# Patient Record
Sex: MALE | Race: WHITE | NOT HISPANIC OR LATINO | Employment: OTHER | URBAN - METROPOLITAN AREA
[De-identification: names, ages, dates, MRNs, and addresses within clinical notes are randomized per-mention and may not be internally consistent; named-entity substitution may affect disease eponyms.]

---

## 2020-06-25 ENCOUNTER — OFFICE VISIT (OUTPATIENT)
Dept: FAMILY MEDICINE CLINIC | Facility: CLINIC | Age: 80
End: 2020-06-25
Payer: MEDICARE

## 2020-06-25 VITALS
OXYGEN SATURATION: 98 % | BODY MASS INDEX: 27.4 KG/M2 | WEIGHT: 170.5 LBS | HEART RATE: 73 BPM | TEMPERATURE: 97.1 F | HEIGHT: 66 IN | SYSTOLIC BLOOD PRESSURE: 124 MMHG | DIASTOLIC BLOOD PRESSURE: 60 MMHG | RESPIRATION RATE: 16 BRPM

## 2020-06-25 DIAGNOSIS — F51.02 ADJUSTMENT INSOMNIA: ICD-10-CM

## 2020-06-25 DIAGNOSIS — R41.89 COGNITIVE IMPAIRMENT: Primary | ICD-10-CM

## 2020-06-25 DIAGNOSIS — F41.9 ANXIETY: ICD-10-CM

## 2020-06-25 DIAGNOSIS — F32.1 CURRENT MODERATE EPISODE OF MAJOR DEPRESSIVE DISORDER WITHOUT PRIOR EPISODE (HCC): ICD-10-CM

## 2020-06-25 DIAGNOSIS — F42.8 OBSESSION: ICD-10-CM

## 2020-06-25 PROBLEM — F32.9 CURRENT EPISODE OF MAJOR DEPRESSIVE DISORDER WITHOUT PRIOR EPISODE: Status: ACTIVE | Noted: 2020-06-25

## 2020-06-25 PROBLEM — G47.00 INSOMNIA: Status: ACTIVE | Noted: 2020-06-25

## 2020-06-25 PROCEDURE — 3008F BODY MASS INDEX DOCD: CPT | Performed by: INTERNAL MEDICINE

## 2020-06-25 PROCEDURE — 99214 OFFICE O/P EST MOD 30 MIN: CPT | Performed by: INTERNAL MEDICINE

## 2020-06-25 PROCEDURE — 1036F TOBACCO NON-USER: CPT | Performed by: INTERNAL MEDICINE

## 2020-06-25 PROCEDURE — 4040F PNEUMOC VAC/ADMIN/RCVD: CPT | Performed by: INTERNAL MEDICINE

## 2020-06-25 PROCEDURE — 1160F RVW MEDS BY RX/DR IN RCRD: CPT | Performed by: INTERNAL MEDICINE

## 2020-06-25 RX ORDER — RABEPRAZOLE SODIUM 20 MG/1
TABLET, DELAYED RELEASE ORAL
COMMUNITY
Start: 2020-02-10 | End: 2021-02-15

## 2020-06-25 RX ORDER — DUTASTERIDE 0.5 MG/1
CAPSULE, LIQUID FILLED ORAL
COMMUNITY
Start: 2020-04-09

## 2020-08-17 ENCOUNTER — APPOINTMENT (EMERGENCY)
Dept: RADIOLOGY | Facility: HOSPITAL | Age: 80
End: 2020-08-17
Payer: MEDICARE

## 2020-08-17 ENCOUNTER — APPOINTMENT (EMERGENCY)
Dept: CT IMAGING | Facility: HOSPITAL | Age: 80
End: 2020-08-17
Payer: MEDICARE

## 2020-08-17 ENCOUNTER — HOSPITAL ENCOUNTER (EMERGENCY)
Facility: HOSPITAL | Age: 80
Discharge: HOME | End: 2020-08-17
Attending: EMERGENCY MEDICINE | Admitting: EMERGENCY MEDICINE
Payer: MEDICARE

## 2020-08-17 VITALS
TEMPERATURE: 96.4 F | WEIGHT: 166 LBS | HEIGHT: 66 IN | DIASTOLIC BLOOD PRESSURE: 75 MMHG | RESPIRATION RATE: 16 BRPM | OXYGEN SATURATION: 97 % | HEART RATE: 75 BPM | SYSTOLIC BLOOD PRESSURE: 151 MMHG | BODY MASS INDEX: 26.68 KG/M2

## 2020-08-17 DIAGNOSIS — R10.12 LEFT UPPER QUADRANT ABDOMINAL PAIN: Primary | ICD-10-CM

## 2020-08-17 DIAGNOSIS — R07.9 CHEST PAIN, UNSPECIFIED TYPE: ICD-10-CM

## 2020-08-17 LAB
ALBUMIN SERPL BCP-MCNC: 3.9 G/DL (ref 3.4–4.8)
ALP SERPL-CCNC: 47.1 U/L (ref 10–129)
ALT SERPL W P-5'-P-CCNC: 12 U/L (ref 5–63)
ANION GAP SERPL CALCULATED.3IONS-SCNC: 8 MMOL/L (ref 4–13)
AST SERPL W P-5'-P-CCNC: 14 U/L (ref 15–41)
ATRIAL RATE: 63 BPM
BASOPHILS # BLD AUTO: 0.02 THOUSANDS/ΜL (ref 0–0.1)
BASOPHILS NFR BLD AUTO: 0 % (ref 0–1)
BILIRUB SERPL-MCNC: 0.82 MG/DL (ref 0.3–1.2)
BUN SERPL-MCNC: 15 MG/DL (ref 6–20)
CALCIUM SERPL-MCNC: 9 MG/DL (ref 8.4–10.2)
CHLORIDE SERPL-SCNC: 107 MMOL/L (ref 96–108)
CO2 SERPL-SCNC: 24 MMOL/L (ref 22–33)
CREAT SERPL-MCNC: 1.01 MG/DL (ref 0.5–1.2)
EOSINOPHIL # BLD AUTO: 0.05 THOUSAND/ΜL (ref 0–0.61)
EOSINOPHIL NFR BLD AUTO: 1 % (ref 0–6)
ERYTHROCYTE [DISTWIDTH] IN BLOOD BY AUTOMATED COUNT: 13.7 % (ref 11.6–15.1)
GFR SERPL CREATININE-BSD FRML MDRD: 70 ML/MIN/1.73SQ M
GLUCOSE SERPL-MCNC: 98 MG/DL (ref 65–140)
HCT VFR BLD AUTO: 44.5 % (ref 36.5–49.3)
HGB BLD-MCNC: 15.5 G/DL (ref 12–17)
IMM GRANULOCYTES # BLD AUTO: 0.01 THOUSAND/UL (ref 0–0.2)
IMM GRANULOCYTES NFR BLD AUTO: 0 % (ref 0–2)
LACTATE SERPL-SCNC: 0.7 MMOL/L (ref 0–2)
LIPASE SERPL-CCNC: 42 U/L (ref 13–60)
LYMPHOCYTES # BLD AUTO: 0.99 THOUSANDS/ΜL (ref 0.6–4.47)
LYMPHOCYTES NFR BLD AUTO: 15 % (ref 14–44)
MCH RBC QN AUTO: 30.3 PG (ref 26.8–34.3)
MCHC RBC AUTO-ENTMCNC: 34.8 G/DL (ref 31.4–37.4)
MCV RBC AUTO: 87 FL (ref 82–98)
MONOCYTES # BLD AUTO: 0.42 THOUSAND/ΜL (ref 0.17–1.22)
MONOCYTES NFR BLD AUTO: 6 % (ref 4–12)
NEUTROPHILS # BLD AUTO: 5.25 THOUSANDS/ΜL (ref 1.85–7.62)
NEUTS SEG NFR BLD AUTO: 78 % (ref 43–75)
P AXIS: 32 DEGREES
PLATELET # BLD AUTO: 194 THOUSANDS/UL (ref 149–390)
PMV BLD AUTO: 10.8 FL (ref 8.9–12.7)
POTASSIUM SERPL-SCNC: 4 MMOL/L (ref 3.5–5)
PR INTERVAL: 164 MS
PROT SERPL-MCNC: 6.1 G/DL (ref 6.4–8.3)
QRS AXIS: -33 DEGREES
QRSD INTERVAL: 100 MS
QT INTERVAL: 445 MS
QTC INTERVAL: 452 MS
RBC # BLD AUTO: 5.11 MILLION/UL (ref 3.88–5.62)
SODIUM SERPL-SCNC: 139 MMOL/L (ref 133–145)
T WAVE AXIS: 72 DEGREES
TROPONIN I SERPL-MCNC: <0.03 NG/ML (ref 0–0.07)
TROPONIN I SERPL-MCNC: <0.03 NG/ML (ref 0–0.07)
VENTRICULAR RATE: 62 BPM
WBC # BLD AUTO: 6.74 THOUSAND/UL (ref 4.31–10.16)

## 2020-08-17 PROCEDURE — 84484 ASSAY OF TROPONIN QUANT: CPT | Performed by: PHYSICIAN ASSISTANT

## 2020-08-17 PROCEDURE — 93010 ELECTROCARDIOGRAM REPORT: CPT | Performed by: INTERNAL MEDICINE

## 2020-08-17 PROCEDURE — 85025 COMPLETE CBC W/AUTO DIFF WBC: CPT | Performed by: PHYSICIAN ASSISTANT

## 2020-08-17 PROCEDURE — 93005 ELECTROCARDIOGRAM TRACING: CPT

## 2020-08-17 PROCEDURE — G1004 CDSM NDSC: HCPCS

## 2020-08-17 PROCEDURE — 71046 X-RAY EXAM CHEST 2 VIEWS: CPT

## 2020-08-17 PROCEDURE — 99285 EMERGENCY DEPT VISIT HI MDM: CPT | Performed by: PHYSICIAN ASSISTANT

## 2020-08-17 PROCEDURE — 83605 ASSAY OF LACTIC ACID: CPT | Performed by: PHYSICIAN ASSISTANT

## 2020-08-17 PROCEDURE — 80053 COMPREHEN METABOLIC PANEL: CPT | Performed by: PHYSICIAN ASSISTANT

## 2020-08-17 PROCEDURE — 96360 HYDRATION IV INFUSION INIT: CPT

## 2020-08-17 PROCEDURE — 36415 COLL VENOUS BLD VENIPUNCTURE: CPT | Performed by: PHYSICIAN ASSISTANT

## 2020-08-17 PROCEDURE — 74177 CT ABD & PELVIS W/CONTRAST: CPT

## 2020-08-17 PROCEDURE — 99285 EMERGENCY DEPT VISIT HI MDM: CPT

## 2020-08-17 PROCEDURE — 83690 ASSAY OF LIPASE: CPT | Performed by: PHYSICIAN ASSISTANT

## 2020-08-17 RX ADMIN — SODIUM CHLORIDE 1000 ML: 0.9 INJECTION, SOLUTION INTRAVENOUS at 14:55

## 2020-08-17 RX ADMIN — IOHEXOL 100 ML: 350 INJECTION, SOLUTION INTRAVENOUS at 15:47

## 2020-08-17 NOTE — ED NOTES
Pt ambulated to bathroom with steady, even gait, without difficulty at this time      Bharti Mcghee RN  08/17/20 5115

## 2020-08-17 NOTE — DISCHARGE INSTRUCTIONS
The exact cause of your symptoms is not yet known, but your evaluation during today's emergency department visit is within normal limits      Please follow-up with your primary care doctor as well as a cardiologist for further evaluation if symptoms persist

## 2020-08-19 ENCOUNTER — APPOINTMENT (OUTPATIENT)
Dept: LAB | Facility: HOSPITAL | Age: 80
End: 2020-08-19
Payer: MEDICARE

## 2020-08-19 ENCOUNTER — TRANSCRIBE ORDERS (OUTPATIENT)
Dept: ADMINISTRATIVE | Facility: HOSPITAL | Age: 80
End: 2020-08-19

## 2020-08-19 DIAGNOSIS — R97.20 ELEVATED PROSTATE SPECIFIC ANTIGEN (PSA): Primary | ICD-10-CM

## 2020-08-19 DIAGNOSIS — R97.20 ELEVATED PROSTATE SPECIFIC ANTIGEN (PSA): ICD-10-CM

## 2020-08-19 LAB — PSA SERPL-MCNC: 4 NG/ML (ref 0–4)

## 2020-08-19 PROCEDURE — G0103 PSA SCREENING: HCPCS

## 2020-08-19 PROCEDURE — 36415 COLL VENOUS BLD VENIPUNCTURE: CPT

## 2020-08-27 ENCOUNTER — OFFICE VISIT (OUTPATIENT)
Dept: FAMILY MEDICINE CLINIC | Facility: CLINIC | Age: 80
End: 2020-08-27
Payer: MEDICARE

## 2020-08-27 VITALS
DIASTOLIC BLOOD PRESSURE: 70 MMHG | RESPIRATION RATE: 16 BRPM | TEMPERATURE: 96.6 F | SYSTOLIC BLOOD PRESSURE: 120 MMHG | HEART RATE: 65 BPM | HEIGHT: 66 IN | WEIGHT: 165 LBS | BODY MASS INDEX: 26.52 KG/M2 | OXYGEN SATURATION: 98 %

## 2020-08-27 DIAGNOSIS — R68.89 MICROGRAPHIA: ICD-10-CM

## 2020-08-27 DIAGNOSIS — R41.3 MEMORY IMPAIRMENT: Primary | ICD-10-CM

## 2020-08-27 DIAGNOSIS — R25.1 TREMOR: ICD-10-CM

## 2020-08-27 PROCEDURE — 1036F TOBACCO NON-USER: CPT | Performed by: FAMILY MEDICINE

## 2020-08-27 PROCEDURE — 1160F RVW MEDS BY RX/DR IN RCRD: CPT | Performed by: FAMILY MEDICINE

## 2020-08-27 PROCEDURE — 4040F PNEUMOC VAC/ADMIN/RCVD: CPT | Performed by: FAMILY MEDICINE

## 2020-08-27 PROCEDURE — 99214 OFFICE O/P EST MOD 30 MIN: CPT | Performed by: FAMILY MEDICINE

## 2020-08-27 PROCEDURE — 3008F BODY MASS INDEX DOCD: CPT | Performed by: FAMILY MEDICINE

## 2020-08-27 NOTE — PROGRESS NOTES
Assessment/Plan:         Problem List Items Addressed This Visit        Other    Memory impairment - Primary     becomng more concerning to pt will get TSH Vit B12 and  Folate; will get neuro  consult         Relevant Orders    TSH, 3rd generation    Vitamin B12    Folate    Ambulatory referral to Neurology    Palmira Rivas     Will get neuro consult  rule out early parkinsons         Tremor     Only occasional tremor will get neuro eval rule out early parkinsons                 Subjective: pt here because of memory problems pt had this addressed by dr Tristan Infante 2 months ago no blood work  Done yet pt thinks he is getting  worse has problems with word recall cant finish sentences cant remember names and forgets purpose for some of his errand  Pt does feel his handwriting has gotten worse and describes it getting smaller and messier sometimes illegible  Pt with occasional tremor of hands pts mother had early onset alzheimers     Patient ID: Micheline Kwok is a 79 y o  male  HPI    The following portions of the patient's history were reviewed and updated as appropriate:   He has a past medical history of BPH (benign prostatic hyperplasia), Disorder of eye, Diverticulitis of rectum, GERD (gastroesophageal reflux disease), Infected hernioplasty mesh ([facility] [address] ), 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[facility] [address] ), Mixed hyperlipidemia, and [facility] )  ,  does not have any pertinent problems on file  ,   has a past surgical history that includes Hernia repair (Right); EGD (2019); EGD AND COLONOSCOPY (2017); and Skin cancer excision (02/21/2019)  ,  family history includes No Known Problems in his father and mother  ,   reports that he has quit smoking  He has never used smokeless tobacco  He reports current alcohol use  He reports previous drug use ,  is allergic to sulfa antibiotics     Current Outpatient Medications   Medication Sig Dispense Refill    dutasteride (AVODART) 0 5 mg capsule 1 CAPSULE PO DAILY      Multiple Vitamins-Minerals (MEMORY NICHOLAS PO) 1 po daily      Probiotic Product (PROBIOTIC ADVANCED PO) 1 po daily      RABEprazole (ACIPHEX) 20 MG tablet 1 TABLET PO DAILY      VITAMIN D, CHOLECALCIFEROL, PO 3 caps po daily       No current facility-administered medications for this visit  Review of Systems   Constitutional: Negative for appetite change, chills, fatigue and fever  Respiratory: Negative for cough, chest tightness and shortness of breath  Cardiovascular: Negative for chest pain, palpitations and leg swelling  Gastrointestinal: Negative for abdominal pain, constipation, diarrhea, nausea and vomiting  Genitourinary: Negative for difficulty urinating and frequency  Musculoskeletal: Negative for arthralgias, back pain and neck pain  Skin: Negative for rash  Neurological: Positive for tremors (occasional)  Negative for dizziness, weakness, light-headedness, numbness and headaches  Memory deficit and micrographia   Hematological: Does not bruise/bleed easily  Psychiatric/Behavioral: Negative for dysphoric mood and sleep disturbance  The patient is not nervous/anxious  Objective:  Vitals:    08/27/20 1431   BP: 120/70   BP Location: Left arm   Patient Position: Sitting   Cuff Size: Adult   Pulse: 65   Resp: 16   Temp: (!) 96 6 °F (35 9 °C)   SpO2: 98%   Weight: 74 8 kg (165 lb)   Height: 5' 6" (1 676 m)     Body mass index is 26 63 kg/m²  Physical Exam  Vitals signs reviewed  Constitutional:       General: He is not in acute distress  Appearance: Normal appearance  He is well-developed  Eyes:      Conjunctiva/sclera: Conjunctivae normal       Pupils: Pupils are equal, round, and reactive to light  Neck:      Musculoskeletal: Normal range of motion and neck supple  Thyroid: No thyromegaly  Vascular: No carotid bruit  Cardiovascular:      Rate and Rhythm: Normal rate and regular rhythm  Pulses: Normal pulses        Heart sounds: Normal heart sounds  No murmur  Comments: No carotid bruits  Pulmonary:      Effort: Pulmonary effort is normal  No respiratory distress  Breath sounds: Normal breath sounds  Chest:      Chest wall: No tenderness  Abdominal:      General: Bowel sounds are normal  There is no distension  Palpations: Abdomen is soft  Tenderness: There is no abdominal tenderness  Lymphadenopathy:      Cervical: No cervical adenopathy  Skin:     General: Skin is warm and dry  Neurological:      General: No focal deficit present  Mental Status: He is alert and oriented to person, place, and time  Cranial Nerves: No cranial nerve deficit  Motor: No weakness  Coordination: Coordination normal       Gait: Gait normal       Deep Tendon Reflexes: Reflexes normal       Comments: No visible tremor   Psychiatric:         Mood and Affect: Mood normal          Behavior: Behavior normal          Thought Content:  Thought content normal        Mini mental status score 29/30

## 2020-08-28 ENCOUNTER — APPOINTMENT (OUTPATIENT)
Dept: LAB | Facility: HOSPITAL | Age: 80
End: 2020-08-28
Payer: MEDICARE

## 2020-08-28 DIAGNOSIS — R41.3 MEMORY IMPAIRMENT: ICD-10-CM

## 2020-08-28 LAB
FOLATE SERPL-MCNC: >20 NG/ML (ref 3.1–17.5)
TSH SERPL DL<=0.05 MIU/L-ACNC: 1.86 UIU/ML (ref 0.34–5.6)
VIT B12 SERPL-MCNC: 1758 PG/ML (ref 100–900)

## 2020-08-28 PROCEDURE — 84443 ASSAY THYROID STIM HORMONE: CPT

## 2020-08-28 PROCEDURE — 36415 COLL VENOUS BLD VENIPUNCTURE: CPT

## 2020-08-28 PROCEDURE — 82607 VITAMIN B-12: CPT

## 2020-08-28 PROCEDURE — 82746 ASSAY OF FOLIC ACID SERUM: CPT

## 2021-01-04 ENCOUNTER — CONSULT (OUTPATIENT)
Dept: NEUROLOGY | Facility: CLINIC | Age: 81
End: 2021-01-04
Payer: MEDICARE

## 2021-01-04 VITALS
SYSTOLIC BLOOD PRESSURE: 124 MMHG | BODY MASS INDEX: 25.25 KG/M2 | HEIGHT: 68 IN | DIASTOLIC BLOOD PRESSURE: 62 MMHG | WEIGHT: 166.6 LBS

## 2021-01-04 DIAGNOSIS — R41.3 MEMORY IMPAIRMENT: ICD-10-CM

## 2021-01-04 PROCEDURE — 99204 OFFICE O/P NEW MOD 45 MIN: CPT | Performed by: PSYCHIATRY & NEUROLOGY

## 2021-01-04 RX ORDER — MULTIVIT WITH MINERALS/LUTEIN
1000 TABLET ORAL DAILY
COMMUNITY

## 2021-01-04 NOTE — PROGRESS NOTES
Assessment/Plan:    Memory impairment  32-year-old man presents for evaluation of cognitive symptoms  Mostly describes frontal/inattentive symptoms such as losing objects around his house  He also has trouble with proper nouns, a common symptom normal cognitive aging  He struggled some on bedside cognitive testing scoring a 22/30 on the MoCA  He has a positive family history of Alzheimer's dementia in his mother  The patient had a single episode of sleep walking  Did have this as a child as well  Discussed option of acquiring an EEG given the low likelihood of frontal nocturnal seizures  He would like to hold off for the time being  Reviewed the patient's last PCP note in addition to labs including a normal TSH and normal B12  We will acquire an MRI with [facility]  Will call with any significant findings and discuss details at follow up  Follow up with Tyrese Stephenson, could transfer to geriatrics there after - this was discussed  Diagnoses and all orders for this visit:    Memory impairment  -     Ambulatory referral to Neurology  -     MRI brain [facility] wo contrast; Future    Other orders  -     vitamin E, tocopherol, 1,000 units capsule; Take 1,000 Units by mouth daily        Subjective:     Patient ID: Baron Austin 79 y o  male    I had the pleasure of seeing your patient, Baron Austin in the Movement Disorders Clinic at the [facility]  The patient is a 79 y o   male who presents for for evaluation of cognitive decline  He presents today with his wife  They describe mostly trouble recalling proper nouns, names of people and addresses  He also has a tendency to lose things around his house  The patient's wife describes a tendency to perseverate on ideas  Often wants to talk about the same thing over and over again though he does remember that he has talked about it already  These cognitive symptoms began about 7 months ago    He also notices that he S to put in more effort to concentrate on reading  The patient was educated through graduate school  He was career [employer], 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  No history of head trauma  Getting lost: no   Story recall: no   Repeating: no     Family History: Mother had Alzheimer's -  in her late 62s  Sister, younger - fall head injury     Regarding motor symptoms:   Tremor: mild, occasional  Witting usually   Slowness: things take longer due to detraction   Stiffness: no   Dystonia: no   Changes in facial expression: no   Changes in voice: no   Changes in writin year  Messy, gets confused  Has had some small letters "smaller than I can handle" normal sample   Changes in gait: no, walks a lot with dog   Falls:no  Freezing: no  Trouble with swallowing:no  Clumsiness/dexterity:no    Regarding non-motor symptoms:   Anosmia: no  Constipation: no  Lightheadedness: minor   Changes in Mood: ok per patient  More anxious "gets strong once in a while" never on medication for mood  Trouble with sleep: was bad over the summer but ok now  REM behavior Disorder: sleep walking once, fell on the stairs  Doesn't recall anything about it  Never acts out his dreams otherwise  The following portions of the patient's history were reviewed and updated as appropriate: allergies, current medications, past family history, past medical history, past social history, past surgical history and problem list     Objective:  /62 (BP Location: Left arm, Patient Position: Sitting, Cuff Size: Standard)   Ht 5' 8" (1 727 m)   Wt 75 6 kg (166 lb 9 6 oz)   BMI 25 33 kg/m²     Physical Exam    Neurological Exam    GENERAL MEDICAL EXAMINATION:  General appearance: alert, in no apparent distress  Appropriately dressed and groomed  Conversing and interacting appropriately  Eyes: Sclera are non-injected     Ears, nose, Mouth, Throat: Mucous membranes are moist    Resp: Breathing comfortably on RA   Musculoskeletal: No evidence of deformities  No contractures  No Edema  Skin: No visible rashes  Warm and well perfused  Psych: normal and appropriate affect     Mental Status:  Alert and oriented to person place and time  Able to relate history without difficulty  Attentive to conversation  Language is fluent and appropriate with normal prosody  There were no paraphasic errors  Speech was not dysarthric  Able to follow both midline and appendicular commands  MoCA 22/30    General Neurology examination:   PERRL, EOMI, Face activates symmetrically  Normal bulk and tone throughout  Patient moves all 4 extremities equally and antigravity  No pronator drift  There were no adventitious movements noted  Normal sensation to light touch and temperature in all 4 extremities  Finger to nose without dysmetria bilaterally  No intention tremor  Fine ALAN were accurate and symmetric with regard to dg and speed  ALAN are irregular in right leg  Normal tone  No tremor  Increased spontaneous movements - psychomotor agitation  No hypomimia or hypophonia     Dysmetria: none on FNF  Dyskinesia: none  Dystonia: none   Myoclonus: none     Gait:   Able to rise from a chair without the use of arms  Posture was normal  Narrow-base and stable stance  Normal initiation  Normal stride length, step height, arm swing  Turn completed in 2 steps  Review of Systems   Constitutional: Negative  Negative for appetite change and fever  HENT: Negative  Negative for hearing loss, tinnitus, trouble swallowing and voice change  Eyes: Negative  Negative for photophobia and pain  Respiratory: Negative  Negative for shortness of breath  Cardiovascular: Negative  Negative for palpitations  Gastrointestinal: Negative  Negative for nausea and vomiting  Endocrine: Negative  Negative for cold intolerance  Genitourinary: Negative  Negative for dysuria, frequency and urgency     Musculoskeletal: Negative  Negative for myalgias and neck pain  Skin: Negative  Negative for rash  Neurological: Positive for numbness (left pointer finger)  Negative for dizziness, tremors, seizures, syncope, facial asymmetry, speech difficulty, weakness, light-headedness and headaches  Hematological: Negative  Does not bruise/bleed easily  Psychiatric/Behavioral: Negative for hallucinations and sleep disturbance  All other systems reviewed and are negative  The above ROS was reviewed and updated  Current Outpatient Medications on File Prior to Visit   Medication Sig Dispense Refill    dutasteride (AVODART) 0 5 mg capsule 1 CAPSULE PO DAILY      Multiple Vitamins-Minerals (MEMORY NICHOLAS PO) 1 po daily      Probiotic Product (PROBIOTIC ADVANCED PO) 1 po daily      RABEprazole (ACIPHEX) 20 MG tablet 1 TABLET PO DAILY      VITAMIN D, CHOLECALCIFEROL, PO 3 caps po daily      vitamin E, tocopherol, 1,000 units capsule Take 1,000 Units by mouth daily       No current facility-administered medications on file prior to visit          Rachana Jade MD  Medical Director   [facility] [address]  [facility]

## 2021-01-04 NOTE — PATIENT INSTRUCTIONS
Things that we know are helpful for thinking and memory   1) Exercise program: gradually increase your physical activity over time  Start small and be patient  Aerobic (cardio) activity is best but incorporate balance, strength and flexibility training as well    a  Try to get at least 30min 3 times per week   2) Diet: Mediterranean diet (colorful fruits and vegetables, olive oil, fish, whole grains, very little red meet is any), MIND diet, anti-inflammatory diet  a  Stay well hydrated: drink 6-8 glasses of water per day   b  What's good for your heart is good for your brain  c  Avoid high-salt foods   3) Sleep: aim for at least 7-8 hours per night   a  Avoid alcohol and medications like Benadryl (Tylenol PM) or other sedating drugs  4) Stress management/mindfulness practice:   a  Talk with our social workers about finding a cognitive behavioral therapists  b  Try a smart phone piedad like [employer] or [employer] for beginners   i  Try curable for pain    c  Take a course in Mindfulness Based Stress Reduction (MBSR)   i  [place] reyes  Read or listen to an audiobook about it:  i  Mindfulness for beginners   ii  10% happier  iii  The happiness advantage   5) Social engagement:   a  Stay in touch with family and friends   b  Plan a few specific activities for your social health every week   c  Melva Estes a local support group   d  Volunteer! www [facility] org/volunteernow or call [phone]    MIND diet score:  1 point for each component      - Green leafy vegetables: at least 6 per week  - Other vegetable: at least 1 per day   - Berries: at least 2 per week  - Red meat: fewer than 4 per week   - Fish: at least 1 per week   - Poultry: at least 2 per week  - Beans: at least 3 per week  - Nuts: at least 5 per week  - Fast or fried food: less than 1 per week  - Olive oil   - Butter less: less than 1 table-spoon per day   - Cheese: less than 1 serving per week   - Pastries/sweets: less than 5 servings per week  - Alcohol: no more than 1 serving/ day

## 2021-01-04 NOTE — ASSESSMENT & PLAN NOTE
78-year-old man presents for evaluation of cognitive symptoms  Mostly describes frontal/inattentive symptoms such as losing objects around his house  He also has trouble with proper nouns, a common symptom normal cognitive aging  He struggled some on bedside cognitive testing scoring a 22/30 on the MoCA  He has a positive family history of Alzheimer's dementia in his mother  The patient had a single episode of sleep walking  Did have this as a child as well  Discussed option of acquiring an EEG given the low likelihood of frontal nocturnal seizures  He would like to hold off for the time being  Reviewed the patient's last PCP note in addition to labs including a normal TSH and normal B12  We will acquire an MRI with [facility]  Will call with any significant findings and discuss details at follow up  Follow up with Estle Alpers, could transfer to geriatrics there after - this was discussed

## 2021-01-08 ENCOUNTER — TELEPHONE (OUTPATIENT)
Dept: FAMILY MEDICINE CLINIC | Facility: CLINIC | Age: 81
End: 2021-01-08

## 2021-01-08 ENCOUNTER — OFFICE VISIT (OUTPATIENT)
Dept: FAMILY MEDICINE CLINIC | Facility: CLINIC | Age: 81
End: 2021-01-08
Payer: MEDICARE

## 2021-01-08 ENCOUNTER — HOSPITAL ENCOUNTER (OUTPATIENT)
Dept: RADIOLOGY | Facility: HOSPITAL | Age: 81
Discharge: HOME | End: 2021-01-08
Attending: INTERNAL MEDICINE
Payer: MEDICARE

## 2021-01-08 VITALS
WEIGHT: 166.5 LBS | BODY MASS INDEX: 25.23 KG/M2 | HEIGHT: 68 IN | SYSTOLIC BLOOD PRESSURE: 112 MMHG | OXYGEN SATURATION: 98 % | RESPIRATION RATE: 16 BRPM | DIASTOLIC BLOOD PRESSURE: 60 MMHG | HEART RATE: 49 BPM | TEMPERATURE: 97.3 F

## 2021-01-08 DIAGNOSIS — M79.672 PAIN OF LEFT HEEL: ICD-10-CM

## 2021-01-08 DIAGNOSIS — M79.672 PAIN OF LEFT HEEL: Primary | ICD-10-CM

## 2021-01-08 PROCEDURE — 73630 X-RAY EXAM OF FOOT: CPT

## 2021-01-08 PROCEDURE — 73650 X-RAY EXAM OF HEEL: CPT

## 2021-01-08 PROCEDURE — 99214 OFFICE O/P EST MOD 30 MIN: CPT | Performed by: INTERNAL MEDICINE

## 2021-01-08 NOTE — PROGRESS NOTES
BMI Counseling: Body mass index is 25 32 kg/m²  The BMI is above normal  Nutrition recommendations include reducing portion sizes, decreasing overall calorie intake, 3-5 servings of fruits/vegetables daily, reducing fast food intake, consuming healthier snacks, decreasing soda and/or juice intake and moderation in carbohydrate intake  Assessment/Plan:    No problem-specific Assessment & Plan notes found for this encounter  Diagnoses and all orders for this visit:    Pain of left heel  -     XR heel / calcaneus 2+ vw left; Future  -     XR foot 3+ vw left; Future    BMI 25 0-25 9,adult      Possible plantar fascitis and this was discussed at length with patient regarding ice therapy, nsaids, voltaren gel, and plantar fascitis exercises-will order foot xray to rule out fracture    Subjective:      Patient ID: Sha Reece is a 79 y o  male  Mr Katrina Jackson has had bad left heel pain for several days now-he is quite the walker, walking his dog several times per day-saw Neurology recently for his memory impairment issues and says he tapped his feet on the floor several times but doesn't think that was the cause of his heel pain-no puncture wounds and doesn't remember injuring it          Review of Systems   Constitutional: Negative for fatigue and fever  Musculoskeletal: Positive for arthralgias  Objective:      /60 (BP Location: Right arm, Patient Position: Sitting, Cuff Size: Adult)   Pulse (!) 49   Temp (!) 97 3 °F (36 3 °C) (Temporal)   Resp 16   Ht 5' 8" (1 727 m)   Wt 75 5 kg (166 lb 8 oz)   SpO2 98%   BMI 25 32 kg/m²          Physical Exam  Constitutional:       Appearance: Normal appearance  HENT:      Head: Normocephalic and atraumatic  Neck:      Musculoskeletal: Normal range of motion     Pulmonary:      Effort: Pulmonary effort is normal    Musculoskeletal:      Comments: Left heel no erythema or wound, slight puffiness    Neurological:      Mental Status: He is alert

## 2021-01-19 ENCOUNTER — HOSPITAL ENCOUNTER (OUTPATIENT)
Dept: MRI IMAGING | Facility: HOSPITAL | Age: 81
Discharge: HOME | End: 2021-01-19
Attending: PSYCHIATRY & NEUROLOGY
Payer: MEDICARE

## 2021-01-19 DIAGNOSIS — R41.3 MEMORY IMPAIRMENT: ICD-10-CM

## 2021-01-19 PROCEDURE — 70551 MRI BRAIN STEM W/O DYE: CPT

## 2021-01-19 PROCEDURE — G1004 CDSM NDSC: HCPCS

## 2021-01-21 ENCOUNTER — TELEPHONE (OUTPATIENT)
Dept: NEUROLOGY | Facility: CLINIC | Age: 81
End: 2021-01-21

## 2021-01-21 NOTE — TELEPHONE ENCOUNTER
Called patient, made him aware of below  Patient verbalized understanding and denied any further questions/concerns at this time

## 2021-01-21 NOTE — TELEPHONE ENCOUNTER
----- Message from Mary Baker MD sent at 1/21/2021  8:26 AM EST -----  Please call the patient regarding his MRI result  No major abnormal findings on the MRI  Will review details at follow up

## 2021-01-27 DIAGNOSIS — Z23 ENCOUNTER FOR IMMUNIZATION: ICD-10-CM

## 2021-02-15 DIAGNOSIS — K21.00 GASTROESOPHAGEAL REFLUX DISEASE WITH ESOPHAGITIS WITHOUT HEMORRHAGE: Primary | ICD-10-CM

## 2021-02-15 RX ORDER — RABEPRAZOLE SODIUM 20 MG/1
TABLET, DELAYED RELEASE ORAL
Qty: 90 TABLET | Refills: 0 | Status: SHIPPED | OUTPATIENT
Start: 2021-02-15 | End: 2021-05-19

## 2021-02-25 ENCOUNTER — IMMUNIZATIONS (OUTPATIENT)
Dept: FAMILY MEDICINE CLINIC | Facility: HOSPITAL | Age: 81
End: 2021-02-25

## 2021-02-25 DIAGNOSIS — Z23 ENCOUNTER FOR IMMUNIZATION: Primary | ICD-10-CM

## 2021-02-25 PROCEDURE — 0001A SARS-COV-2 / COVID-19 MRNA VACCINE (PFIZER-BIONTECH) 30 MCG: CPT

## 2021-02-25 PROCEDURE — 91300 SARS-COV-2 / COVID-19 MRNA VACCINE (PFIZER-BIONTECH) 30 MCG: CPT

## 2021-03-16 ENCOUNTER — IMMUNIZATIONS (OUTPATIENT)
Dept: FAMILY MEDICINE CLINIC | Facility: HOSPITAL | Age: 81
End: 2021-03-16

## 2021-03-16 DIAGNOSIS — Z23 ENCOUNTER FOR IMMUNIZATION: Primary | ICD-10-CM

## 2021-03-16 PROCEDURE — 91300 SARS-COV-2 / COVID-19 MRNA VACCINE (PFIZER-BIONTECH) 30 MCG: CPT

## 2021-03-16 PROCEDURE — 0002A SARS-COV-2 / COVID-19 MRNA VACCINE (PFIZER-BIONTECH) 30 MCG: CPT

## 2021-04-26 ENCOUNTER — APPOINTMENT (OUTPATIENT)
Dept: LAB | Facility: HOSPITAL | Age: 81
End: 2021-04-26
Attending: SPECIALIST
Payer: MEDICARE

## 2021-04-26 ENCOUNTER — TRANSCRIBE ORDERS (OUTPATIENT)
Dept: ADMINISTRATIVE | Facility: HOSPITAL | Age: 81
End: 2021-04-26

## 2021-04-26 DIAGNOSIS — C61 MALIGNANT NEOPLASM OF PROSTATE (HCC): Primary | ICD-10-CM

## 2021-04-26 DIAGNOSIS — C61 MALIGNANT NEOPLASM OF PROSTATE (HCC): ICD-10-CM

## 2021-04-26 PROCEDURE — 84153 ASSAY OF PSA TOTAL: CPT

## 2021-04-26 PROCEDURE — 36415 COLL VENOUS BLD VENIPUNCTURE: CPT

## 2021-04-27 LAB — PSA SERPL-MCNC: 3 NG/ML (ref 0–4)

## 2021-05-06 ENCOUNTER — OFFICE VISIT (OUTPATIENT)
Dept: GASTROENTEROLOGY | Facility: CLINIC | Age: 81
End: 2021-05-06
Payer: MEDICARE

## 2021-05-06 VITALS
HEART RATE: 59 BPM | BODY MASS INDEX: 25.01 KG/M2 | DIASTOLIC BLOOD PRESSURE: 70 MMHG | SYSTOLIC BLOOD PRESSURE: 135 MMHG | WEIGHT: 165 LBS | HEIGHT: 68 IN

## 2021-05-06 DIAGNOSIS — K21.00 GASTROESOPHAGEAL REFLUX DISEASE WITH ESOPHAGITIS WITHOUT HEMORRHAGE: ICD-10-CM

## 2021-05-06 DIAGNOSIS — K86.2 PANCREAS CYST: Primary | ICD-10-CM

## 2021-05-06 PROCEDURE — 99214 OFFICE O/P EST MOD 30 MIN: CPT | Performed by: INTERNAL MEDICINE

## 2021-05-06 NOTE — PROGRESS NOTES
[facility] - Outpatient Follow-up Note  Kaden Buckner [de-identified] y o  male MRN: [id number]  Encounter: [id number]          ASSESSMENT AND PLAN:      1  Pancreas cyst   prior EUS failed to show any concerning lesion and last MRI was done in 2018 and at that time was noted to have no true cyst at the uncinate process of the pancreas or the pancreatic head with no evidence of pancreatic enlargement and there was a hemangioma of the and there was some thickening of the stomach at that time which prompted EGD  For repeat MRI at this time and script was given to get kidney function since he will be getting contrast   - MRI abdomen w wo contrast; Future  - Basic metabolic panel; Future    2  Gastroesophageal reflux disease with esophagitis without hemorrhage   controlled with rabeprazole every other day and will continue this regimen    3  Hx of colon polyps   colonoscopy due in 2022 for screening purposes    We will see patient in 6 months unless otherwise indicated  Patient was seen and examined with Dr Husam Groves   ______________________________________________________________________    SUBJECTIVE:        This is an 26-year-old male who presents today for a follow-up  He does have history of pancreatic cyst and underwent EUS back in 2018 an MRI has been followed and he was due for 1 in December of 2020  He had EGD last 2019 and was noted to have mild antritis gastric polyps hiatal hernia and open ring  Patient last had colonoscopy in 2017 and at that time patient was noted to have no polyps but does have history of tubular adenomas an EGD and colonoscopy are due in 2022  Reflux symptoms are controlled on rabeprazole  Denies any difficulty swallowing and denies any change in bowel habits  REVIEW OF SYSTEMS IS OTHERWISE NEGATIVE        Historical Information   Past Medical History:   Diagnosis Date    BPH (benign prostatic hyperplasia)     Disorder of eye     disorder of choroid of eye  Diverticulitis of rectum     GERD (gastroesophageal reflux disease)     Infected hernioplasty mesh (HCC)     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[address] )     Mixed hyperlipidemia     Skin cancer (melanoma) (HCC)     Uveal, Caroidal     Past Surgical History:   Procedure Laterality Date    EGD  2019    EGD AND COLONOSCOPY  2017    HERNIA REPAIR Right     Inguinal/Umbilical    SKIN CANCER EXCISION  02/21/2019    R upper cheek, benign lesion including margins, face, ears, eyelids, nose, lips, mucous membrane, excised diameter 2 1 to 3 0 CM     Social History   Social History     Substance and Sexual Activity   Alcohol Use Yes    Comment: mod     Social History     Substance and Sexual Activity   Drug Use Not Currently    Comment: DENIES     Social History     Tobacco Use   Smoking Status Former Smoker   Smokeless Tobacco Never Used   Tobacco Comment    QUIT IN 1969     Family History   Problem Relation Age of Onset    Alzheimer's disease Mother     No Known Problems Father        Meds/Allergies       Current Outpatient Medications:     dutasteride (AVODART) 0 5 mg capsule    Multiple Vitamins-Minerals (MEMORY NICHOLAS PO)    Probiotic Product (PROBIOTIC ADVANCED PO)    RABEprazole (ACIPHEX) 20 MG tablet    VITAMIN D, CHOLECALCIFEROL, PO    vitamin E, tocopherol, 1,000 units capsule    Allergies   Allergen Reactions    Sulfa Antibiotics            Objective     Blood pressure 135/70, pulse 59, height 5' 8" (1 727 m), weight 74 8 kg (165 lb)  Body mass index is 25 09 kg/m²  PHYSICAL EXAM:      General Appearance:   Alert, cooperative, no distress   HEENT:   Normocephalic, atraumatic, anicteric      Neck:  Supple, symmetrical, trachea midline   Lungs:   Clear to auscultation bilaterally; no rales, rhonchi or wheezing; respirations unlabored    Heart[de-identified]   Regular rate and rhythm; no murmur, rub, or gallop     Abdomen:   Soft, non-tender, non-distended; normal bowel sounds; no masses, no organomegaly    Genitalia:   Deferred    Rectal:   Deferred    Extremities:  No cyanosis, clubbing or edema    Pulses:  2+ and symmetric    Skin:  No jaundice, rashes, or lesions    Lymph nodes:  No palpable cervical lymphadenopathy        Lab Results:   No visits with results within 1 Day(s) from this visit  Latest known visit with results is:   Appointment on 04/26/2021   Component Date Value    PSA 04/26/2021 3 0          Radiology Results:   No results found

## 2021-05-19 DIAGNOSIS — K21.00 GASTROESOPHAGEAL REFLUX DISEASE WITH ESOPHAGITIS WITHOUT HEMORRHAGE: ICD-10-CM

## 2021-05-19 RX ORDER — RABEPRAZOLE SODIUM 20 MG/1
TABLET, DELAYED RELEASE ORAL
Qty: 90 TABLET | Refills: 0 | Status: SHIPPED | OUTPATIENT
Start: 2021-05-19 | End: 2021-08-16

## 2021-06-08 ENCOUNTER — HOSPITAL ENCOUNTER (OUTPATIENT)
Dept: MRI IMAGING | Facility: HOSPITAL | Age: 81
Discharge: HOME | End: 2021-06-08
Payer: MEDICARE

## 2021-06-08 DIAGNOSIS — K86.2 PANCREAS CYST: ICD-10-CM

## 2021-06-08 PROCEDURE — 74183 MRI ABD W/O CNTR FLWD CNTR: CPT

## 2021-06-08 PROCEDURE — G1004 CDSM NDSC: HCPCS

## 2021-06-08 PROCEDURE — A9585 GADOBUTROL INJECTION: HCPCS | Performed by: PHYSICIAN ASSISTANT

## 2021-06-08 RX ADMIN — GADOBUTROL 7.5 ML: 604.72 INJECTION INTRAVENOUS at 11:51

## 2021-06-08 NOTE — LETTER
[address] [place] [address]  [address] [facility] [address] June 17, 2021    MRN: [id number]     Phone: [phone]     Dear Mr Alyssia Sheikh recently had a(n) MRI performed on 6/8/2021 at  [address] [place] [address] that was requested by Mandie Crane PA-C  The study was reviewed by a radiologist, which is a physician who specializes in medical imaging  The radiologist issued a report describing his or her findings  In that report there was a finding that the radiologist felt warranted further discussion with your health care provider and that discussion would be beneficial to you  The results were sent to Mandie Crane PA-C on 06/15/2021 12:03 PM  We recommend that you contact Mandie Crane PA-C at [phone] or set up an appointment to discuss the results of the imaging test  If you have already heard from Mandie Crane PA-C regarding the results of your study, you can disregard this letter  This letter is not meant to alarm you, but intended to encourage you to follow-up on your results with the provider that sent you for the imaging study  In addition, we have enclosed answers to frequently asked questions by other patients who have also received a letter to review results with their health care provider (see page two)  Thank you for choosing [address] [place] [address] for your medical imaging needs  FREQUENTLY ASKED QUESTIONS    1  Why am I receiving this letter? [facility] requires us to notify patients who have findings on imaging exams that may require more testing or follow-up with a health professional within the next 3 months          2  How serious is the finding on the imaging test?  This letter is sent to all patients who may need follow-up or more testing within the next 3 months  Receiving this letter does not necessarily mean you have a life-threatening imaging finding or disease  Recommendations in the radiologists imaging report are general in nature and it is up to your healthcare provider to say whether those recommendations make sense for your situation  You are strongly encouraged to talk to your health care provider about the results and ask whether additional steps need to be taken  3  Where can I get a copy of the final report for my recent radiology exam?  To get a full copy of the report you can access your records online at [email or web address] or please contact [address] [place] [address] Records Department at [phone] Monday through Friday between 8 am and 6 pm          4  What do I need to do now? Please contact your health care provider who requested the imaging study to discuss what further actions (if any) are needed  You may have already heard from (your ordering provider) in regard to this test in which case you can disregard this letter  NOTICE IN ACCORDANCE WITH THE [facility] PATIENT TEST RESULT INFORMATION ACT OF 2018    You are receiving this notice as a result of a determination by your diagnostic imaging service that further discussions of your test results are warranted and would be beneficial to you  The complete results of your test or tests have been or will be sent to the health care practitioner that ordered the test or tests  It is recommended that you contact your health care practitioner to discuss your results as soon as possible

## 2021-06-09 ENCOUNTER — TELEPHONE (OUTPATIENT)
Dept: NEUROLOGY | Facility: CLINIC | Age: 81
End: 2021-06-09

## 2021-06-14 ENCOUNTER — OFFICE VISIT (OUTPATIENT)
Dept: NEUROLOGY | Facility: CLINIC | Age: 81
End: 2021-06-14
Payer: MEDICARE

## 2021-06-14 VITALS
SYSTOLIC BLOOD PRESSURE: 118 MMHG | HEART RATE: 75 BPM | WEIGHT: 166 LBS | BODY MASS INDEX: 26.06 KG/M2 | DIASTOLIC BLOOD PRESSURE: 82 MMHG | HEIGHT: 67 IN

## 2021-06-14 DIAGNOSIS — R41.89 COGNITIVE IMPAIRMENT: Primary | ICD-10-CM

## 2021-06-14 DIAGNOSIS — R41.3 MEMORY IMPAIRMENT: ICD-10-CM

## 2021-06-14 PROCEDURE — 99214 OFFICE O/P EST MOD 30 MIN: CPT | Performed by: NURSE PRACTITIONER

## 2021-06-14 RX ORDER — MULTIVIT WITH MINERALS/LUTEIN
1000 TABLET ORAL DAILY
COMMUNITY

## 2021-06-14 NOTE — PATIENT INSTRUCTIONS
Medication we could trial is called aricept or donepazil  Things that we know are helpful for thinking and memory   Exercise program: gradually increase your physical activity over time  Start small and be patient  Aerobic (cardio) activity is best but incorporate balance, strength and flexibility training as well  Try to get at least 30min 3 times per week   Diet: Mediterranean diet (colorful fruits and vegetables, olive oil, fish, whole grains, very little red meet is any), MIND diet, anti-inflammatory diet  Stay well hydrated: drink 6-8 glasses of water per day   What's good for your heart is good for your brain   Avoid high-salt foods   Sleep: aim for at least 7-8 hours per night   Avoid alcohol and medications like Benadryl (Tylenol PM) or other sedating drugs   Stress management/mindfulness practice:   Talk with our social workers about finding a cognitive behavioral therapists   Try a smart phone piedad like "[employer]" or "mindfulness for beginners"   Try "curable" for pain     Take a course in Mindfulness Based Stress Reduction (MBSR)   [place]al   Read or listen to an audiobook about it:   Mindfulness for beginners   10% happier   The happiness advantage   Social engagement:   Stay in touch with family and friends   Plan a few specific activities for your social health every week   Join a local support group   Volunteer! www [facility] org/[email or web address] or call [phone]     MIND diet score:   1 point for each component       Green leafy vegetables: at least 6 per week   Other vegetable: at least 1 per day   Berries: at least 2 per week   Red meat: fewer than 4 per week   Fish: at least 1 per week   Poultry: at least 2 per week   Beans: at least 3 per week   Nuts: at least 5 per week   Fast or fried food: less than 1 per week   Olive oil   Butter less: less than 1 table-spoon per day   Cheese: less than 1 serving per week   Pastries/sweets: less than 5 servings per week   Alcohol: no more than 1 serving/ day

## 2021-06-14 NOTE — ASSESSMENT & PLAN NOTE
Overall patient feels that his memory has slightly declined in the past 6 months  He continues to describe some frontal/inattentive symptoms such as losing objects, he also has difficulty remembering names and short-term recall but is able to remember these items in time  We reviewed his MRI with NeuroQuant in which showed a possible neuro degenerative process such as Alzheimer's, however at this time his symptoms would be considered mild and/or possibly age-related  We discussed quite a few possibilities as far as considering medication options such as donepezil  We discussed risks and benefits of this medication and that this medication would only slow progression of a neurodegenerative condition such as Alzheimer's as there is no cure at present time for this type of condition  He also inquired about possibly repeating the MRI which we could consider in the future, however this likely would not change his treatment if his memory continued to decline  His wife notes that he does get embarrassed by his forgetfulness and has been recently stressed with the possibility of moving and we discussed that stress and mood can negatively affect memory as well  We discussed referral to [facility] Care/geriatrics given his MRI results and memory changes  He will plan follow-up with us in 5-6 months in which we would obtain [place], but if he is established with geriatrics he may transition his care to there  At this time he did not want to start any medications, but may consider Aricept in the future and he will contact our office if he wishes to start prior to his next appointment  Patient was encouraged to increase mind stimulating activities such as reading, crosswords, word searches, puzzles, Soduku, solitaire, coloring and other brain games  We also discussed the importance of staying physically active and eating a health diet such as the Mediterranean or MIND diet

## 2021-06-14 NOTE — PROGRESS NOTES
Patient ID: Kaden Buckner is a [de-identified] y o  male  Assessment/Plan:    Memory impairment  Overall patient feels that his memory has slightly declined in the past 6 months  He continues to describe some frontal/inattentive symptoms such as losing objects, he also has difficulty remembering names and short-term recall but is able to remember these items in time  We reviewed his MRI with NeuroQuant in which showed a possible neuro degenerative process such as Alzheimer's, however at this time his symptoms would be considered mild and/or possibly age-related  We discussed quite a few possibilities as far as considering medication options such as donepezil  We discussed risks and benefits of this medication and that this medication would only slow progression of a neurodegenerative condition such as Alzheimer's as there is no cure at present time for this type of condition  He also inquired about possibly repeating the MRI which we could consider in the future, however this likely would not change his treatment if his memory continued to decline  His wife notes that he does get embarrassed by his forgetfulness and has been recently stressed with the possibility of moving and we discussed that stress and mood can negatively affect memory as well  We discussed referral to Senior Care/geriatrics given his MRI results and memory changes  He will plan follow-up with us in 5-6 months in which we would obtain [place], but if he is established with geriatrics he may transition his care to there  At this time he did not want to start any medications, but may consider Aricept in the future and he will contact our office if he wishes to start prior to his next appointment  Patient was encouraged to increase mind stimulating activities such as reading, crosswords, word searches, puzzles, Soduku, solitaire, coloring and other brain games    We also discussed the importance of staying physically active and eating a health diet such as the [address] or MIND diet  Subjective:    HPI    Patient is a 61-year-old male who presented for evaluation of cognitive decline  He noted symptoms began mid  in which she would have trouble recalling proper nouns, names and people addresses, would have a tendency to lose things around the house  He would often talk about the same things over and over again, and would not remember that he had talked about already  He also noted that he had to put more effort into concentrating on reading  The patient was educated through graduate school  He was career [employer], 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  No history of head trauma  Family History: Mother had Alzheimer's -  in her late 62s  Sister, younger - fall head injury     At his initial visit with Dr Mikal Hauser in 2021 he scored a 22/30 on [place]  And his description of his symptoms were noted to be mostly frontal/inattentive, and some common symptoms of normal cognitive aging  An MRI with NeuroQuant was ordered at the time which showed diffuse generalized volume loss, mild degree of chronic small vessel disease, Low hippocampal volume and enlarged inferior lateral and overall ventricular system, suggestive of global ex-vacuo dilatation  The additional finding of an abnormal Hippocamal Occupancy Score, ([address]), is 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  Possibly repeat in 6 months  Interval History:   He feels like his memory has gotten worse since the last time he was here but nothing significant  Will have issues with some short term and long term recall  For example misplacing items, recalling past details of events or something he was told  Forgets names often  His thought process appears to be slower at times  He will forget the name to something but be able to figure out the answer in a few minutes    He had one episode where he couldn't figure out how to open to microwave  No other similar episodes  Finds himself repeating the same things because he forgot he said them  He does get some obsessive thoughts about certain events from time to time  Very unpredictable-good and bad days, more good days then bad  Denies getting lost  No driving concerns  Sleeps well  They are planning to eventually sell their home and move to [place] to be near their daughter  He goes to the gym twice a week, and walks his dog several times a day  Objective:    Blood pressure 118/82, pulse 75, height 5' 7" (1 702 m), weight 75 3 kg (166 lb)  Physical Exam  Constitutional:       General: He is awake  HENT:      Right Ear: Hearing normal       Left Ear: Hearing normal    Eyes:      Extraocular Movements: Extraocular movements intact  Pupils: Pupils are equal, round, and reactive to light  Neurological:      Mental Status: He is alert  Deep Tendon Reflexes:      Reflex Scores:       Bicep reflexes are 2+ on the right side and 2+ on the left side  Brachioradialis reflexes are 2+ on the right side and 2+ on the left side  Patellar reflexes are 2+ on the right side and 2+ on the left side  Achilles reflexes are 2+ on the right side and 2+ on the left side  Psychiatric:         Speech: Speech normal          Neurological Exam  Mental Status  Awake and alert  Oriented to person, place, time and situation  Speech is normal  Language is fluent with no aphasia  Cranial Nerves  CN III, IV, VI: Extraocular movements intact bilaterally  Pupils equal round and reactive to light bilaterally  CN V:  Right: Facial sensation is normal   Left: Facial sensation is normal on the left  CN VII:  Right: There is no facial weakness  Left: There is no facial weakness  CN VIII:  Right: Hearing is normal   Left: Hearing is normal   CN XII: Tongue midline without atrophy or fasciculations      Motor    Strength: Strength 5/5 upper and lower extremities  Sensory  Light touch is normal in upper and lower extremities  Reflexes                                           Right                      Left  Brachioradialis                    2+                         2+  Biceps                                 2+                         2+  Patellar                                2+                         2+  Achilles                                2+                         2+    Coordination  Right: Finger-to-nose normal   Left: Finger-to-nose normal     Gait  Casual gait is normal including stance, stride, and arm swing  ROS:    Review of Systems   Constitutional: Negative  Negative for appetite change and fever  HENT: Negative  Negative for hearing loss, tinnitus, trouble swallowing and voice change  Eyes: Negative  Negative for photophobia and pain  Respiratory: Negative  Negative for shortness of breath  Cardiovascular: Negative  Negative for palpitations  Gastrointestinal: Negative  Negative for nausea and vomiting  Endocrine: Negative  Negative for cold intolerance  Genitourinary: Negative  Negative for dysuria, frequency and urgency  Musculoskeletal: Negative  Negative for myalgias and neck pain  Skin: Negative  Negative for rash  Neurological: Negative  Negative for dizziness, tremors, seizures, syncope, facial asymmetry, speech difficulty, weakness, light-headedness, numbness and headaches  Hematological: Negative  Does not bruise/bleed easily  Psychiatric/Behavioral: Negative  Negative for confusion, hallucinations and sleep disturbance

## 2021-06-16 NOTE — RESULT ENCOUNTER NOTE
It appears that pancreatic cyst has not grown in size when compared to previous EUS it was 2 cm x 1 8 cm,  please let me know if you have any other recommendations as for follow-up and we will have to refer him back regarding the MRI of his spine to his primary or oncologist Sabianist/ethnic/cultural/personal food preferences

## 2021-07-16 ENCOUNTER — HOSPITAL ENCOUNTER (OUTPATIENT)
Dept: MRI IMAGING | Facility: HOSPITAL | Age: 81
Discharge: HOME | End: 2021-07-16
Payer: MEDICARE

## 2021-07-16 DIAGNOSIS — K86.2 PANCREAS CYST: ICD-10-CM

## 2021-07-16 PROCEDURE — G1004 CDSM NDSC: HCPCS

## 2021-07-16 PROCEDURE — A9585 GADOBUTROL INJECTION: HCPCS | Performed by: PHYSICIAN ASSISTANT

## 2021-07-16 PROCEDURE — 74183 MRI ABD W/O CNTR FLWD CNTR: CPT

## 2021-07-16 RX ADMIN — GADOBUTROL 7.5 ML: 604.72 INJECTION INTRAVENOUS at 07:57

## 2021-07-20 ENCOUNTER — HOSPITAL ENCOUNTER (OUTPATIENT)
Dept: MRI IMAGING | Facility: HOSPITAL | Age: 81
Discharge: HOME | End: 2021-07-20
Attending: INTERNAL MEDICINE
Payer: MEDICARE

## 2021-07-20 DIAGNOSIS — M51.46 SCHMORL'S NODES OF LUMBAR REGION: ICD-10-CM

## 2021-07-20 PROCEDURE — 72158 MRI LUMBAR SPINE W/O & W/DYE: CPT

## 2021-07-20 PROCEDURE — A9585 GADOBUTROL INJECTION: HCPCS | Performed by: INTERNAL MEDICINE

## 2021-07-20 PROCEDURE — G1004 CDSM NDSC: HCPCS

## 2021-07-20 RX ADMIN — GADOBUTROL 7.5 ML: 604.72 INJECTION INTRAVENOUS at 07:47

## 2021-07-23 ENCOUNTER — TELEPHONE (OUTPATIENT)
Dept: GASTROENTEROLOGY | Facility: CLINIC | Age: 81
End: 2021-07-23

## 2021-07-23 NOTE — TELEPHONE ENCOUNTER
Spoke to Chantelle Schofield in Radiology  Informed her GI was already aware of results of MRI  She just wanted to inform us that the results were available in chart  Tomasz Hedrick was already aware of results because she spoke with his primary care physician who ordered an MRI of the lumbar area    Thank you

## 2021-07-23 NOTE — TELEPHONE ENCOUNTER
I spoke with Rachel Medellin she already received the results of the MRI of the abdomen that she ordered that was done 7/16  There was an MRI of the lumbar spine ordered by his primary care physician  Dr Cecile Hoyos  on 7/20 -radiology needs to speak with her concerning these findings    Thank you

## 2021-07-23 NOTE — TELEPHONE ENCOUNTER
[employer] from radiology called for Danny Reynolds  Pt had significant findings on his MRI  Vci you please address this  Thank you

## 2021-08-16 DIAGNOSIS — K21.00 GASTROESOPHAGEAL REFLUX DISEASE WITH ESOPHAGITIS WITHOUT HEMORRHAGE: ICD-10-CM

## 2021-08-16 RX ORDER — RABEPRAZOLE SODIUM 20 MG/1
TABLET, DELAYED RELEASE ORAL
Qty: 90 TABLET | Refills: 0 | Status: SHIPPED | OUTPATIENT
Start: 2021-08-16

## 2021-10-18 ENCOUNTER — OFFICE VISIT (OUTPATIENT)
Dept: NEUROLOGY | Facility: CLINIC | Age: 81
End: 2021-10-18
Payer: MEDICARE

## 2021-10-18 VITALS
HEIGHT: 67 IN | DIASTOLIC BLOOD PRESSURE: 80 MMHG | TEMPERATURE: 97.3 F | HEART RATE: 62 BPM | WEIGHT: 168 LBS | SYSTOLIC BLOOD PRESSURE: 120 MMHG | BODY MASS INDEX: 26.37 KG/M2

## 2021-10-18 DIAGNOSIS — R41.3 MEMORY IMPAIRMENT: Primary | ICD-10-CM

## 2021-10-18 PROCEDURE — 99214 OFFICE O/P EST MOD 30 MIN: CPT | Performed by: NURSE PRACTITIONER

## 2021-11-08 ENCOUNTER — APPOINTMENT (OUTPATIENT)
Dept: LAB | Facility: HOSPITAL | Age: 81
End: 2021-11-08
Payer: MEDICARE

## 2021-11-08 DIAGNOSIS — K86.2 PANCREAS CYST: ICD-10-CM

## 2021-11-08 LAB
ANION GAP SERPL CALCULATED.3IONS-SCNC: 6 MMOL/L (ref 4–13)
BUN SERPL-MCNC: 15 MG/DL (ref 6–20)
CALCIUM SERPL-MCNC: 9 MG/DL (ref 8.4–10.2)
CHLORIDE SERPL-SCNC: 105 MMOL/L (ref 96–108)
CO2 SERPL-SCNC: 30 MMOL/L (ref 22–33)
CREAT SERPL-MCNC: 1.11 MG/DL (ref 0.5–1.2)
GFR SERPL CREATININE-BSD FRML MDRD: 62 ML/MIN/1.73SQ M
GLUCOSE P FAST SERPL-MCNC: 87 MG/DL (ref 70–105)
POTASSIUM SERPL-SCNC: 4.3 MMOL/L (ref 3.5–5)
SODIUM SERPL-SCNC: 141 MMOL/L (ref 133–145)

## 2021-11-08 PROCEDURE — 36415 COLL VENOUS BLD VENIPUNCTURE: CPT

## 2021-11-08 PROCEDURE — 80048 BASIC METABOLIC PNL TOTAL CA: CPT

## 2021-11-11 ENCOUNTER — TELEPHONE (OUTPATIENT)
Dept: GASTROENTEROLOGY | Facility: CLINIC | Age: 81
End: 2021-11-11

## 2021-11-11 ENCOUNTER — OFFICE VISIT (OUTPATIENT)
Dept: GASTROENTEROLOGY | Facility: CLINIC | Age: 81
End: 2021-11-11
Payer: MEDICARE

## 2021-11-11 VITALS
HEART RATE: 61 BPM | WEIGHT: 167 LBS | SYSTOLIC BLOOD PRESSURE: 129 MMHG | HEIGHT: 67 IN | DIASTOLIC BLOOD PRESSURE: 61 MMHG | BODY MASS INDEX: 26.21 KG/M2

## 2021-11-11 DIAGNOSIS — K86.2 PANCREAS CYST: ICD-10-CM

## 2021-11-11 DIAGNOSIS — Z86.010 HX OF ADENOMATOUS COLONIC POLYPS: ICD-10-CM

## 2021-11-11 DIAGNOSIS — K31.7 GASTRIC POLYPS: ICD-10-CM

## 2021-11-11 DIAGNOSIS — K21.00 GASTROESOPHAGEAL REFLUX DISEASE WITH ESOPHAGITIS WITHOUT HEMORRHAGE: Primary | ICD-10-CM

## 2021-11-11 DIAGNOSIS — K22.2 SCHATZKI'S RING: ICD-10-CM

## 2021-11-11 PROBLEM — K21.9 GASTROESOPHAGEAL REFLUX: Status: ACTIVE | Noted: 2021-11-11

## 2021-11-11 PROBLEM — Z86.0101 HX OF ADENOMATOUS COLONIC POLYPS: Status: ACTIVE | Noted: 2021-11-11

## 2021-11-11 PROCEDURE — 99214 OFFICE O/P EST MOD 30 MIN: CPT | Performed by: INTERNAL MEDICINE

## 2022-01-20 ENCOUNTER — TELEPHONE (OUTPATIENT)
Dept: GERIATRICS | Age: 82
End: 2022-01-20

## 2022-01-20 NOTE — TELEPHONE ENCOUNTER
Spoke with patient's spouse, Fred Pringle, to reschedule patient's 2/2/22 appointment due to schedule conflicts  Spouse relayed she received our welcome letter and expressed hesitation in rescheduling; relayed she felt that there is no reason we should have living will or power of  and that everything we asked for is in patient's chart  Spouse expressed annoyance  This MA relayed that it is often times helpful, when in a situation where patient cannot speak on his behalf, that documentation is retained in the network; it helps the family  Spouse expressed not understanding and asked to cancel appointments  Appointments have been cancelled

## 2022-02-04 ENCOUNTER — HOSPITAL ENCOUNTER (EMERGENCY)
Facility: HOSPITAL | Age: 82
Discharge: HOME | End: 2022-02-04
Attending: INTERNAL MEDICINE | Admitting: INTERNAL MEDICINE
Payer: MEDICARE

## 2022-02-04 ENCOUNTER — OFFICE VISIT (OUTPATIENT)
Dept: FAMILY MEDICINE CLINIC | Facility: CLINIC | Age: 82
End: 2022-02-04
Payer: MEDICARE

## 2022-02-04 ENCOUNTER — APPOINTMENT (EMERGENCY)
Dept: CT IMAGING | Facility: HOSPITAL | Age: 82
End: 2022-02-04
Payer: MEDICARE

## 2022-02-04 VITALS
HEART RATE: 52 BPM | BODY MASS INDEX: 27.47 KG/M2 | SYSTOLIC BLOOD PRESSURE: 141 MMHG | HEIGHT: 67 IN | RESPIRATION RATE: 18 BRPM | WEIGHT: 175 LBS | OXYGEN SATURATION: 100 % | TEMPERATURE: 97.5 F | DIASTOLIC BLOOD PRESSURE: 61 MMHG

## 2022-02-04 VITALS
OXYGEN SATURATION: 99 % | TEMPERATURE: 97.1 F | SYSTOLIC BLOOD PRESSURE: 124 MMHG | DIASTOLIC BLOOD PRESSURE: 80 MMHG | HEART RATE: 50 BPM | RESPIRATION RATE: 16 BRPM | HEIGHT: 67 IN | BODY MASS INDEX: 26.21 KG/M2 | WEIGHT: 167 LBS

## 2022-02-04 DIAGNOSIS — R10.32 LEFT LOWER QUADRANT ABDOMINAL PAIN: Primary | ICD-10-CM

## 2022-02-04 DIAGNOSIS — K57.92 ACUTE DIVERTICULITIS: Primary | ICD-10-CM

## 2022-02-04 LAB
ALBUMIN SERPL BCP-MCNC: 3.9 G/DL (ref 3.4–4.8)
ALP SERPL-CCNC: 49.7 U/L (ref 10–129)
ALT SERPL W P-5'-P-CCNC: 8 U/L (ref 5–63)
ANION GAP SERPL CALCULATED.3IONS-SCNC: 7 MMOL/L (ref 4–13)
AST SERPL W P-5'-P-CCNC: 12 U/L (ref 15–41)
BASOPHILS # BLD AUTO: 0.01 THOUSANDS/ΜL (ref 0–0.1)
BASOPHILS NFR BLD AUTO: 0 % (ref 0–1)
BILIRUB SERPL-MCNC: 1.11 MG/DL (ref 0.3–1.2)
BUN SERPL-MCNC: 15 MG/DL (ref 6–20)
CALCIUM SERPL-MCNC: 8.9 MG/DL (ref 8.4–10.2)
CHLORIDE SERPL-SCNC: 106 MMOL/L (ref 96–108)
CO2 SERPL-SCNC: 26 MMOL/L (ref 22–33)
CREAT SERPL-MCNC: 1.06 MG/DL (ref 0.5–1.2)
EOSINOPHIL # BLD AUTO: 0.04 THOUSAND/ΜL (ref 0–0.61)
EOSINOPHIL NFR BLD AUTO: 0 % (ref 0–6)
ERYTHROCYTE [DISTWIDTH] IN BLOOD BY AUTOMATED COUNT: 12.8 % (ref 11.6–15.1)
GFR SERPL CREATININE-BSD FRML MDRD: 65 ML/MIN/1.73SQ M
GLUCOSE SERPL-MCNC: 99 MG/DL (ref 65–140)
HCT VFR BLD AUTO: 41.4 % (ref 36.5–49.3)
HGB BLD-MCNC: 14.2 G/DL (ref 12–17)
IMM GRANULOCYTES # BLD AUTO: 0.03 THOUSAND/UL (ref 0–0.2)
IMM GRANULOCYTES NFR BLD AUTO: 0 % (ref 0–2)
LACTATE SERPL-SCNC: 0.8 MMOL/L (ref 0–2)
LIPASE SERPL-CCNC: 24 U/L (ref 13–60)
LYMPHOCYTES # BLD AUTO: 0.81 THOUSANDS/ΜL (ref 0.6–4.47)
LYMPHOCYTES NFR BLD AUTO: 8 % (ref 14–44)
MCH RBC QN AUTO: 30 PG (ref 26.8–34.3)
MCHC RBC AUTO-ENTMCNC: 34.3 G/DL (ref 31.4–37.4)
MCV RBC AUTO: 88 FL (ref 82–98)
MONOCYTES # BLD AUTO: 0.69 THOUSAND/ΜL (ref 0.17–1.22)
MONOCYTES NFR BLD AUTO: 7 % (ref 4–12)
NEUTROPHILS # BLD AUTO: 8.01 THOUSANDS/ΜL (ref 1.85–7.62)
NEUTS SEG NFR BLD AUTO: 85 % (ref 43–75)
NRBC BLD AUTO-RTO: 0 /100 WBCS
PLATELET # BLD AUTO: 171 THOUSANDS/UL (ref 149–390)
PMV BLD AUTO: 11.1 FL (ref 8.9–12.7)
POTASSIUM SERPL-SCNC: 3.8 MMOL/L (ref 3.5–5)
PROT SERPL-MCNC: 6.5 G/DL (ref 6.4–8.3)
RBC # BLD AUTO: 4.73 MILLION/UL (ref 3.88–5.62)
SODIUM SERPL-SCNC: 139 MMOL/L (ref 133–145)
WBC # BLD AUTO: 9.59 THOUSAND/UL (ref 4.31–10.16)

## 2022-02-04 PROCEDURE — 83690 ASSAY OF LIPASE: CPT | Performed by: PHYSICIAN ASSISTANT

## 2022-02-04 PROCEDURE — 96365 THER/PROPH/DIAG IV INF INIT: CPT

## 2022-02-04 PROCEDURE — 99284 EMERGENCY DEPT VISIT MOD MDM: CPT

## 2022-02-04 PROCEDURE — 85025 COMPLETE CBC W/AUTO DIFF WBC: CPT | Performed by: PHYSICIAN ASSISTANT

## 2022-02-04 PROCEDURE — 80053 COMPREHEN METABOLIC PANEL: CPT | Performed by: PHYSICIAN ASSISTANT

## 2022-02-04 PROCEDURE — 99214 OFFICE O/P EST MOD 30 MIN: CPT | Performed by: FAMILY MEDICINE

## 2022-02-04 PROCEDURE — 99284 EMERGENCY DEPT VISIT MOD MDM: CPT | Performed by: PHYSICIAN ASSISTANT

## 2022-02-04 PROCEDURE — 36415 COLL VENOUS BLD VENIPUNCTURE: CPT | Performed by: PHYSICIAN ASSISTANT

## 2022-02-04 PROCEDURE — G1004 CDSM NDSC: HCPCS

## 2022-02-04 PROCEDURE — 83605 ASSAY OF LACTIC ACID: CPT | Performed by: PHYSICIAN ASSISTANT

## 2022-02-04 PROCEDURE — 74177 CT ABD & PELVIS W/CONTRAST: CPT

## 2022-02-04 PROCEDURE — 96366 THER/PROPH/DIAG IV INF ADDON: CPT

## 2022-02-04 RX ORDER — AMOXICILLIN AND CLAVULANATE POTASSIUM 875; 125 MG/1; MG/1
1 TABLET, FILM COATED ORAL 2 TIMES DAILY
Qty: 20 TABLET | Refills: 0 | Status: SHIPPED | OUTPATIENT
Start: 2022-02-04 | End: 2022-02-12 | Stop reason: HOSPADM

## 2022-02-04 RX ORDER — AMOXICILLIN AND CLAVULANATE POTASSIUM 875; 125 MG/1; MG/1
1 TABLET, FILM COATED ORAL 2 TIMES DAILY
Qty: 20 TABLET | Refills: 0 | Status: SHIPPED | OUTPATIENT
Start: 2022-02-04 | End: 2022-02-04 | Stop reason: SDUPTHER

## 2022-02-04 RX ADMIN — IOHEXOL 100 ML: 350 INJECTION, SOLUTION INTRAVENOUS at 14:34

## 2022-02-04 RX ADMIN — SODIUM CHLORIDE, SODIUM LACTATE, POTASSIUM CHLORIDE, AND CALCIUM CHLORIDE 1000 ML: .6; .31; .03; .02 INJECTION, SOLUTION INTRAVENOUS at 13:31

## 2022-02-04 NOTE — PROGRESS NOTES
Assessment/Plan:         Problem List Items Addressed This Visit        Other    Left lower quadrant abdominal pain - Primary     Possible acute diverticulitis pt with sudden onset severe in nature has some guarding give this and age will send to ER for eval                 Subjective: pt with lower abdominal pain this am 2 episodes with trying to have BM  And  Was not successful when he went back to bed after a few minutes it would go away  When he lies to sitting and going up steps it hurts  No urinary complaints no fever     Patient ID: Connie Ocasio is a [de-identified] y o  male  HPI    The following portions of the patient's history were reviewed and updated as appropriate:   Past Medical History:  He has a past medical history of BPH (benign prostatic hyperplasia), Disorder of eye, Diverticulitis of rectum, GERD (gastroesophageal reflux disease), Infected hernioplasty mesh ([facility] ), 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 ), Mixed hyperlipidemia, and Skin cancer (melanoma) ([facility] )  ,  _______________________________________________________________________  Medical Problems:  does not have any pertinent problems on file ,  _______________________________________________________________________  Past Surgical History:   has a past surgical history that includes Hernia repair (Right); EGD (2019); EGD AND COLONOSCOPY (2017); and Skin cancer excision (02/21/2019)  ,  _______________________________________________________________________  Family History:  family history includes Alzheimer's disease in his mother; No Known Problems in his father ,  _______________________________________________________________________  Social History:   reports that he quit smoking about 53 years ago  His smoking use included cigarettes  He has a 9 00 pack-year smoking history  He has never used smokeless tobacco  He reports current alcohol use   He reports previous drug use ,  _______________________________________________________________________  Allergies:  is allergic to sulfa antibiotics     _______________________________________________________________________  Current Outpatient Medications   Medication Sig Dispense Refill    Ascorbic Acid (vitamin C) 1000 MG tablet Take 1,000 mg by mouth daily      b complex vitamins tablet Take 1 tablet by mouth daily      dutasteride (AVODART) 0 5 mg capsule 1 CAPSULE PO DAILY      RABEprazole (ACIPHEX) 20 MG tablet take 1 tablet by mouth 1/2 HOUR PRIOR TO BREAKFAST (Patient taking differently: PT TAKES 1 TABLET EVERY OTHER DAY) 90 tablet 0    VITAMIN D, CHOLECALCIFEROL, PO 3 caps po daily      Multiple Vitamins-Minerals (MEMORY NICHOLAS PO) 1 po daily (Patient not taking: Reported on 10/18/2021 )      Probiotic Product (PROBIOTIC ADVANCED PO) 1 po daily (Patient not taking: Reported on 10/18/2021 )      vitamin E, tocopherol, 1,000 units capsule Take 1,000 Units by mouth daily (Patient not taking: Reported on 10/18/2021 )       No current facility-administered medications for this visit      _______________________________________________________________________  Review of Systems   Constitutional: Negative for chills and fever  Gastrointestinal: Positive for abdominal pain and constipation  Negative for abdominal distention, anal bleeding, diarrhea, nausea, rectal pain and vomiting  Genitourinary: Negative for dysuria, frequency, testicular pain and urgency  Objective:  Vitals:    02/04/22 1122   BP: 124/80   BP Location: Left arm   Patient Position: Sitting   Cuff Size: Standard   Pulse: (!) 50   Resp: 16   Temp: (!) 97 1 °F (36 2 °C)   TempSrc: Temporal   SpO2: 99%   Weight: 75 8 kg (167 lb)   Height: 5' 7" (1 702 m)     Body mass index is 26 16 kg/m²  Physical Exam  Constitutional:       General: He is not in acute distress  Appearance: Normal appearance  He is not ill-appearing     Cardiovascular:      Rate and Rhythm: Normal rate  Heart sounds: Normal heart sounds  Pulmonary:      Effort: Pulmonary effort is normal    Abdominal:      General: There is no distension  Tenderness: There is abdominal tenderness (periumbilical and left lower quadrant pain)  There is guarding  There is no rebound  Neurological:      Mental Status: He is alert

## 2022-02-04 NOTE — ASSESSMENT & PLAN NOTE
Possible acute diverticulitis pt with sudden onset severe in nature has some guarding give this and age will send to ER for eval

## 2022-02-04 NOTE — DISCHARGE INSTRUCTIONS
Please return to the emergency department for worsening symptoms including chest pain, shortness of breath, dizziness, lightheadedness, fever greater than 103, severe pain, worsening symptoms, antibiotics not working, inability to walk, fainting episodes, etc  Please follow-up with your family practice provider as soon as possible  You have been diagnosed today with acute diverticulitis  I have sent an antibiotic over to your pharmacy  Please take the entire course of this antibiotic and take as prescribed  I have given you information of follow-up with a gastroenterologist   Please call and make an appointment  Please follow-up with your primary care provider as soon as possible

## 2022-02-05 NOTE — ED PROVIDER NOTES
History  Chief Complaint   Patient presents with    Abdominal Pain     LLQ pain x "a couple days" sent by PCP to R/O diverticulitis  denies n/v/d  denies urinary symptoms     This is an 42-year-old male with past medical history significant for BPH, diverticulitis, GERD, and hyperlipidemia presenting department today for left lower quadrant pain  This is been ongoing for approximately 2 days  He notes he is constipated but he is still passing flatus  He did have 1 abdominal surgery in the past in his umbilical region but it was not for hernia  He denies any nausea, vomiting, diarrhea, or urinary symptoms  No fever or chills  No chest pain or shortness of breath  No bloody stool  No melena  The patient denies other complaints at this time  History provided by:  Patient   used: No    Abdominal Pain  Pain location:  LLQ  Pain quality comment:  "Blocked"  Pain radiates to:  Does not radiate  Pain severity:  Moderate  Onset quality:  Gradual  Duration:  2 days  Timing:  Constant  Progression:  Worsening  Chronicity:  New  Relieved by:  None tried  Worsened by:  Nothing  Ineffective treatments:  None tried  Associated symptoms: constipation and flatus    Associated symptoms: no anorexia, no belching, no chest pain, no chills, no cough, no diarrhea, no dysuria, no fatigue, no fever, no hematuria, no melena, no nausea, no shortness of breath, no sore throat and no vomiting    Risk factors: being elderly    Risk factors: not pregnant        Prior to Admission Medications   Prescriptions Last Dose Informant Patient Reported? Taking?    Ascorbic Acid (vitamin C) 1000 MG tablet  Self Yes No   Sig: Take 1,000 mg by mouth daily   Multiple Vitamins-Minerals (MEMORY NICHOLAS PO)  Self Yes No   Si po daily   Patient not taking: Reported on 10/18/2021    Probiotic Product (PROBIOTIC ADVANCED PO)  Self Yes No   Si po daily   Patient not taking: Reported on 10/18/2021    RABEprazole (ACIPHEX) 20 MG tablet  Self No No   Sig: take 1 tablet by mouth 1/2 HOUR PRIOR TO BREAKFAST   Patient taking differently: PT TAKES 1 TABLET EVERY OTHER DAY   VITAMIN D, CHOLECALCIFEROL, PO  Self Yes No   Sig: 3 caps po daily   b complex vitamins tablet  Self Yes No   Sig: Take 1 tablet by mouth daily   dutasteride (AVODART) 0 5 mg capsule  Self Yes No   Si CAPSULE PO DAILY   vitamin E, tocopherol, 1,000 units capsule  Self Yes No   Sig: Take 1,000 Units by mouth daily   Patient not taking: Reported on 10/18/2021       Facility-Administered Medications: None       Past Medical History:   Diagnosis Date    BPH (benign prostatic hyperplasia)     Disorder of eye     disorder of choroid of eye    Diverticulitis of rectum     GERD (gastroesophageal reflux disease)     Infected hernioplasty mesh (HCC)     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 [address] [facility]     Mixed hyperlipidemia     [facility]     Uveal, Caroidal       Past Surgical History:   Procedure Laterality Date    EGD      EGD AND COLONOSCOPY      HERNIA REPAIR Right     Inguinal/Umbilical    SKIN CANCER EXCISION  2019    R upper cheek, benign lesion including margins, face, ears, eyelids, nose, lips, mucous membrane, excised diameter 2 1 to 3 0 CM       Family History   Problem Relation Age of Onset    Alzheimer's disease Mother     No Known Problems Father      I have reviewed and agree with the history as documented  E-Cigarette/Vaping    E-Cigarette Use Never User      E-Cigarette/Vaping Substances    Nicotine No     THC No     CBD No     Flavoring No     Other No     Unknown No      Social History     Tobacco Use    Smoking status: Former Smoker     Packs/day: 1 00     Years: 9 00     Pack years: 9 00     Types: Cigarettes     Quit date:      Years since quittin 1    Smokeless tobacco: Never Used    Tobacco comment: QUIT IN    Vaping Use    Vaping Use: Never used   Substance Use Topics    Alcohol use:  Yes Comment: occassionally    Drug use: Not Currently     Comment: DENIES       Review of Systems   Constitutional: Negative for appetite change, chills, fatigue and fever  HENT: Negative for sore throat  Eyes: Negative for visual disturbance  Respiratory: Negative for cough, chest tightness, shortness of breath and wheezing  Cardiovascular: Negative for chest pain and palpitations  Gastrointestinal: Positive for abdominal pain, constipation and flatus  Negative for abdominal distention, anorexia, diarrhea, melena, nausea and vomiting  Genitourinary: Negative for dysuria, flank pain, hematuria and urgency  Musculoskeletal: Negative for neck pain and neck stiffness  Skin: Negative for rash and wound  Neurological: Negative for dizziness, seizures, syncope, weakness, light-headedness, numbness and headaches  Psychiatric/Behavioral: Negative for confusion  All other systems reviewed and are negative  Physical Exam  Physical Exam  Vitals and nursing note reviewed  Constitutional:       General: He is not in acute distress  Appearance: Normal appearance  He is normal weight  He is not ill-appearing, toxic-appearing or diaphoretic  HENT:      Head: Normocephalic and atraumatic  Nose: Nose normal  No congestion or rhinorrhea  Mouth/Throat:      Mouth: Mucous membranes are moist       Pharynx: No oropharyngeal exudate or posterior oropharyngeal erythema  Eyes:      General: No scleral icterus  Right eye: No discharge  Left eye: No discharge  Conjunctiva/sclera: Conjunctivae normal    Cardiovascular:      Rate and Rhythm: Normal rate and regular rhythm  Pulses: Normal pulses  Heart sounds: Normal heart sounds  No murmur heard  No friction rub  No gallop  Comments: 2+ radial pulses bilaterally  Pulmonary:      Effort: Pulmonary effort is normal  No respiratory distress  Breath sounds: Normal breath sounds  No stridor   No wheezing, rhonchi or rales  Comments: CTA b/l without adventitious breath sounds  Chest:      Chest wall: No tenderness  Abdominal:      General: Abdomen is flat  There is no distension  Palpations: Abdomen is soft  Tenderness: There is abdominal tenderness  There is no right CVA tenderness, left CVA tenderness, guarding or rebound  Comments: Left lower quadrant tenderness to palpation without any evidence of peritonitis  No CVA tenderness bilaterally  Negative Freeman sign, rebound, and Rovsing  Abdomen is soft and not distended   Musculoskeletal:         General: Normal range of motion  Cervical back: Normal range of motion  No rigidity  Right lower leg: No edema  Left lower leg: No edema  Skin:     General: Skin is warm and dry  Capillary Refill: Capillary refill takes less than 2 seconds  Coloration: Skin is not jaundiced or pale  Neurological:      General: No focal deficit present  Mental Status: He is alert and oriented to person, place, and time  Mental status is at baseline     Psychiatric:         Mood and Affect: Mood normal          Behavior: Behavior normal          Vital Signs  ED Triage Vitals [02/04/22 1309]   Temperature Pulse Respirations Blood Pressure SpO2   97 5 °F (36 4 °C) (!) 52 18 141/61 100 %      Temp Source Heart Rate Source Patient Position - Orthostatic VS BP Location FiO2 (%)   Oral -- -- -- --      Pain Score       No Pain           Vitals:    02/04/22 1309   BP: 141/61   Pulse: (!) 52         Visual Acuity      ED Medications  Medications   lactated ringers bolus 1,000 mL (0 mL Intravenous Stopped 2/4/22 1505)   iohexol (OMNIPAQUE) 350 MG/ML injection (SINGLE-DOSE) 100 mL (100 mL Intravenous Given 2/4/22 1434)       Diagnostic Studies  Results Reviewed     Procedure Component Value Units Date/Time    Comprehensive metabolic panel [[id number]]  (Abnormal) Collected: 02/04/22 1331    Lab Status: Final result Specimen: Blood from Arm, Right Updated: 02/04/22 1400     Sodium 139 mmol/L      Potassium 3 8 mmol/L      Chloride 106 mmol/L      CO2 26 mmol/L      ANION GAP 7 mmol/L      BUN 15 mg/dL      Creatinine 1 06 mg/dL      Glucose 99 mg/dL      Calcium 8 9 mg/dL      AST 12 U/L      ALT 8 U/L      Alkaline Phosphatase 49 7 U/L      Total Protein 6 5 g/dL      Albumin 3 9 g/dL      Total Bilirubin 1 11 mg/dL      eGFR 65 ml/min/1 73sq m     Narrative:      [place] guidelines for Chronic Kidney Disease (CKD):     Stage 1 with normal or high GFR (GFR > 90 mL/min/1 73 square meters)    Stage 2 Mild CKD (GFR = 60-89 mL/min/1 73 square meters)    Stage 3A Moderate CKD (GFR = 45-59 mL/min/1 73 square meters)    Stage 3B Moderate CKD (GFR = 30-44 mL/min/1 73 square meters)    Stage 4 Severe CKD (GFR = 15-29 mL/min/1 73 square meters)    Stage 5 End Stage CKD (GFR <15 mL/min/1 73 square meters)  Note: GFR calculation is accurate only with a steady state creatinine    Lipase [[id number]]  (Normal) Collected: 02/04/22 1331    Lab Status: Final result Specimen: Blood from Arm, Right Updated: 02/04/22 1400     Lipase 24 u/L     Lactic acid [[id number]]  (Normal) Collected: 02/04/22 1331    Lab Status: Final result Specimen: Blood from Arm, Right Updated: 02/04/22 1400     LACTIC ACID 0 8 mmol/L     Narrative:      Result may be elevated if tourniquet was used during collection      CBC and differential [[id number]]  (Abnormal) Collected: 02/04/22 1331    Lab Status: Final result Specimen: Blood from Arm, Right Updated: 02/04/22 1340     WBC 9 59 Thousand/uL      RBC 4 73 Million/uL      Hemoglobin 14 2 g/dL      Hematocrit 41 4 %      MCV 88 fL      MCH 30 0 pg      MCHC 34 3 g/dL      RDW 12 8 %      MPV 11 1 fL      Platelets 470 Thousands/uL      nRBC 0 /100 WBCs      Neutrophils Relative 85 %      Immat GRANS % 0 %      Lymphocytes Relative 8 %      Monocytes Relative 7 %      Eosinophils Relative 0 %      Basophils Relative 0 % Neutrophils Absolute 8 01 Thousands/µL      Immature Grans Absolute 0 03 Thousand/uL      Lymphocytes Absolute 0 81 Thousands/µL      Monocytes Absolute 0 69 Thousand/µL      Eosinophils Absolute 0 04 Thousand/µL      Basophils Absolute 0 01 Thousands/µL                  CT abdomen pelvis with contrast   Final Result by Lyn Downs MD (02/04 1445)      Acute descending colonic diverticulitis without pericolonic abscess or pneumoperitoneum  The study was marked in [facility] for immediate notification  Workstation performed: [id number]                    Procedures  Procedures         ED Course  ED Course as of 02/04/22 1913   Souleymane Hnanah Feb 04, 2022   1450 CT A+P Results:    Acute descending colonic diverticulitis without pericolonic abscess or pneumoperitoneum  200 Will treat with ABX                                             MDM  Number of Diagnoses or Management Options  Acute diverticulitis: new and requires workup  Diagnosis management comments: This is an 23-year-old male presenting to the emergency department today for left lower quadrant pain  He also notes constipation but is still passing flatus  No nausea, vomiting, diarrhea, or urinary symptoms  Vital signs are stable  On physical examination, the patient has left lower quadrant tenderness to palpation that is nonradiating at this time  Lab workup grossly within normal limits  The patient has acute diverticulitis without any abscess or perforation noted on CT abdomen and pelvis  The patient is stable for discharge at this time  Augmentin sent to the patient's pharmacy  Recommending PCP and GI follow-up as soon as possible  Strict return precautions were given  Recommending Tylenol for symptomatic relief  Also recommending a high-fiber diet and a stool softener to help facilitate easier bowel movements  The patient verifies understanding and agrees to the plan at this time    He notes return to the emergency department with any systemic signs or symptoms of infection  All questions answered to the patient's satisfaction  Amount and/or Complexity of Data Reviewed  Clinical lab tests: ordered and reviewed  Tests in the radiology section of CPT®: ordered and reviewed  Discussion of test results with the performing providers: yes        Disposition  Final diagnoses:   Acute diverticulitis     Time reflects when diagnosis was documented in both MDM as applicable and the Disposition within this note     Time User Action Codes Description Comment    2/4/2022  3:01 PM Enma Yadav Add [K57 92] Acute diverticulitis       ED Disposition     ED Disposition Condition Date/Time Comment    Discharge Stable Fri Feb 4, 2022  3:00 PM Kaden Buckner discharge to home/self care              Follow-up Information     Follow up With Specialties Details Why Contact Info Additional Information    López De Oliveira MD Internal Medicine, Pediatrics Schedule an appointment as soon as possible for a visit   [address]  [facility] [address] [facility] Emergency Department Emergency Medicine Go to  If symptoms worsen [address] [facility],[address] [id number]  [address] [place] [address] Emergency Department, [address], [address] [place] [address]          Discharge Medication List as of 2/4/2022  3:03 PM      CONTINUE these medications which have CHANGED    Details   amoxicillin-clavulanate (Augmentin) 875-125 mg per tablet Take 1 tablet by mouth 2 (two) times a day for 10 days, Starting Fri 2/4/2022, Until Mon 2/14/2022, Normal         CONTINUE these medications which have NOT CHANGED    Details   Ascorbic Acid (vitamin C) 1000 MG tablet Take 1,000 mg by mouth daily, Historical Med      b complex vitamins tablet Take 1 tablet by mouth daily, Historical Med      dutasteride (AVODART) 0 5 mg capsule 1 CAPSULE PO DAILY, Starting Thu 4/9/2020, Historical Med Multiple Vitamins-Minerals (MEMORY NICHOLAS PO) 1 po daily, Historical Med      Probiotic Product (PROBIOTIC ADVANCED PO) 1 po daily, Historical Med      RABEprazole (ACIPHEX) 20 MG tablet take 1 tablet by mouth 1/2 HOUR PRIOR TO BREAKFAST, Normal      VITAMIN D, CHOLECALCIFEROL, PO 3 caps po daily, Historical Med      vitamin E, tocopherol, 1,000 units capsule Take 1,000 Units by mouth daily, Historical Med             No discharge procedures on file      PDMP Review     None          ED Provider  Electronically Signed by           Иван Aponte PA-C  02/04/22 3699

## 2022-02-10 ENCOUNTER — HOSPITAL ENCOUNTER (INPATIENT)
Facility: HOSPITAL | Age: 82
LOS: 1 days | Discharge: HOME | DRG: 392 | End: 2022-02-12
Attending: INTERNAL MEDICINE | Admitting: INTERNAL MEDICINE
Payer: MEDICARE

## 2022-02-10 ENCOUNTER — APPOINTMENT (EMERGENCY)
Dept: CT IMAGING | Facility: HOSPITAL | Age: 82
DRG: 392 | End: 2022-02-10
Payer: MEDICARE

## 2022-02-10 DIAGNOSIS — K59.00 CONSTIPATION, UNSPECIFIED CONSTIPATION TYPE: ICD-10-CM

## 2022-02-10 DIAGNOSIS — K57.92 ACUTE DIVERTICULITIS: Primary | ICD-10-CM

## 2022-02-10 DIAGNOSIS — Z78.9 FAILURE OF OUTPATIENT TREATMENT: ICD-10-CM

## 2022-02-10 LAB
ALBUMIN SERPL BCP-MCNC: 3.9 G/DL (ref 3.4–4.8)
ALP SERPL-CCNC: 59.6 U/L (ref 10–129)
ALT SERPL W P-5'-P-CCNC: 9 U/L (ref 5–63)
ANION GAP SERPL CALCULATED.3IONS-SCNC: 8 MMOL/L (ref 4–13)
AST SERPL W P-5'-P-CCNC: 12 U/L (ref 15–41)
BASOPHILS # BLD AUTO: 0.02 THOUSANDS/ΜL (ref 0–0.1)
BASOPHILS NFR BLD AUTO: 0 % (ref 0–1)
BILIRUB SERPL-MCNC: 0.76 MG/DL (ref 0.3–1.2)
BUN SERPL-MCNC: 21 MG/DL (ref 6–20)
CALCIUM SERPL-MCNC: 9.3 MG/DL (ref 8.4–10.2)
CHLORIDE SERPL-SCNC: 103 MMOL/L (ref 96–108)
CO2 SERPL-SCNC: 26 MMOL/L (ref 22–33)
CREAT SERPL-MCNC: 1.1 MG/DL (ref 0.5–1.2)
EOSINOPHIL # BLD AUTO: 0.08 THOUSAND/ΜL (ref 0–0.61)
EOSINOPHIL NFR BLD AUTO: 1 % (ref 0–6)
ERYTHROCYTE [DISTWIDTH] IN BLOOD BY AUTOMATED COUNT: 12.6 % (ref 11.6–15.1)
GFR SERPL CREATININE-BSD FRML MDRD: 63 ML/MIN/1.73SQ M
GLUCOSE SERPL-MCNC: 125 MG/DL (ref 65–140)
HCT VFR BLD AUTO: 42.3 % (ref 36.5–49.3)
HGB BLD-MCNC: 14.6 G/DL (ref 12–17)
IMM GRANULOCYTES # BLD AUTO: 0.05 THOUSAND/UL (ref 0–0.2)
IMM GRANULOCYTES NFR BLD AUTO: 0 % (ref 0–2)
LACTATE SERPL-SCNC: 0.6 MMOL/L (ref 0–2)
LIPASE SERPL-CCNC: 28 U/L (ref 13–60)
LYMPHOCYTES # BLD AUTO: 0.81 THOUSANDS/ΜL (ref 0.6–4.47)
LYMPHOCYTES NFR BLD AUTO: 7 % (ref 14–44)
MCH RBC QN AUTO: 29.9 PG (ref 26.8–34.3)
MCHC RBC AUTO-ENTMCNC: 34.5 G/DL (ref 31.4–37.4)
MCV RBC AUTO: 87 FL (ref 82–98)
MONOCYTES # BLD AUTO: 0.85 THOUSAND/ΜL (ref 0.17–1.22)
MONOCYTES NFR BLD AUTO: 7 % (ref 4–12)
NEUTROPHILS # BLD AUTO: 10.04 THOUSANDS/ΜL (ref 1.85–7.62)
NEUTS SEG NFR BLD AUTO: 85 % (ref 43–75)
NRBC BLD AUTO-RTO: 0 /100 WBCS
PLATELET # BLD AUTO: 228 THOUSANDS/UL (ref 149–390)
PMV BLD AUTO: 10.5 FL (ref 8.9–12.7)
POTASSIUM SERPL-SCNC: 3.9 MMOL/L (ref 3.5–5)
PROT SERPL-MCNC: 6.8 G/DL (ref 6.4–8.3)
RBC # BLD AUTO: 4.88 MILLION/UL (ref 3.88–5.62)
SODIUM SERPL-SCNC: 137 MMOL/L (ref 133–145)
WBC # BLD AUTO: 11.85 THOUSAND/UL (ref 4.31–10.16)

## 2022-02-10 PROCEDURE — 74177 CT ABD & PELVIS W/CONTRAST: CPT

## 2022-02-10 PROCEDURE — 80053 COMPREHEN METABOLIC PANEL: CPT | Performed by: INTERNAL MEDICINE

## 2022-02-10 PROCEDURE — 96361 HYDRATE IV INFUSION ADD-ON: CPT

## 2022-02-10 PROCEDURE — 99285 EMERGENCY DEPT VISIT HI MDM: CPT

## 2022-02-10 PROCEDURE — 96374 THER/PROPH/DIAG INJ IV PUSH: CPT

## 2022-02-10 PROCEDURE — G1004 CDSM NDSC: HCPCS

## 2022-02-10 PROCEDURE — 1124F ACP DISCUSS-NO DSCNMKR DOCD: CPT | Performed by: INTERNAL MEDICINE

## 2022-02-10 PROCEDURE — 96375 TX/PRO/DX INJ NEW DRUG ADDON: CPT

## 2022-02-10 PROCEDURE — 99285 EMERGENCY DEPT VISIT HI MDM: CPT | Performed by: INTERNAL MEDICINE

## 2022-02-10 PROCEDURE — 83690 ASSAY OF LIPASE: CPT | Performed by: INTERNAL MEDICINE

## 2022-02-10 PROCEDURE — 36415 COLL VENOUS BLD VENIPUNCTURE: CPT | Performed by: INTERNAL MEDICINE

## 2022-02-10 PROCEDURE — 83605 ASSAY OF LACTIC ACID: CPT | Performed by: INTERNAL MEDICINE

## 2022-02-10 PROCEDURE — 85025 COMPLETE CBC W/AUTO DIFF WBC: CPT | Performed by: INTERNAL MEDICINE

## 2022-02-10 RX ORDER — ONDANSETRON 2 MG/ML
4 INJECTION INTRAMUSCULAR; INTRAVENOUS ONCE
Status: COMPLETED | OUTPATIENT
Start: 2022-02-10 | End: 2022-02-10

## 2022-02-10 RX ORDER — MORPHINE SULFATE 4 MG/ML
4 INJECTION, SOLUTION INTRAMUSCULAR; INTRAVENOUS ONCE
Status: COMPLETED | OUTPATIENT
Start: 2022-02-10 | End: 2022-02-10

## 2022-02-10 RX ORDER — SODIUM CHLORIDE 9 MG/ML
125 INJECTION, SOLUTION INTRAVENOUS CONTINUOUS
Status: DISCONTINUED | OUTPATIENT
Start: 2022-02-10 | End: 2022-02-11

## 2022-02-10 RX ADMIN — SODIUM CHLORIDE 125 ML/HR: 0.9 INJECTION, SOLUTION INTRAVENOUS at 22:16

## 2022-02-10 RX ADMIN — MORPHINE SULFATE 4 MG: 4 INJECTION INTRAVENOUS at 22:18

## 2022-02-10 RX ADMIN — ONDANSETRON 4 MG: 2 INJECTION INTRAMUSCULAR; INTRAVENOUS at 22:18

## 2022-02-11 PROBLEM — K59.00 CONSTIPATION: Status: ACTIVE | Noted: 2022-02-11

## 2022-02-11 PROBLEM — K57.92 ACUTE DIVERTICULITIS: Status: ACTIVE | Noted: 2022-02-11

## 2022-02-11 LAB
ANION GAP SERPL CALCULATED.3IONS-SCNC: 10 MMOL/L (ref 4–13)
BASOPHILS # BLD AUTO: 0.03 THOUSANDS/ΜL (ref 0–0.1)
BASOPHILS NFR BLD AUTO: 0 % (ref 0–1)
BILIRUB UR QL STRIP: NEGATIVE
BUN SERPL-MCNC: 19 MG/DL (ref 6–20)
CALCIUM SERPL-MCNC: 8.5 MG/DL (ref 8.4–10.2)
CHLORIDE SERPL-SCNC: 103 MMOL/L (ref 96–108)
CLARITY UR: CLEAR
CO2 SERPL-SCNC: 23 MMOL/L (ref 22–33)
COLOR UR: YELLOW
CREAT SERPL-MCNC: 0.96 MG/DL (ref 0.5–1.2)
EOSINOPHIL # BLD AUTO: 0.04 THOUSAND/ΜL (ref 0–0.61)
EOSINOPHIL NFR BLD AUTO: 0 % (ref 0–6)
ERYTHROCYTE [DISTWIDTH] IN BLOOD BY AUTOMATED COUNT: 12.5 % (ref 11.6–15.1)
GFR SERPL CREATININE-BSD FRML MDRD: 74 ML/MIN/1.73SQ M
GLUCOSE SERPL-MCNC: 117 MG/DL (ref 65–140)
GLUCOSE UR STRIP-MCNC: NEGATIVE MG/DL
HCT VFR BLD AUTO: 38.4 % (ref 36.5–49.3)
HGB BLD-MCNC: 12.9 G/DL (ref 12–17)
HGB UR QL STRIP.AUTO: NEGATIVE
IMM GRANULOCYTES # BLD AUTO: 0.04 THOUSAND/UL (ref 0–0.2)
IMM GRANULOCYTES NFR BLD AUTO: 0 % (ref 0–2)
KETONES UR STRIP-MCNC: ABNORMAL MG/DL
LEUKOCYTE ESTERASE UR QL STRIP: NEGATIVE
LYMPHOCYTES # BLD AUTO: 0.7 THOUSANDS/ΜL (ref 0.6–4.47)
LYMPHOCYTES NFR BLD AUTO: 7 % (ref 14–44)
MCH RBC QN AUTO: 29.3 PG (ref 26.8–34.3)
MCHC RBC AUTO-ENTMCNC: 33.6 G/DL (ref 31.4–37.4)
MCV RBC AUTO: 87 FL (ref 82–98)
MONOCYTES # BLD AUTO: 0.79 THOUSAND/ΜL (ref 0.17–1.22)
MONOCYTES NFR BLD AUTO: 8 % (ref 4–12)
NEUTROPHILS # BLD AUTO: 8.64 THOUSANDS/ΜL (ref 1.85–7.62)
NEUTS SEG NFR BLD AUTO: 85 % (ref 43–75)
NITRITE UR QL STRIP: NEGATIVE
NRBC BLD AUTO-RTO: 0 /100 WBCS
PH UR STRIP.AUTO: 7 [PH]
PLATELET # BLD AUTO: 191 THOUSANDS/UL (ref 149–390)
PMV BLD AUTO: 11.6 FL (ref 8.9–12.7)
POTASSIUM SERPL-SCNC: 3.9 MMOL/L (ref 3.5–5)
PROT UR STRIP-MCNC: NEGATIVE MG/DL
RBC # BLD AUTO: 4.4 MILLION/UL (ref 3.88–5.62)
SODIUM SERPL-SCNC: 136 MMOL/L (ref 133–145)
SP GR UR STRIP.AUTO: 1.01 (ref 1–1.03)
UROBILINOGEN UR QL STRIP.AUTO: 0.2 E.U./DL
WBC # BLD AUTO: 10.24 THOUSAND/UL (ref 4.31–10.16)

## 2022-02-11 PROCEDURE — 87040 BLOOD CULTURE FOR BACTERIA: CPT | Performed by: EMERGENCY MEDICINE

## 2022-02-11 PROCEDURE — 80048 BASIC METABOLIC PNL TOTAL CA: CPT

## 2022-02-11 PROCEDURE — 85025 COMPLETE CBC W/AUTO DIFF WBC: CPT

## 2022-02-11 PROCEDURE — 96361 HYDRATE IV INFUSION ADD-ON: CPT

## 2022-02-11 PROCEDURE — 99219 PR INITIAL OBSERVATION CARE/DAY 50 MINUTES: CPT | Performed by: INTERNAL MEDICINE

## 2022-02-11 PROCEDURE — 81003 URINALYSIS AUTO W/O SCOPE: CPT | Performed by: INTERNAL MEDICINE

## 2022-02-11 PROCEDURE — 36415 COLL VENOUS BLD VENIPUNCTURE: CPT | Performed by: EMERGENCY MEDICINE

## 2022-02-11 RX ORDER — POLYETHYLENE GLYCOL 3350 17 G/17G
17 POWDER, FOR SOLUTION ORAL DAILY
Status: DISCONTINUED | OUTPATIENT
Start: 2022-02-11 | End: 2022-02-12 | Stop reason: HOSPADM

## 2022-02-11 RX ORDER — ACETAMINOPHEN 325 MG/1
650 TABLET ORAL EVERY 6 HOURS PRN
Status: DISCONTINUED | OUTPATIENT
Start: 2022-02-11 | End: 2022-02-12 | Stop reason: HOSPADM

## 2022-02-11 RX ORDER — CEFTRIAXONE 1 G/50ML
1000 INJECTION, SOLUTION INTRAVENOUS EVERY 24 HOURS
Status: DISCONTINUED | OUTPATIENT
Start: 2022-02-12 | End: 2022-02-12 | Stop reason: HOSPADM

## 2022-02-11 RX ORDER — BISACODYL 10 MG
10 SUPPOSITORY, RECTAL RECTAL DAILY PRN
Status: DISCONTINUED | OUTPATIENT
Start: 2022-02-11 | End: 2022-02-12 | Stop reason: HOSPADM

## 2022-02-11 RX ORDER — OXYCODONE HYDROCHLORIDE 5 MG/1
2.5 TABLET ORAL EVERY 6 HOURS PRN
Status: DISCONTINUED | OUTPATIENT
Start: 2022-02-11 | End: 2022-02-12 | Stop reason: HOSPADM

## 2022-02-11 RX ORDER — DOCUSATE SODIUM 100 MG/1
100 CAPSULE, LIQUID FILLED ORAL 2 TIMES DAILY
Status: DISCONTINUED | OUTPATIENT
Start: 2022-02-11 | End: 2022-02-12 | Stop reason: HOSPADM

## 2022-02-11 RX ORDER — METRONIDAZOLE 500 MG/1
500 TABLET ORAL ONCE
Status: COMPLETED | OUTPATIENT
Start: 2022-02-11 | End: 2022-02-11

## 2022-02-11 RX ORDER — SODIUM CHLORIDE 9 MG/ML
75 INJECTION, SOLUTION INTRAVENOUS CONTINUOUS
Status: DISCONTINUED | OUTPATIENT
Start: 2022-02-11 | End: 2022-02-12 | Stop reason: HOSPADM

## 2022-02-11 RX ORDER — METRONIDAZOLE 500 MG/1
500 TABLET ORAL EVERY 8 HOURS SCHEDULED
Status: DISCONTINUED | OUTPATIENT
Start: 2022-02-11 | End: 2022-02-12 | Stop reason: HOSPADM

## 2022-02-11 RX ORDER — ONDANSETRON 2 MG/ML
4 INJECTION INTRAMUSCULAR; INTRAVENOUS EVERY 6 HOURS PRN
Status: DISCONTINUED | OUTPATIENT
Start: 2022-02-11 | End: 2022-02-12 | Stop reason: HOSPADM

## 2022-02-11 RX ORDER — CEFTRIAXONE 1 G/50ML
1000 INJECTION, SOLUTION INTRAVENOUS ONCE
Status: COMPLETED | OUTPATIENT
Start: 2022-02-11 | End: 2022-02-11

## 2022-02-11 RX ORDER — PANTOPRAZOLE SODIUM 40 MG/1
40 TABLET, DELAYED RELEASE ORAL EVERY OTHER DAY
Status: DISCONTINUED | OUTPATIENT
Start: 2022-02-11 | End: 2022-02-12 | Stop reason: HOSPADM

## 2022-02-11 RX ORDER — ASCORBIC ACID 500 MG
1000 TABLET ORAL DAILY
Status: DISCONTINUED | OUTPATIENT
Start: 2022-02-11 | End: 2022-02-12 | Stop reason: HOSPADM

## 2022-02-11 RX ORDER — FINASTERIDE 5 MG/1
5 TABLET, FILM COATED ORAL DAILY
Status: DISCONTINUED | OUTPATIENT
Start: 2022-02-11 | End: 2022-02-12 | Stop reason: HOSPADM

## 2022-02-11 RX ADMIN — FINASTERIDE 5 MG: 5 TABLET, FILM COATED ORAL at 09:29

## 2022-02-11 RX ADMIN — BISACODYL 10 MG: 10 SUPPOSITORY RECTAL at 17:03

## 2022-02-11 RX ADMIN — SODIUM CHLORIDE 75 ML/HR: 0.9 INJECTION, SOLUTION INTRAVENOUS at 05:16

## 2022-02-11 RX ADMIN — ENOXAPARIN SODIUM 40 MG: 40 INJECTION SUBCUTANEOUS at 09:30

## 2022-02-11 RX ADMIN — METRONIDAZOLE 500 MG: 500 TABLET ORAL at 21:14

## 2022-02-11 RX ADMIN — IOHEXOL 100 ML: 350 INJECTION, SOLUTION INTRAVENOUS at 00:07

## 2022-02-11 RX ADMIN — CEFTRIAXONE 1000 MG: 1 INJECTION, SOLUTION INTRAVENOUS at 02:32

## 2022-02-11 RX ADMIN — METRONIDAZOLE 500 MG: 500 TABLET ORAL at 13:41

## 2022-02-11 RX ADMIN — METRONIDAZOLE 500 MG: 500 TABLET ORAL at 05:15

## 2022-02-11 RX ADMIN — DOCUSATE SODIUM 100 MG: 100 CAPSULE, LIQUID FILLED ORAL at 09:29

## 2022-02-11 RX ADMIN — OXYCODONE HYDROCHLORIDE AND ACETAMINOPHEN 1000 MG: 500 TABLET ORAL at 09:29

## 2022-02-11 RX ADMIN — PANTOPRAZOLE SODIUM 40 MG: 40 TABLET, DELAYED RELEASE ORAL at 05:15

## 2022-02-11 RX ADMIN — DOCUSATE SODIUM 100 MG: 100 CAPSULE, LIQUID FILLED ORAL at 17:04

## 2022-02-11 RX ADMIN — POLYETHYLENE GLYCOL 3350 17 G: 17 POWDER, FOR SOLUTION ORAL at 09:29

## 2022-02-11 RX ADMIN — METRONIDAZOLE 500 MG: 500 TABLET ORAL at 02:31

## 2022-02-11 NOTE — ASSESSMENT & PLAN NOTE
Last BM was over week ago  States he previously went once daily  Denies any medication changes  States he is staying adequately hydrated and eating foods high in fiber    CT abdomen pelvis without any signs of obstruction  Start on bowel regimen

## 2022-02-11 NOTE — ED CARE HANDOFF
Emergency Department Sign Out Note        Signout and transfer of care from my colleague, Dr Jamaal Purvis  See Separate Emergency Department note  The patient, Dieudonne Barakat, was evaluated for left lower quadrant pain  Labs Reviewed   CBC AND DIFFERENTIAL - Abnormal       Result Value Ref Range Status    WBC 11 85 (*) 4 31 - 10 16 Thousand/uL Final    RBC 4 88  3 88 - 5 62 Million/uL Final    Hemoglobin 14 6  12 0 - 17 0 g/dL Final    Hematocrit 42 3  36 5 - 49 3 % Final    MCV 87  82 - 98 fL Final    MCH 29 9  26 8 - 34 3 pg Final    MCHC 34 5  31 4 - 37 4 g/dL Final    RDW 12 6  11 6 - 15 1 % Final    MPV 10 5  8 9 - 12 7 fL Final    Platelets 589  366 - 390 Thousands/uL Final    nRBC 0  /100 WBCs Final    Neutrophils Relative 85 (*) 43 - 75 % Final    Immat GRANS % 0  0 - 2 % Final    Lymphocytes Relative 7 (*) 14 - 44 % Final    Monocytes Relative 7  4 - 12 % Final    Eosinophils Relative 1  0 - 6 % Final    Basophils Relative 0  0 - 1 % Final    Neutrophils Absolute 10 04 (*) 1 85 - 7 62 Thousands/µL Final    Immature Grans Absolute 0 05  0 00 - 0 20 Thousand/uL Final    Lymphocytes Absolute 0 81  0 60 - 4 47 Thousands/µL Final    Monocytes Absolute 0 85  0 17 - 1 22 Thousand/µL Final    Eosinophils Absolute 0 08  0 00 - 0 61 Thousand/µL Final    Basophils Absolute 0 02  0 00 - 0 10 Thousands/µL Final   COMPREHENSIVE METABOLIC PANEL - Abnormal    Sodium 137  133 - 145 mmol/L Final    Potassium 3 9  3 5 - 5 0 mmol/L Final    Chloride 103  96 - 108 mmol/L Final    CO2 26  22 - 33 mmol/L Final    ANION GAP 8  4 - 13 mmol/L Final    BUN 21 (*) 6 - 20 mg/dL Final    Creatinine 1 10  0 50 - 1 20 mg/dL Final    Comment: Standardized to IDMS reference method    Glucose 125  65 - 140 mg/dL Final    Comment: If the patient is fasting, the ADA then defines impaired fasting glucose as > 100 mg/dL and diabetes as > or equal to 123 mg/dL    Specimen collection should occur prior to Sulfasalazine administration due to the potential for falsely depressed results  Specimen collection should occur prior to Sulfapyridine administration due to the potential for falsely elevated results  Calcium 9 3  8 4 - 10 2 mg/dL Final    AST 12 (*) 15 - 41 U/L Final    Comment: Specimen collection should occur prior to Sulfasalazine administration due to the potential for falsely depressed results  ALT 9  5 - 63 U/L Final    Comment: Specimen collection should occur prior to Sulfasalazine administration due to the potential for falsely depressed results  Alkaline Phosphatase 59 6  10 - 129 U/L Final    Total Protein 6 8  6 4 - 8 3 g/dL Final    Albumin 3 9  3 4 - 4 8 g/dL Final    Total Bilirubin 0 76  0 30 - 1 20 mg/dL Final    eGFR 63  ml/min/1 73sq m Final    Narrative:     [place] guidelines for Chronic Kidney Disease (CKD):     Stage 1 with normal or high GFR (GFR > 90 mL/min/1 73 square meters)    Stage 2 Mild CKD (GFR = 60-89 mL/min/1 73 square meters)    Stage 3A Moderate CKD (GFR = 45-59 mL/min/1 73 square meters)    Stage 3B Moderate CKD (GFR = 30-44 mL/min/1 73 square meters)    Stage 4 Severe CKD (GFR = 15-29 mL/min/1 73 square meters)    Stage 5 End Stage CKD (GFR <15 mL/min/1 73 square meters)  Note: GFR calculation is accurate only with a steady state creatinine   LIPASE - Normal    Lipase 28  13 - 60 u/L Final   LACTIC ACID, PLASMA - Normal    LACTIC ACID 0 6  0 0 - 2 0 mmol/L Final    Narrative:     Result may be elevated if tourniquet was used during collection  BLOOD CULTURE   BLOOD CULTURE   UA W REFLEX TO MICROSCOPIC WITH REFLEX TO CULTURE       CT abdomen/pelvis pending  CT abdomen/pelvis shows intervally worsened diverticulitis of the descending colon, no pericolonic abscess  CT also shows a right renal cyst and enlarged prostate      Plan to expand antibiotic therapy, and admit patient for further evaluation and treatment of acute diverticulitis, with failure of outpatient therapy  Case was reviewed with inpatient team   Plan of care was reviewed with patient  ED Course as of 02/11/22 0205   Fri Feb 11, 2022   0149 CT ABDOMEN AND PELVIS WITH IV CONTRAST    FINDINGS:     ABDOMEN     LOWER CHEST:  No clinically significant abnormality identified in the visualized lower chest      LIVER/BILIARY TREE:  Unremarkable      GALLBLADDER:  No calcified gallstones  No pericholecystic inflammatory change      SPLEEN:  Unremarkable      PANCREAS:  Unremarkable      ADRENAL GLANDS:  Unremarkable      KIDNEYS/URETERS:  1 1 cm cyst in the midportion of the right kidney  Bilateral kidneys otherwise appear grossly unremarkable; no hydronephrosis      STOMACH AND BOWEL:  Colonic diverticulosis redemonstrated  There is focal moderate wall thickening in the midportion of the descending colon, consistent with focal acute diverticulitis which appears to have intervally worsened  Pericolonic inflammatory   changes  Small amount of fluid tracks in the left paracolic gutter, no evidence of bowel obstruction  No discrete abscess  Small hiatal hernia  Otherwise grossly unremarkable      APPENDIX:  No findings to suggest appendicitis      ABDOMINOPELVIC CAVITY:  No intraperitoneal free air  No discrete adenopathy      VESSELS:  Mild atherosclerosis; no aortic aneurysm      PELVIS     REPRODUCTIVE ORGANS:  Enlarged and nodular prostate  Prostate volume estimated at 57 mL      URINARY BLADDER:  Prostate indents the bladder base; otherwise grossly unremarkable     ABDOMINAL WALL/INGUINAL REGIONS:  Fat containing inguinal hernias  Otherwise grossly unremarkable      OSSEOUS STRUCTURES: Degenerative changes of the spine, most prominent in the lower lumbar region  Generalized osteopenia    No acute fracture or destructive osseous lesion         IMPRESSION:     Previously seen focal diverticulitis in the descending colon appears to have intervally worsened, no discrete pericolonic abscess is seen      No pneumoperitoneum      Right renal cyst, enlarged prostate, and other findings as above         Procedures  MDM        Disposition  Final diagnoses:   Acute diverticulitis   Failure of outpatient treatment     Time reflects when diagnosis was documented in both MDM as applicable and the Disposition within this note     Time User Action Codes Description Comment    2/11/2022  2:04 AM Doris Mann Add [K57 92] Acute diverticulitis     2/11/2022  2:04 AM Doris Mann Add [Z78 9] Failure of outpatient treatment       ED Disposition     ED Disposition Condition Date/Time Comment    Admit Stable Fri Feb 11, 2022  2:04 AM Case was reviewed with inpatient team and the patient's admission status was agreed to be Admission Status: observation status to the service of Dr Michelle Langston  Follow-up Information    None       Patient's Medications   Discharge Prescriptions    No medications on file     No discharge procedures on file         ED Provider  Electronically Signed by     Trice Stanley MD  02/11/22 0004       Trice Stanley MD  02/11/22 6402       Trice Stanley MD  02/11/22 5197

## 2022-02-11 NOTE — H&P
[facility] 46  H&P- Leonora Patterson 1940, [de-identified] y o  male MRN: [id number]  Unit/Bed#: -[facility] Encounter: [id number]  Primary Care Provider: Onofre Horton MD   Date and time admitted to hospital: 2/10/2022  9:33 PM    * Acute diverticulitis  Assessment & Plan  · Diagnosed 2/4 in ED  Discharged with Augmentin  Presents 2/10 with worsening pain and constipation  · Afebrile, does not meet sepsis/SIRS, WBC 11 85  · Colonoscopy 2017: hx of tubular adenomas, no polyps, sigmoid diverticlar and internal hemorrhoids in anal papilla  recommended f/u in 5 years   · CT abd/pelvis:  · Previously seen focal diverticulitis in the descending colon appears to have intervally worsened, no discrete pericolonic abscess is seen  No pneumoperitoneum    Plan  · Started rocephin and flagyl in ED  · Will continue  · IVF  · Clear liquid diet, advance as able  · P r n Zofran, pain control  · Follow up on blood cultures  · General surgery consult     Constipation  Assessment & Plan  Last BM was over week ago  States he previously went once daily  Denies any medication changes  States he is staying adequately hydrated and eating foods high in fiber  CT abdomen pelvis without any signs of obstruction  Start on bowel regimen    Hx of adenomatous colonic polyps  Assessment & Plan  · On remote colonoscopy, not noted on last colonoscopy from 2017  · Follows with GI, Dr Glenna Balbuena    Gastroesophageal reflux  Assessment & Plan  Takes Rabeprazole every other day, will order Protonix while inpatient     Cognitive impairment  Assessment & Plan  · Follows with neurology, diagnosed with Alzheimer's dementia  Mild memory impairment  A&Ox3  · MOCA 22/30  · Optimize sleep/wake cycles      VTE Pharmacologic Prophylaxis: VTE Score: 4 Lovenox  Code Status: Level 1 - Full Code Full Code   Discussion with family: Patient declined call to        Anticipated Length of Stay: Patient will be admitted on an observation basis with an anticipated length of stay of less than 2 midnights secondary to Acute diverticulitis failing outpatient management  Total Time for Visit, including Counseling / Coordination of Care: 60 minutes Greater than 50% of this total time spent on direct patient counseling and coordination of care  Chief Complaint:  Abdominal pain    History of Present Illness:  Eliz Robles is a [de-identified] y o  male with a PMH of diverticulosis, BPH, GERD who presents with worsening abdominal pain  Patient was seen in ED on 02/04 for same complaints and was diagnosed with acute diverticulitis  Patient was stable for discharge and sent home on a course of Augmentin with close return to care precautions  States the sudden onset of worsening pain early this afternoon  Did not take any medications prior to arrival   Denies any fevers, chills, nausea, vomiting, diarrhea  Of note patient states he did not have a bowel movement over week  Last bowel movement was regular  He states he previously had bowel movements every day  Denies any new medications  States he is eating and drinking appropriately and is eating foods high in fiber  Last colonoscopy was in 2017  Patient history of adenomas polyps, last colonoscopy no polyps visualized  Follow-up outpatient with GI, has repeat colonoscopy scheduled for later 2022  Will admit for observation patient failed outpatient management of acute diverticulitis  Plan for liquid diet advance as able  IV antibiotics  General surgery consult  Confirm with patient level 1 full code    Review of Systems:  Review of Systems   Constitutional: Negative for chills and fever  HENT: Negative for congestion  Eyes: Negative for visual disturbance  Respiratory: Negative for chest tightness, shortness of breath and wheezing  Cardiovascular: Negative for chest pain, palpitations and leg swelling  Gastrointestinal: Positive for abdominal pain and constipation   Negative for abdominal distention, blood in stool, diarrhea, nausea and vomiting  Endocrine: Negative for polyuria  Genitourinary: Negative for difficulty urinating, frequency and urgency  Musculoskeletal: Negative for back pain  Skin: Negative for rash  Neurological: Negative for dizziness, weakness and light-headedness  Psychiatric/Behavioral: Negative for sleep disturbance  Past Medical and Surgical History:   Past Medical History:   Diagnosis Date    BPH (benign prostatic hyperplasia)     Disorder of eye     disorder of choroid of eye    Diverticulitis of rectum     GERD (gastroesophageal reflux disease)     Infected hernioplasty mesh (HCC)     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  Authorizing Provider   amoxicillin-clavulanate (Augmentin) 875-125 mg per tablet Take 1 tablet by mouth 2 (two) times a day for 10 days 2/4/22 2/14/22  Nicolas Yadav PA-C   Ascorbic Acid (vitamin C) 1000 MG tablet Take 1,000 mg by mouth daily    Historical Provider, MD   b complex vitamins tablet Take 1 tablet by mouth daily    Historical Provider, MD   dutasteride (AVODART) 0 5 mg capsule 1 CAPSULE PO DAILY 4/9/20   Historical Provider, MD   Multiple Vitamins-Minerals (MEMORY NICHOLAS PO) 1 po daily  Patient not taking: Reported on 10/18/2021     Historical Provider, MD   Probiotic Product (PROBIOTIC ADVANCED PO) 1 po daily  Patient not taking: Reported on 10/18/2021     Historical Provider, MD   RABEprazole (ACIPHEX) 20 MG tablet take 1 tablet by mouth 1/2 HOUR PRIOR TO BREAKFAST  Patient taking differently: PT TAKES 1 TABLET EVERY OTHER DAY 21   Kenzie Sung MD   VITAMIN D, CHOLECALCIFEROL, PO 3 caps po daily    Historical Provider, MD   vitamin E, tocopherol, 1,000 units capsule Take 1,000 Units by mouth daily  Patient not taking: Reported on 10/18/2021     Historical Provider, MD     I have reviewed home medications with patient personally  Allergies: Allergies   Allergen Reactions    Sulfa Antibiotics        Social History:  Marital Status: /Civil Union   Occupation:   Patient Pre-hospital Living Situation: Home  Patient Pre-hospital Level of Mobility: walks  Patient Pre-hospital Diet Restrictions:  Substance Use History:   Social History     Substance and Sexual Activity   Alcohol Use Yes    Comment: occassionally     Social History     Tobacco Use   Smoking Status Former Smoker    Packs/day:     Years:     Pack years:     Types: Cigarettes    Quit date: Idania Stewart Years since quittin 1   Smokeless Tobacco Never Used   Tobacco Comment    QUIT IN      Social History     Substance and Sexual Activity   Drug Use Not Currently    Comment: DENIES       Family History:  Family History   Problem Relation Age of Onset    Alzheimer's disease Mother     No Known Problems Father        Physical Exam:     Vitals:   Blood Pressure: 114/57 (22)  Pulse: 70 (22)  Temperature: (!) 97 °F (36 1 °C) (22)  Temp Source: Tympanic (22)  Respirations: 19 (22)  Height: 5' 7" (170 2 cm) (22)  Weight - Scale: 74 5 kg (164 lb 3 9 oz) (22)  SpO2: 97 % (22)    Physical Exam  Constitutional:       General: He is not in acute distress  Appearance: He is not toxic-appearing  HENT:      Head: Normocephalic and atraumatic        Mouth/Throat:      Mouth: Mucous membranes are moist    Eyes:      Pupils: Pupils are equal, round, and reactive to light  Cardiovascular:      Rate and Rhythm: Normal rate and regular rhythm  Pulses: Normal pulses  Heart sounds: Normal heart sounds  No murmur heard  Pulmonary:      Effort: Pulmonary effort is normal  No respiratory distress  Breath sounds: Normal breath sounds  No wheezing or rales  Abdominal:      General: Abdomen is flat  Bowel sounds are normal  There is no distension  Tenderness: There is abdominal tenderness (TTP in LLQ and suprapubic region)  There is no guarding or rebound  Musculoskeletal:      Right lower leg: No edema  Left lower leg: No edema  Skin:     General: Skin is warm and dry  Neurological:      General: No focal deficit present  Mental Status: He is alert and oriented to person, place, and time  Cranial Nerves: No cranial nerve deficit     Psychiatric:         Mood and Affect: Mood normal          Behavior: Behavior normal           Additional Data:     Lab Results:  Results from last 7 days   Lab Units 02/10/22  2208   WBC Thousand/uL 11 85*   HEMOGLOBIN g/dL 14 6   HEMATOCRIT % 42 3   PLATELETS Thousands/uL 228   NEUTROS PCT % 85*   LYMPHS PCT % 7*   MONOS PCT % 7   EOS PCT % 1     Results from last 7 days   Lab Units 02/10/22  2208   SODIUM mmol/L 137   POTASSIUM mmol/L 3 9   CHLORIDE mmol/L 103   CO2 mmol/L 26   BUN mg/dL 21*   CREATININE mg/dL 1 10   ANION GAP mmol/L 8   CALCIUM mg/dL 9 3   ALBUMIN g/dL 3 9   TOTAL BILIRUBIN mg/dL 0 76   ALK PHOS U/L 59 6   ALT U/L 9   AST U/L 12*   GLUCOSE RANDOM mg/dL 125                 Results from last 7 days   Lab Units 02/10/22  2208 02/04/22  1331   LACTIC ACID mmol/L 0 6 0 8       Imaging: Reviewed radiology reports from this admission including: abdominal/pelvic CT  CT abdomen pelvis with contrast   Final Result by Marjorie Alvarez DO (02/11 0144)      Previously seen focal diverticulitis in the descending colon appears to have intervally worsened, no discrete pericolonic abscess is seen       No pneumoperitoneum  Right renal cyst, enlarged prostate, and other findings as above  Workstation performed: [id number]             EKG and Other Studies Reviewed on Admission:   · EKG: No EKG obtained  ** Please Note: This note has been constructed using a voice recognition system   **

## 2022-02-11 NOTE — ASSESSMENT & PLAN NOTE
· Follows with neurology, diagnosed with Alzheimer's dementia  Mild memory impairment  A&Ox3    · MOCA 22/30  · Optimize sleep/wake cycles

## 2022-02-11 NOTE — ED PROVIDER NOTES
History  Chief Complaint   Patient presents with    Abdominal Pain     recent issues with constipation over the past week      This is an [de-identified]years old came for having left-sided abdominal pain  Patient was at the ER on 2022 for the same pain and he was diagnosed with acute descending colonic diverticulitis  Patient was discharged on Augmentin from the ER  Patient came back and stated that the pain did not go away  Patient also stated that he has one BM In 1 week which was yesterday patient passed gas  PATIENT DENIES ANY FEVER, nausea, vomiting  Patient still taking his antibiotic and he did not finish it and since yesterday he has pain which increased in severity  Patient took his dinner tonight and after dinner he has severe pain  Patient also stated that he found that his urine is pink but he has no flank pain  Patient denies any dysuria hematuria  Patient denies any chest pain shortness of breath  Prior to Admission Medications   Prescriptions Last Dose Informant Patient Reported? Taking?    Ascorbic Acid (vitamin C) 1000 MG tablet  Self Yes No   Sig: Take 1,000 mg by mouth daily   Multiple Vitamins-Minerals (MEMORY NICHOLAS PO)  Self Yes No   Si po daily   Patient not taking: Reported on 10/18/2021    Probiotic Product (PROBIOTIC ADVANCED PO)  Self Yes No   Si po daily   Patient not taking: Reported on 10/18/2021    RABEprazole (ACIPHEX) 20 MG tablet  Self No No   Sig: take 1 tablet by mouth 1/2 HOUR PRIOR TO BREAKFAST   Patient taking differently: PT TAKES 1 TABLET EVERY OTHER DAY   VITAMIN D, CHOLECALCIFEROL, PO  Self Yes No   Sig: 3 caps po daily   amoxicillin-clavulanate (Augmentin) 875-125 mg per tablet   No No   Sig: Take 1 tablet by mouth 2 (two) times a day for 10 days   b complex vitamins tablet  Self Yes No   Sig: Take 1 tablet by mouth daily   dutasteride (AVODART) 0 5 mg capsule  Self Yes No   Si CAPSULE PO DAILY   vitamin E, tocopherol, 1,000 units capsule  Self Yes No   Sig: Take 1,000 Units by mouth daily   Patient not taking: Reported on 10/18/2021       Facility-Administered Medications: None       Past Medical History:   Diagnosis Date    BPH (benign prostatic hyperplasia)     Disorder of eye     disorder of choroid of eye    Diverticulitis of rectum     GERD (gastroesophageal reflux disease)     Infected hernioplasty mesh (HCC)     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[facility] [address] )     Mixed hyperlipidemia     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, Caroidal       Past Surgical History:   Procedure Laterality Date    EGD      EGD AND COLONOSCOPY      HERNIA REPAIR Right     Inguinal/Umbilical    SKIN CANCER EXCISION  2019    R upper cheek, benign lesion including margins, face, ears, eyelids, nose, lips, mucous membrane, excised diameter 2 1 to 3 0 CM       Family History   Problem Relation Age of Onset    Alzheimer's disease Mother     No Known Problems Father      I have reviewed and agree with the history as documented  E-Cigarette/Vaping    E-Cigarette Use Never User      E-Cigarette/Vaping Substances    Nicotine No     THC No     CBD No     Flavoring No     Other No     Unknown No      Social History     Tobacco Use    Smoking status: Former Smoker     Packs/day: 1 00     Years: 9      Pack years: 9      Types: Cigarettes     Quit date:      Years since quittin 1    Smokeless tobacco: Never Used    Tobacco comment: QUIT IN    Vaping Use    Vaping Use: Never used   Substance Use Topics    Alcohol use: Yes     Comment: occassionally    Drug use: Not Currently     Comment: DENIES       Review of Systems   Constitutional: Negative for diaphoresis, fatigue and fever  HENT: Negative for congestion, drooling, ear discharge, hearing loss, rhinorrhea, sinus pressure, sinus pain, sore throat and tinnitus  Respiratory: Negative for cough, chest tightness and shortness of breath      Cardiovascular: Negative for chest pain, palpitations and leg swelling  Gastrointestinal: Positive for abdominal pain and constipation  Negative for abdominal distention, blood in stool, diarrhea, nausea and vomiting  Endocrine: Negative for polydipsia, polyphagia and polyuria  Genitourinary: Negative for difficulty urinating, dysuria, flank pain, hematuria, scrotal swelling and testicular pain  Musculoskeletal: Negative for arthralgias, back pain, gait problem, neck pain and neck stiffness  Skin: Negative for color change, pallor and rash  Neurological: Negative for dizziness, light-headedness and headaches  Hematological: Negative for adenopathy  Does not bruise/bleed easily  Psychiatric/Behavioral: Negative for agitation and behavioral problems  Physical Exam  Physical Exam  Vitals and nursing note reviewed  Constitutional:       General: He is not in acute distress  Appearance: He is well-developed  He is not ill-appearing, toxic-appearing or diaphoretic  HENT:      Head: Normocephalic and atraumatic  Mouth/Throat:      Mouth: Mucous membranes are moist       Pharynx: No pharyngeal swelling or oropharyngeal exudate  Eyes:      Extraocular Movements: Extraocular movements intact  Pupils: Pupils are equal, round, and reactive to light  Cardiovascular:      Rate and Rhythm: Normal rate and regular rhythm  Heart sounds: Normal heart sounds  No murmur heard  No friction rub  No gallop  Pulmonary:      Effort: Pulmonary effort is normal  No respiratory distress  Breath sounds: Normal breath sounds  No wheezing, rhonchi or rales  Chest:      Chest wall: No tenderness  Abdominal:      General: A surgical scar is present  Bowel sounds are normal  There is no distension or abdominal bruit  There are no signs of injury  Palpations: Abdomen is soft  There is no shifting dullness, hepatomegaly, splenomegaly or mass  Tenderness:  There is abdominal tenderness in the left upper quadrant and left lower quadrant  There is no right CVA tenderness, left CVA tenderness, guarding or rebound  Negative signs include Freeman's sign, Rovsing's sign, McBurney's sign and obturator sign  Hernia: No hernia is present  Musculoskeletal:         General: No tenderness or deformity  Normal range of motion  Cervical back: Normal range of motion and neck supple  Skin:     General: Skin is warm and dry  Capillary Refill: Capillary refill takes less than 2 seconds  Coloration: Skin is not cyanotic, jaundiced, mottled or pale  Findings: No erythema or rash  Neurological:      Mental Status: He is alert and oriented to person, place, and time     Psychiatric:         Behavior: Behavior normal          Vital Signs  ED Triage Vitals [02/10/22 2135]   Temperature Pulse Respirations Blood Pressure SpO2   98 1 °F (36 7 °C) 91 18 140/68 99 %      Temp Source Heart Rate Source Patient Position - Orthostatic VS BP Location FiO2 (%)   Oral Monitor Sitting Left arm --      Pain Score       2           Vitals:    02/11/22 0015 02/11/22 0100 02/11/22 0300 02/11/22 0746   BP: 131/65 120/75 114/57 113/51   Pulse: 78 74 70 77   Patient Position - Orthostatic VS: Lying Lying Lying Lying         Visual Acuity      ED Medications  Medications   cefTRIAXone (ROCEPHIN) IVPB (premix in dextrose) 1,000 mg 50 mL (has no administration in time range)   metroNIDAZOLE (FLAGYL) tablet 500 mg (500 mg Oral Given 2/11/22 1341)   ondansetron (ZOFRAN) injection 4 mg (has no administration in time range)   acetaminophen (TYLENOL) tablet 650 mg (has no administration in time range)   oxyCODONE (ROXICODONE) IR tablet 2 5 mg (has no administration in time range)   sodium chloride 0 9 % infusion (75 mL/hr Intravenous New Bag 2/11/22 0516)   enoxaparin (LOVENOX) subcutaneous injection 40 mg (40 mg Subcutaneous Given 2/11/22 0930)   ascorbic acid (VITAMIN C) tablet 1,000 mg (1,000 mg Oral Given 2/11/22 0929)   finasteride (PROSCAR) tablet 5 mg (5 mg Oral Given 2/11/22 0929)   pantoprazole (PROTONIX) EC tablet 40 mg (40 mg Oral Given 2/11/22 0515)   docusate sodium (COLACE) capsule 100 mg (100 mg Oral Given 2/11/22 0929)   polyethylene glycol (MIRALAX) packet 17 g (17 g Oral Given 2/11/22 0929)   morphine (PF) 4 mg/mL injection 4 mg (4 mg Intravenous Given 2/10/22 2218)   ondansetron (ZOFRAN) injection 4 mg (4 mg Intravenous Given 2/10/22 2218)   iohexol (OMNIPAQUE) 350 MG/ML injection (SINGLE-DOSE) 100 mL (100 mL Intravenous Given 2/11/22 0007)   iohexol (OMNIPAQUE) 240 MG/ML solution 50 mL (50 mL Oral Given 2/11/22 0007)   cefTRIAXone (ROCEPHIN) IVPB (premix in dextrose) 1,000 mg 50 mL (1,000 mg Intravenous New Bag 2/11/22 0232)   metroNIDAZOLE (FLAGYL) tablet 500 mg (500 mg Oral Given 2/11/22 0231)       Diagnostic Studies  Results Reviewed     Procedure Component Value Units Date/Time    Basic metabolic panel [[id number]] Collected: 02/11/22 0449    Lab Status: Final result Specimen: Blood from Arm, Left Updated: 02/11/22 0538     Sodium 136 mmol/L      Potassium 3 9 mmol/L      Chloride 103 mmol/L      CO2 23 mmol/L      ANION GAP 10 mmol/L      BUN 19 mg/dL      Creatinine 0 96 mg/dL      Glucose 117 mg/dL      Calcium 8 5 mg/dL      eGFR 74 ml/min/1 73sq m     Narrative:      [place] guidelines for Chronic Kidney Disease (CKD):     Stage 1 with normal or high GFR (GFR > 90 mL/min/1 73 square meters)    Stage 2 Mild CKD (GFR = 60-89 mL/min/1 73 square meters)    Stage 3A Moderate CKD (GFR = 45-59 mL/min/1 73 square meters)    Stage 3B Moderate CKD (GFR = 30-44 mL/min/1 73 square meters)    Stage 4 Severe CKD (GFR = 15-29 mL/min/1 73 square meters)    Stage 5 End Stage CKD (GFR <15 mL/min/1 73 square meters)  Note: GFR calculation is accurate only with a steady state creatinine    CBC and differential [[id number]]  (Abnormal) Collected: 02/11/22 9985    Lab Status: Final result Specimen: Blood from Arm, Left Updated: 02/11/22 0509     WBC 10 24 Thousand/uL      RBC 4 40 Million/uL      Hemoglobin 12 9 g/dL      Hematocrit 38 4 %      MCV 87 fL      MCH 29 3 pg      MCHC 33 6 g/dL      RDW 12 5 %      MPV 11 6 fL      Platelets 509 Thousands/uL      nRBC 0 /100 WBCs      Neutrophils Relative 85 %      Immat GRANS % 0 %      Lymphocytes Relative 7 %      Monocytes Relative 8 %      Eosinophils Relative 0 %      Basophils Relative 0 %      Neutrophils Absolute 8 64 Thousands/µL      Immature Grans Absolute 0 04 Thousand/uL      Lymphocytes Absolute 0 70 Thousands/µL      Monocytes Absolute 0 79 Thousand/µL      Eosinophils Absolute 0 04 Thousand/µL      Basophils Absolute 0 03 Thousands/µL     UA w Reflex to Microscopic w Reflex to Culture [[id number]]  (Abnormal) Collected: 02/11/22 0231    Lab Status: Final result Specimen: Urine, Clean Catch Updated: 02/11/22 0250     Color, UA Yellow     Clarity, UA Clear     Specific Gravity, UA 1 010     pH, UA 7 0     Leukocytes, UA Negative     Nitrite, UA Negative     Protein, UA Negative mg/dl      Glucose, UA Negative mg/dl      Ketones, UA Trace mg/dl      Urobilinogen, UA 0 2 E U /dl      Bilirubin, UA Negative     Blood, UA Negative    Blood culture #1 [[id number]] Collected: 02/11/22 0229    Lab Status: In process Specimen: Blood from Arm, Left Updated: 02/11/22 0232    Blood culture #2 [[id number]] Collected: 02/11/22 0220    Lab Status:  In process Specimen: Blood from Arm, Right Updated: 02/11/22 0225    Comprehensive metabolic panel [[id number]]  (Abnormal) Collected: 02/10/22 2208    Lab Status: Final result Specimen: Blood from Arm, Right Updated: 02/10/22 2234     Sodium 137 mmol/L      Potassium 3 9 mmol/L      Chloride 103 mmol/L      CO2 26 mmol/L      ANION GAP 8 mmol/L      BUN 21 mg/dL      Creatinine 1 10 mg/dL      Glucose 125 mg/dL      Calcium 9 3 mg/dL      AST 12 U/L      ALT 9 U/L      Alkaline Phosphatase 59 6 U/L      Total Protein 6 8 g/dL      Albumin 3 9 g/dL Total Bilirubin 0 76 mg/dL      eGFR 63 ml/min/1 73sq m     Narrative:      [place] guidelines for Chronic Kidney Disease (CKD):     Stage 1 with normal or high GFR (GFR > 90 mL/min/1 73 square meters)    Stage 2 Mild CKD (GFR = 60-89 mL/min/1 73 square meters)    Stage 3A Moderate CKD (GFR = 45-59 mL/min/1 73 square meters)    Stage 3B Moderate CKD (GFR = 30-44 mL/min/1 73 square meters)    Stage 4 Severe CKD (GFR = 15-29 mL/min/1 73 square meters)    Stage 5 End Stage CKD (GFR <15 mL/min/1 73 square meters)  Note: GFR calculation is accurate only with a steady state creatinine    Lipase [[id number]]  (Normal) Collected: 02/10/22 2208    Lab Status: Final result Specimen: Blood from Arm, Right Updated: 02/10/22 2234     Lipase 28 u/L     Lactic acid, plasma [[id number]]  (Normal) Collected: 02/10/22 2208    Lab Status: Final result Specimen: Blood from Arm, Right Updated: 02/10/22 2233     LACTIC ACID 0 6 mmol/L     Narrative:      Result may be elevated if tourniquet was used during collection      CBC and differential [[id number]]  (Abnormal) Collected: 02/10/22 2208    Lab Status: Final result Specimen: Blood from Arm, Right Updated: 02/10/22 2215     WBC 11 85 Thousand/uL      RBC 4 88 Million/uL      Hemoglobin 14 6 g/dL      Hematocrit 42 3 %      MCV 87 fL      MCH 29 9 pg      MCHC 34 5 g/dL      RDW 12 6 %      MPV 10 5 fL      Platelets 946 Thousands/uL      nRBC 0 /100 WBCs      Neutrophils Relative 85 %      Immat GRANS % 0 %      Lymphocytes Relative 7 %      Monocytes Relative 7 %      Eosinophils Relative 1 %      Basophils Relative 0 %      Neutrophils Absolute 10 04 Thousands/µL      Immature Grans Absolute 0 05 Thousand/uL      Lymphocytes Absolute 0 81 Thousands/µL      Monocytes Absolute 0 85 Thousand/µL      Eosinophils Absolute 0 08 Thousand/µL      Basophils Absolute 0 02 Thousands/µL                  CT abdomen pelvis with contrast   Final Result by Anand Doherty Grisel Bragg DO (02/11 0144)      Previously seen focal diverticulitis in the descending colon appears to have intervally worsened, no discrete pericolonic abscess is seen  No pneumoperitoneum  Right renal cyst, enlarged prostate, and other findings as above  Workstation performed: [id number]                    Procedures  Procedures         ED Course  ED Course as of 02/11/22 1420   Thu Feb 10, 2022   2244 Blood Pressure: 140/68                                             MDM    Disposition  Final diagnoses:   Acute diverticulitis   Failure of outpatient treatment     Time reflects when diagnosis was documented in both MDM as applicable and the Disposition within this note     Time User Action Codes Description Comment    2/11/2022  2:04 AM Tami Young [K57 [id number]] Acute diverticulitis     2/11/2022  2:04 AM Tami Young [Z78 [id number]] Failure of outpatient treatment       ED Disposition     ED Disposition Condition Date/Time Comment    Admit Stable Fri Feb 11, 2022  2:04 AM Case was reviewed with inpatient team and the patient's admission status was agreed to be Admission Status: observation status to the service of Dr Chad Torres          Follow-up Information    None         Current Discharge Medication List      CONTINUE these medications which have NOT CHANGED    Details   amoxicillin-clavulanate (Augmentin) 875-125 mg per tablet Take 1 tablet by mouth 2 (two) times a day for 10 days  Qty: 20 tablet, Refills: 0    Associated Diagnoses: Acute diverticulitis      Ascorbic Acid (vitamin C) 1000 MG tablet Take 1,000 mg by mouth daily      b complex vitamins tablet Take 1 tablet by mouth daily      dutasteride (AVODART) 0 5 mg capsule 1 CAPSULE PO DAILY      Multiple Vitamins-Minerals (MEMORY NICHOLAS PO) 1 po daily      Probiotic Product (PROBIOTIC ADVANCED PO) 1 po daily      RABEprazole (ACIPHEX) 20 MG tablet take 1 tablet by mouth 1/2 HOUR PRIOR TO BREAKFAST  Qty: 90 tablet, Refills: 0 Associated Diagnoses: Gastroesophageal reflux disease with esophagitis without hemorrhage      VITAMIN D, CHOLECALCIFEROL, PO 3 caps po daily      vitamin E, tocopherol, 1,000 units capsule Take 1,000 Units by mouth daily             No discharge procedures on file      PDMP Review     None          ED Provider  Electronically Signed by           Katt Hernandes MD  02/11/22 5856

## 2022-02-11 NOTE — ASSESSMENT & PLAN NOTE
· Diagnosed 2/4 in ED  Discharged with Augmentin  Presents 2/10 with worsening pain and constipation  · Afebrile, does not meet sepsis/SIRS, WBC 11 85  · Colonoscopy 2017: hx of tubular adenomas, no polyps, sigmoid diverticlar and internal hemorrhoids in anal papilla  recommended f/u in 5 years   · CT abd/pelvis:  · Previously seen focal diverticulitis in the descending colon appears to have intervally worsened, no discrete pericolonic abscess is seen    No pneumoperitoneum    Plan  · Started rocephin and flagyl in ED  · Will continue  · IVF  · Clear liquid diet, advance as able  · P r n Zofran, pain control  · Follow up on blood cultures  · General surgery consult

## 2022-02-11 NOTE — ASSESSMENT & PLAN NOTE
· On remote colonoscopy, not noted on last colonoscopy from 2017  · Follows with GI, Dr Daxa Carrillo

## 2022-02-12 VITALS
SYSTOLIC BLOOD PRESSURE: 109 MMHG | OXYGEN SATURATION: 96 % | BODY MASS INDEX: 25.78 KG/M2 | HEIGHT: 67 IN | TEMPERATURE: 97.4 F | RESPIRATION RATE: 18 BRPM | DIASTOLIC BLOOD PRESSURE: 68 MMHG | HEART RATE: 70 BPM | WEIGHT: 164.24 LBS

## 2022-02-12 LAB
ANION GAP SERPL CALCULATED.3IONS-SCNC: 6 MMOL/L (ref 4–13)
BASOPHILS # BLD AUTO: 0.02 THOUSANDS/ΜL (ref 0–0.1)
BASOPHILS NFR BLD AUTO: 1 % (ref 0–1)
BUN SERPL-MCNC: 11 MG/DL (ref 6–20)
CALCIUM SERPL-MCNC: 8.3 MG/DL (ref 8.4–10.2)
CHLORIDE SERPL-SCNC: 109 MMOL/L (ref 96–108)
CO2 SERPL-SCNC: 25 MMOL/L (ref 22–33)
CREAT SERPL-MCNC: 0.93 MG/DL (ref 0.5–1.2)
EOSINOPHIL # BLD AUTO: 0.13 THOUSAND/ΜL (ref 0–0.61)
EOSINOPHIL NFR BLD AUTO: 3 % (ref 0–6)
ERYTHROCYTE [DISTWIDTH] IN BLOOD BY AUTOMATED COUNT: 12.4 % (ref 11.6–15.1)
GFR SERPL CREATININE-BSD FRML MDRD: 77 ML/MIN/1.73SQ M
GLUCOSE SERPL-MCNC: 98 MG/DL (ref 65–140)
HCT VFR BLD AUTO: 38.2 % (ref 36.5–49.3)
HGB BLD-MCNC: 12.9 G/DL (ref 12–17)
IMM GRANULOCYTES # BLD AUTO: 0.02 THOUSAND/UL (ref 0–0.2)
IMM GRANULOCYTES NFR BLD AUTO: 1 % (ref 0–2)
LYMPHOCYTES # BLD AUTO: 0.91 THOUSANDS/ΜL (ref 0.6–4.47)
LYMPHOCYTES NFR BLD AUTO: 22 % (ref 14–44)
MCH RBC QN AUTO: 29.5 PG (ref 26.8–34.3)
MCHC RBC AUTO-ENTMCNC: 33.8 G/DL (ref 31.4–37.4)
MCV RBC AUTO: 87 FL (ref 82–98)
MONOCYTES # BLD AUTO: 0.39 THOUSAND/ΜL (ref 0.17–1.22)
MONOCYTES NFR BLD AUTO: 9 % (ref 4–12)
NEUTROPHILS # BLD AUTO: 2.73 THOUSANDS/ΜL (ref 1.85–7.62)
NEUTS SEG NFR BLD AUTO: 64 % (ref 43–75)
NRBC BLD AUTO-RTO: 0 /100 WBCS
PLATELET # BLD AUTO: 199 THOUSANDS/UL (ref 149–390)
PMV BLD AUTO: 11.4 FL (ref 8.9–12.7)
POTASSIUM SERPL-SCNC: 4 MMOL/L (ref 3.5–5)
RBC # BLD AUTO: 4.37 MILLION/UL (ref 3.88–5.62)
SODIUM SERPL-SCNC: 140 MMOL/L (ref 133–145)
WBC # BLD AUTO: 4.2 THOUSAND/UL (ref 4.31–10.16)

## 2022-02-12 PROCEDURE — 99239 HOSP IP/OBS DSCHRG MGMT >30: CPT | Performed by: PHYSICIAN ASSISTANT

## 2022-02-12 PROCEDURE — 80048 BASIC METABOLIC PNL TOTAL CA: CPT | Performed by: INTERNAL MEDICINE

## 2022-02-12 PROCEDURE — 85025 COMPLETE CBC W/AUTO DIFF WBC: CPT | Performed by: INTERNAL MEDICINE

## 2022-02-12 RX ORDER — DOCUSATE SODIUM 100 MG/1
100 CAPSULE, LIQUID FILLED ORAL 2 TIMES DAILY
Qty: 60 CAPSULE | Refills: 0 | Status: SHIPPED | OUTPATIENT
Start: 2022-02-12 | End: 2022-03-14

## 2022-02-12 RX ORDER — LEVOFLOXACIN 750 MG/1
750 TABLET ORAL EVERY 24 HOURS
Qty: 5 TABLET | Refills: 0 | Status: SHIPPED | OUTPATIENT
Start: 2022-02-12 | End: 2022-02-17

## 2022-02-12 RX ORDER — ONDANSETRON 4 MG/1
4 TABLET, FILM COATED ORAL EVERY 8 HOURS PRN
Qty: 20 TABLET | Refills: 0 | Status: SHIPPED | OUTPATIENT
Start: 2022-02-12

## 2022-02-12 RX ORDER — METRONIDAZOLE 500 MG/1
500 TABLET ORAL EVERY 8 HOURS SCHEDULED
Qty: 15 TABLET | Refills: 0 | Status: SHIPPED | OUTPATIENT
Start: 2022-02-12 | End: 2022-02-17

## 2022-02-12 RX ORDER — ACETAMINOPHEN 325 MG/1
650 TABLET ORAL EVERY 6 HOURS PRN
Qty: 20 TABLET | Refills: 0 | Status: SHIPPED | OUTPATIENT
Start: 2022-02-12

## 2022-02-12 RX ORDER — POLYETHYLENE GLYCOL 3350 17 G/17G
17 POWDER, FOR SOLUTION ORAL DAILY
Qty: 170 G | Refills: 0 | Status: SHIPPED | OUTPATIENT
Start: 2022-02-13 | End: 2022-02-23

## 2022-02-12 RX ADMIN — CEFTRIAXONE 1000 MG: 1 INJECTION, SOLUTION INTRAVENOUS at 02:15

## 2022-02-12 RX ADMIN — FINASTERIDE 5 MG: 5 TABLET, FILM COATED ORAL at 08:19

## 2022-02-12 RX ADMIN — METRONIDAZOLE 500 MG: 500 TABLET ORAL at 05:23

## 2022-02-12 RX ADMIN — OXYCODONE HYDROCHLORIDE AND ACETAMINOPHEN 1000 MG: 500 TABLET ORAL at 08:19

## 2022-02-12 RX ADMIN — ENOXAPARIN SODIUM 40 MG: 40 INJECTION SUBCUTANEOUS at 08:19

## 2022-02-12 RX ADMIN — DOCUSATE SODIUM 100 MG: 100 CAPSULE, LIQUID FILLED ORAL at 08:19

## 2022-02-12 RX ADMIN — POLYETHYLENE GLYCOL 3350 17 G: 17 POWDER, FOR SOLUTION ORAL at 08:19

## 2022-02-12 RX ADMIN — METRONIDAZOLE 500 MG: 500 TABLET ORAL at 13:21

## 2022-02-12 NOTE — ASSESSMENT & PLAN NOTE
· On remote colonoscopy, not noted on last colonoscopy from 2017  · Follows with GI, Dr Phyllis Santiago    Follow-up outpatient

## 2022-02-12 NOTE — ASSESSMENT & PLAN NOTE
· Diagnosed on 2/4 in ED, was discharged with Augmentin  Presents with the same on 2/10 with worsening pain and constipation  · Colonoscopy 2017: hx of tubular adenomas, no polyps, sigmoid diverticlar and internal hemorrhoids in anal papilla  recommended f/u in 5 years   · CT abd/pelvis: Previously seen focal diverticulitis in the descending colon appears to have intervally worsened, no discrete pericolonic abscess is seen    No pneumoperitoneum  · Afebrile, does not meet sepsis/SIRS, WBC 11 85 on admission, which has since resolved and normalized  · Started rocephin and flagyl in ED  · Patient without any abdominal complaints today, did have soft BM overnight without issues, stable for discharge to continue outpatient antibiotic course and follow-up with General surgery  · Follow up on blood cultures  · General surgery consulted, discussed with Dr Oleg Linda:  · Can discharge on Levaquin/Flagyl course  · Follow-up outpatient in office  · Tolerated full liquid diet well, can continue on discharge for few days and transition to low residue diet for few weeks

## 2022-02-12 NOTE — DISCHARGE INSTRUCTIONS
Levofloxacin (By mouth)   Levofloxacin (fbm-zlz-AMUA-a-sin)  Treats infections and plague (including pneumonic and septicemic plague)  It is also given to people who have been exposed to anthrax  This medicine is a quinolone antibiotic  Brand Name(s): Levaquin   There may be other brand names for this medicine  When This Medicine Should Not Be Used: This medicine is not right for everyone  Do not use it if you had an allergic reaction to levofloxacin or to similar medicines  How to Use This Medicine:   Liquid, Tablet  · Your doctor will tell you how much medicine to use  Do not use more than directed  Take your medicine at the same time each day  · Oral liquid: Take it 1 hour before or 2 hours after you eat  Measure the oral liquid medicine with a marked measuring spoon, oral syringe, or medicine cup  · Tablet: Take it with or without food  · Take all of the medicine in your prescription to clear up your infection, even if you feel better after the first few doses  · Drink extra fluids so you will urinate more often and help prevent kidney problems  · This medicine should come with a Medication Guide  Ask your pharmacist for a copy if you do not have one  · Missed dose: Take a dose as soon as you remember  If it is almost time for your next dose, wait until then and take a regular dose  Do not take extra medicine to make up for a missed dose  · Store the medicine in a closed container at room temperature, away from heat, moisture, and direct light  Drugs and Foods to Avoid:   Ask your doctor or pharmacist before using any other medicine, including over-the-counter medicines, vitamins, and herbal products  · Some medicines can affect how levofloxacin works  Tell your doctor if you are using any of the following:  ? Theophylline  ? Blood thinner (including warfarin)  ? Insulin or oral diabetes medicine (including glyburide, insulin)  ?  Medicine for heart rhythm problems (including amiodarone, procainamide, quinidine, sotalol)  ? Medicine to treat depression or mental illness  ? NSAID pain or arthritis medicine (including aspirin, celecoxib, diclofenac, ibuprofen, naproxen)  ? Steroid medicine (including hydrocortisone, methylprednisolone, prednisone)  · Take levofloxacin at least 2 hours before or 2 hours after didanosine buffered tablets for oral suspension or the pediatric powder for oral suspension, sucralfate, or antacids, multivitamins, or other products containing aluminum, magnesium, iron, or zinc   Warnings While Using This Medicine:   · Tell your doctor if you are pregnant or breastfeeding, or if you have kidney disease, liver disease, diabetes, heart disease, myasthenia gravis, brain problems, aortic aneurysm (bulge in the wall of the largest artery), or a history of heart rhythm problems (including QT prolongation), seizures, or mental illness  Tell your doctor if you have ever had tendon or joint problems, including rheumatoid arthritis, or if you have received a transplant  · This medicine may cause the following problems:  ? Tendinitis and tendon rupture (may happen after treatment ends)  ? Nerve damage in the arms or legs, which may be permanent  ? Changes in mood or behavior, seizures, or increased pressure in the head  ? Serious skin reactions  ? Kidney problems  ? Liver problems  ? Increased risk of aortic aneurysm  ? Heart rhythm changes  ? Changes in blood sugar levels  · This medicine may make you feel dizzy or lightheaded  Do not drive or do anything else that could be dangerous until you know how this medicine affects you  · This medicine may make you bleed, bruise, or get infections more easily  Take precautions to prevent illness and injury  Wash your hands often  · This medicine can cause diarrhea  Call your doctor if the diarrhea becomes severe, does not stop, or is bloody  Do not take any medicine to stop diarrhea until you have talked to your doctor   Diarrhea can occur 2 months or more after you stop taking this medicine  · Tell any doctor or dentist who treats you that you are using this medicine  This medicine may affect certain medical test results  · This medicine may make your skin more sensitive to sunlight  Wear sunscreen  Do not use sunlamps or tanning beds  · Call your doctor if your symptoms do not improve or if they get worse  · Your doctor will do lab tests at regular visits to check on the effects of this medicine  Keep all appointments  · Keep all medicine out of the reach of children  Never share your medicine with anyone  Possible Side Effects While Using This Medicine:   Call your doctor right away if you notice any of these side effects:  · Allergic reaction: Itching or hives, swelling in your face or hands, swelling or tingling in your mouth or throat, chest tightness, trouble breathing  · Blistering, peeling, red skin rash  · Change in how much or how often you urinate, cloudy or bloody urine  · Dark urine or pale stools, nausea, vomiting, loss of appetite, stomach pain, yellow skin or eyes  · Diarrhea that may contain blood  · Fainting, dizziness, or lightheadedness  · Fast, slow, or uneven heartbeat, chest pain  · Numbness, tingling, or burning pain in your hands, arms, legs, or feet  · Pain, stiffness, swelling, or bruises around your ankle, leg, shoulder, or other joint  · Seizures, severe headache, unusual thoughts or behaviors, trouble sleeping, feeling anxious, confused, or depressed, seeing, hearing, or feeling things that are not there  · Sensitivity of the skin to sunlight, redness or other discoloration of the skin, severe sunburn  · Sudden chest, stomach, or back pain, trouble breathing, cough  · Unusual bleeding, bruising, or weakness  If you notice these less serious side effects, talk with your doctor:   · Mild headache or nausea  If you notice other side effects that you think are caused by this medicine, tell your doctor     Call your doctor for medical advice about side effects  You may report side effects to FDA at 8-425-FDA-3065    © Copyright [employer] 2021 Information is for End User's use only and may not be sold, redistributed or otherwise used for commercial purposes  The above information is an  only  It is not intended as medical advice for individual conditions or treatments  Talk to your doctor, nurse or pharmacist before following any medical regimen to see if it is safe and effective for you  Metronidazole (By mouth)   Metronidazole (met-cari-ANTONIO-da-zole)  Treats bacterial infections  Brand Name(s): Flagyl, Flagyl 375   There may be other brand names for this medicine  When This Medicine Should Not Be Used: This medicine is not right for everyone  Do not use if you had an allergic reaction to metronidazole or similar medicines  Do not use this medicine to treat trichomoniasis if you are in the first 3 months of pregnancy  How to Use This Medicine:   Capsule, Tablet, Long Acting Tablet  · Take your medicine as directed  Your dose may need to be changed several times to find what works best for you  · Capsule: Take with food or milk to avoid upset stomach  · Extended-release tablet: Take it on an empty stomach, 1 hour before or 2 hours after a meal   · Swallow the extended-release tablet whole  Do not crush, break, or chew it  · Take all of the medicine in your prescription to clear up your infection, even if you feel better after the first few doses  · Missed dose: Take a dose as soon as you remember  If it is almost time for your next dose, wait until then and take a regular dose  Do not take extra medicine to make up for a missed dose  · Store the medicine in a closed container at room temperature, away from heat, moisture, and direct light    Drugs and Foods to Avoid:   Ask your doctor or pharmacist before using any other medicine, including over-the-counter medicines, vitamins, and herbal products  · Do not use this medicine if you have taken disulfiram within the last 2 weeks  Do not drink alcohol or use medicine that contains alcohol or propylene glycol while using this medicine and for at least 3 days after treatment  · Some foods and medicines can affect how metronidazole works  Tell your doctor if you are using any of the following:  ? Busulfan, cimetidine, lithium, phenobarbital, phenytoin  ? Blood thinner (including warfarin)  ? Medicine that may cause heart rhythm problems  Warnings While Using This Medicine:   · Tell your doctor if you are pregnant or breastfeeding, or if you have kidney disease, liver disease, a yeast infection (including oral thrush), Cockayne syndrome (genetic disorder), or a history of blood or bone marrow problems or seizures  · This medicine may cause the following problems:  ? Brain or nervous system problems, including peripheral neuropathy, encephalopathy, seizures, meningitis, or vision problems  ? Liver problems, which may be life-threatening  ? Serious skin reactions  · Call your doctor if your symptoms do not improve or if they get worse  · Your doctor will do lab tests at regular visits to check on the effects of this medicine  Keep all appointments  · Tell any doctor or dentist who treats you that you are using this medicine  This medicine may affect certain medical test results  · Keep all medicine out of the reach of children  Never share your medicine with anyone    Possible Side Effects While Using This Medicine:   Call your doctor right away if you notice any of these side effects:  · Allergic reaction: Itching or hives, swelling in your face or hands, swelling or tingling in your mouth or throat, chest tightness, trouble breathing  · Blistering, peeling, red skin rash  · Change in how much or how often you urinate  · Confusion, drowsiness, headache, stiff neck or back  · Dark urine or pale stools, nausea, vomiting, loss of appetite, stomach pain, yellow skin or eyes  · Dizziness, problems with muscle control, clumsiness, shakiness, trouble talking  · Fast, pounding, or uneven heartbeat, fainting, lightheadedness  · Fever, chills, cough, sore throat, body aches  · Numbness, tingling, or burning pain in your hands, arms, legs, or feet  · Seizures  · Unusual bleeding, bruising, or weakness  If you notice these less serious side effects, talk with your doctor:   · Sores or white patches on your lips, mouth, or throat  · Unusual or unpleasant taste in your mouth  If you notice other side effects that you think are caused by this medicine, tell your doctor  Call your doctor for medical advice about side effects  You may report side effects to FDA at 0-875-FDA-4776    © Copyright [employer] 2021 Information is for End User's use only and may not be sold, redistributed or otherwise used for commercial purposes  The above information is an  only  It is not intended as medical advice for individual conditions or treatments  Talk to your doctor, nurse or pharmacist before following any medical regimen to see if it is safe and effective for you

## 2022-02-12 NOTE — DISCHARGE INSTR - AVS FIRST PAGE
You will need to follow-up with general surgery outpatient for management and monitoring of acute diverticulitis  Continue full liquid diet for few days, if doing well can transition to low residue diet for few weeks  Will continue taking antibiotics with Levaquin/Flagyl for 5 days

## 2022-02-12 NOTE — DISCHARGE SUMMARY
Vaibhav U  67   Discharge- Dieudonne Barakat 1940, [de-identified] y o  male MRN: [id number]  Unit/Bed#: -01 Encounter: [id number]  Primary Care Provider: Sravani Mcbride MD   Date and time admitted to hospital: 2/10/2022  9:33 PM    * Acute diverticulitis  Assessment & Plan  · Diagnosed on 2/4 in ED, was discharged with Augmentin  Presents with the same on 2/10 with worsening pain and constipation  · Colonoscopy 2017: hx of tubular adenomas, no polyps, sigmoid diverticlar and internal hemorrhoids in anal papilla  recommended f/u in 5 years   · CT abd/pelvis: Previously seen focal diverticulitis in the descending colon appears to have intervally worsened, no discrete pericolonic abscess is seen  No pneumoperitoneum  · Afebrile, does not meet sepsis/SIRS, WBC 11 85 on admission, which has since resolved and normalized  · Started rocephin and flagyl in ED  · Patient without any abdominal complaints today, did have soft BM overnight without issues, stable for discharge to continue outpatient antibiotic course and follow-up with General surgery  · Follow up on blood cultures  · General surgery consulted, discussed with Dr Nhung Gan:  · Can discharge on Levaquin/Flagyl course  · Follow-up outpatient in office  · Tolerated full liquid diet well, can continue on discharge for few days and transition to low residue diet for few weeks    Constipation  Assessment & Plan  · PTA, last BM was over week ago  States he previously went once daily  · Denies any medication changes  States he is staying adequately hydrated and eating foods high in fiber  · CT abdomen pelvis without any signs of obstruction  · Start on bowel regimen  · Soft BM on 02/11    Hx of adenomatous colonic polyps  Assessment & Plan  · On remote colonoscopy, not noted on last colonoscopy from 2017  · Follows with GI, Dr Daxa Carrillo    Follow-up outpatient    Gastroesophageal reflux  Assessment & Plan  · Takes Rabeprazole every other day, will order Protonix while inpatient   · Continue PPI    Cognitive impairment  Assessment & Plan  · Follows with neurology, diagnosed with Alzheimer's dementia  Mild memory impairment  A&Ox3  · MOCA 22/30  · Optimize sleep/wake cycles    Medical Problems             Resolved Problems  Date Reviewed: 2/12/2022    None              Discharging Physician / Practitioner: Olivia Johnson PA-C  PCP: Zoraida Nageotte, MD  Admission Date:   Admission Orders (From admission, onward)     Ordered        02/11/22 1035  Inpatient Admission  Once            02/11/22 0205  Place in Observation  Once                      Discharge Date: 02/12/22    Consultations During Hospital Stay:  · None    Procedures Performed:   · None    Significant Findings / Test Results:   CT abdomen pelvis with contrast   Final Result by Sasha Boo DO (02/11 0144)      Previously seen focal diverticulitis in the descending colon appears to have intervally worsened, no discrete pericolonic abscess is seen  No pneumoperitoneum  Right renal cyst, enlarged prostate, and other findings as above  Workstation performed: [id number]             Incidental Findings:   · None     Test Results Pending at Discharge (will require follow up): · Final blood culture results     Outpatient Tests Requested:  · Follow-up with general surgery outpatient for monitoring of acute diverticulitis    Complications:  None    Reason for Admission:  Acute diverticulitis    Hospital Course:   Kathya Martinez is a [de-identified] y o  male patient who originally presented to the hospital on 2/10/2022 due to acute diverticulitis  Patient was recently seen in the ED on 02/04 for the same, was discharged with Augmentin course, failed in via trial and presented with worsening abdominal pain and constipation X 1 week  On arrival to ED, patient overall stable, afebrile, did not meet SIRS/sepsis criteria, although with mildly elevated WBCs   CT showed worsened diverticulitis without abscess  Patient started on IV Rocephin and Flagyl in the ED, started on IV fluids, clear liquid diet, general surgery consulted  Throughout course of admission, patient's abdominal pain had improved, was able to have a BM overnight 2/11, tolerated advancing to full liquid diet well  General surgery recommended discharge on Levaquin/Flagyl and following up outpatient in office, can continue full liquid diet for few days on discharge and transition to low residue/fiber diet for few weeks  Continue bowel regimen on discharge, will give Amrit gonzales r fito Miller Please see above list of diagnoses and related plan for additional information  Condition at Discharge: stable    Discharge Day Visit / Exam:   Subjective:  Patient is seen at bedside this a m , without nausea/vomiting, abdominal pain resolved  Patient did have 1 soft BM overnight, tolerating diet well, states he feels ready for discharge home  Vitals: Blood Pressure: 109/68 (02/12/22 0808)  Pulse: 70 (02/12/22 0808)  Temperature: (!) 97 4 °F (36 3 °C) (02/12/22 0808)  Temp Source: Oral (02/12/22 2818)  Respirations: 18 (02/12/22 0808)  Height: 5' 7" (170 2 cm) (02/11/22 0300)  Weight - Scale: 74 5 kg (164 lb 3 9 oz) (02/11/22 0300)  SpO2: 96 % (02/12/22 0830)  Exam:   Physical Exam  Constitutional:       General: He is not in acute distress  Appearance: Normal appearance  He is not ill-appearing, toxic-appearing or diaphoretic  Cardiovascular:      Rate and Rhythm: Normal rate and regular rhythm  Pulses: Normal pulses  Heart sounds: Normal heart sounds  Pulmonary:      Effort: Pulmonary effort is normal  No respiratory distress  Breath sounds: Normal breath sounds  Abdominal:      General: Bowel sounds are normal  There is no distension  Palpations: Abdomen is soft  Tenderness: There is no abdominal tenderness  Musculoskeletal:         General: No swelling or tenderness     Skin:     General: Skin is warm  Neurological:      General: No focal deficit present  Mental Status: He is alert and oriented to person, place, and time  Psychiatric:         Mood and Affect: Mood normal          Behavior: Behavior normal          Discussion with Family: Attempted to update  (wife) via phone  Left voicemail  Discharge instructions/Information to patient and family:   See after visit summary for information provided to patient and family  Provisions for Follow-Up Care:  See after visit summary for information related to follow-up care and any pertinent home health orders  Disposition:   Home    Planned Readmission:  None     Discharge Statement:  I spent 45 minutes discharging the patient  This time was spent on the day of discharge  I had direct contact with the patient on the day of discharge  Greater than 50% of the total time was spent examining patient, answering all patient questions, arranging and discussing plan of care with patient as well as directly providing post-discharge instructions  Additional time then spent on discharge activities  Discharge Medications:  See after visit summary for reconciled discharge medications provided to patient and/or family        **Please Note: This note may have been constructed using a voice recognition system**

## 2022-02-12 NOTE — ASSESSMENT & PLAN NOTE
· PTA, last BM was over week ago  States he previously went once daily  · Denies any medication changes  States he is staying adequately hydrated and eating foods high in fiber    · CT abdomen pelvis without any signs of obstruction  · Start on bowel regimen  · Soft BM on 02/11

## 2022-02-14 ENCOUNTER — TRANSITIONAL CARE MANAGEMENT (OUTPATIENT)
Dept: FAMILY MEDICINE CLINIC | Facility: CLINIC | Age: 82
End: 2022-02-14

## 2022-02-16 LAB
BACTERIA BLD CULT: NORMAL
BACTERIA BLD CULT: NORMAL

## 2022-02-24 NOTE — PHYSICIAN ADVISOR
Current patient class: Inpatient  The patient is currently on Hospital Day: 3 at [facility]      The patient was admitted to the hospital at 10:35 AM on 2/11/22 for the following diagnosis:  Abdominal pain [R10 9]  Acute diverticulitis [K57 92]  Failure of outpatient treatment [Z78 9]       There is documentation in the medical record of an expected length of stay of at least 2 midnights  The patient is therefore expected to satisfy the 2 midnight benchmark and given the 2 midnight presumption is appropriate for INPATIENT ADMISSION  Given this expectation of a satisfying stay, CMS instructs us that the patient is most often appropriate for inpatient admission under part A provided medical necessity is documented in the chart  After review of the relevant documentation, labs, vital signs and test results, the patient is appropriate for INPATIENT ADMISSION  Admission to the hospital as an inpatient is a complex decision making process which requires the practitioner to consider the patients presenting complaint, history and physical examination and all relevant testing  With this in mind, in this case, the patient was deemed appropriate for INPATIENT ADMISSION  After review of the documentation and testing available at the time of the admission I concur with this clinical determination of medical necessity  Rationale is as follows: The patient is a [de-identified] yrs old Male who presented to the ED at 2/10/2022  9:33 PM with a chief complaint of Abdominal Pain (recent issues with constipation over the past week )  Patient was recently in ER 2/4/22 for diverticulitis and treated with Augmentin  She then returned to ER on day of admission with worsening abdominal pain and constipation  CT abd/pelvis showed worsening diverticulitis without abscess  She was seen by general surgery and started on IV rocephin, flagyl and IV fluids   No surgical intervention was recommended and bowel regimen started as well  Her diet was advanced from clear liquid to full liquid by discharge  Her abdominal pain improved and she was discharged on hospital day 3  The patients vitals on arrival were   ED Triage Vitals [02/10/22 2135]   Temperature Pulse Respirations Blood Pressure SpO2   98 1 °F (36 7 °C) 91 18 140/68 99 %      Temp Source Heart Rate Source Patient Position - Orthostatic VS BP Location FiO2 (%)   Oral Monitor Sitting Left arm --      Pain Score       2           Past Medical History:   Diagnosis Date    BPH (benign prostatic hyperplasia)     Disorder of eye     disorder of choroid of eye    Diverticulitis of rectum     GERD (gastroesophageal reflux disease)     Infected hernioplasty mesh (HCC)     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[address] )     Mixed hyperlipidemia     Skin cancer (melanoma) (HCC)     Uveal, Caroidal     Past Surgical History:   Procedure Laterality Date    EGD  2019    EGD AND COLONOSCOPY  2017    HERNIA REPAIR Right     Inguinal/Umbilical    SKIN CANCER EXCISION  02/21/2019    R upper cheek, benign lesion including margins, face, ears, eyelids, nose, lips, mucous membrane, excised diameter 2 1 to 3 0 CM           Consults have been placed to:   None    Vitals:    02/11/22 1619 02/11/22 1950 02/12/22 0808 02/12/22 0830   BP: 121/59 124/54 109/68    BP Location: Right arm Left arm Left arm    Pulse: (!) 42 72 70    Resp: 18 18 18    Temp: (!) 96 6 °F (35 9 °C) 97 7 °F (36 5 °C) (!) 97 4 °F (36 3 °C)    TempSrc: Tympanic Tympanic Oral    SpO2: 98% 95%  96%   Weight:       Height:           Most recent labs:    No results for input(s): WBC, HGB, HCT, PLT, K, NA, CALCIUM, BUN, CREATININE, LIPASE, AMYLASE, INR, TROPONINI, CKTOTAL, AST, ALT, ALKPHOS, BILITOT in the last 72 hours  Scheduled Meds:  Continuous Infusions:No current facility-administered medications for this encounter  PRN Meds:      Surgical procedures (if appropriate):

## 2022-03-08 ENCOUNTER — TELEPHONE (OUTPATIENT)
Dept: FAMILY MEDICINE CLINIC | Facility: CLINIC | Age: 82
End: 2022-03-08

## 2022-03-08 ENCOUNTER — HOSPITAL ENCOUNTER (EMERGENCY)
Facility: HOSPITAL | Age: 82
Discharge: HOME | End: 2022-03-08
Attending: INTERNAL MEDICINE | Admitting: INTERNAL MEDICINE
Payer: MEDICARE

## 2022-03-08 ENCOUNTER — APPOINTMENT (EMERGENCY)
Dept: CT IMAGING | Facility: HOSPITAL | Age: 82
End: 2022-03-08
Payer: MEDICARE

## 2022-03-08 VITALS
RESPIRATION RATE: 16 BRPM | SYSTOLIC BLOOD PRESSURE: 145 MMHG | HEIGHT: 66 IN | OXYGEN SATURATION: 100 % | DIASTOLIC BLOOD PRESSURE: 78 MMHG | TEMPERATURE: 97.4 F | BODY MASS INDEX: 25.28 KG/M2 | WEIGHT: 157.3 LBS | HEART RATE: 62 BPM

## 2022-03-08 DIAGNOSIS — R33.9 URINARY RETENTION: ICD-10-CM

## 2022-03-08 DIAGNOSIS — R10.33 PERIUMBILICAL ABDOMINAL PAIN: Primary | ICD-10-CM

## 2022-03-08 DIAGNOSIS — N40.0 BPH (BENIGN PROSTATIC HYPERPLASIA): ICD-10-CM

## 2022-03-08 LAB
ALBUMIN SERPL BCP-MCNC: 3.7 G/DL (ref 3.5–5)
ALP SERPL-CCNC: 56 U/L (ref 34–104)
ALT SERPL W P-5'-P-CCNC: 10 U/L (ref 7–52)
ANION GAP SERPL CALCULATED.3IONS-SCNC: 7 MMOL/L (ref 4–13)
AST SERPL W P-5'-P-CCNC: 13 U/L (ref 13–39)
ATRIAL RATE: 63 BPM
BASOPHILS # BLD AUTO: 0.03 THOUSANDS/ΜL (ref 0–0.1)
BASOPHILS NFR BLD AUTO: 1 % (ref 0–1)
BILIRUB SERPL-MCNC: 0.63 MG/DL (ref 0.2–1)
BUN SERPL-MCNC: 13 MG/DL (ref 5–25)
CALCIUM SERPL-MCNC: 9 MG/DL (ref 8.4–10.2)
CARDIAC TROPONIN I PNL SERPL HS: 3 NG/L (ref 8–18)
CHLORIDE SERPL-SCNC: 105 MMOL/L (ref 96–108)
CO2 SERPL-SCNC: 27 MMOL/L (ref 21–32)
CREAT SERPL-MCNC: 1.04 MG/DL (ref 0.6–1.3)
EOSINOPHIL # BLD AUTO: 0.12 THOUSAND/ΜL (ref 0–0.61)
EOSINOPHIL NFR BLD AUTO: 2 % (ref 0–6)
ERYTHROCYTE [DISTWIDTH] IN BLOOD BY AUTOMATED COUNT: 13 % (ref 11.6–15.1)
GFR SERPL CREATININE-BSD FRML MDRD: 67 ML/MIN/1.73SQ M
GLUCOSE SERPL-MCNC: 96 MG/DL (ref 65–140)
HCT VFR BLD AUTO: 38.4 % (ref 36.5–49.3)
HGB BLD-MCNC: 13.7 G/DL (ref 12–17)
IMM GRANULOCYTES # BLD AUTO: 0.02 THOUSAND/UL (ref 0–0.2)
IMM GRANULOCYTES NFR BLD AUTO: 0 % (ref 0–2)
LIPASE SERPL-CCNC: 41 U/L (ref 11–82)
LYMPHOCYTES # BLD AUTO: 1.08 THOUSANDS/ΜL (ref 0.6–4.47)
LYMPHOCYTES NFR BLD AUTO: 18 % (ref 14–44)
MCH RBC QN AUTO: 29.8 PG (ref 26.8–34.3)
MCHC RBC AUTO-ENTMCNC: 35.7 G/DL (ref 31.4–37.4)
MCV RBC AUTO: 84 FL (ref 82–98)
MONOCYTES # BLD AUTO: 0.4 THOUSAND/ΜL (ref 0.17–1.22)
MONOCYTES NFR BLD AUTO: 7 % (ref 4–12)
NEUTROPHILS # BLD AUTO: 4.49 THOUSANDS/ΜL (ref 1.85–7.62)
NEUTS SEG NFR BLD AUTO: 72 % (ref 43–75)
NRBC BLD AUTO-RTO: 0 /100 WBCS
P AXIS: 50 DEGREES
PLATELET # BLD AUTO: 203 THOUSANDS/UL (ref 149–390)
PMV BLD AUTO: 10.7 FL (ref 8.9–12.7)
POTASSIUM SERPL-SCNC: 3.8 MMOL/L (ref 3.5–5.3)
PR INTERVAL: 167 MS
PROT SERPL-MCNC: 6.2 G/DL (ref 6.4–8.4)
QRS AXIS: -30 DEGREES
QRSD INTERVAL: 103 MS
QT INTERVAL: 453 MS
QTC INTERVAL: 460 MS
RBC # BLD AUTO: 4.6 MILLION/UL (ref 3.88–5.62)
SODIUM SERPL-SCNC: 139 MMOL/L (ref 135–147)
T WAVE AXIS: 74 DEGREES
VENTRICULAR RATE: 62 BPM
WBC # BLD AUTO: 6.14 THOUSAND/UL (ref 4.31–10.16)

## 2022-03-08 PROCEDURE — G1004 CDSM NDSC: HCPCS

## 2022-03-08 PROCEDURE — 96361 HYDRATE IV INFUSION ADD-ON: CPT

## 2022-03-08 PROCEDURE — 36415 COLL VENOUS BLD VENIPUNCTURE: CPT | Performed by: INTERNAL MEDICINE

## 2022-03-08 PROCEDURE — 96360 HYDRATION IV INFUSION INIT: CPT

## 2022-03-08 PROCEDURE — 85025 COMPLETE CBC W/AUTO DIFF WBC: CPT | Performed by: INTERNAL MEDICINE

## 2022-03-08 PROCEDURE — 93005 ELECTROCARDIOGRAM TRACING: CPT

## 2022-03-08 PROCEDURE — 93010 ELECTROCARDIOGRAM REPORT: CPT | Performed by: INTERNAL MEDICINE

## 2022-03-08 PROCEDURE — 83690 ASSAY OF LIPASE: CPT | Performed by: INTERNAL MEDICINE

## 2022-03-08 PROCEDURE — 74177 CT ABD & PELVIS W/CONTRAST: CPT

## 2022-03-08 PROCEDURE — 80053 COMPREHEN METABOLIC PANEL: CPT | Performed by: INTERNAL MEDICINE

## 2022-03-08 PROCEDURE — 99284 EMERGENCY DEPT VISIT MOD MDM: CPT | Performed by: INTERNAL MEDICINE

## 2022-03-08 PROCEDURE — 84484 ASSAY OF TROPONIN QUANT: CPT | Performed by: INTERNAL MEDICINE

## 2022-03-08 PROCEDURE — 99284 EMERGENCY DEPT VISIT MOD MDM: CPT

## 2022-03-08 RX ORDER — TAMSULOSIN HYDROCHLORIDE 0.4 MG/1
0.4 CAPSULE ORAL
Qty: 30 CAPSULE | Refills: 0 | Status: SHIPPED | OUTPATIENT
Start: 2022-03-08 | End: 2022-04-07

## 2022-03-08 RX ORDER — SODIUM CHLORIDE 9 MG/ML
125 INJECTION, SOLUTION INTRAVENOUS CONTINUOUS
Status: DISCONTINUED | OUTPATIENT
Start: 2022-03-08 | End: 2022-03-08 | Stop reason: HOSPADM

## 2022-03-08 RX ADMIN — SODIUM CHLORIDE 125 ML/HR: 0.9 INJECTION, SOLUTION INTRAVENOUS at 13:18

## 2022-03-08 RX ADMIN — IOHEXOL 100 ML: 350 INJECTION, SOLUTION INTRAVENOUS at 14:33

## 2022-03-08 NOTE — ED PROVIDER NOTES
History  Chief Complaint   Patient presents with    Abdominal Pain     pt reports severe abdominal pain this morning; This is an [de-identified]years old came for having abdominal pain for the last few days  The abdominal pain increased today  Patient pointed to the mid abdomen  Patient has no nausea no vomiting no diarrhea  Patient take his breakfast this morning he tolerated it well  Patient denies any fever  Patient recently diagnosed with diverticulitis and he was on antibiotics for that  Patient is moving his bowel every day  Patient denies any urinary symptoms  Patient has no chest pain shortness of breath  Patient has pulse ox 100% on room air  Patient has long history of abdominal surgery when he has hernia which was repaired and was complicated and he has another surgery for it by Dr Mercedes Night  Prior to Admission Medications   Prescriptions Last Dose Informant Patient Reported? Taking?    Ascorbic Acid (vitamin C) 1000 MG tablet  Self Yes No   Sig: Take 1,000 mg by mouth daily   Multiple Vitamins-Minerals (MEMORY NICHOLAS PO)  Self Yes No   Si po daily   Patient not taking: Reported on 10/18/2021    Probiotic Product (PROBIOTIC ADVANCED PO)  Self Yes No   Si po daily   Patient not taking: Reported on 10/18/2021    RABEprazole (ACIPHEX) 20 MG tablet  Self No No   Sig: take 1 tablet by mouth 1/2 HOUR PRIOR TO BREAKFAST   Patient taking differently: PT TAKES 1 TABLET EVERY OTHER DAY   VITAMIN D, CHOLECALCIFEROL, PO  Self Yes No   Sig: 3 caps po daily   acetaminophen (TYLENOL) 325 mg tablet   No No   Sig: Take 2 tablets (650 mg total) by mouth every 6 (six) hours as needed for mild pain   b complex vitamins tablet  Self Yes No   Sig: Take 1 tablet by mouth daily   docusate sodium (COLACE) 100 mg capsule   No No   Sig: Take 1 capsule (100 mg total) by mouth 2 (two) times a day   dutasteride (AVODART) 0 5 mg capsule  Self Yes No   Si CAPSULE PO DAILY   ondansetron (ZOFRAN) 4 mg tablet   No No Sig: Take 1 tablet (4 mg total) by mouth every 8 (eight) hours as needed for nausea or vomiting   polyethylene glycol (MIRALAX) 17 g packet   No No   Sig: Take 17 g by mouth daily for 10 days   vitamin E, tocopherol, 1,000 units capsule  Self Yes No   Sig: Take 1,000 Units by mouth daily   Patient not taking: Reported on 10/18/2021       Facility-Administered Medications: None       Past Medical History:   Diagnosis Date    BPH (benign prostatic hyperplasia)     Disorder of eye     disorder of choroid of eye    Diverticulitis of rectum     GERD (gastroesophageal reflux disease)     Infected hernioplasty mesh (HCC)     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 [address] )     Mixed hyperlipidemia     [facility]     Uveal, Caroidal       Past Surgical History:   Procedure Laterality Date    EGD      EGD AND COLONOSCOPY      HERNIA REPAIR Right     Inguinal/Umbilical    SKIN CANCER EXCISION  2019    R upper cheek, benign lesion including margins, face, ears, eyelids, nose, lips, mucous membrane, excised diameter 2 1 to 3 0 CM       Family History   Problem Relation Age of Onset    Alzheimer's disease Mother     No Known Problems Father      I have reviewed and agree with the history as documented  E-Cigarette/Vaping    E-Cigarette Use Never User      E-Cigarette/Vaping Substances    Nicotine No     THC No     CBD No     Flavoring No     Other No     Unknown No      Social History     Tobacco Use    Smoking status: Former Smoker     Packs/day: 1 00     Years: 9      Pack years: 9      Types: Cigarettes     Quit date:      Years since quittin 2    Smokeless tobacco: Never Used    Tobacco comment: QUIT IN    Vaping Use    Vaping Use: Never used   Substance Use Topics    Alcohol use: Yes     Comment: occassionally    Drug use: Not Currently     Comment: DENIES       Review of Systems   Constitutional: Negative for diaphoresis, fatigue and fever     HENT: Negative for sneezing, sore throat and trouble swallowing  Respiratory: Negative for cough, chest tightness and shortness of breath  Cardiovascular: Negative for chest pain, palpitations and leg swelling  Gastrointestinal: Positive for abdominal pain  Negative for blood in stool, constipation, diarrhea, nausea and vomiting  Endocrine: Negative for polydipsia, polyphagia and polyuria  Genitourinary: Negative for difficulty urinating, dysuria, flank pain and hematuria  Musculoskeletal: Negative for arthralgias, back pain, gait problem, neck pain and neck stiffness  Skin: Negative for color change, pallor and rash  Neurological: Negative for dizziness, light-headedness and headaches  Hematological: Negative for adenopathy  Does not bruise/bleed easily  Psychiatric/Behavioral: Negative for agitation and behavioral problems  Physical Exam  Physical Exam  Vitals and nursing note reviewed  Constitutional:       General: He is not in acute distress  Appearance: He is well-developed  He is not ill-appearing, toxic-appearing or diaphoretic  HENT:      Head: Normocephalic and atraumatic  Mouth/Throat:      Pharynx: No oropharyngeal exudate  Eyes:      General: No scleral icterus  Extraocular Movements: Extraocular movements intact  Pupils: Pupils are equal, round, and reactive to light  Cardiovascular:      Rate and Rhythm: Normal rate and regular rhythm  Heart sounds: Normal heart sounds  No murmur heard  No friction rub  No gallop  Pulmonary:      Effort: Pulmonary effort is normal  No respiratory distress  Breath sounds: Normal breath sounds  No stridor  No wheezing, rhonchi or rales  Chest:      Chest wall: No tenderness  Abdominal:      General: Bowel sounds are normal  There is no distension  Palpations: Abdomen is soft  There is no shifting dullness, fluid wave, hepatomegaly, splenomegaly, mass or pulsatile mass  Tenderness:  There is abdominal tenderness in the periumbilical area  There is no right CVA tenderness, left CVA tenderness, guarding or rebound  Negative signs include Freeman's sign, Rovsing's sign, McBurney's sign, psoas sign and obturator sign  Hernia: No hernia is present  There is no hernia in the umbilical area, ventral area, left inguinal area, right femoral area, left femoral area or right inguinal area  Musculoskeletal:         General: No tenderness or deformity  Normal range of motion  Cervical back: Normal range of motion and neck supple  Skin:     General: Skin is warm and dry  Capillary Refill: Capillary refill takes less than 2 seconds  Coloration: Skin is not cyanotic, jaundiced or pale  Findings: No erythema or rash  Neurological:      Mental Status: He is alert and oriented to person, place, and time     Psychiatric:         Behavior: Behavior normal          Vital Signs  ED Triage Vitals   Temperature Pulse Respirations Blood Pressure SpO2   03/08/22 1250 03/08/22 1245 03/08/22 1245 03/08/22 1245 03/08/22 1245   (!) 97 4 °F (36 3 °C) 62 16 145/78 100 %      Temp Source Heart Rate Source Patient Position - Orthostatic VS BP Location FiO2 (%)   03/08/22 1250 03/08/22 1245 03/08/22 1245 03/08/22 1245 --   Oral Monitor Lying Left arm       Pain Score       03/08/22 1245       7           Vitals:    03/08/22 1245   BP: 145/78   Pulse: 62   Patient Position - Orthostatic VS: Lying         Visual Acuity      ED Medications  Medications   iohexol (OMNIPAQUE) 350 MG/ML injection (SINGLE-DOSE) 100 mL (100 mL Intravenous Given 3/8/22 1433)       Diagnostic Studies  Results Reviewed     Procedure Component Value Units Date/Time    Lipase [[id number]]  (Normal) Collected: 03/08/22 1318    Lab Status: Final result Specimen: Blood from Arm, Right Updated: 03/08/22 1415     Lipase 41 u/L     Comprehensive metabolic panel [[id number]]  (Abnormal) Collected: 03/08/22 1318    Lab Status: Final result Specimen: Blood from Arm, Right Updated: 03/08/22 1415     Sodium 139 mmol/L      Potassium 3 8 mmol/L      Chloride 105 mmol/L      CO2 27 mmol/L      ANION GAP 7 mmol/L      BUN 13 mg/dL      Creatinine 1 04 mg/dL      Glucose 96 mg/dL      Calcium 9 0 mg/dL      AST 13 U/L      ALT 10 U/L      Alkaline Phosphatase 56 U/L      Total Protein 6 2 g/dL      Albumin 3 7 g/dL      Total Bilirubin 0 63 mg/dL      eGFR 67 ml/min/1 73sq m     Narrative:      [place] guidelines for Chronic Kidney Disease (CKD):     Stage 1 with normal or high GFR (GFR > 90 mL/min/1 73 square meters)    Stage 2 Mild CKD (GFR = 60-89 mL/min/1 73 square meters)    Stage 3A Moderate CKD (GFR = 45-59 mL/min/1 73 square meters)    Stage 3B Moderate CKD (GFR = 30-44 mL/min/1 73 square meters)    Stage 4 Severe CKD (GFR = 15-29 mL/min/1 73 square meters)    Stage 5 End Stage CKD (GFR <15 mL/min/1 73 square meters)  Note: GFR calculation is accurate only with a steady state creatinine    High Sensitivity Troponin I Random [[id number]]  (Abnormal) Collected: 03/08/22 1318    Lab Status: Final result Specimen: Blood from Arm, Right Updated: 03/08/22 1348     HS TnI random 3 ng/L     CBC and differential [[id number]] Collected: 03/08/22 1318    Lab Status: Final result Specimen: Blood from Arm, Right Updated: 03/08/22 1324     WBC 6 14 Thousand/uL      RBC 4 60 Million/uL      Hemoglobin 13 7 g/dL      Hematocrit 38 4 %      MCV 84 fL      MCH 29 8 pg      MCHC 35 7 g/dL      RDW 13 0 %      MPV 10 7 fL      Platelets 219 Thousands/uL      nRBC 0 /100 WBCs      Neutrophils Relative 72 %      Immat GRANS % 0 %      Lymphocytes Relative 18 %      Monocytes Relative 7 %      Eosinophils Relative 2 %      Basophils Relative 1 %      Neutrophils Absolute 4 49 Thousands/µL      Immature Grans Absolute 0 02 Thousand/uL      Lymphocytes Absolute 1 08 Thousands/µL      Monocytes Absolute 0 40 Thousand/µL      Eosinophils Absolute 0 12 Thousand/µL      Basophils Absolute 0 03 Thousands/µL                  CT abdomen pelvis w contrast   Final Result by Nate Mathew MD (03/08 1505)      Previously demonstrated diverticulitis has improved since the study of February 10  It is possible there is a subtle recurrence however if indeed the prior process had previously resolved  No abscess is seen  Bladder distention with prostate hypertrophy should be correlated for any urinary retention  There is moderate SMA narrowing identified at its origin although the celiac and JELENA appear patent and there is no bowel dilatation  This was discussed with Dr Jennings Sandhoff at 3:00 PM            Workstation performed: [id number]                    Procedures  Procedures         ED Course  ED Course as of 03/09/22 0626   Tue Mar 08, 2022   1504 Pt stated after he came back from CT he went to bathroom and large amount of urine comes out  1537 EKG; Sinus rhythm rate 62/min, L axis deviation  No ischemic changes                               SBIRT 20yo+      Most Recent Value   SBIRT (22 yo +)    In order to provide better care to our patients, we are screening all of our patients for alcohol and drug use  Would it be okay to ask you these screening questions? No Filed at: 03/08/2022 1457                    [facility]  Number of Diagnoses or Management Options  Diagnosis management comments: This is an [de-identified]years old came for having abdominal pain  Patient was recently treated for diverticulitis  Physical exam on the patient shows midline and periumbilical tenderness  Patient has no nausea no vomiting no diarrhea  Patient has no fever  The pain is like discomfort we reviewed the labs on the patient which came back negative  And CTA shows very large urinary bladder which looks like retention  Patient after came back from the CT scan he went to the bathroom in the past large amount of urine  Patient has also enlarged prostate    Patient stated that all the abdominal pain he has is gone after he has urine  Patient advised to follow-up with urologist and are going to start him on Flomax once daily  Amount and/or Complexity of Data Reviewed  Clinical lab tests: ordered and reviewed  Tests in the radiology section of CPT®: ordered and reviewed    Risk of Complications, Morbidity, and/or Mortality  Presenting problems: moderate  Diagnostic procedures: moderate  Management options: low        Disposition  Final diagnoses:   Periumbilical abdominal pain   Urinary retention   BPH (benign prostatic hyperplasia)     Time reflects when diagnosis was documented in both MDM as applicable and the Disposition within this note     Time User Action Codes Description Comment    3/8/2022  3:41 PM Kamaljit Haywood Add [X25 09] Periumbilical abdominal pain     3/8/2022  3:41 PM Kamaljit Haywood Add [R33 9] Urinary retention     3/8/2022  3:42 PM Kamaljit Haywood Add [N40 0] BPH (benign prostatic hyperplasia)       ED Disposition     ED Disposition Condition Date/Time Comment    Discharge Stable Tue Mar 8, 2022  3:41 PM Thomos Seats discharge to home/self care              Follow-up Information     Follow up With Specialties Details Why Contact Info Additional [address] [facility] In 1 week  [facility] [address] [facility] [id number]  [address] [place] [address] For Urology [facility], [address], [address],  [facility]          Discharge Medication List as of 3/8/2022  3:46 PM      START taking these medications    Details   tamsulosin (FLOMAX) 0 4 mg Take 1 capsule (0 4 mg total) by mouth daily with dinner, Starting Tue 3/8/2022, Until Thu 4/7/2022, Normal         CONTINUE these medications which have NOT CHANGED    Details   acetaminophen (TYLENOL) 325 mg tablet Take 2 tablets (650 mg total) by mouth every 6 (six) hours as needed for mild pain, Starting Sat 2/12/2022, Normal Ascorbic Acid (vitamin C) 1000 MG tablet Take 1,000 mg by mouth daily, Historical Med      b complex vitamins tablet Take 1 tablet by mouth daily, Historical Med      docusate sodium (COLACE) 100 mg capsule Take 1 capsule (100 mg total) by mouth 2 (two) times a day, Starting Sat 2/12/2022, Until Mon 3/14/2022, Normal      dutasteride (AVODART) 0 5 mg capsule 1 CAPSULE PO DAILY, Starting Thu 4/9/2020, Historical Med      Multiple Vitamins-Minerals (MEMORY NICHOLAS PO) 1 po daily, Historical Med      ondansetron (ZOFRAN) 4 mg tablet Take 1 tablet (4 mg total) by mouth every 8 (eight) hours as needed for nausea or vomiting, Starting Sat 2/12/2022, Normal      polyethylene glycol (MIRALAX) 17 g packet Take 17 g by mouth daily for 10 days, Starting Sun 2/13/2022, Until Wed 2/23/2022, Normal      Probiotic Product (PROBIOTIC ADVANCED PO) 1 po daily, Historical Med      RABEprazole (ACIPHEX) 20 MG tablet take 1 tablet by mouth 1/2 HOUR PRIOR TO BREAKFAST, Normal      VITAMIN D, CHOLECALCIFEROL, PO 3 caps po daily, Historical Med      vitamin E, tocopherol, 1,000 units capsule Take 1,000 Units by mouth daily, Historical Med             No discharge procedures on file      PDMP Review     None          ED Provider  Electronically Signed by           Benitez Corrales MD  03/09/22 5919

## 2022-03-08 NOTE — ED NOTES
D/c reviewed with pt  Pt verbalized understanding and has no further questions at this time       Aure Tao, JACINTA  03/08/22 8650

## 2022-03-08 NOTE — DISCHARGE INSTRUCTIONS
FOLLOW up with urology  Take medications as prescribed  Labs Reviewed   COMPREHENSIVE METABOLIC PANEL - Abnormal       Result Value Ref Range Status    Sodium 139  135 - 147 mmol/L Final    Potassium 3 8  3 5 - 5 3 mmol/L Final    Chloride 105  96 - 108 mmol/L Final    CO2 27  21 - 32 mmol/L Final    ANION GAP 7  4 - 13 mmol/L Final    BUN 13  5 - 25 mg/dL Final    Creatinine 1 04  0 60 - 1 30 mg/dL Final    Comment: Standardized to IDMS reference method    Glucose 96  65 - 140 mg/dL Final    Comment: If the patient is fasting, the ADA then defines impaired fasting glucose as > 100 mg/dL and diabetes as > or equal to 123 mg/dL  Specimen collection should occur prior to Sulfasalazine administration due to the potential for falsely depressed results  Specimen collection should occur prior to Sulfapyridine administration due to the potential for falsely elevated results  Calcium 9 0  8 4 - 10 2 mg/dL Final    AST 13  13 - 39 U/L Final    Comment: Specimen collection should occur prior to Sulfasalazine administration due to the potential for falsely depressed results  ALT 10  7 - 52 U/L Final    Comment: Specimen collection should occur prior to Sulfasalazine administration due to the potential for falsely depressed results       Alkaline Phosphatase 56  34 - 104 U/L Final    Total Protein 6 2 (*) 6 4 - 8 4 g/dL Final    Albumin 3 7  3 5 - 5 0 g/dL Final    Total Bilirubin 0 63  0 20 - 1 00 mg/dL Final    eGFR 67  ml/min/1 73sq m Final    Narrative:     [place] guidelines for Chronic Kidney Disease (CKD):     Stage 1 with normal or high GFR (GFR > 90 mL/min/1 73 square meters)    Stage 2 Mild CKD (GFR = 60-89 mL/min/1 73 square meters)    Stage 3A Moderate CKD (GFR = 45-59 mL/min/1 73 square meters)    Stage 3B Moderate CKD (GFR = 30-44 mL/min/1 73 square meters)    Stage 4 Severe CKD (GFR = 15-29 mL/min/1 73 square meters)    Stage 5 End Stage CKD (GFR <15 mL/min/1 73 square meters)  Note: GFR calculation is accurate only with a steady state creatinine   HIGH SENSITIVITY TROPONIN I RANDOM - Abnormal    HS TnI random 3 (*) 8 - 18 ng/L Final    Comment:                                              Initial (time 0) result  If >=50 ng/L, Myocardial injury suggested ;  Type of myocardial injury and treatment strategy  to be determined  If 5-49 ng/L, a delta result at 2 hours and or 4 hours will be needed to further evaluate  If <4 ng/L, and chest pain has been >3 hours since onset, patient may qualify for discharge based on the HEART score in the ED  If <5 ng/L and <3hours since onset of chest pain, a delta result at 2 hours will be needed to further evaluate  HS Troponin 99th Percentile URL of a Health Population=12 ng/L with a 95% Confidence Interval of 8-18 ng/L  Second Troponin (time 2 hours)  If calculated delta >= 20 ng/L,  Myocardial injury suggested ; Type of myocardial injury and treatment strategy to be determined  If 5-49 ng/L and the calculated delta is 5-19 ng/L, consult medical service for evaluation  Continue evaluation for ischemia on ecg and other possible etiology and repeat hs troponin at 4 hours  If delta is <5 ng/L at 2 hours, consider discharge based on risk stratification via the HEART score (if in ED), or MAGALIS risk score in IP/Observation  HS Troponin 99th Percentile URL of a Health Population=12 ng/L with a 95% Confidence Interval of 8-18 ng/L     LIPASE - Normal    Lipase 41  11 - 82 u/L Final   CBC AND DIFFERENTIAL    WBC 6 14  4 31 - 10 16 Thousand/uL Final    RBC 4 60  3 88 - 5 62 Million/uL Final    Hemoglobin 13 7  12 0 - 17 0 g/dL Final    Hematocrit 38 4  36 5 - 49 3 % Final    MCV 84  82 - 98 fL Final    MCH 29 8  26 8 - 34 3 pg Final    MCHC 35 7  31 4 - 37 4 g/dL Final    RDW 13 0  11 6 - 15 1 % Final    MPV 10 7  8 9 - 12 7 fL Final    Platelets 055  861 - 390 Thousands/uL Final    nRBC 0  /100 WBCs Final    Neutrophils Relative 72  43 - 75 % Final    Immat GRANS % 0  0 - 2 % Final    Lymphocytes Relative 18  14 - 44 % Final    Monocytes Relative 7  4 - 12 % Final    Eosinophils Relative 2  0 - 6 % Final    Basophils Relative 1  0 - 1 % Final    Neutrophils Absolute 4 49  1 85 - 7 62 Thousands/µL Final    Immature Grans Absolute 0 02  0 00 - 0 20 Thousand/uL Final    Lymphocytes Absolute 1 08  0 60 - 4 47 Thousands/µL Final    Monocytes Absolute 0 40  0 17 - 1 22 Thousand/µL Final    Eosinophils Absolute 0 12  0 00 - 0 61 Thousand/µL Final    Basophils Absolute 0 03  0 00 - 0 10 Thousands/µL Final     CT abdomen pelvis w contrast   Final Result      Previously demonstrated diverticulitis has improved since the study of February 10  It is possible there is a subtle recurrence however if indeed the prior process had previously resolved  No abscess is seen  Bladder distention with prostate hypertrophy should be correlated for any urinary retention  There is moderate SMA narrowing identified at its origin although the celiac and JELENA appear patent and there is no bowel dilatation        This was discussed with Dr Sharyn Melendez at 3:00 PM            Workstation performed: [id number]

## 2022-03-09 RX ORDER — IBUPROFEN 200 MG
TABLET ORAL AS NEEDED
COMMUNITY

## 2022-03-11 ENCOUNTER — ANESTHESIA (OUTPATIENT)
Dept: GASTROENTEROLOGY | Facility: AMBULATORY SURGERY CENTER | Age: 82
End: 2022-03-11

## 2022-03-11 ENCOUNTER — HOSPITAL ENCOUNTER (OUTPATIENT)
Dept: GASTROENTEROLOGY | Facility: AMBULATORY SURGERY CENTER | Age: 82
Discharge: HOME | End: 2022-03-11
Payer: MEDICARE

## 2022-03-11 ENCOUNTER — ANESTHESIA EVENT (OUTPATIENT)
Dept: GASTROENTEROLOGY | Facility: AMBULATORY SURGERY CENTER | Age: 82
End: 2022-03-11

## 2022-03-11 VITALS
HEIGHT: 66 IN | RESPIRATION RATE: 18 BRPM | WEIGHT: 154 LBS | SYSTOLIC BLOOD PRESSURE: 113 MMHG | TEMPERATURE: 96 F | HEART RATE: 70 BPM | BODY MASS INDEX: 24.75 KG/M2 | DIASTOLIC BLOOD PRESSURE: 58 MMHG | OXYGEN SATURATION: 99 %

## 2022-03-11 DIAGNOSIS — K31.7 GASTRIC POLYPS: ICD-10-CM

## 2022-03-11 DIAGNOSIS — Z86.010 HX OF ADENOMATOUS COLONIC POLYPS: ICD-10-CM

## 2022-03-11 DIAGNOSIS — K22.2 SCHATZKI'S RING: ICD-10-CM

## 2022-03-11 DIAGNOSIS — K21.00 GASTROESOPHAGEAL REFLUX DISEASE WITH ESOPHAGITIS WITHOUT HEMORRHAGE: ICD-10-CM

## 2022-03-11 PROCEDURE — 00813 ANES UPR LWR GI NDSC PX: CPT | Performed by: NURSE ANESTHETIST, CERTIFIED REGISTERED

## 2022-03-11 PROCEDURE — G0105 COLORECTAL SCRN; HI RISK IND: HCPCS | Performed by: INTERNAL MEDICINE

## 2022-03-11 PROCEDURE — 88305 TISSUE EXAM BY PATHOLOGIST: CPT | Performed by: PATHOLOGY

## 2022-03-11 PROCEDURE — 99100 ANES PT EXTEME AGE<1 YR&>70: CPT | Performed by: NURSE ANESTHETIST, CERTIFIED REGISTERED

## 2022-03-11 PROCEDURE — 43239 EGD BIOPSY SINGLE/MULTIPLE: CPT | Performed by: INTERNAL MEDICINE

## 2022-03-11 RX ORDER — LIDOCAINE HYDROCHLORIDE 10 MG/ML
INJECTION, SOLUTION EPIDURAL; INFILTRATION; INTRACAUDAL; PERINEURAL AS NEEDED
Status: DISCONTINUED | OUTPATIENT
Start: 2022-03-11 | End: 2022-03-11

## 2022-03-11 RX ORDER — PROPOFOL 10 MG/ML
INJECTION, EMULSION INTRAVENOUS AS NEEDED
Status: DISCONTINUED | OUTPATIENT
Start: 2022-03-11 | End: 2022-03-11

## 2022-03-11 RX ORDER — SODIUM CHLORIDE 9 MG/ML
INJECTION, SOLUTION INTRAVENOUS CONTINUOUS PRN
Status: DISCONTINUED | OUTPATIENT
Start: 2022-03-11 | End: 2022-03-11

## 2022-03-11 RX ORDER — SODIUM CHLORIDE 9 MG/ML
30 INJECTION, SOLUTION INTRAVENOUS CONTINUOUS
Status: DISCONTINUED | OUTPATIENT
Start: 2022-03-11 | End: 2022-03-15 | Stop reason: HOSPADM

## 2022-03-11 RX ORDER — SODIUM CHLORIDE 9 MG/ML
20 INJECTION, SOLUTION INTRAVENOUS CONTINUOUS
Status: DISCONTINUED | OUTPATIENT
Start: 2022-03-11 | End: 2022-03-15 | Stop reason: HOSPADM

## 2022-03-11 RX ADMIN — PROPOFOL 100 MG: 10 INJECTION, EMULSION INTRAVENOUS at 08:28

## 2022-03-11 RX ADMIN — SODIUM CHLORIDE: 9 INJECTION, SOLUTION INTRAVENOUS at 08:23

## 2022-03-11 RX ADMIN — PROPOFOL 50 MG: 10 INJECTION, EMULSION INTRAVENOUS at 08:39

## 2022-03-11 RX ADMIN — PROPOFOL 50 MG: 10 INJECTION, EMULSION INTRAVENOUS at 08:33

## 2022-03-11 RX ADMIN — LIDOCAINE HYDROCHLORIDE 30 MG: 10 INJECTION, SOLUTION EPIDURAL; INFILTRATION; INTRACAUDAL; PERINEURAL at 08:28

## 2022-03-11 RX ADMIN — PROPOFOL 50 MG: 10 INJECTION, EMULSION INTRAVENOUS at 08:45

## 2022-03-11 NOTE — DISCHARGE INSTRUCTIONS
Upper Endoscopy and Colonoscopy   WHAT YOU NEED TO KNOW:   An upper endoscopy is also called an upper gastrointestinal (GI) endoscopy, or an esophagogastroduodenoscopy (EGD)  It is a procedure to examine the inside of your esophagus, stomach, and duodenum (first part of the small intestine) with a scope  You may feel bloated, gassy, or have some abdominal discomfort after your procedure  Your throat may be sore for 24 to 36 hours  You may burp or pass gas from air that is still inside your body  A colonoscopy is a procedure to examine the inside of your colon (intestine) with a scope  Polyps or tissue growths may have been removed during your colonoscopy  It is normal to feel bloated and to have some abdominal discomfort  You should be passing gas  If you have hemorrhoids or you had polyps removed, you may have a small amount of bleeding  DISCHARGE INSTRUCTIONS:   Seek care immediately if:   · You have sudden, severe abdominal pain  · You have problems swallowing  · You have a large amount of black, sticky bowel movements or blood in your bowel movements  · You have sudden trouble breathing  · You feel weak, lightheaded, or faint or your heart beats faster than normal for you  Contact your healthcare provider if:   · You have a fever and chills  · You have nausea or are vomiting  · Your abdomen is bloated or feels full and hard  · You have abdominal pain  · You have a large amount of black, sticky bowel movements or blood in your bowel movements  · You have not had a bowel movement for 3 days after your procedure  · You have rash or hives  · You have questions or concerns about your procedure  Activity:   ·       Do not lift, strain, or run for 24 hours after your procedure  ·       Rest after your procedure  You have been given medicine to relax you  Do not drive or make important decisions until the day after your procedure   Return to your normal activity as directed  ·       Relieve gas and discomfort from bloating by lying on your right side with a heating pad on your abdomen  You may need to take short walks to help the gas move out  Eat small meals until bloating is relieved  Follow up with your healthcare provider as directed: Write down your questions so you remember to ask them during your visits  If you take a blood thinner, please review the specific instructions from your endoscopist about when you should resume it  These can be found in the Recommendation and Your Medication list sections of this After Visit Summary  GERD (Gastroesophageal Reflux Disease)   WHAT YOU NEED TO KNOW:   What is gastroesophageal reflux disease (GERD)? GERD is reflux that happens more than 2 times a week for a few weeks  Reflux means acid and food in your stomach back up into your esophagus  GERD can cause other health problems over time if it is not treated  What causes GERD? GERD often happens because the lower muscle (sphincter) of the esophagus does not close properly  The sphincter normally opens to let food into the stomach  It then closes to keep food and stomach acid in the stomach  If the sphincter does not close properly, stomach acid and food back up (reflux) into the esophagus  The following may increase your risk for GERD:  · Certain foods such as spicy foods, chocolate, foods that contain caffeine, peppermint, and fried foods    · Hiatal hernia    · Certain medicines such as calcium channel blockers (used to treat high blood pressure), allergy medicines, sedatives, or antidepressants    · Pregnancy, obesity, or scleroderma    · Lying down after a meal    · Drinking alcohol or smoking cigarettes    What are the signs and symptoms of GERD?    · Heartburn (burning pain in your chest)    · Pain after meals that spreads to your neck, jaw, or shoulder    · Pain that gets better when you change positions    · Bitter or acid taste in your mouth    · A dry cough    · Trouble swallowing or pain with swallowing    · Hoarseness or a sore throat    · Burping or hiccups    · Feeling full soon after you start eating    How is GERD diagnosed? Your healthcare provider will ask about your symptoms and when they started  Tell him or her about other medical conditions you have, your eating habits, and your activities  You may also need any of the following:  · The amount of acid  in your esophagus and stomach may be checked  A small probe is used to check the amount  · An endoscopy  is a procedure used to look at the inside of your esophagus and stomach  An endoscope is a bendable tube with a light and camera on the end  Your healthcare provider may remove a small sample of tissue and send it to a lab for tests  · X-ray  pictures may be taken of your stomach and intestines (bowel)  You may be given a chalky liquid to drink before the pictures are taken  This liquid helps your stomach and intestines show up better on the x-rays  · Pressure and function  tests of your esophagus can help find problems such as a hiatal hernia  How is GERD treated? · Medicines  are used to decrease stomach acid  Medicine may also be used to help your lower esophageal sphincter and stomach contract (tighten) more  · Surgery  is done to wrap the upper part of the stomach around the esophageal sphincter  This will strengthen the sphincter and prevent reflux  How can I manage GERD? · Do not have foods or drinks that may increase heartburn  These include chocolate, peppermint, fried or fatty foods, drinks that contain caffeine, or carbonated drinks (soda)  Other foods include spicy foods, onions, tomatoes, and tomato-based foods  Do not have foods or drinks that can irritate your esophagus, such as citrus fruits, juices, and alcohol  · Do not eat large meals    When you eat a lot of food at one time, your stomach needs more acid to digest it  Eat 6 small meals each day instead of 3 large ones, and eat slowly  Do not eat meals 2 to 3 hours before bedtime  · Elevate the head of your bed  Place 6-inch blocks under the head of your bed frame  You may also use more than one pillow under your head and shoulders while you sleep  · Maintain a healthy weight  If you are overweight, weight loss may help relieve symptoms of GERD  · Do not smoke  Smoking weakens the lower esophageal sphincter and increases the risk of GERD  Ask your healthcare provider for information if you currently smoke and need help to quit  E-cigarettes or smokeless tobacco still contain nicotine  Talk to your healthcare provider before you use these products  · Do not put pressure on your abdomen  Pressure pushes acid up into your esophagus  Do not wear clothing that is tight around your waist  Do not bend over  Bend at the knees if you need to pick something up  Call your local emergency number (911 in the [address] [facility]) if:   · You have severe chest pain and sudden trouble breathing  When should I seek immediate care? · You have trouble breathing after you vomit  · You have trouble swallowing, or pain with swallowing  · Your bowel movements are black, bloody, or tarry-looking  · Your vomit looks like coffee grounds or has blood in it  When should I call my doctor? · You feel full and cannot burp or vomit  · You vomit large amounts, or you vomit often  · You are losing weight without trying  · Your symptoms get worse or do not improve with treatment  · You have questions or concerns about your condition or care  CARE AGREEMENT:   You have the right to help plan your care  Learn about your health condition and how it may be treated  Discuss treatment options with your healthcare providers to decide what care you want to receive  You always have the right to refuse treatment  The above information is an  only   It is not intended as medical advice for individual conditions or treatments  Talk to your doctor, nurse or pharmacist before following any medical regimen to see if it is safe and effective for you  © Copyright [employer] 2022 Information is for End User's use only and may not be sold, redistributed or otherwise used for commercial purposes  All illustrations and images included in CareNotes® are the copyrighted property of [employer] D A M , Inc  or [facility]  Gastric Polyps   WHAT YOU NEED TO KNOW:   What are gastric polyps? Gastric polyps are growths that form in the lining of your stomach  They are not cancer, but certain types of polyps can change into cancer  What increases my risk for gastric polyps? · Chronic gastritis caused by NSAIDs use or ulcers    · Long-term use of proton pump inhibitor medicines (used to decrease stomach acid)    · An infection in your stomach caused by H  pylori bacteria    What are the symptoms of gastric polyps? You may have no symptoms  Large polyps may cause any of the following:  · Abdominal pain    · Indigestion    · Vomiting after meals or vomiting blood    · Dark or bloody bowel movements    How are gastric polyps diagnosed? Gastric polyps are usually found during an endoscopy for another reason  All or part of the polyp will be removed  Your healthcare provider may also remove tissue from your stomach  The polyps and tissue are sent to a lab for testing  How are gastric polyps treated? Some types of polyps go away on their own  You may need any of the following for polyps that do not go away:  · Antibiotics  may be given if you have an infection caused by H  pylori bacteria  · Polyp removal  may be needed if they are large, you have symptoms, or abnormal cells are found  Large polyps and abnormal cells increase your risk for cancer  · Surgery  may be used to remove part of your stomach if the polyps cannot be removed and abnormal cells are found      What can I do to manage or prevent gastric polyps? · Wash your hands often to prevent an H  pylori infection  Use soap and warm water  Use an alcohol-based gel if soap and water are not available  Clean your hands before you eat and after you use the bathroom  Clean your hands after you change a baby's diaper  · Ask your healthcare provider about medicines before you take them  NSAIDs, such as ibuprofen, can cause stomach bleeding  Your provider may tell you not to use proton pump inhibitors if you have polyps called fundic polyps  He or she can tell you about other medicines you should not take to prevent new polyps  · Do not smoke  Nicotine and other chemicals in cigarettes and cigars can worsen your symptoms  Ask your provider for information if you currently smoke and need help to quit  E-cigarettes or smokeless tobacco still contain nicotine  Talk to your provider before you use these products  · Do not drink alcohol  Alcohol may worsen your symptoms  Alcohol also increases the risk for cancer of the esophagus or stomach  Ask your provider for information if you currently drink alcohol and need help to quit  When should I seek immediate care? · You have blood in your vomit  · You have dark or bloody bowel movements  · You have severe pain in your abdomen that does not go away after you take medicine  When should I call my doctor? · You have indigestion that does not go away with treatment  · You vomit after meals  · You have questions or concerns about your condition or care  CARE AGREEMENT:   You have the right to help plan your care  Learn about your health condition and how it may be treated  Discuss treatment options with your healthcare providers to decide what care you want to receive  You always have the right to refuse treatment  The above information is an  only  It is not intended as medical advice for individual conditions or treatments   Talk to your doctor, nurse or pharmacist before following any medical regimen to see if it is safe and effective for you  © Copyright [employer] 2022 Information is for End User's use only and may not be sold, redistributed or otherwise used for commercial purposes  All illustrations and images included in CareNotes® are the copyrighted property of [employer] M [employer] Inc  or Sasha Tirado  Diverticulosis   WHAT YOU NEED TO KNOW:   What is diverticulosis? Diverticulosis is a condition that causes small pockets called diverticula to form in your intestine  These pockets make it difficult for bowel movements to pass through your digestive system  What causes diverticulosis? Diverticula form when muscles have to work hard to move bowel movements through the intestine  The force causes bulges to form at weak areas in the intestine  This may happen if you eat foods that are low in fiber  Fiber helps give your bowel movements more bulk so they are larger and easier to move through your colon  The following may increase your risk of diverticulosis:  · A history of constipation    · Age 36 or older    · Obesity    · Lack of exercise    What are the signs and symptoms of diverticulosis? Diverticulosis usually does not cause any signs or symptoms  It may cause any of the following in some people:  · Pain or discomfort in your lower abdomen    · Abdominal bloating    · Constipation or diarrhea    How is diverticulosis diagnosed? Your healthcare provider will examine you and ask about your bowel movements, diet, and symptoms  He or she will also ask about any medical conditions you have or medicines you take  You may need any of the following:  · Blood tests  may be done to check for signs of inflammation  · A barium enema  is an x-ray of your colon that may show diverticula  A tube is put into your anus, and a liquid called barium is put through the tube   Barium is used so that healthcare providers can see your colon more clearly  · Flexible sigmoidoscopy  is a test to look for any changes in your lower intestines and rectum  It may also show the cause of any bleeding or pain  A soft, bendable tube with a light on the end will be put into your anus  It will then be moved forward into your intestine  · A colonoscopy  is used to look at your whole colon  A scope (long bendable tube with a light on the end) is used to take pictures  This test may show diverticula  · A CT scan , or CAT scan, may show diverticula  You may be given contrast liquid before the scan  Tell the healthcare provider if you have ever had an allergic reaction to contrast liquid  How is diverticulosis managed? The goal of treatment is to manage any symptoms you have and prevent other problems such as diverticulitis  Diverticulitis is swelling or infection of the diverticula  Your healthcare provider may recommend any of the following:  · Eat a variety of high-fiber foods  High-fiber foods help you have regular bowel movements  High-fiber foods include cooked beans, fruits, vegetables, and some cereals  Most adults need 25 to 35 grams of fiber each day  Your healthcare provider may recommend that you have more  Ask your healthcare provider how much fiber you need  Increase fiber slowly  You may have abdominal discomfort, bloating, and gas if you add fiber to your diet too quickly  You may need to take a fiber supplement if you are not getting enough fiber from food  · Medicines  to soften your bowel movements may be given  You may also need medicines to treat symptoms such as bloating and pain  · Drink liquids as directed  You may need to drink 2 to 3 liters (8 to 12 cups) of liquids every day  Ask your healthcare provider how much liquid to drink each day and which liquids are best for you  · Apply heat  on your abdomen for 20 to 30 minutes every 2 hours for as many days as directed  Heat helps decrease pain and muscle spasms      How can I help prevent diverticulitis or other symptoms? The following may help decrease your risk for diverticulitis or symptoms, such as bleeding  Talk to your provider about these or other things you can do to prevent problems that may occur with diverticulosis  · Exercise regularly  Ask your healthcare provider about the best exercise plan for you  Exercise can help you have regular bowel movements  Get 30 minutes of exercise on most days of the week  · Maintain a healthy weight  Ask your healthcare provider what a healthy weight is for you  Ask him or her to help you create a weight loss plan if you are overweight  · Do not smoke  Nicotine and other chemicals in cigarettes increase your risk for diverticulitis  Ask your healthcare provider for information if you currently smoke and need help to quit  E-cigarettes or smokeless tobacco still contain nicotine  Talk to your healthcare provider before you use these products  · Ask your healthcare provider if it is safe to take NSAIDs  NSAIDs may increase your risk of diverticulitis  When should I seek immediate care? · You have severe pain on the left side of your lower abdomen  · Your bowel movements are bright or dark red  When should I call my doctor? · You have a fever and chills  · You feel dizzy or lightheaded  · You have nausea, or you are vomiting  · You have a change in your bowel movements  · You have questions or concerns about your condition or care  CARE AGREEMENT:   You have the right to help plan your care  Learn about your health condition and how it may be treated  Discuss treatment options with your healthcare providers to decide what care you want to receive  You always have the right to refuse treatment  The above information is an  only  It is not intended as medical advice for individual conditions or treatments   Talk to your doctor, nurse or pharmacist before following any medical regimen to see if it is safe and effective for you  © Copyright [employer] 2022 Information is for End User's use only and may not be sold, redistributed or otherwise used for commercial purposes  All illustrations and images included in CareNotes® are the copyrighted property of Cameron MENEZES  or [facility] Fiber Diet   WHAT YOU NEED TO KNOW:   What is a high-fiber diet? A high-fiber diet includes foods that have a high amount of fiber  Fiber is the part of fruits, vegetables, and grains that is not broken down by your body  Fiber keeps your bowel movements regular  Fiber can also help lower your cholesterol level, control blood sugar in people with diabetes, and relieve constipation  Fiber can also help you control your weight because it helps you feel full faster  Most adults should eat 25 to 35 grams of fiber each day  Talk to your dietitian or healthcare provider about the amount of fiber you need  What foods are good sources of fiber? · Foods with at least 4 grams of fiber per serving:      ? ? to ½ cup of high-fiber cereal (check the nutrition label on the box)    ? ½ cup of blackberries or raspberries    ? 4 dried prunes    ? 1 cooked artichoke    ? ½ cup of cooked legumes, such as lentils, or red, kidney, and cruz beans    · Foods with 1 to 3 grams of fiber per serving:      ? 1 slice of whole-wheat, pumpernickel, or rye bread    ? ½ cup of cooked brown rice    ? 4 whole-wheat crackers    ? 1 cup of oatmeal    ? ½ cup of cereal with 1 to 3 grams of fiber per serving (check the nutrition label on the box)    ? 1 small piece of fruit, such as an apple, banana, pear, kiwi, or orange    ? 3 dates    ? ½ cup of canned apricots, fruit cocktail, peaches, or pears    ? ½ cup of raw or cooked vegetables, such as carrots, cauliflower, cabbage, spinach, squash, or corn    What are some ways that I can increase fiber in my diet? · Choose brown or wild rice instead of white rice       · Use whole wheat flour in recipes instead of white or all-purpose flour  · Add beans and peas to casseroles or soups  · Choose fresh fruit and vegetables with peels or skins on instead of juices  What other guidelines should I follow? · Add fiber to your diet slowly  You may have abdominal discomfort, bloating, and gas if you add fiber to your diet too quickly  · Drink plenty of liquids as you add fiber to your diet  You may have nausea or develop constipation if you do not drink enough water  Ask how much liquid to drink each day and which liquids are best for you  CARE AGREEMENT:   You have the right to help plan your care  Discuss treatment options with your healthcare provider to decide what care you want to receive  You always have the right to refuse treatment  The above information is an  only  It is not intended as medical advice for individual conditions or treatments  Talk to your doctor, nurse or pharmacist before following any medical regimen to see if it is safe and effective for you  © Copyright [employer] 2022 Information is for End User's use only and may not be sold, redistributed or otherwise used for commercial purposes   All illustrations and images included in CareNotes® are the copyrighted property of A [employer] A M [employer] Inc  or [address] [place] [address]

## 2022-03-11 NOTE — ANESTHESIA POSTPROCEDURE EVALUATION
Post-Op Assessment Note    CV Status:  Stable  Pain Score: 0    Pain management: adequate     Mental Status:  Alert and awake   Hydration Status:  Euvolemic   PONV Controlled:  Controlled   Airway Patency:  Patent      Post Op Vitals Reviewed: Yes      Staff: CRNA   Comments: vss        No complications documented      BP   109/76   Temp     Pulse  70   Resp   16   SpO2   100

## 2022-03-11 NOTE — ANESTHESIA PREPROCEDURE EVALUATION
Procedure:  EGD  COLONOSCOPY    Relevant Problems   GI/HEPATIC   (+) Gastroesophageal reflux   (+) Pancreas cyst      NEURO/PSYCH   (+) Anxiety   (+) Current episode of major depressive disorder without prior episode   (+) Hx of adenomatous colonic polyps        Physical Exam    Airway    Mallampati score: I  TM Distance: >3 FB  Neck ROM: full     Dental   No notable dental hx     Cardiovascular  Rhythm: regular, Rate: normal, Cardiovascular exam normal    Pulmonary  Pulmonary exam normal Breath sounds clear to auscultation,     Other Findings        Anesthesia Plan  ASA Score- 2     Anesthesia Type- IV sedation with anesthesia with ASA Monitors  Additional Monitors:   Airway Plan:           Plan Factors-Exercise tolerance (METS): >4 METS  Chart reviewed  EKG reviewed  Imaging results reviewed  Existing labs reviewed  Patient summary reviewed  Obstructive sleep apnea risk education given perioperatively  Induction- intravenous  Postoperative Plan-     Informed Consent- Anesthetic plan and risks discussed with patient

## 2022-03-11 NOTE — H&P
History and Physical - [facility]  Kaden Buckner [de-identified] y o  male MRN: [id number]                  HPI: Kaden Buckner is a [de-identified]y o  year old male who presents for EGD and colonoscopy due to history of gastroesophageal reflux Schatzki's ring and adenomatous colon polyps      REVIEW OF SYSTEMS: Per the HPI, and otherwise unremarkable      Historical Information   Past Medical History:   Diagnosis Date    BPH (benign prostatic hyperplasia)     Colon polyp     Disorder of eye     disorder of choroid of eye    Diverticulitis of rectum     GERD (gastroesophageal reflux disease)     Infected hernioplasty mesh (HCC)     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 [facility]     Memory impairment     Mixed hyperlipidemia     [facility] )     Uveal, Caroidal     Past Surgical History:   Procedure Laterality Date    ABDOMINAL SURGERY      mesh removal    EGD      EGD AND COLONOSCOPY      HERNIA REPAIR Right     Inguinal/Umbilical    SKIN CANCER EXCISION  2019    R upper cheek, benign lesion including margins, face, ears, eyelids, nose, lips, mucous membrane, excised diameter 2 1 to 3 0 CM     Social History   Social History     Substance and Sexual Activity   Alcohol Use Yes    Comment: occassionally     Social History     Substance and Sexual Activity   Drug Use Not Currently    Comment: DENIES     Social History     Tobacco Use   Smoking Status Former Smoker    Packs/day: 1 00    Years: 9 00    Pack years: 9 00    Types: Cigarettes    Quit date: 57    Years since quittin 2   Smokeless Tobacco Never Used   Tobacco Comment    QUIT IN      Family History   Problem Relation Age of Onset    Alzheimer's disease Mother     No Known Problems Father        Meds/Allergies       Current Outpatient Medications:     Ascorbic Acid (vitamin C) 1000 MG tablet    b complex vitamins tablet    RABEprazole (ACIPHEX) 20 MG tablet    tamsulosin (FLOMAX) 0 4 mg    VITAMIN D, CHOLECALCIFEROL, PO    acetaminophen (TYLENOL) 325 mg tablet    docusate sodium (COLACE) 100 mg capsule    dutasteride (AVODART) 0 5 mg capsule    ibuprofen (MOTRIN) 200 mg tablet    Multiple Vitamins-Minerals (MEMORY NICHOLAS PO)    ondansetron (ZOFRAN) 4 mg tablet    polyethylene glycol (MIRALAX) 17 g packet    Probiotic Product (PROBIOTIC ADVANCED PO)    vitamin E, tocopherol, 1,000 units capsule    Allergies   Allergen Reactions    Sulfa Antibiotics Other (See Comments)     Childhood- unknown       Objective     /75   Pulse 82   Temp (!) 96 °F (35 6 °C) (Temporal)   Resp 18   Ht 5' 6" (1 676 m)   Wt 69 9 kg (154 lb)   SpO2 98%   BMI 24 86 kg/m²       PHYSICAL EXAM    Gen: NAD  Head: NCAT  CV: RRR  CHEST: Clear  ABD: soft, NT/ND  EXT: no edema      ASSESSMENT/PLAN:  This is a [de-identified]y o  year old male here for EGD and colonoscopy, and he is stable and optimized for his procedure

## 2022-03-23 NOTE — RESULT ENCOUNTER NOTE
Please inform patient that their biopsies were benign  Repeat EGD 1 year with biopsies of the squamocolumnar junction  Due to intestinal metaplasia during recent biopsies

## 2022-04-12 ENCOUNTER — TELEPHONE (OUTPATIENT)
Dept: NEUROLOGY | Facility: CLINIC | Age: 82
End: 2022-04-12

## 2022-10-10 ENCOUNTER — HOSPITAL ENCOUNTER (EMERGENCY)
Facility: HOSPITAL | Age: 82
Discharge: HOME | End: 2022-10-10
Attending: EMERGENCY MEDICINE
Payer: MEDICARE

## 2022-10-10 ENCOUNTER — APPOINTMENT (EMERGENCY)
Dept: CT IMAGING | Facility: HOSPITAL | Age: 82
End: 2022-10-10
Payer: MEDICARE

## 2022-10-10 VITALS
BODY MASS INDEX: 24.86 KG/M2 | SYSTOLIC BLOOD PRESSURE: 156 MMHG | TEMPERATURE: 97.5 F | DIASTOLIC BLOOD PRESSURE: 71 MMHG | OXYGEN SATURATION: 100 % | RESPIRATION RATE: 12 BRPM | WEIGHT: 154 LBS | HEART RATE: 68 BPM

## 2022-10-10 DIAGNOSIS — K57.92 ACUTE DIVERTICULITIS: Primary | ICD-10-CM

## 2022-10-10 LAB
ALBUMIN SERPL BCP-MCNC: 3.9 G/DL (ref 3.5–5)
ALP SERPL-CCNC: 55 U/L (ref 34–104)
ALT SERPL W P-5'-P-CCNC: 10 U/L (ref 7–52)
ANION GAP SERPL CALCULATED.3IONS-SCNC: 8 MMOL/L (ref 4–13)
AST SERPL W P-5'-P-CCNC: 14 U/L (ref 13–39)
BACTERIA UR QL AUTO: NORMAL /HPF
BASOPHILS # BLD AUTO: 0.02 THOUSANDS/ΜL (ref 0–0.1)
BASOPHILS NFR BLD AUTO: 0 % (ref 0–1)
BILIRUB SERPL-MCNC: 0.7 MG/DL (ref 0.2–1)
BILIRUB UR QL STRIP: NEGATIVE
BUN SERPL-MCNC: 15 MG/DL (ref 5–25)
CALCIUM SERPL-MCNC: 9.1 MG/DL (ref 8.4–10.2)
CARDIAC TROPONIN I PNL SERPL HS: 3 NG/L
CHLORIDE SERPL-SCNC: 105 MMOL/L (ref 96–108)
CLARITY UR: CLEAR
CO2 SERPL-SCNC: 25 MMOL/L (ref 21–32)
COLOR UR: YELLOW
CREAT SERPL-MCNC: 0.98 MG/DL (ref 0.6–1.3)
EOSINOPHIL # BLD AUTO: 0.12 THOUSAND/ΜL (ref 0–0.61)
EOSINOPHIL NFR BLD AUTO: 1 % (ref 0–6)
ERYTHROCYTE [DISTWIDTH] IN BLOOD BY AUTOMATED COUNT: 13 % (ref 11.6–15.1)
GFR SERPL CREATININE-BSD FRML MDRD: 72 ML/MIN/1.73SQ M
GLUCOSE SERPL-MCNC: 87 MG/DL (ref 65–140)
GLUCOSE UR STRIP-MCNC: NEGATIVE MG/DL
HCT VFR BLD AUTO: 42 % (ref 36.5–49.3)
HGB BLD-MCNC: 14.6 G/DL (ref 12–17)
HGB UR QL STRIP.AUTO: NEGATIVE
IMM GRANULOCYTES # BLD AUTO: 0.03 THOUSAND/UL (ref 0–0.2)
IMM GRANULOCYTES NFR BLD AUTO: 0 % (ref 0–2)
KETONES UR STRIP-MCNC: NEGATIVE MG/DL
LEUKOCYTE ESTERASE UR QL STRIP: ABNORMAL
LIPASE SERPL-CCNC: 28 U/L (ref 11–82)
LYMPHOCYTES # BLD AUTO: 1.29 THOUSANDS/ΜL (ref 0.6–4.47)
LYMPHOCYTES NFR BLD AUTO: 15 % (ref 14–44)
MCH RBC QN AUTO: 30 PG (ref 26.8–34.3)
MCHC RBC AUTO-ENTMCNC: 34.8 G/DL (ref 31.4–37.4)
MCV RBC AUTO: 86 FL (ref 82–98)
MONOCYTES # BLD AUTO: 0.58 THOUSAND/ΜL (ref 0.17–1.22)
MONOCYTES NFR BLD AUTO: 7 % (ref 4–12)
NEUTROPHILS # BLD AUTO: 6.81 THOUSANDS/ΜL (ref 1.85–7.62)
NEUTS SEG NFR BLD AUTO: 77 % (ref 43–75)
NITRITE UR QL STRIP: NEGATIVE
NON-SQ EPI CELLS URNS QL MICRO: NORMAL /HPF
NRBC BLD AUTO-RTO: 0 /100 WBCS
PH UR STRIP.AUTO: 6 [PH]
PLATELET # BLD AUTO: 181 THOUSANDS/UL (ref 149–390)
PMV BLD AUTO: 10.4 FL (ref 8.9–12.7)
POTASSIUM SERPL-SCNC: 3.7 MMOL/L (ref 3.5–5.3)
PROT SERPL-MCNC: 6.6 G/DL (ref 6.4–8.4)
PROT UR STRIP-MCNC: NEGATIVE MG/DL
RBC # BLD AUTO: 4.86 MILLION/UL (ref 3.88–5.62)
RBC #/AREA URNS AUTO: NORMAL /HPF
SODIUM SERPL-SCNC: 138 MMOL/L (ref 135–147)
SP GR UR STRIP.AUTO: 1.01 (ref 1–1.03)
UROBILINOGEN UR QL STRIP.AUTO: 0.2 E.U./DL
WBC # BLD AUTO: 8.85 THOUSAND/UL (ref 4.31–10.16)
WBC #/AREA URNS AUTO: NORMAL /HPF

## 2022-10-10 PROCEDURE — 85025 COMPLETE CBC W/AUTO DIFF WBC: CPT

## 2022-10-10 PROCEDURE — 84484 ASSAY OF TROPONIN QUANT: CPT | Performed by: EMERGENCY MEDICINE

## 2022-10-10 PROCEDURE — 80053 COMPREHEN METABOLIC PANEL: CPT

## 2022-10-10 PROCEDURE — 36415 COLL VENOUS BLD VENIPUNCTURE: CPT

## 2022-10-10 PROCEDURE — 83690 ASSAY OF LIPASE: CPT

## 2022-10-10 PROCEDURE — 74177 CT ABD & PELVIS W/CONTRAST: CPT

## 2022-10-10 PROCEDURE — 93005 ELECTROCARDIOGRAM TRACING: CPT

## 2022-10-10 PROCEDURE — G1004 CDSM NDSC: HCPCS

## 2022-10-10 PROCEDURE — 99284 EMERGENCY DEPT VISIT MOD MDM: CPT

## 2022-10-10 PROCEDURE — 99285 EMERGENCY DEPT VISIT HI MDM: CPT | Performed by: EMERGENCY MEDICINE

## 2022-10-10 PROCEDURE — 81001 URINALYSIS AUTO W/SCOPE: CPT | Performed by: EMERGENCY MEDICINE

## 2022-10-10 RX ORDER — AMOXICILLIN AND CLAVULANATE POTASSIUM 875; 125 MG/1; MG/1
1 TABLET, FILM COATED ORAL ONCE
Status: COMPLETED | OUTPATIENT
Start: 2022-10-10 | End: 2022-10-10

## 2022-10-10 RX ORDER — AMOXICILLIN AND CLAVULANATE POTASSIUM 875; 125 MG/1; MG/1
1 TABLET, FILM COATED ORAL EVERY 12 HOURS SCHEDULED
Qty: 14 TABLET | Refills: 0 | Status: SHIPPED | OUTPATIENT
Start: 2022-10-10 | End: 2022-10-17

## 2022-10-10 RX ADMIN — IOHEXOL 100 ML: 350 INJECTION, SOLUTION INTRAVENOUS at 18:48

## 2022-10-10 RX ADMIN — AMOXICILLIN AND CLAVULANATE POTASSIUM 1 TABLET: 875; 125 TABLET, FILM COATED ORAL at 20:54

## 2022-10-10 NOTE — ED NOTES
Patient transported to [address] [place] [address] [facility]  10/10/22 6621 (3) adequate

## 2022-10-10 NOTE — ED PROVIDER NOTES
History  Chief Complaint   Patient presents with   • Constipation     No bm since Thursday     81year-old male presents saying that he had not had a bowel movement for 4 days, had an “explosive” bowel movement today, patient reports he now has some bilateral lower quadrant abdominal pain and he is concerned  Patient denies any fever, headache, dizziness, chest pain, cough, shortness of breath, nausea, vomiting, distension, dysuria, hematuria, hematochezia, no other complaints  The patient has no history of abdominal surgeries, the patient reports he has no medical problems, however chart review shows that he has a history of diverticulitis and other abdominal disorders  Prior to Admission Medications   Prescriptions Last Dose Informant Patient Reported? Taking?    Ascorbic Acid (vitamin C) 1000 MG tablet  Self Yes No   Sig: Take 1,000 mg by mouth daily   Multiple Vitamins-Minerals (MEMORY NICHOLAS PO)  Self Yes No   Si po daily   Patient not taking: Reported on 10/18/2021    Probiotic Product (PROBIOTIC ADVANCED PO)  Self Yes No   Si po daily   Patient not taking: Reported on 10/18/2021    RABEprazole (ACIPHEX) 20 MG tablet  Self No No   Sig: take 1 tablet by mouth 1/2 HOUR PRIOR TO BREAKFAST   Patient taking differently: PT TAKES 1 TABLET EVERY OTHER DAY   VITAMIN D, CHOLECALCIFEROL, PO  Self Yes No   Sig: 3 caps po daily   acetaminophen (TYLENOL) 325 mg tablet   No No   Sig: Take 2 tablets (650 mg total) by mouth every 6 (six) hours as needed for mild pain   b complex vitamins tablet  Self Yes No   Sig: Take 1 tablet by mouth daily   docusate sodium (COLACE) 100 mg capsule  Self No No   Sig: Take 1 capsule (100 mg total) by mouth 2 (two) times a day   Patient taking differently: Take 100 mg by mouth if needed     dutasteride (AVODART) 0 5 mg capsule  Self Yes No   Si CAPSULE PO DAILY   ibuprofen (MOTRIN) 200 mg tablet  Spouse/Significant Other Yes No   Sig: Take by mouth if needed for mild pain ondansetron (ZOFRAN) 4 mg tablet   No No   Sig: Take 1 tablet (4 mg total) by mouth every 8 (eight) hours as needed for nausea or vomiting   Patient not taking: Reported on 3/9/2022    polyethylene glycol (MIRALAX) 17 g packet   No No   Sig: Take 17 g by mouth daily for 10 days   Patient not taking: Reported on 3/9/2022    tamsulosin (FLOMAX) 0 4 mg   No No   Sig: Take 1 capsule (0 4 mg total) by mouth daily with dinner   vitamin E, tocopherol, 1,000 units capsule  Self Yes No   Sig: Take 1,000 Units by mouth daily   Patient not taking: Reported on 10/18/2021       Facility-Administered Medications: None       Past Medical History:   Diagnosis Date   • BPH (benign prostatic hyperplasia)    • Colon polyp    • Disorder of eye     disorder of choroid of eye   • Diverticulitis of rectum    • GERD (gastroesophageal reflux disease)    • Infected hernioplasty mesh [facility])    • 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[facility] )    • Memory impairment    • Mixed hyperlipidemia    • Skin cancer (melanoma) ([facility] )     Uveal, Caroidal       Past Surgical History:   Procedure Laterality Date   • ABDOMINAL SURGERY      mesh removal   • EGD  2019   • EGD AND COLONOSCOPY  2017   • EGD AND COLONOSCOPY  03/11/2022   • HERNIA REPAIR Right     Inguinal/Umbilical   • SKIN CANCER EXCISION  02/21/2019    R upper cheek, benign lesion including margins, face, ears, eyelids, nose, lips, mucous membrane, excised diameter 2 1 to 3 0 CM       Family History   Problem Relation Age of Onset   • Alzheimer's disease Mother    • No Known Problems Father      I have reviewed and agree with the history as documented      E-Cigarette/Vaping   • E-Cigarette Use Never User      E-Cigarette/Vaping Substances   • Nicotine No    • THC No    • CBD No    • Flavoring No    • Other No    • Unknown No      Social History     Tobacco Use   • Smoking status: Former Smoker     Packs/day: 1 00     Years: 9 00     Pack years: 9 00     Types: Cigarettes     Quit date: 1969     Years since quittin 8   • Smokeless tobacco: Never Used   • Tobacco comment: QUIT IN    Vaping Use   • Vaping Use: Never used   Substance Use Topics   • Alcohol use: Yes     Comment: occassionally   • Drug use: Not Currently     Comment: DENIES       Review of Systems   Constitutional: Negative for fever  HENT: Negative for congestion  Eyes: Negative for visual disturbance  Respiratory: Negative for cough and shortness of breath  Cardiovascular: Negative for chest pain  Gastrointestinal: Positive for abdominal pain and constipation  Negative for diarrhea and vomiting  Endocrine: Negative for polyuria  Genitourinary: Negative for dysuria and hematuria  Musculoskeletal: Negative for myalgias  Neurological: Negative for dizziness and headaches  Physical Exam  Physical Exam  Vitals and nursing note reviewed  Constitutional:       Appearance: Normal appearance  HENT:      Head: Normocephalic and atraumatic  Right Ear: External ear normal       Left Ear: External ear normal       Mouth/Throat:      Mouth: Mucous membranes are moist       Pharynx: Oropharynx is clear  Eyes:      Conjunctiva/sclera: Conjunctivae normal       Pupils: Pupils are equal, round, and reactive to light  Cardiovascular:      Rate and Rhythm: Normal rate and regular rhythm  Heart sounds: Normal heart sounds  Pulmonary:      Effort: No respiratory distress  Breath sounds: Normal breath sounds  Abdominal:      General: Abdomen is flat  There is no distension  Palpations: Abdomen is soft  Tenderness: There is abdominal tenderness in the right lower quadrant, suprapubic area and left lower quadrant  There is no right CVA tenderness, left CVA tenderness, guarding or rebound  Positive signs include McBurney's sign  Negative signs include Rovsing's sign  Musculoskeletal:         General: No deformity  Normal range of motion  Cervical back: Normal range of motion     Skin:     General: Skin is warm and dry  Neurological:      General: No focal deficit present  Mental Status: He is alert and oriented to person, place, and time     Psychiatric:         Mood and Affect: Mood normal          Behavior: Behavior normal          Vital Signs  ED Triage Vitals [10/10/22 1714]   Temperature Pulse Respirations Blood Pressure SpO2   97 5 °F (36 4 °C) 68 12 156/71 100 %      Temp Source Heart Rate Source Patient Position - Orthostatic VS BP Location FiO2 (%)   Oral Monitor Sitting Right arm --      Pain Score       5           Vitals:    10/10/22 1714   BP: 156/71   Pulse: 68   Patient Position - Orthostatic VS: Sitting         Visual Acuity      ED Medications  Medications   amoxicillin-clavulanate (AUGMENTIN) 875-125 mg per tablet 1 tablet (has no administration in time range)   iohexol (OMNIPAQUE) 350 MG/ML injection (SINGLE-DOSE) 100 mL (100 mL Intravenous Given 10/10/22 1848)       Diagnostic Studies  Results Reviewed     Procedure Component Value Units Date/Time    HS Troponin 0hr (reflex protocol) [[id number]]  (Normal) Collected: 10/10/22 1803    Lab Status: Final result Specimen: Blood from Arm, Right Updated: 10/10/22 1830     hs TnI 0hr 3 ng/L     Urine Microscopic [[id number]]  (Normal) Collected: 10/10/22 1802    Lab Status: Final result Specimen: Urine, Clean Catch Updated: 10/10/22 1822     RBC, UA None Seen /hpf      WBC, UA 0-1 /hpf      Epithelial Cells Occasional /hpf      Bacteria, UA Occasional /hpf     UA (URINE) with reflex to Scope [[id number]]  (Abnormal) Collected: 10/10/22 1802    Lab Status: Final result Specimen: Urine, Clean Catch Updated: 10/10/22 1809     Color, UA Yellow     Clarity, UA Clear     Specific Gravity, UA 1 010     pH, UA 6 0     Leukocytes, UA Trace     Nitrite, UA Negative     Protein, UA Negative mg/dl      Glucose, UA Negative mg/dl      Ketones, UA Negative mg/dl      Urobilinogen, UA 0 2 E U /dl      Bilirubin, UA Negative     Occult Blood, UA Negative Comprehensive metabolic panel [[id number]] Collected: 10/10/22 1732    Lab Status: Final result Specimen: Blood from Arm, Right Updated: 10/10/22 1804     Sodium 138 mmol/L      Potassium 3 7 mmol/L      Chloride 105 mmol/L      CO2 25 mmol/L      ANION GAP 8 mmol/L      BUN 15 mg/dL      Creatinine 0 98 mg/dL      Glucose 87 mg/dL      Calcium 9 1 mg/dL      AST 14 U/L      ALT 10 U/L      Alkaline Phosphatase 55 U/L      Total Protein 6 6 g/dL      Albumin 3 9 g/dL      Total Bilirubin 0 70 mg/dL      eGFR 72 ml/min/1 73sq m     Narrative:      National Kidney Disease Foundation guidelines for Chronic Kidney Disease (CKD):   •  Stage 1 with normal or high GFR (GFR > 90 mL/min/1 73 square meters)  •  Stage 2 Mild CKD (GFR = 60-89 mL/min/1 73 square meters)  •  Stage 3A Moderate CKD (GFR = 45-59 mL/min/1 73 square meters)  •  Stage 3B Moderate CKD (GFR = 30-44 mL/min/1 73 square meters)  •  Stage 4 Severe CKD (GFR = 15-29 mL/min/1 73 square meters)  •  Stage 5 End Stage CKD (GFR <15 mL/min/1 73 square meters)  Note: GFR calculation is accurate only with a steady state creatinine    Lipase [[id number]]  (Normal) Collected: 10/10/22 1732    Lab Status: Final result Specimen: Blood from Arm, Right Updated: 10/10/22 1804     Lipase 28 u/L     CBC and differential [[id number]]  (Abnormal) Collected: 10/10/22 1732    Lab Status: Final result Specimen: Blood from Arm, Right Updated: 10/10/22 1738     WBC 8 85 Thousand/uL      RBC 4 86 Million/uL      Hemoglobin 14 6 g/dL      Hematocrit 42 0 %      MCV 86 fL      MCH 30 0 pg      MCHC 34 8 g/dL      RDW 13 0 %      MPV 10 4 fL      Platelets 776 Thousands/uL      nRBC 0 /100 WBCs      Neutrophils Relative 77 %      Immat GRANS % 0 %      Lymphocytes Relative 15 %      Monocytes Relative 7 %      Eosinophils Relative 1 %      Basophils Relative 0 %      Neutrophils Absolute 6 81 Thousands/µL      Immature Grans Absolute 0 03 Thousand/uL      Lymphocytes Absolute 1 29 Thousands/µL      Monocytes Absolute 0 58 Thousand/µL      Eosinophils Absolute 0 12 Thousand/µL      Basophils Absolute 0 02 Thousands/µL                  CT abdomen pelvis with contrast   Final Result by Bailey Hsu MD (10/10 2040)         1  Multiple diverticula in the sigmoid colon with small amount of adjacent fluid felt to represent acute diverticulitis  No evidence of perforation  The study was marked in [facility] for immediate notification  Workstation performed: [id number]                    Procedures  ECG 12 Lead Documentation Only    Date/Time: 10/10/2022 6:35 PM  Performed by: David Taveras MD  Authorized by: David Taveras MD     ECG reviewed by me, the ED Provider: yes    Patient location:  ED  Previous ECG:     Previous ECG:  Unavailable  Interpretation:     Interpretation: abnormal    Rate:     ECG rate:  80    ECG rate assessment: normal    Rhythm:     Rhythm: sinus rhythm    Ectopy:     Ectopy: bigeminy and PVCs      PVCs:  Frequent  QRS:     QRS axis:  Left  Conduction:     Conduction: normal    ST segments:     ST segments:  Normal  T waves:     T waves: normal               ED Course       MDM  Number of Diagnoses or Management Options  Acute diverticulitis  Diagnosis management comments: Patient found to have diverticulitis without abscess or perforation, therefore patient will be sent home with a prescription for Augmentin, encouraged to follow up with primary care and GI as an outpatient for further management, and to return if any symptoms worsen  Patient is safe for discharge home at this time        Disposition  Final diagnoses:   Acute diverticulitis     Time reflects when diagnosis was documented in both MDM as applicable and the Disposition within this note     Time User Action Codes Description Comment    10/10/2022  8:44 PM Melanie Valentine Add [K57 92] Acute diverticulitis     10/10/2022  8:44 PM Melanie Valentine Modify [K57 92] Acute diverticulitis       ED Disposition     ED Disposition   Discharge    Condition   Stable    Date/Time   Mon Oct 10, 2022  8:44 PM    Comment   Eden Gan discharge to home/self care                 Follow-up Information     Follow up With Specialties Details Why Contact Info Additional Information    Thais Weaver MD Internal Medicine, Pediatrics Schedule an appointment as soon as possible for a visit in 3 days For follow-up [address]  [place] [address] Emergency Department Emergency Medicine Go to  As needed [address] [place], [address] Emergency Department, [address], [address] [facility]    [facility] [place] [facility] Schedule an appointment as soon as possible for a visit in 3 days For follow-up [address] [facility] [address] [facility] [place], [facility] [address] [place], [place], [address] [phone]          Patient's Medications   Discharge Prescriptions    AMOXICILLIN-CLAVULANATE (AUGMENTIN) 875-125 MG PER TABLET    Take 1 tablet by mouth every 12 (twelve) hours for 7 days       Start Date: 10/10/2022End Date: 10/17/2022       Order Dose: 1 tablet       Quantity: 14 tablet    Refills: 0           PDMP Review     None          ED Provider  Electronically Signed by           Dori Joyner MD  10/10/22 2044

## 2022-10-11 LAB
ATRIAL RATE: 60 BPM
P AXIS: 18 DEGREES
PR INTERVAL: 171 MS
QRS AXIS: -25 DEGREES
QRSD INTERVAL: 99 MS
QT INTERVAL: 447 MS
QTC INTERVAL: 516 MS
T WAVE AXIS: 48 DEGREES
VENTRICULAR RATE: 80 BPM

## 2022-10-11 PROCEDURE — 93010 ELECTROCARDIOGRAM REPORT: CPT | Performed by: INTERNAL MEDICINE

## 2022-10-31 ENCOUNTER — CONSULT (OUTPATIENT)
Dept: SURGERY | Facility: CLINIC | Age: 82
End: 2022-10-31

## 2022-10-31 VITALS
OXYGEN SATURATION: 98 % | SYSTOLIC BLOOD PRESSURE: 110 MMHG | WEIGHT: 162 LBS | BODY MASS INDEX: 26.03 KG/M2 | HEIGHT: 66 IN | DIASTOLIC BLOOD PRESSURE: 64 MMHG | TEMPERATURE: 97.6 F | HEART RATE: 74 BPM | RESPIRATION RATE: 18 BRPM

## 2022-10-31 DIAGNOSIS — K40.91 RECURRENT RIGHT INGUINAL HERNIA: Primary | ICD-10-CM

## 2023-03-15 ENCOUNTER — TELEPHONE (OUTPATIENT)
Dept: GASTROENTEROLOGY | Facility: CLINIC | Age: 83
End: 2023-03-15

## 2023-04-17 PROBLEM — Z87.19 HISTORY OF DIVERTICULITIS: Status: ACTIVE | Noted: 2023-04-17

## 2023-04-17 PROBLEM — R41.3 MEMORY IMPAIRMENT: Status: RESOLVED | Noted: 2020-08-27 | Resolved: 2023-04-17

## 2023-04-20 ENCOUNTER — APPOINTMENT (OUTPATIENT)
Dept: LAB | Facility: HOSPITAL | Age: 83
End: 2023-04-20
Attending: INTERNAL MEDICINE

## 2023-04-20 DIAGNOSIS — Z13.29 THYROID DISORDER SCREENING: ICD-10-CM

## 2023-04-20 DIAGNOSIS — Z87.898 HISTORY OF ELEVATED PSA: ICD-10-CM

## 2023-04-20 DIAGNOSIS — Z00.00 MEDICARE ANNUAL WELLNESS VISIT, SUBSEQUENT: ICD-10-CM

## 2023-04-20 DIAGNOSIS — Z12.5 SCREENING PSA (PROSTATE SPECIFIC ANTIGEN): ICD-10-CM

## 2023-04-20 DIAGNOSIS — Z13.220 LIPID SCREENING: ICD-10-CM

## 2023-04-20 LAB
ALBUMIN SERPL BCP-MCNC: 4.2 G/DL (ref 3.5–5)
ALP SERPL-CCNC: 50 U/L (ref 34–104)
ALT SERPL W P-5'-P-CCNC: 12 U/L (ref 7–52)
ANION GAP SERPL CALCULATED.3IONS-SCNC: 8 MMOL/L (ref 4–13)
AST SERPL W P-5'-P-CCNC: 15 U/L (ref 13–39)
BASOPHILS # BLD AUTO: 0.02 THOUSANDS/ΜL (ref 0–0.1)
BASOPHILS NFR BLD AUTO: 0 % (ref 0–1)
BILIRUB SERPL-MCNC: 0.92 MG/DL (ref 0.2–1)
BUN SERPL-MCNC: 15 MG/DL (ref 5–25)
CALCIUM SERPL-MCNC: 9.1 MG/DL (ref 8.4–10.2)
CHLORIDE SERPL-SCNC: 105 MMOL/L (ref 96–108)
CHOLEST SERPL-MCNC: 166 MG/DL
CO2 SERPL-SCNC: 27 MMOL/L (ref 21–32)
CREAT SERPL-MCNC: 1.07 MG/DL (ref 0.6–1.3)
EOSINOPHIL # BLD AUTO: 0.07 THOUSAND/ΜL (ref 0–0.61)
EOSINOPHIL NFR BLD AUTO: 1 % (ref 0–6)
ERYTHROCYTE [DISTWIDTH] IN BLOOD BY AUTOMATED COUNT: 13 % (ref 11.6–15.1)
GFR SERPL CREATININE-BSD FRML MDRD: 64 ML/MIN/1.73SQ M
GLUCOSE P FAST SERPL-MCNC: 94 MG/DL (ref 65–99)
HCT VFR BLD AUTO: 44.4 % (ref 36.5–49.3)
HDLC SERPL-MCNC: 61 MG/DL
HGB BLD-MCNC: 14.8 G/DL (ref 12–17)
IMM GRANULOCYTES # BLD AUTO: 0.01 THOUSAND/UL (ref 0–0.2)
IMM GRANULOCYTES NFR BLD AUTO: 0 % (ref 0–2)
LDLC SERPL CALC-MCNC: 97 MG/DL (ref 0–100)
LYMPHOCYTES # BLD AUTO: 0.86 THOUSANDS/ΜL (ref 0.6–4.47)
LYMPHOCYTES NFR BLD AUTO: 17 % (ref 14–44)
MCH RBC QN AUTO: 29.9 PG (ref 26.8–34.3)
MCHC RBC AUTO-ENTMCNC: 33.3 G/DL (ref 31.4–37.4)
MCV RBC AUTO: 90 FL (ref 82–98)
MONOCYTES # BLD AUTO: 0.36 THOUSAND/ΜL (ref 0.17–1.22)
MONOCYTES NFR BLD AUTO: 7 % (ref 4–12)
NEUTROPHILS # BLD AUTO: 3.66 THOUSANDS/ΜL (ref 1.85–7.62)
NEUTS SEG NFR BLD AUTO: 75 % (ref 43–75)
NONHDLC SERPL-MCNC: 105 MG/DL
NRBC BLD AUTO-RTO: 0 /100 WBCS
PLATELET # BLD AUTO: 193 THOUSANDS/UL (ref 149–390)
PMV BLD AUTO: 10.7 FL (ref 8.9–12.7)
POTASSIUM SERPL-SCNC: 4 MMOL/L (ref 3.5–5.3)
PROT SERPL-MCNC: 6.6 G/DL (ref 6.4–8.4)
PSA SERPL-MCNC: 8.4 NG/ML (ref 0–4)
RBC # BLD AUTO: 4.95 MILLION/UL (ref 3.88–5.62)
SODIUM SERPL-SCNC: 140 MMOL/L (ref 135–147)
TRIGL SERPL-MCNC: 42 MG/DL
TSH SERPL DL<=0.05 MIU/L-ACNC: 1.72 UIU/ML (ref 0.45–4.5)
WBC # BLD AUTO: 4.98 THOUSAND/UL (ref 4.31–10.16)

## 2023-05-01 ENCOUNTER — OFFICE VISIT (OUTPATIENT)
Dept: SURGERY | Facility: CLINIC | Age: 83
End: 2023-05-01

## 2023-05-01 VITALS
DIASTOLIC BLOOD PRESSURE: 76 MMHG | HEIGHT: 66 IN | RESPIRATION RATE: 18 BRPM | OXYGEN SATURATION: 98 % | HEART RATE: 60 BPM | TEMPERATURE: 97.4 F | WEIGHT: 161 LBS | SYSTOLIC BLOOD PRESSURE: 120 MMHG | BODY MASS INDEX: 25.88 KG/M2

## 2023-05-01 DIAGNOSIS — K40.91 RECURRENT RIGHT INGUINAL HERNIA: Primary | ICD-10-CM

## 2023-05-01 RX ORDER — DUTASTERIDE 0.5 MG/1
0.5 CAPSULE, LIQUID FILLED ORAL DAILY
COMMUNITY
Start: 2023-04-19

## 2023-05-01 NOTE — PROGRESS NOTES
Assessment/Plan: The hernia remains asymptomatic however it has become bigger in size  Patient wants to have surgery done in fall in the month of November  I told him that he should see me in September for a preop visit  In the meantime I advised him to give me a call or go to the emergency department if the hernia becomes irreducible, painful, he starts having nausea and vomiting  He verbalized understanding  No problem-specific Assessment & Plan notes found for this encounter  Diagnoses and all orders for this visit:    Recurrent right inguinal hernia    Other orders  -     dutasteride (AVODART) 0 5 mg capsule; Take 0 5 mg by mouth daily          Subjective:      Patient ID: Yesenia Simpson is a 81 y o  male  29-year-old male patient came to my office for follow-up regarding his recurrent right inguinal hernia  He says that he does not have any pain in the hernia  No nausea or vomiting  He says when he lays down the hernia reduces  It has however become bigger than the last time he was seen  The following portions of the patient's history were reviewed and updated as appropriate:   He  has a past medical history of BPH (benign prostatic hyperplasia), Colon polyp, Disorder of eye, Diverticulitis of rectum, GERD (gastroesophageal reflux disease), Infected hernioplasty mesh ([address] ), Malignant melanoma of eye, left ([address] ), Memory impairment, Mixed hyperlipidemia, Skin cancer (melanoma) ([address] ), Vision loss of left eye, and Wears glasses    He   Patient Active Problem List    Diagnosis Date Noted    History of diverticulitis 04/17/2023    Acute diverticulitis 02/11/2022    Constipation 02/11/2022    Left lower quadrant abdominal pain 02/04/2022    Gastroesophageal reflux 11/11/2021    Pancreas cyst 11/11/2021    Schatzki's ring 11/11/2021    Gastric polyps 11/11/2021    Hx of adenomatous colonic polyps 11/11/2021    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    Tremor 08/27/2020    Cognitive impairment 06/25/2020    Anxiety 06/25/2020    Obsession 06/25/2020    Insomnia 06/25/2020    Current moderate episode of major depressive disorder without prior episode [facility])      He  has a past surgical history that includes Hernia repair (Right); EGD (2019); EGD AND COLONOSCOPY (2017); Skin cancer excision (02/21/2019); Abdominal surgery; and EGD AND COLONOSCOPY (03/11/2022)  His family history includes Alzheimer's disease in his mother; No Known Problems in his father  He  reports that he quit smoking about 54 years ago  His smoking use included cigarettes  He has a 9 00 pack-year smoking history  He has never used smokeless tobacco  He reports current alcohol use  He reports that he does not currently use drugs  Current Outpatient Medications   Medication Sig Dispense Refill    acetaminophen (TYLENOL) 325 mg tablet Take 2 tablets (650 mg total) by mouth every 6 (six) hours as needed for mild pain 20 tablet 0    Ascorbic Acid (vitamin C) 1000 MG tablet Take 1,000 mg by mouth daily      b complex vitamins tablet Take 1 tablet by mouth daily      dutasteride (AVODART) 0 5 mg capsule Take 0 5 mg by mouth daily      ibuprofen (MOTRIN) 200 mg tablet Take by mouth if needed for mild pain      VITAMIN D, CHOLECALCIFEROL, PO 3 caps po daily      tamsulosin (FLOMAX) 0 4 mg Take 1 capsule (0 4 mg total) by mouth daily with dinner 30 capsule 0     No current facility-administered medications for this visit       Current Outpatient Medications on File Prior to Visit   Medication Sig    acetaminophen (TYLENOL) 325 mg tablet Take 2 tablets (650 mg total) by mouth every 6 (six) hours as needed for mild pain    Ascorbic Acid (vitamin C) 1000 MG tablet Take 1,000 mg by mouth daily    b complex vitamins tablet Take 1 tablet by mouth daily    dutasteride (AVODART) 0 5 mg capsule Take 0 5 mg by mouth daily    ibuprofen (MOTRIN) 200 mg tablet Take by mouth if needed for mild pain    VITAMIN D, CHOLECALCIFEROL, PO 3 caps po "daily    tamsulosin (FLOMAX) 0 4 mg Take 1 capsule (0 4 mg total) by mouth daily with dinner     No current facility-administered medications on file prior to visit  He is allergic to sulfa antibiotics       Review of Systems   Constitutional: Negative  HENT: Negative  Eyes: Negative  Respiratory: Negative  Cardiovascular: Negative  Gastrointestinal: Negative  Endocrine: Negative  Genitourinary: Negative  Musculoskeletal: Negative  Skin: Negative  Allergic/Immunologic: Negative  Hematological: Negative  Psychiatric/Behavioral: Negative  Objective:      /76 (BP Location: Left arm, Patient Position: Sitting, Cuff Size: Standard)   Pulse 60   Temp (!) 97 4 °F (36 3 °C) (Tympanic)   Resp 18   Ht 5' 6\" (1 676 m)   Wt 73 kg (161 lb)   SpO2 98%   BMI 25 99 kg/m²          Physical Exam  Vitals reviewed  Constitutional:       Appearance: Normal appearance  HENT:      Head: Normocephalic and atraumatic  Mouth/Throat:      Mouth: Mucous membranes are moist    Eyes:      Pupils: Pupils are equal, round, and reactive to light  Cardiovascular:      Rate and Rhythm: Normal rate and regular rhythm  Pulses: Normal pulses  Pulmonary:      Effort: Pulmonary effort is normal    Abdominal:      General: Abdomen is flat  Bowel sounds are normal  There is no distension  Palpations: Abdomen is soft  Tenderness: There is no abdominal tenderness  Hernia: A hernia (Moderate-sized reducible right-sided recurrent inguinal hernia  The contents were bowel) is present  Genitourinary:     Penis: Normal        Testes: Normal    Musculoskeletal:         General: Normal range of motion  Cervical back: Normal range of motion  Neurological:      General: No focal deficit present  Mental Status: He is alert and oriented to person, place, and time           "

## 2023-06-22 ENCOUNTER — TELEPHONE (OUTPATIENT)
Age: 83
End: 2023-06-22

## 2023-06-22 NOTE — TELEPHONE ENCOUNTER
Patients wife called asking for a refill of the following medication below  Patient has an appt on 07/12/23    [facility]    RABEprazole (ACIPHEX) 20 MG tablet     30 tablets no refill      Please Advise

## 2023-06-23 DIAGNOSIS — K21.9 GASTROESOPHAGEAL REFLUX DISEASE WITHOUT ESOPHAGITIS: Primary | ICD-10-CM

## 2023-06-23 RX ORDER — RABEPRAZOLE SODIUM 20 MG/1
20 TABLET, DELAYED RELEASE ORAL DAILY
Qty: 60 TABLET | Refills: 0 | Status: SHIPPED | OUTPATIENT
Start: 2023-06-23

## 2023-07-12 ENCOUNTER — OFFICE VISIT (OUTPATIENT)
Dept: GASTROENTEROLOGY | Facility: CLINIC | Age: 83
End: 2023-07-12
Payer: MEDICARE

## 2023-07-12 ENCOUNTER — TELEPHONE (OUTPATIENT)
Dept: GASTROENTEROLOGY | Facility: CLINIC | Age: 83
End: 2023-07-12

## 2023-07-12 VITALS
BODY MASS INDEX: 25.65 KG/M2 | DIASTOLIC BLOOD PRESSURE: 61 MMHG | HEART RATE: 82 BPM | SYSTOLIC BLOOD PRESSURE: 112 MMHG | WEIGHT: 159.6 LBS | HEIGHT: 66 IN

## 2023-07-12 DIAGNOSIS — K22.2 SCHATZKI'S RING: ICD-10-CM

## 2023-07-12 DIAGNOSIS — K21.9 GASTROESOPHAGEAL REFLUX DISEASE WITHOUT ESOPHAGITIS: Primary | ICD-10-CM

## 2023-07-12 DIAGNOSIS — K31.A0 INTESTINAL METAPLASIA OF CARDIA OF STOMACH: ICD-10-CM

## 2023-07-12 DIAGNOSIS — Z86.010 HX OF ADENOMATOUS COLONIC POLYPS: ICD-10-CM

## 2023-07-12 DIAGNOSIS — K86.2 PANCREAS CYST: ICD-10-CM

## 2023-07-12 DIAGNOSIS — K31.7 GASTRIC POLYPS: ICD-10-CM

## 2023-07-12 PROCEDURE — 99214 OFFICE O/P EST MOD 30 MIN: CPT | Performed by: INTERNAL MEDICINE

## 2023-07-12 NOTE — TELEPHONE ENCOUNTER
Procedure: EGD  Scheduled date of procedure (as of today):07/25/23  Physician performing procedure:Dr. Ag Celeste  Location of procedure:[facility]  Instructions reviewed with patient by: ti  Clearances: n/a

## 2023-07-12 NOTE — H&P (VIEW-ONLY)
[facility] - Outpatient Follow-up Note  Manuel Austin 81 y.o. male MRN: [id number]  Encounter: [id number]          ASSESSMENT AND PLAN:      1. Gastroesophageal reflux disease without esophagitis  Control conservatively. Has not taken his Aciphex for a year  - EGD; Future    2. Intestinal metaplasia of cardia of stomach  Noted on last endoscopy March 2022 we will repeat  - EGD; Future    3. Schatzki's ring  Dilated years ago asymptomatic  - EGD; Future    4. Gastric polyps  Fundic  - EGD; Future    5. Pancreas cyst  1.4 cm pancreatic tail cyst last MRI 2 years ago  - MRI abdomen w wo contrast and mrcp; Future  - Basic metabolic panel; Future    6. Hx of adenomatous colonic polyps  Next colonoscopy March 2027 if clinically reasonable at that time    ______________________________________________________________________    SUBJECTIVE: Very pleasant 44-year-old retired [employer] who I have seen for many years Schatzki's ring reflux gastric polyps pancreas cyst and colon cancer screening. He is off his Aciphex and has been so for a year he did have intestinal metaplasia of the cardia a year ago he is due for repeat EGD. He has no other particular complaints. REVIEW OF SYSTEMS IS OTHERWISE NEGATIVE.       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, Caroidal   • Vision loss of left eye    • Wears glasses      Past Surgical History:   Procedure Laterality Date   • ABDOMINAL SURGERY      mesh removal   • EGD  2019   • EGD AND COLONOSCOPY  2017   • EGD AND COLONOSCOPY  03/11/2022   • HERNIA REPAIR Right Inguinal/Umbilical   • SKIN CANCER EXCISION  2019    R upper cheek, benign lesion including margins, face, ears, eyelids, nose, lips, mucous membrane, excised diameter 2.1 to 3.0 CM     Social History   Social History     Substance and Sexual Activity   Alcohol Use Yes    Comment: 1 glass a night     Social History     Substance and Sexual Activity   Drug Use Not Currently    Comment: DENIES     Social History     Tobacco Use   Smoking Status Former   • Packs/day: 1.00   • Years: 9.00   • Total pack years: 9.00   • Types: Cigarettes   • Quit date:    • Years since quittin.5   Smokeless Tobacco Never   Tobacco Comments    QUIT IN      Family History   Problem Relation Age of Onset   • Alzheimer's disease Mother    • No Known Problems Father        Meds/Allergies       Current Outpatient Medications:   •  acetaminophen (TYLENOL) 325 mg tablet  •  Ascorbic Acid (vitamin C) 1000 MG tablet  •  b complex vitamins tablet  •  dutasteride (AVODART) 0.5 mg capsule  •  ibuprofen (MOTRIN) 200 mg tablet  •  VITAMIN D, CHOLECALCIFEROL, PO  •  tamsulosin (FLOMAX) 0.4 mg    Allergies   Allergen Reactions   • Sulfa Antibiotics Other (See Comments)     Childhood- unknown           Objective     Blood pressure 112/61, pulse 82, height 5' 6" (1.676 m), weight 72.4 kg (159 lb 9.6 oz). Body mass index is 25.76 kg/m². PHYSICAL EXAM:      General Appearance:   Alert, cooperative, no distress   HEENT:   Normocephalic, atraumatic, anicteric.     Neck:  Supple, symmetrical, trachea midline   Lungs:   Clear to auscultation bilaterally; no rales, rhonchi or wheezing; respirations unlabored    Heart[de-identified]   Regular rate and rhythm; no murmur, rub, or gallop.    Abdomen:   Soft, non-tender, non-distended; normal bowel sounds; no masses, no organomegaly    Genitalia:   Deferred    Rectal:   Deferred    Extremities:  No cyanosis, clubbing or edema    Pulses:  2+ and symmetric    Skin:  No jaundice, rashes, or lesions    Lymph nodes:  No palpable cervical lymphadenopathy        Lab Results:   No visits with results within 1 Day(s) from this visit. Latest known visit with results is:   Appointment on 04/20/2023   Component Date Value   • WBC 04/20/2023 4.98    • RBC 04/20/2023 4.95    • Hemoglobin 04/20/2023 14.8    • Hematocrit 04/20/2023 44.4    • MCV 04/20/2023 90    • MCH 04/20/2023 29.9    • MCHC 04/20/2023 33.3    • RDW 04/20/2023 13.0    • MPV 04/20/2023 10.7    • Platelets 26/98/3246 193    • nRBC 04/20/2023 0    • Neutrophils Relative 04/20/2023 75    • Immat GRANS % 04/20/2023 0    • Lymphocytes Relative 04/20/2023 17    • Monocytes Relative 04/20/2023 7    • Eosinophils Relative 04/20/2023 1    • Basophils Relative 04/20/2023 0    • Neutrophils Absolute 04/20/2023 3.66    • Immature Grans Absolute 04/20/2023 0.01    • Lymphocytes Absolute 04/20/2023 0.86    • Monocytes Absolute 04/20/2023 0.36    • Eosinophils Absolute 04/20/2023 0.07    • Basophils Absolute 04/20/2023 0.02    • Sodium 04/20/2023 140    • Potassium 04/20/2023 4.0    • Chloride 04/20/2023 105    • CO2 04/20/2023 27    • ANION GAP 04/20/2023 8    • BUN 04/20/2023 15    • Creatinine 04/20/2023 1.07    • Glucose, Fasting 04/20/2023 94    • Calcium 04/20/2023 9.1    • AST 04/20/2023 15    • ALT 04/20/2023 12    • Alkaline Phosphatase 04/20/2023 50    • Total Protein 04/20/2023 6.6    • Albumin 04/20/2023 4.2    • Total Bilirubin 04/20/2023 0.92    • eGFR 04/20/2023 64    • Cholesterol 04/20/2023 166    • Triglycerides 04/20/2023 42    • HDL, Direct 04/20/2023 61    • LDL Calculated 04/20/2023 97    • Non-HDL-Chol (CHOL-HDL) 04/20/2023 105    • TSH 3RD GENERATON 04/20/2023 1.719    • PSA 04/20/2023 8.4 (H)          Radiology Results:   No results found.

## 2023-07-12 NOTE — PROGRESS NOTES
[facility] - Outpatient Follow-up Note  Clinton Palacios 81 y.o. male MRN: [id number]  Encounter: [id number]          ASSESSMENT AND PLAN:      1. Gastroesophageal reflux disease without esophagitis  Control conservatively. Has not taken his Aciphex for a year  - EGD; Future    2. Intestinal metaplasia of cardia of stomach  Noted on last endoscopy March 2022 we will repeat  - EGD; Future    3. Schatzki's ring  Dilated years ago asymptomatic  - EGD; Future    4. Gastric polyps  Fundic  - EGD; Future    5. Pancreas cyst  1.4 cm pancreatic tail cyst last MRI 2 years ago  - MRI abdomen w wo contrast and mrcp; Future  - Basic metabolic panel; Future    6. 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 He is off his Aciphex and has been so for a year he did have intestinal metaplasia of the cardia a year ago he is due for repeat EGD. He has no other particular complaints. REVIEW OF SYSTEMS IS OTHERWISE NEGATIVE.       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, Caroidal   • Vision loss of left eye    • Wears glasses      Past Surgical History:   Procedure Laterality Date   • ABDOMINAL SURGERY      mesh removal   • EGD  2019   • EGD AND COLONOSCOPY  2017   • EGD AND COLONOSCOPY  03/11/2022   • HERNIA REPAIR Right Inguinal/Umbilical   • SKIN CANCER EXCISION  2019    R upper cheek, benign lesion including margins, face, ears, eyelids, nose, lips, mucous membrane, excised diameter 2.1 to 3.0 CM     Social History   Social History     Substance and Sexual Activity   Alcohol Use Yes    Comment: 1 glass a night     Social History     Substance and Sexual Activity   Drug Use Not Currently    Comment: DENIES     Social History     Tobacco Use   Smoking Status Former   • Packs/day: 1.00   • Years: 9.00   • Total pack years: 9.00   • Types: Cigarettes   • Quit date:    • Years since quittin.5   Smokeless Tobacco Never   Tobacco Comments    QUIT IN      Family History   Problem Relation Age of Onset   • Alzheimer's disease Mother    • No Known Problems Father        Meds/Allergies       Current Outpatient Medications:   •  acetaminophen (TYLENOL) 325 mg tablet  •  Ascorbic Acid (vitamin C) 1000 MG tablet  •  b complex vitamins tablet  •  dutasteride (AVODART) 0.5 mg capsule  •  ibuprofen (MOTRIN) 200 mg tablet  •  VITAMIN D, CHOLECALCIFEROL, PO  •  tamsulosin (FLOMAX) 0.4 mg    Allergies   Allergen Reactions   • Sulfa Antibiotics Other (See Comments)     Childhood- unknown           Objective     Blood pressure 112/61, pulse 82, height 5' 6" (1.676 m), weight 72.4 kg (159 lb 9.6 oz). Body mass index is 25.76 kg/m². PHYSICAL EXAM:      General Appearance:   Alert, cooperative, no distress   HEENT:   Normocephalic, atraumatic, anicteric.     Neck:  Supple, symmetrical, trachea midline   Lungs:   Clear to auscultation bilaterally; no rales, rhonchi or wheezing; respirations unlabored    Heart[de-identified]   Regular rate and rhythm; no murmur, rub, or gallop.    Abdomen:   Soft, non-tender, non-distended; normal bowel sounds; no masses, no organomegaly    Genitalia:   Deferred    Rectal:   Deferred    Extremities:  No cyanosis, clubbing or edema    Pulses:  2+ and symmetric    Skin:  No jaundice, rashes, or lesions    Lymph nodes:  No palpable cervical lymphadenopathy        Lab Results:   No visits with results within 1 Day(s) from this visit. Latest known visit with results is:   Appointment on 04/20/2023   Component Date Value   • WBC 04/20/2023 4.98    • RBC 04/20/2023 4.95    • Hemoglobin 04/20/2023 14.8    • Hematocrit 04/20/2023 44.4    • MCV 04/20/2023 90    • MCH 04/20/2023 29.9    • MCHC 04/20/2023 33.3    • RDW 04/20/2023 13.0    • MPV 04/20/2023 10.7    • Platelets 99/83/5662 193    • nRBC 04/20/2023 0    • Neutrophils Relative 04/20/2023 75    • Immat GRANS % 04/20/2023 0    • Lymphocytes Relative 04/20/2023 17    • Monocytes Relative 04/20/2023 7    • Eosinophils Relative 04/20/2023 1    • Basophils Relative 04/20/2023 0    • Neutrophils Absolute 04/20/2023 3.66    • Immature Grans Absolute 04/20/2023 0.01    • Lymphocytes Absolute 04/20/2023 0.86    • Monocytes Absolute 04/20/2023 0.36    • Eosinophils Absolute 04/20/2023 0.07    • Basophils Absolute 04/20/2023 0.02    • Sodium 04/20/2023 140    • Potassium 04/20/2023 4.0    • Chloride 04/20/2023 105    • CO2 04/20/2023 27    • ANION GAP 04/20/2023 8    • BUN 04/20/2023 15    • Creatinine 04/20/2023 1.07    • Glucose, Fasting 04/20/2023 94    • Calcium 04/20/2023 9.1    • AST 04/20/2023 15    • ALT 04/20/2023 12    • Alkaline Phosphatase 04/20/2023 50    • Total Protein 04/20/2023 6.6    • Albumin 04/20/2023 4.2    • Total Bilirubin 04/20/2023 0.92    • eGFR 04/20/2023 64    • Cholesterol 04/20/2023 166    • Triglycerides 04/20/2023 42    • HDL, Direct 04/20/2023 61    • LDL Calculated 04/20/2023 97    • Non-HDL-Chol (CHOL-HDL) 04/20/2023 105    • TSH 3RD GENERATON 04/20/2023 1.719    • PSA 04/20/2023 8.4 (H)          Radiology Results:   No results found.

## 2023-07-25 ENCOUNTER — TELEPHONE (OUTPATIENT)
Age: 83
End: 2023-07-25

## 2023-07-25 NOTE — TELEPHONE ENCOUNTER
Medication  RABEprazole (ACIPHEX) 20 MG tablet [[id number]]  RABEprazole (ACIPHEX) 20 MG tablet [[id number]]  DISCONTINUED    Order Details  Dose: 20 mg Route: Oral Frequency: Daily   Dispense Quantity: 60 tablet Refills: 0          Sig: Take 1 tablet (20 mg total) by mouth daily         Start Date: 06/23/23 End Date: 07/12/23   Discontinued by: Morelia Rolle MD on 7/12/2023 10:07   Reason: Therapy completed         Written Date: 06/23/23 Expiration Date: 06/22/24

## 2023-07-26 ENCOUNTER — ANESTHESIA EVENT (OUTPATIENT)
Dept: ANESTHESIOLOGY | Facility: AMBULATORY SURGERY CENTER | Age: 83
End: 2023-07-26

## 2023-07-26 ENCOUNTER — ANESTHESIA (OUTPATIENT)
Dept: ANESTHESIOLOGY | Facility: AMBULATORY SURGERY CENTER | Age: 83
End: 2023-07-26

## 2023-07-26 RX ORDER — LIDOCAINE HYDROCHLORIDE 10 MG/ML
0.5 INJECTION, SOLUTION EPIDURAL; INFILTRATION; INTRACAUDAL; PERINEURAL ONCE AS NEEDED
Status: CANCELLED | OUTPATIENT
Start: 2023-07-26

## 2023-07-26 RX ORDER — SODIUM CHLORIDE, SODIUM LACTATE, POTASSIUM CHLORIDE, CALCIUM CHLORIDE 600; 310; 30; 20 MG/100ML; MG/100ML; MG/100ML; MG/100ML
125 INJECTION, SOLUTION INTRAVENOUS CONTINUOUS
Status: CANCELLED | OUTPATIENT
Start: 2023-07-26

## 2023-07-27 ENCOUNTER — ANESTHESIA (OUTPATIENT)
Dept: GASTROENTEROLOGY | Facility: AMBULATORY SURGERY CENTER | Age: 83
End: 2023-07-27

## 2023-07-27 ENCOUNTER — HOSPITAL ENCOUNTER (OUTPATIENT)
Dept: GASTROENTEROLOGY | Facility: AMBULATORY SURGERY CENTER | Age: 83
Discharge: HOME | End: 2023-07-27
Payer: MEDICARE

## 2023-07-27 ENCOUNTER — ANESTHESIA EVENT (OUTPATIENT)
Dept: GASTROENTEROLOGY | Facility: AMBULATORY SURGERY CENTER | Age: 83
End: 2023-07-27

## 2023-07-27 VITALS
HEART RATE: 68 BPM | SYSTOLIC BLOOD PRESSURE: 112 MMHG | WEIGHT: 159 LBS | OXYGEN SATURATION: 98 % | TEMPERATURE: 98.3 F | HEIGHT: 66 IN | DIASTOLIC BLOOD PRESSURE: 68 MMHG | RESPIRATION RATE: 18 BRPM | BODY MASS INDEX: 25.55 KG/M2

## 2023-07-27 DIAGNOSIS — K31.7 GASTRIC POLYPS: ICD-10-CM

## 2023-07-27 DIAGNOSIS — K21.9 GASTROESOPHAGEAL REFLUX DISEASE WITHOUT ESOPHAGITIS: ICD-10-CM

## 2023-07-27 DIAGNOSIS — K31.A0 INTESTINAL METAPLASIA OF CARDIA OF STOMACH: ICD-10-CM

## 2023-07-27 DIAGNOSIS — K22.2 SCHATZKI'S RING: ICD-10-CM

## 2023-07-27 PROBLEM — K22.719 BARRETT'S ESOPHAGUS WITH DYSPLASIA: Status: ACTIVE | Noted: 2023-07-27

## 2023-07-27 PROCEDURE — 43239 EGD BIOPSY SINGLE/MULTIPLE: CPT | Performed by: INTERNAL MEDICINE

## 2023-07-27 PROCEDURE — 88305 TISSUE EXAM BY PATHOLOGIST: CPT | Performed by: PATHOLOGY

## 2023-07-27 RX ORDER — PROPOFOL 10 MG/ML
INJECTION, EMULSION INTRAVENOUS AS NEEDED
Status: DISCONTINUED | OUTPATIENT
Start: 2023-07-27 | End: 2023-07-27

## 2023-07-27 RX ORDER — SODIUM CHLORIDE, SODIUM LACTATE, POTASSIUM CHLORIDE, CALCIUM CHLORIDE 600; 310; 30; 20 MG/100ML; MG/100ML; MG/100ML; MG/100ML
125 INJECTION, SOLUTION INTRAVENOUS CONTINUOUS
Status: DISCONTINUED | OUTPATIENT
Start: 2023-07-27 | End: 2023-07-27

## 2023-07-27 RX ORDER — LIDOCAINE HYDROCHLORIDE 10 MG/ML
0.5 INJECTION, SOLUTION EPIDURAL; INFILTRATION; INTRACAUDAL; PERINEURAL ONCE AS NEEDED
Status: DISCONTINUED | OUTPATIENT
Start: 2023-07-27 | End: 2023-07-31 | Stop reason: HOSPADM

## 2023-07-27 RX ORDER — LIDOCAINE HYDROCHLORIDE 10 MG/ML
INJECTION, SOLUTION EPIDURAL; INFILTRATION; INTRACAUDAL; PERINEURAL AS NEEDED
Status: DISCONTINUED | OUTPATIENT
Start: 2023-07-27 | End: 2023-07-27

## 2023-07-27 RX ADMIN — PROPOFOL 30 MG: 10 INJECTION, EMULSION INTRAVENOUS at 09:29

## 2023-07-27 RX ADMIN — LIDOCAINE HYDROCHLORIDE 100 MG: 10 INJECTION, SOLUTION EPIDURAL; INFILTRATION; INTRACAUDAL; PERINEURAL at 09:26

## 2023-07-27 RX ADMIN — SODIUM CHLORIDE, SODIUM LACTATE, POTASSIUM CHLORIDE, CALCIUM CHLORIDE: 600; 310; 30; 20 INJECTION, SOLUTION INTRAVENOUS at 09:22

## 2023-07-27 RX ADMIN — PROPOFOL 120 MG: 10 INJECTION, EMULSION INTRAVENOUS at 09:26

## 2023-07-27 NOTE — DISCHARGE SUMMARY
Discharge Summary - Alejandra [facility] 81 y.o. male MRN: [id number]    Unit/Bed#:  Encounter: [id number]    Admission Date:  7/27/2023    Admitting Diagnosis: Gastroesophageal reflux disease without esophagitis [K21.9]  Intestinal metaplasia of cardia of stomach [K31. A0]  Schatzki's ring [K22.2]  Gastric polyps [K31.7]    HPI: History of reflux and Sherwood's esophagus    Procedures Performed: No orders of the defined types were placed in this encounter. Summary of Hospital Course: Tolerated procedure well    Significant Findings, Care, Treatment and Services Provided: Short segment Sherwood's esophagus? Biopsies taken. Small sliding hiatal hernia    Complications: None    Discharge Diagnosis: See above    Medical Problems     Resolved Problems  Date Reviewed: 7/12/2023   None         Condition at Discharge: good         Discharge instructions/Information to patient and family:   See after visit summary for information provided to patient and family. Provisions for Follow-Up Care:  See after visit summary for information related to follow-up care and any pertinent home health orders.       PCP: Reji Juarez MD    Disposition: Home

## 2023-07-27 NOTE — INTERVAL H&P NOTE
H&P reviewed. After examining the patient I find no changes in the patients condition since the H&P had been written.     Vitals:    07/27/23 0824   BP: 125/67   Pulse: 55   Resp: 18   Temp: 98.3 °F (36.8 °C)   SpO2: 98%

## 2023-07-27 NOTE — ANESTHESIA POSTPROCEDURE EVALUATION
Post-Op Assessment Note    CV Status:  Stable  Pain Score: 0    Pain management: adequate     Mental Status:  Alert and awake   Hydration Status:  Euvolemic   PONV Controlled:  Controlled   Airway Patency:  Patent      Post Op Vitals Reviewed: Yes      Staff: CRNA         No notable events documented.     BP   115/64   Temp   98.0   Pulse  56   Resp   18   SpO2   100

## 2023-07-27 NOTE — ANESTHESIA PREPROCEDURE EVALUATION
Procedure:  EGD    URI 1 week prior. No ongoing symptoms. Relevant Problems   GI/HEPATIC   (+) Gastroesophageal reflux   (+) Pancreas cyst      NEURO/PSYCH   (+) Anxiety   (+) Current moderate episode of major depressive disorder without prior episode [facility])        Physical Exam    Airway    Mallampati score: III  TM Distance: >3 FB  Neck ROM: full     Dental   No notable dental hx     Cardiovascular  Rhythm: regular, Rate: normal, Cardiovascular exam normal    Pulmonary  Pulmonary exam normal Breath sounds clear to auscultation,     Other Findings  Small mouth opening      Anesthesia Plan  ASA Score- 2     Anesthesia Type- IV sedation with anesthesia with ASA Monitors. Additional Monitors:   Airway Plan:     Comment: Discussed risks/benefits, including medication reactions, awareness, aspiration, and serious/life threatening complications. Plan to maintain native airway with IVGA, monitored with EtCO2    Discussed the increase risk of undergoing anesthesia with recent/ongoing URI. However the patient wishes to proceed. .       Plan Factors-Exercise tolerance (METS): >4 METS. Patient summary reviewed. Patient instructed to abstain from smoking on day of procedure. Patient did not smoke on day of surgery. Induction- intravenous. Postoperative Plan-     Informed Consent- Anesthetic plan and risks discussed with patient. I personally reviewed this patient with the CRNA. Discussed and agreed on the Anesthesia Plan with the CRNA. Rafia Bowser

## 2023-07-31 PROCEDURE — 88305 TISSUE EXAM BY PATHOLOGIST: CPT | Performed by: PATHOLOGY

## 2023-08-07 ENCOUNTER — TELEPHONE (OUTPATIENT)
Dept: GASTROENTEROLOGY | Facility: CLINIC | Age: 83
End: 2023-08-07

## 2023-09-11 ENCOUNTER — OFFICE VISIT (OUTPATIENT)
Dept: SURGERY | Facility: CLINIC | Age: 83
End: 2023-09-11
Payer: MEDICARE

## 2023-09-11 VITALS
HEART RATE: 64 BPM | BODY MASS INDEX: 24.91 KG/M2 | RESPIRATION RATE: 18 BRPM | WEIGHT: 155 LBS | TEMPERATURE: 97.7 F | OXYGEN SATURATION: 98 % | SYSTOLIC BLOOD PRESSURE: 114 MMHG | DIASTOLIC BLOOD PRESSURE: 72 MMHG | HEIGHT: 66 IN

## 2023-09-11 DIAGNOSIS — Z01.818 ENCOUNTER FOR PREADMISSION TESTING: ICD-10-CM

## 2023-09-11 DIAGNOSIS — K40.91 RECURRENT RIGHT INGUINAL HERNIA: Primary | ICD-10-CM

## 2023-09-11 PROCEDURE — 99214 OFFICE O/P EST MOD 30 MIN: CPT | Performed by: SURGERY

## 2023-09-11 NOTE — PROGRESS NOTES
Assessment/Plan: 28-year-old who has a right recurrent inguinal hernia. He is here to schedule his surgery. He wants surgery to be done in November. This will be done using open technique. I explained the procedure to him. He verbalized understanding and signed the consent. The surgery will be done on November 2, 2023. No problem-specific Assessment & Plan notes found for this encounter. Diagnoses and all orders for this visit:    Recurrent right inguinal hernia    Encounter for preadmission testing  -     CBC and differential; Future  -     Comprehensive metabolic panel; Future  -     ECG 12 lead; Future          Subjective:      Patient ID: Joaquim Herrera is a 81 y.o. male. 28-year-old male patient who is here to schedule his surgery for right recurrent inguinal hernia. He says he has no pain in the hernia. No nausea vomiting. No other new complaints. The following portions of the patient's history were reviewed and updated as appropriate:   He  has a past medical history of BPH (benign prostatic hyperplasia), Colon polyp, Disorder of eye, Diverticulitis of rectum, GERD (gastroesophageal reflux disease), Infected hernioplasty mesh ([address]), Malignant melanoma of eye, left ([address]), Memory impairment, Mixed hyperlipidemia, Skin cancer (melanoma) ([address]), Vision loss of left eye, and Wears glasses.   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 His family history includes Alzheimer's disease in his mother; No Known Problems in his father. He  reports that he quit smoking about 54 years ago. His smoking use included cigarettes. He has a 9.00 pack-year smoking history. He has never used smokeless tobacco. He reports current alcohol use of about 7.0 standard drinks of alcohol per week. He reports that he does not currently use drugs. Current Outpatient Medications   Medication Sig Dispense Refill   • acetaminophen (TYLENOL) 325 mg tablet Take 2 tablets (650 mg total) by mouth every 6 (six) hours as needed for mild pain 20 tablet 0   • Ascorbic Acid (vitamin C) 1000 MG tablet Take 1,000 mg by mouth daily     • b complex vitamins tablet Take 1 tablet by mouth daily     • ibuprofen (MOTRIN) 200 mg tablet Take by mouth if needed for mild pain     • VITAMIN D, CHOLECALCIFEROL, PO 3 caps po daily       No current facility-administered medications for this visit. Current Outpatient Medications on File Prior to Visit   Medication Sig   • acetaminophen (TYLENOL) 325 mg tablet Take 2 tablets (650 mg total) by mouth every 6 (six) hours as needed for mild pain   • Ascorbic Acid (vitamin C) 1000 MG tablet Take 1,000 mg by mouth daily   • b complex vitamins tablet Take 1 tablet by mouth daily   • ibuprofen (MOTRIN) 200 mg tablet Take by mouth if needed for mild pain   • VITAMIN D, CHOLECALCIFEROL, PO 3 caps po daily     No current facility-administered medications on file prior to visit. He is allergic to sulfa antibiotics. .    Review of Systems   Constitutional: Negative. HENT: Negative. Eyes: Negative. Respiratory: Negative. Cardiovascular: Negative. Gastrointestinal: Negative. Endocrine: Negative. Genitourinary: Negative. Musculoskeletal: Negative. Skin: Negative. Allergic/Immunologic: Negative. Hematological: Negative. Psychiatric/Behavioral: Negative. Objective:      /72 (BP Location: Left arm, Patient Position: Sitting, Cuff Size: Standard)   Pulse 64   Temp 97.7 °F (36.5 °C) (Tympanic)   Resp 18   Ht 5' 6" (1.676 m)   Wt 70.3 kg (155 lb)   SpO2 98%   BMI 25.02 kg/m²          Physical Exam  Vitals reviewed. Constitutional:       Appearance: Normal appearance. HENT:      Head: Normocephalic and atraumatic. Mouth/Throat:      Mouth: Mucous membranes are moist.   Eyes:      Pupils: Pupils are equal, round, and reactive to light. Cardiovascular:      Rate and Rhythm: Normal rate and regular rhythm. Pulses: Normal pulses. Pulmonary:      Effort: Pulmonary effort is normal.   Abdominal:      General: Abdomen is flat. Bowel sounds are normal. There is no distension. Palpations: Abdomen is soft. Tenderness: There is no abdominal tenderness. Hernia: A hernia (Moderate-sized reducible right-sided recurrent inguinal hernia. The contents were bowel) is present. Genitourinary:     Penis: Normal.       Testes: Normal.   Musculoskeletal:         General: Normal range of motion. Cervical back: Normal range of motion. Neurological:      General: No focal deficit present. Mental Status: He is alert and oriented to person, place, and time.

## 2023-10-20 ENCOUNTER — OFFICE VISIT (OUTPATIENT)
Dept: LAB | Facility: HOSPITAL | Age: 83
End: 2023-10-20
Attending: SURGERY
Payer: MEDICARE

## 2023-10-20 ENCOUNTER — LAB (OUTPATIENT)
Dept: LAB | Facility: HOSPITAL | Age: 83
End: 2023-10-20
Attending: SURGERY
Payer: MEDICARE

## 2023-10-20 DIAGNOSIS — Z01.818 ENCOUNTER FOR PREADMISSION TESTING: ICD-10-CM

## 2023-10-20 DIAGNOSIS — K86.2 PANCREAS CYST: ICD-10-CM

## 2023-10-20 LAB
ALBUMIN SERPL BCP-MCNC: 4.1 G/DL (ref 3.5–5)
ALP SERPL-CCNC: 51 U/L (ref 34–104)
ALT SERPL W P-5'-P-CCNC: 10 U/L (ref 7–52)
ANION GAP SERPL CALCULATED.3IONS-SCNC: 5 MMOL/L
AST SERPL W P-5'-P-CCNC: 12 U/L (ref 13–39)
BASOPHILS # BLD AUTO: 0.03 THOUSANDS/ÂΜL (ref 0–0.1)
BASOPHILS NFR BLD AUTO: 1 % (ref 0–1)
BILIRUB SERPL-MCNC: 0.88 MG/DL (ref 0.2–1)
BUN SERPL-MCNC: 15 MG/DL (ref 5–25)
CALCIUM SERPL-MCNC: 9.1 MG/DL (ref 8.4–10.2)
CHLORIDE SERPL-SCNC: 104 MMOL/L (ref 96–108)
CO2 SERPL-SCNC: 30 MMOL/L (ref 21–32)
CREAT SERPL-MCNC: 0.96 MG/DL (ref 0.6–1.3)
EOSINOPHIL # BLD AUTO: 0.09 THOUSAND/ÂΜL (ref 0–0.61)
EOSINOPHIL NFR BLD AUTO: 2 % (ref 0–6)
ERYTHROCYTE [DISTWIDTH] IN BLOOD BY AUTOMATED COUNT: 13.1 % (ref 11.6–15.1)
GFR SERPL CREATININE-BSD FRML MDRD: 73 ML/MIN/1.73SQ M
GLUCOSE P FAST SERPL-MCNC: 81 MG/DL (ref 65–99)
HCT VFR BLD AUTO: 43.6 % (ref 36.5–49.3)
HGB BLD-MCNC: 14.4 G/DL (ref 12–17)
IMM GRANULOCYTES # BLD AUTO: 0.02 THOUSAND/UL (ref 0–0.2)
IMM GRANULOCYTES NFR BLD AUTO: 0 % (ref 0–2)
LYMPHOCYTES # BLD AUTO: 1.08 THOUSANDS/ÂΜL (ref 0.6–4.47)
LYMPHOCYTES NFR BLD AUTO: 18 % (ref 14–44)
MCH RBC QN AUTO: 30 PG (ref 26.8–34.3)
MCHC RBC AUTO-ENTMCNC: 33 G/DL (ref 31.4–37.4)
MCV RBC AUTO: 91 FL (ref 82–98)
MONOCYTES # BLD AUTO: 0.45 THOUSAND/ÂΜL (ref 0.17–1.22)
MONOCYTES NFR BLD AUTO: 8 % (ref 4–12)
NEUTROPHILS # BLD AUTO: 4.23 THOUSANDS/ÂΜL (ref 1.85–7.62)
NEUTS SEG NFR BLD AUTO: 71 % (ref 43–75)
NRBC BLD AUTO-RTO: 0 /100 WBCS
PLATELET # BLD AUTO: 194 THOUSANDS/UL (ref 149–390)
PMV BLD AUTO: 10.6 FL (ref 8.9–12.7)
POTASSIUM SERPL-SCNC: 3.9 MMOL/L (ref 3.5–5.3)
PROT SERPL-MCNC: 6.2 G/DL (ref 6.4–8.4)
RBC # BLD AUTO: 4.8 MILLION/UL (ref 3.88–5.62)
SODIUM SERPL-SCNC: 139 MMOL/L (ref 135–147)
WBC # BLD AUTO: 5.9 THOUSAND/UL (ref 4.31–10.16)

## 2023-10-20 PROCEDURE — 85025 COMPLETE CBC W/AUTO DIFF WBC: CPT

## 2023-10-20 PROCEDURE — 93005 ELECTROCARDIOGRAM TRACING: CPT

## 2023-10-20 PROCEDURE — 36415 COLL VENOUS BLD VENIPUNCTURE: CPT

## 2023-10-20 PROCEDURE — 80053 COMPREHEN METABOLIC PANEL: CPT

## 2023-10-26 LAB
ATRIAL RATE: 65 BPM
P AXIS: 53 DEGREES
PR INTERVAL: 160 MS
QRS AXIS: -27 DEGREES
QRSD INTERVAL: 92 MS
QT INTERVAL: 398 MS
QTC INTERVAL: 413 MS
T WAVE AXIS: 55 DEGREES
VENTRICULAR RATE: 65 BPM

## 2023-10-26 PROCEDURE — 93010 ELECTROCARDIOGRAM REPORT: CPT | Performed by: INTERNAL MEDICINE

## 2023-11-01 RX ORDER — DUTASTERIDE 0.5 MG/1
0.5 CAPSULE, LIQUID FILLED ORAL DAILY
COMMUNITY

## 2023-11-01 NOTE — PRE-PROCEDURE INSTRUCTIONS
Pre-Surgery Instructions:   Medication Instructions    acetaminophen (TYLENOL) 325 mg tablet Uses PRN- OK to take day of surgery    Ascorbic Acid (vitamin C) 1000 MG tablet Stop taking 7 days prior to surgery. b complex vitamins tablet Stop taking 7 days prior to surgery. dutasteride (AVODART) 0.5 mg capsule Take day of surgery. ibuprofen (MOTRIN) 200 mg tablet Stop taking 3 days prior to surgery. VITAMIN D, CHOLECALCIFEROL, PO Stop taking 7 days prior to surgery. Pt verbalizes understanding of the following:    Please reference your [facility] Surgical Experience Booklet” for additional information to prepare for your upcoming surgery. - DO NOT EAT OR DRINK ANYTHING after midnight on the evening before your procedure including coffee, tea, gum or hard candy.    - ONLY SIPS OF WATER with your medications are allowed on the morning of your procedure. - Avoid OTC non-directed Vit/ Suppl/ Herbals 7 days prior to surgery to ensure no drug interactions with perioperative surgical/ anesthetic meds  - Avoid NSAIDs 3 days prior. Tylenol is ok to take as needed. - Avoid ASA containing products 5 days prior, unless otherwise instructed by your provider   - Bring a list of meds you take at home with your last dose noted    - Avoid alcohol within 24hrs     - Follow the pre surgery showering instructions as listed in the [facility] Surgical Experience Booklet” or otherwise provided by your surgeon's office.  - Bathing instructions, has chg, neck down, no genitals  - No lotions, powders, sprays, deodorant, cologne, jewelry  - No shaving any body parts within 24hrs  - Do not use dry shampoo, hair spray, hair gel, or any type of hair products. - Do not use a blade to shave the surgical area 1 week before surgery. It is ok to use clean electric clippers up to 24 hours before surgery    - For outpatient surgery, arrange for someone to drive you home after the procedure & stay with you until the next morning.  Visitor guidelines discussed. - Bring insurance cards & photo id    - Bring a case for your glasses  - Leave all valuables such as credit cards, money & jewelry at home  - Please bring any specially ordered equipment (sling, braces) if indicated. - Wear causal clothing that is easy to take on and off. Consider your type of surgery.    - Notify surgeon if you develop any new illnesses, exposure, develop a rash/ open wounds or have additional questions prior to your surgery. - Did the surgeon's office give you any other special instructions? No  - Did surgeon require any clearances? No    You will receive a call one business day prior to surgery with an arrival time and hospital directions. If your surgery is scheduled on a Monday, the hospital will be calling you on the Friday prior to your surgery.

## 2023-11-02 ENCOUNTER — ANESTHESIA EVENT (OUTPATIENT)
Dept: PERIOP | Facility: HOSPITAL | Age: 83
End: 2023-11-02
Payer: MEDICARE

## 2023-11-02 ENCOUNTER — HOSPITAL ENCOUNTER (OUTPATIENT)
Facility: HOSPITAL | Age: 83
Setting detail: OUTPATIENT SURGERY
Discharge: HOME | End: 2023-11-02
Attending: SURGERY | Admitting: SURGERY
Payer: MEDICARE

## 2023-11-02 ENCOUNTER — ANESTHESIA (OUTPATIENT)
Dept: PERIOP | Facility: HOSPITAL | Age: 83
End: 2023-11-02
Payer: MEDICARE

## 2023-11-02 VITALS
TEMPERATURE: 97.5 F | HEART RATE: 67 BPM | RESPIRATION RATE: 16 BRPM | BODY MASS INDEX: 24.11 KG/M2 | SYSTOLIC BLOOD PRESSURE: 109 MMHG | WEIGHT: 150 LBS | OXYGEN SATURATION: 98 % | DIASTOLIC BLOOD PRESSURE: 62 MMHG | HEIGHT: 66 IN

## 2023-11-02 DIAGNOSIS — K40.91 RECURRENT INGUINAL HERNIA OF RIGHT SIDE WITHOUT OBSTRUCTION OR GANGRENE: Primary | ICD-10-CM

## 2023-11-02 PROCEDURE — 49520 REREPAIR ING HERNIA REDUCE: CPT | Performed by: SURGERY

## 2023-11-02 PROCEDURE — NC001 PR NO CHARGE: Performed by: SURGERY

## 2023-11-02 PROCEDURE — 55520 REMOVAL OF SPERM CORD LESION: CPT | Performed by: PHYSICIAN ASSISTANT

## 2023-11-02 PROCEDURE — 88305 TISSUE EXAM BY PATHOLOGIST: CPT | Performed by: PATHOLOGY

## 2023-11-02 PROCEDURE — 55520 REMOVAL OF SPERM CORD LESION: CPT | Performed by: SURGERY

## 2023-11-02 PROCEDURE — 88302 TISSUE EXAM BY PATHOLOGIST: CPT | Performed by: PATHOLOGY

## 2023-11-02 PROCEDURE — C1781 MESH (IMPLANTABLE): HCPCS | Performed by: SURGERY

## 2023-11-02 PROCEDURE — 49520 REREPAIR ING HERNIA REDUCE: CPT | Performed by: PHYSICIAN ASSISTANT

## 2023-11-02 RX ORDER — CEFAZOLIN SODIUM 2 G/50ML
2000 SOLUTION INTRAVENOUS ONCE
Status: COMPLETED | OUTPATIENT
Start: 2023-11-02 | End: 2023-11-02

## 2023-11-02 RX ORDER — ONDANSETRON 2 MG/ML
INJECTION INTRAMUSCULAR; INTRAVENOUS AS NEEDED
Status: DISCONTINUED | OUTPATIENT
Start: 2023-11-02 | End: 2023-11-02

## 2023-11-02 RX ORDER — GLYCOPYRROLATE 0.2 MG/ML
INJECTION INTRAMUSCULAR; INTRAVENOUS AS NEEDED
Status: DISCONTINUED | OUTPATIENT
Start: 2023-11-02 | End: 2023-11-02

## 2023-11-02 RX ORDER — DEXAMETHASONE SODIUM PHOSPHATE 10 MG/ML
INJECTION, SOLUTION INTRAMUSCULAR; INTRAVENOUS AS NEEDED
Status: DISCONTINUED | OUTPATIENT
Start: 2023-11-02 | End: 2023-11-02

## 2023-11-02 RX ORDER — ROCURONIUM BROMIDE 10 MG/ML
INJECTION, SOLUTION INTRAVENOUS AS NEEDED
Status: DISCONTINUED | OUTPATIENT
Start: 2023-11-02 | End: 2023-11-02

## 2023-11-02 RX ORDER — PROPOFOL 10 MG/ML
INJECTION, EMULSION INTRAVENOUS AS NEEDED
Status: DISCONTINUED | OUTPATIENT
Start: 2023-11-02 | End: 2023-11-02

## 2023-11-02 RX ORDER — SODIUM CHLORIDE, SODIUM LACTATE, POTASSIUM CHLORIDE, CALCIUM CHLORIDE 600; 310; 30; 20 MG/100ML; MG/100ML; MG/100ML; MG/100ML
100 INJECTION, SOLUTION INTRAVENOUS CONTINUOUS
Status: DISCONTINUED | OUTPATIENT
Start: 2023-11-02 | End: 2023-11-02 | Stop reason: HOSPADM

## 2023-11-02 RX ORDER — LIDOCAINE HCL/PF 100 MG/5ML
SYRINGE (ML) INJECTION AS NEEDED
Status: DISCONTINUED | OUTPATIENT
Start: 2023-11-02 | End: 2023-11-02

## 2023-11-02 RX ORDER — FENTANYL CITRATE 50 UG/ML
INJECTION, SOLUTION INTRAMUSCULAR; INTRAVENOUS AS NEEDED
Status: DISCONTINUED | OUTPATIENT
Start: 2023-11-02 | End: 2023-11-02

## 2023-11-02 RX ORDER — NEOSTIGMINE METHYLSULFATE 1 MG/ML
INJECTION INTRAVENOUS AS NEEDED
Status: DISCONTINUED | OUTPATIENT
Start: 2023-11-02 | End: 2023-11-02

## 2023-11-02 RX ORDER — PHENYLEPHRINE HCL IN 0.9% NACL 1 MG/10 ML
SYRINGE (ML) INTRAVENOUS AS NEEDED
Status: DISCONTINUED | OUTPATIENT
Start: 2023-11-02 | End: 2023-11-02

## 2023-11-02 RX ORDER — ONDANSETRON 2 MG/ML
4 INJECTION INTRAMUSCULAR; INTRAVENOUS ONCE AS NEEDED
Status: DISCONTINUED | OUTPATIENT
Start: 2023-11-02 | End: 2023-11-02 | Stop reason: HOSPADM

## 2023-11-02 RX ORDER — FENTANYL CITRATE/PF 50 MCG/ML
25 SYRINGE (ML) INJECTION
Status: DISCONTINUED | OUTPATIENT
Start: 2023-11-02 | End: 2023-11-02 | Stop reason: HOSPADM

## 2023-11-02 RX ORDER — OXYCODONE HYDROCHLORIDE AND ACETAMINOPHEN 5; 325 MG/1; MG/1
1 TABLET ORAL EVERY 8 HOURS PRN
Qty: 12 TABLET | Refills: 0 | Status: SHIPPED | OUTPATIENT
Start: 2023-11-02

## 2023-11-02 RX ORDER — MAGNESIUM HYDROXIDE 1200 MG/15ML
LIQUID ORAL AS NEEDED
Status: DISCONTINUED | OUTPATIENT
Start: 2023-11-02 | End: 2023-11-02 | Stop reason: HOSPADM

## 2023-11-02 RX ORDER — BUPIVACAINE HYDROCHLORIDE AND EPINEPHRINE 5; 5 MG/ML; UG/ML
INJECTION, SOLUTION EPIDURAL; INTRACAUDAL; PERINEURAL AS NEEDED
Status: DISCONTINUED | OUTPATIENT
Start: 2023-11-02 | End: 2023-11-02 | Stop reason: HOSPADM

## 2023-11-02 RX ADMIN — Medication 100 MCG: at 13:31

## 2023-11-02 RX ADMIN — Medication 100 MCG: at 13:38

## 2023-11-02 RX ADMIN — ONDANSETRON 4 MG: 2 INJECTION INTRAMUSCULAR; INTRAVENOUS at 13:44

## 2023-11-02 RX ADMIN — PROPOFOL 50 MG: 10 INJECTION, EMULSION INTRAVENOUS at 14:30

## 2023-11-02 RX ADMIN — LIDOCAINE HYDROCHLORIDE 100 MG: 20 INJECTION INTRAVENOUS at 13:30

## 2023-11-02 RX ADMIN — SODIUM CHLORIDE, SODIUM LACTATE, POTASSIUM CHLORIDE, AND CALCIUM CHLORIDE: .6; .31; .03; .02 INJECTION, SOLUTION INTRAVENOUS at 13:10

## 2023-11-02 RX ADMIN — CEFAZOLIN SODIUM 2000 MG: 2 SOLUTION INTRAVENOUS at 13:20

## 2023-11-02 RX ADMIN — DEXAMETHASONE SODIUM PHOSPHATE 10 MG: 10 INJECTION, SOLUTION INTRAMUSCULAR; INTRAVENOUS at 13:44

## 2023-11-02 RX ADMIN — GLYCOPYRROLATE 0.6 MG: 0.2 INJECTION, SOLUTION INTRAMUSCULAR; INTRAVENOUS at 14:28

## 2023-11-02 RX ADMIN — FENTANYL CITRATE 50 MCG: 50 INJECTION INTRAMUSCULAR; INTRAVENOUS at 13:30

## 2023-11-02 RX ADMIN — PROPOFOL 150 MG: 10 INJECTION, EMULSION INTRAVENOUS at 13:30

## 2023-11-02 RX ADMIN — ROCURONIUM BROMIDE 40 MG: 10 INJECTION, SOLUTION INTRAVENOUS at 13:31

## 2023-11-02 RX ADMIN — PROPOFOL 50 MG: 10 INJECTION, EMULSION INTRAVENOUS at 13:31

## 2023-11-02 RX ADMIN — FENTANYL CITRATE 50 MCG: 50 INJECTION INTRAMUSCULAR; INTRAVENOUS at 14:30

## 2023-11-02 RX ADMIN — PHENYLEPHRINE HYDROCHLORIDE 30 MCG/MIN: 10 INJECTION INTRAVENOUS at 13:33

## 2023-11-02 RX ADMIN — PHENYLEPHRINE HYDROCHLORIDE 40 MCG/MIN: 10 INJECTION INTRAVENOUS at 13:40

## 2023-11-02 RX ADMIN — NEOSTIGMINE METHYLSULFATE 3 MG: 1 INJECTION, SOLUTION INTRAVENOUS at 14:28

## 2023-11-02 NOTE — DISCHARGE INSTR - AVS FIRST PAGE
Postoperative Care Instructions  Right Inguinal Reoperative Repair      1. General: You may feel pulling sensations around the wound or funny aches and pains around the incisions. This is normal. Even minor surgery is a change in your body and this is your body's reaction to it. If you have had abdominal surgery, it may help to support the incision with a small pillow or blanket for comfort when moving or coughing. 2. Wound care: The glue over the incisions will fall off over the next week or two.    3. Showering: You may shower again 48 hours after surgery. Please do not soak wound in standing water such as a bath, hot tub, pool, lake, etc. Do not scrub or use exfoliants on the surgical wounds. 4. Activity: You may go up and down stairs, walk as much as you are comfortable, but walk at least 3 times each day. If you have had abdominal surgery, do not perform any strenuous exercise or lift anything heavier than 15-20 pounds for at least 4 weeks, unless cleared by your physician. 5. Diet: You may resume your regular diet. Please drink lots of water. 6. Medications: Resume all of your previous medications, unless told otherwise by the doctor. A good option for pain control is to start with acetaminophen(Tylenol) 650mg and ibuprofen(Advil) 400-600mg and alternate taking them every 3 hours. If this is not sufficient then you make take the narcotic pain medicine as prescribed. You do not need to take the narcotic pain medication unless you are having significant pain and discomfort. Please take the narcotic medication with food. Insure that you do not take more than 4000 mg of Tylenol per day. 7. Driving: You will need someone to drive you home on the day of surgery. Do not drive or make any important decisions while on narcotic pain medication. Generally, you may drive 48 hours after you've stopped taking all narcotic pain medications.     8. Upset Stomach: You may take Maalox, Tums, or similar items for an upset stomach. If your narcotic pain medication causes an upset stomach, do not take it on an empty stomach. Try taking it with at least some crackers or toast.     9. Constipation: Patients often experience constipation after surgery, especially if taking narcotic pain medications. We recommend starting an over-the-counter medication for this, such as Metamucil, Senokot, Colace, milk of magnesia, etc. You may stop taking these medications a couple days after your last dose of narcotic medication. If you experience significant nausea or vomiting after abdominal surgery, call the office before trying any of these medications. 10. Call the office: If you are experiencing any of the following: fevers above 101.5°, significant nausea or vomiting, if the wound develops drainage and/or excessive redness around the wound, or if you have significant diarrhea or other worsening symptoms. 11. Pain: A prescription for narcotic pain medication will be sent to your pharmacy upon discharge from the hospital. Please only take this medication if absolutely necessary. Please return extra narcotics to your local pharmacy.

## 2023-11-02 NOTE — H&P
Assessment/Plan: 60-year-old who has a right recurrent inguinal hernia. No new complaints since he was last seen in the office. No problem-specific Assessment & Plan notes found for this encounter. Diagnoses and all orders for this visit:     Recurrent right inguinal hernia     Encounter for preadmission testing  -     CBC and differential; Future  -     Comprehensive metabolic panel; Future  -     ECG 12 lead; Future            Subjective:       Patient ID: Sarah Colón is a 81 y.o. male. 60-year-old male patient who is here to schedule his surgery for right recurrent inguinal hernia. He says he has no pain in the hernia. No nausea vomiting. No other new complaints. The following portions of the patient's history were reviewed and updated as appropriate:   He  has a past medical history of BPH (benign prostatic hyperplasia), Colon polyp, Disorder of eye, Diverticulitis of rectum, GERD (gastroesophageal reflux disease), Infected hernioplasty mesh ([address]), Malignant melanoma of eye, left ([address]), Memory impairment, Mixed hyperlipidemia, Skin cancer (melanoma) ([address]), Vision loss of left eye, and Wears glasses. He        Patient Active Problem List     Diagnosis Date Noted    Sherwood's esophagus with dysplasia 07/27/2023    History of diverticulitis 04/17/2023    Acute diverticulitis 02/11/2022    Constipation 02/11/2022    Left lower quadrant abdominal pain 02/04/2022    Gastroesophageal reflux 11/11/2021    Pancreas cyst 11/11/2021    Schatzki's ring 11/11/2021    Gastric polyps 11/11/2021    Hx of adenomatous colonic polyps 11/11/2021    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    Tremor 08/27/2020    Cognitive impairment 06/25/2020    Anxiety 06/25/2020    Obsession 06/25/2020    Insomnia 06/25/2020    Current moderate episode of major depressive disorder without prior episode [facility])        He  has a past surgical history that includes Hernia repair (Right); EGD (2019); EGD AND COLONOSCOPY (2017);  Skin cancer excision (02/21/2019); Abdominal surgery; and EGD AND COLONOSCOPY (03/11/2022). His family history includes Alzheimer's disease in his mother; No Known Problems in his father. He  reports that he quit smoking about 54 years ago. His smoking use included cigarettes. He has a 9.00 pack-year smoking history. He has never used smokeless tobacco. He reports current alcohol use of about 7.0 standard drinks of alcohol per week. He reports that he does not currently use drugs. Current Outpatient Medications   Medication Sig Dispense Refill    acetaminophen (TYLENOL) 325 mg tablet Take 2 tablets (650 mg total) by mouth every 6 (six) hours as needed for mild pain 20 tablet 0    Ascorbic Acid (vitamin C) 1000 MG tablet Take 1,000 mg by mouth daily        b complex vitamins tablet Take 1 tablet by mouth daily        ibuprofen (MOTRIN) 200 mg tablet Take by mouth if needed for mild pain        VITAMIN D, CHOLECALCIFEROL, PO 3 caps po daily          No current facility-administered medications for this visit. Current Outpatient Medications on File Prior to Visit   Medication Sig    acetaminophen (TYLENOL) 325 mg tablet Take 2 tablets (650 mg total) by mouth every 6 (six) hours as needed for mild pain    Ascorbic Acid (vitamin C) 1000 MG tablet Take 1,000 mg by mouth daily    b complex vitamins tablet Take 1 tablet by mouth daily    ibuprofen (MOTRIN) 200 mg tablet Take by mouth if needed for mild pain    VITAMIN D, CHOLECALCIFEROL, PO 3 caps po daily      No current facility-administered medications on file prior to visit. He is allergic to sulfa antibiotics. .     Review of Systems  Constitutional: Negative. HENT: Negative. Eyes: Negative. Respiratory: Negative. Cardiovascular: Negative. Gastrointestinal: Negative. Endocrine: Negative. Genitourinary: Negative. Musculoskeletal: Negative. Skin: Negative. Allergic/Immunologic: Negative. Hematological: Negative. Psychiatric/Behavioral: Negative. Objective:        /72 (BP Location: Left arm, Patient Position: Sitting, Cuff Size: Standard)   Pulse 64   Temp 97.7 °F (36.5 °C) (Tympanic)   Resp 18   Ht 5' 6" (1.676 m)   Wt 70.3 kg (155 lb)   SpO2 98%   BMI 25.02 kg/m²             Physical Exam  Vitals reviewed. Constitutional:       Appearance: Normal appearance. HENT:      Head: Normocephalic and atraumatic. Mouth/Throat:      Mouth: Mucous membranes are moist.   Eyes:      Pupils: Pupils are equal, round, and reactive to light. Cardiovascular:      Rate and Rhythm: Normal rate and regular rhythm. Pulses: Normal pulses. Pulmonary:      Effort: Pulmonary effort is normal.   Abdominal:      General: Abdomen is flat. Bowel sounds are normal. There is no distension. Palpations: Abdomen is soft. Tenderness: There is no abdominal tenderness. Hernia: A hernia (Moderate-sized reducible right-sided recurrent inguinal hernia. The contents were bowel) is present. Genitourinary:     Penis: Normal.       Testes: Normal.   Musculoskeletal:         General: Normal range of motion. Cervical back: Normal range of motion. Neurological:      General: No focal deficit present. Mental Status: He is alert and oriented to person, place, and time.

## 2023-11-02 NOTE — OP NOTE
OPERATIVE REPORT  PATIENT NAME: Magui Miller    :  1940  MRN: [id number]  Pt Location: [facility] OR ROOM 02    SURGERY DATE: 2023    Surgeon(s) and Role: Janine Snider MD - Primary     * Carl Gray PA-C - Assisting    Preop Diagnosis:  Recurrent inguinal hernia of right side without obstruction or gangrene [K40.91]    Post-Op Diagnosis Codes:     * Recurrent inguinal hernia of right side without obstruction or gangrene [K40.91]    Procedure(s):  Right - OPEN REPAIR OF RECURRENT REDUCIBLE RIGHT INGUINAL  HERNIAS WITH MESH    Specimen(s):  ID Type Source Tests Collected by Time Destination   1 : RIGHT SPERMATIC CORD LIPOMA Tissue Lipoma of cord TISSUE EXAM Janine Snider MD 2023 1358    2 : RIGHT ILIOHYPOGASTRIC NERVE Tissue Nerve TISSUE EXAM Janine Snider MD 2023 1416        Estimated Blood Loss:   Minimal    Drains:  * No LDAs found *    Anesthesia Type:   General    Operative Indications:  Recurrent inguinal hernia of right side without obstruction or gangrene [K40.91]      Operative Findings:  Right-sided recurrent inguinal hernia. There was a direct defect as well as indirect defect. The contents of the direct hernia was small bowel and fat. The contents of the indirect hernia was fact. Lipoma of the cord was present which was excised. Cord structures were identified and preserved. Complications:   None    Procedure and Technique:  Patient was brought to the operating room and identified correctly. General anesthesia given by anesthesia team parts prepped and draped in standard fashion. Timeout performed. Patient received preoperative IV antibiotic. Local anesthetic was given in the area of the previous surgical scar in the right groin. Part of the previous incision was used. The incision was deepened through the skin and subcutaneous tissue up to the Maria E's fascia. Maria E's fascia was opened using electrocautery. External oblique aponeurosis was identified. The external ring and external oblique was opened using electrocautery. Cord structures were delivered and a Penrose drain was used to retract them. Some fibers of cremasteric were taken down. I could identify a large direct sac immediately. It was dissected from the cord structures and the attenuated fibers of the transversalis fascia. An indirect hernia sac was also seen. It was dissected and reduced. The lipoma of the cord was ligated at the base with 2-0 Vicryl and excised and sent for pathology. An extra-large plug was then introduced into the preperitoneal space. Plication of the fascia was done in a running fashion using 2-0 Prolene taking bites through the mesh as well. Reconstruction of the internal ring was performed in the similar fashion. A Prolene hernia patch was then introduced and was placed over the posterior wall. It was sutured over the conjoint tendon using interrupted 2-0 Prolene. Sutures were all taken over the conjoint tendon and iliopubic tract. The tails were then wrapped around the cord at the internal ring and sutured to the conjoined muscle. We ensured that it was snug around the cord but not too tight. The external oblique aponeurosis was then closed in a running fashion using 2-0 Vicryl. Irrigation was performed. Hemostasis was adequate. The Maria E's fascia was closed in an interrupted fashion using 3-0 Vicryl. Subcutaneous tissue was closed in an interrupted fashion using 3-0 Vicryl. The skin was closed using 4-0 Monocryl in a subcuticular fashion. Exofin was applied. Patient was reversed from anesthesia and taken to recovery under stable condition. I was present for the entire procedure., A qualified resident physician was not available. , and A physician assistant was required during the procedure for retraction, tissue handling, dissection and suturing.     Patient Disposition:  PACU         SIGNATURE: Flavio Valera MD  DATE: November 2, 2023  TIME: 2:58 PM

## 2023-11-02 NOTE — ANESTHESIA POSTPROCEDURE EVALUATION
Post-Op Assessment Note    CV Status:  Stable  Pain Score: 0    Pain management: adequate     Mental Status:  Sleepy and arousable   Hydration Status:  Stable   PONV Controlled:  Controlled   Airway Patency:  Patent      Post Op Vitals Reviewed: Yes      Staff: CRNA         No notable events documented.     BP   127/59   Temp   97.8   Pulse  69   Resp   12   SpO2   99

## 2023-11-02 NOTE — ANESTHESIA PREPROCEDURE EVALUATION
Procedure:  OPEN REPAIR OF RECURRENT RIGHT INGUINAL HERNIA WITH MESH (Right: Groin)    Relevant Problems   GI/HEPATIC   (+) Gastroesophageal reflux   (+) Pancreas cyst      NEURO/PSYCH   (+) Anxiety   (+) Current moderate episode of major depressive disorder without prior episode (HCC)        Physical Exam    Airway    Mallampati score: II  TM Distance: >3 FB  Neck ROM: full     Dental   No notable dental hx     Cardiovascular  Cardiovascular exam normal    Pulmonary  Pulmonary exam normal     Other Findings        Anesthesia Plan  ASA Score- 2     Anesthesia Type- general with ASA Monitors. Additional Monitors:     Airway Plan: ETT. Plan Factors-    Chart reviewed. EKG reviewed. Existing labs reviewed. Patient summary reviewed. Induction- intravenous. Postoperative Plan-     Informed Consent- Anesthetic plan and risks discussed with patient. I personally reviewed this patient with the CRNA. Discussed and agreed on the Anesthesia Plan with the CRNA. Lena Zarco

## 2023-11-03 NOTE — PERIOPERATIVE NURSING NOTE
Pt unable to void post procedure. Dr. Angela Rodriguez notified of attempts to void and all interventions including IVF, PO fluids, ambulation, and running water while attempting to void. Per Dr. Angela Rodriguez patient is to be straight cathed and then discharged. Straight cath completed in sterile fashion without difficulty. Pt straight cathed for 200ml raymond colored urine. Pt tolerated well. Pt and wife provided education to continue with increased PO fluid intake. If unable to void in 8 hours or if there is bladder discomfort, distention, and pt is unable to void they are to return to the ED. Patient and wife verbalized understanding.

## 2023-11-06 ENCOUNTER — HOSPITAL ENCOUNTER (INPATIENT)
Facility: HOSPITAL | Age: 83
LOS: 1 days | Discharge: HOME HEALTH | DRG: 684 | End: 2023-11-08
Attending: INTERNAL MEDICINE | Admitting: SURGERY
Payer: MEDICARE

## 2023-11-06 ENCOUNTER — APPOINTMENT (OUTPATIENT)
Dept: RADIOLOGY | Facility: HOSPITAL | Age: 83
DRG: 684 | End: 2023-11-06
Payer: MEDICARE

## 2023-11-06 DIAGNOSIS — R33.8 ACUTE URINARY RETENTION: ICD-10-CM

## 2023-11-06 DIAGNOSIS — R33.8 BENIGN PROSTATIC HYPERPLASIA WITH URINARY RETENTION: ICD-10-CM

## 2023-11-06 DIAGNOSIS — Z87.19 S/P INGUINAL HERNIA REPAIR: ICD-10-CM

## 2023-11-06 DIAGNOSIS — F41.9 ANXIETY: ICD-10-CM

## 2023-11-06 DIAGNOSIS — N40.1 BENIGN PROSTATIC HYPERPLASIA WITH URINARY RETENTION: ICD-10-CM

## 2023-11-06 DIAGNOSIS — K59.00 CONSTIPATION, UNSPECIFIED CONSTIPATION TYPE: ICD-10-CM

## 2023-11-06 DIAGNOSIS — R41.89 COGNITIVE IMPAIRMENT: Primary | ICD-10-CM

## 2023-11-06 DIAGNOSIS — Z98.890 S/P INGUINAL HERNIA REPAIR: ICD-10-CM

## 2023-11-06 DIAGNOSIS — N17.9 ACUTE RENAL FAILURE, UNSPECIFIED ACUTE RENAL FAILURE TYPE (HCC): ICD-10-CM

## 2023-11-06 LAB
ALBUMIN SERPL BCP-MCNC: 4.1 G/DL (ref 3.5–5)
ALP SERPL-CCNC: 53 U/L (ref 34–104)
ALT SERPL W P-5'-P-CCNC: 12 U/L (ref 7–52)
ANION GAP SERPL CALCULATED.3IONS-SCNC: 16 MMOL/L
AST SERPL W P-5'-P-CCNC: 16 U/L (ref 13–39)
BACTERIA UR QL AUTO: ABNORMAL /HPF
BASOPHILS # BLD AUTO: 0.01 THOUSANDS/ÂΜL (ref 0–0.1)
BASOPHILS NFR BLD AUTO: 0 % (ref 0–1)
BILIRUB SERPL-MCNC: 0.74 MG/DL (ref 0.2–1)
BILIRUB UR QL STRIP: NEGATIVE
BUN SERPL-MCNC: 76 MG/DL (ref 5–25)
CALCIUM SERPL-MCNC: 10.1 MG/DL (ref 8.4–10.2)
CHLORIDE SERPL-SCNC: 98 MMOL/L (ref 96–108)
CLARITY UR: CLEAR
CO2 SERPL-SCNC: 21 MMOL/L (ref 21–32)
COLOR UR: YELLOW
CREAT SERPL-MCNC: 8.37 MG/DL (ref 0.6–1.3)
EOSINOPHIL # BLD AUTO: 0.03 THOUSAND/ÂΜL (ref 0–0.61)
EOSINOPHIL NFR BLD AUTO: 0 % (ref 0–6)
ERYTHROCYTE [DISTWIDTH] IN BLOOD BY AUTOMATED COUNT: 13.6 % (ref 11.6–15.1)
GFR SERPL CREATININE-BSD FRML MDRD: 5 ML/MIN/1.73SQ M
GLUCOSE SERPL-MCNC: 105 MG/DL (ref 65–140)
GLUCOSE UR STRIP-MCNC: NEGATIVE MG/DL
HCT VFR BLD AUTO: 46.8 % (ref 36.5–49.3)
HGB BLD-MCNC: 16.1 G/DL (ref 12–17)
HGB UR QL STRIP.AUTO: ABNORMAL
IMM GRANULOCYTES # BLD AUTO: 0.06 THOUSAND/UL (ref 0–0.2)
IMM GRANULOCYTES NFR BLD AUTO: 0 % (ref 0–2)
KETONES UR STRIP-MCNC: ABNORMAL MG/DL
LEUKOCYTE ESTERASE UR QL STRIP: NEGATIVE
LIPASE SERPL-CCNC: 12 U/L (ref 11–82)
LYMPHOCYTES # BLD AUTO: 0.67 THOUSANDS/ÂΜL (ref 0.6–4.47)
LYMPHOCYTES NFR BLD AUTO: 5 % (ref 14–44)
MCH RBC QN AUTO: 29.9 PG (ref 26.8–34.3)
MCHC RBC AUTO-ENTMCNC: 34.4 G/DL (ref 31.4–37.4)
MCV RBC AUTO: 87 FL (ref 82–98)
MONOCYTES # BLD AUTO: 1.2 THOUSAND/ÂΜL (ref 0.17–1.22)
MONOCYTES NFR BLD AUTO: 8 % (ref 4–12)
NEUTROPHILS # BLD AUTO: 12.88 THOUSANDS/ÂΜL (ref 1.85–7.62)
NEUTS SEG NFR BLD AUTO: 87 % (ref 43–75)
NITRITE UR QL STRIP: NEGATIVE
NON-SQ EPI CELLS URNS QL MICRO: ABNORMAL /HPF
NRBC BLD AUTO-RTO: 0 /100 WBCS
PH UR STRIP.AUTO: 5.5 [PH]
PLATELET # BLD AUTO: 253 THOUSANDS/UL (ref 149–390)
PMV BLD AUTO: 10.9 FL (ref 8.9–12.7)
POTASSIUM SERPL-SCNC: 4.2 MMOL/L (ref 3.5–5.3)
PROT SERPL-MCNC: 7.5 G/DL (ref 6.4–8.4)
PROT UR STRIP-MCNC: ABNORMAL MG/DL
RBC # BLD AUTO: 5.38 MILLION/UL (ref 3.88–5.62)
RBC #/AREA URNS AUTO: ABNORMAL /HPF
SODIUM SERPL-SCNC: 135 MMOL/L (ref 135–147)
SP GR UR STRIP.AUTO: 1.01 (ref 1–1.03)
UROBILINOGEN UR QL STRIP.AUTO: 0.2 E.U./DL
WBC # BLD AUTO: 14.85 THOUSAND/UL (ref 4.31–10.16)
WBC #/AREA URNS AUTO: ABNORMAL /HPF

## 2023-11-06 PROCEDURE — 99283 EMERGENCY DEPT VISIT LOW MDM: CPT

## 2023-11-06 PROCEDURE — 99222 1ST HOSP IP/OBS MODERATE 55: CPT | Performed by: SURGERY

## 2023-11-06 PROCEDURE — 81003 URINALYSIS AUTO W/O SCOPE: CPT | Performed by: PHYSICIAN ASSISTANT

## 2023-11-06 PROCEDURE — 81001 URINALYSIS AUTO W/SCOPE: CPT | Performed by: PHYSICIAN ASSISTANT

## 2023-11-06 PROCEDURE — 99285 EMERGENCY DEPT VISIT HI MDM: CPT | Performed by: INTERNAL MEDICINE

## 2023-11-06 PROCEDURE — 71045 X-RAY EXAM CHEST 1 VIEW: CPT

## 2023-11-06 PROCEDURE — 83690 ASSAY OF LIPASE: CPT | Performed by: INTERNAL MEDICINE

## 2023-11-06 PROCEDURE — 36415 COLL VENOUS BLD VENIPUNCTURE: CPT | Performed by: PHYSICIAN ASSISTANT

## 2023-11-06 PROCEDURE — 85025 COMPLETE CBC W/AUTO DIFF WBC: CPT | Performed by: PHYSICIAN ASSISTANT

## 2023-11-06 PROCEDURE — 80053 COMPREHEN METABOLIC PANEL: CPT | Performed by: PHYSICIAN ASSISTANT

## 2023-11-06 PROCEDURE — 93005 ELECTROCARDIOGRAM TRACING: CPT

## 2023-11-06 RX ORDER — OXYCODONE HYDROCHLORIDE 5 MG/1
2.5 TABLET ORAL EVERY 6 HOURS PRN
Status: DISCONTINUED | OUTPATIENT
Start: 2023-11-06 | End: 2023-11-08 | Stop reason: HOSPADM

## 2023-11-06 RX ORDER — IBUPROFEN 400 MG/1
400 TABLET ORAL EVERY 6 HOURS PRN
Status: DISCONTINUED | OUTPATIENT
Start: 2023-11-06 | End: 2023-11-06

## 2023-11-06 RX ORDER — SODIUM CHLORIDE 9 MG/ML
125 INJECTION, SOLUTION INTRAVENOUS CONTINUOUS
Status: DISCONTINUED | OUTPATIENT
Start: 2023-11-06 | End: 2023-11-07

## 2023-11-06 RX ORDER — KETOROLAC TROMETHAMINE 30 MG/ML
15 INJECTION, SOLUTION INTRAMUSCULAR; INTRAVENOUS EVERY 6 HOURS PRN
Status: DISCONTINUED | OUTPATIENT
Start: 2023-11-06 | End: 2023-11-06

## 2023-11-06 RX ADMIN — SODIUM CHLORIDE 125 ML/HR: 0.9 INJECTION, SOLUTION INTRAVENOUS at 16:45

## 2023-11-06 RX ADMIN — SODIUM CHLORIDE 500 ML: 0.9 INJECTION, SOLUTION INTRAVENOUS at 18:37

## 2023-11-06 NOTE — ED PROVIDER NOTES
History  Chief Complaint   Patient presents with    Post-op Problem     Reports he had a hernia repair last Thursday and has been lethargic ever since. Denies N/V/D. Complaining of lower mid abdominal pain that comes and goes. Was prescribed Percocet, but hasn't taken any at all     This is an 81years old came for having generalized weakness and abdominal pain. Patient has recent right inguinal hernia repair which was done by .  According to the wife he does not eat or drink. Patient also has history of BPH and the last time he passed urine was yesterday afternoon. Patient has no fever. Patient has history of dementia and cannot express what he has. Patient denies any fever. Wife denies vomiting or diarrhea. Patient has no respiratory distress or CP. Patient is awake but he is confused and disoriented. Due to the wife this is his baseline. Prior to Admission Medications   Prescriptions Last Dose Informant Patient Reported? Taking?    Ascorbic Acid (vitamin C) 1000 MG tablet  Self Yes No   Sig: Take 1,000 mg by mouth daily   VITAMIN D, CHOLECALCIFEROL, PO  Self Yes No   Sig: 3 caps po daily   acetaminophen (TYLENOL) 325 mg tablet  Self No No   Sig: Take 2 tablets (650 mg total) by mouth every 6 (six) hours as needed for mild pain   b complex vitamins tablet  Self Yes No   Sig: Take 1 tablet by mouth daily   dutasteride (AVODART) 0.5 mg capsule   Yes No   Sig: Take 0.5 mg by mouth daily   ibuprofen (MOTRIN) 200 mg tablet  Self Yes No   Sig: Take by mouth if needed for mild pain   oxyCODONE-acetaminophen (Percocet) 5-325 mg per tablet   No No   Sig: Take 1 tablet by mouth every 8 (eight) hours as needed for moderate pain for up to 12 doses Max Daily Amount: 3 tablets      Facility-Administered Medications: None       Past Medical History:   Diagnosis Date    Anxiety     BPH (benign prostatic hyperplasia)     Colon polyp     Disorder of eye     disorder of choroid of eye    Diverticulitis of rectum     Infected hernioplasty mesh (HCC)     Malignant melanoma of eye, left ([address])     Memory impairment     Mixed hyperlipidemia     Skin cancer (melanoma) ([address])     Uveal, Caroidal    Vision loss of left eye     Wears glasses        Past Surgical History:   Procedure Laterality Date    ABDOMINAL SURGERY      mesh removal    EGD      EGD AND COLONOSCOPY      EGD AND COLONOSCOPY  2022    HERNIA REPAIR Right     Inguinal/Umbilical    [place] RPR RECRT INGUINAL HERNIA ANY AGE REDUCIBLE Right 2023    Procedure: OPEN REPAIR OF RECURRENT REDUCIBLE RIGHT INGUINAL  HERNIAS WITH MESH;  Surgeon: Jayro Hinkle MD;  Location:  MAIN OR;  Service: General    SKIN CANCER EXCISION  2019    R upper cheek, benign lesion including margins, face, ears, eyelids, nose, lips, mucous membrane, excised diameter 2.1 to 3.0 CM       Family History   Problem Relation Age of Onset    Alzheimer's disease Mother     No Known Problems Father      I have reviewed and agree with the history as documented. E-Cigarette/Vaping    E-Cigarette Use Never User      E-Cigarette/Vaping Substances    Nicotine No     THC No     CBD No     Flavoring No     Other No     Unknown No      Social History     Tobacco Use    Smoking status: Former     Packs/day: 1.00     Years: 9.00     Total pack years: 9.00     Types: Cigarettes     Quit date:      Years since quittin.8    Smokeless tobacco: Never    Tobacco comments:     QUIT IN    Vaping Use    Vaping Use: Never used   Substance Use Topics    Alcohol use: Yes     Alcohol/week: 7.0 standard drinks of alcohol     Types: 7 Glasses of wine per week     Comment: 1 glass a night    Drug use: Not Currently     Comment: DENIES       Review of Systems   Unable to perform ROS: Dementia       Physical Exam  Physical Exam  Vitals and nursing note reviewed. Constitutional:       General: He is not in acute distress. Appearance: He is well-developed. He is obese.  He is not ill-appearing, toxic-appearing or diaphoretic. HENT:      Head: Normocephalic and atraumatic. Right Ear: Ear canal normal.      Left Ear: Ear canal normal.      Nose: Nose normal. No congestion or rhinorrhea. Mouth/Throat:      Pharynx: No oropharyngeal exudate or posterior oropharyngeal erythema. Eyes:      Extraocular Movements: Extraocular movements intact. Neck:      Vascular: No carotid bruit. Cardiovascular:      Rate and Rhythm: Normal rate and regular rhythm. Heart sounds: Normal heart sounds. No murmur heard. No friction rub. No gallop. Pulmonary:      Effort: No respiratory distress. Breath sounds: No stridor. No wheezing, rhonchi or rales. Chest:      Chest wall: No tenderness. Abdominal:      General: Bowel sounds are normal. There is distension. Palpations: Abdomen is soft. There is no mass. Tenderness: There is no abdominal tenderness. There is no right CVA tenderness, left CVA tenderness, guarding or rebound. Hernia: No hernia is present. Comments: Patient has suprapubic fullness. No abdominal tenderness. No rigidity or mass. The site of the right inguinal hernia repair looks good there is no signs of infection. Bladder scan was done and shows 990 cc of urine. Musculoskeletal:         General: No swelling, tenderness, deformity or signs of injury. Normal range of motion. Cervical back: Normal range of motion and neck supple. No rigidity or tenderness. Right lower leg: No edema. Left lower leg: No edema. Lymphadenopathy:      Cervical: No cervical adenopathy. Skin:     General: Skin is warm and dry. Capillary Refill: Capillary refill takes less than 2 seconds. Coloration: Skin is not jaundiced or pale. Findings: No bruising, erythema, lesion or rash. Neurological:      Mental Status: He is alert and oriented to person, place, and time.    Psychiatric:         Behavior: Behavior normal.         Vital Signs  ED Triage Vitals   Temperature Pulse Respirations Blood Pressure SpO2   11/06/23 1603 11/06/23 1603 11/06/23 1603 11/06/23 1607 11/06/23 1603   99.1 °F (37.3 °C) 86 16 165/81 99 %      Temp Source Heart Rate Source Patient Position - Orthostatic VS BP Location FiO2 (%)   11/06/23 1603 -- 11/06/23 1756 11/06/23 1756 --   Axillary  Lying Right arm       Pain Score       11/06/23 1603       5           Vitals:    11/06/23 1756 11/06/23 2130 11/07/23 0010 11/07/23 0841   BP: 156/78 135/69 138/79 133/69   Pulse: 75 87 87 83   Patient Position - Orthostatic VS: Lying Lying Lying Lying         Visual Acuity      ED Medications  Medications   finasteride (PROSCAR) tablet 5 mg (5 mg Oral Given 11/7/23 0844)   tamsulosin (FLOMAX) capsule 0.4 mg (has no administration in time range)   lactated ringers infusion (125 mL/hr Intravenous New Bag 11/7/23 0400)   ondansetron (ZOFRAN) injection 4 mg (has no administration in time range)   heparin (porcine) subcutaneous injection 5,000 Units (5,000 Units Subcutaneous Given 11/7/23 0524)   acetaminophen (TYLENOL) tablet 650 mg (has no administration in time range)   oxyCODONE (ROXICODONE) IR tablet 2.5 mg (has no administration in time range)   influenza vaccine, high-dose (FLUZONE HIGH-DOSE) IM injection SHEREE 0.7 mL (has no administration in time range)   sodium chloride 0.9 % bolus 500 mL (500 mL Intravenous New Bag 11/6/23 1837)       Diagnostic Studies  Results Reviewed       Procedure Component Value Units Date/Time    Urine Microscopic [[id number]]  (Abnormal) Collected: 11/06/23 1647    Lab Status: Final result Specimen: Urine, Indwelling Choi Catheter Updated: 11/06/23 1702     RBC, UA Innumerable /hpf      WBC, UA 1-2 /hpf      Epithelial Cells None Seen /hpf      Bacteria, UA Occasional /hpf     UA w Reflex to Microscopic w Reflex to Culture [[id number]]  (Abnormal) Collected: 11/06/23 1647    Lab Status: Final result Specimen: Urine, Indwelling Choi Catheter Updated: 11/06/23 1655     Color, UA Yellow     Clarity, UA Clear     Specific Gravity, UA 1.015     pH, UA 5.5     Leukocytes, UA Negative     Nitrite, UA Negative     Protein, UA 1+ mg/dl      Glucose, UA Negative mg/dl      Ketones, UA Trace mg/dl      Urobilinogen, UA 0.2 E.U./dl      Bilirubin, UA Negative     Occult Blood, UA 3+    Comprehensive metabolic panel [[id number]]  (Abnormal) Collected: 11/06/23 1624    Lab Status: Final result Specimen: Blood from Arm, Left Updated: 11/06/23 1649     Sodium 135 mmol/L      Potassium 4.2 mmol/L      Chloride 98 mmol/L      CO2 21 mmol/L      ANION GAP 16 mmol/L      BUN 76 mg/dL      Creatinine 8.37 mg/dL      Glucose 105 mg/dL      Calcium 10.1 mg/dL      AST 16 U/L      ALT 12 U/L      Alkaline Phosphatase 53 U/L      Total Protein 7.5 g/dL      Albumin 4.1 g/dL      Total Bilirubin 0.74 mg/dL      eGFR 5 ml/min/1.73sq m     Narrative:      National Kid[employer] Foundation guidelines for Chronic Kidney Disease (CKD):     Stage 1 with normal or high GFR (GFR > 90 mL/min/1.73 square meters)    Stage 2 Mild CKD (GFR = 60-89 mL/min/1.73 square meters)    Stage 3A Moderate CKD (GFR = 45-59 mL/min/1.73 square meters)    Stage 3B Moderate CKD (GFR = 30-44 mL/min/1.73 square meters)    Stage 4 Severe CKD (GFR = 15-29 mL/min/1.73 square meters)    Stage 5 End Stage CKD (GFR <15 mL/min/1.73 square meters)  Note: GFR calculation is accurate only with a steady state creatinine    Lipase [[id number]]  (Normal) Collected: 11/06/23 1624    Lab Status: Final result Specimen: Blood from Arm, Left Updated: 11/06/23 1649     Lipase 12 u/L     CBC and differential [[id number]]  (Abnormal) Collected: 11/06/23 1624    Lab Status: Final result Specimen: Blood from Arm, Left Updated: 11/06/23 1631     WBC 14.85 Thousand/uL      RBC 5.38 Million/uL      Hemoglobin 16.1 g/dL      Hematocrit 46.8 %      MCV 87 fL      MCH 29.9 pg      MCHC 34.4 g/dL      RDW 13.6 %      MPV 10.9 fL      Platelets 003 Thousands/uL      nRBC 0 /100 WBCs      Neutrophils Relative 87 %      Immat GRANS % 0 %      Lymphocytes Relative 5 %      Monocytes Relative 8 %      Eosinophils Relative 0 %      Basophils Relative 0 %      Neutrophils Absolute 12.88 Thousands/µL      Immature Grans Absolute 0.06 Thousand/uL      Lymphocytes Absolute 0.67 Thousands/µL      Monocytes Absolute 1.20 Thousand/µL      Eosinophils Absolute 0.03 Thousand/µL      Basophils Absolute 0.01 Thousands/µL                    XR chest 1 view portable   ED Interpretation by Terrence Hanley MD (11/06 6150)   CXR ; RLL LLL Atelectasis, small L pleural effusion       Final Result by Christina Membreno MD (11/07 3608)      Mild bibasilar atelectasis, greater on the right. Workstation performed: [id number]                    Procedures  Procedures         ED Course                                             Medical Decision Making  This is an 81years old having recent repair of the right inguinal hernia which was done on 11/2/2023. Surgery was done with no complications. Patient went home and does not eat he does not drink. Came today having abdominal fullness. On the exam he has urinary retention with 990 cc of urine. Choi's catheter was inserted immediately and clear urine comes out. The site of the hernia shows no signs of infections. Patient has WBCs 14 K and today is postop day#4. We will admit the patient under Dr. Morelia Delgado for observation and monitor his status. Labs reviewed and BUN/CR 78/8.37 , this is coming up from 15/0.96 possible due to acute urinary retention. UA shows no infection. The case discussed with Kathryn Kanner, NP, and dr Morelia Delgado.      Amount and/or Complexity of Data Reviewed  Labs: ordered. Details: WBC 14.85, bun/cr 76/8. 37coming up from 15/0.96 on 11/2/23  Radiology: ordered and independent interpretation performed. Details: CXR; RLL, LLL atelectasis, small L pleural effusion.   Discussion of management or test interpretation with external provider(s): Discussed with Kamron Blood and dr Valorie Martin  Prescription drug management. Decision regarding hospitalization.              Disposition  Final diagnoses:   Acute urinary retention   Acute renal failure, unspecified acute renal failure type ([address])   S/P inguinal hernia repair     Time reflects when diagnosis was documented in both MDM as applicable and the Disposition within this note       Time User Action Codes Description Comment    11/6/2023  4:27 PM Kamron Blood Add [R41.89] Cognitive impairment     11/6/2023  4:27 PM Kamron Herrmannry Add [K59.00] Constipation, unspecified constipation type     11/6/2023  4:27 PM Lavkaren Herrmannry Add [F41.9] Anxiety     11/6/2023  5:17 PM Abdelmalek, Moheb Add [R33.8] Acute urinary retention     11/6/2023  5:18 PM Abdelmalek, Moheb Add [N17.9] Acute renal failure, unspecified acute renal failure type ([address])     11/6/2023  5:18 PM Migel Robertson [H90.689,  Z87.19] S/P inguinal hernia repair           ED Disposition       ED Disposition   Admit    Condition   Stable    Date/Time   Mon Nov 6, 2023  5:38 PM    Comment   Case was discussed with Sera Kaye NP and the patient's admission status was agreed to be Admission Status: inpatient status to the service of Dr. Kosta Valencia    None         Current Discharge Medication List        CONTINUE these medications which have NOT CHANGED    Details   acetaminophen (TYLENOL) 325 mg tablet Take 2 tablets (650 mg total) by mouth every 6 (six) hours as needed for mild pain  Qty: 20 tablet, Refills: 0    Associated Diagnoses: Acute diverticulitis      Ascorbic Acid (vitamin C) 1000 MG tablet Take 1,000 mg by mouth daily      b complex vitamins tablet Take 1 tablet by mouth daily      dutasteride (AVODART) 0.5 mg capsule Take 0.5 mg by mouth daily      ibuprofen (MOTRIN) 200 mg tablet Take by mouth if needed for mild pain oxyCODONE-acetaminophen (Percocet) 5-325 mg per tablet Take 1 tablet by mouth every 8 (eight) hours as needed for moderate pain for up to 12 doses Max Daily Amount: 3 tablets  Qty: 12 tablet, Refills: 0    Associated Diagnoses: Recurrent inguinal hernia of right side without obstruction or gangrene      VITAMIN D, CHOLECALCIFEROL, PO 3 caps po daily             No discharge procedures on file.     PDMP Review       None            ED Provider  Electronically Signed by             Nevaeh White MD  11/07/23 5727

## 2023-11-06 NOTE — H&P
History and Physical - General Surgery   Joaquim Herrera 81 y.o. male MRN: [id number]  Unit/Bed#: ED 15 Encounter: [id number]    Assessment/Plan   80 y/o male with PMH anxiety/depression, BPH, memory impairment, HLD, s/p open R IHR with Dr. Bj Meng on 11/2/23, p/w decreased PO intake and weakness since surgery. Pt's wife reports pt hasn't eaten much at all and has only had a little to drink per her prompting since surgery  Pt reports incisional pain has been well controlled with ibuprofen   Denies abdominal pain, fever/chills, changes to bowel/bladder function  Pt reports that he urinated a good amount recently, however upon placement of Choi by ED physician >900cc immediate return (clear yellow urine, slightly dark in color)  Pt afebrile with stable vital signs on arrival  Abdomen soft, mildly distended, mildly tender to palpation of epigastrium (exam prior to Choi placement)  Incision CDI with reasonable amount of ecchymosis extending into scrotum, no evidence of hematoma/seroma, no incisional tenderness, no scrotal edema  Labs pending    Admit to surgical service for observation and IVF  Consult SLIM for medical co-management  F/U labs and UA  Proscar/Flomax  Continue Choi catheter  Regular diet  IVF  PRN analgesics/antiemetics  I/O  D/W attending    HPI:  Joaquim Herrera is a 81 y.o. male with PMH anxiety/depression, BPH, memory impairment, HLD, s/p open R IHR with Dr. Bj Meng on 11/2/23, p/w decreased PO intake and weakness since surgery. Pt reports that he has felt okay but hasn't been very hungry. He says that his bowel and bladder function have been normal. Of note, pt required straight cath prior to discharge after surgery on 11/2. His pain has been managed adequately with ibuprofen at home. He denies fever/chills, nausea/vomiting. No sick contacts. Pt denies urinary symptoms. On exam, pt has expected post-op ecchymosis and incision is CDI.  Abdomen mildly distended and tender, both improved after placement of Choi catheter with almost 1L return. Review of Systems   Constitutional:  Positive for appetite change (decreased) and fatigue. Negative for chills and fever. Respiratory:  Negative for shortness of breath. Cardiovascular:  Negative for chest pain. Gastrointestinal:  Negative for abdominal pain, constipation, diarrhea, nausea and vomiting. Genitourinary:  Negative for decreased urine volume, difficulty urinating, dysuria, frequency, hematuria, penile discharge, penile pain, penile swelling, scrotal swelling, testicular pain and urgency. Skin:  Positive for wound (surgical). Neurological:  Negative for dizziness and light-headedness.         Historical Information   Past Medical History:   Diagnosis Date    Anxiety     BPH (benign prostatic hyperplasia)     Colon polyp     Disorder of eye     disorder of choroid of eye    Diverticulitis of rectum     Infected hernioplasty mesh (HCC)     Malignant melanoma of eye, left ([address])     Memory impairment     Mixed hyperlipidemia     Skin cancer (melanoma) ([address])     Uveal, Caroidal    Vision loss of left eye     Wears glasses      Past Surgical History:   Procedure Laterality Date    ABDOMINAL SURGERY      mesh removal    EGD  2019    EGD AND COLONOSCOPY  2017    EGD AND COLONOSCOPY  03/11/2022    HERNIA REPAIR Right     Inguinal/Umbilical    [place] RPR RECRT INGUINAL HERNIA ANY AGE REDUCIBLE Right 11/2/2023    Procedure: OPEN REPAIR OF RECURRENT REDUCIBLE RIGHT INGUINAL  HERNIAS WITH MESH;  Surgeon: Donna Chilel MD;  Location:  MAIN OR;  Service: General    SKIN CANCER EXCISION  02/21/2019    R upper cheek, benign lesion including margins, face, ears, eyelids, nose, lips, mucous membrane, excised diameter 2.1 to 3.0 CM     Social History   Social History     Substance and Sexual Activity   Alcohol Use Yes    Alcohol/week: 7.0 standard drinks of alcohol    Types: 7 Glasses of wine per week    Comment: 1 glass a night     Social History Substance and Sexual Activity   Drug Use Not Currently    Comment: DENIES     Social History     Tobacco Use   Smoking Status Former    Packs/day: 1.00    Years: 9.00    Total pack years: 9.00    Types: Cigarettes    Quit date: 56    Years since quittin.8   Smokeless Tobacco Never   Tobacco Comments    QUIT IN 56     Family History: non-contributory    Meds/Allergies   all medications and allergies reviewed  Allergies   Allergen Reactions    Sulfa Antibiotics Other (See Comments)     Childhood- unknown       Objective   First Vitals:   Blood Pressure: 165/81 (23 1607)  Pulse: 86 (23 1603)  Temperature: 99.1 °F (37.3 °C) (23 1603)  Temp Source: Axillary (23 1603)  Respirations: 16 (23 1603)  Weight - Scale: 68 kg (150 lb) (23 1603)  SpO2: 99 % (23 1603)    Current Vitals:   Blood Pressure: 165/81 (23 1607)  Pulse: 86 (23 1603)  Temperature: 99.1 °F (37.3 °C) (23 1603)  Temp Source: Axillary (23 1603)  Respirations: 16 (23 1603)  Weight - Scale: 68 kg (150 lb) (23 1603)  SpO2: 99 % (23 1603)    No intake or output data in the 24 hours ending 23 1643    Invasive Devices       None                   Physical Exam  Vitals reviewed. Constitutional:       General: He is not in acute distress. Appearance: He is not toxic-appearing. HENT:      Head: Normocephalic and atraumatic. Mouth/Throat:      Mouth: Mucous membranes are dry. Eyes:      Extraocular Movements: Extraocular movements intact. Cardiovascular:      Rate and Rhythm: Normal rate and regular rhythm. Pulmonary:      Effort: Pulmonary effort is normal. No respiratory distress. Abdominal:      General: Bowel sounds are normal. There is distension. Palpations: Abdomen is soft. Tenderness: There is abdominal tenderness. There is no guarding. Comments: Incision CDI. Ecchymosis from incision into scrotum, normal post-surgery.  No hematoma. Genitourinary:     Penis: Normal.       Testes: Normal.   Musculoskeletal:         General: Normal range of motion. Cervical back: Normal range of motion. Skin:     General: Skin is warm and dry. Neurological:      Mental Status: He is alert. Psychiatric:         Mood and Affect: Mood normal.         Behavior: Behavior normal.         Thought Content: Thought content normal.            Labs pending.        Hilary Patterson PA-C   11/6/2023

## 2023-11-07 PROBLEM — N17.9 AKI (ACUTE KIDNEY INJURY) (HCC): Status: ACTIVE | Noted: 2023-11-07

## 2023-11-07 PROBLEM — Z98.890 H/O INGUINAL HERNIA REPAIR: Status: ACTIVE | Noted: 2023-11-07

## 2023-11-07 PROBLEM — Z87.19 H/O INGUINAL HERNIA REPAIR: Status: ACTIVE | Noted: 2023-11-07

## 2023-11-07 PROBLEM — N40.0 BPH (BENIGN PROSTATIC HYPERPLASIA): Chronic | Status: ACTIVE | Noted: 2023-11-07

## 2023-11-07 LAB
ALBUMIN SERPL BCP-MCNC: 3.1 G/DL (ref 3.5–5)
ALP SERPL-CCNC: 40 U/L (ref 34–104)
ALT SERPL W P-5'-P-CCNC: 8 U/L (ref 7–52)
ANION GAP SERPL CALCULATED.3IONS-SCNC: 5 MMOL/L
ANION GAP SERPL CALCULATED.3IONS-SCNC: 9 MMOL/L
AST SERPL W P-5'-P-CCNC: 10 U/L (ref 13–39)
BASOPHILS # BLD AUTO: 0.01 THOUSANDS/ÂΜL (ref 0–0.1)
BASOPHILS NFR BLD AUTO: 0 % (ref 0–1)
BILIRUB SERPL-MCNC: 0.77 MG/DL (ref 0.2–1)
BUN SERPL-MCNC: 29 MG/DL (ref 5–25)
BUN SERPL-MCNC: 42 MG/DL (ref 5–25)
CALCIUM ALBUM COR SERPL-MCNC: 9.5 MG/DL (ref 8.3–10.1)
CALCIUM SERPL-MCNC: 8.7 MG/DL (ref 8.4–10.2)
CALCIUM SERPL-MCNC: 8.8 MG/DL (ref 8.4–10.2)
CHLORIDE SERPL-SCNC: 109 MMOL/L (ref 96–108)
CHLORIDE SERPL-SCNC: 110 MMOL/L (ref 96–108)
CO2 SERPL-SCNC: 22 MMOL/L (ref 21–32)
CO2 SERPL-SCNC: 24 MMOL/L (ref 21–32)
CREAT SERPL-MCNC: 1.16 MG/DL (ref 0.6–1.3)
CREAT SERPL-MCNC: 2.2 MG/DL (ref 0.6–1.3)
EOSINOPHIL # BLD AUTO: 0.1 THOUSAND/ÂΜL (ref 0–0.61)
EOSINOPHIL NFR BLD AUTO: 1 % (ref 0–6)
ERYTHROCYTE [DISTWIDTH] IN BLOOD BY AUTOMATED COUNT: 13.3 % (ref 11.6–15.1)
GFR SERPL CREATININE-BSD FRML MDRD: 26 ML/MIN/1.73SQ M
GFR SERPL CREATININE-BSD FRML MDRD: 58 ML/MIN/1.73SQ M
GLUCOSE SERPL-MCNC: 100 MG/DL (ref 65–140)
GLUCOSE SERPL-MCNC: 96 MG/DL (ref 65–140)
HCT VFR BLD AUTO: 39 % (ref 36.5–49.3)
HGB BLD-MCNC: 13.1 G/DL (ref 12–17)
IMM GRANULOCYTES # BLD AUTO: 0.02 THOUSAND/UL (ref 0–0.2)
IMM GRANULOCYTES NFR BLD AUTO: 0 % (ref 0–2)
LYMPHOCYTES # BLD AUTO: 0.59 THOUSANDS/ÂΜL (ref 0.6–4.47)
LYMPHOCYTES NFR BLD AUTO: 7 % (ref 14–44)
MCH RBC QN AUTO: 29.9 PG (ref 26.8–34.3)
MCHC RBC AUTO-ENTMCNC: 33.6 G/DL (ref 31.4–37.4)
MCV RBC AUTO: 89 FL (ref 82–98)
MONOCYTES # BLD AUTO: 0.83 THOUSAND/ÂΜL (ref 0.17–1.22)
MONOCYTES NFR BLD AUTO: 10 % (ref 4–12)
NEUTROPHILS # BLD AUTO: 7.22 THOUSANDS/ÂΜL (ref 1.85–7.62)
NEUTS SEG NFR BLD AUTO: 82 % (ref 43–75)
NRBC BLD AUTO-RTO: 0 /100 WBCS
PLATELET # BLD AUTO: 194 THOUSANDS/UL (ref 149–390)
PMV BLD AUTO: 10.5 FL (ref 8.9–12.7)
POTASSIUM SERPL-SCNC: 3.8 MMOL/L (ref 3.5–5.3)
POTASSIUM SERPL-SCNC: 3.9 MMOL/L (ref 3.5–5.3)
PROT SERPL-MCNC: 5.4 G/DL (ref 6.4–8.4)
RBC # BLD AUTO: 4.38 MILLION/UL (ref 3.88–5.62)
SARS-COV-2 RNA RESP QL NAA+PROBE: NEGATIVE
SODIUM SERPL-SCNC: 138 MMOL/L (ref 135–147)
SODIUM SERPL-SCNC: 141 MMOL/L (ref 135–147)
WBC # BLD AUTO: 8.77 THOUSAND/UL (ref 4.31–10.16)

## 2023-11-07 PROCEDURE — 80053 COMPREHEN METABOLIC PANEL: CPT | Performed by: PHYSICIAN ASSISTANT

## 2023-11-07 PROCEDURE — 85025 COMPLETE CBC W/AUTO DIFF WBC: CPT | Performed by: PHYSICIAN ASSISTANT

## 2023-11-07 PROCEDURE — 99232 SBSQ HOSP IP/OBS MODERATE 35: CPT | Performed by: PHYSICIAN ASSISTANT

## 2023-11-07 PROCEDURE — 99232 SBSQ HOSP IP/OBS MODERATE 35: CPT | Performed by: SURGERY

## 2023-11-07 PROCEDURE — 80048 BASIC METABOLIC PNL TOTAL CA: CPT | Performed by: PHYSICIAN ASSISTANT

## 2023-11-07 PROCEDURE — 99221 1ST HOSP IP/OBS SF/LOW 40: CPT | Performed by: NURSE PRACTITIONER

## 2023-11-07 PROCEDURE — 87635 SARS-COV-2 COVID-19 AMP PRB: CPT | Performed by: PHYSICIAN ASSISTANT

## 2023-11-07 RX ORDER — ONDANSETRON 2 MG/ML
4 INJECTION INTRAMUSCULAR; INTRAVENOUS EVERY 6 HOURS PRN
Status: DISCONTINUED | OUTPATIENT
Start: 2023-11-07 | End: 2023-11-08 | Stop reason: HOSPADM

## 2023-11-07 RX ORDER — ACETAMINOPHEN 325 MG/1
650 TABLET ORAL EVERY 6 HOURS PRN
Status: DISCONTINUED | OUTPATIENT
Start: 2023-11-07 | End: 2023-11-08 | Stop reason: HOSPADM

## 2023-11-07 RX ORDER — HEPARIN SODIUM 5000 [USP'U]/ML
5000 INJECTION, SOLUTION INTRAVENOUS; SUBCUTANEOUS EVERY 8 HOURS SCHEDULED
Status: DISCONTINUED | OUTPATIENT
Start: 2023-11-07 | End: 2023-11-08 | Stop reason: HOSPADM

## 2023-11-07 RX ORDER — LANOLIN ALCOHOL/MO/W.PET/CERES
3 CREAM (GRAM) TOPICAL
Status: DISCONTINUED | OUTPATIENT
Start: 2023-11-07 | End: 2023-11-08 | Stop reason: HOSPADM

## 2023-11-07 RX ORDER — FINASTERIDE 5 MG/1
5 TABLET, FILM COATED ORAL DAILY
Status: DISCONTINUED | OUTPATIENT
Start: 2023-11-07 | End: 2023-11-08 | Stop reason: HOSPADM

## 2023-11-07 RX ORDER — SODIUM CHLORIDE, SODIUM LACTATE, POTASSIUM CHLORIDE, CALCIUM CHLORIDE 600; 310; 30; 20 MG/100ML; MG/100ML; MG/100ML; MG/100ML
125 INJECTION, SOLUTION INTRAVENOUS CONTINUOUS
Status: DISCONTINUED | OUTPATIENT
Start: 2023-11-07 | End: 2023-11-08 | Stop reason: HOSPADM

## 2023-11-07 RX ORDER — TAMSULOSIN HYDROCHLORIDE 0.4 MG/1
0.4 CAPSULE ORAL
Status: DISCONTINUED | OUTPATIENT
Start: 2023-11-07 | End: 2023-11-08 | Stop reason: HOSPADM

## 2023-11-07 RX ADMIN — Medication 3 MG: at 21:05

## 2023-11-07 RX ADMIN — TAMSULOSIN HYDROCHLORIDE 0.4 MG: 0.4 CAPSULE ORAL at 16:00

## 2023-11-07 RX ADMIN — SODIUM CHLORIDE, SODIUM LACTATE, POTASSIUM CHLORIDE, AND CALCIUM CHLORIDE 125 ML/HR: .6; .31; .03; .02 INJECTION, SOLUTION INTRAVENOUS at 12:40

## 2023-11-07 RX ADMIN — SODIUM CHLORIDE 125 ML/HR: 0.9 INJECTION, SOLUTION INTRAVENOUS at 00:45

## 2023-11-07 RX ADMIN — HEPARIN SODIUM 5000 UNITS: 5000 INJECTION INTRAVENOUS; SUBCUTANEOUS at 05:24

## 2023-11-07 RX ADMIN — HEPARIN SODIUM 5000 UNITS: 5000 INJECTION INTRAVENOUS; SUBCUTANEOUS at 21:05

## 2023-11-07 RX ADMIN — FINASTERIDE 5 MG: 5 TABLET, FILM COATED ORAL at 08:44

## 2023-11-07 RX ADMIN — HEPARIN SODIUM 5000 UNITS: 5000 INJECTION INTRAVENOUS; SUBCUTANEOUS at 14:48

## 2023-11-07 RX ADMIN — SODIUM CHLORIDE, SODIUM LACTATE, POTASSIUM CHLORIDE, AND CALCIUM CHLORIDE 125 ML/HR: .6; .31; .03; .02 INJECTION, SOLUTION INTRAVENOUS at 04:00

## 2023-11-07 NOTE — PROGRESS NOTES
Progress Note - General Surgery   Mo Herr 81 y.o. male MRN: [id number]  Unit/Bed#: -01 Encounter: [id number]    Assessment:  80yo M with PMH anxiety/depression, BPH, memory impairment, HLD, s/p open R IHR with Dr. Nico Garcia on 11/2/23, presenting with decreased PO intake and weakness since surgery   - AVSS on RA  - no leukocytosis  RENATO   - Cr 2.2 (8.3), baseline appears to be between 0.9 - 1.0  Urinary retention    - 16 (Fr Mcduffie catheter inserted in ED with >900cc immediate return  - UOP: additional 1.5L out overnight   - UA neg for infection     Plan:  - Maintain mcduffie catheter, patient will require TOV, timing to be determined   - regular diet as tolerated   - Trend BMP, monitor Cr closely, monitor vitals   - IVF   - I/Os  - continue flomax/proscar   - DVT ppx : heparin / SCDs  - SLIM consulted for assistance with medical management, appreciate recs     Discussed with attending     Subjective/Objective   Subjective: No acute events overnight. Agitated this morning. Impaired memory. Only oriented to self. Unsure how he arrived at hospital.  Feels that we are keeping him from his wife and home. Mcduffie catheter in place draining clear yellow urine. Denies pain. Has not touched breakfast tray this morning. Called wife and updated on patient's mental status and overall clinical picture while at patient's bedside. Provided phone with wife on the line to patient to continue conversation and reassurance. Objective:   Blood pressure 138/79, pulse 87, temperature 98.1 °F (36.7 °C), temperature source Oral, resp. rate 18, height 5' 6" (1.676 m), weight 68 kg (150 lb), SpO2 96 %. ,Body mass index is 24.21 kg/m².       Intake/Output Summary (Last 24 hours) at 11/7/2023 0723  Last data filed at 11/7/2023 0601  Gross per 24 hour   Intake 1308.33 ml   Output 3065 ml   Net -1756.67 ml       Invasive Devices       Peripheral Intravenous Line  Duration             Peripheral IV 11/07/23 Dorsal (posterior); Left Forearm <1 day              Drain  Duration             Urethral Catheter 16 Fr. <1 day                    Physical Exam  Vitals reviewed. Constitutional:       General: He is not in acute distress. Appearance: He is not toxic-appearing. HENT:      Head: Normocephalic and atraumatic. Cardiovascular:      Rate and Rhythm: Normal rate. Pulmonary:      Effort: No respiratory distress. Abdominal:      General: Bowel sounds are normal. There is distension (mild). Palpations: Abdomen is soft. Tenderness: There is no abdominal tenderness. There is no guarding or rebound. Genitourinary:     Comments: Choi catheter in place draining clear yellow urine  Neurological:      Mental Status: He is alert. He is disoriented. Psychiatric:         Behavior: Behavior is agitated. Lab, Imaging and other studies:I have personally reviewed pertinent lab results. , CBC:   Lab Results   Component Value Date    WBC 8.77 11/07/2023    HGB 13.1 11/07/2023    HCT 39.0 11/07/2023    MCV 89 11/07/2023     11/07/2023    RBC 4.38 11/07/2023    MCH 29.9 11/07/2023    MCHC 33.6 11/07/2023    RDW 13.3 11/07/2023    MPV 10.5 11/07/2023    NRBC 0 11/07/2023   , CMP:   Lab Results   Component Value Date    SODIUM 141 11/07/2023    K 3.8 11/07/2023     (H) 11/07/2023    CO2 22 11/07/2023    BUN 42 (H) 11/07/2023    CREATININE 2.20 (H) 11/07/2023    CALCIUM 8.8 11/07/2023    AST 10 (L) 11/07/2023    ALT 8 11/07/2023    ALKPHOS 40 11/07/2023    EGFR 26 11/07/2023       No results found. VTE PPX:  VTE Pharmacologic Prophylaxis: Heparin  VTE Mechanical Prophylaxis: SCDs, ambulation as able     Denilson Vergara PA-C  11/7/2023     7:23 AM   **Please Note: Portions of the record may have been created using voice recognition software. Occasional wrong word or "sound a like" substitutions may have occurred due to the inherent limitations of voice recognition software.   Read the chart carefully and recognize, using context, where substitutions have occurred. **

## 2023-11-07 NOTE — ASSESSMENT & PLAN NOTE
Patient is s/p recurrent inguinal hernia repair with surgery and was doing well at home in recovery until issue with urinary retention  Post surg mgmt per primary team

## 2023-11-07 NOTE — CONSULTS
[facility]  Consult  Name: Homer Brothers 81 y.o. male I MRN: [id number]  Unit/Bed#: -01 I Date of Admission: 11/6/2023   Date of Service: 11/7/2023 I Hospital Day: 0    Inpatient consult to Internal Medicine  Consult performed by: JUAN C Patel  Consult ordered by: Cristine Sr PA-C          Assessment/Plan   * RENATO (acute kidney injury) [facility])  Assessment & Plan  Patient is s/p recurrent inguinal hernia repair with surgery and was doing well at home in recovery until yesterday when he was unable to void. HE had to be straight catheterized prior to discharge on 11/2 following surgery also. He complained of abdominal pains and was retaining >900 ml of urine. A mcduffie catheter was placed in the ED on 11/6/23. He has a post-obstructive RENATO which should resolve with the mcduffie catheter and IVF. Avoid nephrotoxic medications and further insult with hypotension  His baseline creatinine is around 1.0  Follow creatinine closely, BMP this am   Follow urine output closely. The mcduffie was free flowing overnight and he is -1700ml  Continue flomax and proscar       BPH (benign prostatic hyperplasia)  Assessment & Plan  Flomax and proscar  If patient fails voiding trial, consider urology consult     H/O inguinal hernia repair  Assessment & Plan  Per primary team              VTE Prophylaxis:   Moderate Risk (Score 3-4) - Pharmacological DVT Prophylaxis Ordered: heparin. Recommendations for Discharge:  Resume pre hospital medications  Urology follow-up     Total Time Spent on Date of Encounter in care of patient: 32 mins. This time was spent on one or more of the following: performing physical exam; counseling and coordination of care; obtaining or reviewing history; documenting in the medical record; reviewing/ordering tests, medications or procedures; communicating with other healthcare professionals and discussing with patient's family/caregivers.     Collaboration of Care: Were Recommendations Directly Discussed with Primary Treatment Team? No    History of Present Illness:  Bryant Snider is a 81 y.o. male who is originally admitted to the surgical  service due to abdominal pains and inability to void. We are consulted for assistance with his acute kidney injury and other medical issues. The patient has a history of BPH and cognitive decline. He only endorses taking vitamins at home and is "not really on medications." He did not know that he was in the hospital and was very upset that we are keeping him from his wife. When asked, he has no abdominal pains currently. He asks to go home as soon as possible. Review of Systems:  Review of Systems   Constitutional: Negative. HENT: Negative. Respiratory: Negative. Cardiovascular: Negative. Gastrointestinal:  Positive for abdominal pain. Genitourinary:  Positive for decreased urine volume and difficulty urinating. Neurological: Negative. Hematological: Negative. Psychiatric/Behavioral: Negative.            Past Medical and Surgical History:   Past Medical History:   Diagnosis Date    Anxiety     BPH (benign prostatic hyperplasia)     Colon polyp     Disorder of eye     disorder of choroid of eye    Diverticulitis of rectum     Infected hernioplasty mesh (HCC)     Malignant melanoma of eye, left ([address])     Memory impairment     Mixed hyperlipidemia     Skin cancer (melanoma) ([address])     Uveal, Caroidal    Vision loss of left eye     Wears glasses        Past Surgical History:   Procedure Laterality Date    ABDOMINAL SURGERY      mesh removal    EGD  2019    EGD AND COLONOSCOPY  2017    EGD AND COLONOSCOPY  03/11/2022    HERNIA REPAIR Right     Inguinal/Umbilical    LA RPR RECRT INGUINAL HERNIA ANY AGE REDUCIBLE Right 11/2/2023    Procedure: OPEN REPAIR OF RECURRENT REDUCIBLE RIGHT INGUINAL  HERNIAS WITH MESH;  Surgeon: Rufino Pierce MD;  Location:  MAIN OR;  Service: General    SKIN CANCER EXCISION 2019    R upper cheek, benign lesion including margins, face, ears, eyelids, nose, lips, mucous membrane, excised diameter 2.1 to 3.0 CM       Meds/Allergies:  all medications and allergies reviewed    Allergies: Allergies   Allergen Reactions    Sulfa Antibiotics Other (See Comments)     Childhood- unknown       Social History:  Marital Status: /Civil Union  Substance Use History:   Social History     Substance and Sexual Activity   Alcohol Use Yes    Alcohol/week: 7.0 standard drinks of alcohol    Types: 7 Glasses of wine per week    Comment: 1 glass a night     Social History     Tobacco Use   Smoking Status Former    Packs/day: 1.00    Years: 9.00    Total pack years: 9.00    Types: Cigarettes    Quit date:     Years since quittin.8   Smokeless Tobacco Never   Tobacco Comments    QUIT IN      Social History     Substance and Sexual Activity   Drug Use Not Currently    Comment: DENIES       Family History:  Patient is not able to state     Physical Exam:   Vitals:   Blood Pressure: 138/79 (23 001)  Pulse: 87 (23)  Temperature: 98.1 °F (36.7 °C) (23)  Temp Source: Oral (23)  Respirations: 18 (23)  Height: 5' 6" (167.6 cm) (23)  Weight - Scale: 68 kg (150 lb) (23)  SpO2: 96 % (23)    Physical Exam  HENT:      Head: Normocephalic. Mouth/Throat:      Mouth: Mucous membranes are moist.   Cardiovascular:      Rate and Rhythm: Normal rate and regular rhythm. Heart sounds: No murmur heard. Pulmonary:      Effort: Pulmonary effort is normal.   Abdominal:      General: Abdomen is flat. Bowel sounds are normal. There is no distension. Palpations: Abdomen is soft. Tenderness: There is no abdominal tenderness. Genitourinary:     Penis: Normal.       Testes: Normal.      Comments: Choi draining clear yellow urine. Musculoskeletal:         General: No swelling. Normal range of motion. Skin:     General: Skin is warm and dry. Coloration: Skin is not pale. Neurological:      Mental Status: He is alert. He is disoriented. Psychiatric:      Comments: Slightly agitated but redirectable when talking about his wife. Additional Data:   Lab Results:    Results from last 7 days   Lab Units 11/06/23  1624   WBC Thousand/uL 14.85*   HEMOGLOBIN g/dL 16.1   HEMATOCRIT % 46.8   PLATELETS Thousands/uL 253   NEUTROS PCT % 87*   LYMPHS PCT % 5*   MONOS PCT % 8   EOS PCT % 0     Results from last 7 days   Lab Units 11/06/23  1624   SODIUM mmol/L 135   POTASSIUM mmol/L 4.2   CHLORIDE mmol/L 98   CO2 mmol/L 21   BUN mg/dL 76*   CREATININE mg/dL 8.37*   ANION GAP mmol/L 16   CALCIUM mg/dL 10.1   ALBUMIN g/dL 4.1   TOTAL BILIRUBIN mg/dL 0.74   ALK PHOS U/L 53   ALT U/L 12   AST U/L 16   GLUCOSE RANDOM mg/dL 105             No results found for: "HGBA1C"            Imaging: Reviewed radiology reports from this admission including: chest xray  XR chest 1 view portable   ED Interpretation by Tiffani Antonio MD (11/06 8404)   CXR ; RLL LLL Atelectasis, small L pleural effusion           EKG, Pathology, and Other Studies Reviewed on Admission:   EKG: NSR. HR 65. This was an EKG from 10/20/23. ** Please Note: This note may have been constructed using a voice recognition system.  **

## 2023-11-07 NOTE — ASSESSMENT & PLAN NOTE
Patient presented on 11/6 unable to void. He had to be straight catheterized prior to discharge on 11/2 following surgery also. He complained of abdominal pains and was retaining >900 ml of urine. A mcduffie catheter was placed in the ED on 11/6/23. He has a post-obstructive RENATO which should resolve with the mcduffie catheter and IVF. Avoid nephrotoxic medications (patient was taking NSAIDS) and further insult with hypotension  His baseline creatinine is around 1.0, currently improved from 8.37 to 2.20 already  Follow creatinine closely, BMP tomorrow am  Follow urine output closely. The mcduffie was free flowing overnight and he is -1700ml  Continue flomax and proscar   Would do TOV in 1 week.  Outpatient referral to urology at discharge as he tells me he is not established   Avoid constipation

## 2023-11-07 NOTE — ASSESSMENT & PLAN NOTE
Patient is s/p recurrent inguinal hernia repair with surgery and was doing well at home in recovery until yesterday when he was unable to void. HE had to be straight catheterized prior to discharge on 11/2 following surgery also. He complained of abdominal pains and was retaining >900 ml of urine. A mcduffie catheter was placed in the ED on 11/6/23. He has a post-obstructive RENATO which should resolve with the mcduffie catheter and IVF. Avoid nephrotoxic medications and further insult with hypotension  His baseline creatinine is around 1.0  Follow creatinine closely, BMP this am   Follow urine output closely.  The mcduffie was free flowing overnight and he is -1700ml  Continue flomax and proscar

## 2023-11-07 NOTE — PROGRESS NOTES
[facility] [facility]  Progress Note  Name: Felix Sandra  MRN: [id number]  Unit/Bed#: -01 I Date of Admission: 11/6/2023   Date of Service: 11/7/2023 I Hospital Day: 0    Assessment/Plan   * RENATO (acute kidney injury) [facility])  Assessment & Plan  Patient presented on 11/6 unable to void. He had to be straight catheterized prior to discharge on 11/2 following surgery also. He complained of abdominal pains and was retaining >900 ml of urine. A mcduffie catheter was placed in the ED on 11/6/23. He has a post-obstructive RENATO which should resolve with the mcduffie catheter and IVF. Avoid nephrotoxic medications (patient was taking NSAIDS) and further insult with hypotension  His baseline creatinine is around 1.0, currently improved from 8.37 to 2.20 already  Follow creatinine closely, BMP tomorrow am  Follow urine output closely. The mcduffie was free flowing overnight and he is -1700ml  Continue flomax and proscar   Would do TOV in 1 week. Outpatient referral to urology at discharge as he tells me he is not established   Avoid constipation      H/O inguinal hernia repair  Assessment & Plan  Patient is s/p recurrent inguinal hernia repair with surgery and was doing well at home in recovery until issue with urinary retention  Post surg mgmt per primary team     BPH (benign prostatic hyperplasia)  Assessment & Plan  Flomax and proscar           VTE Pharmacologic Prophylaxis:   sc heparin    Patient Centered Rounds: spoke with RN  Discussions with Specialists or Other Care Team Provider: case mgmt    Education and Discussions with Family / Patient: Attempted to update  (wife) via phone. Left voicemail. Total Time Spent on Date of Encounter in care of patient: 25 mins.  This time was spent on one or more of the following: performing physical exam; counseling and coordination of care; obtaining or reviewing history; documenting in the medical record; reviewing/ordering tests, medications or procedures; communicating with other healthcare professionals and discussing with patient's family/caregivers. Current Length of Stay: 0 day(s)  Current Patient Status: Observation   Certification Statement: The patient, admitted on an observation basis, will now require > 2 midnight hospital stay due to RENATO  Discharge Plan:  likely would be stable for dc tomorrow if continued improvement in creatinine    Code Status: Level 1 - Full Code    Subjective:   Patient offers no complaints but seems to imply he was confused about his plan of care until his wife clarified for him and now he is no longer confused. Objective:     Vitals:   Temp (24hrs), Av.3 °F (36.8 °C), Min:97.6 °F (36.4 °C), Max:99.1 °F (37.3 °C)    Temp:  [97.6 °F (36.4 °C)-99.1 °F (37.3 °C)] 97.6 °F (36.4 °C)  HR:  [75-93] 83  Resp:  [16-20] 20  BP: (133-165)/(69-81) 133/69  SpO2:  [95 %-99 %] 97 %  Body mass index is 24.21 kg/m². Input and Output Summary (last 24 hours): Intake/Output Summary (Last 24 hours) at 2023 1207  Last data filed at 2023 0601  Gross per 24 hour   Intake 1308.33 ml   Output 3065 ml   Net -1756.67 ml       Physical Exam:   Physical Exam  Vitals reviewed. Constitutional:       General: He is not in acute distress. Appearance: Normal appearance. He is not ill-appearing, toxic-appearing or diaphoretic. Comments: Well appearing   Eyes:      General: No scleral icterus. Right eye: No discharge. Left eye: No discharge. Cardiovascular:      Rate and Rhythm: Normal rate and regular rhythm. Heart sounds: No murmur heard. Pulmonary:      Effort: No respiratory distress. Breath sounds: No stridor. No wheezing or rhonchi. Abdominal:      General: There is no distension. Palpations: Abdomen is soft. Tenderness: There is no abdominal tenderness. There is no guarding.    Genitourinary:     Comments: Choi with clear yellow urine  Musculoskeletal:      Right lower leg: No edema. Left lower leg: No edema. Skin:     General: Skin is warm and dry. Coloration: Skin is not jaundiced or pale. Findings: No bruising, erythema, lesion or rash. Neurological:      Mental Status: He is alert. Mental status is at baseline. Comments: Awake alert sitting up in bed   Psychiatric:         Mood and Affect: Mood normal.         Thought Content: Thought content normal.          Additional Data:     Labs:  Results from last 7 days   Lab Units 11/07/23  0539   WBC Thousand/uL 8.77   HEMOGLOBIN g/dL 13.1   HEMATOCRIT % 39.0   PLATELETS Thousands/uL 194   NEUTROS PCT % 82*   LYMPHS PCT % 7*   MONOS PCT % 10   EOS PCT % 1     Results from last 7 days   Lab Units 11/07/23  0539   SODIUM mmol/L 141   POTASSIUM mmol/L 3.8   CHLORIDE mmol/L 110*   CO2 mmol/L 22   BUN mg/dL 42*   CREATININE mg/dL 2.20*   ANION GAP mmol/L 9   CALCIUM mg/dL 8.8   ALBUMIN g/dL 3.1*   TOTAL BILIRUBIN mg/dL 0.77   ALK PHOS U/L 40   ALT U/L 8   AST U/L 10*   GLUCOSE RANDOM mg/dL 96                       Lines/Drains:  Invasive Devices       Peripheral Intravenous Line  Duration             Peripheral IV 11/07/23 Dorsal (posterior); Left Forearm <1 day              Drain  Duration             Urethral Catheter 16 Fr. <1 day                  Urinary Catheter:  Goal for removal:  would dc with mcduffie           Imaging:    Recent Cultures (last 7 days):         Last 24 Hours Medication List:   Current Facility-Administered Medications   Medication Dose Route Frequency Provider Last Rate    acetaminophen  650 mg Oral Q6H PRN Briana Lornaval Shah PA-C      finasteride  5 mg Oral Daily Briana Lorna Minisilvana PA-C      heparin (porcine)  5,000 Units Subcutaneous Q8H [facility] Cristel Muñoz PA-C      influenza vaccine  0.7 mL Intramuscular Once Odilon Velázquez MD      lactated ringers  125 mL/hr Intravenous Continuous Cristel Muñoz PA-C 125 mL/hr (11/07/23 0400)    ondansetron  4 mg Intravenous Q6H PRN Briana King PA-C      oxyCODONE  2.5 mg Oral Q6H PRN Briana King PA-C      tamsulosin  0.4 mg Oral Daily With [facility] KAREL Randolph          Today, Patient Was Seen By: Abner Brennan PA-C    **Please Note: This note may have been constructed using a voice recognition system. **

## 2023-11-07 NOTE — NURSING NOTE
Flu shot ordered for patient, patient declined at this time, stated he will consider receiving at his follow up appt once discharged. Wife in room and agrees.  Provider aware

## 2023-11-08 ENCOUNTER — HOME HEALTH ADMISSION (OUTPATIENT)
Dept: HOME HEALTH SERVICES | Facility: HOME HEALTHCARE | Age: 83
End: 2023-11-08

## 2023-11-08 ENCOUNTER — TELEPHONE (OUTPATIENT)
Age: 83
End: 2023-11-08

## 2023-11-08 ENCOUNTER — HOME CARE VISIT (OUTPATIENT)
Dept: HOME HEALTH SERVICES | Facility: HOME HEALTHCARE | Age: 83
End: 2023-11-08

## 2023-11-08 VITALS
RESPIRATION RATE: 20 BRPM | TEMPERATURE: 97.9 F | WEIGHT: 150 LBS | DIASTOLIC BLOOD PRESSURE: 67 MMHG | HEART RATE: 73 BPM | HEIGHT: 66 IN | BODY MASS INDEX: 24.11 KG/M2 | SYSTOLIC BLOOD PRESSURE: 130 MMHG | OXYGEN SATURATION: 97 %

## 2023-11-08 LAB
ANION GAP SERPL CALCULATED.3IONS-SCNC: 8 MMOL/L
BUN SERPL-MCNC: 20 MG/DL (ref 5–25)
CALCIUM SERPL-MCNC: 8.2 MG/DL (ref 8.4–10.2)
CHLORIDE SERPL-SCNC: 107 MMOL/L (ref 96–108)
CO2 SERPL-SCNC: 25 MMOL/L (ref 21–32)
CREAT SERPL-MCNC: 0.88 MG/DL (ref 0.6–1.3)
GFR SERPL CREATININE-BSD FRML MDRD: 79 ML/MIN/1.73SQ M
GLUCOSE SERPL-MCNC: 96 MG/DL (ref 65–140)
POTASSIUM SERPL-SCNC: 3.5 MMOL/L (ref 3.5–5.3)
SODIUM SERPL-SCNC: 140 MMOL/L (ref 135–147)

## 2023-11-08 PROCEDURE — 88305 TISSUE EXAM BY PATHOLOGIST: CPT | Performed by: PATHOLOGY

## 2023-11-08 PROCEDURE — 88302 TISSUE EXAM BY PATHOLOGIST: CPT | Performed by: PATHOLOGY

## 2023-11-08 PROCEDURE — 99232 SBSQ HOSP IP/OBS MODERATE 35: CPT | Performed by: INTERNAL MEDICINE

## 2023-11-08 PROCEDURE — 80048 BASIC METABOLIC PNL TOTAL CA: CPT | Performed by: PHYSICIAN ASSISTANT

## 2023-11-08 PROCEDURE — 99232 SBSQ HOSP IP/OBS MODERATE 35: CPT | Performed by: SURGERY

## 2023-11-08 RX ORDER — TAMSULOSIN HYDROCHLORIDE 0.4 MG/1
0.4 CAPSULE ORAL
Qty: 30 CAPSULE | Refills: 0 | Status: SHIPPED | OUTPATIENT
Start: 2023-11-08 | End: 2023-12-08

## 2023-11-08 RX ADMIN — HEPARIN SODIUM 5000 UNITS: 5000 INJECTION INTRAVENOUS; SUBCUTANEOUS at 06:02

## 2023-11-08 RX ADMIN — HEPARIN SODIUM 5000 UNITS: 5000 INJECTION INTRAVENOUS; SUBCUTANEOUS at 14:12

## 2023-11-08 RX ADMIN — FINASTERIDE 5 MG: 5 TABLET, FILM COATED ORAL at 09:38

## 2023-11-08 NOTE — TELEPHONE ENCOUNTER
Darryl Martínez [facility] [address], [address] [place] [address] [address]    [phone]    Telephone Intake: Geriatric Assessment     - Chart Review  Has this patient been seen by our department in the last 3 years? No  Please route to provider for chart review prior to scheduling and let the caller know that this phone intake will be reviewed IF -  Pt was recently hospitalized  Pt is prescribed medications for behavior management or has a history of psychiatric hospitalization  Pt plans to attend alone    Referral source: Self     Caller who is scheduling/relationship to pt: Andre Rudd   Caller's phone number: [phone]    Reason for referral: Patient concerns  and Family member concerns regarding memory concerns, behavior changes/concerns, driving concerns, and home safety concerns. If there are behavioral concerns, is the pt prescribed medications to manage these? N/A    If so, how many? N/A    Has the patient ever had an inpatient psychiatric hospitalization? No, N/A   What is the goal of the visit? Dx and care plan      Has the patient been seen by a Neurologist or Geriatrician? Yes    If yes, is this appointment for a second opinion? No  Has the patient ever been diagnosed with dementia? No    Has the patient had an MRI NeuroQuant within the last 1 year? No (If so, please route to provider to determine if assessment + conference are needed or if only assessment should be scheduled)      Preferred language? English   Highest education level? Masters   Does the patient wear glasses? Yes   Does the patient use hearing aids? No     Is there a living will/healthcare POA in place/If so, who? No     Does the pt/caregiver have access for a virtual visit (computer/smart phone with audio/video)? No    Caller was informed:   Please make sure the pt is accompanied by someone who knows them well / caregiver / family member to participate in this appointment.    Who will accompany the pt (name and relationship)? Gretta Smith   Phone number of person accompanying pt: [phone]  Please make sure the pt attends all appointments, including the assessment, care conference, follow-up, whether in-person or virtual.  For virtual visits, pt must be physically present in [facility]. Office packet mailed out to: [phone] [facility] [place] [phone]  Added to wait list for sooner appointments/notified that calls can be short notice (same day/day prior)?  No

## 2023-11-08 NOTE — CASE MANAGEMENT
Case Management Assessment & Discharge Planning Note    Patient name Renny Lips  Location /-42 MRN [id number]  : 1940 Date 2023       Current Admission Date: 2023  Current Admission Diagnosis:RENATO (acute kidney injury) [facility])   Patient Active Problem List    Diagnosis Date Noted    RENATO (acute kidney injury) ([address] ) 2023    H/O inguinal hernia repair 2023    BPH (benign prostatic hyperplasia) 2023    Recurrent inguinal hernia of right side without obstruction or gangrene 2023    Sherwood's esophagus with dysplasia 2023    History of diverticulitis 2023    Acute diverticulitis 2022    Constipation 2022    Left lower quadrant abdominal pain 2022    Gastroesophageal reflux 2021    Pancreas cyst 2021    Schatzki's ring 2021    Gastric polyps 2021    Hx of adenomatous colonic polyps 2021    Micrographia 2020    Tremor 2020    Cognitive impairment 2020    Anxiety 2020    Obsession 2020    Insomnia 2020    Current moderate episode of major depressive disorder without prior episode ([address] )       LOS (days): 1  Geometric Mean LOS (GMLOS) (days): 2.20  Days to GMLOS:1.3     OBJECTIVE:    Risk of Unplanned Readmission Score: 6.87         Current admission status: Inpatient       Preferred Pharmacy:   [address] [place], [place] [address]get #[id number] ERIC Mejia - [address] [place] [id number]  Phone: [phone] Fax: [address] [address]  Phone: [phone] Fax: [phone]    Primary Care Provider: Zayra Myrick MD    Primary Insurance: MEDICARE  Secondary Insurance:  FOR LIFE    ASSESSMENT:  Rishi Proxies    There are no active Health Care Proxies on file.                  Readmission Root Cause  30 Day Readmission: No    Patient Information  Admitted from[de-identified] Home  Mental Status: Alert  During Assessment patient was accompanied by: Spouse  Assessment information provided by[de-identified] Patient  Support Systems: Spouse/significant other  Home entry access options.  Select all that apply.: Stairs  Number of steps to enter home.: 4  Do the steps have railings?: Yes  Type of Current Residence: 2 story home  Upon entering residence, is there a bedroom on the main floor (no further steps)?: No  A bedroom is located on the following floor levels of residence (select all that apply):: 2nd Floor  Upon entering residence, is there a bathroom on the main floor (no further steps)?: No  Indicate which floors of current residence have a bathroom (select all the apply):: 2nd Floor  Number of steps to 2nd floor from main floor: One Flight  In the last 12 months, was there a time when you were not able to pay the mortgage or rent on time?: No  In the last 12 months, was there a time when you did not have a steady place to sleep or slept in a shelter (including now)?: No  Homeless/housing insecurity resource given?: N/A  Living Arrangements: Lives w/ Spouse/significant other    Activities of Daily Living Prior to Admission  Functional Status: Independent  Completes ADLs independently?: Yes  Ambulates independently?: Yes  Does patient use assisted devices?: No  Does patient currently own DME?: No  Does patient have a history of Outpatient Therapy (PT/OT)?: No  Does the patient have a history of Short-Term Rehab?: No  Does patient have a history of HHC?: Yes  Does patient currently have [facility]?: No         Patient Information Continued  Income Source: Pension/skilled nursing  Does patient have prescription coverage?: Yes  Within the past 12 months, you worried that your food would run out before you got the money to buy more.: Never true  Within the past 12 months, the food you bought just didn't last and you didn't have money to get more.: Never true  Food insecurity resource given?: N/A  Does patient receive dialysis treatments?: No  Does patient have a history of substance abuse?: No  Does patient have a history of Mental Health Diagnosis?: No         Means of Transportation  Means of Transport to Appts[de-identified] Family transport  In the past 12 months, has lack of transportation kept you from medical appointments or from getting medications?: No  In the past 12 months, has lack of transportation kept you from meetings, work, or from getting things needed for daily living?: No  Was application for public transport provided?: N/A        DISCHARGE DETAILS:    Discharge planning discussed with[de-identified] patient and spouse  Freedom of Choice: Yes  Comments - Freedom of Choice: Cm informed that patinet is being recommended for dc home with mcduffie. Cm met with patient and spouse at bedside to discuss dc recommendation. Both are in agreement. Cm confirmed that they had [facility] in the past, but can not recall which agecny. Both are in agreement with a blanket referral to [facility] agencies. Cm placed referral and will follow up with the patient and spouse since they have no preference. CM contacted family/caregiver?: Yes  Were Treatment Team discharge recommendations reviewed with patient/caregiver?: Yes  Did patient/caregiver verbalize understanding of patient care needs?: Yes  Were patient/caregiver advised of the risks associated with not following Treatment Team discharge recommendations?: Yes    Contacts  Patient Contacts: [place]  Relationship to Patient[de-identified] Family  Contact Method:  In Person  Reason/Outcome: Emergency Contact, Discharge [address]         Is the patient interested in [facility] at discharge?: Yes  [facility] requested[de-identified] [address] Provider[de-identified] PCP  Home Health Services Needed[de-identified] Urinary Incontinence Catheter Management  Homebound Criteria Met[de-identified] Requires the Assistance of Another Person for Safe Ambulation or to Leave the Home  Supporting Clincal Findings[de-identified] Fatigues Easliy in Short Distances, Limited Endurance    DME Referral Provided  Referral made for DME?: No    Other Referral/Resources/Interventions Provided:  Referral Comments: referral to [facility] for mcduffie management.          Treatment Team Recommendation: Home with [address] [facility]  Discharge Destination Plan[de-identified] Home with [address] [facility] at Discharge : Family           ETA of Transport (Date): 11/08/23  ETA of Transport (Time): 1500

## 2023-11-08 NOTE — ASSESSMENT & PLAN NOTE
History of cognitive decline and recent hernia repair 11/2 came back to the hospital for lethargy found to have profound renal injury  RENATO secondary to obstructive uropathy as well as NSAID use  Had retained over 900 mL of urine  Urinary catheter has been placed. Urology recommends maintaining until outpatient follow-up for voiding trial.    Started on finasteride and tamsulosin.     Disposition: Medically stable for discharge with outpatient follow-up

## 2023-11-08 NOTE — PROGRESS NOTES
[address] [facility]  Progress Note  Name: Bernie Gutierrez  MRN: [id number]  Unit/Bed#: -01 I Date of Admission: 11/6/2023   Date of Service: 11/8/2023 I Hospital Day: 1    Assessment/Plan   * RENATO (acute kidney injury) [facility])  Assessment & Plan  History of cognitive decline and recent hernia repair 11/2 came back to the hospital for lethargy found to have profound renal injury  RENATO secondary to obstructive uropathy as well as NSAID use  Had retained over 900 mL of urine  Urinary catheter has been placed. Urology recommends maintaining until outpatient follow-up for voiding trial.    Started on finasteride and tamsulosin. Disposition: Medically stable for discharge with outpatient follow-up     BPH (benign prostatic hyperplasia)  Assessment & Plan  BPH with urinary retention this admission. Started on finasteride and tamsulosin. Follow-up with Dr. Charles Craft for voiding trial after discharge    H/O inguinal hernia repair  Assessment & Plan  Recent inguinal hernia repair without issues. Management per primary service    Cognitive impairment  Assessment & Plan  Cognitive decline with some delirium. Currently mood stable. VTE Pharmacologic Prophylaxis:   Moderate Risk (Score 3-4) - Pharmacological DVT Prophylaxis Ordered: heparin. Patient Centered Rounds: I have performed bedside rounds with nursing staff today. Discussions with Specialists or Other Care Team Provider: Case management    Education and Discussions with Family / Patient:     Time Spent for Care: This time was spent on one or more of the following: performing physical exam; counseling and coordination of care; obtaining or reviewing history; documenting in the medical record; reviewing/ordering tests, medications or procedures; communicating with other healthcare professionals and discussing with patient's family/caregivers.     Current Length of Stay: 1 day(s)  Current Patient Status: Inpatient   Certification Statement:   Discharge Plan: Fabiana Sim is following this patient on consult. They are medically stable for discharge when deemed appropriate by primary service. Code Status: Level 1 - Full Code      Subjective:   Patient seen and examined. No complaints. On virtual 1: 1. Objective:   Vitals: Blood pressure 130/67, pulse 73, temperature 97.9 °F (36.6 °C), temperature source Axillary, resp. rate 20, height 5' 6" (1.676 m), weight 68 kg (150 lb), SpO2 97 %. Intake/Output Summary (Last 24 hours) at 11/8/2023 1001  Last data filed at 11/8/2023 0840  Gross per 24 hour   Intake 2109 ml   Output 2375 ml   Net -266 ml       Physical Exam  Vitals reviewed. Constitutional:       General: He is not in acute distress. HENT:      Head: Atraumatic. Cardiovascular:      Rate and Rhythm: Regular rhythm. Heart sounds: Normal heart sounds. Pulmonary:      Effort: Pulmonary effort is normal.      Breath sounds: Decreased breath sounds present. No wheezing. Abdominal:      General: Bowel sounds are normal.      Palpations: Abdomen is soft. Tenderness: There is no abdominal tenderness. There is no rebound. Musculoskeletal:         General: No swelling or tenderness. Skin:     General: Skin is warm. Findings: Bruising (Ecchymosis inguinal region) present. Neurological:      Mental Status: He is alert. Cranial Nerves: No cranial nerve deficit.    Psychiatric:         Mood and Affect: Mood normal.       Additional Data:   Labs:  Results from last 7 days   Lab Units 11/07/23  0539 11/06/23  1624   WBC Thousand/uL 8.77 14.85*   HEMOGLOBIN g/dL 13.1 16.1   PLATELETS Thousands/uL 194 253   MCV fL 89 87     Results from last 7 days   Lab Units 11/08/23  0501 11/07/23  1604 11/07/23  0539 11/06/23  1624   SODIUM mmol/L 140 138 141 135   POTASSIUM mmol/L 3.5 3.9 3.8 4.2   CHLORIDE mmol/L 107 109* 110* 98   CO2 mmol/L 25 24 22 21   ANION GAP mmol/L 8 5 9 16   BUN mg/dL 20 29* 42* 76*   CREATININE mg/dL 0.88 1. 16 2.20* 8.37*   CALCIUM mg/dL 8.2* 8.7 8.8 10.1   ALBUMIN g/dL  --   --  3.1* 4.1   TOTAL BILIRUBIN mg/dL  --   --  0.77 0.74   ALK PHOS U/L  --   --  40 53   ALT U/L  --   --  8 12   AST U/L  --   --  10* 16   EGFR ml/min/1.73sq m 79 58 26 5   GLUCOSE RANDOM mg/dL 96 100 96 105                                      * I Have Reviewed All Lab Data Listed Above. Cultures:         Results from last 7 days   Lab Units 11/07/23  0937   SARS-COV-2  Negative           Lines/Drains:  Invasive Devices       Peripheral Intravenous Line  Duration             Peripheral IV 11/07/23 Dorsal (posterior); Left Forearm 1 day              Drain  Duration             Urethral Catheter 16 Fr. 1 day                  Telemetry:      Imaging:  Imaging Reports Reviewed Today Include:   XR chest 1 view portable    Result Date: 11/7/2023  Impression: Mild bibasilar atelectasis, greater on the right. Workstation performed: [id number]       Scheduled Meds:  Current Facility-Administered Medications   Medication Dose Route Frequency Provider Last Rate    acetaminophen  650 mg Oral Q6H PRN Briana Gautam PA-C      finasteride  5 mg Oral Daily Briana Gautam PA-C      heparin (porcine)  5,000 Units Subcutaneous [facility] Maria Ines Carroll PA-C      influenza vaccine  0.7 mL Intramuscular Once Lor Silveira MD      lactated ringers  125 mL/hr Intravenous Continuous Maria Ines Carroll PA-C 125 mL/hr (11/07/23 1240)    melatonin  3 mg Oral HS Lor Silveira MD      ondansetron  4 mg Intravenous Q6H PRN Briana Gautam PA-C      oxyCODONE  2.5 mg Oral Q6H PRN Briana Gautam PA-C      tamsulosin  0.4 mg Oral Daily With [address] KAREL Randolph         Today, Patient Was Seen By: Juan Wilson DO    ** Please Note: Dictation voice to text software may have been used in the creation of this document.  **

## 2023-11-08 NOTE — CONSULTS
H&P Exam - Urology       Patient: Chanel Best   : 1940 Sex: male   MRN: [id number]     CSN: [id number]      History of Present Illness   HPI:  Chanel Best is a 81 y.o. male well-known to me history of voiding complaints on tamsulosin and finasteride combination undergoing uneventful hernia surgery last week requiring postop straight cath being discharged home presenting back to [address] yesterday in urinary retention having Choi cath inserted with 800 cc of urine drained with creatinine 8.0 patient admitted creatinine falling to 2.0 today seen at the bedside. Review of Systems:   Constitutional:  Negative for activity change, fever, chills and diaphoresis. HENT: Negative for hearing loss and trouble swallowing. Eyes: Negative for itching and visual disturbance. Respiratory: Negative for chest tightness and shortness of breath. Cardiovascular: Negative for chest pain, edema. Gastrointestinal: Negative for abdominal distention, na abdominal pain, constipation, diarrhea, Nausea and vomiting. Genitourinary: Negative for decreased urine volume, difficulty urinating, dysuria, enuresis, frequency, hematuria and urgency. Musculoskeletal: Negative for gait problem and myalgias. Neurological: Negative for dizziness and headaches. Hematological: Does not bruise/bleed easily.        Historical Information   Past Medical History:   Diagnosis Date    Anxiety     BPH (benign prostatic hyperplasia)     Colon polyp     Disorder of eye     disorder of choroid of eye    Diverticulitis of rectum     Infected hernioplasty mesh (HCC)     Malignant melanoma of eye, left ([address])     Memory impairment     Mixed hyperlipidemia     Skin cancer (melanoma) ([address])     Uveal, Caroidal    Vision loss of left eye     Wears glasses      Past Surgical History:   Procedure Laterality Date    ABDOMINAL SURGERY      mesh removal    EGD      EGD AND COLONOSCOPY  2017    EGD AND COLONOSCOPY  2022 HERNIA REPAIR Right     Inguinal/Umbilical    CA RPR RECRT INGUINAL HERNIA ANY AGE REDUCIBLE Right 2023    Procedure: OPEN REPAIR OF RECURRENT REDUCIBLE RIGHT INGUINAL  HERNIAS WITH MESH;  Surgeon: Hilary Fajardo MD;  Location:  MAIN OR;  Service: General    SKIN CANCER EXCISION  2019    R upper cheek, benign lesion including margins, face, ears, eyelids, nose, lips, mucous membrane, excised diameter 2.1 to 3.0 CM     Social History   Social History     Substance and Sexual Activity   Alcohol Use Yes    Alcohol/week: 7.0 standard drinks of alcohol    Types: 7 Glasses of wine per week    Comment: 1 glass a night     Social History     Substance and Sexual Activity   Drug Use Not Currently    Comment: DENIES     Social History     Tobacco Use   Smoking Status Former    Packs/day: 1.00    Years: 9.00    Total pack years: 9.00    Types: Cigarettes    Quit date:     Years since quittin.8   Smokeless Tobacco Never   Tobacco Comments    QUIT IN      Family History:   Family History   Problem Relation Age of Onset    Alzheimer's disease Mother     No Known Problems Father        Meds/Allergies   Medications Prior to Admission   Medication    acetaminophen (TYLENOL) 325 mg tablet    Ascorbic Acid (vitamin C) 1000 MG tablet    b complex vitamins tablet    dutasteride (AVODART) 0.5 mg capsule    ibuprofen (MOTRIN) 200 mg tablet    oxyCODONE-acetaminophen (Percocet) 5-325 mg per tablet    VITAMIN D, CHOLECALCIFEROL, PO     Allergies   Allergen Reactions    Sulfa Antibiotics Other (See Comments)     Childhood- unknown       Objective   Vitals: /59 (BP Location: Right arm)   Pulse 71   Temp 98.5 °F (36.9 °C) (Oral)   Resp 18   Ht 5' 6" (1.676 m)   Wt 68 kg (150 lb)   SpO2 97%   BMI 24.21 kg/m²     Physical Exam:  General Alert awake   Normocephalic atraumatic PERRLA  Lungs clear bilaterally  Cardiac normal S1 normal S2  Abdomen soft, flank pain  Hernia incision covered dressing  Indwelling Choi draining clear urine  Extremities no edema    I/O last 24 hours: In: 2977.3 [P.O.:709; I.V.:2268.3]  Out: 2800 [Urine:2800]    Invasive Devices       Peripheral Intravenous Line  Duration             Peripheral IV 11/07/23 Dorsal (posterior); Left Forearm <1 day              Drain  Duration             Urethral Catheter 16 Fr. 1 day                        Lab Results: CBC:   Lab Results   Component Value Date    WBC 8.77 11/07/2023    HGB 13.1 11/07/2023    HCT 39.0 11/07/2023    MCV 89 11/07/2023     11/07/2023    RBC 4.38 11/07/2023    MCH 29.9 11/07/2023    MCHC 33.6 11/07/2023    RDW 13.3 11/07/2023    MPV 10.5 11/07/2023    NRBC 0 11/07/2023     CMP:   Lab Results   Component Value Date     (H) 11/07/2023    CO2 24 11/07/2023    BUN 29 (H) 11/07/2023    CREATININE 1.16 11/07/2023    CALCIUM 8.7 11/07/2023    AST 10 (L) 11/07/2023    ALT 8 11/07/2023    ALKPHOS 40 11/07/2023    EGFR 58 11/07/2023     Urinalysis:   Lab Results   Component Value Date    COLORU Yellow 11/06/2023    CLARITYU Clear 11/06/2023    SPECGRAV 1.015 11/06/2023    PHUR 5.5 11/06/2023    LEUKOCYTESUR Negative 11/06/2023    NITRITE Negative 11/06/2023    GLUCOSEU Negative 11/06/2023    KETONESU Trace (A) 11/06/2023    BILIRUBINUR Negative 11/06/2023    BLOODU 3+ (A) 11/06/2023     Urine Culture: No results found for: "URINECX"  PSA:   Lab Results   Component Value Date    PSA 8.4 (H) 04/20/2023    PSA 3.0 04/26/2021    PSA 4.0 08/19/2020           Assessment/ Plan:  Acute urinary retention secondary to anesthesia recent hernia repair  Continue Choi catheter  We will DC home with catheter when ambulating well would remove catheter in the office within 1 week continue tamsulosin 0.8 mg increased      Yonny Robles MD

## 2023-11-08 NOTE — PROGRESS NOTES
Progress Note - General Surgery   Jensen Martinez 81 y.o. male MRN: [id number]  Unit/Bed#: -01 Encounter: [id number]    Assessment:  80yo M with PMH anxiety/depression, BPH, memory impairment, HLD, s/p open R IHR with Dr. Renay Olguin on 11/2/23, presenting with decreased PO intake and weakness since surgery   - afeb, VSS on RA  - BMP unremarkable, Cr returned to baseline   - UOP >2L via mcduffie catheter    Plan:  - Discussed patient with Dr. Renay Olguin, pts surgeon. Plan for dc to home this afternoon   - follow up with general surgery in outpatient setting   - Dr. Des Estevez of Urology evaluated patient last evening, recommending cont mcduffie catheter, dc home with catheter, follow up in 1 week after hospital discharge, cont tamsulosin     Subjective/Objective   Subjective: patient placed on virtual 1:1 overnight due to cognitive impairment. This morning, patient is disoriented. States "you are keeping me from my wife". Patient re-oriented and wife notified, phone provided to pt with wife on line. Denies abdominal pain, making good urine, tolerating diet. Addendum: re-evaluated at bedside @ 12:18 PM  Mental status improved. Feels well. Tolerating diet. Good UOP. Suspect hospital acquired delirium is contributing     Objective:   Blood pressure 130/59, pulse 71, temperature 98.5 °F (36.9 °C), temperature source Oral, resp. rate 18, height 5' 6" (1.676 m), weight 68 kg (150 lb), SpO2 97 %. ,Body mass index is 24.21 kg/m². Intake/Output Summary (Last 24 hours) at 11/8/2023 0723  Last data filed at 11/8/2023 0601  Gross per 24 hour   Intake 2109 ml   Output 2100 ml   Net 9 ml       Invasive Devices       Peripheral Intravenous Line  Duration             Peripheral IV 11/07/23 Dorsal (posterior); Left Forearm 1 day              Drain  Duration             Urethral Catheter 16 Fr. 1 day                    Physical Exam  Vitals reviewed. Constitutional:       General: He is not in acute distress.      Appearance: He is not toxic-appearing. HENT:      Mouth/Throat:      Mouth: Mucous membranes are moist.   Cardiovascular:      Rate and Rhythm: Normal rate. Pulmonary:      Effort: No respiratory distress. Abdominal:      General: Bowel sounds are normal. There is no distension. Palpations: Abdomen is soft. Tenderness: There is no abdominal tenderness. There is no guarding or rebound. Comments: S/p open RIH repair with surrounding ecchymosis, no recurrence, no hematoma formation    Genitourinary:     Comments: Choi catheter in place draining clear yellow urine  Musculoskeletal:      Right lower leg: No edema. Left lower leg: No edema. Skin:     General: Skin is warm and dry. Neurological:      Mental Status: He is alert. He is disoriented. Psychiatric:         Behavior: Behavior is agitated. Behavior is cooperative. Cognition and Memory: Memory is impaired. Lab, Imaging and other studies:I have personally reviewed pertinent lab results. , CBC: No results found for: "WBC", "HGB", "HCT", "MCV", "PLT", "ADJUSTEDWBC", "RBC", "MCH", "MCHC", "RDW", "MPV", "NRBC", CMP:   Lab Results   Component Value Date    SODIUM 138 11/07/2023    K 3.9 11/07/2023     (H) 11/07/2023    CO2 24 11/07/2023    BUN 29 (H) 11/07/2023    CREATININE 1.16 11/07/2023    CALCIUM 8.7 11/07/2023    EGFR 58 11/07/2023       XR chest 1 view portable    Result Date: 11/7/2023  Impression: Mild bibasilar atelectasis, greater on the right. Workstation performed: [id number]       VTE PPX:  VTE Mechanical Prophylaxis: SCDs, ambulation as able     Vika Marks PA-C  11/8/2023     7:23 AM   **Please Note: Portions of the record may have been created using voice recognition software. Occasional wrong word or "sound a like" substitutions may have occurred due to the inherent limitations of voice recognition software. Read the chart carefully and recognize, using context, where substitutions have occurred. **

## 2023-11-08 NOTE — PLAN OF CARE
Problem: MOBILITY - ADULT  Goal: Maintain or return to baseline ADL function  Description: INTERVENTIONS:  -  Assess patient's ability to carry out ADLs; assess patient's baseline for ADL function and identify physical deficits which impact ability to perform ADLs (bathing, care of mouth/teeth, toileting, grooming, dressing, etc.)  - Assess/evaluate cause of self-care deficits   - Assess range of motion  - Assess patient's mobility; develop plan if impaired  - Assess patient's need for assistive devices and provide as appropriate  - Encourage maximum independence but intervene and supervise when necessary  - Involve family in performance of ADLs  - Assess for home care needs following discharge   - Consider OT consult to assist with ADL evaluation and planning for discharge  - Provide patient education as appropriate  Outcome: Progressing  Goal: Maintains/Returns to pre admission functional level  Description: INTERVENTIONS:  - Perform BMAT or MOVE assessment daily.   - Set and communicate daily mobility goal to care team and patient/family/caregiver. - Collaborate with rehabilitation services on mobility goals if consulted  - Perform Range of Motion  times a day. - Reposition patient every  hours.   - Dangle patient  times a day  - Stand patient  times a day  - Ambulate patient  times a day  - Out of bed to chair  times a day   - Out of bed for meals  times a day  - Out of bed for toileting  - Record patient progress and toleration of activity level   Outcome: Progressing     Problem: PAIN - ADULT  Goal: Verbalizes/displays adequate comfort level or baseline comfort level  Description: Interventions:  - Encourage patient to monitor pain and request assistance  - Assess pain using appropriate pain scale  - Administer analgesics based on type and severity of pain and evaluate response  - Implement non-pharmacological measures as appropriate and evaluate response  - Consider cultural and social influences on pain and pain management  - Notify physician/advanced practitioner if interventions unsuccessful or patient reports new pain  Outcome: Progressing     Problem: INFECTION - ADULT  Goal: Absence or prevention of progression during hospitalization  Description: INTERVENTIONS:  - Assess and monitor for signs and symptoms of infection  - Monitor lab/diagnostic results  - Monitor all insertion sites, i.e. indwelling lines, tubes, and drains  - Monitor endotracheal if appropriate and nasal secretions for changes in amount and color  - [place] appropriate cooling/warming therapies per order  - Administer medications as ordered  - Instruct and encourage patient and family to use good hand hygiene technique  - Identify and instruct in appropriate isolation precautions for identified infection/condition  Outcome: Progressing  Goal: Absence of fever/infection during neutropenic period  Description: INTERVENTIONS:  - Monitor WBC    Outcome: Progressing     Problem: SAFETY ADULT  Goal: Maintain or return to baseline ADL function  Description: INTERVENTIONS:  -  Assess patient's ability to carry out ADLs; assess patient's baseline for ADL function and identify physical deficits which impact ability to perform ADLs (bathing, care of mouth/teeth, toileting, grooming, dressing, etc.)  - Assess/evaluate cause of self-care deficits   - Assess range of motion  - Assess patient's mobility; develop plan if impaired  - Assess patient's need for assistive devices and provide as appropriate  - Encourage maximum independence but intervene and supervise when necessary  - Involve family in performance of ADLs  - Assess for home care needs following discharge   - Consider OT consult to assist with ADL evaluation and planning for discharge  - Provide patient education as appropriate  Outcome: Progressing  Goal: Maintains/Returns to pre admission functional level  Description: INTERVENTIONS:  - Perform BMAT or MOVE assessment daily.   - Set and communicate daily mobility goal to care team and patient/family/caregiver. - Collaborate with rehabilitation services on mobility goals if consulted  - Perform Range of Motion  times a day. - Reposition patient every  hours.   - Dangle patient  times a day  - Stand patient  times a day  - Ambulate patient  times a day  - Out of bed to chair  times a day   - Out of bed for meals  times a day  - Out of bed for toileting  - Record patient progress and toleration of activity level   Outcome: Progressing  Goal: Patient will remain free of falls  Description: INTERVENTIONS:  - Educate patient/family on patient safety including physical limitations  - Instruct patient to call for assistance with activity   - Consult OT/PT to assist with strengthening/mobility   - Keep Call bell within reach  - Keep bed low and locked with side rails adjusted as appropriate  - Keep care items and personal belongings within reach  - Initiate and maintain comfort rounds  - Make Fall Risk Sign visible to staff  - Offer Toileting every  Hours, in advance of need  - Initiate/Maintain alarm  - Obtain necessary fall risk management equipment:   - Apply yellow socks and bracelet for high fall risk patients  - Consider moving patient to room near nurses station  Outcome: Progressing     Problem: DISCHARGE PLANNING  Goal: Discharge to home or other facility with appropriate resources  Description: INTERVENTIONS:  - Identify barriers to discharge w/patient and caregiver  - Arrange for needed discharge resources and transportation as appropriate  - Identify discharge learning needs (meds, wound care, etc.)  - Arrange for interpretive services to assist at discharge as needed  - Refer to Case Management Department for coordinating discharge planning if the patient needs post-hospital services based on physician/advanced practitioner order or complex needs related to functional status, cognitive ability, or social support system  Outcome: Progressing Problem: Knowledge Deficit  Goal: Patient/family/caregiver demonstrates understanding of disease process, treatment plan, medications, and discharge instructions  Description: Complete learning assessment and assess knowledge base.   Interventions:  - Provide teaching at level of understanding  - Provide teaching via preferred learning methods  Outcome: Progressing

## 2023-11-08 NOTE — ASSESSMENT & PLAN NOTE
BPH with urinary retention this admission. Started on finasteride and tamsulosin.   Follow-up with Dr. Trinity Liao for voiding trial after discharge

## 2023-11-09 ENCOUNTER — TELEPHONE (OUTPATIENT)
Age: 83
End: 2023-11-09

## 2023-11-10 ENCOUNTER — APPOINTMENT (EMERGENCY)
Dept: CT IMAGING | Facility: HOSPITAL | Age: 83
DRG: 698 | End: 2023-11-10
Payer: MEDICARE

## 2023-11-10 ENCOUNTER — HOSPITAL ENCOUNTER (INPATIENT)
Facility: HOSPITAL | Age: 83
LOS: 5 days | DRG: 698 | End: 2023-11-16
Attending: EMERGENCY MEDICINE | Admitting: INTERNAL MEDICINE
Payer: MEDICARE

## 2023-11-10 ENCOUNTER — HOME CARE VISIT (OUTPATIENT)
Dept: HOME HEALTH SERVICES | Facility: HOME HEALTHCARE | Age: 83
End: 2023-11-10

## 2023-11-10 DIAGNOSIS — R41.89 COGNITIVE IMPAIRMENT: ICD-10-CM

## 2023-11-10 DIAGNOSIS — F22 DELUSION (HCC): ICD-10-CM

## 2023-11-10 DIAGNOSIS — R41.82 ALTERED MENTAL STATUS, UNSPECIFIED ALTERED MENTAL STATUS TYPE: Primary | ICD-10-CM

## 2023-11-10 DIAGNOSIS — G93.40 ENCEPHALOPATHY: ICD-10-CM

## 2023-11-10 DIAGNOSIS — R31.29 MICROSCOPIC HEMATURIA: ICD-10-CM

## 2023-11-10 DIAGNOSIS — R44.3 HALLUCINATION: ICD-10-CM

## 2023-11-10 DIAGNOSIS — N40.0 BPH (BENIGN PROSTATIC HYPERPLASIA): ICD-10-CM

## 2023-11-10 DIAGNOSIS — F03.90 DEMENTIA (HCC): ICD-10-CM

## 2023-11-10 DIAGNOSIS — R33.9 URINARY RETENTION: ICD-10-CM

## 2023-11-10 LAB
ALBUMIN SERPL BCP-MCNC: 3.4 G/DL (ref 3.5–5)
ALP SERPL-CCNC: 53 U/L (ref 34–104)
ALT SERPL W P-5'-P-CCNC: 11 U/L (ref 7–52)
AMMONIA PLAS-SCNC: 36 UMOL/L (ref 18–72)
AMORPH PHOS CRY URNS QL MICRO: ABNORMAL /HPF
ANION GAP SERPL CALCULATED.3IONS-SCNC: 8 MMOL/L
APAP SERPL-MCNC: <2 UG/ML (ref 10–20)
AST SERPL W P-5'-P-CCNC: 14 U/L (ref 13–39)
BACTERIA UR QL AUTO: ABNORMAL /HPF
BASOPHILS # BLD AUTO: 0.02 THOUSANDS/ÂΜL (ref 0–0.1)
BASOPHILS NFR BLD AUTO: 0 % (ref 0–1)
BILIRUB SERPL-MCNC: 0.62 MG/DL (ref 0.2–1)
BILIRUB UR QL STRIP: NEGATIVE
BUN SERPL-MCNC: 13 MG/DL (ref 5–25)
CALCIUM ALBUM COR SERPL-MCNC: 9.2 MG/DL (ref 8.3–10.1)
CALCIUM SERPL-MCNC: 8.7 MG/DL (ref 8.4–10.2)
CHLORIDE SERPL-SCNC: 105 MMOL/L (ref 96–108)
CLARITY UR: CLEAR
CO2 SERPL-SCNC: 24 MMOL/L (ref 21–32)
COLOR UR: YELLOW
CREAT SERPL-MCNC: 0.84 MG/DL (ref 0.6–1.3)
EOSINOPHIL # BLD AUTO: 0.23 THOUSAND/ÂΜL (ref 0–0.61)
EOSINOPHIL NFR BLD AUTO: 3 % (ref 0–6)
ERYTHROCYTE [DISTWIDTH] IN BLOOD BY AUTOMATED COUNT: 12.7 % (ref 11.6–15.1)
ETHANOL SERPL-MCNC: <10 MG/DL
GFR SERPL CREATININE-BSD FRML MDRD: 81 ML/MIN/1.73SQ M
GLUCOSE SERPL-MCNC: 101 MG/DL (ref 65–140)
GLUCOSE UR STRIP-MCNC: NEGATIVE MG/DL
HCT VFR BLD AUTO: 39.2 % (ref 36.5–49.3)
HGB BLD-MCNC: 13.7 G/DL (ref 12–17)
HGB UR QL STRIP.AUTO: ABNORMAL
IMM GRANULOCYTES # BLD AUTO: 0.02 THOUSAND/UL (ref 0–0.2)
IMM GRANULOCYTES NFR BLD AUTO: 0 % (ref 0–2)
KETONES UR STRIP-MCNC: ABNORMAL MG/DL
LEUKOCYTE ESTERASE UR QL STRIP: NEGATIVE
LYMPHOCYTES # BLD AUTO: 1.12 THOUSANDS/ÂΜL (ref 0.6–4.47)
LYMPHOCYTES NFR BLD AUTO: 14 % (ref 14–44)
MAGNESIUM SERPL-MCNC: 1.8 MG/DL (ref 1.9–2.7)
MCH RBC QN AUTO: 30 PG (ref 26.8–34.3)
MCHC RBC AUTO-ENTMCNC: 34.9 G/DL (ref 31.4–37.4)
MCV RBC AUTO: 86 FL (ref 82–98)
MONOCYTES # BLD AUTO: 0.56 THOUSAND/ÂΜL (ref 0.17–1.22)
MONOCYTES NFR BLD AUTO: 7 % (ref 4–12)
MUCOUS THREADS UR QL AUTO: ABNORMAL
NEUTROPHILS # BLD AUTO: 5.91 THOUSANDS/ÂΜL (ref 1.85–7.62)
NEUTS SEG NFR BLD AUTO: 76 % (ref 43–75)
NITRITE UR QL STRIP: NEGATIVE
NON-SQ EPI CELLS URNS QL MICRO: ABNORMAL /HPF
NRBC BLD AUTO-RTO: 0 /100 WBCS
PH UR STRIP.AUTO: 7.5 [PH]
PLATELET # BLD AUTO: 223 THOUSANDS/UL (ref 149–390)
PMV BLD AUTO: 10 FL (ref 8.9–12.7)
POTASSIUM SERPL-SCNC: 3.5 MMOL/L (ref 3.5–5.3)
PROT SERPL-MCNC: 5.8 G/DL (ref 6.4–8.4)
PROT UR STRIP-MCNC: NEGATIVE MG/DL
RBC # BLD AUTO: 4.57 MILLION/UL (ref 3.88–5.62)
RBC #/AREA URNS AUTO: ABNORMAL /HPF
SALICYLATES SERPL-MCNC: <5 MG/DL (ref 3–20)
SODIUM SERPL-SCNC: 137 MMOL/L (ref 135–147)
SP GR UR STRIP.AUTO: 1.01 (ref 1–1.03)
TSH SERPL DL<=0.05 MIU/L-ACNC: 3.23 UIU/ML (ref 0.45–4.5)
UROBILINOGEN UR QL STRIP.AUTO: 0.2 E.U./DL
WBC # BLD AUTO: 7.86 THOUSAND/UL (ref 4.31–10.16)
WBC #/AREA URNS AUTO: ABNORMAL /HPF

## 2023-11-10 PROCEDURE — 81001 URINALYSIS AUTO W/SCOPE: CPT | Performed by: EMERGENCY MEDICINE

## 2023-11-10 PROCEDURE — 36415 COLL VENOUS BLD VENIPUNCTURE: CPT | Performed by: EMERGENCY MEDICINE

## 2023-11-10 PROCEDURE — 99284 EMERGENCY DEPT VISIT MOD MDM: CPT

## 2023-11-10 PROCEDURE — 84443 ASSAY THYROID STIM HORMONE: CPT | Performed by: EMERGENCY MEDICINE

## 2023-11-10 PROCEDURE — 80053 COMPREHEN METABOLIC PANEL: CPT | Performed by: EMERGENCY MEDICINE

## 2023-11-10 PROCEDURE — 81003 URINALYSIS AUTO W/O SCOPE: CPT | Performed by: EMERGENCY MEDICINE

## 2023-11-10 PROCEDURE — 84443 ASSAY THYROID STIM HORMONE: CPT | Performed by: INTERNAL MEDICINE

## 2023-11-10 PROCEDURE — 82607 VITAMIN B-12: CPT | Performed by: INTERNAL MEDICINE

## 2023-11-10 PROCEDURE — 82140 ASSAY OF AMMONIA: CPT | Performed by: EMERGENCY MEDICINE

## 2023-11-10 PROCEDURE — 80179 DRUG ASSAY SALICYLATE: CPT | Performed by: EMERGENCY MEDICINE

## 2023-11-10 PROCEDURE — 93005 ELECTROCARDIOGRAM TRACING: CPT

## 2023-11-10 PROCEDURE — G1004 CDSM NDSC: HCPCS

## 2023-11-10 PROCEDURE — 70450 CT HEAD/BRAIN W/O DYE: CPT

## 2023-11-10 PROCEDURE — 99291 CRITICAL CARE FIRST HOUR: CPT | Performed by: EMERGENCY MEDICINE

## 2023-11-10 PROCEDURE — 85025 COMPLETE CBC W/AUTO DIFF WBC: CPT | Performed by: EMERGENCY MEDICINE

## 2023-11-10 PROCEDURE — 82077 ASSAY SPEC XCP UR&BREATH IA: CPT | Performed by: EMERGENCY MEDICINE

## 2023-11-10 PROCEDURE — 82306 VITAMIN D 25 HYDROXY: CPT | Performed by: INTERNAL MEDICINE

## 2023-11-10 PROCEDURE — 80143 DRUG ASSAY ACETAMINOPHEN: CPT | Performed by: EMERGENCY MEDICINE

## 2023-11-10 PROCEDURE — 83735 ASSAY OF MAGNESIUM: CPT | Performed by: EMERGENCY MEDICINE

## 2023-11-11 ENCOUNTER — HOME CARE VISIT (OUTPATIENT)
Dept: HOME HEALTH SERVICES | Facility: HOME HEALTHCARE | Age: 83
End: 2023-11-11

## 2023-11-11 ENCOUNTER — APPOINTMENT (INPATIENT)
Dept: CT IMAGING | Facility: HOSPITAL | Age: 83
DRG: 698 | End: 2023-11-11
Payer: MEDICARE

## 2023-11-11 PROBLEM — K40.91 RECURRENT INGUINAL HERNIA OF RIGHT SIDE WITHOUT OBSTRUCTION OR GANGRENE: Status: RESOLVED | Noted: 2023-11-02 | Resolved: 2023-11-11

## 2023-11-11 PROBLEM — N17.9 AKI (ACUTE KIDNEY INJURY) (HCC): Status: RESOLVED | Noted: 2023-11-07 | Resolved: 2023-11-11

## 2023-11-11 PROBLEM — G93.40 ENCEPHALOPATHY: Status: ACTIVE | Noted: 2023-11-11

## 2023-11-11 PROBLEM — R33.9 URINE RETENTION: Status: ACTIVE | Noted: 2023-11-11

## 2023-11-11 LAB
25(OH)D3 SERPL-MCNC: 34.2 NG/ML (ref 30–100)
ANION GAP SERPL CALCULATED.3IONS-SCNC: 7 MMOL/L
BASOPHILS # BLD AUTO: 0.01 THOUSANDS/ÂΜL (ref 0–0.1)
BASOPHILS NFR BLD AUTO: 0 % (ref 0–1)
BUN SERPL-MCNC: 11 MG/DL (ref 5–25)
CALCIUM SERPL-MCNC: 8.3 MG/DL (ref 8.4–10.2)
CHLORIDE SERPL-SCNC: 106 MMOL/L (ref 96–108)
CO2 SERPL-SCNC: 24 MMOL/L (ref 21–32)
CREAT SERPL-MCNC: 0.76 MG/DL (ref 0.6–1.3)
EOSINOPHIL # BLD AUTO: 0.18 THOUSAND/ÂΜL (ref 0–0.61)
EOSINOPHIL NFR BLD AUTO: 3 % (ref 0–6)
ERYTHROCYTE [DISTWIDTH] IN BLOOD BY AUTOMATED COUNT: 12.8 % (ref 11.6–15.1)
GFR SERPL CREATININE-BSD FRML MDRD: 84 ML/MIN/1.73SQ M
GLUCOSE SERPL-MCNC: 106 MG/DL (ref 65–140)
HCT VFR BLD AUTO: 36.3 % (ref 36.5–49.3)
HGB BLD-MCNC: 12.3 G/DL (ref 12–17)
IMM GRANULOCYTES # BLD AUTO: 0.03 THOUSAND/UL (ref 0–0.2)
IMM GRANULOCYTES NFR BLD AUTO: 1 % (ref 0–2)
LYMPHOCYTES # BLD AUTO: 0.98 THOUSANDS/ÂΜL (ref 0.6–4.47)
LYMPHOCYTES NFR BLD AUTO: 15 % (ref 14–44)
MCH RBC QN AUTO: 29.1 PG (ref 26.8–34.3)
MCHC RBC AUTO-ENTMCNC: 33.9 G/DL (ref 31.4–37.4)
MCV RBC AUTO: 86 FL (ref 82–98)
MONOCYTES # BLD AUTO: 0.53 THOUSAND/ÂΜL (ref 0.17–1.22)
MONOCYTES NFR BLD AUTO: 8 % (ref 4–12)
NEUTROPHILS # BLD AUTO: 4.62 THOUSANDS/ÂΜL (ref 1.85–7.62)
NEUTS SEG NFR BLD AUTO: 73 % (ref 43–75)
NRBC BLD AUTO-RTO: 0 /100 WBCS
PLATELET # BLD AUTO: 211 THOUSANDS/UL (ref 149–390)
PMV BLD AUTO: 9.8 FL (ref 8.9–12.7)
POTASSIUM SERPL-SCNC: 3.6 MMOL/L (ref 3.5–5.3)
RBC # BLD AUTO: 4.22 MILLION/UL (ref 3.88–5.62)
SODIUM SERPL-SCNC: 137 MMOL/L (ref 135–147)
TSH SERPL DL<=0.05 MIU/L-ACNC: 3.29 UIU/ML (ref 0.45–4.5)
VIT B12 SERPL-MCNC: 341 PG/ML (ref 180–914)
WBC # BLD AUTO: 6.35 THOUSAND/UL (ref 4.31–10.16)

## 2023-11-11 PROCEDURE — 74176 CT ABD & PELVIS W/O CONTRAST: CPT

## 2023-11-11 PROCEDURE — G1004 CDSM NDSC: HCPCS

## 2023-11-11 PROCEDURE — 85025 COMPLETE CBC W/AUTO DIFF WBC: CPT | Performed by: INTERNAL MEDICINE

## 2023-11-11 PROCEDURE — 80048 BASIC METABOLIC PNL TOTAL CA: CPT | Performed by: INTERNAL MEDICINE

## 2023-11-11 PROCEDURE — 99222 1ST HOSP IP/OBS MODERATE 55: CPT | Performed by: INTERNAL MEDICINE

## 2023-11-11 RX ORDER — TAMSULOSIN HYDROCHLORIDE 0.4 MG/1
0.4 CAPSULE ORAL
Status: CANCELLED | OUTPATIENT
Start: 2023-11-11

## 2023-11-11 RX ORDER — ENOXAPARIN SODIUM 100 MG/ML
40 INJECTION SUBCUTANEOUS DAILY
Status: DISCONTINUED | OUTPATIENT
Start: 2023-11-11 | End: 2023-11-16 | Stop reason: HOSPADM

## 2023-11-11 RX ORDER — ACETAMINOPHEN 325 MG/1
975 TABLET ORAL EVERY 8 HOURS SCHEDULED
Status: DISCONTINUED | OUTPATIENT
Start: 2023-11-11 | End: 2023-11-13

## 2023-11-11 RX ORDER — QUETIAPINE FUMARATE 25 MG/1
25 TABLET, FILM COATED ORAL
Status: DISCONTINUED | OUTPATIENT
Start: 2023-11-11 | End: 2023-11-11

## 2023-11-11 RX ORDER — ONDANSETRON 2 MG/ML
4 INJECTION INTRAMUSCULAR; INTRAVENOUS EVERY 6 HOURS PRN
Status: DISCONTINUED | OUTPATIENT
Start: 2023-11-11 | End: 2023-11-16 | Stop reason: HOSPADM

## 2023-11-11 RX ORDER — OLANZAPINE 2.5 MG/1
5 TABLET, FILM COATED ORAL ONCE
Status: COMPLETED | OUTPATIENT
Start: 2023-11-11 | End: 2023-11-11

## 2023-11-11 RX ORDER — ACETAMINOPHEN 325 MG/1
650 TABLET ORAL EVERY 6 HOURS PRN
Status: DISCONTINUED | OUTPATIENT
Start: 2023-11-11 | End: 2023-11-16 | Stop reason: HOSPADM

## 2023-11-11 RX ORDER — OLANZAPINE 10 MG/2ML
5 INJECTION, POWDER, FOR SOLUTION INTRAMUSCULAR EVERY 6 HOURS PRN
Status: DISCONTINUED | OUTPATIENT
Start: 2023-11-11 | End: 2023-11-13

## 2023-11-11 RX ORDER — MAGNESIUM SULFATE HEPTAHYDRATE 40 MG/ML
2 INJECTION, SOLUTION INTRAVENOUS ONCE
Status: COMPLETED | OUTPATIENT
Start: 2023-11-11 | End: 2023-11-11

## 2023-11-11 RX ORDER — FINASTERIDE 5 MG/1
5 TABLET, FILM COATED ORAL DAILY
Status: DISCONTINUED | OUTPATIENT
Start: 2023-11-11 | End: 2023-11-16 | Stop reason: HOSPADM

## 2023-11-11 RX ORDER — QUETIAPINE FUMARATE 25 MG/1
25 TABLET, FILM COATED ORAL
Status: DISCONTINUED | OUTPATIENT
Start: 2023-11-11 | End: 2023-11-14

## 2023-11-11 RX ORDER — FINASTERIDE 5 MG/1
5 TABLET, FILM COATED ORAL DAILY
Status: CANCELLED | OUTPATIENT
Start: 2023-11-11

## 2023-11-11 RX ORDER — TAMSULOSIN HYDROCHLORIDE 0.4 MG/1
0.4 CAPSULE ORAL
Status: DISCONTINUED | OUTPATIENT
Start: 2023-11-11 | End: 2023-11-16 | Stop reason: HOSPADM

## 2023-11-11 RX ADMIN — ENOXAPARIN SODIUM 40 MG: 40 INJECTION SUBCUTANEOUS at 08:12

## 2023-11-11 RX ADMIN — OLANZAPINE 5 MG: 2.5 TABLET, FILM COATED ORAL at 12:39

## 2023-11-11 RX ADMIN — MAGNESIUM SULFATE HEPTAHYDRATE 2 G: 40 INJECTION, SOLUTION INTRAVENOUS at 03:38

## 2023-11-11 RX ADMIN — ACETAMINOPHEN 975 MG: 325 TABLET, FILM COATED ORAL at 12:38

## 2023-11-11 RX ADMIN — QUETIAPINE FUMARATE 25 MG: 25 TABLET ORAL at 19:41

## 2023-11-11 RX ADMIN — TAMSULOSIN HYDROCHLORIDE 0.4 MG: 0.4 CAPSULE ORAL at 16:14

## 2023-11-11 RX ADMIN — ACETAMINOPHEN 975 MG: 325 TABLET, FILM COATED ORAL at 21:35

## 2023-11-11 RX ADMIN — FINASTERIDE 5 MG: 5 TABLET, FILM COATED ORAL at 12:39

## 2023-11-11 NOTE — PLAN OF CARE
Problem: PAIN - ADULT  Goal: Verbalizes/displays adequate comfort level or baseline comfort level  Description: Interventions:  - Encourage patient to monitor pain and request assistance  - Assess pain using appropriate pain scale  - Administer analgesics based on type and severity of pain and evaluate response  - Implement non-pharmacological measures as appropriate and evaluate response  - Consider cultural and social influences on pain and pain management  - Notify physician/advanced practitioner if interventions unsuccessful or patient reports new pain  Outcome: Progressing     Problem: INFECTION - ADULT  Goal: Absence or prevention of progression during hospitalization  Description: INTERVENTIONS:  - Assess and monitor for signs and symptoms of infection  - Monitor lab/diagnostic results  - Monitor all insertion sites, i.e. indwelling lines, tubes, and drains  - Monitor endotracheal if appropriate and nasal secretions for changes in amount and color  - [place] appropriate cooling/warming therapies per order  - Administer medications as ordered  - Instruct and encourage patient and family to use good hand hygiene technique  - Identify and instruct in appropriate isolation precautions for identified infection/condition  Outcome: Progressing  Goal: Absence of fever/infection during neutropenic period  Description: INTERVENTIONS:  - Monitor WBC    Outcome: Progressing     Problem: SAFETY ADULT  Goal: Patient will remain free of falls  Description: INTERVENTIONS:  - Educate patient/family on patient safety including physical limitations  - Instruct patient to call for assistance with activity   - Consult OT/PT to assist with strengthening/mobility   - Keep Call bell within reach  - Keep bed low and locked with side rails adjusted as appropriate  - Keep care items and personal belongings within reach  - Initiate and maintain comfort rounds  - Make Fall Risk Sign visible to staff  - Offer Toileting every 2 Hours, in advance of need  - Initiate/Maintain bed alarm  - Obtain necessary fall risk management equipment:   - Apply yellow socks and bracelet for high fall risk patients  - Consider moving patient to room near nurses station  Outcome: Progressing  Goal: Maintain or return to baseline ADL function  Description: INTERVENTIONS:  -  Assess patient's ability to carry out ADLs; assess patient's baseline for ADL function and identify physical deficits which impact ability to perform ADLs (bathing, care of mouth/teeth, toileting, grooming, dressing, etc.)  - Assess/evaluate cause of self-care deficits   - Assess range of motion  - Assess patient's mobility; develop plan if impaired  - Assess patient's need for assistive devices and provide as appropriate  - Encourage maximum independence but intervene and supervise when necessary  - Involve family in performance of ADLs  - Assess for home care needs following discharge   - Consider OT consult to assist with ADL evaluation and planning for discharge  - Provide patient education as appropriate  Outcome: Progressing  Goal: Maintains/Returns to pre admission functional level  Description: INTERVENTIONS:  - Perform BMAT or MOVE assessment daily.   - Set and communicate daily mobility goal to care team and patient/family/caregiver. - Collaborate with rehabilitation services on mobility goals if consulted  - Perform Range of Motion 3 times a day. - Reposition patient every 2 hours.   - Dangle patient 3 times a day  - Stand patient 3 times a day  - Ambulate patient 3 times a day  - Out of bed to chair 3 times a day   - Out of bed for meals 3 times a day  - Out of bed for toileting  - Record patient progress and toleration of activity level   Outcome: Progressing     Problem: DISCHARGE PLANNING  Goal: Discharge to home or other facility with appropriate resources  Description: INTERVENTIONS:  - Identify barriers to discharge w/patient and caregiver  - Arrange for needed discharge resources and transportation as appropriate  - Identify discharge learning needs (meds, wound care, etc.)  - Arrange for interpretive services to assist at discharge as needed  - Refer to Case Management Department for coordinating discharge planning if the patient needs post-hospital services based on physician/advanced practitioner order or complex needs related to functional status, cognitive ability, or social support system  Outcome: Progressing     Problem: Knowledge Deficit  Goal: Patient/family/caregiver demonstrates understanding of disease process, treatment plan, medications, and discharge instructions  Description: Complete learning assessment and assess knowledge base.   Interventions:  - Provide teaching at level of understanding  - Provide teaching via preferred learning methods  Outcome: Progressing
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
## 2023-11-11 NOTE — ASSESSMENT & PLAN NOTE
Recent admission with RENATO secondary to urine retention s/p Choi catheter placement. Follows up with Dr. Gregory Queen outpatient. Recommended voiding trial on Monday. Given patient in the hospital if he is present till Monday then will do voiding trial inpatient.

## 2023-11-11 NOTE — H&P
ADMISSION NOTE   [address]       Name: Joyce Calhoun   Age & Sex: 81 y.o. male   MRN: [id number]  Unit/Bed#: -01   Encounter: [id number]  Primary Care Provider: Juan Cooper MD      * Metabolic encephalopathy  Assessment & Plan  Patient presented with worsening change in mental status. As per wife who is the primary historian reports patient has dementia however baseline is very cooperative, calm. Alert oriented x2-3. Symptoms started to get worse with change in mental status after recent surgery done on 11/2/2023 for inguinal hernia repair. Was recently admitted to the hospital with RENATO secondary to urine retention s/p Choi catheter placement and discharged home with Choi catheter in place. States patient was calm and cooperative however his mental status was not back to baseline on discharge from recent hospitalization. Presented again with delusion and hallucinations started on the day of presentation. Difficulty taking care of her at home by wife. No recent changes in medication. TSH WNL. Electrolytes WNL. CT head unremarkable. Currently unclear etiology. Vitals otherwise stable. Patient currently is awake, alert and oriented to self which is not his baseline. Stable etiology likely cystitis with change in mental status and hallucination requiring IV antibiotic. Fall precautions  Delirium precautions  Replace magnesium  Will start Seroquel 25 mg daily  Continue Rocephin  Geriatics consulted  Check TSH, vitamin B12, vitamin D  Ct abdomen was reviewed shows -Circumferential bladder wall thickening with mild surrounding fat stranding. The bladder wall thickening is attributable to outlet obstruction, given the severe prostamegaly, however the surrounding fat stranding could indicate a superimposed cystitis. Give IV antibiotic with Rocephin. Also will replace with vitamin B12.   Geriatrics input appreciated, recommended to continue Seroquel 25 mg at 7 PM, added gabapentin 100 mg twice a day, can increase gabapentin to every 8 hours as needed, and Zyprexa as needed for severe agitation. We will watch without virtual one-to-one, patient's wife wants him to be placed in SNF however does not need physical therapy needs. Cystitis  Assessment & Plan  Presented with change in mental status with cystitis and will give IV antibiotic with Rocephin. Urine retention  Assessment & Plan  Recent admission with RENATO secondary to urine retention s/p Choi catheter placement. Follows up with Dr. Jose Orellana outpatient. Plan of care was discussed in length with Dr. Jose Orellana and the wife at bedside who wanted a voiding trial done as inpatient however recommended to continue Choi catheter for now due to confusion and encephalopathy and recommend voiding trial at the SNF/rehab or as outpatient by Dr. Jose Orellana. BPH (benign prostatic hyperplasia)  Assessment & Plan  On Flomax and dutasteride chronic change to finasteride and Flomax while inpatient    H/O inguinal hernia repair  Assessment & Plan  S/p recent surgery on 11/2/2023    Constipation  Assessment & Plan  Stool softeners,    Insomnia  Assessment & Plan  We will add melatonin 3 mg nightly  Optimize pain regimen  If melatonin not effective consider mirtazapine 7.5 mg nightly  Avoid nighttime interruptions and caffeine use in the afternoon    Current moderate episode of major depressive disorder without prior episode (HCC)  Assessment & Plan  Consider Lexapro 5 mg daily  Provide supportive care  Encourage family and friends to visit    Cognitive impairment  Assessment & Plan  Patient needs assistance with some IADLs at baseline  Will continue to provide supportive care, reorient as needed. Patient is at high risk for delirium, will monitor closely and place on delirium precautions. Maintain sleep/wake cycle, add melatonin 3 mg nightly  Optimize pain regimen.   Monitor for constipation and urinary retention and manage as needed. Vitamin B12 and TSH within normal limits  Encourage family to visit. Encourage to wear glasses and hearing aids while awake. Encourage po intake, assist with feeding if needed. Out of bed with meals and continue physical therapy  I would recommend geriatric assessment as outpatient when patient is medically stable and in a familiar environment        VTE Prophylaxis: Enoxaparin (Lovenox)  / sequential compression device and foot pump applied   Code Status: Level 1 full code  POLST: Unknown  Discussion with family: Daughter-in-law and wife present at bedside. All questions/concerns were answered. They agree with the plan. Anticipated Length of Stay:  Patient will be admitted on an Inpatient basis with an anticipated length of stay of more than 2 midnights. Justification for Hospital Stay: Encephalopathy    Total Time for Visit, including Counseling / Coordination of Care: 45 minutes. Greater than 50% of this total time spent on direct patient counseling and coordination of care. Chief Complaint:   Change in mental status started 1 day prior to presentation having delusions, hallucination as per wife    History of Present Illness:    Freddy Packer is a 81 y.o. male with prior history of BPH with recent inguinal hernia repair done on 67/1/6225 complicated with urine retention requiring Choi catheter placement who presents with change in mental status noted on day of presentation. Presented with wife and daughter-in-law who are the primary historian. Patient is poor historian. Patient is alert oriented to self however awake and following commands well. Wife states patient was in good health prior to recent surgery however had change in mental status requiring recent admission and discharge but was not back to his baseline. Was relatively stable in mood however had worsening delusional hallucination on the day of presentation today. Denied any complaint.   No recent changes in medication. No alcoholism. Was seeing people who were not present as well as blaming wife for suspicious activity. Review of Systems:    Review of Systems   Constitutional:  Negative for activity change, appetite change, chills, diaphoresis, fatigue, fever and unexpected weight change. HENT:  Negative for rhinorrhea and sore throat. Eyes:  Negative for visual disturbance. Respiratory:  Negative for cough, chest tightness and shortness of breath. Cardiovascular:  Negative for chest pain, palpitations and leg swelling. Gastrointestinal:  Negative for abdominal distention, abdominal pain, blood in stool, constipation, diarrhea, nausea and vomiting. Genitourinary:  Negative for difficulty urinating. Musculoskeletal:  Negative for arthralgias. Neurological:  Negative for headaches. Psychiatric/Behavioral:  Negative for behavioral problems and sleep disturbance.         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[address])     Memory impairment     Mixed hyperlipidemia     Skin cancer (melanoma) ([address])     Uveal, Caroidal    Vision loss of left eye     Wears glasses        Past Surgical History:   Procedure Laterality Date    ABDOMINAL SURGERY      mesh removal    EGD  2019    EGD AND COLONOSCOPY  2017    EGD AND COLONOSCOPY  03/11/2022    HERNIA REPAIR Right     Inguinal/Umbilical    [place] [facility] RECRT INGUINAL HERNIA ANY AGE REDUCIBLE Right 11/2/2023    Procedure: OPEN REPAIR OF RECURRENT REDUCIBLE RIGHT INGUINAL  HERNIAS WITH MESH;  Surgeon: Zuleika Turcios MD;  Location:  MAIN OR;  Service: General    SKIN CANCER EXCISION  02/21/2019    R upper cheek, benign lesion including margins, face, ears, eyelids, nose, lips, mucous membrane, excised diameter 2.1 to 3.0 CM       Meds/Allergies:    Prior to Admission medications    Medication Sig Start Date End Date Taking? Authorizing Provider   dutasteride (AVODART) 0.5 mg capsule Take 0.5 mg by mouth daily   Yes Historical Provider, MD   tamsulosin (FLOMAX) 0.4 mg Take 1 capsule (0.4 mg total) by mouth daily with dinner 23 Yes Tanvir Sampson PA-C   VITAMIN D, CHOLECALCIFEROL, PO 3 caps po daily   Yes Historical Provider, MD   acetaminophen (TYLENOL) 325 mg tablet Take 2 tablets (650 mg total) by mouth every 6 (six) hours as needed for mild pain  Patient not taking: Reported on 11/10/2023 2/12/22   Chay Craft PA-C   Ascorbic Acid (vitamin C) 1000 MG tablet Take 1,000 mg by mouth daily  Patient not taking: Reported on 11/10/2023    Historical Provider, MD   b complex vitamins tablet Take 1 tablet by mouth daily  Patient not taking: Reported on 11/10/2023    Historical Provider, MD   ibuprofen (MOTRIN) 200 mg tablet Take by mouth if needed for mild pain  Patient not taking: Reported on 11/10/2023    Historical Provider, MD   oxyCODONE-acetaminophen (Percocet) 5-325 mg per tablet Take 1 tablet by mouth every 8 (eight) hours as needed for moderate pain for up to 12 doses Max Daily Amount: 3 tablets  Patient not taking: Reported on 11/10/2023 11/2/23   Nicci Uriarte PA-C     I have reviewed home medications with patient family member. Allergies:    Allergies   Allergen Reactions    Sulfa Antibiotics Other (See Comments)     Childhood- unknown       Social History:     Marital Status: /Civil Union   Substance Use History:   Social History     Substance and Sexual Activity   Alcohol Use Yes    Alcohol/week: 7.0 standard drinks of alcohol    Types: 7 Glasses of wine per week    Comment: 1 glass a night     Social History     Tobacco Use   Smoking Status Former    Packs/day: 1.00    Years: 9.00    Total pack years: 9.00    Types: Cigarettes    Quit date:     Years since quittin.9   Smokeless Tobacco Never   Tobacco Comments    QUIT IN 57     Social History     Substance and Sexual Activity   Drug Use Not Currently    Comment: DENIES       Family History:    Family History   Problem Relation Age of Onset    Alzheimer's disease Mother     No Known Problems Father         Physical Exam:     Vitals:   Blood Pressure: 108/66 (11/13/23 2349)  Pulse: 68 (11/13/23 2349)  Temperature: 97.5 °F (36.4 °C) (11/13/23 2349)  Temp Source: Tympanic (11/13/23 2349)  Respirations: 18 (11/13/23 2349)  Height: 5' 6" (167.6 cm) (11/11/23 0222)  Weight - Scale: 66.2 kg (145 lb 15.1 oz) (11/11/23 0222)  SpO2: 100 % (11/13/23 2349)    Physical Exam  Vitals reviewed. Constitutional:       General: He is not in acute distress. Appearance: Normal appearance. HENT:      Head: Normocephalic and atraumatic. Eyes:      General: No scleral icterus. Right eye: No discharge. Left eye: No discharge. Cardiovascular:      Rate and Rhythm: Normal rate and regular rhythm. Pulses: Normal pulses. Heart sounds: Normal heart sounds. Pulmonary:      Effort: Pulmonary effort is normal. No respiratory distress. Breath sounds: Normal breath sounds. No wheezing or rales. Chest:      Chest wall: No tenderness. Abdominal:      General: Abdomen is flat. Bowel sounds are normal. There is no distension. Palpations: Abdomen is soft. Tenderness: There is no abdominal tenderness. Musculoskeletal:         General: No deformity. Normal range of motion. Cervical back: Normal range of motion and neck supple. Right lower leg: No edema. Left lower leg: No edema. Skin:     General: Skin is warm. Coloration: Skin is not jaundiced or pale. Findings: No rash. Neurological:      General: No focal deficit present. Mental Status: He is alert and oriented to person, place, and time. Mental status is at baseline. Cranial Nerves: No cranial nerve deficit.       Motor: No weakness. Psychiatric:         Mood and Affect: Mood normal.         Behavior: Behavior normal.         Additional Data:     Lab Results: I have personally reviewed pertinent reports. Results from last 7 days   Lab Units 11/13/23  0531 11/11/23 0451   WBC Thousand/uL 7.17 6.35   HEMOGLOBIN g/dL 13.6 12.3   HEMATOCRIT % 40.3 36.3*   PLATELETS Thousands/uL 250 211   NEUTROS PCT %  --  73   LYMPHS PCT %  --  15   MONOS PCT %  --  8   EOS PCT %  --  3     Results from last 7 days   Lab Units 11/13/23  0531 11/11/23  0451 11/10/23  2304   SODIUM mmol/L 140   < > 137   POTASSIUM mmol/L 3.6   < > 3.5   CHLORIDE mmol/L 108   < > 105   CO2 mmol/L 24   < > 24   BUN mg/dL 15   < > 13   CREATININE mg/dL 0.99   < > 0.84   ANION GAP mmol/L 8   < > 8   CALCIUM mg/dL 8.4   < > 8.7   ALBUMIN g/dL  --   --  3.4*   TOTAL BILIRUBIN mg/dL  --   --  0.62   ALK PHOS U/L  --   --  53   ALT U/L  --   --  11   AST U/L  --   --  14   GLUCOSE RANDOM mg/dL 95   < > 101    < > = values in this interval not displayed. Imaging: I have personally reviewed pertinent reports. CT abdomen pelvis wo contrast   Final Result by Maribel Perla MD (11/11 1501)      Circumferential bladder wall thickening with mild surrounding fat stranding. The bladder wall thickening is attributable to outlet obstruction, given the severe prostamegaly, however the surrounding fat stranding could indicate a superimposed cystitis. The study was marked in [facility] for immediate notification. Workstation performed: [id number]         CT head without contrast   Final Result by Naveed Crabtree MD (11/11 0010)      No acute intracranial abnormality. Workstation performed: [id number]             EKG, Pathology, and Other Studies Reviewed on Admission:   EKG: Not available    Allscripts / Epic Records Reviewed: Yes     ** Please Note: This note has been constructed using a voice recognition system.  **

## 2023-11-11 NOTE — PLAN OF CARE
Problem: PAIN - ADULT  Goal: Verbalizes/displays adequate comfort level or baseline comfort level  Description: Interventions:  - Encourage patient to monitor pain and request assistance  - Assess pain using appropriate pain scale  - Administer analgesics based on type and severity of pain and evaluate response  - Implement non-pharmacological measures as appropriate and evaluate response  - Consider cultural and social influences on pain and pain management  - Notify physician/advanced practitioner if interventions unsuccessful or patient reports new pain  Outcome: Progressing     Problem: INFECTION - ADULT  Goal: Absence or prevention of progression during hospitalization  Description: INTERVENTIONS:  - Assess and monitor for signs and symptoms of infection  - Monitor lab/diagnostic results  - Monitor all insertion sites, i.e. indwelling lines, tubes, and drains  - Monitor endotracheal if appropriate and nasal secretions for changes in amount and color  - [place] appropriate cooling/warming therapies per order  - Administer medications as ordered  - Instruct and encourage patient and family to use good hand hygiene technique  - Identify and instruct in appropriate isolation precautions for identified infection/condition  Outcome: Progressing  Goal: Absence of fever/infection during neutropenic period  Description: INTERVENTIONS:  - Monitor WBC    Outcome: Progressing     Problem: SAFETY ADULT  Goal: Patient will remain free of falls  Description: INTERVENTIONS:  - Educate patient/family on patient safety including physical limitations  - Instruct patient to call for assistance with activity   - Consult OT/PT to assist with strengthening/mobility   - Keep Call bell within reach  - Keep bed low and locked with side rails adjusted as appropriate  - Keep care items and personal belongings within reach  - Initiate and maintain comfort rounds  - Make Fall Risk Sign visible to staff  - Apply yellow socks and bracelet for high fall risk patients  - Consider moving patient to room near nurses station  Outcome: Progressing  Goal: Maintain or return to baseline ADL function  Description: INTERVENTIONS:  -  Assess patient's ability to carry out ADLs; assess patient's baseline for ADL function and identify physical deficits which impact ability to perform ADLs (bathing, care of mouth/teeth, toileting, grooming, dressing, etc.)  - Assess/evaluate cause of self-care deficits   - Assess range of motion  - Assess patient's mobility; develop plan if impaired  - Assess patient's need for assistive devices and provide as appropriate  - Encourage maximum independence but intervene and supervise when necessary  - Involve family in performance of ADLs  - Assess for home care needs following discharge   - Consider OT consult to assist with ADL evaluation and planning for discharge  - Provide patient education as appropriate  Outcome: Progressing  Goal: Maintains/Returns to pre admission functional level  Description: INTERVENTIONS:  - Perform BMAT or MOVE assessment daily.   - Set and communicate daily mobility goal to care team and patient/family/caregiver.    - Collaborate with rehabilitation services on mobility goals if consulted  - Out of bed for toileting  - Record patient progress and toleration of activity level   Outcome: Progressing     Problem: DISCHARGE PLANNING  Goal: Discharge to home or other facility with appropriate resources  Description: INTERVENTIONS:  - Identify barriers to discharge w/patient and caregiver  - Arrange for needed discharge resources and transportation as appropriate  - Identify discharge learning needs (meds, wound care, etc.)  - Arrange for interpretive services to assist at discharge as needed  - Refer to Case Management Department for coordinating discharge planning if the patient needs post-hospital services based on physician/advanced practitioner order or complex needs related to functional status, cognitive ability, or social support system  Outcome: Progressing     Problem: Knowledge Deficit  Goal: Patient/family/caregiver demonstrates understanding of disease process, treatment plan, medications, and discharge instructions  Description: Complete learning assessment and assess knowledge base.   Interventions:  - Provide teaching at level of understanding  - Provide teaching via preferred learning methods  Outcome: Progressing

## 2023-11-11 NOTE — ASSESSMENT & PLAN NOTE
Patient presented with worsening change in mental status. As per wife who is the primary historian reports patient has dementia however baseline is very cooperative, calm. Alert oriented x2-3. Symptoms started to get worse with change in mental status after recent surgery done on 11/2/2023 for inguinal hernia repair. Was recently admitted to the hospital with RENATO secondary to urine retention s/p Choi catheter placement and discharged home with Choi catheter in place. States patient was calm and cooperative however his mental status was not back to baseline on discharge from recent hospitalization. Presented again with delusion and hallucinations started on the day of presentation. Difficulty taking care of her at home by wife. No recent changes in medication. Urinalysis unremarkable. TSH WNL. Electrolytes WNL. CT head unremarkable. Currently unclear etiology. Vitals otherwise stable. Patient currently is awake, alert and oriented to self which is not his baseline. Fall precautions  Delirium precautions  Replace magnesium  Will start Seroquel 25 mg daily  Will need MRI brain if no etiology identified  Geriatics consulted  Check TSH, vitamin B12, vitamin D  Ct abdomen was reviewed shows -Circumferential bladder wall thickening with mild surrounding fat stranding. The bladder wall thickening is attributable to outlet obstruction, given the severe prostamegaly, however the surrounding fat stranding could indicate a superimposed cystitis. Give IV antibiotic with Rocephin. Also will replace with vitamin B12.

## 2023-11-11 NOTE — CASE COMMUNICATION
[id number]. Cirilo Islas Patient called into office asking to cancel SN visit today as he was admitted back to hospital last night.

## 2023-11-12 PROBLEM — N30.90 CYSTITIS: Status: ACTIVE | Noted: 2023-11-12

## 2023-11-12 PROCEDURE — 99232 SBSQ HOSP IP/OBS MODERATE 35: CPT | Performed by: INTERNAL MEDICINE

## 2023-11-12 RX ORDER — CEFTRIAXONE 1 G/50ML
1000 INJECTION, SOLUTION INTRAVENOUS EVERY 24 HOURS
Status: DISCONTINUED | OUTPATIENT
Start: 2023-11-12 | End: 2023-11-16 | Stop reason: HOSPADM

## 2023-11-12 RX ADMIN — ACETAMINOPHEN 975 MG: 325 TABLET, FILM COATED ORAL at 05:25

## 2023-11-12 RX ADMIN — CYANOCOBALAMIN TAB 500 MCG 1000 MCG: 500 TAB at 08:31

## 2023-11-12 RX ADMIN — FINASTERIDE 5 MG: 5 TABLET, FILM COATED ORAL at 08:31

## 2023-11-12 RX ADMIN — TAMSULOSIN HYDROCHLORIDE 0.4 MG: 0.4 CAPSULE ORAL at 16:50

## 2023-11-12 RX ADMIN — CEFTRIAXONE 1000 MG: 1 INJECTION, SOLUTION INTRAVENOUS at 17:52

## 2023-11-12 RX ADMIN — QUETIAPINE FUMARATE 25 MG: 25 TABLET ORAL at 19:31

## 2023-11-12 RX ADMIN — ENOXAPARIN SODIUM 40 MG: 40 INJECTION SUBCUTANEOUS at 08:31

## 2023-11-12 NOTE — PLAN OF CARE
Problem: PAIN - ADULT  Goal: Verbalizes/displays adequate comfort level or baseline comfort level  Description: Interventions:  - Encourage patient to monitor pain and request assistance  - Assess pain using appropriate pain scale  - Administer analgesics based on type and severity of pain and evaluate response  - Implement non-pharmacological measures as appropriate and evaluate response  - Consider cultural and social influences on pain and pain management  - Notify physician/advanced practitioner if interventions unsuccessful or patient reports new pain  Outcome: Progressing     Problem: INFECTION - ADULT  Goal: Absence or prevention of progression during hospitalization  Description: INTERVENTIONS:  - Assess and monitor for signs and symptoms of infection  - Monitor lab/diagnostic results  - Monitor all insertion sites, i.e. indwelling lines, tubes, and drains  - Monitor endotracheal if appropriate and nasal secretions for changes in amount and color  - [place] appropriate cooling/warming therapies per order  - Administer medications as ordered  - Instruct and encourage patient and family to use good hand hygiene technique  - Identify and instruct in appropriate isolation precautions for identified infection/condition  Outcome: Progressing  Goal: Absence of fever/infection during neutropenic period  Description: INTERVENTIONS:  - Monitor WBC    Outcome: Progressing     Problem: SAFETY ADULT  Goal: Patient will remain free of falls  Description: INTERVENTIONS:  - Educate patient/family on patient safety including physical limitations  - Instruct patient to call for assistance with activity   - Consult OT/PT to assist with strengthening/mobility   - Keep Call bell within reach  - Keep bed low and locked with side rails adjusted as appropriate  - Keep care items and personal belongings within reach  - Initiate and maintain comfort rounds  - Make Fall Risk Sign visible to staff  - Apply yellow socks and bracelet for high fall risk patients  - Consider moving patient to room near nurses station  Outcome: Progressing  Goal: Maintain or return to baseline ADL function  Description: INTERVENTIONS:  -  Assess patient's ability to carry out ADLs; assess patient's baseline for ADL function and identify physical deficits which impact ability to perform ADLs (bathing, care of mouth/teeth, toileting, grooming, dressing, etc.)  - Assess/evaluate cause of self-care deficits   - Assess range of motion  - Assess patient's mobility; develop plan if impaired  - Assess patient's need for assistive devices and provide as appropriate  - Encourage maximum independence but intervene and supervise when necessary  - Involve family in performance of ADLs  - Assess for home care needs following discharge   - Consider OT consult to assist with ADL evaluation and planning for discharge  - Provide patient education as appropriate  Outcome: Progressing  Goal: Maintains/Returns to pre admission functional level  Description: INTERVENTIONS:  - Perform BMAT or MOVE assessment daily.   - Set and communicate daily mobility goal to care team and patient/family/caregiver.    - Collaborate with rehabilitation services on mobility goals if consulted  - Out of bed for toileting  - Record patient progress and toleration of activity level   Outcome: Progressing     Problem: DISCHARGE PLANNING  Goal: Discharge to home or other facility with appropriate resources  Description: INTERVENTIONS:  - Identify barriers to discharge w/patient and caregiver  - Arrange for needed discharge resources and transportation as appropriate  - Identify discharge learning needs (meds, wound care, etc.)  - Arrange for interpretive services to assist at discharge as needed  - Refer to Case Management Department for coordinating discharge planning if the patient needs post-hospital services based on physician/advanced practitioner order or complex needs related to functional status, cognitive ability, or social support system  Outcome: Progressing     Problem: Knowledge Deficit  Goal: Patient/family/caregiver demonstrates understanding of disease process, treatment plan, medications, and discharge instructions  Description: Complete learning assessment and assess knowledge base.   Interventions:  - Provide teaching at level of understanding  - Provide teaching via preferred learning methods  Outcome: Progressing     Problem: Prexisting or High Potential for Compromised Skin Integrity  Goal: Skin integrity is maintained or improved  Description: INTERVENTIONS:  - Identify patients at risk for skin breakdown  - Assess and monitor skin integrity  - Assess and monitor nutrition and hydration status  - Monitor labs   - Assess for incontinence   - Turn and reposition patient  - Assist with mobility/ambulation  - Relieve pressure over bony prominences  - Avoid friction and shearing  - Provide appropriate hygiene as needed including keeping skin clean and dry  - Evaluate need for skin moisturizer/barrier cream  - Collaborate with interdisciplinary team   - Patient/family teaching  - Consider wound care consult   Outcome: Progressing

## 2023-11-12 NOTE — PLAN OF CARE
Problem: SAFETY ADULT  Goal: Patient will remain free of falls  Description: INTERVENTIONS:  - Educate patient/family on patient safety including physical limitations  - Instruct patient to call for assistance with activity   - Consult OT/PT to assist with strengthening/mobility   - Keep Call bell within reach  - Keep bed low and locked with side rails adjusted as appropriate  - Keep care items and personal belongings within reach  - Initiate and maintain comfort rounds  - Make Fall Risk Sign visible to staff  - Offer Toileting every 2 Hours, in advance of need  - Initiate/Maintain bed alarm  - Obtain necessary fall risk management equipment:   - Apply yellow socks and bracelet for high fall risk patients  - Consider moving patient to room near nurses station  Outcome: Progressing        Problem: SAFETY ADULT  Goal: Maintain or return to baseline ADL function  Description: INTERVENTIONS:  -  Assess patient's ability to carry out ADLs; assess patient's baseline for ADL function and identify physical deficits which impact ability to perform ADLs (bathing, care of mouth/teeth, toileting, grooming, dressing, etc.)  - Assess/evaluate cause of self-care deficits   - Assess range of motion  - Assess patient's mobility; develop plan if impaired  - Assess patient's need for assistive devices and provide as appropriate  - Encourage maximum independence but intervene and supervise when necessary  - Involve family in performance of ADLs  - Assess for home care needs following discharge   - Consider OT consult to assist with ADL evaluation and planning for discharge  - Provide patient education as appropriate  Outcome: Progressing        Problem: INFECTION - ADULT  Goal: Absence or prevention of progression during hospitalization  Description: INTERVENTIONS:  - Assess and monitor for signs and symptoms of infection  - Monitor lab/diagnostic results  - Monitor all insertion sites, i.e. indwelling lines, tubes, and drains  - Monitor endotracheal if appropriate and nasal secretions for changes in amount and color  - [place] appropriate cooling/warming therapies per order  - Administer medications as ordered  - Instruct and encourage patient and family to use good hand hygiene technique  - Identify and instruct in appropriate isolation precautions for identified infection/condition  Outcome: Progressing  Goal: Absence of fever/infection during neutropenic period  Description: INTERVENTIONS:  - Monitor WBC    Outcome: Progressing        Problem: NEUROSENSORY - ADULT  Goal: Achieves stable or improved neurological status  Description: INTERVENTIONS  - Monitor and report changes in neurological status  - Monitor vital signs such as temperature, blood pressure, glucose, and any other labs ordered   - Initiate measures to prevent increased intracranial pressure  - Monitor for seizure activity and implement precautions if appropriate      Outcome: Progressing  Goal: Achieves maximal functionality and self care  Description: INTERVENTIONS  - Monitor swallowing and airway patency with patient fatigue and changes in neurological status  - Encourage and assist patient to increase activity and self care. - Encourage visually impaired, hearing impaired and aphasic patients to use assistive/communication devices  Outcome: Progressing     Problem: NEUROSENSORY - ADULT  Goal: Achieves maximal functionality and self care  Description: INTERVENTIONS  - Monitor swallowing and airway patency with patient fatigue and changes in neurological status  - Encourage and assist patient to increase activity and self care.    - Encourage visually impaired, hearing impaired and aphasic patients to use assistive/communication devices  Outcome: Progressing

## 2023-11-12 NOTE — PROGRESS NOTES
[facility]  Progress Note  Name: Amy Reagan  MRN: [id number]  Unit/Bed#: -01 I Date of Admission: 11/10/2023   Date of Service: 11/12/2023 I Hospital Day: 1    Assessment/Plan   Cystitis  Assessment & Plan  Presented with change in mental status with cystitis and will give IV antibiotic with Rocephin. Urine retention  Assessment & Plan  Recent admission with RENATO secondary to urine retention s/p Choi catheter placement. Follows up with Dr. Deepthi Corral outpatient. Recommended voiding trial on Monday. Given patient in the hospital if he is present till Monday then will do voiding trial inpatient. BPH (benign prostatic hyperplasia)  Assessment & Plan  On Flomax and dutasteride chronic change to finasteride and Flomax while inpatient    H/O inguinal hernia repair  Assessment & Plan  S/p recent surgery on 11/2/2023    * Encephalopathy  Assessment & Plan  Patient presented with worsening change in mental status. As per wife who is the primary historian reports patient has dementia however baseline is very cooperative, calm. Alert oriented x2-3. Symptoms started to get worse with change in mental status after recent surgery done on 11/2/2023 for inguinal hernia repair. Was recently admitted to the hospital with RENATO secondary to urine retention s/p Choi catheter placement and discharged home with Choi catheter in place. States patient was calm and cooperative however his mental status was not back to baseline on discharge from recent hospitalization. Presented again with delusion and hallucinations started on the day of presentation. Difficulty taking care of her at home by wife. No recent changes in medication. Urinalysis unremarkable. TSH WNL. Electrolytes WNL. CT head unremarkable. Currently unclear etiology. Vitals otherwise stable. Patient currently is awake, alert and oriented to self which is not his baseline.     Fall precautions  Delirium precautions  Replace magnesium  Will start Seroquel 25 mg daily  Will need MRI brain if no etiology identified  Geriatics consulted  Check TSH, vitamin B12, vitamin D  Ct abdomen was reviewed shows -Circumferential bladder wall thickening with mild surrounding fat stranding. The bladder wall thickening is attributable to outlet obstruction, given the severe prostamegaly, however the surrounding fat stranding could indicate a superimposed cystitis. Give IV antibiotic with Rocephin. Also will replace with vitamin B12. VTE Pharmacologic Prophylaxis:   Moderate Risk (Score 3-4) - Pharmacological DVT Prophylaxis Ordered: enoxaparin (Lovenox). Patient Centered Rounds: I performed bedside rounds with nursing staff today. Discussions with Specialists or Other Care Team Provider: d/w staff    Education and Discussions with Family / Patient: Updated  (wife) via phone. Total Time Spent on Date of Encounter in care of patient: 45 mins. This time was spent on one or more of the following: performing physical exam; counseling and coordination of care; obtaining or reviewing history; documenting in the medical record; reviewing/ordering tests, medications or procedures; communicating with other healthcare professionals and discussing with patient's family/caregivers. Current Length of Stay: 1 day(s)  Current Patient Status: Inpatient   Certification Statement: The patient will continue to require additional inpatient hospital stay due to cystitis , encephalopathy   Discharge Plan: Anticipate discharge in 48 hrs to rehab facility. Code Status: Level 1 - Full Code    Subjective:   Patient is confused thinks that he is in [place], patient has no acute overnight events noted. Denies of abdominal pain nausea or vomiting.     Objective:     Vitals:   Temp (24hrs), Av.8 °F (36.6 °C), Min:97.4 °F (36.3 °C), Max:98.1 °F (36.7 °C)    Temp:  [97.4 °F (36.3 °C)-98.1 °F (36.7 °C)] 98 °F (36.7 °C)  HR:  [60-85] 60  Resp:  [16-18] 16  BP: (117-136)/(52-68) 136/52  SpO2:  [95 %-98 %] 98 %  Body mass index is 23.56 kg/m². Input and Output Summary (last 24 hours): Intake/Output Summary (Last 24 hours) at 11/12/2023 1658  Last data filed at 11/12/2023 1601  Gross per 24 hour   Intake 1079 ml   Output 1470 ml   Net -391 ml       Physical Exam:   Physical Exam   HEENT-PERRLA, moist oral mucosa  Neck-supple, no JVD elevation   Respiratory-equal air entry bilaterally, no rales or rhonchi  Cardiovascular system-S1, S2 heard, no murmur or gallops or rubs  Abdomen-soft, nontender, no guarding or rigidity, bowel sounds heard  Extremities-no pedal edema  Peripheral pulses palpable  Musculoskeletal-no contractures  Central nervous system-no acute focal neurological deficit ,no sensory or motor deficit noted. Skin-no rash noted, right groin surgical site is clean and bruising noted. No active bleeding or discharge noted. Patient is awake alert however confused thinks that he is in [facility] [place], denies of any abdominal pain or nausea or vomiting. Patient anxious earlier this morning.     Additional Data:     Labs:  Results from last 7 days   Lab Units 11/11/23  0451   WBC Thousand/uL 6.35   HEMOGLOBIN g/dL 12.3   HEMATOCRIT % 36.3*   PLATELETS Thousands/uL 211   NEUTROS PCT % 73   LYMPHS PCT % 15   MONOS PCT % 8   EOS PCT % 3     Results from last 7 days   Lab Units 11/11/23  0451 11/10/23  2304   SODIUM mmol/L 137 137   POTASSIUM mmol/L 3.6 3.5   CHLORIDE mmol/L 106 105   CO2 mmol/L 24 24   BUN mg/dL 11 13   CREATININE mg/dL 0.76 0.84   ANION GAP mmol/L 7 8   CALCIUM mg/dL 8.3* 8.7   ALBUMIN g/dL  --  3.4*   TOTAL BILIRUBIN mg/dL  --  0.62   ALK PHOS U/L  --  53   ALT U/L  --  11   AST U/L  --  14   GLUCOSE RANDOM mg/dL 106 101                       Lines/Drains:  Invasive Devices       Peripheral Intravenous Line  Duration             Peripheral IV 11/10/23 Left;Ventral (anterior) Forearm 1 day Drain  Duration             Urethral Catheter 16 Fr. 6 days                  Urinary Catheter:  Goal for removal: N/A- Discharging with Choi               Imaging: Personally reviewed the following imaging: abdominal/pelvic CT    Recent Cultures (last 7 days):         Last 24 Hours Medication List:   Current Facility-Administered Medications   Medication Dose Route Frequency Provider Last Rate    acetaminophen  650 mg Oral Q6H PRN Moris Aceves MD      acetaminophen  975 mg Oral [facility] Ana Brennan MD      cefTRIAXone  1,000 mg Intravenous Q24H Ana Brennan MD      cyanocobalamin  1,000 mcg Oral Daily Ana Brennan MD      enoxaparin  40 mg Subcutaneous Daily Moris Aceves MD      finasteride  5 mg Oral Daily Ana Brennan MD      OLANZapine  5 mg Intramuscular Q6H PRN Moris Aceves MD      ondansetron  4 mg Intravenous Q6H PRN Moris Aceves MD      QUEtiapine  25 mg Oral HS Cameron Villarreal MD      tamsulosin  0.4 mg Oral Daily With Jennifer Hall MD          Today, Patient Was Seen By: Cameron Villarreal MD    **Please Note: This note may have been constructed using a voice recognition system. **

## 2023-11-13 LAB
ANION GAP SERPL CALCULATED.3IONS-SCNC: 8 MMOL/L
BUN SERPL-MCNC: 15 MG/DL (ref 5–25)
CALCIUM SERPL-MCNC: 8.4 MG/DL (ref 8.4–10.2)
CHLORIDE SERPL-SCNC: 108 MMOL/L (ref 96–108)
CO2 SERPL-SCNC: 24 MMOL/L (ref 21–32)
CREAT SERPL-MCNC: 0.99 MG/DL (ref 0.6–1.3)
ERYTHROCYTE [DISTWIDTH] IN BLOOD BY AUTOMATED COUNT: 12.8 % (ref 11.6–15.1)
GFR SERPL CREATININE-BSD FRML MDRD: 70 ML/MIN/1.73SQ M
GLUCOSE SERPL-MCNC: 95 MG/DL (ref 65–140)
HCT VFR BLD AUTO: 40.3 % (ref 36.5–49.3)
HGB BLD-MCNC: 13.6 G/DL (ref 12–17)
MCH RBC QN AUTO: 30 PG (ref 26.8–34.3)
MCHC RBC AUTO-ENTMCNC: 33.7 G/DL (ref 31.4–37.4)
MCV RBC AUTO: 89 FL (ref 82–98)
PLATELET # BLD AUTO: 250 THOUSANDS/UL (ref 149–390)
PMV BLD AUTO: 10.1 FL (ref 8.9–12.7)
POTASSIUM SERPL-SCNC: 3.6 MMOL/L (ref 3.5–5.3)
RBC # BLD AUTO: 4.54 MILLION/UL (ref 3.88–5.62)
SODIUM SERPL-SCNC: 140 MMOL/L (ref 135–147)
WBC # BLD AUTO: 7.17 THOUSAND/UL (ref 4.31–10.16)

## 2023-11-13 PROCEDURE — 99232 SBSQ HOSP IP/OBS MODERATE 35: CPT | Performed by: INTERNAL MEDICINE

## 2023-11-13 PROCEDURE — 85027 COMPLETE CBC AUTOMATED: CPT | Performed by: INTERNAL MEDICINE

## 2023-11-13 PROCEDURE — 97167 OT EVAL HIGH COMPLEX 60 MIN: CPT

## 2023-11-13 PROCEDURE — 97130 THER IVNTJ EA ADDL 15 MIN: CPT

## 2023-11-13 PROCEDURE — 80048 BASIC METABOLIC PNL TOTAL CA: CPT | Performed by: INTERNAL MEDICINE

## 2023-11-13 PROCEDURE — 99222 1ST HOSP IP/OBS MODERATE 55: CPT | Performed by: FAMILY MEDICINE

## 2023-11-13 PROCEDURE — 97162 PT EVAL MOD COMPLEX 30 MIN: CPT

## 2023-11-13 PROCEDURE — 97129 THER IVNTJ 1ST 15 MIN: CPT

## 2023-11-13 PROCEDURE — 99223 1ST HOSP IP/OBS HIGH 75: CPT | Performed by: UROLOGY

## 2023-11-13 RX ORDER — GABAPENTIN 100 MG/1
100 CAPSULE ORAL 2 TIMES DAILY
Status: DISCONTINUED | OUTPATIENT
Start: 2023-11-13 | End: 2023-11-14

## 2023-11-13 RX ORDER — OLANZAPINE 10 MG/2ML
2.5 INJECTION, POWDER, FOR SOLUTION INTRAMUSCULAR
Status: DISCONTINUED | OUTPATIENT
Start: 2023-11-13 | End: 2023-11-16 | Stop reason: HOSPADM

## 2023-11-13 RX ORDER — ESCITALOPRAM OXALATE 10 MG/1
5 TABLET ORAL DAILY
Status: DISCONTINUED | OUTPATIENT
Start: 2023-11-14 | End: 2023-11-16 | Stop reason: HOSPADM

## 2023-11-13 RX ORDER — OLANZAPINE 10 MG/2ML
2.5 INJECTION, POWDER, FOR SOLUTION INTRAMUSCULAR EVERY 6 HOURS PRN
Status: DISCONTINUED | OUTPATIENT
Start: 2023-11-13 | End: 2023-11-13

## 2023-11-13 RX ORDER — DOCUSATE SODIUM 100 MG/1
100 CAPSULE, LIQUID FILLED ORAL 2 TIMES DAILY
Status: DISCONTINUED | OUTPATIENT
Start: 2023-11-13 | End: 2023-11-16 | Stop reason: HOSPADM

## 2023-11-13 RX ORDER — ACETAMINOPHEN 325 MG/1
975 TABLET ORAL 3 TIMES DAILY
Status: DISCONTINUED | OUTPATIENT
Start: 2023-11-13 | End: 2023-11-16 | Stop reason: HOSPADM

## 2023-11-13 RX ORDER — SENNOSIDES 8.6 MG
2 TABLET ORAL
Status: DISCONTINUED | OUTPATIENT
Start: 2023-11-13 | End: 2023-11-14

## 2023-11-13 RX ORDER — POLYETHYLENE GLYCOL 3350 17 G/17G
17 POWDER, FOR SOLUTION ORAL DAILY
Status: DISCONTINUED | OUTPATIENT
Start: 2023-11-13 | End: 2023-11-16 | Stop reason: HOSPADM

## 2023-11-13 RX ORDER — LANOLIN ALCOHOL/MO/W.PET/CERES
3 CREAM (GRAM) TOPICAL
Status: DISCONTINUED | OUTPATIENT
Start: 2023-11-13 | End: 2023-11-16 | Stop reason: HOSPADM

## 2023-11-13 RX ORDER — WATER 10 ML/10ML
INJECTION INTRAMUSCULAR; INTRAVENOUS; SUBCUTANEOUS
Status: COMPLETED
Start: 2023-11-13 | End: 2023-11-13

## 2023-11-13 RX ADMIN — Medication 3 MG: at 18:07

## 2023-11-13 RX ADMIN — CYANOCOBALAMIN TAB 500 MCG 1000 MCG: 500 TAB at 08:52

## 2023-11-13 RX ADMIN — DOCUSATE SODIUM 100 MG: 100 CAPSULE, LIQUID FILLED ORAL at 18:07

## 2023-11-13 RX ADMIN — CEFTRIAXONE 1000 MG: 1 INJECTION, SOLUTION INTRAVENOUS at 17:19

## 2023-11-13 RX ADMIN — TAMSULOSIN HYDROCHLORIDE 0.4 MG: 0.4 CAPSULE ORAL at 17:20

## 2023-11-13 RX ADMIN — ACETAMINOPHEN 975 MG: 325 TABLET, FILM COATED ORAL at 22:12

## 2023-11-13 RX ADMIN — ENOXAPARIN SODIUM 40 MG: 40 INJECTION SUBCUTANEOUS at 08:52

## 2023-11-13 RX ADMIN — FINASTERIDE 5 MG: 5 TABLET, FILM COATED ORAL at 08:52

## 2023-11-13 RX ADMIN — WATER: 1 INJECTION INTRAMUSCULAR; INTRAVENOUS; SUBCUTANEOUS at 22:18

## 2023-11-13 RX ADMIN — DOCUSATE SODIUM 100 MG: 100 CAPSULE, LIQUID FILLED ORAL at 10:53

## 2023-11-13 RX ADMIN — QUETIAPINE FUMARATE 25 MG: 25 TABLET ORAL at 18:07

## 2023-11-13 RX ADMIN — GABAPENTIN 100 MG: 100 CAPSULE ORAL at 14:56

## 2023-11-13 RX ADMIN — POLYETHYLENE GLYCOL 3350 17 G: 17 POWDER, FOR SOLUTION ORAL at 10:53

## 2023-11-13 RX ADMIN — SENNOSIDES 17.2 MG: 8.6 TABLET, FILM COATED ORAL at 22:14

## 2023-11-13 RX ADMIN — OLANZAPINE 2.5 MG: 10 INJECTION, POWDER, FOR SOLUTION INTRAMUSCULAR at 21:29

## 2023-11-13 NOTE — ASSESSMENT & PLAN NOTE
Patient needs assistance with some IADLs at baseline  Will continue to provide supportive care, reorient as needed. Patient is at high risk for delirium, will monitor closely and place on delirium precautions. Maintain sleep/wake cycle, add melatonin 3 mg nightly  Optimize pain regimen. Monitor for constipation and urinary retention and manage as needed. Vitamin B12 and TSH within normal limits  Encourage family to visit. Encourage to wear glasses and hearing aids while awake. Encourage po intake, assist with feeding if needed.    Out of bed with meals and continue physical therapy  I would recommend geriatric assessment as outpatient when patient is medically stable and in a familiar environment

## 2023-11-13 NOTE — CONSULTS
Consultation - Geriatric Medicine   Elias Del Cid 81 y.o. male MRN: [id number]  Unit/Bed#: -01 Encounter: [id number]      Assessment/Plan     Urine retention  Assessment & Plan  Patient seen by urology  Currently Choi in place  Continue tamsulosin and finasteride  Monitor closely  Continue ceftriaxone for UTI    BPH (benign prostatic hyperplasia)  Assessment & Plan  Continue tamsulosin and finasteride  Urology follows    Constipation  Assessment & Plan  Add senna 2 tablets nightly  Continue Colace and MiraLAX  Encourage p.o. hydration and out of bed as tolerated  Offer prune juice    Insomnia  Assessment & Plan  We will add melatonin 3 mg nightly  Optimize pain regimen  If melatonin not effective consider mirtazapine 7.5 mg nightly  Avoid nighttime interruptions and caffeine use in the afternoon    Current moderate episode of major depressive disorder without prior episode (HCC)  Assessment & Plan  Consider Lexapro 5 mg daily  Provide supportive care  Encourage family and friends to visit    Cognitive impairment  Assessment & Plan  Patient needs assistance with some IADLs at baseline  Will continue to provide supportive care, reorient as needed. Patient is at high risk for delirium, will monitor closely and place on delirium precautions. Maintain sleep/wake cycle, add melatonin 3 mg nightly  Optimize pain regimen. Monitor for constipation and urinary retention and manage as needed. Vitamin B12 and TSH within normal limits  Encourage family to visit. Encourage to wear glasses and hearing aids while awake. Encourage po intake, assist with feeding if needed.    Out of bed with meals and continue physical therapy  I would recommend geriatric assessment as outpatient when patient is medically stable and in a familiar environment    * Encephalopathy  Assessment & Plan    -Patient is high risk of delirium due to cognitive impairment at baseline, change in environment, insomnia, cystitis  -Initiate delirium precautions  -maintain normal sleep/wake cycle, add melatonin 3 mg nightly  -minimize overnight interruptions, group overnight vitals/labs/nursing checks as possible  -dim lights, close blinds and turn off tv to minimize stimulation and encourage sleep environment in evenings  -ensure that pain is well controlled continue Tylenol 975mg 3x daily scheduled   -monitor for fecal and urinary retention which may precipitate delirium, add senna to his regimen  -encourage early mobilization and ambulation  -provide frequent reorientation and redirection  -encourage family and friends at the bedside to help calm patient if anxious  -avoid medications which may precipitate or worsen delirium such as tramadol, benzodiazepine, anticholinergics, and antihistaminics  -encourage hydration and nutrition , assist with feeding if needed  -redirect unwanted behaviors as first line, avoid physical restraints.  -Continue Seroquel 25 mg at 7 PM  -Add gabapentin 100 mg twice daily second dose at 7 PM  -May use gabapentin 100 mg every 8 hours as needed for agitation if patient tolerates p.o. intake  -Use Zyprexa 2.5 mg every 8 hours as needed only for severe agitation and if patient not tolerating p.o. intake                History of Present Illness   Physician Requesting Consult: Sal Roper MD  Reason for Consult / Principal Problem: Acute encephalopathy  Hx and PE limited by: change in mental status  Additional history obtained from: Wife Kurtis Melgoza      HPI: Renny Yates is a 81y.o. year old male who presented to the hospital with change in mental status. He was found to have UTI and started on IV antibiotics. During his hospital stay he continues to be confused. At the time of encounter he denies chest pain shortness of breath cough. No fever or chills. No abdominal pain nausea vomiting. States that his appetite is fair. Reports insomnia.   Became tearful intermittently at the time of encounter and stated multiple times that he feels embarrassed. Discussed with wife at bedside, she reports concerns for memory issues at baseline. Patient needs assistance with some IADLs. She noted acute change in mental status status post hernia repair couple of weeks ago. Patient lives at home with his wife, continues to drive, ambulates independently and no recent falls reported. Inpatient consult to Gerontology  Consult performed by: Butch Farias MD  Consult ordered by: Lindsay Levy MD      Inpatient consult to Gerontology  Consult performed by: Butch Farias MD  Consult ordered by: Rosario Sim MD          Review of Systems   Constitutional:  Positive for appetite change. Negative for chills, fatigue and fever. HENT:  Negative for congestion, rhinorrhea and sore throat. Respiratory:  Negative for cough, shortness of breath and wheezing. Cardiovascular:  Negative for chest pain. Gastrointestinal:  Negative for abdominal pain, constipation and nausea. Genitourinary:  Negative for dysuria and hematuria. Musculoskeletal:  Negative for gait problem. Skin:  Negative for rash and wound. Allergic/Immunologic: Negative for environmental allergies. Neurological:  Negative for dizziness and syncope. Hematological:  Does not bruise/bleed easily. Psychiatric/Behavioral:  Positive for dysphoric mood and sleep disturbance. Negative for behavioral problems.             Historical Information   Past Medical History:   Diagnosis Date    Anxiety     BPH (benign prostatic hyperplasia)     Colon polyp     Disorder of eye     disorder of choroid of eye    Diverticulitis of rectum     Infected hernioplasty mesh (HCC)     Malignant melanoma of eye, left ([address])     Memory impairment     Mixed hyperlipidemia     Skin cancer (melanoma) ([address])     Uveal, Caroidal    Vision loss of left eye     Wears glasses      Past Surgical History:   Procedure Laterality Date    ABDOMINAL SURGERY      mesh removal    EGD  2019 EGD AND COLONOSCOPY      EGD AND COLONOSCOPY  2022    HERNIA REPAIR Right     Inguinal/Umbilical    RI RPR RECRT INGUINAL HERNIA ANY AGE REDUCIBLE Right 2023    Procedure: OPEN REPAIR OF RECURRENT REDUCIBLE RIGHT INGUINAL  HERNIAS WITH MESH;  Surgeon: Linn Joshua MD;  Location: [facility];  Service: General    SKIN CANCER EXCISION  2019    R upper cheek, benign lesion including margins, face, ears, eyelids, nose, lips, mucous membrane, excised diameter 2.1 to 3.0 CM     Social History   Social History     Substance and Sexual Activity   Alcohol Use Yes    Alcohol/week: 7.0 standard drinks of alcohol    Types: 7 Glasses of wine per week    Comment: 1 glass a night     Social History     Substance and Sexual Activity   Drug Use Not Currently    Comment: DENIES     Social History     Tobacco Use   Smoking Status Former    Packs/day: 1.00    Years: 9.00    Total pack years: 9.00    Types: Cigarettes    Quit date:     Years since quittin.9   Smokeless Tobacco Never   Tobacco Comments    QUIT IN      Family History:   Family History   Problem Relation Age of Onset    Alzheimer's disease Mother     No Known Problems Father        Meds/Allergies   current meds:   Current Facility-Administered Medications   Medication Dose Route Frequency    acetaminophen (TYLENOL) tablet 650 mg  650 mg Oral Q6H PRN    acetaminophen (TYLENOL) tablet 975 mg  975 mg Oral TID    cefTRIAXone (ROCEPHIN) IVPB (premix in dextrose) 1,000 mg 50 mL  1,000 mg Intravenous Q24H    cyanocobalamin (VITAMIN B-12) tablet 1,000 mcg  1,000 mcg Oral Daily    docusate sodium (COLACE) capsule 100 mg  100 mg Oral BID    enoxaparin (LOVENOX) subcutaneous injection 40 mg  40 mg Subcutaneous Daily    [START ON 2023] escitalopram (LEXAPRO) tablet 5 mg  5 mg Oral Daily    finasteride (PROSCAR) tablet 5 mg  5 mg Oral Daily    gabapentin (NEURONTIN) capsule 100 mg  100 mg Oral BID    melatonin tablet 3 mg  3 mg Oral HS OLANZapine (ZyPREXA) IM injection 2.5 mg  2.5 mg Intramuscular Q6H PRN    ondansetron (ZOFRAN) injection 4 mg  4 mg Intravenous Q6H PRN    polyethylene glycol (MIRALAX) packet 17 g  17 g Oral Daily    QUEtiapine (SEROquel) tablet 25 mg  25 mg Oral HS    senna (SENOKOT) tablet 17.2 mg  2 tablet Oral HS    tamsulosin (FLOMAX) capsule 0.4 mg  0.4 mg Oral Daily With Dinner     Home medications  Tamsulosin 0.4 mg daily  Dutasteride 0.5 mg daily  Vitamin D 2000 units daily  Vitamin C 1000 mg daily  Vitamin B complex 1 capsule daily  Tylenol as needed for pain    Allergies   Allergen Reactions    Sulfa Antibiotics Other (See Comments)     Childhood- unknown       Objective     Intake/Output Summary (Last 24 hours) at 11/13/2023 1535  Last data filed at 11/13/2023 0405  Gross per 24 hour   Intake 278 ml   Output 1250 ml   Net -972 ml     Invasive Devices       Peripheral Intravenous Line  Duration             Peripheral IV 11/10/23 Left;Ventral (anterior) Forearm 2 days              Drain  Duration             Urethral Catheter 16 Fr. 6 days                    Physical Exam  Vitals and nursing note reviewed. Constitutional:       General: He is not in acute distress. Appearance: He is well-developed. HENT:      Head: Normocephalic and atraumatic. Mouth/Throat:      Mouth: Mucous membranes are moist.   Eyes:      Conjunctiva/sclera: Conjunctivae normal.   Cardiovascular:      Rate and Rhythm: Normal rate and regular rhythm. Heart sounds: No murmur heard. Pulmonary:      Effort: Pulmonary effort is normal. No respiratory distress. Breath sounds: Normal breath sounds. Abdominal:      Palpations: Abdomen is soft. Tenderness: There is no abdominal tenderness. Genitourinary:     Comments: Choi cath  Musculoskeletal:         General: No swelling. Cervical back: Neck supple. Right lower leg: No edema. Left lower leg: No edema. Skin:     General: Skin is warm and dry.       Capillary Refill: Capillary refill takes less than 2 seconds. Neurological:      Mental Status: He is alert. He is disoriented. Comments: AAOx1   Psychiatric:         Mood and Affect: Mood normal.         Lab Results:   I have personally reviewed pertinent lab results including the following:  - CBC CMP, B12, TSH, vitamin D    I have personally reviewed the following imaging study reports in PACS:  -CT abdomen and pelvis, CT head      Therapies:   PT: no postacute rehab needs    VTE Prophylaxis: Enoxaparin (Lovenox)    Code Status: Level 1 - Full Code  Advance Directive and Living Will:      Power of :    POLST:      Family and Social Support:   Living Arrangements: Lives w/ Spouse/significant other  Support Systems: Spouse/significant other  Type of Current Residence: 2 story home  Type of Current Home Care Services: Nurse visit          Thank you for allowing me to participate in your patients' care. Please do not hesitate to call with any additional questions.   Emely Koenig MD

## 2023-11-13 NOTE — PLAN OF CARE
Problem: PHYSICAL THERAPY ADULT  Goal: Performs mobility at highest level of function for planned discharge setting. See evaluation for individualized goals. Description:            See flowsheet documentation for full assessment, interventions and recommendations. Outcome: Adequate for Discharge  Note:    Problem List: Impaired judgement, Decreased cognition  Assessment: Orders for PT eval and treat received. Pt exhibits physical deficits noted in problem list above. Deficits listed contribute to functional limitations that are significant from the patient PLOF and include: safety/judgement awareness that would put pt at risk for falling or other injury. During today's session,assessed pt's strength, ROM, coordination, endurance, balance, ability to transfer, and ambulate in hallways with balance challenges. The AM-PAC & Barthel Index outcome tools were used to assist in determining pt safety w/ mobility/self care & appropriate d/c recommendations, see above for scores. Patient's clinical presentation is evolving due to altered mental status. Considering the patient's PLOF, co-morbidities, acute functional limitations, functional outcome measures pt appears close to or at his baseline functionality. Despite pt's cognitive deficits he does seem to make safe decisions and is not impulsive. At times, the patient has difficulty understanding or sequencing tasks. Pt does not seem to have acute PT needs at this time. Will discharge PT order. Barriers to Discharge: Decreased caregiver support     Rehab Resource Intensity Level, PT: No post-acute rehabilitation needs    See flowsheet documentation for full assessment.

## 2023-11-13 NOTE — OCCUPATIONAL THERAPY NOTE
Occupational Therapy Evaluation/Cognitive Evaluation     Patient Name: Dana Echavarria  AOLIS'P Date: 11/13/2023  Problem List  Principal Problem:    Encephalopathy  Active Problems:    H/O inguinal hernia repair    BPH (benign prostatic hyperplasia)    Urine retention    Cystitis    Past Medical History  Past Medical History:   Diagnosis Date    Anxiety     BPH (benign prostatic hyperplasia)     Colon polyp     Disorder of eye     disorder of choroid of eye    Diverticulitis of rectum     Infected hernioplasty mesh (HCC)     Malignant melanoma of eye, left ([address])     Memory impairment     Mixed hyperlipidemia     Skin cancer (melanoma) ([address])     Uveal, Caroidal    Vision loss of left eye     Wears glasses      Past Surgical History  Past Surgical History:   Procedure Laterality Date    ABDOMINAL SURGERY      mesh removal    EGD  2019    EGD AND COLONOSCOPY  2017    EGD AND COLONOSCOPY  03/11/2022    HERNIA REPAIR Right     Inguinal/Umbilical    SC RPR RECRT INGUINAL HERNIA ANY AGE REDUCIBLE Right 11/2/2023    Procedure: OPEN REPAIR OF RECURRENT REDUCIBLE RIGHT INGUINAL  HERNIAS WITH MESH;  Surgeon: Clinton Bonilla MD;  Location:  MAIN OR;  Service: General    SKIN CANCER EXCISION  02/21/2019    R upper cheek, benign lesion including margins, face, ears, eyelids, nose, lips, mucous membrane, excised diameter 2.1 to 3.0 CM             11/13/23 1220   OT Last Visit   OT Visit Date 11/13/23   Note Type   Note type Evaluation  (+cognitive evaluation)   Pain Assessment   Pain Assessment Tool 0-10   Pain Score No Pain   Patient's Stated Pain Goal No pain   Hospital Pain Intervention(s) Repositioned; Ambulation/increased activity   Multiple Pain Sites No   Restrictions/Precautions   Weight Bearing Precautions Per Order No   Other Precautions 1:1;Cognitive; Chair Alarm; Bed Alarm; Fall Risk  (Virtual 1:1)   Home Living   Type of [address] [place] [address] Dr Multi-level;1/2 bath on main level;Bed/bath upstairs;Stairs to enter with rails  (4 MITCHELL; first 3 steps with HR)   Bathroom Shower/Tub Tub/shower unit   Bathroom Toilet Standard   Bathroom Equipment Other (Comment)  (Spouse denies)   [address] Other (Comment)  (Spouse denies)   Prior Function   Level of [place] Independent with ADLs; Independent with functional mobility; Needs assistance with IADLS   Lives With Spouse   Receives Help From Family;Friend(s)   IADLs Independent with driving;Family/Friend/Other provides meals; Family/Friend/Other provides medication management  (At true baseline patient is independent with all IADL tasks, (+) driving. Most recently, has needed incrased assistance to sequence and initate tasks d/t impaired cognition.)   Falls in the last 6 months 0   Vocational Retired   Comments Pt is a questionable historian at time of eval. Spouse and Son present to provide insight into home setup and PLOF. At true baseline, pt is fully independent with all ADL/IADL and mobility without use of AD. (+) driving. Since patients recent surgery, he has had increased difficulty managing IADL and basic self-care tasks d/t difficulty sequencing, directional following and attention deficits per spouse. Lifestyle   Autonomy At baseline pt is (I) c ADLs, functional mobility w/o use of AD. (A) for IADL tasks. (+) Driving. Lives with spouse in a multi-level home, (+) steps. Retired   Reciprocal Relationships Supportive family and friends   Service to Others Retired   [employer]   Additional Pertinent History Pt admitted from home with worsening change in mental status. As per wife who is the primary historian reports patient has dementia however baseline is very cooperative, calm. Alert oriented x2-3. Symptoms started to get worse with change in mental status after recent surgery done on 11/2/2023 for inguinal hernia repair.   Was recently admitted to the hospital with RENATO secondary to urine retention s/p Choi catheter placement and discharged home with Choi catheter in place   Family/Caregiver Present Yes  (Spouse and son at the beginning of the evaluation.)   Subjective   Subjective "Oh look! Do you see the baby?"   ADL   Eating Assistance 5  Supervision/Setup   Eating Deficit Setup  (with lunch tray at the end of the session; pt seeking verbal agreement to start eating lunch.)   Grooming Assistance 5  Supervision/Setup   Grooming Deficit Setup;Supervision/safety; Increased time to complete;Verbal cueing;Wash/dry hands  (verbal cues to sequence through task)   [address] [place] [address]  4  Minimal Assistance   Additional Comments Unable to formally assess bathing, dressing, toileting at time of eval. The above levels of assistance are anticipated based on functional performance deficits with use of clinical judgement. Pt is limited by impaired cognition. Bed Mobility   Supine to Sit 7  Independent   Sit to Supine 7  Independent   Additional Comments Pt denies dizziness/lightheadedness with postural changes. Transfers   Sit to Stand 4  Minimal assistance   Additional items Increased time required;Verbal cues   Stand to Sit 5  Supervision   Additional items Increased time required;Verbal cues   Stand pivot 4  Minimal assistance   Additional items Increased time required;Verbal cues  (No AD;  Verbal cues for surface proximity.)   Additional Comments Unsteadiness noted with inital sit to stand from EOB ~ CGA/ANGELIC for postural corrections and to assist with balance. Functional Mobility   Functional Mobility 4  Minimal assistance   Additional Comments Variable ANGELIC-CGA for functional household distance in the hallway without use of AD.    Balance   Static Sitting Normal   Dynamic Sitting Normal   Static Standing Fair +   Dynamic Standing Fair +   Activity Tolerance Activity Tolerance Treatment limited secondary to medical complications (Comment)  (Impaired cognition)   Medical Staff Made Aware Spoke with care coordination, SLIM, PT   Nurse Made Aware Spoke with JACINTA Johnston   RUZION Assessment   RUE Assessment WFL  (MMT 5/5 grossly)   LUE Assessment   LUE Assessment WFL  (MMT 5/5 grossly)   Hand Function   Gross Motor Coordination Functional   Fine Motor Coordination Functional   Hand Function Comments Pt is R hand dominant   Vision-Basic Assessment   Current Vision Wears glasses all the time   Patient Visual Report Other (Comment)  (Noted the blue fall mat as a cattle trough, taking LARGE steps onto the mat.)   Vision - Complex Assessment   Tracking Intact   Acuity Able to read clock/calendar on wall without difficulty  (Difficulty reading the phrase "[employer]" on employee's badge but able to read the clock on the wall without difficulty.)   Cognition   Overall Cognitive Status Impaired    Per spouse and EMR patient has a cognitive impairment at baseline. Usually AxO 2-3. Arousal/Participation Alert; Responsive; Cooperative   Attention Attends with cues to redirect   Orientation Level Oriented to person;Disoriented to place; Disoriented to time;Disoriented to situation  ("This is an [place]" "A place for old people" "June")   Memory Decreased recall of precautions;Decreased recall of recent events;Decreased short term memory;Decreased long term memory   Following Commands Follows one step commands with increased time or repetition   Comments Pt is very pleasent and cooperative. Reports visual hallucinations of seeing a baby on the bathroom floor where blankets were noted to be and the fall mat as a cattle trough. Pt becomes upset and frustrated with confusion. . States it is difficult  to express words/thoughts properly.    Cognition Assessment Tools SLUMS   Score 9   Assessment   Limitation Decreased cognition;Decreased self-care trans;Decreased high-level ADLs   Prognosis Fair Assessment Patient is a 81 y.o. male seen for OT evaluation and cognitive evaluation  at [facility] following admission on 11/10/2023  s/p Encephalopathy. Please see above for comprehensive list of comorbidities and significant PMHx impacting functional performance. At baseline, pt is fully independent with all self-care tasks and basic IADLs. Ambulates functional household and community distance without use of AD. (+) driving. Upon initial evaluation, pt appears to be performing below baseline functional status. Pt requires [place] for UB ADLs, ANGELIC for LB ADLs, independent for bed mobility, ANGELIC for transfers and [place] for functional mobility household distance with no AD. The AM-PAC & Barthel Index outcome tools were used to assist in determining pt safety w/self care /mobility and appropriate d/c recommendations, see above for score. Occupational performance is affected by the following deficits: impaired memory , impaired judgement and problem solving , decreased emotional regulation and coping skills , impaired sequencing , orientation , impaired safety awareness , impaired learning ability d/t current cognitive status , impaired global mental status, and direction following. Personal/Environmental factors impacting D/C include: steps to enter/navigate the home, FOS to second floor, Assistance needed for ADLs and functional mobility, High fall risk , decreased insight toward deficits , decreased recall of precautions , and baseline cognitive deficits. Supporting factors include: support system available and attitude towards recovery Patient would benefit from OT services within the acute care setting to maximize level of functional independence in the following areas driving/community mobility , activity engagement , medication management , safety procedures , fall prevention , and ADLs.   From OT standpoint, recommendation at time of D/C would be to level III (Minimum Resource Intensity)   Goals Patient Goals no rehab goals stated on this date d/t impaired cognition; decrease burden of care   LTG Time Frame 10-14   Long Term Goal See below   Plan   Treatment Interventions ADL retraining;Functional transfer training;Cognitive reorientation;Patient/family training;Equipment evaluation/education; Compensatory technique education; Energy conservation; Activityengagement   Goal Expiration Date 11/23/23   OT Treatment Day 1   OT Frequency 2-3x/wk   Discharge Recommendation   Rehab Resource Intensity Level, OT III (Minimum Resource Intensity)  (OP Cognition f/u; Fit to drive asseessment)   Additional Comments  The patient's raw score on the AM-PAC Daily Activity Inpatient Short Form is 19. A raw score of greater than or equal to 19 suggests the patient may benefit from discharge to home. Please refer to the recommendation of the Occupational Therapist for safe discharge planning. AM-PAC Daily Activity Inpatient   Lower Body Dressing 3   Bathing 3   Toileting 3   Upper Body Dressing 3   Grooming 3   Eating 4   Daily Activity Raw Score 19   Daily Activity Standardized Score (Calc for Raw Score >=11) 40.22   AM-PAC Applied Cognition Inpatient   Following a Speech/Presentation 1   Understanding Ordinary Conversation 2   Taking Medications 1   Remembering Where Things Are Placed or Put Away 2   Remembering List of 4-5 Errands 1   Taking Care of Complicated Tasks 1   Applied Cognition Raw Score 8   Applied Cognition Standardized Score 19.32   SLUMS   What day of the week is it? 1   What is the year? 0   What state are we in? 0   How much did you spend? 1   How much do you have left? 0   Name animals? 1   Oject recall? 0   Repeat numbers backwards? 1   Draw a clock? 0   Identify a triangle 0   Determine size? 1   What was the females name? 2   When did she go back to work? 0   What work did she do? 0   What state did she live in?  2   Calculated SLUMS Score 9   SLUMS Comments (S)  Total score: 9/30; scores <20 with a high school education indicate performance consistent with dementia. Impairments in immediate recall, orientation, delayed recall with interference, memory, numeric calculation and registration, visual spatial, and executive function noted. Barthel Index   Feeding 10   Bathing 0   Grooming Score 0   Dressing Score 5   Bladder Score 0   Bowels Score 10   Toilet Use Score 5   Transfers (Bed/Chair) Score 10   Mobility (Level Surface) Score 0   Stairs Score 5   Barthel Index Score 45   Additional Treatment Session   Start Time 1155   End Time 1220   Treatment Assessment Pt participated in [facility] following IE to further evaluate cognitive function. Pt's overall score 9/30, scores less than 20 for a patient with a high school education are consistent with dementia. Recommend futher cognitive evaluation for early dementing disorder. Deficits noted in immediate recall, orientation, delayed recall with interference, memory, numeric calculation and registration, visual spatial, and executive function. Pt verbalized and displayed frustration with inability to adequately answer questions. Jair Negus attempting in various different ways to express how he is feeling. Recommend family to continue assisting / supervising with medication management and safety within the home. Recommend further cognitive assessment with use of the ACLS and Fit to drive assessment for safe discharge disposition. End of Consult   Education Provided Yes   Patient Position at End of Consult Supine;Bed/Chair alarm activated; All needs within reach   Nurse Communication Nurse aware of consult     Goals established on initial evaluation:     Pt will complete UB ADLs Mod independent   for increased ADL independence within 10 days. Pt will complete LB ADLs Mod independent   for increased ADL independence within 10 days. Pt will complete toileting Mod independent   with use of DME for increased ADL independence within 10 days.      Pt will demonstrate proper body mechanics to complete self-care transfers and functional mobility with Supervision and use of LRAD for increased safety and functional independence within 10 days. Pt will perform grooming tasks standing at the sink with Supervision in order to increase overall independence and return to PLOF. Pt will receive education on use of compensatory memory strategies for increased safety and independent with IADL tasks. Pt will attend to treatment task or activity for 10 minutes without need for redirection to improve activity engagement within 10 days. Pt will participate in ongoing cognitive assessments (ACLS) to assist with safe D/C planning and supervision/assistance recommendations. Patient will demonstrate compensatory techniques for low vision with minimal verbal cues to increase safety awareness, increase independence with ADLS and decrease fall risk.        Dary Macias, [id number] [facility] OTR/L   [place] Licensure# [id number]

## 2023-11-13 NOTE — ASSESSMENT & PLAN NOTE
No bowel movement since admission to the hospital   Continue senna 2 tablets BID  Continue Colace and MiraLAX  Encourage p.o. hydration and out of bed as tolerated  Offer prune juice

## 2023-11-13 NOTE — CONSULTS
Consultation - General Surgery  Salli Collet 81 y.o. male MRN: [id number]  Unit/Bed#: -01 Encounter: [id number]    Reason for Consult: urinary retention 2/2 BPH      Assessment/Plan:  82 y/o male with PMH anxiety/depression, BPH, dementia, HLD, recently admitted with RENATO 2/2 urinary retention and discharged home with Choi catheter, now p/w AMS. Urology consulted for urinary retention. Per wife, pt with hallucinations and delusion, difficulty caring for him at home  Follows with Dr. Ravi Lincoln 11/11 with circumferential bladder wall thickening attributable to obstruction vs cystitis  UA unremarkable  AFVSS  Labs WNL  1.25L UO per Choi  Pt has no complaints this morning  Abdominal exam benign  Surgical site from right inguinal hernia repair looks great, resolving ecchymosis    Continue Choi catheter, discharge with catheter with plan for void trial at rehab  Continue Flomax/Proscar  Continue empiric antibiotics per SLIM  F/U with Dr. Leah Sargent       HPI:    Salli Collet is a 81 y.o. male with PMH anxiety/depression, BPH, dementia, HLD, recently admitted with RENATO 2/2 urinary retention and discharged home with Choi catheter, now p/w AMS. Urology consulted for urinary retention. No acute problems with Choi catheter. No hematuria. Patient denies any complaints including dysuria, hematuria, penile pain/swelling, scrotal pain/swelling. Choi catheter draining clear yellow urine. Patient is afebrile with stable vitals and labs within normal limits. Good urine output per Choi catheter. Abdominal exam benign. Patient follows with Dr. Leah Sargent. Review of Systems:  Review of Systems   Constitutional:  Negative for chills and fever. Respiratory:  Negative for shortness of breath. Cardiovascular:  Negative for chest pain. Gastrointestinal:  Negative for abdominal pain, nausea and vomiting.    Genitourinary:  Negative for flank pain, hematuria, penile pain, penile swelling, scrotal swelling and testicular pain.         Historical Information   Past Medical History:   Diagnosis Date    Anxiety     BPH (benign prostatic hyperplasia)     Colon polyp     Disorder of eye     disorder of choroid of eye    Diverticulitis of rectum     Infected hernioplasty mesh ([address])     Malignant melanoma of eye, left ([address])     Memory impairment     Mixed hyperlipidemia     Skin cancer (melanoma) ([address])     Uveal, Caroidal    Vision loss of left eye     Wears glasses      Past Surgical History:   Procedure Laterality Date    ABDOMINAL SURGERY      mesh removal    EGD      EGD AND COLONOSCOPY      EGD AND COLONOSCOPY  2022    HERNIA REPAIR Right     Inguinal/Umbilical    WA RPR RECRT INGUINAL HERNIA ANY AGE REDUCIBLE Right 2023    Procedure: OPEN REPAIR OF RECURRENT REDUCIBLE RIGHT INGUINAL  HERNIAS WITH MESH;  Surgeon: Trinity Pereira MD;  Location:  MAIN OR;  Service: General    SKIN CANCER EXCISION  2019    R upper cheek, benign lesion including margins, face, ears, eyelids, nose, lips, mucous membrane, excised diameter 2.1 to 3.0 CM     Social History   Social History     Substance and Sexual Activity   Alcohol Use Yes    Alcohol/week: 7.0 standard drinks of alcohol    Types: 7 Glasses of wine per week    Comment: 1 glass a night     Social History     Substance and Sexual Activity   Drug Use Not Currently    Comment: DENIES     Social History     Tobacco Use   Smoking Status Former    Packs/day: 1.00    Years: 9.00    Total pack years: 9.00    Types: Cigarettes    Quit date:     Years since quittin.9   Smokeless Tobacco Never   Tobacco Comments    QUIT IN      Family History   Problem Relation Age of Onset    Alzheimer's disease Mother     No Known Problems Father        Medications:  Current Facility-Administered Medications   Medication Dose Route Frequency    acetaminophen (TYLENOL) tablet 650 mg  650 mg Oral Q6H PRN    acetaminophen (TYLENOL) tablet 975 mg  975 mg Oral Q8H [facility] cefTRIAXone (ROCEPHIN) IVPB (premix in dextrose) 1,000 mg 50 mL  1,000 mg Intravenous Q24H    cyanocobalamin (VITAMIN B-12) tablet 1,000 mcg  1,000 mcg Oral Daily    enoxaparin (LOVENOX) subcutaneous injection 40 mg  40 mg Subcutaneous Daily    finasteride (PROSCAR) tablet 5 mg  5 mg Oral Daily    OLANZapine (ZyPREXA) IM injection 5 mg  5 mg Intramuscular Q6H PRN    ondansetron (ZOFRAN) injection 4 mg  4 mg Intravenous Q6H PRN    QUEtiapine (SEROquel) tablet 25 mg  25 mg Oral HS    tamsulosin (FLOMAX) capsule 0.4 mg  0.4 mg Oral Daily With Dinner       Allergies   Allergen Reactions    Sulfa Antibiotics Other (See Comments)     Childhood- unknown       Physical Examination:  Vitals:   Vitals:    11/12/23 0752 11/12/23 1440 11/12/23 2345 11/13/23 0725   BP: 121/68 136/52 108/61 109/60   BP Location: Right arm Right arm Right arm Right arm   Pulse: 61 60 66 69   Resp: 16 16 16 17   Temp: (!) 97.4 °F (36.3 °C) 98 °F (36.7 °C) 99 °F (37.2 °C) 97.9 °F (36.6 °C)   TempSrc: Oral Oral Tympanic Oral   SpO2: 98% 98% 98% 98%   Weight:       Height:         Temp  Min: 97.4 °F (36.3 °C)  Max: 99 °F (37.2 °C)  IBW (Ideal Body Weight): 63.8 kg    Physical Exam  Vitals and nursing note reviewed. Constitutional:       General: He is not in acute distress. Appearance: He is not toxic-appearing. HENT:      Head: Normocephalic and atraumatic. Eyes:      Extraocular Movements: Extraocular movements intact. Pulmonary:      Effort: Pulmonary effort is normal. No respiratory distress. Abdominal:      General: There is no distension. Palpations: Abdomen is soft. Tenderness: There is no abdominal tenderness. There is no guarding. Genitourinary:     Penis: Normal.       Testes: Normal.      Comments: Choi in place draining CYU  Musculoskeletal:         General: Normal range of motion. Cervical back: Normal range of motion. Skin:     General: Skin is warm and dry.    Neurological:      Mental Status: He is alert. Mental status is at baseline. Psychiatric:         Mood and Affect: Mood normal.         Behavior: Behavior normal.          Diagnostic Data:  Lab: I have personally reviewed pertinent lab results. , CBC:   Lab Results   Component Value Date    WBC 7.17 11/13/2023    HGB 13.6 11/13/2023    HCT 40.3 11/13/2023    MCV 89 11/13/2023     11/13/2023    RBC 4.54 11/13/2023    MCH 30.0 11/13/2023    MCHC 33.7 11/13/2023    RDW 12.8 11/13/2023    MPV 10.1 11/13/2023   , CMP:   Lab Results   Component Value Date    SODIUM 140 11/13/2023    K 3.6 11/13/2023     11/13/2023    CO2 24 11/13/2023    BUN 15 11/13/2023    CREATININE 0.99 11/13/2023    CALCIUM 8.4 11/13/2023    EGFR 70 11/13/2023     Results from last 7 days   Lab Units 11/13/23  0531   WBC Thousand/uL 7.17   HEMOGLOBIN g/dL 13.6   HEMATOCRIT % 40.3   PLATELETS Thousands/uL 250     Results from last 7 days   Lab Units 11/13/23  0531 11/11/23  0451 11/10/23  2304   POTASSIUM mmol/L 3.6   < > 3.5   CHLORIDE mmol/L 108   < > 105   CO2 mmol/L 24   < > 24   BUN mg/dL 15   < > 13   CREATININE mg/dL 0.99   < > 0.84   CALCIUM mg/dL 8.4   < > 8.7   ALK PHOS U/L  --   --  53   ALT U/L  --   --  11   AST U/L  --   --  14    < > = values in this interval not displayed. Imaging: I have personally reviewed the pertinent imaging studies on the PACS system  Procedure: CT abdomen pelvis wo contrast    Result Date: 11/11/2023  Narrative: CT ABDOMEN AND PELVIS WITHOUT IV CONTRAST INDICATION: . Urinary retention. COMPARISON: CT abdomen pelvis 10/10/2022. TECHNIQUE:  CT examination of the abdomen and pelvis was performed without intravenous contrast. Multiplanar 2D reformatted images were created from the source data. This examination, like all CT scans performed in the [facility], was performed utilizing techniques to minimize radiation dose exposure, including the use of iterative reconstruction and automated exposure control.  Radiation dose length product (DLP) for this visit:  344.8 mGy-cm Enteric contrast was administered. FINDINGS: ABDOMEN LOWER CHEST: Trace right basilar pleural effusion. Small hiatal hernia. LIVER/BILIARY TREE:  Unremarkable. GALLBLADDER:  No calcified gallstones. No pericholecystic inflammatory change. SPLEEN:  Unremarkable. PANCREAS:  Unremarkable. ADRENAL GLANDS:  Unremarkable. KIDNEYS/URETERS: Unchanged right renal cyst. Mild nonspecific bilateral perinephric fat stranding. STOMACH AND BOWEL: Uncomplicated duodenal diverticulum. There is colonic diverticulosis without evidence of acute diverticulitis. APPENDIX:  No findings to suggest appendicitis. ABDOMINOPELVIC CAVITY:  No ascites. No pneumoperitoneum. No lymphadenopathy. VESSELS:  Unremarkable for patient's age. PELVIS REPRODUCTIVE ORGANS: Severe prostamegaly protruding into the bladder base. URINARY BLADDER: Decompressed with a Choi catheter. Circumferential bladder wall thickening with mild surrounding fat stranding. This thickening is attributable to the obstruction however the surrounding fat stranding could indicate superimposed cystitis. ABDOMINAL WALL/INGUINAL REGIONS: Right inguinal hernia repair. OSSEOUS STRUCTURES:  No acute fracture or destructive osseous lesion. Impression: Circumferential bladder wall thickening with mild surrounding fat stranding. The bladder wall thickening is attributable to outlet obstruction, given the severe prostamegaly, however the surrounding fat stranding could indicate a superimposed cystitis. The study was marked in [facility] for immediate notification. Workstation performed: [id number]     Procedure: CT head without contrast    Result Date: 11/11/2023  Narrative: CT BRAIN - WITHOUT CONTRAST INDICATION:   AMS. COMPARISON:  None. TECHNIQUE:  CT examination of the brain was performed. Multiplanar 2D reformatted images were created from the source data. Radiation dose length product (DLP) for this visit:  825.3 mGy-cm .   This examination, like all CT scans performed in the [facility], was performed utilizing techniques to minimize radiation dose exposure, including the use of iterative reconstruction and automated exposure control. IMAGE QUALITY:  Diagnostic. FINDINGS: PARENCHYMA:  No intracranial mass, mass effect or midline shift. No CT signs of acute infarction. No acute parenchymal hemorrhage. VENTRICLES AND EXTRA-AXIAL SPACES:  Ventricles and extra-axial CSF spaces are prominent commensurate with the degree of volume loss. No hydrocephalus. No acute extra-axial hemorrhage. VISUALIZED ORBITS: Normal visualized orbits. PARANASAL SINUSES: Normal visualized paranasal sinuses. CALVARIUM AND EXTRACRANIAL SOFT TISSUES:  Normal.     Impression: No acute intracranial abnormality. Workstation performed: [id number]       Active medications:   The patients active medications were reviewed and modified as appropriate  VTE Prophylaxis: Enoxaparin (Lovenox)      Code Status: Level 1 - Full Code    Augie Patel PA-C  11/13/2023

## 2023-11-13 NOTE — ASSESSMENT & PLAN NOTE
Patient seen by urology  Currently Choi in place  Continue tamsulosin and finasteride  Monitor closely  Continue ceftriaxone for UTI

## 2023-11-13 NOTE — PLAN OF CARE
Problem: OCCUPATIONAL THERAPY ADULT  Goal: Performs self-care activities at highest level of function for planned discharge setting. See evaluation for individualized goals. Description: Treatment Interventions: ADL retraining, Functional transfer training, Cognitive reorientation, Patient/family training, Equipment evaluation/education, Compensatory technique education, Energy conservation, Activityengagement          See flowsheet documentation for full assessment, interventions and recommendations. Outcome: Progressing  Note: Limitation: Decreased cognition, Decreased self-care trans, Decreased high-level ADLs  Prognosis: Fair  Assessment: Patient is a 81 y.o. male seen for OT evaluation and cognitive evaluation  at [facility] following admission on 11/10/2023  s/p Encephalopathy. Please see above for comprehensive list of comorbidities and significant PMHx impacting functional performance. At baseline, pt is fully independent with all self-care tasks and basic IADLs. Ambulates functional household and community distance without use of AD. (+) driving. Upon initial evaluation, pt appears to be performing below baseline functional status. Pt requires [place] for UB ADLs, [place] for LB ADLs, independent for bed mobility, ANGELIC for transfers and [place] for functional mobility household distance with no AD. The AM-PAC & Barthel Index outcome tools were used to assist in determining pt safety w/self care /mobility and appropriate d/c recommendations, see above for score. Occupational performance is affected by the following deficits: impaired memory , impaired judgement and problem solving , decreased emotional regulation and coping skills , impaired sequencing , orientation , impaired safety awareness , impaired learning ability d/t current cognitive status , impaired global mental status, and direction following.  Personal/Environmental factors impacting D/C include: steps to enter/navigate the home, FOS to second floor, Assistance needed for ADLs and functional mobility, High fall risk , decreased insight toward deficits , decreased recall of precautions , and baseline cognitive deficits. Supporting factors include: support system available and attitude towards recovery Patient would benefit from OT services within the acute care setting to maximize level of functional independence in the following areas driving/community mobility , activity engagement , medication management , safety procedures , fall prevention , and ADLs. From OT standpoint, recommendation at time of D/C would be to level III (Minimum Resource Intensity)     Rehab Resource Intensity Level, OT: III (Minimum Resource Intensity) (OP Cognition f/u;  Fit to drive asseessment)       Adán Marcus, [id number] [facility] OTR/L   [place] Licensure# [id number]

## 2023-11-13 NOTE — ASSESSMENT & PLAN NOTE
First-line treatment is behavior modification  Maintain sleep-wake cycle, avoid nighttime interruptions  Avoid caffeine throughout the day  Avoid napping throughout the day  Encourage physical activity throughout the day  Avoid sedative hypnotics including benzodiazepines and benadryl  Continue melatonin 3 mg nightly

## 2023-11-13 NOTE — PLAN OF CARE
Problem: SAFETY ADULT  Goal: Patient will remain free of falls  Description: INTERVENTIONS:  - Educate patient/family on patient safety including physical limitations  - Instruct patient to call for assistance with activity   - Consult OT/PT to assist with strengthening/mobility   - Keep Call bell within reach  - Keep bed low and locked with side rails adjusted as appropriate  - Keep care items and personal belongings within reach  - Initiate and maintain comfort rounds  - Make Fall Risk Sign visible to staff  - Offer Toileting every 2 Hours, in advance of need  - Initiate/Maintain bed and chair alarm  - Apply yellow socks and bracelet for high fall risk patients  - Consider moving patient to room near nurses station  Outcome: Progressing  Goal: Maintain or return to baseline ADL function  Description: INTERVENTIONS:  -  Assess patient's ability to carry out ADLs; assess patient's baseline for ADL function and identify physical deficits which impact ability to perform ADLs (bathing, care of mouth/teeth, toileting, grooming, dressing, etc.)  - Assess/evaluate cause of self-care deficits   - Assess range of motion  - Assess patient's mobility; develop plan if impaired  - Assess patient's need for assistive devices and provide as appropriate  - Encourage maximum independence but intervene and supervise when necessary  - Involve family in performance of ADLs  - Assess for home care needs following discharge   - Consider OT consult to assist with ADL evaluation and planning for discharge  - Provide patient education as appropriate  Outcome: Progressing  Goal: Maintains/Returns to pre admission functional level  Description: INTERVENTIONS:  - Perform BMAT or MOVE assessment daily.   - Set and communicate daily mobility goal to care team and patient/family/caregiver. - Collaborate with rehabilitation services on mobility goals if consulted  - Perform Range of Motion 3 times a day.   - Reposition patient every 2 hours.  - Dangle patient 3 times a day  - Stand patient 3 times a day  - Ambulate patient 3 times a day  - Out of bed to chair 3 times a day   - Out of bed for meals 3 times a day  - Out of bed for toileting  - Record patient progress and toleration of activity level   Outcome: Progressing     Problem: Prexisting or High Potential for Compromised Skin Integrity  Goal: Skin integrity is maintained or improved  Description: INTERVENTIONS:  - Identify patients at risk for skin breakdown  - Assess and monitor skin integrity  - Assess and monitor nutrition and hydration status  - Monitor labs   - Assess for incontinence   - Turn and reposition patient  - Assist with mobility/ambulation  - Relieve pressure over bony prominences  - Avoid friction and shearing  - Provide appropriate hygiene as needed including keeping skin clean and dry  - Evaluate need for skin moisturizer/barrier cream  - Collaborate with interdisciplinary team   - Patient/family teaching   Outcome: Progressing     Problem: MOBILITY - ADULT  Goal: Maintain or return to baseline ADL function  Description: INTERVENTIONS:  -  Assess patient's ability to carry out ADLs; assess patient's baseline for ADL function and identify physical deficits which impact ability to perform ADLs (bathing, care of mouth/teeth, toileting, grooming, dressing, etc.)  - Assess/evaluate cause of self-care deficits   - Assess range of motion  - Assess patient's mobility; develop plan if impaired  - Assess patient's need for assistive devices and provide as appropriate  - Encourage maximum independence but intervene and supervise when necessary  - Involve family in performance of ADLs  - Assess for home care needs following discharge   - Consider OT consult to assist with ADL evaluation and planning for discharge  - Provide patient education as appropriate  Outcome: Progressing  Goal: Maintains/Returns to pre admission functional level  Description: INTERVENTIONS:  - Perform BMAT or MOVE assessment daily.   - Set and communicate daily mobility goal to care team and patient/family/caregiver. - Collaborate with rehabilitation services on mobility goals if consulted  - Perform Range of Motion 3 times a day. - Reposition patient every 2 hours. - Dangle patient 3 times a day  - Stand patient 3 times a day  - Ambulate patient 3 times a day  - Out of bed to chair 3 times a day   - Out of bed for meals 3 times a day  - Out of bed for toileting  - Record patient progress and toleration of activity level   Outcome: Progressing     Problem: NEUROSENSORY - ADULT  Goal: Achieves stable or improved neurological status  Description: INTERVENTIONS  - Monitor and report changes in neurological status  - Monitor vital signs such as temperature, blood pressure, glucose, and any other labs ordered   - Initiate measures to prevent increased intracranial pressure  - Monitor for seizure activity and implement precautions if appropriate      Outcome: Progressing  Goal: Achieves maximal functionality and self care  Description: INTERVENTIONS  - Monitor swallowing and airway patency with patient fatigue and changes in neurological status  - Encourage and assist patient to increase activity and self care.    - Encourage visually impaired, hearing impaired and aphasic patients to use assistive/communication devices  Outcome: Progressing     Problem: GASTROINTESTINAL - ADULT  Goal: Maintains or returns to baseline bowel function  Description: INTERVENTIONS:  - Assess bowel function  - Encourage oral fluids to ensure adequate hydration  - Administer IV fluids if ordered to ensure adequate hydration  - Administer ordered medications as needed  - Encourage mobilization and activity  - Consider nutritional services referral to assist patient with adequate nutrition and appropriate food choices  Outcome: Progressing  Goal: Maintains adequate nutritional intake  Description: INTERVENTIONS:  - Monitor percentage of each meal consumed  - Identify factors contributing to decreased intake, treat as appropriate  - Assist with meals as needed  - Monitor I&O, weight, and lab values if indicated  - Obtain nutrition services referral as needed  Outcome: Progressing     Problem: GENITOURINARY - ADULT  Goal: Maintains or returns to baseline urinary function  Description: INTERVENTIONS:  - Assess urinary function  - Encourage oral fluids to ensure adequate hydration if ordered  - Administer IV fluids as ordered to ensure adequate hydration  - Administer ordered medications as needed  - Offer frequent toileting  - Follow urinary retention protocol if ordered  Outcome: Progressing  Goal: Absence of urinary retention  Description: INTERVENTIONS:  - Assess patient’s ability to void and empty bladder  - Monitor I/O  - Bladder scan as needed  - Discuss with physician/AP medications to alleviate retention as needed  - Discuss catheterization for long term situations as appropriate  Outcome: Progressing  Goal: Urinary catheter remains patent  Description: INTERVENTIONS:  - Assess patency of urinary catheter  - If patient has a chronic mcduffie, consider changing catheter if non-functioning  - Follow guidelines for intermittent irrigation of non-functioning urinary catheter  Outcome: Progressing

## 2023-11-13 NOTE — ASSESSMENT & PLAN NOTE
Patient presented with worsening change in mental status. As per wife who is the primary historian reports patient has dementia however baseline is very cooperative, calm. Alert oriented x2-3. Symptoms started to get worse with change in mental status after recent surgery done on 11/2/2023 for inguinal hernia repair. Was recently admitted to the hospital with RENATO secondary to urine retention s/p Choi catheter placement and discharged home with Choi catheter in place. States patient was calm and cooperative however his mental status was not back to baseline on discharge from recent hospitalization. Presented again with delusion and hallucinations started on the day of presentation. Difficulty taking care of her at home by wife. No recent changes in medication. Urinalysis unremarkable. TSH WNL. Electrolytes WNL. CT head unremarkable. Currently unclear etiology. Vitals otherwise stable. Patient currently is awake, alert and oriented to self which is not his baseline. Fall precautions  Delirium precautions  Replace magnesium  Will start Seroquel 25 mg daily  Will need MRI brain if no etiology identified  Geriatics consulted  Check TSH, vitamin B12, vitamin D  Ct abdomen was reviewed shows -Circumferential bladder wall thickening with mild surrounding fat stranding. The bladder wall thickening is attributable to outlet obstruction, given the severe prostamegaly, however the surrounding fat stranding could indicate a superimposed cystitis. Give IV antibiotic with Rocephin. Also will replace with vitamin B12. Geriatrics input appreciated, recommended to continue Seroquel 25 mg at 7 PM, added gabapentin 100 mg twice a day, can increase gabapentin to every 8 hours as needed, and Zyprexa as needed for severe agitation. We will watch without virtual one-to-one, patient's wife wants him to be placed in SNF however does not need physical therapy needs.

## 2023-11-13 NOTE — CASE MANAGEMENT
Case Management Assessment    Patient name Magui Miller  Location /-89 MRN [id number]  : 1940 Date 2023       Current Admission Date: 11/10/2023  Current Admission Diagnosis:Encephalopathy   Patient Active Problem List    Diagnosis Date Noted    Cystitis 2023    Encephalopathy 2023    Urine retention 2023    H/O inguinal hernia repair 2023    BPH (benign prostatic hyperplasia) 2023    Sherwood's esophagus with dysplasia 2023    History of diverticulitis 2023    Acute diverticulitis 2022    Constipation 2022    Left lower quadrant abdominal pain 2022    Gastroesophageal reflux 2021    Pancreas cyst 2021    Schatzki's ring 2021    Gastric polyps 2021    Hx of adenomatous colonic polyps 2021    Micrographia 2020    Tremor 2020    Cognitive impairment 2020    Anxiety 2020    Obsession 2020    Insomnia 2020    Current moderate episode of major depressive disorder without prior episode ([facility])       LOS (days): 2  Geometric Mean LOS (GMLOS) (days): 2.90  Days to GMLOS:0.3     OBJECTIVE:  PATIENT READMITTED TO HOSPITAL  Risk of Unplanned Readmission Score: 9.05         Current admission status: Inpatient       Preferred Pharmacy:   [id number] [address] [address]  Phone: [phone] Fax: [address] [place] [address]  Phone: [phone] Fax: [phone]    Primary Care Provider: Magui Galaviz MD    Primary Insurance: MEDICARE  Secondary Insurance:  FOR LIFE    ASSESSMENT:  Rishi Proxies    There are no active Health Care Proxies on file.                  Readmission Root Cause  30 Day Readmission: Yes  Who directed you to return to the hospital?: Family  Did you understand whom to contact if you had questions or problems?: Yes  Did you get your prescriptions before you left the hospital?: Yes  Were you able to get your prescriptions filled when you left the hospital?: Yes  Did you take your medications as prescribed?: Yes  Were you able to get to your follow-up appointments?: No  Reason[de-identified] Readmitted prior to appointment  During previous admission, was a post-acute recommendation made?: Yes  What post-acute resources were offered?: [facility]  Patient was readmitted due to: encephalopathy  Action Plan: medical stablization. therapy pending-possible STR. Patient Information  Admitted from[de-identified] Home  Mental Status: Confused  During Assessment patient was accompanied by: Spouse  Assessment information provided by[de-identified] Spouse  Support Systems: Spouse/significant other  Home entry access options.  Select all that apply.: Stairs  Number of steps to enter home.: 4  Do the steps have railings?: Yes  Type of Current Residence: 2 story home  Upon entering residence, is there a bedroom on the main floor (no further steps)?: No  A bedroom is located on the following floor levels of residence (select all that apply):: 2nd Floor  Upon entering residence, is there a bathroom on the main floor (no further steps)?: No  Indicate which floors of current residence have a bathroom (select all the apply):: 2nd Floor  Number of steps to 2nd floor from main floor: One Flight  In the last 12 months, was there a time when you were not able to pay the mortgage or rent on time?: No  In the last 12 months, was there a time when you did not have a steady place to sleep or slept in a shelter (including now)?: No  Homeless/housing insecurity resource given?: N/A  Living Arrangements: Lives w/ Spouse/significant other    Activities of Daily Living Prior to Admission  Functional Status: Independent  Completes ADLs independently?: Yes  Ambulates independently?: Yes  Does patient use assisted devices?: No  Does patient currently own DME?: No  Does patient have a history of Outpatient Therapy (PT/OT)?: No  Does the patient have a history of Short-Term Rehab?: No  Does patient have a history of HHC?: Yes  Does patient currently have [address]?: Yes    Current Home Health Care  Type of Current Home Care Services: Nurse visit  Current Home Health Agency[de-identified] [facility] Provider[de-identified] PCP    Patient Information Continued  Income Source: Pension/longterm  Does patient have prescription coverage?: Yes  Within the past 12 months, you worried that your food would run out before you got the money to buy more.: Never true  Within the past 12 months, the food you bought just didn't last and you didn't have money to get more.: Never true  Food insecurity resource given?: N/A  Does patient receive dialysis treatments?: No  Does patient have a history of substance abuse?: No         Means of Transportation  Means of Transport to [facility][de-identified] Family transport  In the past 12 months, has lack of transportation kept you from medical appointments or from getting medications?: No  In the past 12 months, has lack of transportation kept you from meetings, work, or from getting things needed for daily living?: No  Was application for public transport provided?: N/A    Cm met with patient and wife again to review dc plan. Cm informed wife that referrals sent to facilities for review to see who will be able to accept. Cm provided wife with information for long term care at home or at assisted livings. Cm will follow.

## 2023-11-13 NOTE — ASSESSMENT & PLAN NOTE
-Patient is high risk of delirium due to cognitive impairment at baseline, change in environment, insomnia, cystitis  -Initiate delirium precautions  -maintain normal sleep/wake cycle, continue melatonin 3 mg nightly  -minimize overnight interruptions, group overnight vitals/labs/nursing checks as possible  -dim lights, close blinds and turn off tv to minimize stimulation and encourage sleep environment in evenings  -ensure that pain is well controlled continue Tylenol 975mg 3x daily scheduled   -monitor for fecal and urinary retention which may precipitate delirium, continue Senna 2 tablets BID  -encourage early mobilization and ambulation  -provide frequent reorientation and redirection  -encourage family and friends at the bedside to help calm patient if anxious  -avoid medications which may precipitate or worsen delirium such as tramadol, benzodiazepine, anticholinergics, and antihistaminics  -encourage hydration and nutrition , assist with feeding if needed  -redirect unwanted behaviors as first line, avoid physical restraints  -Continue gabapentin 100 mg TID, if needed could consider increasing nighttime dose of gabapentin 200 mg  -Continue Depakote 250 mg po at 5 PM. continue to monitor CMP to monitor LFTs  -If patient having behaviors in the morning could consider low-dose Depakote at 9 AM, will hold on that for now as patient has been appropriate for the most part today  -Use Zyprexa 2.5 mg every 8 hours as needed only for severe agitation and if patient not tolerating p.o. intake- has not required Zyprexa last night or today  -Seroquel was discontinued yesterday

## 2023-11-13 NOTE — PROGRESS NOTES
[address]  Progress Note  Name: Robby Galaviz  MRN: [id number]  Unit/Bed#: -01 I Date of Admission: 11/10/2023   Date of Service: 11/13/2023 I Hospital Day: 2    Assessment/Plan   Cystitis  Assessment & Plan  Presented with change in mental status with cystitis and will give IV antibiotic with Rocephin. Urine retention  Assessment & Plan  Recent admission with RENATO secondary to urine retention s/p Choi catheter placement. Follows up with Dr. Renetta Hernandez outpatient. Plan of care was discussed in length with Dr. Renetta Hernandez and the wife at bedside who wanted a voiding trial done as inpatient however recommended to continue Cohi catheter for now due to confusion and encephalopathy and recommend voiding trial at the SNF/rehab or as outpatient by Dr. Renetta Hernandez. BPH (benign prostatic hyperplasia)  Assessment & Plan  On Flomax and dutasteride chronic change to finasteride and Flomax while inpatient    H/O inguinal hernia repair  Assessment & Plan  S/p recent surgery on 11/2/2023    Constipation  Assessment & Plan  Stool softeners,    * Encephalopathy  Assessment & Plan  Patient presented with worsening change in mental status. As per wife who is the primary historian reports patient has dementia however baseline is very cooperative, calm. Alert oriented x2-3. Symptoms started to get worse with change in mental status after recent surgery done on 11/2/2023 for inguinal hernia repair. Was recently admitted to the hospital with RENATO secondary to urine retention s/p Choi catheter placement and discharged home with Choi catheter in place. States patient was calm and cooperative however his mental status was not back to baseline on discharge from recent hospitalization. Presented again with delusion and hallucinations started on the day of presentation. Difficulty taking care of her at home by wife. No recent changes in medication. Urinalysis unremarkable. TSH WNL.   Electrolytes WNL.  CT head unremarkable. Currently unclear etiology. Vitals otherwise stable. Patient currently is awake, alert and oriented to self which is not his baseline. Fall precautions  Delirium precautions  Replace magnesium  Will start Seroquel 25 mg daily  Will need MRI brain if no etiology identified  Geriatics consulted  Check TSH, vitamin B12, vitamin D  Ct abdomen was reviewed shows -Circumferential bladder wall thickening with mild surrounding fat stranding. The bladder wall thickening is attributable to outlet obstruction, given the severe prostamegaly, however the surrounding fat stranding could indicate a superimposed cystitis. Give IV antibiotic with Rocephin. Also will replace with vitamin B12. Geriatrics input appreciated, recommended to continue Seroquel 25 mg at 7 PM, added gabapentin 100 mg twice a day, can increase gabapentin to every 8 hours as needed, and Zyprexa as needed for severe agitation. We will watch without virtual one-to-one, patient's wife wants him to be placed in SNF however does not need physical therapy needs. VTE Pharmacologic Prophylaxis:   Moderate Risk (Score 3-4) - Pharmacological DVT Prophylaxis Ordered: enoxaparin (Lovenox). Patient Centered Rounds: I performed bedside rounds with nursing staff today. Discussions with Specialists or Other Care Team Provider: d/w geriatrics    Education and Discussions with Family / Patient: Updated  (wife) at bedside. Total Time Spent on Date of Encounter in care of patient: 45 mins. This time was spent on one or more of the following: performing physical exam; counseling and coordination of care; obtaining or reviewing history; documenting in the medical record; reviewing/ordering tests, medications or procedures; communicating with other healthcare professionals and discussing with patient's family/caregivers.     Current Length of Stay: 2 day(s)  Current Patient Status: Inpatient   Certification Statement: The patient will continue to require additional inpatient hospital stay due to retention with Choi catheter, confusion  Discharge Plan: Anticipate discharge in 24-48 hrs to rehab facility. Code Status: Level 1 - Full Code    Subjective:   Patient awake alert but confused, thinks that he is in [place], states that he is seeing a BP in the room, was on virtual one-to-one, he is directable, follow simple commands,    Objective:     Vitals:   Temp (24hrs), Av.3 °F (36.8 °C), Min:97.9 °F (36.6 °C), Max:99 °F (37.2 °C)    Temp:  [97.9 °F (36.6 °C)-99 °F (37.2 °C)] 98 °F (36.7 °C)  HR:  [66-72] 72  Resp:  [16-17] 17  BP: (108-112)/(60-64) 112/64  SpO2:  [98 %] 98 %  Body mass index is 23.56 kg/m². Input and Output Summary (last 24 hours): Intake/Output Summary (Last 24 hours) at 2023 1822  Last data filed at 2023 0405  Gross per 24 hour   Intake 160 ml   Output 600 ml   Net -440 ml       Physical Exam:   Physical Exam   HEENT-PERRLA, moist oral mucosa  Neck-supple, no JVD elevation   Respiratory-equal air entry bilaterally, no rales or rhonchi  Cardiovascular system-S1, S2 heard, no murmur or gallops or rubs  Abdomen-soft, nontender, no guarding or rigidity, bowel sounds heard  Extremities-no pedal edema  Peripheral pulses palpable  Musculoskeletal-no contractures  Central nervous system-no acute focal neurological deficit ,no sensory or motor deficit noted.   Skin-no rash noted       Additional Data:     Labs:  Results from last 7 days   Lab Units 23  0531 23  0451   WBC Thousand/uL 7.17 6.35   HEMOGLOBIN g/dL 13.6 12.3   HEMATOCRIT % 40.3 36.3*   PLATELETS Thousands/uL 250 211   NEUTROS PCT %  --  73   LYMPHS PCT %  --  15   MONOS PCT %  --  8   EOS PCT %  --  3     Results from last 7 days   Lab Units 23  0531 231 11/10/23  2304   SODIUM mmol/L 140   < > 137   POTASSIUM mmol/L 3.6   < > 3.5   CHLORIDE mmol/L 108   < > 105   CO2 mmol/L 24   < > 24 BUN mg/dL 15   < > 13   CREATININE mg/dL 0.99   < > 0.84   ANION GAP mmol/L 8   < > 8   CALCIUM mg/dL 8.4   < > 8.7   ALBUMIN g/dL  --   --  3.4*   TOTAL BILIRUBIN mg/dL  --   --  0.62   ALK PHOS U/L  --   --  53   ALT U/L  --   --  11   AST U/L  --   --  14   GLUCOSE RANDOM mg/dL 95   < > 101    < > = values in this interval not displayed.                        Lines/Drains:  Invasive Devices       Peripheral Intravenous Line  Duration             Peripheral IV 11/10/23 Left;Ventral (anterior) Forearm 2 days              Drain  Duration             Urethral Catheter 16 Fr. 7 days                  Urinary Catheter:  Goal for removal: N/A- Discharging with Choi               Imaging: Personally reviewed the following imaging: abdominal/pelvic CT    Recent Cultures (last 7 days):         Last 24 Hours Medication List:   Current Facility-Administered Medications   Medication Dose Route Frequency Provider Last Rate    acetaminophen  650 mg Oral Q6H PRN Ibrahima Hamm MD      acetaminophen  975 mg Oral TID Anastacia Martines MD      cefTRIAXone  1,000 mg Intravenous Q24H Ingris Gamez MD 1,000 mg (11/13/23 4253)    cyanocobalamin  1,000 mcg Oral Daily Ana Moore MD      docusate sodium  100 mg Oral BID Ana Moore MD      enoxaparin  40 mg Subcutaneous Daily Ibrahima Hamm MD      Kojo Boothe ON 11/14/2023] escitalopram  5 mg Oral Daily Anastacia Martines MD      finasteride  5 mg Oral Daily Ingris Gamez MD      gabapentin  100 mg Oral BID Anastacia Martines MD      melatonin  3 mg Oral HS Anastacia Martines MD      OLANZapine  2.5 mg Intramuscular Q6H PRN Anastacia Martines MD      ondansetron  4 mg Intravenous Q6H PRN Ibrahima Hamm MD      polyethylene glycol  17 g Oral Daily Ana Moore MD      QUEtiapine  25 mg Oral HS Ana Moore MD      senna  2 tablet Oral HS Anastacia Martines MD      tamsulosin  0.4 mg Oral Daily With [employer] Angela Gallardo MD          Today, Patient Was Seen By: Lisa Velázquez MD    **Please Note: This note may have been constructed using a voice recognition system. **

## 2023-11-13 NOTE — ASSESSMENT & PLAN NOTE
Recent admission with RENATO secondary to urine retention s/p Choi catheter placement. Follows up with Dr. Jd Yang outpatient. Plan of care was discussed in length with Dr. Jd Yang and the wife at bedside who wanted a voiding trial done as inpatient however recommended to continue Choi catheter for now due to confusion and encephalopathy and recommend voiding trial at the SNF/rehab or as outpatient by Dr. Jd Yang.

## 2023-11-14 PROBLEM — G93.41 METABOLIC ENCEPHALOPATHY: Status: ACTIVE | Noted: 2023-11-11

## 2023-11-14 PROBLEM — T83.518A CATHETER CYSTITIS: Status: ACTIVE | Noted: 2023-11-12

## 2023-11-14 PROCEDURE — 83918 ORGANIC ACIDS TOTAL QUANT: CPT | Performed by: INTERNAL MEDICINE

## 2023-11-14 PROCEDURE — 99232 SBSQ HOSP IP/OBS MODERATE 35: CPT | Performed by: FAMILY MEDICINE

## 2023-11-14 PROCEDURE — 99232 SBSQ HOSP IP/OBS MODERATE 35: CPT | Performed by: INTERNAL MEDICINE

## 2023-11-14 RX ORDER — DIVALPROEX SODIUM 250 MG/1
250 TABLET, DELAYED RELEASE ORAL DAILY
Status: DISCONTINUED | OUTPATIENT
Start: 2023-11-14 | End: 2023-11-16 | Stop reason: HOSPADM

## 2023-11-14 RX ORDER — SENNOSIDES 8.6 MG
2 TABLET ORAL 2 TIMES DAILY
Status: DISCONTINUED | OUTPATIENT
Start: 2023-11-14 | End: 2023-11-16 | Stop reason: HOSPADM

## 2023-11-14 RX ORDER — GABAPENTIN 100 MG/1
100 CAPSULE ORAL 3 TIMES DAILY
Status: DISCONTINUED | OUTPATIENT
Start: 2023-11-14 | End: 2023-11-16 | Stop reason: HOSPADM

## 2023-11-14 RX ORDER — WATER 10 ML/10ML
INJECTION INTRAMUSCULAR; INTRAVENOUS; SUBCUTANEOUS
Status: COMPLETED
Start: 2023-11-14 | End: 2023-11-14

## 2023-11-14 RX ADMIN — ACETAMINOPHEN 975 MG: 325 TABLET, FILM COATED ORAL at 22:00

## 2023-11-14 RX ADMIN — OLANZAPINE 2.5 MG: 10 INJECTION, POWDER, FOR SOLUTION INTRAMUSCULAR at 00:56

## 2023-11-14 RX ADMIN — GABAPENTIN 100 MG: 100 CAPSULE ORAL at 22:00

## 2023-11-14 RX ADMIN — FINASTERIDE 5 MG: 5 TABLET, FILM COATED ORAL at 09:16

## 2023-11-14 RX ADMIN — GABAPENTIN 100 MG: 100 CAPSULE ORAL at 13:18

## 2023-11-14 RX ADMIN — GABAPENTIN 100 MG: 100 CAPSULE ORAL at 09:16

## 2023-11-14 RX ADMIN — ESCITALOPRAM OXALATE 5 MG: 10 TABLET ORAL at 09:16

## 2023-11-14 RX ADMIN — CYANOCOBALAMIN TAB 500 MCG 1000 MCG: 500 TAB at 09:16

## 2023-11-14 RX ADMIN — ENOXAPARIN SODIUM 40 MG: 40 INJECTION SUBCUTANEOUS at 09:15

## 2023-11-14 RX ADMIN — WATER 10 ML: 1 INJECTION INTRAMUSCULAR; INTRAVENOUS; SUBCUTANEOUS at 00:58

## 2023-11-14 RX ADMIN — CEFTRIAXONE 1000 MG: 1 INJECTION, SOLUTION INTRAVENOUS at 17:29

## 2023-11-14 RX ADMIN — POLYETHYLENE GLYCOL 3350 17 G: 17 POWDER, FOR SOLUTION ORAL at 09:15

## 2023-11-14 RX ADMIN — DOCUSATE SODIUM 100 MG: 100 CAPSULE, LIQUID FILLED ORAL at 09:16

## 2023-11-14 RX ADMIN — ACETAMINOPHEN 975 MG: 325 TABLET, FILM COATED ORAL at 09:16

## 2023-11-14 RX ADMIN — Medication 3 MG: at 22:00

## 2023-11-14 NOTE — PLAN OF CARE
Problem: PAIN - ADULT  Goal: Verbalizes/displays adequate comfort level or baseline comfort level  Description: Interventions:  - Encourage patient to monitor pain and request assistance  - Assess pain using appropriate pain scale  - Administer analgesics based on type and severity of pain and evaluate response  - Implement non-pharmacological measures as appropriate and evaluate response  - Consider cultural and social influences on pain and pain management  - Notify physician/advanced practitioner if interventions unsuccessful or patient reports new pain  Outcome: Progressing     Problem: INFECTION - ADULT  Goal: Absence or prevention of progression during hospitalization  Description: INTERVENTIONS:  - Assess and monitor for signs and symptoms of infection  - Monitor lab/diagnostic results  - Monitor all insertion sites, i.e. indwelling lines, tubes, and drains  - Monitor endotracheal if appropriate and nasal secretions for changes in amount and color  - Jerome appropriate cooling/warming therapies per order  - Administer medications as ordered  - Instruct and encourage patient and family to use good hand hygiene technique  - Identify and instruct in appropriate isolation precautions for identified infection/condition  Outcome: Progressing  Goal: Absence of fever/infection during neutropenic period  Description: INTERVENTIONS:  - Monitor WBC    Outcome: Progressing     Problem: SAFETY ADULT  Goal: Patient will remain free of falls  Description: INTERVENTIONS:  - Educate patient/family on patient safety including physical limitations  - Instruct patient to call for assistance with activity   - Consult OT/PT to assist with strengthening/mobility   - Keep Call bell within reach  - Keep bed low and locked with side rails adjusted as appropriate  - Keep care items and personal belongings within reach  - Initiate and maintain comfort rounds  - Make Fall Risk Sign visible to staff  - Offer Toileting every 2 Hours, in advance of need  - Initiate/Maintain bed alarm  - Obtain necessary fall risk management equipment:   - Apply yellow socks and bracelet for high fall risk patients  - Consider moving patient to room near nurses station  Outcome: Progressing  Goal: Maintain or return to baseline ADL function  Description: INTERVENTIONS:  -  Assess patient's ability to carry out ADLs; assess patient's baseline for ADL function and identify physical deficits which impact ability to perform ADLs (bathing, care of mouth/teeth, toileting, grooming, dressing, etc.)  - Assess/evaluate cause of self-care deficits   - Assess range of motion  - Assess patient's mobility; develop plan if impaired  - Assess patient's need for assistive devices and provide as appropriate  - Encourage maximum independence but intervene and supervise when necessary  - Involve family in performance of ADLs  - Assess for home care needs following discharge   - Consider OT consult to assist with ADL evaluation and planning for discharge  - Provide patient education as appropriate  Outcome: Progressing  Goal: Maintains/Returns to pre admission functional level  Description: INTERVENTIONS:  - Perform BMAT or MOVE assessment daily.   - Set and communicate daily mobility goal to care team and patient/family/caregiver. - Collaborate with rehabilitation services on mobility goals if consulted  - Perform Range of Motion 3 times a day. - Reposition patient every 2 hours.   - Dangle patient 3 times a day  - Stand patient 3 times a day  - Ambulate patient 3 times a day  - Out of bed to chair 3 times a day   - Out of bed for meals 3 times a day  - Out of bed for toileting  - Record patient progress and toleration of activity level   Outcome: Progressing     Problem: DISCHARGE PLANNING  Goal: Discharge to home or other facility with appropriate resources  Description: INTERVENTIONS:  - Identify barriers to discharge w/patient and caregiver  - Arrange for needed discharge resources and transportation as appropriate  - Identify discharge learning needs (meds, wound care, etc.)  - Arrange for interpretive services to assist at discharge as needed  - Refer to Case Management Department for coordinating discharge planning if the patient needs post-hospital services based on physician/advanced practitioner order or complex needs related to functional status, cognitive ability, or social support system  Outcome: Progressing     Problem: Knowledge Deficit  Goal: Patient/family/caregiver demonstrates understanding of disease process, treatment plan, medications, and discharge instructions  Description: Complete learning assessment and assess knowledge base.   Interventions:  - Provide teaching at level of understanding  - Provide teaching via preferred learning methods  Outcome: Progressing     Problem: Prexisting or High Potential for Compromised Skin Integrity  Goal: Skin integrity is maintained or improved  Description: INTERVENTIONS:  - Identify patients at risk for skin breakdown  - Assess and monitor skin integrity  - Assess and monitor nutrition and hydration status  - Monitor labs   - Assess for incontinence   - Turn and reposition patient  - Assist with mobility/ambulation  - Relieve pressure over bony prominences  - Avoid friction and shearing  - Provide appropriate hygiene as needed including keeping skin clean and dry  - Evaluate need for skin moisturizer/barrier cream  - Collaborate with interdisciplinary team   - Patient/family teaching  - Consider wound care consult   Outcome: Progressing     Problem: MOBILITY - ADULT  Goal: Maintain or return to baseline ADL function  Description: INTERVENTIONS:  -  Assess patient's ability to carry out ADLs; assess patient's baseline for ADL function and identify physical deficits which impact ability to perform ADLs (bathing, care of mouth/teeth, toileting, grooming, dressing, etc.)  - Assess/evaluate cause of self-care deficits   - Assess range of motion  - Assess patient's mobility; develop plan if impaired  - Assess patient's need for assistive devices and provide as appropriate  - Encourage maximum independence but intervene and supervise when necessary  - Involve family in performance of ADLs  - Assess for home care needs following discharge   - Consider OT consult to assist with ADL evaluation and planning for discharge  - Provide patient education as appropriate  Outcome: Progressing  Goal: Maintains/Returns to pre admission functional level  Description: INTERVENTIONS:  - Perform BMAT or MOVE assessment daily.   - Set and communicate daily mobility goal to care team and patient/family/caregiver. - Collaborate with rehabilitation services on mobility goals if consulted  - Perform Range of Motion 3 times a day. - Reposition patient every 2 hours. - Dangle patient 3 times a day  - Stand patient 3 times a day  - Ambulate patient 3 times a day  - Out of bed to chair 3 times a day   - Out of bed for meals 3 times a day  - Out of bed for toileting  - Record patient progress and toleration of activity level   Outcome: Progressing     Problem: NEUROSENSORY - ADULT  Goal: Achieves stable or improved neurological status  Description: INTERVENTIONS  - Monitor and report changes in neurological status  - Monitor vital signs such as temperature, blood pressure, glucose, and any other labs ordered   - Initiate measures to prevent increased intracranial pressure  - Monitor for seizure activity and implement precautions if appropriate      Outcome: Progressing  Goal: Achieves maximal functionality and self care  Description: INTERVENTIONS  - Monitor swallowing and airway patency with patient fatigue and changes in neurological status  - Encourage and assist patient to increase activity and self care.    - Encourage visually impaired, hearing impaired and aphasic patients to use assistive/communication devices  Outcome: Progressing     Problem: GASTROINTESTINAL - ADULT  Goal: Maintains or returns to baseline bowel function  Description: INTERVENTIONS:  - Assess bowel function  - Encourage oral fluids to ensure adequate hydration  - Administer IV fluids if ordered to ensure adequate hydration  - Administer ordered medications as needed  - Encourage mobilization and activity  - Consider nutritional services referral to assist patient with adequate nutrition and appropriate food choices  Outcome: Progressing  Goal: Maintains adequate nutritional intake  Description: INTERVENTIONS:  - Monitor percentage of each meal consumed  - Identify factors contributing to decreased intake, treat as appropriate  - Assist with meals as needed  - Monitor I&O, weight, and lab values if indicated  - Obtain nutrition services referral as needed  Outcome: Progressing     Problem: GENITOURINARY - ADULT  Goal: Maintains or returns to baseline urinary function  Description: INTERVENTIONS:  - Assess urinary function  - Encourage oral fluids to ensure adequate hydration if ordered  - Administer IV fluids as ordered to ensure adequate hydration  - Administer ordered medications as needed  - Offer frequent toileting  - Follow urinary retention protocol if ordered  Outcome: Progressing  Goal: Absence of urinary retention  Description: INTERVENTIONS:  - Assess patient’s ability to void and empty bladder  - Monitor I/O  - Bladder scan as needed  - Discuss with physician/AP medications to alleviate retention as needed  - Discuss catheterization for long term situations as appropriate  Outcome: Progressing  Goal: Urinary catheter remains patent  Description: INTERVENTIONS:  - Assess patency of urinary catheter  - If patient has a chronic mcduffie, consider changing catheter if non-functioning  - Follow guidelines for intermittent irrigation of non-functioning urinary catheter  Outcome: Progressing

## 2023-11-14 NOTE — ASSESSMENT & PLAN NOTE
Patient needs assistance with some IADLs at baseline  Will continue to provide supportive care, reorient as needed. Patient is at high risk for delirium, will monitor closely and place on delirium precautions. Maintain sleep/wake cycle, add melatonin 3 mg nightly  Optimize pain regimen. Monitor for constipation and urinary retention and manage as needed. Vitamin B12 and TSH within normal limits-continue to supplement B12  Encourage family to visit. Encourage to wear glasses and hearing aids while awake. Encourage po intake, assist with feeding if needed.    Out of bed with meals and continue physical therapy  I would recommend geriatric assessment as outpatient when patient is medically stable and in a familiar environment  Agree with starting Depakote in the setting of dementia and agitation with behavioral issues

## 2023-11-14 NOTE — NURSING NOTE
2115 impulsive and aggressive, hitting nurses and trying to elope. Security is called to help redirect back in bed. 2130 After getting pt his prn med Zyprexa doctor is informed.

## 2023-11-14 NOTE — ASSESSMENT & PLAN NOTE
Presented with change in mental status with cystitis and will give IV antibiotic with Rocephin. Patient cystitis is associated with indwelling Choi catheter which was placed on 11/6/2023, this is evidenced by CT scan showing recurrence of bladder wall thickening, and urinalysis.

## 2023-11-14 NOTE — ASSESSMENT & PLAN NOTE
We will add melatonin 3 mg nightly  Optimize pain regimen  If melatonin not effective consider mirtazapine 7.5 mg nightly  Avoid nighttime interruptions and caffeine use in the afternoon

## 2023-11-14 NOTE — ASSESSMENT & PLAN NOTE
Patient presented with worsening change in mental status. As per wife who is the primary historian reports patient has dementia however baseline is very cooperative, calm. Alert oriented x2-3. Symptoms started to get worse with change in mental status after recent surgery done on 11/2/2023 for inguinal hernia repair. Was recently admitted to the hospital with RENATO secondary to urine retention s/p Choi catheter placement and discharged home with Choi catheter in place. States patient was calm and cooperative however his mental status was not back to baseline on discharge from recent hospitalization. Presented again with delusion and hallucinations started on the day of presentation. Difficulty taking care of her at home by wife. No recent changes in medication. TSH WNL. Electrolytes WNL. CT head unremarkable. Currently unclear etiology. Vitals otherwise stable. Patient currently is awake, alert and oriented to self which is not his baseline. Stable etiology likely cystitis with change in mental status and hallucination requiring IV antibiotic. Fall precautions  Delirium precautions  Replace magnesium  Will start Seroquel 25 mg daily  Continue Rocephin  Geriatics consulted  Check TSH, vitamin B12, vitamin D  Ct abdomen was reviewed shows -Circumferential bladder wall thickening with mild surrounding fat stranding. The bladder wall thickening is attributable to outlet obstruction, given the severe prostamegaly, however the surrounding fat stranding could indicate a superimposed cystitis. Give IV antibiotic with Rocephin. Also will replace with vitamin B12. Geriatrics input appreciated, recommended to continue Seroquel 25 mg at 7 PM, added gabapentin 100 mg twice a day, can increase gabapentin to every 8 hours as needed, and Zyprexa as needed for severe agitation.   Agree with adding Depakote due to agitation in the setting of dementia

## 2023-11-14 NOTE — PROGRESS NOTES
Progress Note - Geriatric Medicine   Sarah Colón 81 y.o. male MRN: [id number]  Unit/Bed#: -01 Encounter: [id number]      Assessment/Plan:  Urine retention  Assessment & Plan  Patient seen by urology  Currently Choi in place  Continue tamsulosin and finasteride  Monitor closely  Continue ceftriaxone for UTI    BPH (benign prostatic hyperplasia)  Assessment & Plan  Continue tamsulosin and finasteride  Urology follows    Constipation  Assessment & Plan  No bowel movement since admission to the hospital   Increase senna to 2 tablets bid  Continue Colace and MiraLAX  Encourage p.o. hydration and out of bed as tolerated  Offer prune juice    Insomnia  Assessment & Plan  We will add melatonin 3 mg nightly  Optimize pain regimen  If melatonin not effective consider mirtazapine 7.5 mg nightly  Avoid nighttime interruptions and caffeine use in the afternoon    Current moderate episode of major depressive disorder without prior episode (HCC)  Assessment & Plan  Continue Lexapro 5 mg daily  Provide supportive care  Encourage family and friends to visit    Cognitive impairment  Assessment & Plan  Patient needs assistance with some IADLs at baseline  Will continue to provide supportive care, reorient as needed. Patient is at high risk for delirium, will monitor closely and place on delirium precautions. Maintain sleep/wake cycle, add melatonin 3 mg nightly  Optimize pain regimen. Monitor for constipation and urinary retention and manage as needed. Vitamin B12 and TSH within normal limits  Encourage family to visit. Encourage to wear glasses and hearing aids while awake. Encourage po intake, assist with feeding if needed.    Out of bed with meals and continue physical therapy  I would recommend geriatric assessment as outpatient when patient is medically stable and in a familiar environment    * Metabolic encephalopathy  Assessment & Plan    -Patient is high risk of delirium due to cognitive impairment at baseline, change in environment, insomnia, cystitis  -Initiate delirium precautions  -maintain normal sleep/wake cycle, add melatonin 3 mg nightly  -minimize overnight interruptions, group overnight vitals/labs/nursing checks as possible  -dim lights, close blinds and turn off tv to minimize stimulation and encourage sleep environment in evenings  -ensure that pain is well controlled continue Tylenol 975mg 3x daily scheduled   -monitor for fecal and urinary retention which may precipitate delirium, add senna to his regimen  -encourage early mobilization and ambulation  -provide frequent reorientation and redirection  -encourage family and friends at the bedside to help calm patient if anxious  -avoid medications which may precipitate or worsen delirium such as tramadol, benzodiazepine, anticholinergics, and antihistaminics  -encourage hydration and nutrition , assist with feeding if needed  -redirect unwanted behaviors as first line, avoid physical restraints.  -Continue Seroquel 25 mg at 7 PM  -Increase gabapentin to 100 mg 3 times daily  -Start Depakote 250 mg po at 5 PM, monitor CMP  -Use Zyprexa 2.5 mg every 8 hours as needed only for severe agitation and if patient not tolerating p.o. intake         Subjective:   Patient seen and examined at bedside for geriatric follow-up. Sleeping at the time of encounter. She was agitated overnight and received Zyprexa 2.5 mg IM around 9 PM.  Per nursing staff he is sleeping since 3 AM.  He was able to take his morning medications. Review of Systems   Unable to perform ROS: Mental status change         Objective:     Vitals: Blood pressure 103/63, pulse 59, temperature 97.6 °F (36.4 °C), temperature source Oral, resp. rate 16, height 5' 6" (1.676 m), weight 66.2 kg (145 lb 15.1 oz), SpO2 99 %. ,Body mass index is 23.56 kg/m².       Intake/Output Summary (Last 24 hours) at 11/14/2023 1059  Last data filed at 11/14/2023 0900  Gross per 24 hour   Intake 720 ml   Output 970 ml   Net -250 ml       Current Medications: Reviewed    Physical Exam:   Physical Exam  Vitals and nursing note reviewed. Constitutional:       General: He is not in acute distress. Appearance: He is well-developed. HENT:      Head: Normocephalic and atraumatic. Mouth/Throat:      Mouth: Mucous membranes are dry. Eyes:      Conjunctiva/sclera: Conjunctivae normal.   Cardiovascular:      Rate and Rhythm: Normal rate and regular rhythm. Heart sounds: No murmur heard. Pulmonary:      Effort: Pulmonary effort is normal. No respiratory distress. Breath sounds: Normal breath sounds. Abdominal:      Palpations: Abdomen is soft. Tenderness: There is no abdominal tenderness. Genitourinary:     Comments: Choi cath  Musculoskeletal:         General: No swelling. Cervical back: Neck supple. Skin:     General: Skin is warm and dry. Capillary Refill: Capillary refill takes less than 2 seconds. Neurological:      Mental Status: He is alert. Comments: somnolent   Psychiatric:      Comments: Agitated/aggressive in the evening , currently sleeping          Invasive Devices       Peripheral Intravenous Line  Duration             Peripheral IV 11/10/23 Left;Ventral (anterior) Forearm 3 days              Drain  Duration             Urethral Catheter 16 Fr. 7 days                    Lab, Imaging and other studies: I have personally reviewed pertinent reports.

## 2023-11-14 NOTE — PLAN OF CARE
Problem: PAIN - ADULT  Goal: Verbalizes/displays adequate comfort level or baseline comfort level  Description: Interventions:  - Encourage patient to monitor pain and request assistance  - Assess pain using appropriate pain scale  - Administer analgesics based on type and severity of pain and evaluate response  - Implement non-pharmacological measures as appropriate and evaluate response  - Consider cultural and social influences on pain and pain management  - Notify physician/advanced practitioner if interventions unsuccessful or patient reports new pain  Outcome: Progressing     Problem: INFECTION - ADULT  Goal: Absence or prevention of progression during hospitalization  Description: INTERVENTIONS:  - Assess and monitor for signs and symptoms of infection  - Monitor lab/diagnostic results  - Administer medications as ordered  - Instruct and encourage patient and family to use good hand hygiene technique  - Identify and instruct in appropriate isolation precautions for identified infection/condition  Outcome: Progressing  Goal: Absence of fever/infection during neutropenic period  Description: INTERVENTIONS:  - Monitor WBC    Outcome: Progressing     Problem: DISCHARGE PLANNING  Goal: Discharge to home or other facility with appropriate resources  Description: INTERVENTIONS:  - Identify barriers to discharge w/patient and caregiver  - Arrange for needed discharge resources and transportation as appropriate  - Identify discharge learning needs (meds, wound care, etc.)  - Arrange for interpretive services to assist at discharge as needed  - Refer to Case Management Department for coordinating discharge planning if the patient needs post-hospital services based on physician/advanced practitioner order or complex needs related to functional status, cognitive ability, or social support system  Outcome: Progressing     Problem: Knowledge Deficit  Goal: Patient/family/caregiver demonstrates understanding of disease process, treatment plan, medications, and discharge instructions  Description: Complete learning assessment and assess knowledge base. Interventions:  - Provide teaching at level of understanding  - Provide teaching via preferred learning methods  Outcome: Progressing     Problem: Prexisting or High Potential for Compromised Skin Integrity  Goal: Skin integrity is maintained or improved  Description: INTERVENTIONS:  - Identify patients at risk for skin breakdown  - Assess and monitor skin integrity  - Assess and monitor nutrition and hydration status  - Monitor labs   - Assess for incontinence   - Turn and reposition patient  - Assist with mobility/ambulation  - Relieve pressure over bony prominences  - Avoid friction and shearing  - Provide appropriate hygiene as needed including keeping skin clean and dry  - Evaluate need for skin moisturizer/barrier cream  - Collaborate with interdisciplinary team   - Patient/family teaching  Outcome: Progressing     Problem: MOBILITY - ADULT  Goal: Maintain or return to baseline ADL function  Description: INTERVENTIONS:  -  Assess patient's ability to carry out ADLs; assess patient's baseline for ADL function and identify physical deficits which impact ability to perform ADLs (bathing, care of mouth/teeth, toileting, grooming, dressing, etc.)  - Assess/evaluate cause of self-care deficits   - Assess range of motion  - Assess patient's mobility; develop plan if impaired  - Assess patient's need for assistive devices and provide as appropriate  - Encourage maximum independence but intervene and supervise when necessary  - Involve family in performance of ADLs  - Assess for home care needs following discharge   - Consider OT consult to assist with ADL evaluation and planning for discharge  - Provide patient education as appropriate  Outcome: Progressing  Goal: Maintains/Returns to pre admission functional level  Description: INTERVENTIONS:  - Perform BMAT or MOVE assessment daily. - Set and communicate daily mobility goal to care team and patient/family/caregiver. - Collaborate with rehabilitation services on mobility goals if consulted  - Perform Range of Motion 3 times a day. - Reposition patient every 2 hours. - Dangle patient 3 times a day  - Stand patient 3 times a day  - Ambulate patient 3 times a day  - Out of bed to chair 3 times a day   - Out of bed for meals 3 times a day  - Out of bed for toileting  - Record patient progress and toleration of activity level   Outcome: Progressing     Problem: NEUROSENSORY - ADULT  Goal: Achieves stable or improved neurological status  Description: INTERVENTIONS  - Monitor and report changes in neurological status  - Monitor vital signs such as temperature, blood pressure, glucose, and any other labs ordered   - Initiate measures to prevent increased intracranial pressure  - Monitor for seizure activity and implement precautions if appropriate      Outcome: Progressing  Goal: Achieves maximal functionality and self care  Description: INTERVENTIONS  - Monitor swallowing and airway patency with patient fatigue and changes in neurological status  - Encourage and assist patient to increase activity and self care.    - Encourage visually impaired, hearing impaired and aphasic patients to use assistive/communication devices  Outcome: Progressing     Problem: GASTROINTESTINAL - ADULT  Goal: Maintains or returns to baseline bowel function  Description: INTERVENTIONS:  - Assess bowel function  - Encourage oral fluids to ensure adequate hydration  - Administer IV fluids if ordered to ensure adequate hydration  - Administer ordered medications as needed  - Encourage mobilization and activity  - Consider nutritional services referral to assist patient with adequate nutrition and appropriate food choices  Outcome: Progressing  Goal: Maintains adequate nutritional intake  Description: INTERVENTIONS:  - Monitor percentage of each meal consumed  - Identify factors contributing to decreased intake, treat as appropriate  - Assist with meals as needed  - Monitor I&O, weight, and lab values if indicated  - Obtain nutrition services referral as needed  Outcome: Progressing     Problem: GENITOURINARY - ADULT  Goal: Maintains or returns to baseline urinary function  Description: INTERVENTIONS:  - Assess urinary function  - Encourage oral fluids to ensure adequate hydration if ordered  - Administer IV fluids as ordered to ensure adequate hydration  - Administer ordered medications as needed  - Offer frequent toileting  - Follow urinary retention protocol if ordered  Outcome: Progressing  Goal: Absence of urinary retention  Description: INTERVENTIONS:  - Assess patient’s ability to void and empty bladder  - Monitor I/O  - Bladder scan as needed  - Discuss with physician/AP medications to alleviate retention as needed  - Discuss catheterization for long term situations as appropriate  Outcome: Progressing  Goal: Urinary catheter remains patent  Description: INTERVENTIONS:  - Assess patency of urinary catheter  - If patient has a chronic mcduffie, consider changing catheter if non-functioning  - Follow guidelines for intermittent irrigation of non-functioning urinary catheter  Outcome: Progressing

## 2023-11-14 NOTE — PROGRESS NOTES
[address]  Progress Note  Name: Leonor Portillo  MRN: [id number]  Unit/Bed#: -01 I Date of Admission: 11/10/2023   Date of Service: 11/14/2023 I Hospital Day: 3    Assessment/Plan   Catheter cystitis   Assessment & Plan  Presented with change in mental status with cystitis and will give IV antibiotic with Rocephin. Patient cystitis is associated with indwelling Choi catheter which was placed on 11/6/2023, this is evidenced by CT scan showing recurrence of bladder wall thickening, and urinalysis. Urine retention  Assessment & Plan  Recent admission with RENATO secondary to urine retention s/p Choi catheter placement. Follows up with Dr. Terressa Halsted outpatient. Plan of care was discussed in length with Dr. Terressa Halsted and the wife at bedside who wanted a voiding trial done as inpatient however recommended to continue Choi catheter for now due to confusion and encephalopathy and recommend voiding trial at the SNF/rehab or as outpatient by Dr. Terressa Halsted. BPH (benign prostatic hyperplasia)  Assessment & Plan  On Flomax and dutasteride chronic change to finasteride and Flomax while inpatient    H/O inguinal hernia repair  Assessment & Plan  S/p recent surgery on 11/2/2023    Constipation  Assessment & Plan  Stool softeners,    Insomnia  Assessment & Plan  We will add melatonin 3 mg nightly  Optimize pain regimen  If melatonin not effective consider mirtazapine 7.5 mg nightly  Avoid nighttime interruptions and caffeine use in the afternoon    Current moderate episode of major depressive disorder without prior episode (HCC)  Assessment & Plan  Consider Lexapro 5 mg daily  Provide supportive care  Encourage family and friends to visit    Cognitive impairment  Assessment & Plan  Patient needs assistance with some IADLs at baseline  Will continue to provide supportive care, reorient as needed.   Patient is at high risk for delirium, will monitor closely and place on delirium precautions. Maintain sleep/wake cycle, add melatonin 3 mg nightly  Optimize pain regimen. Monitor for constipation and urinary retention and manage as needed. Vitamin B12 and TSH within normal limits-continue to supplement B12  Encourage family to visit. Encourage to wear glasses and hearing aids while awake. Encourage po intake, assist with feeding if needed. Out of bed with meals and continue physical therapy  I would recommend geriatric assessment as outpatient when patient is medically stable and in a familiar environment  Agree with starting Depakote in the setting of dementia and agitation with behavioral issues    * Metabolic encephalopathy  Assessment & Plan  Patient presented with worsening change in mental status. As per wife who is the primary historian reports patient has dementia however baseline is very cooperative, calm. Alert oriented x2-3. Symptoms started to get worse with change in mental status after recent surgery done on 11/2/2023 for inguinal hernia repair. Was recently admitted to the hospital with RENATO secondary to urine retention s/p Choi catheter placement and discharged home with Choi catheter in place. States patient was calm and cooperative however his mental status was not back to baseline on discharge from recent hospitalization. Presented again with delusion and hallucinations started on the day of presentation. Difficulty taking care of her at home by wife. No recent changes in medication. TSH WNL. Electrolytes WNL. CT head unremarkable. Currently unclear etiology. Vitals otherwise stable. Patient currently is awake, alert and oriented to self which is not his baseline. Stable etiology likely cystitis with change in mental status and hallucination requiring IV antibiotic.     Fall precautions  Delirium precautions  Replace magnesium  Will start Seroquel 25 mg daily  Continue Rocephin  Geriatics consulted  Check TSH, vitamin B12, vitamin D  Ct abdomen was reviewed shows -Circumferential bladder wall thickening with mild surrounding fat stranding. The bladder wall thickening is attributable to outlet obstruction, given the severe prostamegaly, however the surrounding fat stranding could indicate a superimposed cystitis. Give IV antibiotic with Rocephin. Also will replace with vitamin B12. Geriatrics input appreciated, recommended to continue Seroquel 25 mg at 7 PM, added gabapentin 100 mg twice a day, can increase gabapentin to every 8 hours as needed, and Zyprexa as needed for severe agitation. Agree with adding Depakote due to agitation in the setting of dementia               VTE Pharmacologic Prophylaxis:   High Risk (Score >/= 5) - Pharmacological DVT Prophylaxis Ordered: enoxaparin (Lovenox). Sequential Compression Devices Ordered. Mobility:   Basic Mobility Inpatient Raw Score: 24  JH-HLM Goal: 8: Walk 250 feet or more  JH-HLM Achieved: 8: Walk 250 feet ot more  HLM Goal NOT achieved. Continue with multidisciplinary rounding and encourage appropriate mobility to improve upon [facility] goals. Patient Centered Rounds: I performed bedside rounds with nursing staff today. Discussions with Specialists or Other Care Team Provider: geriatrics    Education and Discussions with Family / Patient: Updated  (wife) at bedside. Total Time Spent on Date of Encounter in care of patient: 35 mins. This time was spent on one or more of the following: performing physical exam; counseling and coordination of care; obtaining or reviewing history; documenting in the medical record; reviewing/ordering tests, medications or procedures; communicating with other healthcare professionals and discussing with patient's family/caregivers.     Current Length of Stay: 3 day(s)  Current Patient Status: Inpatient   Certification Statement: The patient will continue to require additional inpatient hospital stay due to medication management  Discharge Plan: Anticipate discharge in 24-48 hrs to discharge location to be determined pending rehab evaluations. Code Status: Level 1 - Full Code    Subjective:   Patient was noted to be agitated last night requiring as needed dosing of Zyprexa, has been sleeping from roughly 3 AM into this morning. Objective:     Vitals:   Temp (24hrs), Av.7 °F (36.5 °C), Min:97.5 °F (36.4 °C), Max:98 °F (36.7 °C)    Temp:  [97.5 °F (36.4 °C)-98 °F (36.7 °C)] 97.6 °F (36.4 °C)  HR:  [59-72] 59  Resp:  [16-18] 16  BP: (103-112)/(63-66) 103/63  SpO2:  [98 %-100 %] 99 %  Body mass index is 23.56 kg/m². Input and Output Summary (last 24 hours): Intake/Output Summary (Last 24 hours) at 2023 1318  Last data filed at 2023 0900  Gross per 24 hour   Intake 720 ml   Output 970 ml   Net -250 ml       Physical Exam:   Physical Exam  Vitals and nursing note reviewed. Constitutional:       General: He is not in acute distress. Appearance: He is well-developed. He is not toxic-appearing or diaphoretic. HENT:      Head: Normocephalic and atraumatic. Eyes:      General: No scleral icterus. Conjunctiva/sclera: Conjunctivae normal.   Cardiovascular:      Rate and Rhythm: Normal rate and regular rhythm. Heart sounds: No murmur heard. No friction rub. No gallop. Pulmonary:      Effort: Pulmonary effort is normal. No respiratory distress. Breath sounds: Normal breath sounds. No stridor. No wheezing, rhonchi or rales. Chest:      Chest wall: No tenderness. Abdominal:      General: There is no distension. Palpations: Abdomen is soft. There is no mass. Tenderness: There is no abdominal tenderness. There is no guarding or rebound. Hernia: No hernia is present. Musculoskeletal:         General: No swelling or tenderness. Cervical back: Neck supple. Skin:     General: Skin is warm and dry. Capillary Refill: Capillary refill takes less than 2 seconds.    Neurological:      Mental Status: He is alert. He is disoriented. Psychiatric:         Mood and Affect: Mood normal.          Additional Data:     Labs:  Results from last 7 days   Lab Units 11/13/23  0531 11/11/23  0451   WBC Thousand/uL 7.17 6.35   HEMOGLOBIN g/dL 13.6 12.3   HEMATOCRIT % 40.3 36.3*   PLATELETS Thousands/uL 250 211   NEUTROS PCT %  --  73   LYMPHS PCT %  --  15   MONOS PCT %  --  8   EOS PCT %  --  3     Results from last 7 days   Lab Units 11/13/23  0531 11/11/23  0451 11/10/23  2304   SODIUM mmol/L 140   < > 137   POTASSIUM mmol/L 3.6   < > 3.5   CHLORIDE mmol/L 108   < > 105   CO2 mmol/L 24   < > 24   BUN mg/dL 15   < > 13   CREATININE mg/dL 0.99   < > 0.84   ANION GAP mmol/L 8   < > 8   CALCIUM mg/dL 8.4   < > 8.7   ALBUMIN g/dL  --   --  3.4*   TOTAL BILIRUBIN mg/dL  --   --  0.62   ALK PHOS U/L  --   --  53   ALT U/L  --   --  11   AST U/L  --   --  14   GLUCOSE RANDOM mg/dL 95   < > 101    < > = values in this interval not displayed. Lines/Drains:  Invasive Devices       Peripheral Intravenous Line  Duration             Peripheral IV 11/10/23 Left;Ventral (anterior) Forearm 3 days              Drain  Duration             Urethral Catheter 16 Fr. 7 days                  Urinary Catheter:  Goal for removal: N/A- Discharging with Choi               Imaging: No pertinent imaging reviewed.     Recent Cultures (last 7 days):         Last 24 Hours Medication List:   Current Facility-Administered Medications   Medication Dose Route Frequency Provider Last Rate    acetaminophen  650 mg Oral Q6H PRN Glenny Chowdhury MD      acetaminophen  975 mg Oral TID Дмитрий Yu MD      cefTRIAXone  1,000 mg Intravenous Q24H Tesha Barrera MD 1,000 mg (11/13/23 7191)    cyanocobalamin  1,000 mcg Oral Daily Ana Christian MD      divalproex sodium  250 mg Oral Daily Дмитрий Yu MD      docusate sodium  100 mg Oral BID Tesha Barrera MD      enoxaparin  40 mg Subcutaneous Daily Dora Osborne MD      escitalopram  5 mg Oral Daily Jr Coats MD      finasteride  5 mg Oral Daily Manpreet Ball MD      gabapentin  100 mg Oral TID Jr Coats MD      melatonin  3 mg Oral HS Jr Coats MD      OLANZapine  2.5 mg Intramuscular Q3H PRN Codie Mirza MD      ondansetron  4 mg Intravenous Q6H PRN Dora Osborne MD      polyethylene glycol  17 g Oral Daily Manpreet Ball MD      QUEtiapine  25 mg Oral HS Manpreet Ball MD      senna  2 tablet Oral BID Jr Coats MD      tamsulosin  0.4 mg Oral Daily With Sravani Cesar MD          Today, Patient Was Seen By: Alejandra Kaplan DO    **Please Note: This note may have been constructed using a voice recognition system. **

## 2023-11-14 NOTE — ASSESSMENT & PLAN NOTE
Recent admission with RENATO secondary to urine retention s/p Choi catheter placement. Follows up with Dr. Dimitri Marin outpatient. Plan of care was discussed in length with Dr. Dimitri Marin and the wife at bedside who wanted a voiding trial done as inpatient however recommended to continue Choi catheter for now due to confusion and encephalopathy and recommend voiding trial at the SNF/rehab or as outpatient by Dr. Dimitri Marin.

## 2023-11-15 LAB
ALBUMIN SERPL BCP-MCNC: 3.4 G/DL (ref 3.5–5)
ALP SERPL-CCNC: 50 U/L (ref 34–104)
ALT SERPL W P-5'-P-CCNC: 12 U/L (ref 7–52)
ANION GAP SERPL CALCULATED.3IONS-SCNC: 6 MMOL/L
AST SERPL W P-5'-P-CCNC: 12 U/L (ref 13–39)
BASOPHILS # BLD AUTO: 0.02 THOUSANDS/ÂΜL (ref 0–0.1)
BASOPHILS NFR BLD AUTO: 0 % (ref 0–1)
BILIRUB SERPL-MCNC: 0.46 MG/DL (ref 0.2–1)
BUN SERPL-MCNC: 17 MG/DL (ref 5–25)
CALCIUM ALBUM COR SERPL-MCNC: 9.1 MG/DL (ref 8.3–10.1)
CALCIUM SERPL-MCNC: 8.6 MG/DL (ref 8.4–10.2)
CHLORIDE SERPL-SCNC: 106 MMOL/L (ref 96–108)
CO2 SERPL-SCNC: 27 MMOL/L (ref 21–32)
CREAT SERPL-MCNC: 0.92 MG/DL (ref 0.6–1.3)
EOSINOPHIL # BLD AUTO: 0.24 THOUSAND/ÂΜL (ref 0–0.61)
EOSINOPHIL NFR BLD AUTO: 3 % (ref 0–6)
ERYTHROCYTE [DISTWIDTH] IN BLOOD BY AUTOMATED COUNT: 12.9 % (ref 11.6–15.1)
GFR SERPL CREATININE-BSD FRML MDRD: 77 ML/MIN/1.73SQ M
GLUCOSE SERPL-MCNC: 98 MG/DL (ref 65–140)
HCT VFR BLD AUTO: 41.4 % (ref 36.5–49.3)
HGB BLD-MCNC: 13.9 G/DL (ref 12–17)
IMM GRANULOCYTES # BLD AUTO: 0.03 THOUSAND/UL (ref 0–0.2)
IMM GRANULOCYTES NFR BLD AUTO: 0 % (ref 0–2)
LYMPHOCYTES # BLD AUTO: 1.03 THOUSANDS/ÂΜL (ref 0.6–4.47)
LYMPHOCYTES NFR BLD AUTO: 11 % (ref 14–44)
MCH RBC QN AUTO: 30.1 PG (ref 26.8–34.3)
MCHC RBC AUTO-ENTMCNC: 33.6 G/DL (ref 31.4–37.4)
MCV RBC AUTO: 90 FL (ref 82–98)
MONOCYTES # BLD AUTO: 0.47 THOUSAND/ÂΜL (ref 0.17–1.22)
MONOCYTES NFR BLD AUTO: 5 % (ref 4–12)
NEUTROPHILS # BLD AUTO: 7.43 THOUSANDS/ÂΜL (ref 1.85–7.62)
NEUTS SEG NFR BLD AUTO: 81 % (ref 43–75)
NRBC BLD AUTO-RTO: 0 /100 WBCS
PLATELET # BLD AUTO: 239 THOUSANDS/UL (ref 149–390)
PMV BLD AUTO: 10.2 FL (ref 8.9–12.7)
POTASSIUM SERPL-SCNC: 3.8 MMOL/L (ref 3.5–5.3)
PROT SERPL-MCNC: 5.9 G/DL (ref 6.4–8.4)
RBC # BLD AUTO: 4.62 MILLION/UL (ref 3.88–5.62)
SODIUM SERPL-SCNC: 139 MMOL/L (ref 135–147)
WBC # BLD AUTO: 9.22 THOUSAND/UL (ref 4.31–10.16)

## 2023-11-15 PROCEDURE — 85025 COMPLETE CBC W/AUTO DIFF WBC: CPT | Performed by: INTERNAL MEDICINE

## 2023-11-15 PROCEDURE — 99232 SBSQ HOSP IP/OBS MODERATE 35: CPT | Performed by: NURSE PRACTITIONER

## 2023-11-15 PROCEDURE — 80053 COMPREHEN METABOLIC PANEL: CPT | Performed by: INTERNAL MEDICINE

## 2023-11-15 PROCEDURE — 99232 SBSQ HOSP IP/OBS MODERATE 35: CPT | Performed by: INTERNAL MEDICINE

## 2023-11-15 RX ADMIN — CEFTRIAXONE 1000 MG: 1 INJECTION, SOLUTION INTRAVENOUS at 17:09

## 2023-11-15 RX ADMIN — GABAPENTIN 100 MG: 100 CAPSULE ORAL at 22:10

## 2023-11-15 RX ADMIN — POLYETHYLENE GLYCOL 3350 17 G: 17 POWDER, FOR SOLUTION ORAL at 09:26

## 2023-11-15 RX ADMIN — SENNOSIDES 17.2 MG: 8.6 TABLET, FILM COATED ORAL at 17:08

## 2023-11-15 RX ADMIN — TAMSULOSIN HYDROCHLORIDE 0.4 MG: 0.4 CAPSULE ORAL at 17:08

## 2023-11-15 RX ADMIN — DOCUSATE SODIUM 100 MG: 100 CAPSULE, LIQUID FILLED ORAL at 09:25

## 2023-11-15 RX ADMIN — DOCUSATE SODIUM 100 MG: 100 CAPSULE, LIQUID FILLED ORAL at 17:08

## 2023-11-15 RX ADMIN — ACETAMINOPHEN 975 MG: 325 TABLET, FILM COATED ORAL at 22:08

## 2023-11-15 RX ADMIN — FINASTERIDE 5 MG: 5 TABLET, FILM COATED ORAL at 09:25

## 2023-11-15 RX ADMIN — ACETAMINOPHEN 975 MG: 325 TABLET, FILM COATED ORAL at 17:08

## 2023-11-15 RX ADMIN — GABAPENTIN 100 MG: 100 CAPSULE ORAL at 09:25

## 2023-11-15 RX ADMIN — ACETAMINOPHEN 975 MG: 325 TABLET, FILM COATED ORAL at 09:25

## 2023-11-15 RX ADMIN — ESCITALOPRAM OXALATE 5 MG: 10 TABLET ORAL at 09:25

## 2023-11-15 RX ADMIN — SENNOSIDES 17.2 MG: 8.6 TABLET, FILM COATED ORAL at 09:25

## 2023-11-15 RX ADMIN — DIVALPROEX SODIUM 250 MG: 250 TABLET, DELAYED RELEASE ORAL at 17:08

## 2023-11-15 RX ADMIN — GABAPENTIN 100 MG: 100 CAPSULE ORAL at 12:56

## 2023-11-15 RX ADMIN — CYANOCOBALAMIN TAB 500 MCG 1000 MCG: 500 TAB at 09:25

## 2023-11-15 RX ADMIN — Medication 3 MG: at 22:08

## 2023-11-15 RX ADMIN — ENOXAPARIN SODIUM 40 MG: 40 INJECTION SUBCUTANEOUS at 09:26

## 2023-11-15 NOTE — PLAN OF CARE
Problem: PAIN - ADULT  Goal: Verbalizes/displays adequate comfort level or baseline comfort level  Description: Interventions:  - Encourage patient to monitor pain and request assistance  - Assess pain using appropriate pain scale  - Administer analgesics based on type and severity of pain and evaluate response  - Implement non-pharmacological measures as appropriate and evaluate response  - Consider cultural and social influences on pain and pain management  - Notify physician/advanced practitioner if interventions unsuccessful or patient reports new pain  Outcome: Progressing     Problem: INFECTION - ADULT  Goal: Absence or prevention of progression during hospitalization  Description: INTERVENTIONS:  - Assess and monitor for signs and symptoms of infection  - Monitor lab/diagnostic results  - Monitor all insertion sites, i.e. indwelling lines, tubes, and drains  - Monitor endotracheal if appropriate and nasal secretions for changes in amount and color  - [place] appropriate cooling/warming therapies per order  - Administer medications as ordered  - Instruct and encourage patient and family to use good hand hygiene technique  - Identify and instruct in appropriate isolation precautions for identified infection/condition  Outcome: Progressing     Problem: DISCHARGE PLANNING  Goal: Discharge to home or other facility with appropriate resources  Description: INTERVENTIONS:  - Identify barriers to discharge w/patient and caregiver  - Arrange for needed discharge resources and transportation as appropriate  - Identify discharge learning needs (meds, wound care, etc.)  - Arrange for interpretive services to assist at discharge as needed  - Refer to Case Management Department for coordinating discharge planning if the patient needs post-hospital services based on physician/advanced practitioner order or complex needs related to functional status, cognitive ability, or social support system  Outcome: Progressing Problem: Knowledge Deficit  Goal: Patient/family/caregiver demonstrates understanding of disease process, treatment plan, medications, and discharge instructions  Description: Complete learning assessment and assess knowledge base.   Interventions:  - Provide teaching at level of understanding  - Provide teaching via preferred learning methods  Outcome: Progressing     Problem: MOBILITY - ADULT  Goal: Maintain or return to baseline ADL function  Description: INTERVENTIONS:  -  Assess patient's ability to carry out ADLs; assess patient's baseline for ADL function and identify physical deficits which impact ability to perform ADLs (bathing, care of mouth/teeth, toileting, grooming, dressing, etc.)  - Assess/evaluate cause of self-care deficits   - Assess range of motion  - Assess patient's mobility; develop plan if impaired  - Assess patient's need for assistive devices and provide as appropriate  - Encourage maximum independence but intervene and supervise when necessary  - Involve family in performance of ADLs  - Assess for home care needs following discharge   - Consider OT consult to assist with ADL evaluation and planning for discharge  - Provide patient education as appropriate  Outcome: Progressing     Problem: GASTROINTESTINAL - ADULT  Goal: Maintains or returns to baseline bowel function  Description: INTERVENTIONS:  - Assess bowel function  - Encourage oral fluids to ensure adequate hydration  - Administer IV fluids if ordered to ensure adequate hydration  - Administer ordered medications as needed  - Encourage mobilization and activity  - Consider nutritional services referral to assist patient with adequate nutrition and appropriate food choices  Outcome: Progressing

## 2023-11-15 NOTE — ASSESSMENT & PLAN NOTE
Patient needs assistance with some IADLs at baseline  Will continue to provide supportive care, reorient as needed. Patient is at high risk for delirium, will monitor closely and place on delirium precautions. Maintain sleep/wake cycle, add melatonin 3 mg nightly  Optimize pain regimen. Monitor for constipation and urinary retention and manage as needed. Vitamin B12 and TSH within normal limits-continue to supplement B12  Encourage family to visit. Encourage to wear glasses and hearing aids while awake. Encourage po intake, assist with feeding if needed. Out of bed with meals and continue physical therapy  I would recommend geriatric assessment as outpatient when patient is medically stable and in a familiar environment  Agree with starting Depakote in the setting of dementia and agitation with behavioral issues-patient continues to be confused however appears to be less agitated today during my evaluation on 11/15 throughout the morning and afternoon.   Patient slept well last night we will hold off on starting Remeron for now

## 2023-11-15 NOTE — PROGRESS NOTES
Progress Note - Geriatric Medicine   Elias Del Cid 81 y.o. male MRN: [id number]  Unit/Bed#: -01 Encounter: [id number]      Assessment/Plan:  Urine retention  Assessment & Plan  Patient seen by urology  Currently Choi in place  Continue tamsulosin and finasteride  Monitor closely  Continue ceftriaxone for UTI    BPH (benign prostatic hyperplasia)  Assessment & Plan  Continue tamsulosin and finasteride  Urology follows    Constipation  Assessment & Plan  No bowel movement since admission to the hospital   Continue senna 2 tablets BID  Continue Colace and MiraLAX  Encourage p.o. hydration and out of bed as tolerated  Offer prune juice    Insomnia  Assessment & Plan  First-line treatment is behavior modification  Maintain sleep-wake cycle, avoid nighttime interruptions  Avoid caffeine throughout the day  Avoid napping throughout the day  Encourage physical activity throughout the day  Avoid sedative hypnotics including benzodiazepines and benadryl  Continue melatonin 3 mg nightly    Current moderate episode of major depressive disorder without prior episode (HCC)  Assessment & Plan  Continue Lexapro 5 mg daily  Provide supportive care  Encourage family and friends to visit    Cognitive impairment  Assessment & Plan  Patient needs assistance with some IADLs at baseline  Will continue to provide supportive care, reorient as needed. Patient is at high risk for delirium, will monitor closely and place on delirium precautions. Maintain sleep/wake cycle, add melatonin 3 mg nightly  Optimize pain regimen. Monitor for constipation and urinary retention and manage as needed. Vitamin B12 and TSH within normal limits  Encourage family to visit. Encourage to wear glasses and hearing aids while awake. Encourage po intake, assist with feeding if needed.    Out of bed with meals and continue physical therapy  I would recommend geriatric assessment as outpatient when patient is medically stable and in a familiar environment    * Metabolic encephalopathy  Assessment & Plan    -Patient is high risk of delirium due to cognitive impairment at baseline, change in environment, insomnia, cystitis  -Initiate delirium precautions  -maintain normal sleep/wake cycle, continue melatonin 3 mg nightly  -minimize overnight interruptions, group overnight vitals/labs/nursing checks as possible  -dim lights, close blinds and turn off tv to minimize stimulation and encourage sleep environment in evenings  -ensure that pain is well controlled continue Tylenol 975mg 3x daily scheduled   -monitor for fecal and urinary retention which may precipitate delirium, continue Senna 2 tablets BID  -encourage early mobilization and ambulation  -provide frequent reorientation and redirection  -encourage family and friends at the bedside to help calm patient if anxious  -avoid medications which may precipitate or worsen delirium such as tramadol, benzodiazepine, anticholinergics, and antihistaminics  -encourage hydration and nutrition , assist with feeding if needed  -redirect unwanted behaviors as first line, avoid physical restraints  -Continue gabapentin 100 mg TID, if needed could consider increasing nighttime dose of gabapentin 200 mg  -Continue Depakote 250 mg po at 5 PM. continue to monitor CMP to monitor LFTs  -If patient having behaviors in the morning could consider low-dose Depakote at 9 AM, will hold on that for now as patient has been appropriate for the most part today  -Use Zyprexa 2.5 mg every 8 hours as needed only for severe agitation and if patient not tolerating p.o. intake- has not required Zyprexa last night or today  -Seroquel was discontinued yesterday           Subjective:   Bea Gautam is an 78-year-old male being seen for a geriatrics follow-up. Upon examination patient was alert and oriented to person only. His wife reports he was more oriented this morning, but has been more confused since she arrived back this afternoon.   Nursing reports he has been confused all day. He has been calm and cooperative without any restraints since yesterday morning. He ate almost his entire lunch. He has not yet moved his bowels since hospitalized. Patient's wife was bedside during examination, all questions answered. Review of Systems   Unable to perform ROS: Mental status change        Objective:     Vitals: Blood pressure 109/66, pulse 74, temperature 97.9 °F (36.6 °C), temperature source Oral, resp. rate 17, height 5' 6" (1.676 m), weight 66.2 kg (145 lb 15.1 oz), SpO2 96 %. ,Body mass index is 23.56 kg/m². Intake/Output Summary (Last 24 hours) at 11/15/2023 1646  Last data filed at 11/15/2023 1201  Gross per 24 hour   Intake 356 ml   Output 700 ml   Net -344 ml       Current Medications: Reviewed    Physical Exam:    Physical Exam  Constitutional:       General: He is not in acute distress. Appearance: He is not ill-appearing. HENT:      Head: Normocephalic. Cardiovascular:      Rate and Rhythm: Normal rate and regular rhythm. Pulses: Normal pulses. Heart sounds: Normal heart sounds. No murmur heard. Pulmonary:      Effort: Pulmonary effort is normal. No respiratory distress. Breath sounds: Normal breath sounds. No wheezing. Abdominal:      General: Bowel sounds are normal. There is no distension. Palpations: Abdomen is soft. Tenderness: There is no abdominal tenderness. Genitourinary:     Comments: Choi catheter  Musculoskeletal:      Right lower leg: No edema. Left lower leg: No edema. Skin:     General: Skin is warm and dry. Coloration: Skin is not jaundiced or pale. Neurological:      General: No focal deficit present. Mental Status: He is alert. Mental status is at baseline. Cranial Nerves: No cranial nerve deficit. Motor: Weakness present. Gait: Gait abnormal.      Comments: Alert to person only   Psychiatric:         Cognition and Memory: Cognition is impaired. Memory is impaired. Judgment: Judgment is impulsive. Invasive Devices       Peripheral Intravenous Line  Duration             Peripheral IV 11/14/23 Dorsal (posterior); Right Forearm 1 day              Drain  Duration             Urethral Catheter 16 Fr. 9 days                    Lab, Imaging and other studies: Pertinent labs reviewed     Scribed by Gemma Segundo PA-C. Note reviewed and history/physical exam taken with and completed by JUAN C Hughes. Please note:  Voice-recognition software may have been used in the preparation of this document. Occasional wrong word or "sound-alike" substitutions may have occurred due to the inherent limitations of voice recognition software. Interpretation should be guided by context.

## 2023-11-15 NOTE — CASE MANAGEMENT
Case Management Progress Note    Patient name Dana Echavarria  Location /-72 MRN [id number]  : 1940 Date 11/15/2023       LOS (days): 4  Geometric Mean LOS (GMLOS) (days): 4.90  Days to GMLOS:0.3        OBJECTIVE:        Current admission status: Inpatient  [facility]   [facility] [address] [place] [address] [address] [place] [address]  Phone: [phone] Fax: [facility] [address], [address] [place] [address]  Phone: [phone] Fax: [phone]    Primary Care Provider: Celia Carmona MD    Primary Insurance: MEDICARE  Secondary Insurance:  FOR LIFE    PROGRESS NOTE:    Cm sent updated information to [facility] choice facility- [facility]-to see if patient would have a bed. Cm informed them that patient's restraints were dc on  at 12:48pm. Cm offered a site visit for patient. Cm awaiting response from facility.

## 2023-11-15 NOTE — ASSESSMENT & PLAN NOTE
Recent admission with RENATO secondary to urine retention s/p Choi catheter placement. Follows up with Dr. Erica Balderas outpatient. Plan of care was discussed in length with Dr. Erica Balderas and the wife at bedside who wanted a voiding trial done as inpatient however recommended to continue Choi catheter for now due to confusion and encephalopathy and recommend voiding trial at the SNF/rehab or as outpatient by Dr. Erica Balderas.

## 2023-11-15 NOTE — ASSESSMENT & PLAN NOTE
Patient presented with worsening change in mental status. As per wife who is the primary historian reports patient has dementia however baseline is very cooperative, calm. Alert oriented x2-3. Symptoms started to get worse with change in mental status after recent surgery done on 11/2/2023 for inguinal hernia repair. Was recently admitted to the hospital with RENTAO secondary to urine retention s/p Choi catheter placement and discharged home with Choi catheter in place. States patient was calm and cooperative however his mental status was not back to baseline on discharge from recent hospitalization. Presented again with delusion and hallucinations started on the day of presentation. Difficulty taking care of her at home by wife. No recent changes in medication. TSH WNL. Electrolytes WNL. CT head unremarkable. Currently unclear etiology. Vitals otherwise stable. Patient currently is awake, alert and oriented to self which is not his baseline. Stable etiology likely cystitis with change in mental status and hallucination requiring IV antibiotic. Fall precautions  Delirium precautions  Replace magnesium  Will start Seroquel 25 mg daily  Continue Rocephin  Geriatics consulted  Check TSH, vitamin B12, vitamin D  Ct abdomen was reviewed shows -Circumferential bladder wall thickening with mild surrounding fat stranding. The bladder wall thickening is attributable to outlet obstruction, given the severe prostamegaly, however the surrounding fat stranding could indicate a superimposed cystitis. Give IV antibiotic with Rocephin. Also will replace with vitamin B12. Geriatrics input appreciated, recommended to continue Seroquel 25 mg at 7 PM, added gabapentin 100 mg twice a day, can increase gabapentin to every 8 hours as needed, and Zyprexa as needed for severe agitation.   Agree with adding Depakote due to agitation in the setting of dementia

## 2023-11-15 NOTE — PROGRESS NOTES
[id number] [facility]  Progress Note  Name: Ashleigh Rizzo  MRN: [id number]  Unit/Bed#: -01 I Date of Admission: 11/10/2023   Date of Service: 11/15/2023 I Hospital Day: 4    Assessment/Plan   Catheter cystitis   Assessment & Plan  Presented with change in mental status with cystitis and will give IV antibiotic with Rocephin. Patient cystitis is associated with indwelling Choi catheter which was placed on 11/6/2023, this is evidenced by CT scan showing recurrence of bladder wall thickening, and urinalysis. Urine retention  Assessment & Plan  Recent admission with RENATO secondary to urine retention s/p Choi catheter placement. Follows up with Dr. Megan Loaiza outpatient. Plan of care was discussed in length with Dr. Megan Loaiza and the wife at bedside who wanted a voiding trial done as inpatient however recommended to continue Choi catheter for now due to confusion and encephalopathy and recommend voiding trial at the SNF/rehab or as outpatient by Dr. Megan Loaiza. BPH (benign prostatic hyperplasia)  Assessment & Plan  On Flomax and dutasteride chronic change to finasteride and Flomax while inpatient    H/O inguinal hernia repair  Assessment & Plan  S/p recent surgery on 11/2/2023    Constipation  Assessment & Plan  Stool softeners,    Insomnia  Assessment & Plan  We will add melatonin 3 mg nightly  Optimize pain regimen  If melatonin not effective consider mirtazapine 7.5 mg nightly  Avoid nighttime interruptions and caffeine use in the afternoon    Current moderate episode of major depressive disorder without prior episode (HCC)  Assessment & Plan  Consider Lexapro 5 mg daily  Provide supportive care  Encourage family and friends to visit    Cognitive impairment  Assessment & Plan  Patient needs assistance with some IADLs at baseline  Will continue to provide supportive care, reorient as needed.   Patient is at high risk for delirium, will monitor closely and place on delirium precautions. Maintain sleep/wake cycle, add melatonin 3 mg nightly  Optimize pain regimen. Monitor for constipation and urinary retention and manage as needed. Vitamin B12 and TSH within normal limits-continue to supplement B12  Encourage family to visit. Encourage to wear glasses and hearing aids while awake. Encourage po intake, assist with feeding if needed. Out of bed with meals and continue physical therapy  I would recommend geriatric assessment as outpatient when patient is medically stable and in a familiar environment  Agree with starting Depakote in the setting of dementia and agitation with behavioral issues-patient continues to be confused however appears to be less agitated today during my evaluation on 11/15 throughout the morning and afternoon. Patient slept well last night we will hold off on starting Remeron for now    * Metabolic encephalopathy  Assessment & Plan  Patient presented with worsening change in mental status. As per wife who is the primary historian reports patient has dementia however baseline is very cooperative, calm. Alert oriented x2-3. Symptoms started to get worse with change in mental status after recent surgery done on 11/2/2023 for inguinal hernia repair. Was recently admitted to the hospital with RENATO secondary to urine retention s/p Choi catheter placement and discharged home with Choi catheter in place. States patient was calm and cooperative however his mental status was not back to baseline on discharge from recent hospitalization. Presented again with delusion and hallucinations started on the day of presentation. Difficulty taking care of her at home by wife. No recent changes in medication. TSH WNL. Electrolytes WNL. CT head unremarkable. Currently unclear etiology. Vitals otherwise stable. Patient currently is awake, alert and oriented to self which is not his baseline.   Stable etiology likely cystitis with change in mental status and hallucination requiring IV antibiotic. Fall precautions  Delirium precautions  Replace magnesium  Will start Seroquel 25 mg daily  Continue Rocephin  Geriatics consulted  Check TSH, vitamin B12, vitamin D  Ct abdomen was reviewed shows -Circumferential bladder wall thickening with mild surrounding fat stranding. The bladder wall thickening is attributable to outlet obstruction, given the severe prostamegaly, however the surrounding fat stranding could indicate a superimposed cystitis. Give IV antibiotic with Rocephin. Also will replace with vitamin B12. Geriatrics input appreciated, recommended to continue Seroquel 25 mg at 7 PM, added gabapentin 100 mg twice a day, can increase gabapentin to every 8 hours as needed, and Zyprexa as needed for severe agitation. Agree with adding Depakote due to agitation in the setting of dementia               VTE Pharmacologic Prophylaxis:   High Risk (Score >/= 5) - Pharmacological DVT Prophylaxis Ordered: enoxaparin (Lovenox). Sequential Compression Devices Ordered. Mobility:   Basic Mobility Inpatient Raw Score: 22  [facility] Goal: 7: Walk 25 feet or more  [facility] Achieved: 1: Laying in bed  [facility] Goal NOT achieved. Continue with multidisciplinary rounding and encourage appropriate mobility to improve upon [facility] goals. Patient Centered Rounds: I performed bedside rounds with nursing staff today. Discussions with Specialists or Other Care Team Provider: emili    Education and Discussions with Family / Patient: Updated  (significant other) at bedside. Total Time Spent on Date of Encounter in care of patient: 35 mins. This time was spent on one or more of the following: performing physical exam; counseling and coordination of care; obtaining or reviewing history; documenting in the medical record; reviewing/ordering tests, medications or procedures; communicating with other healthcare professionals and discussing with patient's family/caregivers.     Current Length of Stay: 4 day(s)  Current Patient Status: Inpatient   Certification Statement: The patient will continue to require additional inpatient hospital stay due to medication management  Discharge Plan: Anticipate discharge in 48 hrs to rehab facility. Code Status: Level 1 - Full Code    Subjective:   Patient is alert to self only thought he was in [place] today, recognizes his wife at bedside but is looking for his kids we will use are still children rather than the full grown adults that they currently are    Objective:     Vitals:   Temp (24hrs), Av.9 °F (36.6 °C), Min:97.9 °F (36.6 °C), Max:97.9 °F (36.6 °C)    Temp:  [97.9 °F (36.6 °C)] 97.9 °F (36.6 °C)  HR:  [57-76] 76  Resp:  [16-18] 17  BP: ()/(57-75) 128/75  SpO2:  [94 %-97 %] 94 %  Body mass index is 23.56 kg/m². Input and Output Summary (last 24 hours): Intake/Output Summary (Last 24 hours) at 11/15/2023 1234  Last data filed at 11/15/2023 0900  Gross per 24 hour   Intake 356 ml   Output 800 ml   Net -444 ml       Physical Exam:   Physical Exam  Vitals and nursing note reviewed. Constitutional:       General: He is not in acute distress. Appearance: He is well-developed. He is not toxic-appearing or diaphoretic. HENT:      Head: Normocephalic and atraumatic. Eyes:      General: No scleral icterus. Conjunctiva/sclera: Conjunctivae normal.   Cardiovascular:      Rate and Rhythm: Normal rate and regular rhythm. Heart sounds: No murmur heard. No friction rub. No gallop. Pulmonary:      Effort: Pulmonary effort is normal. No respiratory distress. Breath sounds: Normal breath sounds. No stridor. No wheezing, rhonchi or rales. Chest:      Chest wall: No tenderness. Abdominal:      General: There is no distension. Palpations: Abdomen is soft. There is no mass. Tenderness: There is no abdominal tenderness. There is no guarding or rebound. Hernia: No hernia is present.    Musculoskeletal: General: No swelling or tenderness. Cervical back: Neck supple. Skin:     General: Skin is warm and dry. Capillary Refill: Capillary refill takes less than 2 seconds. Neurological:      Mental Status: He is alert. He is disoriented. Psychiatric:         Mood and Affect: Mood normal.          Additional Data:     Labs:  Results from last 7 days   Lab Units 11/15/23  0544   WBC Thousand/uL 9.22   HEMOGLOBIN g/dL 13.9   HEMATOCRIT % 41.4   PLATELETS Thousands/uL 239   NEUTROS PCT % 81*   LYMPHS PCT % 11*   MONOS PCT % 5   EOS PCT % 3     Results from last 7 days   Lab Units 11/15/23  0544   SODIUM mmol/L 139   POTASSIUM mmol/L 3.8   CHLORIDE mmol/L 106   CO2 mmol/L 27   BUN mg/dL 17   CREATININE mg/dL 0.92   ANION GAP mmol/L 6   CALCIUM mg/dL 8.6   ALBUMIN g/dL 3.4*   TOTAL BILIRUBIN mg/dL 0.46   ALK PHOS U/L 50   ALT U/L 12   AST U/L 12*   GLUCOSE RANDOM mg/dL 98                       Lines/Drains:  Invasive Devices       Peripheral Intravenous Line  Duration             Peripheral IV 11/14/23 Dorsal (posterior); Right Forearm <1 day              Drain  Duration             Urethral Catheter 16 Fr. 8 days                  Urinary Catheter:  Goal for removal: N/A- Discharging with Choi               Imaging: No pertinent imaging reviewed.     Recent Cultures (last 7 days):         Last 24 Hours Medication List:   Current Facility-Administered Medications   Medication Dose Route Frequency Provider Last Rate    acetaminophen  650 mg Oral Q6H PRN Marti Ramirez MD      acetaminophen  975 mg Oral TID Kathi Knox MD      cefTRIAXone  1,000 mg Intravenous Q24H Juan Carlos Banai, DO 1,000 mg (11/14/23 1729)    cyanocobalamin  1,000 mcg Oral Daily Ana Black MD      divalproex sodium  250 mg Oral Daily Kathi Knox MD      docusate sodium  100 mg Oral BID Marysol Adams MD      enoxaparin  40 mg Subcutaneous Daily Marti Ramirez MD      escitalopram  5 mg Oral Daily Katherine Zimmerman MD      finasteride  5 mg Oral Daily Ra Balderas MD      gabapentin  100 mg Oral TID Katherine Zimmerman MD      melatonin  3 mg Oral HS Katherine Zimmerman MD      OLANZapine  2.5 mg Intramuscular Q3H PRN Rj Kaplan MD      ondansetron  4 mg Intravenous Q6H PRN Isa Gamez MD      polyethylene glycol  17 g Oral Daily Ra Balderas MD      senna  2 tablet Oral BID Katherine Zimmerman MD      tamsulosin  0.4 mg Oral Daily With Esmer Novoa MD          Today, Patient Was Seen By: Tevin Starr DO    **Please Note: This note may have been constructed using a voice recognition system. **

## 2023-11-15 NOTE — PLAN OF CARE
Problem: PAIN - ADULT  Goal: Verbalizes/displays adequate comfort level or baseline comfort level  Description: Interventions:  - Encourage patient to monitor pain and request assistance  - Assess pain using appropriate pain scale  - Administer analgesics based on type and severity of pain and evaluate response  - Implement non-pharmacological measures as appropriate and evaluate response  - Consider cultural and social influences on pain and pain management  - Notify physician/advanced practitioner if interventions unsuccessful or patient reports new pain  Outcome: Progressing     Problem: INFECTION - ADULT  Goal: Absence or prevention of progression during hospitalization  Description: INTERVENTIONS:  - Assess and monitor for signs and symptoms of infection  - Monitor lab/diagnostic results  - Monitor all insertion sites, i.e. indwelling lines, tubes, and drains  - Monitor endotracheal if appropriate and nasal secretions for changes in amount and color  - [place] appropriate cooling/warming therapies per order  - Administer medications as ordered  - Instruct and encourage patient and family to use good hand hygiene technique  - Identify and instruct in appropriate isolation precautions for identified infection/condition  Outcome: Progressing  Goal: Absence of fever/infection during neutropenic period  Description: INTERVENTIONS:  - Monitor WBC    Outcome: Progressing     Problem: SAFETY ADULT  Goal: Patient will remain free of falls  Description: INTERVENTIONS:  - Educate patient/family on patient safety including physical limitations  - Instruct patient to call for assistance with activity   - Consult OT/PT to assist with strengthening/mobility   - Keep Call bell within reach  - Keep bed low and locked with side rails adjusted as appropriate  - Keep care items and personal belongings within reach  - Initiate and maintain comfort rounds  - Make Fall Risk Sign visible to staff  - Offer Toileting every  Hours, in advance of need  - Initiate/Maintain alarm  - Obtain necessary fall risk management equipment:   - Apply yellow socks and bracelet for high fall risk patients  - Consider moving patient to room near nurses station  Outcome: Progressing  Goal: Maintain or return to baseline ADL function  Description: INTERVENTIONS:  -  Assess patient's ability to carry out ADLs; assess patient's baseline for ADL function and identify physical deficits which impact ability to perform ADLs (bathing, care of mouth/teeth, toileting, grooming, dressing, etc.)  - Assess/evaluate cause of self-care deficits   - Assess range of motion  - Assess patient's mobility; develop plan if impaired  - Assess patient's need for assistive devices and provide as appropriate  - Encourage maximum independence but intervene and supervise when necessary  - Involve family in performance of ADLs  - Assess for home care needs following discharge   - Consider OT consult to assist with ADL evaluation and planning for discharge  - Provide patient education as appropriate  Outcome: Progressing  Goal: Maintains/Returns to pre admission functional level  Description: INTERVENTIONS:  - Perform BMAT or MOVE assessment daily.   - Set and communicate daily mobility goal to care team and patient/family/caregiver. - Collaborate with rehabilitation services on mobility goals if consulted  - Perform Range of Motion  times a day. - Reposition patient every  hours.   - Dangle patient  times a day  - Stand patient  times a day  - Ambulate patient  times a day  - Out of bed to chair  times a day   - Out of bed for meals  times a day  - Out of bed for toileting  - Record patient progress and toleration of activity level   Outcome: Progressing     Problem: Knowledge Deficit  Goal: Patient/family/caregiver demonstrates understanding of disease process, treatment plan, medications, and discharge instructions  Description: Complete learning assessment and assess knowledge base.  Interventions:  - Provide teaching at level of understanding  - Provide teaching via preferred learning methods  Outcome: Progressing     Problem: Prexisting or High Potential for Compromised Skin Integrity  Goal: Skin integrity is maintained or improved  Description: INTERVENTIONS:  - Identify patients at risk for skin breakdown  - Assess and monitor skin integrity  - Assess and monitor nutrition and hydration status  - Monitor labs   - Assess for incontinence   - Turn and reposition patient  - Assist with mobility/ambulation  - Relieve pressure over bony prominences  - Avoid friction and shearing  - Provide appropriate hygiene as needed including keeping skin clean and dry  - Evaluate need for skin moisturizer/barrier cream  - Collaborate with interdisciplinary team   - Patient/family teaching  - Consider wound care consult   Outcome: Progressing     Problem: MOBILITY - ADULT  Goal: Maintain or return to baseline ADL function  Description: INTERVENTIONS:  -  Assess patient's ability to carry out ADLs; assess patient's baseline for ADL function and identify physical deficits which impact ability to perform ADLs (bathing, care of mouth/teeth, toileting, grooming, dressing, etc.)  - Assess/evaluate cause of self-care deficits   - Assess range of motion  - Assess patient's mobility; develop plan if impaired  - Assess patient's need for assistive devices and provide as appropriate  - Encourage maximum independence but intervene and supervise when necessary  - Involve family in performance of ADLs  - Assess for home care needs following discharge   - Consider OT consult to assist with ADL evaluation and planning for discharge  - Provide patient education as appropriate  Outcome: Progressing  Goal: Maintains/Returns to pre admission functional level  Description: INTERVENTIONS:  - Perform BMAT or MOVE assessment daily.   - Set and communicate daily mobility goal to care team and patient/family/caregiver.    - Collaborate with rehabilitation services on mobility goals if consulted  - Perform Range of Motion  times a day. - Reposition patient every  hours. - Dangle patient  times a day  - Stand patient  times a day  - Ambulate patient  times a day  - Out of bed to chair  times a day   - Out of bed for meals  times a day  - Out of bed for toileting  - Record patient progress and toleration of activity level   Outcome: Progressing     Problem: NEUROSENSORY - ADULT  Goal: Achieves stable or improved neurological status  Description: INTERVENTIONS  - Monitor and report changes in neurological status  - Monitor vital signs such as temperature, blood pressure, glucose, and any other labs ordered   - Initiate measures to prevent increased intracranial pressure  - Monitor for seizure activity and implement precautions if appropriate      Outcome: Progressing  Goal: Achieves maximal functionality and self care  Description: INTERVENTIONS  - Monitor swallowing and airway patency with patient fatigue and changes in neurological status  - Encourage and assist patient to increase activity and self care.    - Encourage visually impaired, hearing impaired and aphasic patients to use assistive/communication devices  Outcome: Progressing     Problem: GASTROINTESTINAL - ADULT  Goal: Maintains or returns to baseline bowel function  Description: INTERVENTIONS:  - Assess bowel function  - Encourage oral fluids to ensure adequate hydration  - Administer IV fluids if ordered to ensure adequate hydration  - Administer ordered medications as needed  - Encourage mobilization and activity  - Consider nutritional services referral to assist patient with adequate nutrition and appropriate food choices  Outcome: Progressing  Goal: Maintains adequate nutritional intake  Description: INTERVENTIONS:  - Monitor percentage of each meal consumed  - Identify factors contributing to decreased intake, treat as appropriate  - Assist with meals as needed  - Monitor I&O, weight, and lab values if indicated  - Obtain nutrition services referral as needed  Outcome: Progressing

## 2023-11-16 VITALS
RESPIRATION RATE: 16 BRPM | HEART RATE: 77 BPM | TEMPERATURE: 98.3 F | SYSTOLIC BLOOD PRESSURE: 133 MMHG | OXYGEN SATURATION: 98 % | HEIGHT: 66 IN | DIASTOLIC BLOOD PRESSURE: 69 MMHG | WEIGHT: 145.94 LBS | BODY MASS INDEX: 23.46 KG/M2

## 2023-11-16 PROCEDURE — 99239 HOSP IP/OBS DSCHRG MGMT >30: CPT | Performed by: INTERNAL MEDICINE

## 2023-11-16 RX ORDER — SENNOSIDES 8.6 MG
17.2 TABLET ORAL 2 TIMES DAILY
Qty: 90 TABLET | Refills: 0
Start: 2023-11-16

## 2023-11-16 RX ORDER — LANOLIN ALCOHOL/MO/W.PET/CERES
3 CREAM (GRAM) TOPICAL
Qty: 90 TABLET | Refills: 0
Start: 2023-11-16

## 2023-11-16 RX ORDER — GABAPENTIN 100 MG/1
100 CAPSULE ORAL 3 TIMES DAILY
Qty: 90 CAPSULE | Refills: 0
Start: 2023-11-16

## 2023-11-16 RX ORDER — ESCITALOPRAM OXALATE 5 MG/1
5 TABLET ORAL DAILY
Qty: 90 TABLET | Refills: 0
Start: 2023-11-17

## 2023-11-16 RX ORDER — DIVALPROEX SODIUM 250 MG/1
250 TABLET, DELAYED RELEASE ORAL DAILY
Qty: 90 TABLET | Refills: 0
Start: 2023-11-16

## 2023-11-16 RX ORDER — POLYETHYLENE GLYCOL 3350 17 G/17G
17 POWDER, FOR SOLUTION ORAL DAILY
Qty: 90 EACH | Refills: 0
Start: 2023-11-17

## 2023-11-16 RX ORDER — DOCUSATE SODIUM 100 MG/1
100 CAPSULE, LIQUID FILLED ORAL 2 TIMES DAILY
Qty: 90 CAPSULE | Refills: 0
Start: 2023-11-16

## 2023-11-16 RX ADMIN — ESCITALOPRAM OXALATE 5 MG: 10 TABLET ORAL at 08:32

## 2023-11-16 RX ADMIN — ACETAMINOPHEN 975 MG: 325 TABLET, FILM COATED ORAL at 08:31

## 2023-11-16 RX ADMIN — ENOXAPARIN SODIUM 40 MG: 40 INJECTION SUBCUTANEOUS at 08:31

## 2023-11-16 RX ADMIN — CYANOCOBALAMIN TAB 500 MCG 1000 MCG: 500 TAB at 08:31

## 2023-11-16 RX ADMIN — FINASTERIDE 5 MG: 5 TABLET, FILM COATED ORAL at 08:31

## 2023-11-16 RX ADMIN — POLYETHYLENE GLYCOL 3350 17 G: 17 POWDER, FOR SOLUTION ORAL at 08:31

## 2023-11-16 RX ADMIN — GABAPENTIN 100 MG: 100 CAPSULE ORAL at 12:29

## 2023-11-16 RX ADMIN — SENNOSIDES 17.2 MG: 8.6 TABLET, FILM COATED ORAL at 08:32

## 2023-11-16 RX ADMIN — DOCUSATE SODIUM 100 MG: 100 CAPSULE, LIQUID FILLED ORAL at 08:31

## 2023-11-16 RX ADMIN — GABAPENTIN 100 MG: 100 CAPSULE ORAL at 08:31

## 2023-11-16 NOTE — NURSING NOTE
2nd attempt to reach [facility] to provide with report prior to patient transfer. Call disconnected by facility.

## 2023-11-16 NOTE — PLAN OF CARE
Problem: PAIN - ADULT  Goal: Verbalizes/displays adequate comfort level or baseline comfort level  Description: Interventions:  - Encourage patient to monitor pain and request assistance  - Assess pain using appropriate pain scale  - Administer analgesics based on type and severity of pain and evaluate response  - Implement non-pharmacological measures as appropriate and evaluate response  - Consider cultural and social influences on pain and pain management  - Notify physician/advanced practitioner if interventions unsuccessful or patient reports new pain  Outcome: Progressing     Problem: INFECTION - ADULT  Goal: Absence or prevention of progression during hospitalization  Description: INTERVENTIONS:  - Assess and monitor for signs and symptoms of infection  - Monitor lab/diagnostic results  - Monitor all insertion sites, i.e. indwelling lines, tubes, and drains  - Monitor endotracheal if appropriate and nasal secretions for changes in amount and color  - [place] appropriate cooling/warming therapies per order  - Administer medications as ordered  - Instruct and encourage patient and family to use good hand hygiene technique  - Identify and instruct in appropriate isolation precautions for identified infection/condition  Outcome: Progressing  Goal: Absence of fever/infection during neutropenic period  Description: INTERVENTIONS:  - Monitor WBC    Outcome: Progressing

## 2023-11-16 NOTE — CASE MANAGEMENT
Case Management Discharge Planning Note    Patient name Joaquim Herrera  Location /-09 MRN [id number]  : 1940 Date 2023       Current Admission Date: 11/10/2023  Current Admission Diagnosis:Metabolic encephalopathy   Patient Active Problem List    Diagnosis Date Noted    Catheter cystitis      Metabolic encephalopathy     Urine retention 2023    H/O inguinal hernia repair 2023    BPH (benign prostatic hyperplasia) 2023    Sherwood's esophagus with dysplasia 2023    History of diverticulitis 2023    Acute diverticulitis 2022    Constipation 2022    Left lower quadrant abdominal pain 2022    Gastroesophageal reflux 2021    Pancreas cyst 2021    Schatzki's ring 2021    Gastric polyps 2021    Hx of adenomatous colonic polyps 2021    Micrographia 2020    Tremor 2020    Cognitive impairment 2020    Anxiety 2020    Obsession 2020    Insomnia 2020    Current moderate episode of major depressive disorder without prior episode ([facility])       LOS (days): 5  Geometric Mean LOS (GMLOS) (days): 4.90  Days to GMLOS:-0.6     OBJECTIVE:  Risk of Unplanned Readmission Score: 11.15         Current admission status: Inpatient   Preferred Pharmacy:   [facility] - [address] [place] [id number]  Phone: [phone] Fax: [address] [place] [address]  Phone: [phone] Fax: [phone]    Primary Care Provider: Elias Ponce MD    Primary Insurance: MEDICARE  Secondary Insurance:  FOR LIFE    DISCHARGE DETAILS:    Discharge planning discussed with[de-identified] patient's spouse, and son  Freedom of Choice: Yes  Comments - Freedom of Choice: Cm met with patinet's family several times to discuss dc plan.  Family would like rehab placement after speaking [facility] patient's surgeon. Cm advised family about patient level of function and need to have ongoing discussion for long term care since patient will be admitting for short term rehab. Family agreed to [facility]. Family was given information for long term care placement. Cm also confirmed [facility] family today the accepting facilities. Per family they would like [facility]. Cm reserveed placement and confirmed transporation. All parties aware and in agreement with dc plan. CM contacted family/caregiver?: Yes  Were Treatment Team discharge recommendations reviewed with patient/caregiver?: Yes  Did patient/caregiver verbalize understanding of patient care needs?: N/A- going to facility  Were patient/caregiver advised of the risks associated with not following Treatment Team discharge recommendations?: Yes    Contacts  Patient Contacts: [place]  Relationship to Patient[de-identified] Family  Contact Method: In Person  Reason/Outcome: Emergency Contact, Discharge [address]         Is the patient interested in [facility] at discharge?: No    DME Referral Provided  Referral made for DME?: No    Other Referral/Resources/Interventions Provided:  Referral Comments: blanket referral for [facility]. Treatment Team Recommendation: Short Term Rehab  Discharge Destination Plan[de-identified] Short Term Rehab  Transport at Discharge : [facility]  Dispatcher Contacted: Yes  Number/Name of Dispatcher: Roundtrip     ETA of Transport (Date): 11/16/23  ETA of Transport (Time): 1300              IMM Given (Date):: 11/16/23  IMM Given to[de-identified] Family      IMM reviewed with patient's caregiver, patient's caregiver agrees with discharge determination.      Accepting Facility Name, [address] [place] [address] : [facility] rehab facility  Receiving Facility/Agency Phone Number: [phone]  Facility/Agency Fax Number: [phone]

## 2023-11-17 ENCOUNTER — PATIENT OUTREACH (OUTPATIENT)
Dept: CASE MANAGEMENT | Facility: OTHER | Age: 83
End: 2023-11-17

## 2023-11-17 ENCOUNTER — TRANSITIONAL CARE MANAGEMENT (OUTPATIENT)
Dept: FAMILY MEDICINE CLINIC | Facility: CLINIC | Age: 83
End: 2023-11-17

## 2023-11-17 LAB
ATRIAL RATE: 64 BPM
ATRIAL RATE: 82 BPM
P AXIS: 55 DEGREES
P AXIS: 66 DEGREES
PR INTERVAL: 136 MS
PR INTERVAL: 146 MS
QRS AXIS: -21 DEGREES
QRS AXIS: -34 DEGREES
QRSD INTERVAL: 88 MS
QRSD INTERVAL: 96 MS
QT INTERVAL: 348 MS
QT INTERVAL: 420 MS
QTC INTERVAL: 406 MS
QTC INTERVAL: 433 MS
T WAVE AXIS: 45 DEGREES
T WAVE AXIS: 73 DEGREES
VENTRICULAR RATE: 64 BPM
VENTRICULAR RATE: 82 BPM

## 2023-11-17 PROCEDURE — 93010 ELECTROCARDIOGRAM REPORT: CPT | Performed by: INTERNAL MEDICINE

## 2023-11-17 NOTE — PROGRESS NOTES
Outpatient Care Management RENATO/SNF Pathway. Discharged 11/16/23 to [facility]. Email sent to facility to inform them the patient is on the RENATO Pathway and I will be following them during their skilled stay. This Admin Coordinator will continue to monitor via chart review.

## 2023-11-19 NOTE — ASSESSMENT & PLAN NOTE
Recent admission with RENATO secondary to urine retention s/p Choi catheter placement. Follows up with Dr. Brien Caputo outpatient. Plan of care was discussed in length with Dr. Brien Caputo and the wife at bedside who wanted a voiding trial done as inpatient however recommended to continue Choi catheter for now due to confusion and encephalopathy and recommend voiding trial at the SNF/rehab or as outpatient by Dr. Brien Caputo.

## 2023-11-19 NOTE — DISCHARGE SUMMARY
[address]  Discharge- Jensen Martinez 1940, 81 y.o. male MRN: [id number]  Unit/Bed#: -[facility] Encounter: [id number]  Primary Care Provider: Whit Mobley MD   Date and time admitted to hospital: 11/10/2023 10:13 PM    Catheter cystitis   Assessment & Plan  Presented with change in mental status with cystitis and will give IV antibiotic with Rocephin. Patient cystitis is associated with indwelling Choi catheter which was placed on 11/6/2023, this is evidenced by CT scan showing recurrence of bladder wall thickening, and urinalysis. Urine retention  Assessment & Plan  Recent admission with RENATO secondary to urine retention s/p Choi catheter placement. Follows up with Dr. Des Estevez outpatient. Plan of care was discussed in length with Dr. Des Estevez and the wife at bedside who wanted a voiding trial done as inpatient however recommended to continue Choi catheter for now due to confusion and encephalopathy and recommend voiding trial at the SNF/rehab or as outpatient by Dr. Des Estevez. BPH (benign prostatic hyperplasia)  Assessment & Plan  On Flomax and dutasteride chronic change to finasteride and Flomax while inpatient    H/O inguinal hernia repair  Assessment & Plan  S/p recent surgery on 11/2/2023    Constipation  Assessment & Plan  Stool softeners,    Insomnia  Assessment & Plan  We will add melatonin 3 mg nightly  Optimize pain regimen  If melatonin not effective consider mirtazapine 7.5 mg nightly  Avoid nighttime interruptions and caffeine use in the afternoon    Current moderate episode of major depressive disorder without prior episode (HCC)  Assessment & Plan  Consider Lexapro 5 mg daily  Provide supportive care  Encourage family and friends to visit    Cognitive impairment  Assessment & Plan  Patient needs assistance with some IADLs at baseline  Will continue to provide supportive care, reorient as needed.   Patient is at high risk for delirium, will monitor closely and place on delirium precautions. Maintain sleep/wake cycle, add melatonin 3 mg nightly  Optimize pain regimen. Monitor for constipation and urinary retention and manage as needed. Vitamin B12 and TSH within normal limits-continue to supplement B12  Encourage family to visit. Encourage to wear glasses and hearing aids while awake. Encourage po intake, assist with feeding if needed. Out of bed with meals and continue physical therapy  I would recommend geriatric assessment as outpatient when patient is medically stable and in a familiar environment      * Metabolic encephalopathy  Assessment & Plan  Patient presented with worsening change in mental status. As per wife who is the primary historian reports patient has dementia however baseline is very cooperative, calm. Alert oriented x2-3. Symptoms started to get worse with change in mental status after recent surgery done on 11/2/2023 for inguinal hernia repair. Was recently admitted to the hospital with RENATO secondary to urine retention s/p Choi catheter placement and discharged home with Choi catheter in place. States patient was calm and cooperative however his mental status was not back to baseline on discharge from recent hospitalization. Presented again with delusion and hallucinations started on the day of presentation. Difficulty taking care of her at home by wife. No recent changes in medication. TSH WNL. Electrolytes WNL. CT head unremarkable. Currently unclear etiology. Vitals otherwise stable. Patient currently is awake, alert and oriented to self which is not his baseline. Stable etiology likely cystitis with change in mental status and hallucination requiring IV antibiotic.     Fall precautions  Delirium precautions  Replace magnesium  Will start Seroquel 25 mg daily  Continue Rocephin  Geriatics consulted  Check TSH, vitamin B12, vitamin D  Ct abdomen was reviewed shows -Circumferential bladder wall thickening with mild surrounding fat stranding. The bladder wall thickening is attributable to outlet obstruction, given the severe prostamegaly, however the surrounding fat stranding could indicate a superimposed cystitis. Give IV antibiotic with Rocephin. Also will replace with vitamin B12. Geriatrics input appreciated, recommended to continue Seroquel 25 mg at 7 PM, added gabapentin 100 mg twice a day, can increase gabapentin to every 8 hours as needed, and Zyprexa as needed for severe agitation. Agree with adding Depakote due to agitation in the setting of dementia        Medical Problems       Resolved Problems  Date Reviewed: 11/19/2023   None       Discharging Physician / Practitioner: Patrick Packer DO  PCP: Shruthi Chinchilla MD  Admission Date:   Admission Orders (From admission, onward)       Ordered        11/11/23 0024  INPATIENT ADMISSION  Once                          Discharge Date: 11/16/23    Consultations During Hospital Stay:    geriatrics  Procedures Performed:   none    Significant Findings / Test Results:   CT abdomen pelvis wo contrast   Final Result by Mercedez Whelan MD (11/11 4755)      Circumferential bladder wall thickening with mild surrounding fat stranding. The bladder wall thickening is attributable to outlet obstruction, given the severe prostamegaly, however the surrounding fat stranding could indicate a superimposed cystitis. The study was marked in [facility] for immediate notification. Workstation performed: [id number]         CT head without contrast   Final Result by Lauryn Mendoza MD (11/11 0010)      No acute intracranial abnormality. Workstation performed: [id number]              Incidental Findings:      I reviewed the above mentioned incidental findings with the patient and/or family and they expressed understanding.     Test Results Pending at Discharge (will require follow up):   none     Outpatient Tests Requested:  none    Complications:      Reason for Admission:     Hospital Course:   Rhys Choi is a 81 y.o. male patient who originally presented to the hospital on 11/10/2023 due to change in mental status with acute confusion in the setting of progressive dementia. Patient was treated for urinary tract infection, however he continued to have cognitive deficits which are likely due to progression of dementia. Was also noted to have hallucinations of people in his room intermittently thrgouhout his stay . He was evaluated by geriatrics and started on Depakote with improvement in his agitation. He was discharged to rehab with plans for long-term placement afterwards. He will be followed by urology for voiding trial.        Please see above list of diagnoses and related plan for additional information. Condition at Discharge: guarded    Discharge Day Visit / Exam:   Subjective: Denies any acute complaints  Vitals: Blood Pressure: 133/69 (11/16/23 0750)  Pulse: 77 (11/16/23 0750)  Temperature: 98.3 °F (36.8 °C) (11/16/23 0750)  Temp Source: Oral (11/16/23 0750)  Respirations: 16 (11/16/23 0750)  Height: 5' 6" (167.6 cm) (11/11/23 0222)  Weight - Scale: 66.2 kg (145 lb 15.1 oz) (11/11/23 0222)  SpO2: 98 % (11/16/23 0750)  Exam:   Physical Exam  Vitals and nursing note reviewed. Constitutional:       General: He is not in acute distress. Appearance: He is well-developed. He is not toxic-appearing or diaphoretic. HENT:      Head: Normocephalic and atraumatic. Eyes:      General: No scleral icterus. Conjunctiva/sclera: Conjunctivae normal.   Cardiovascular:      Rate and Rhythm: Normal rate and regular rhythm. Heart sounds: No murmur heard. No friction rub. No gallop. Pulmonary:      Effort: Pulmonary effort is normal. No respiratory distress. Breath sounds: Normal breath sounds. No stridor. No wheezing, rhonchi or rales. Chest:      Chest wall: No tenderness. Abdominal:      General: There is no distension. Palpations: Abdomen is soft. There is no mass. Tenderness: There is no abdominal tenderness. There is no guarding or rebound. Hernia: No hernia is present. Musculoskeletal:         General: No swelling or tenderness. Cervical back: Neck supple. Skin:     General: Skin is warm and dry. Capillary Refill: Capillary refill takes less than 2 seconds. Neurological:      Mental Status: He is alert. He is disoriented. Psychiatric:         Mood and Affect: Mood normal.          Discussion with Family: Updated  (wife) at bedside. Discharge instructions/Information to patient and family:   See after visit summary for information provided to patient and family. Provisions for Follow-Up Care:  See after visit summary for information related to follow-up care and any pertinent home health orders. Mobility at time of Discharge:   Basic Mobility Inpatient Raw Score: 18  JH-HLM Goal: 6: Walk 10 steps or more  JH-HLM Achieved: 1: Laying in bed  [facility] Goal achieved. Continue to encourage appropriate mobility. Disposition:   Acute Rehab at rehab    Planned Readmission: no     Discharge Statement:  I spent 35 minutes discharging the patient. This time was spent on the day of discharge. I had direct contact with the patient on the day of discharge. Greater than 50% of the total time was spent examining patient, answering all patient questions, arranging and discussing plan of care with patient as well as directly providing post-discharge instructions. Additional time then spent on discharge activities. Discharge Medications:  See after visit summary for reconciled discharge medications provided to patient and/or family.       **Please Note: This note may have been constructed using a voice recognition system**

## 2023-11-19 NOTE — ASSESSMENT & PLAN NOTE
Patient needs assistance with some IADLs at baseline  Will continue to provide supportive care, reorient as needed. Patient is at high risk for delirium, will monitor closely and place on delirium precautions. Maintain sleep/wake cycle, add melatonin 3 mg nightly  Optimize pain regimen. Monitor for constipation and urinary retention and manage as needed. Vitamin B12 and TSH within normal limits-continue to supplement B12  Encourage family to visit. Encourage to wear glasses and hearing aids while awake. Encourage po intake, assist with feeding if needed.    Out of bed with meals and continue physical therapy  I would recommend geriatric assessment as outpatient when patient is medically stable and in a familiar environment

## 2023-11-20 ENCOUNTER — PATIENT OUTREACH (OUTPATIENT)
Dept: CASE MANAGEMENT | Facility: OTHER | Age: 83
End: 2023-11-20

## 2023-11-20 NOTE — PROGRESS NOTES
Chart review complete Update obtained from [facility]). The patient is currently admitted to the SNF and is receiving skilled therapies No LCD at this time. This Admin Coordinator will continue to monitor via chart review.

## 2023-11-22 LAB — METHYLMALONATE SERPL-SCNC: 263 NMOL/L (ref 0–378)

## 2023-11-27 ENCOUNTER — PATIENT OUTREACH (OUTPATIENT)
Dept: CASE MANAGEMENT | Facility: OTHER | Age: 83
End: 2023-11-27

## 2023-11-27 NOTE — PROGRESS NOTES
Chart review complete Update obtained from [facility]). The patient is currently admitted to the SNF and is receiving skilled therapies No LCD at this time. This Admin Coordinator will continue to monitor via chart review.

## 2023-12-04 ENCOUNTER — PATIENT OUTREACH (OUTPATIENT)
Dept: CASE MANAGEMENT | Facility: OTHER | Age: 83
End: 2023-12-04

## 2023-12-04 NOTE — PROGRESS NOTES
Chart review complete Update obtained from [facility]). The patient is currently admitted to the SNF and is receiving skilled therapies No LCD at this time. This Admin Coordinator will continue to monitor via chart review.

## 2023-12-11 ENCOUNTER — PATIENT OUTREACH (OUTPATIENT)
Dept: CASE MANAGEMENT | Facility: OTHER | Age: 83
End: 2023-12-11

## 2023-12-11 NOTE — PROGRESS NOTES
Chart review complete Update obtained from [facility]). The patient is currently admitted to the SNF and is receiving skilled therapies No LCD at this time. This Admin Coordinator will continue to monitor via chart review.

## 2023-12-13 ENCOUNTER — OFFICE VISIT (OUTPATIENT)
Age: 83
End: 2023-12-13
Payer: MEDICARE

## 2023-12-13 VITALS
DIASTOLIC BLOOD PRESSURE: 68 MMHG | OXYGEN SATURATION: 99 % | BODY MASS INDEX: 23.14 KG/M2 | HEIGHT: 66 IN | SYSTOLIC BLOOD PRESSURE: 110 MMHG | HEART RATE: 69 BPM | WEIGHT: 144 LBS | TEMPERATURE: 97.6 F

## 2023-12-13 DIAGNOSIS — R33.8 BENIGN PROSTATIC HYPERPLASIA WITH URINARY RETENTION: ICD-10-CM

## 2023-12-13 DIAGNOSIS — G30.1 MILD LATE ONSET ALZHEIMER'S DEMENTIA WITH ANXIETY (HCC): Primary | ICD-10-CM

## 2023-12-13 DIAGNOSIS — F51.05 INSOMNIA DUE TO OTHER MENTAL DISORDER: ICD-10-CM

## 2023-12-13 DIAGNOSIS — K59.01 SLOW TRANSIT CONSTIPATION: ICD-10-CM

## 2023-12-13 DIAGNOSIS — F99 INSOMNIA DUE TO OTHER MENTAL DISORDER: ICD-10-CM

## 2023-12-13 DIAGNOSIS — N40.1 BENIGN PROSTATIC HYPERPLASIA WITH URINARY OBSTRUCTION: Chronic | ICD-10-CM

## 2023-12-13 DIAGNOSIS — N40.1 BENIGN PROSTATIC HYPERPLASIA WITH URINARY RETENTION: ICD-10-CM

## 2023-12-13 DIAGNOSIS — N13.8 BENIGN PROSTATIC HYPERPLASIA WITH URINARY OBSTRUCTION: Chronic | ICD-10-CM

## 2023-12-13 DIAGNOSIS — R41.0 DELIRIUM: Chronic | ICD-10-CM

## 2023-12-13 DIAGNOSIS — F02.A4 MILD LATE ONSET ALZHEIMER'S DEMENTIA WITH ANXIETY (HCC): Primary | ICD-10-CM

## 2023-12-13 PROBLEM — R68.89: Status: RESOLVED | Noted: 2020-08-27 | Resolved: 2023-12-13

## 2023-12-13 PROBLEM — F03.94 DEMENTIA WITH ANXIETY (HCC): Status: ACTIVE | Noted: 2020-06-25

## 2023-12-13 PROBLEM — F03.90 DEMENTIA (HCC): Status: ACTIVE | Noted: 2020-06-25

## 2023-12-13 PROBLEM — Z87.19 HISTORY OF COLONIC DIVERTICULITIS: Chronic | Status: ACTIVE | Noted: 2022-02-11

## 2023-12-13 PROBLEM — F03.94 DEMENTIA WITH ANXIETY (HCC): Chronic | Status: ACTIVE | Noted: 2020-06-25

## 2023-12-13 PROBLEM — R33.9 URINE RETENTION: Status: RESOLVED | Noted: 2023-11-11 | Resolved: 2023-12-13

## 2023-12-13 PROBLEM — F42.8 OBSESSION: Status: RESOLVED | Noted: 2020-06-25 | Resolved: 2023-12-13

## 2023-12-13 PROBLEM — Z87.19 HISTORY OF COLONIC DIVERTICULITIS: Status: ACTIVE | Noted: 2022-02-11

## 2023-12-13 PROBLEM — G93.41 METABOLIC ENCEPHALOPATHY: Status: RESOLVED | Noted: 2023-11-11 | Resolved: 2023-12-13

## 2023-12-13 PROBLEM — Z87.19 HISTORY OF DIVERTICULITIS: Status: RESOLVED | Noted: 2023-04-17 | Resolved: 2023-12-13

## 2023-12-13 PROBLEM — R33.9 URINE RETENTION: Chronic | Status: ACTIVE | Noted: 2023-11-11

## 2023-12-13 PROBLEM — K21.9 GASTROESOPHAGEAL REFLUX: Status: RESOLVED | Noted: 2021-11-11 | Resolved: 2023-12-13

## 2023-12-13 PROBLEM — R10.32 LEFT LOWER QUADRANT ABDOMINAL PAIN: Status: RESOLVED | Noted: 2022-02-04 | Resolved: 2023-12-13

## 2023-12-13 PROBLEM — F41.9 ANXIETY: Status: RESOLVED | Noted: 2020-06-25 | Resolved: 2023-12-13

## 2023-12-13 PROBLEM — R25.1 TREMOR: Status: RESOLVED | Noted: 2020-08-27 | Resolved: 2023-12-13

## 2023-12-13 PROCEDURE — G2212 PROLONG OUTPT/OFFICE VIS: HCPCS | Performed by: INTERNAL MEDICINE

## 2023-12-13 PROCEDURE — 99205 OFFICE O/P NEW HI 60 MIN: CPT | Performed by: INTERNAL MEDICINE

## 2023-12-13 RX ORDER — TAMSULOSIN HYDROCHLORIDE 0.4 MG/1
0.4 CAPSULE ORAL 2 TIMES DAILY
COMMUNITY
Start: 2023-12-13 | End: 2023-12-20 | Stop reason: SDUPTHER

## 2023-12-13 NOTE — PATIENT INSTRUCTIONS
Choi catheter is causing ongoing direct harm with delirium and subsequent insomnia and disorganized thinking. It is essential that Choi catheter is removed and patient start with course of regular straight catheterization three times a day, every day, for bladder training and resolution of ongoing delirium. May start with bladder ultrasounds for checking volume and and avoid catheterization for volume less than 500cc if patient is able to urinate on his own.

## 2023-12-13 NOTE — PROGRESS NOTES
ASSESSMENT AND PLAN:  1. 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 Counseled on diagnosis and prognosis. Now patient has delirium on top of chronic dementia related to urinary retention and now with Choi catheter causing consistent confusion and feeling that he has to urinate despite catheter being present. Choi catheter is causing ongoing delirium with delusions and must be removed immediately. Unclear that escitalopram is necessary. Will continue with divalproex for now, although if Choi catheter is removed and delirium resolves, likely can stop this as well. Decision-making capacity: Does not have capacity for complex medical or financial decisions. 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 Unclear that escitalopram is necessary. Will continue with divalproex for now, although if Choi catheter is removed and delirium resolves, likely can stop this as well. 2. Benign prostatic hyperplasia with urinary retention    3. Benign prostatic hyperplasia with urinary obstruction  Assessment & Plan: Choi catheter is causing ongoing delirium and confusion. It is essential that Choi catheter is removed and patient start with course of regular straight catheterization three times a day, every day, for bladder training. 4. Delirium  Assessment & Plan: For delirium to improve, it will be essential to remove the Choi catheter. Continue with divalproex and gabapentin for now to mediate symptoms. 5. Slow transit constipation  Assessment & Plan:  Constipation can promote urinary retention. Continue with senna and Miralax therapies. 6. Insomnia due to other mental disorder  Assessment & Plan:  Likely related to delirium and adjustment to facility.   Best therapy is to remove Choi catheter and return home as soon as able. HPI:    We had the pleasure of evaluating Senait Morales who is a 81 y.o. male in Geriatric consultation today. Mr. Blayne Cochran is in the office with his wife \[facility] [place] [facility]\"" and son Otto Gao. He lives with his wife. HISTORY AS PER WIFE AND SON:  Wife reports significant decline after inguinal hernia surgery Nov 2nd 2023. Was doing well the day after surgery, but then declined over 2 days with increased lethargy then delirium associated also with severe urinary retention. COGNITION:  Memory Issues noticed since 2 year(s)    Memory affected: short term memory loss    Symptoms started: gradual  Over time the memory has:  worsened  Memory issue(s) were noted by: family   Difficulty finding the right word while speaking: Yes  Requires repeat information or asking the same question repeatedly: Yes  Fluctuation in alertness: No  Changes in mood or personality:No  Current or previous treatment for depression or anxiety: Yes    Family member with dementia and what type? Yes, mother with Alzheimer's  History of head trauma: No  History of stroke: No  History of alcohol or substance abuse: No    FUNCTIONAL STATUS:  BADLs  Does patient require assistance with:  Bathing: No  Dressing: No  Transferring: No  Continence: No  Toileting: No  Feeding: No    IADLs  Dose patient require assistance with:  Telephone: Yes  Shopping: Yes  Food Preparation: Yes  Housekeeping: Yes, never did that  Laundry: Yes, never did that  Transportation: Yes  Medications: Yes, wife was filling pill box and giving pills directly from pill box  Finances: Yes, wife took over finances    [address]:  Does patient get angry or hostile? Resist care from others? Yes, particularly from his wife. Does patient see or hear things that no one else can see or hear? Yes, just since the surgery, somewhat improved  Does patient act impatient and cranky? Does mood frequently change for no apparent reason? No  Does patient act suspicious without good reason (example: believes that others are stealing from him or her, or planning to harm him or her in some way)? Yes, just since the surgery  Does patient less interested in his or her usual activities or in the activities and plans of others? Yes  Does patient have trouble sleeping at night? Yes, just since the surgery  Dose patient have abnormal movements while asleep?  No    SAFETY:  Hearing and vision issue: Yes, vision loss  Any gait or balance disorder: No  Uses: no assistive devices  Any falls in the last year: Yes, just since surgery in the nursing home, no injury  Any history of wandering: No  Are there firearms or guns in the home: No  Does patient drive: No  Any driving accidents or citations in the last year: No  Any concerns about patient's ability to drive: Yes    ACP REVIEW:  Does patient have POA: Yes  Does patient have a Living will: Yes  Any legal assistance needed for healthcare planning?: No    Allergies   Allergen Reactions    Sulfa Antibiotics Other (See Comments)     Childhood- unknown       Medications:    Current Outpatient Medications on File Prior to Visit   Medication Sig Dispense Refill    cyanocobalamin (VITAMIN B-12) 1000 MCG tablet Take 1 tablet (1,000 mcg total) by mouth daily Do not start before November 17, 2023. 90 tablet 0    divalproex sodium (DEPAKOTE) 250 mg DR tablet Take 1 tablet (250 mg total) by mouth in the morning 90 tablet 0    docusate sodium (COLACE) 100 mg capsule Take 1 capsule (100 mg total) by mouth 2 (two) times a day 90 capsule 0    dutasteride (AVODART) 0.5 mg capsule Take 0.5 mg by mouth daily      escitalopram (LEXAPRO) 5 mg tablet Take 1 tablet (5 mg total) by mouth daily Do not start before November 17, 2023. 90 tablet 0    gabapentin (NEURONTIN) 100 mg capsule Take 1 capsule (100 mg total) by mouth 3 (three) times a day 90 capsule 0    melatonin 3 mg Take 1 tablet (3 mg total) by mouth daily at bedtime 90 tablet 0    polyethylene glycol (MIRALAX) 17 g packet Take 17 g by mouth daily Do not start before November 17, 2023. 90 each 0    senna (SENOKOT) 8.6 mg Take 2 tablets (17.2 mg total) by mouth 2 (two) times a day 90 tablet 0    tamsulosin (FLOMAX) 0.4 mg Take 1 capsule (0.4 mg total) by mouth 2 (two) times a day      [DISCONTINUED] tamsulosin (FLOMAX) 0.4 mg Take 1 capsule (0.4 mg total) by mouth daily with dinner 30 capsule 0    [DISCONTINUED] acetaminophen (TYLENOL) 325 mg tablet Take 2 tablets (650 mg total) by mouth every 6 (six) hours as needed for mild pain (Patient not taking: Reported on 11/10/2023) 20 tablet 0     No current facility-administered medications on file prior to visit.        History:  Past Medical History:   Diagnosis Date    Anxiety     BPH (benign prostatic hyperplasia)     Colon polyp     Disorder of eye     disorder of choroid of eye    Diverticulitis of rectum     Infected hernioplasty mesh (HCC)     Malignant melanoma of eye, left ([address])     Memory impairment     Mixed hyperlipidemia     Skin cancer (melanoma) ([address])     Uveal, Caroidal    Vision loss of left eye     Wears glasses      Past Surgical History:   Procedure Laterality Date    ABDOMINAL SURGERY      mesh removal    EGD  2019    EGD AND COLONOSCOPY  2017    EGD AND COLONOSCOPY  03/11/2022    HERNIA REPAIR Right     Inguinal/Umbilical    LA RPR RECRT INGUINAL HERNIA ANY AGE REDUCIBLE Right 11/2/2023    Procedure: OPEN REPAIR OF RECURRENT REDUCIBLE RIGHT INGUINAL  HERNIAS WITH MESH;  Surgeon: Karine Dooley MD;  Location:  MAIN OR;  Service: General    SKIN CANCER EXCISION  02/21/2019    R upper cheek, benign lesion including margins, face, ears, eyelids, nose, lips, mucous membrane, excised diameter 2.1 to 3.0 CM     Family History   Problem Relation Age of Onset    Alzheimer's disease Mother     No Known Problems Father      Social History     Socioeconomic History    Marital status: /Civil Union     Spouse name: Not on file Number of children: Not on file    Years of education: 4 year college    Highest education level: Not on file   Occupational History    Occupation: Retired [employer]   Tobacco Use    Smoking status: Former     Current packs/day: 0.00     Average packs/day: 1 pack/day for 9.0 years (9.0 ttl pk-yrs)     Types: Cigarettes     Start date:      Quit date:      Years since quittin.9    Smokeless tobacco: Never    Tobacco comments:     QUIT IN    Vaping Use    Vaping status: Never Used   Substance and Sexual Activity    Alcohol use: Yes     Alcohol/week: 7.0 standard drinks of alcohol     Types: 7 Glasses of wine per week     Comment: 1 glass a night    Drug use: Not Currently     Comment: DENIES    Sexual activity: Not Currently   Other Topics Concern    Not on file   Social History Narrative    Do you currently or have you served in the [address] [place] [address] [employer]: Yes    If Yes, What branch of service: [employer]    Occupation: retired [employer]    Education: [address] [place] [address]    Marital status:     Exercise level: None    Diet: Regular    General stress level: Low    Alcohol intake: Moderate    Caffeine intake: Moderate    Seat belts used routinely: Yes    Sunscreen used routinely: Yes    Smoke alarm in home: Yes    Advance directive: Yes    Live alone or with others: with others     Social Determinants of Health     Financial Resource Strain: Low Risk  (2023)    Overall Financial Resource Strain (CARDIA)     Difficulty of Paying Living Expenses: Not very hard   Food Insecurity: No Food Insecurity (2023)    Hunger Vital Sign     Worried About Running Out of Food in the Last Year: Never true     Ran Out of Food in the Last Year: Never true   Transportation Needs: No Transportation Needs (2023)    PRAPARE - Transportation     Lack of Transportation (Medical): No     Lack of Transportation (Non-Medical):  No   Physical Activity: Not on file   Stress: Not on file   Social Connections: Not on file   Intimate Partner Violence: Not on file   Housing Stability: Unknown (11/13/2023)    Housing Stability Vital Sign     Unable to Pay for Housing in the Last Year: No     Number of Places Lived in the Last Year: Not on file     Unstable Housing in the Last Year: No     Past Surgical History:   Procedure Laterality Date    ABDOMINAL SURGERY      mesh removal    EGD  2019    EGD AND COLONOSCOPY  2017    EGD AND COLONOSCOPY  03/11/2022    HERNIA REPAIR Right     Inguinal/Umbilical    OR RPR RECRT INGUINAL HERNIA ANY AGE REDUCIBLE Right 11/2/2023    Procedure: OPEN REPAIR OF RECURRENT REDUCIBLE RIGHT [facility];  Surgeon: Yasmine Blanco MD;  Location:  MAIN OR;  Service: General    SKIN CANCER EXCISION  02/21/2019    R upper cheek, benign lesion including margins, face, ears, eyelids, nose, lips, mucous membrane, excised diameter 2.1 to 3.0 CM       OBJECTIVE:  Vitals:    12/13/23 1059   BP: 110/68   BP Location: Left arm   Patient Position: Sitting   Cuff Size: Standard   Pulse: 69   Temp: 97.6 °F (36.4 °C)   TempSrc: Temporal   SpO2: 99%   Weight: 65.3 kg (144 lb)   Height: 5' 6" (1.676 m)     Body mass index is 23.24 kg/m². Physical Exam  Constitutional:       General: He is not in acute distress. HENT:      Head: Normocephalic and atraumatic. Right Ear: External ear normal.      Left Ear: External ear normal.      Nose: Nose normal.   Eyes:      General: No scleral icterus. Conjunctiva/sclera: Conjunctivae normal.   Pulmonary:      Effort: Pulmonary effort is normal. No respiratory distress. Abdominal:      General: There is no distension. Skin:     Coloration: Skin is not jaundiced or pale. Neurological:      General: No focal deficit present. Mental Status: He is alert. Psychiatric:         Mood and Affect: Mood normal.      Comments: Poor memory. Patient asked twice during the visit to go urinate despite having urinary catheter in place. Able to be redirected both times. MoCA: 12/30  Geriatric Depression Screen      Flowsheet Row Most Recent Value   Geriatric Depression Scale (Short Version) Total 7          Depression Screening Follow-up Plan: Patient's depression screening was positive with a geriatric depression screen (GDS) score of  . Patient's depressive symptoms likely due to other medical condition. Would recommend treatment of underlying condition. Will continue to monitor at next office visit. Labs & Imaging:  Lab Results   Component Value Date    WBC 9.22 11/15/2023    HGB 13.9 11/15/2023    HCT 41.4 11/15/2023    MCV 90 11/15/2023     11/15/2023     Lab Results   Component Value Date    SODIUM 139 11/15/2023    K 3.8 11/15/2023     11/15/2023    CO2 27 11/15/2023    AGAP 6 11/15/2023    BUN 17 11/15/2023    CREATININE 0.92 11/15/2023    GLUC 98 11/15/2023    GLUF 81 10/20/2023    CALCIUM 8.6 11/15/2023    AST 12 (L) 11/15/2023    ALT 12 11/15/2023    ALKPHOS 50 11/15/2023    TP 5.9 (L) 11/15/2023    TBILI 0.46 11/15/2023    EGFR 77 11/15/2023     Lab Results   Component Value Date    CHOLESTEROL 166 04/20/2023     Lab Results   Component Value Date    HDL 61 04/20/2023     Lab Results   Component Value Date    TRIG 42 04/20/2023     Lab Results   Component Value Date    NONHDLC 105 04/20/2023     Lab Results   Component Value Date    LDLCALC 97 04/20/2023     Lab Results   Component Value Date    SJQGGBQS48 341 11/10/2023     Lab Results   Component Value Date    KZV6EPNODYWQ 3.235 11/10/2023    GUV0MWORRAML 3.288 11/10/2023     Lab Results   Component Value Date    VKER31UQHPKR 34.2 11/10/2023      Results for orders placed during the hospital encounter of 11/10/23    CT head without contrast    Narrative  CT BRAIN - WITHOUT CONTRAST    INDICATION:   AMS. COMPARISON:  None. TECHNIQUE:  CT examination of the brain was performed. Multiplanar 2D reformatted images were created from the source data.     Radiation dose length product (DLP) for this visit:  825.3 mGy-cm . This examination, like all CT scans performed in the [facility], was performed utilizing techniques to minimize radiation dose exposure, including the use of iterative  reconstruction and automated exposure control. IMAGE QUALITY:  Diagnostic. FINDINGS:    PARENCHYMA:  No intracranial mass, mass effect or midline shift. No CT signs of acute infarction. No acute parenchymal hemorrhage. VENTRICLES AND EXTRA-AXIAL SPACES:  Ventricles and extra-axial CSF spaces are prominent commensurate with the degree of volume loss. No hydrocephalus. No acute extra-axial hemorrhage. VISUALIZED ORBITS: Normal visualized orbits. PARANASAL SINUSES: Normal visualized paranasal sinuses. CALVARIUM AND EXTRACRANIAL SOFT TISSUES:  Normal.    Impression  No acute intracranial abnormality. Results for orders placed during the hospital encounter of 01/19/21    MRI brain NeuroQuant wo contrast    Narrative  MRI BRAIN WITHOUT CONTRAST, NeuroQuant IMAGING    INDICATION: R41.3: Other amnesia. COMPARISON:   None. TECHNIQUE:  Sagittal T1, axial T2, axial [place], axial T1, axial FLAIR, axial diffusion imaging. Sagittal 3D volumetric imaging processed by [address] creating General Morphology and Age Related Atrophy reports. IMAGE QUALITY:  Diagnostic. FINDINGS:    BRAIN PARENCHYMA:  No acute disease. There is no acute ischemic comment cranial mass or mass effect. There is no edema or pathologic hemosiderin deposition. Several tiny, nonspecific foci of high signal in the supratentorial parenchyma likely  reflecting sequela of chronic small vessel disease. Diffuse, generalized volume loss. Uriah    QUANTITATIVE:    Exam Quality: Adequate for volumetric analysis.     Hippocampus    Total hippocampal volume (cc):                           5.58  Percentile for similar age:                              22th    Superior Lateral ventricular volume (cc):     7 0.77  Percentile for similar age:                             80th    Inferior lateral ventricular volume (cc):                            4.6  Percentile for similar age:                                    96th    Hippocampal Occupancy Score (HOC):                     0.55      Quantitative conclusions:  Hippocampal Volume:                       Low normal  Superior Lateral Ventricle Volume:     Mildly prominent  Inferior Lateral Ventricle Volume:       Prompt    Concordance between qualitative and quantitative hippocampal volume assessment: Concordant. Change in brain volumes: No previous volumetric study for comparison    Mean hippocampal volume loss among normal elderly: 0.7% per year, (-0.3 to 1.7;  Carin 2008; also Elmer 2010). VENTRICLES:  Prominent for age    SELLA AND PITUITARY GLAND:  Partially empty sella    ORBITS:  Bilateral lens implants    PARANASAL SINUSES:  Trace sinus mucosal disease. VASCULATURE:  Evaluation of the major intracranial vasculature demonstrates appropriate flow voids. CALVARIUM AND SKULL BASE:  Normal.    EXTRACRANIAL SOFT TISSUES:  Normal.    Impression  1. No acute intracranial disease. Diffuse generalized volume loss, mild degree of chronic small vessel disease. 2.  NeuroQuant analysis was performed: Low hippocampal volume and enlarged inferior lateral and overall ventricular system, suggestive of global ex-vacuo dilatation. 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 Recommend reevaluation with Neuroquant imaging in 6 months. I have spent 89 minutes with Patient and family today including taking history from family, patient, review of records, charting, and counseling regarding diagnosis and management of conditions.

## 2023-12-13 NOTE — ASSESSMENT & PLAN NOTE
Likely related to delirium and adjustment to facility. Best therapy is to remove Choi catheter and return home as soon as able.

## 2023-12-13 NOTE — ASSESSMENT & PLAN NOTE
Choi catheter is causing ongoing delirium and confusion. It is essential that Choi catheter is removed and patient start with course of regular straight catheterization three times a day, every day, for bladder training.

## 2023-12-13 NOTE — ASSESSMENT & PLAN NOTE
For delirium to improve, it will be essential to remove the Choi catheter. Continue with divalproex and gabapentin for now to mediate symptoms.

## 2023-12-13 NOTE — PROGRESS NOTES
Lauren De La Cruz [facility]  [address], [address] [facility], [address]  [phone]    Social Work [address] presents today for a geriatric assessment, accompanied by wife & son. LSW discussed various resources available to patient as he is being discharged to home tomorrow from skilled rehab. Patient's wife expressed concerns with managing patient's catheter as he may be switching to straight cath when returning home. LSW stated that patient's wife could appeal with the nursing home to see if they will give more time and training with the catheter. Patient's wife stated that she did appeal before and patient has now been in rehab for 3 weeks. LSW discussed options at home such as: hiring a nurse to help with catheter daily & utilizing homecare that medicare will cover post rehab, as well as an aide to help with daily care needs. Patient's son stated that he could help and his wife who is a nurse could help with cath, but that if he needs it long term they may need other options. LSW discussed respite care at SNF if insurance is not willing to cover more time, or possibly patient moving into the SNF for LTC. LSW stated that some memory care facilities may be able to help with the cath but it depends on the facility/level of care they provide. LSW provided [employer] brochure to help with finding placement option if family considering long term care. Patient's wife stated she does have long term care insurance, LSW stated that this can sometimes help with in homecare coverage or paying for patient to live in a facility. LSW stated that patient may also be eligible for veterans benefits as patient is a [place], with benefits that can also cover the cost for homecare or living in facility for LTC.      LSW provided the following resources to help with finding care: homecare list, SNF list, [employer] brochure, Legal & financial planning for people living with dementia ([facility] packet), elder law  list, P brochure, and adult day center list. LSW discussed benefits of elder law  & adult day centers as another care option. LSW suggested talking with nursing home social worker to help with setting up homecare for patient's discharge. LSW stated that [facility] offers in homecare nurses & aides. LSW available as needed for support. Rene Cognitive Assessment (MoCA) Version 8.2  Education: Masters    Points Earned POSSIBLE Points   Visuospatial/Executive   Alternating Clio Making 0 1   Visuoconstructional skills 0 1   Visuoconstructional skills (clock) 1 3   Naming   Naming Animals 3 3   Attention   Digit Span 2 2   Vigilance (letters) 0 1   Serial 7 subtraction 3 3   Language   Sentence Repetition 2 2   Verbal fluency 0 1   Abstraction   Abstraction (word pairings) 0 2   Delayed recall   Delayed recall 0 5   Memory index score: 3/15   Orientation   Orientation 1 6   TOTAL SCORE: 12/30  (Normal ?26/30)   Additional notes: MoCA completed prior 10/18/21, score was: 22/30       Geriatric Depression Scale (GDS) completed today, 7/15.

## 2023-12-13 NOTE — ASSESSMENT & PLAN NOTE
Syndrome is best explained as an Alzheimer's type dementia, with significant symptoms since 2021 as per neurology note. Counseled on diagnosis and prognosis. Now patient has delirium on top of chronic dementia related to urinary retention and now with Choi catheter causing consistent confusion and feeling that he has to urinate despite catheter being present. Choi catheter is causing ongoing delirium with delusions and must be removed immediately. Unclear that escitalopram, divalproex or gabapentin are necessary. Will continue for now, although if Choi catheter is removed and delirium resolves, likely can stop these medications. Decision-making capacity: Does not have capacity for complex medical or financial decisions. 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 Unclear that escitalopram is necessary. Will continue with divalproex for now, although if Choi catheter is removed and delirium resolves, likely can stop this as well.

## 2023-12-14 ENCOUNTER — TELEPHONE (OUTPATIENT)
Age: 83
End: 2023-12-14

## 2023-12-14 NOTE — TELEPHONE ENCOUNTER
I think Mr Fadia Helton is at [facility] we request voiding trial before he leaves there for [facility]

## 2023-12-14 NOTE — TELEPHONE ENCOUNTER
Received call from [id number] in anticipation of admission of patient in to their Dementia/Connection unit. Patient is currently at [facility] with indwelling mcduffie catheter. Patient's daughter is requesting a voiding trial to be done tomorrow prior to his discharge from rehab on Saturday 12/16 and admission to [facility]. Maryuri Perry reports patient failing previous voiding trial. They have [facility] coming in to manage mcduffie catheters. She requires voiding trial be completed prior to patient admission into their unit knowing he failed voiding trial and requires catheter to stay intact. Can PCP order voiding trial for patient at [facility]? Please follow up with Maryuri Perry if ordered so she can follow up with [facility]. Recent  OV with [facility] and mention of discontinuation of catheter in office notes.

## 2023-12-15 ENCOUNTER — PATIENT OUTREACH (OUTPATIENT)
Dept: CASE MANAGEMENT | Facility: OTHER | Age: 83
End: 2023-12-15

## 2023-12-15 NOTE — PROGRESS NOTES
Chart review complete It has been 30 days since SNF/STR Surveillance episode was opened I will no longer be following the patient per protocol. I have removed myself from the care team, updated the Care Coordination note, and closed the episode. Email sent to facility to inform them I will no longer be following the patient.

## 2023-12-20 ENCOUNTER — TELEPHONE (OUTPATIENT)
Age: 83
End: 2023-12-20

## 2023-12-20 DIAGNOSIS — F03.911 AGITATION DUE TO DEMENTIA (HCC): Primary | ICD-10-CM

## 2023-12-20 DIAGNOSIS — Z76.0 MEDICATION REFILL: Primary | ICD-10-CM

## 2023-12-20 DIAGNOSIS — N40.1 BENIGN PROSTATIC HYPERPLASIA WITH URINARY RETENTION: ICD-10-CM

## 2023-12-20 DIAGNOSIS — R41.82 ALTERED MENTAL STATUS, UNSPECIFIED ALTERED MENTAL STATUS TYPE: ICD-10-CM

## 2023-12-20 DIAGNOSIS — R33.8 BENIGN PROSTATIC HYPERPLASIA WITH URINARY RETENTION: ICD-10-CM

## 2023-12-20 RX ORDER — DUTASTERIDE 0.5 MG/1
0.5 CAPSULE, LIQUID FILLED ORAL DAILY
Qty: 90 CAPSULE | Refills: 0 | Status: CANCELLED | OUTPATIENT
Start: 2023-12-20

## 2023-12-20 RX ORDER — LORAZEPAM 1 MG/1
1 TABLET ORAL 2 TIMES DAILY PRN
Qty: 40 TABLET | Refills: 0 | Status: SHIPPED | OUTPATIENT
Start: 2023-12-20 | End: 2023-12-28 | Stop reason: SDUPTHER

## 2023-12-20 RX ORDER — DOCUSATE SODIUM 100 MG/1
100 CAPSULE, LIQUID FILLED ORAL 2 TIMES DAILY
Qty: 180 CAPSULE | Refills: 0 | Status: SHIPPED | OUTPATIENT
Start: 2023-12-20

## 2023-12-20 RX ORDER — GABAPENTIN 100 MG/1
100 CAPSULE ORAL 3 TIMES DAILY
Qty: 90 CAPSULE | Refills: 0 | Status: CANCELLED | OUTPATIENT
Start: 2023-12-20

## 2023-12-20 RX ORDER — TAMSULOSIN HYDROCHLORIDE 0.4 MG/1
0.4 CAPSULE ORAL 2 TIMES DAILY
Qty: 60 CAPSULE | Refills: 0 | Status: SHIPPED | OUTPATIENT
Start: 2023-12-20 | End: 2023-12-29

## 2023-12-20 RX ORDER — LANOLIN ALCOHOL/MO/W.PET/CERES
3 CREAM (GRAM) TOPICAL
Qty: 90 TABLET | Refills: 0 | Status: SHIPPED | OUTPATIENT
Start: 2023-12-20

## 2023-12-20 RX ORDER — DOCUSATE SODIUM 100 MG/1
100 CAPSULE, LIQUID FILLED ORAL 2 TIMES DAILY
Qty: 90 CAPSULE | Refills: 0 | Status: CANCELLED | OUTPATIENT
Start: 2023-12-20

## 2023-12-20 RX ORDER — SENNOSIDES 8.6 MG
17.2 TABLET ORAL 2 TIMES DAILY
Qty: 90 TABLET | Refills: 0 | Status: CANCELLED | OUTPATIENT
Start: 2023-12-20

## 2023-12-20 RX ORDER — OLANZAPINE 5 MG/1
5 TABLET ORAL DAILY PRN
Qty: 30 TABLET | Refills: 0 | Status: SHIPPED | OUTPATIENT
Start: 2023-12-20

## 2023-12-20 RX ORDER — ESCITALOPRAM OXALATE 5 MG/1
5 TABLET ORAL DAILY
Qty: 90 TABLET | Refills: 0 | Status: CANCELLED | OUTPATIENT
Start: 2023-12-20

## 2023-12-20 RX ORDER — DIVALPROEX SODIUM 250 MG/1
250 TABLET, DELAYED RELEASE ORAL DAILY
Qty: 90 TABLET | Refills: 0 | Status: SHIPPED | OUTPATIENT
Start: 2023-12-20

## 2023-12-20 RX ORDER — DUTASTERIDE 0.5 MG/1
0.5 CAPSULE, LIQUID FILLED ORAL DAILY
Qty: 90 CAPSULE | Refills: 0 | Status: SHIPPED | OUTPATIENT
Start: 2023-12-20

## 2023-12-20 RX ORDER — SENNOSIDES 8.6 MG
17.2 TABLET ORAL 2 TIMES DAILY
Qty: 360 TABLET | Refills: 0 | Status: SHIPPED | OUTPATIENT
Start: 2023-12-20

## 2023-12-20 RX ORDER — DIVALPROEX SODIUM 250 MG/1
250 TABLET, DELAYED RELEASE ORAL DAILY
Qty: 90 TABLET | Refills: 0 | Status: CANCELLED | OUTPATIENT
Start: 2023-12-20

## 2023-12-20 RX ORDER — GABAPENTIN 100 MG/1
100 CAPSULE ORAL 3 TIMES DAILY
Qty: 270 CAPSULE | Refills: 0 | Status: SHIPPED | OUTPATIENT
Start: 2023-12-20

## 2023-12-20 RX ORDER — ESCITALOPRAM OXALATE 5 MG/1
5 TABLET ORAL DAILY
Qty: 90 TABLET | Refills: 0 | Status: SHIPPED | OUTPATIENT
Start: 2023-12-20

## 2023-12-20 RX ORDER — LANOLIN ALCOHOL/MO/W.PET/CERES
3 CREAM (GRAM) TOPICAL
Qty: 90 TABLET | Refills: 0 | Status: CANCELLED | OUTPATIENT
Start: 2023-12-20

## 2023-12-20 NOTE — TELEPHONE ENCOUNTER
Unclear why this was sent to [facility].  He was seen for outpatient dementia evaluation, but we are not involved in the process of discharge/admission.

## 2023-12-20 NOTE — TELEPHONE ENCOUNTER
Resending refills to PCP. They were previously forwarded to [facility] (see other refill encounter today 12/20/23)    Reason for call:   [x] Refill   [] Prior Auth  [] Other:     Office:   [x] PCP/Provider - [facility] GLADIS  / Lucie  [] Specialty/Provider -     Medication:   Vit B-12 1000mcg qd  #90  Depakote DR 250mg qd  #90  Colace 100mg bid  #180  Dutasteride 0.5mg qd  #90  Escitalopram 5mg qd  #90  Gabapentin 100mg tid  #270  Melatonin 3mg qhs  #90  Senna 8.6mg bid  #180    Pharmacy: [facility] - [address] [place] [address]
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
## 2023-12-21 NOTE — TELEPHONE ENCOUNTER
Marisol, I sent a message back to [facility] yesterday and I sent scripts for lorazepam and olanzapine for them to use prn to [facility]-I also strongly advised them to get a urine u/a and culture on him to rule out UTI and put orders in

## 2023-12-27 ENCOUNTER — TELEPHONE (OUTPATIENT)
Dept: OTHER | Facility: OTHER | Age: 83
End: 2023-12-27

## 2023-12-27 DIAGNOSIS — R33.8 BENIGN PROSTATIC HYPERPLASIA WITH URINARY RETENTION: ICD-10-CM

## 2023-12-27 DIAGNOSIS — N40.1 BENIGN PROSTATIC HYPERPLASIA WITH URINARY RETENTION: ICD-10-CM

## 2023-12-27 NOTE — TELEPHONE ENCOUNTER
Patient medication was sent to the wrong pharmacy. The medication need to be sent to the [facility] [place] [facility]. Pharmacy Number: [id number]

## 2023-12-28 DIAGNOSIS — F03.911 AGITATION DUE TO DEMENTIA (HCC): ICD-10-CM

## 2023-12-28 RX ORDER — LORAZEPAM 1 MG/1
1 TABLET ORAL 2 TIMES DAILY PRN
Qty: 40 TABLET | Refills: 0 | Status: CANCELLED | OUTPATIENT
Start: 2023-12-28

## 2023-12-28 RX ORDER — LORAZEPAM 1 MG/1
1 TABLET ORAL 2 TIMES DAILY PRN
Qty: 40 TABLET | Refills: 0 | Status: SHIPPED | OUTPATIENT
Start: 2023-12-28

## 2023-12-28 NOTE — TELEPHONE ENCOUNTER
Per Dr. Conde she tried calling the nurse line regarding patient medication being sent to the wrong pharmacy and no answer but she left a message.   Patient medication was sent to the wrong pharmacy. The medication need to be sent to the [facility] [place] [facility]. Pharmacy Number: [phone]

## 2023-12-28 NOTE — TELEPHONE ENCOUNTER
Phylicia from [facility] called, the pts lorazepam was sent to the wrong [facility]. Please re-send. I have removed the incorrect one for future.

## 2023-12-29 ENCOUNTER — TELEPHONE (OUTPATIENT)
Age: 83
End: 2023-12-29

## 2023-12-29 RX ORDER — TAMSULOSIN HYDROCHLORIDE 0.4 MG/1
CAPSULE ORAL
Qty: 60 CAPSULE | Refills: 11 | Status: SHIPPED | OUTPATIENT
Start: 2023-12-29
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
## 2023-12-29 NOTE — TELEPHONE ENCOUNTER
Tosin Block from [facility] called in regards to patient's medications, attempted to warm transfer to office clinical, please call back to discuss.  Their pharmacy closes for the holiday and he has been pretty unruly there.

## 2023-12-29 NOTE — TELEPHONE ENCOUNTER
I did send Zyprexa and lorazepam for prn use to [facility]-did they not get the orders? I guess he may need something on a daily basis

## 2024-01-01 ENCOUNTER — TELEPHONE (OUTPATIENT)
Dept: OTHER | Facility: OTHER | Age: 84
End: 2024-01-01

## 2024-01-01 NOTE — TELEPHONE ENCOUNTER
Pt's son in law calling for Dr. Faulkner in regards to worsening agitation d/t dementia. Family was recently in contact with Dr. Faulkner who prescribed ativan 1mg bid prn 12/28. Family is asking for recommendations or additional med orders as pt is not improving and afraid [facility] staff will call ems to take pt to hospital. On call provider contacted. Recommends if pt's symptoms are worsening pt should be evaluated at hospital. Wife informed of recommendations and stated the pt is behaving and calm for now. If she is called from the facility d/t his behavior or aggression tonight she will authorize for the staff to contact ems to bring him into the ED. On call provider made aware of decision.

## 2024-01-02 ENCOUNTER — TELEPHONE (OUTPATIENT)
Age: 84
End: 2024-01-02

## 2024-01-02 NOTE — TELEPHONE ENCOUNTER
Pt's wife called and she stated that the pt has been placed in [facility] memory unit. He had become very agitated and violent and she was concerned. She would like to know if its possible to move up care conference appt. I moved the care conference to 1/9 at 3pm.

## 2024-01-03 ENCOUNTER — TELEPHONE (OUTPATIENT)
Age: 84
End: 2024-01-03

## 2024-01-03 NOTE — TELEPHONE ENCOUNTER
Patient's spouse, Sobia Hernandez ([phone]) called to request that emergency  be added to patient's chart as follows:    Dr. Ari Napoles, son-in-law; [phone] (cell)    Advised spouse, Medical Communication form would need to be updated via [employer] or upon next visit with any [facility] provider, and patient's signature would be required.    Spouse states she is POA.  POA documentation is not on file.    Advised spouse that in addition to updating the Medical Communication form, a copy of Power of  would need to be scanned into the patient's record which would allow her to sign on behalf of the patient.    Spouse expressed understanding.    In the meantime, note will be added to patient registration to review this phone encounter for possible additional emergency contact.

## 2024-01-03 NOTE — TELEPHONE ENCOUNTER
"Patient's spouse Sobia Hernandez ([phone]) called with concerns.  Patient got really out of hand last night.  Patient was taken to the ER and then released.  Sobia advised [facility] wanted her to sit with patient.  Sobia advised \"isn't that what I pay them for\"  I advised Sobia to contact [facility] and follow up with them.     Sobia wants to know what she should do and is this something that is going to continue daily.  I advised Sobia I will let the provider know and someone will give you a call back.          "

## 2024-01-04 NOTE — TELEPHONE ENCOUNTER
I called and spoke with wife regarding the patient.  She reports that she had a new meeting with a new NP and thinks that that was a much better meeting.  Patient will continue with new care team.  She reports that Dr Augusta Davis is the new PCP.  She reports that they will work on changing medications to improve his ongoing hallucinations and delusions and agitation and lack of appetite.  We discussed options and she reports that likely she will cancel her next appointment as they are taking over this issue but that she will call me for an appointment if she feels like more advice and guidance is needed.

## 2024-01-05 ENCOUNTER — TELEPHONE (OUTPATIENT)
Age: 84
End: 2024-01-05
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
## 2024-01-07 ENCOUNTER — APPOINTMENT (OUTPATIENT)
Dept: RADIOLOGY | Facility: HOSPITAL | Age: 84
DRG: 056 | End: 2024-01-07
Payer: MEDICARE

## 2024-01-07 ENCOUNTER — APPOINTMENT (EMERGENCY)
Dept: CT IMAGING | Facility: HOSPITAL | Age: 84
DRG: 056 | End: 2024-01-07
Payer: MEDICARE

## 2024-01-07 ENCOUNTER — HOSPITAL ENCOUNTER (INPATIENT)
Facility: HOSPITAL | Age: 84
LOS: 22 days | Discharge: HOSPICE | DRG: 056 | End: 2024-01-29
Attending: SURGERY | Admitting: SURGERY
Payer: MEDICARE

## 2024-01-07 DIAGNOSIS — F02.C11 SEVERE ALZHEIMER'S DEMENTIA WITH AGITATION, UNSPECIFIED TIMING OF DEMENTIA ONSET (HCC): ICD-10-CM

## 2024-01-07 DIAGNOSIS — G30.9 SEVERE ALZHEIMER'S DEMENTIA WITH AGITATION, UNSPECIFIED TIMING OF DEMENTIA ONSET (HCC): ICD-10-CM

## 2024-01-07 DIAGNOSIS — N40.0 BENIGN PROSTATIC HYPERPLASIA, UNSPECIFIED WHETHER LOWER URINARY TRACT SYMPTOMS PRESENT: ICD-10-CM

## 2024-01-07 DIAGNOSIS — F03.90 DEMENTIA (HCC): ICD-10-CM

## 2024-01-07 DIAGNOSIS — R82.90 ABNORMAL URINE FINDING: ICD-10-CM

## 2024-01-07 DIAGNOSIS — R26.2 AMBULATORY DYSFUNCTION: ICD-10-CM
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
## 2024-01-07 PROBLEM — K59.00 CONSTIPATION: Status: ACTIVE | Noted: 2024-01-07

## 2024-01-07 PROBLEM — G62.9 NEUROPATHY: Status: ACTIVE | Noted: 2024-01-07

## 2024-01-07 LAB
ABO GROUP BLD: NORMAL
ABO GROUP BLD: NORMAL
ALBUMIN SERPL BCP-MCNC: 3.5 G/DL (ref 3.5–5)
ALBUMIN SERPL BCP-MCNC: 3.5 G/DL (ref 3.5–5)
ALP SERPL-CCNC: 57 U/L (ref 34–104)
ALP SERPL-CCNC: 57 U/L (ref 34–104)
ALT SERPL W P-5'-P-CCNC: 7 U/L (ref 7–52)
ALT SERPL W P-5'-P-CCNC: 7 U/L (ref 7–52)
AMMONIA PLAS-SCNC: 29 UMOL/L (ref 18–72)
AMMONIA PLAS-SCNC: 29 UMOL/L (ref 18–72)
AMORPH URATE CRY URNS QL MICRO: ABNORMAL
AMORPH URATE CRY URNS QL MICRO: ABNORMAL
ANION GAP SERPL CALCULATED.3IONS-SCNC: 3 MMOL/L
ANION GAP SERPL CALCULATED.3IONS-SCNC: 3 MMOL/L
AST SERPL W P-5'-P-CCNC: 10 U/L (ref 13–39)
AST SERPL W P-5'-P-CCNC: 10 U/L (ref 13–39)
BACTERIA UR QL AUTO: ABNORMAL /HPF
BACTERIA UR QL AUTO: ABNORMAL /HPF
BASE EXCESS BLDA CALC-SCNC: 4 MMOL/L (ref -2–3)
BASE EXCESS BLDA CALC-SCNC: 4 MMOL/L (ref -2–3)
BASOPHILS # BLD AUTO: 0.02 THOUSANDS/ÂΜL (ref 0–0.1)
BASOPHILS # BLD AUTO: 0.02 THOUSANDS/ÂΜL (ref 0–0.1)
BASOPHILS NFR BLD AUTO: 0 % (ref 0–1)
BASOPHILS NFR BLD AUTO: 0 % (ref 0–1)
BILIRUB DIRECT SERPL-MCNC: 0.11 MG/DL (ref 0–0.2)
BILIRUB DIRECT SERPL-MCNC: 0.11 MG/DL (ref 0–0.2)
BILIRUB SERPL-MCNC: 0.72 MG/DL (ref 0.2–1)
BILIRUB SERPL-MCNC: 0.72 MG/DL (ref 0.2–1)
BILIRUB UR QL STRIP: NEGATIVE
BILIRUB UR QL STRIP: NEGATIVE
BUN SERPL-MCNC: 19 MG/DL (ref 5–25)
BUN SERPL-MCNC: 19 MG/DL (ref 5–25)
CA-I BLD-SCNC: 1.17 MMOL/L (ref 1.12–1.32)
CA-I BLD-SCNC: 1.17 MMOL/L (ref 1.12–1.32)
CALCIUM SERPL-MCNC: 8.5 MG/DL (ref 8.4–10.2)
CALCIUM SERPL-MCNC: 8.5 MG/DL (ref 8.4–10.2)
CARDIAC TROPONIN I PNL SERPL HS: 3 NG/L (ref 8–18)
CARDIAC TROPONIN I PNL SERPL HS: 3 NG/L (ref 8–18)
CHLORIDE SERPL-SCNC: 109 MMOL/L (ref 96–108)
CHLORIDE SERPL-SCNC: 109 MMOL/L (ref 96–108)
CK SERPL-CCNC: 105 U/L (ref 39–308)
CK SERPL-CCNC: 105 U/L (ref 39–308)
CLARITY UR: ABNORMAL
CLARITY UR: ABNORMAL
CO2 SERPL-SCNC: 27 MMOL/L (ref 21–32)
CO2 SERPL-SCNC: 27 MMOL/L (ref 21–32)
COLOR UR: ABNORMAL
COLOR UR: ABNORMAL
CREAT SERPL-MCNC: 0.86 MG/DL (ref 0.6–1.3)
CREAT SERPL-MCNC: 0.86 MG/DL (ref 0.6–1.3)
EOSINOPHIL # BLD AUTO: 0.07 THOUSAND/ÂΜL (ref 0–0.61)
EOSINOPHIL # BLD AUTO: 0.07 THOUSAND/ÂΜL (ref 0–0.61)
EOSINOPHIL NFR BLD AUTO: 1 % (ref 0–6)
EOSINOPHIL NFR BLD AUTO: 1 % (ref 0–6)
ERYTHROCYTE [DISTWIDTH] IN BLOOD BY AUTOMATED COUNT: 13.6 % (ref 11.6–15.1)
ERYTHROCYTE [DISTWIDTH] IN BLOOD BY AUTOMATED COUNT: 13.6 % (ref 11.6–15.1)
FLUAV RNA RESP QL NAA+PROBE: NEGATIVE
FLUAV RNA RESP QL NAA+PROBE: NEGATIVE
FLUBV RNA RESP QL NAA+PROBE: NEGATIVE
FLUBV RNA RESP QL NAA+PROBE: NEGATIVE
GFR SERPL CREATININE-BSD FRML MDRD: 80 ML/MIN/1.73SQ M
GFR SERPL CREATININE-BSD FRML MDRD: 80 ML/MIN/1.73SQ M
GLUCOSE SERPL-MCNC: 92 MG/DL (ref 65–140)
GLUCOSE UR STRIP-MCNC: NEGATIVE MG/DL
GLUCOSE UR STRIP-MCNC: NEGATIVE MG/DL
HCO3 BLDA-SCNC: 29.9 MMOL/L (ref 24–30)
HCO3 BLDA-SCNC: 29.9 MMOL/L (ref 24–30)
HCT VFR BLD AUTO: 37.5 % (ref 36.5–49.3)
HCT VFR BLD AUTO: 37.5 % (ref 36.5–49.3)
HCT VFR BLD CALC: 33 % (ref 36.5–49.3)
HCT VFR BLD CALC: 33 % (ref 36.5–49.3)
HGB BLD-MCNC: 11.7 G/DL (ref 12–17)
HGB BLD-MCNC: 11.7 G/DL (ref 12–17)
HGB BLDA-MCNC: 11.2 G/DL (ref 12–17)
HGB BLDA-MCNC: 11.2 G/DL (ref 12–17)
HGB UR QL STRIP.AUTO: ABNORMAL
HGB UR QL STRIP.AUTO: ABNORMAL
IMM GRANULOCYTES # BLD AUTO: 0.03 THOUSAND/UL (ref 0–0.2)
IMM GRANULOCYTES # BLD AUTO: 0.03 THOUSAND/UL (ref 0–0.2)
IMM GRANULOCYTES NFR BLD AUTO: 0 % (ref 0–2)
IMM GRANULOCYTES NFR BLD AUTO: 0 % (ref 0–2)
INR PPP: 1 (ref 0.84–1.19)
INR PPP: 1 (ref 0.84–1.19)
KETONES UR STRIP-MCNC: ABNORMAL MG/DL
KETONES UR STRIP-MCNC: ABNORMAL MG/DL
LACTATE SERPL-SCNC: 1.1 MMOL/L (ref 0.5–2)
LACTATE SERPL-SCNC: 1.1 MMOL/L (ref 0.5–2)
LEUKOCYTE ESTERASE UR QL STRIP: ABNORMAL
LEUKOCYTE ESTERASE UR QL STRIP: ABNORMAL
LYMPHOCYTES # BLD AUTO: 0.52 THOUSANDS/ÂΜL (ref 0.6–4.47)
LYMPHOCYTES # BLD AUTO: 0.52 THOUSANDS/ÂΜL (ref 0.6–4.47)
LYMPHOCYTES NFR BLD AUTO: 7 % (ref 14–44)
LYMPHOCYTES NFR BLD AUTO: 7 % (ref 14–44)
MCH RBC QN AUTO: 28.7 PG (ref 26.8–34.3)
MCH RBC QN AUTO: 28.7 PG (ref 26.8–34.3)
MCHC RBC AUTO-ENTMCNC: 31.2 G/DL (ref 31.4–37.4)
MCHC RBC AUTO-ENTMCNC: 31.2 G/DL (ref 31.4–37.4)
MCV RBC AUTO: 92 FL (ref 82–98)
MCV RBC AUTO: 92 FL (ref 82–98)
MONOCYTES # BLD AUTO: 0.51 THOUSAND/ÂΜL (ref 0.17–1.22)
MONOCYTES # BLD AUTO: 0.51 THOUSAND/ÂΜL (ref 0.17–1.22)
MONOCYTES NFR BLD AUTO: 7 % (ref 4–12)
MONOCYTES NFR BLD AUTO: 7 % (ref 4–12)
MUCOUS THREADS UR QL AUTO: ABNORMAL
MUCOUS THREADS UR QL AUTO: ABNORMAL
NEUTROPHILS # BLD AUTO: 5.95 THOUSANDS/ÂΜL (ref 1.85–7.62)
NEUTROPHILS # BLD AUTO: 5.95 THOUSANDS/ÂΜL (ref 1.85–7.62)
NEUTS SEG NFR BLD AUTO: 85 % (ref 43–75)
NEUTS SEG NFR BLD AUTO: 85 % (ref 43–75)
NITRITE UR QL STRIP: POSITIVE
NITRITE UR QL STRIP: POSITIVE
NON-SQ EPI CELLS URNS QL MICRO: ABNORMAL /HPF
NON-SQ EPI CELLS URNS QL MICRO: ABNORMAL /HPF
NRBC BLD AUTO-RTO: 0 /100 WBCS
NRBC BLD AUTO-RTO: 0 /100 WBCS
PCO2 BLD: 31 MMOL/L (ref 21–32)
PCO2 BLD: 31 MMOL/L (ref 21–32)
PCO2 BLD: 47.5 MM HG (ref 42–50)
PCO2 BLD: 47.5 MM HG (ref 42–50)
PH BLD: 7.41 [PH] (ref 7.3–7.4)
PH BLD: 7.41 [PH] (ref 7.3–7.4)
PH UR STRIP.AUTO: 7 [PH]
PH UR STRIP.AUTO: 7 [PH]
PLATELET # BLD AUTO: 173 THOUSANDS/UL (ref 149–390)
PLATELET # BLD AUTO: 173 THOUSANDS/UL (ref 149–390)
PLATELET # BLD AUTO: 194 THOUSANDS/UL (ref 149–390)
PLATELET # BLD AUTO: 194 THOUSANDS/UL (ref 149–390)
PMV BLD AUTO: 10.2 FL (ref 8.9–12.7)
PMV BLD AUTO: 10.2 FL (ref 8.9–12.7)
PMV BLD AUTO: 10.7 FL (ref 8.9–12.7)
PMV BLD AUTO: 10.7 FL (ref 8.9–12.7)
PO2 BLD: 26 MM HG (ref 35–45)
PO2 BLD: 26 MM HG (ref 35–45)
POTASSIUM BLD-SCNC: 5.8 MMOL/L (ref 3.5–5.3)
POTASSIUM BLD-SCNC: 5.8 MMOL/L (ref 3.5–5.3)
POTASSIUM SERPL-SCNC: 5.1 MMOL/L (ref 3.5–5.3)
POTASSIUM SERPL-SCNC: 5.1 MMOL/L (ref 3.5–5.3)
PROCALCITONIN SERPL-MCNC: <0.05 NG/ML
PROCALCITONIN SERPL-MCNC: <0.05 NG/ML
PROT SERPL-MCNC: 5.9 G/DL (ref 6.4–8.4)
PROT SERPL-MCNC: 5.9 G/DL (ref 6.4–8.4)
PROT UR STRIP-MCNC: ABNORMAL MG/DL
PROT UR STRIP-MCNC: ABNORMAL MG/DL
PROTHROMBIN TIME: 13.8 SECONDS (ref 11.6–14.5)
PROTHROMBIN TIME: 13.8 SECONDS (ref 11.6–14.5)
RBC # BLD AUTO: 4.07 MILLION/UL (ref 3.88–5.62)
RBC # BLD AUTO: 4.07 MILLION/UL (ref 3.88–5.62)
RBC #/AREA URNS AUTO: ABNORMAL /HPF
RBC #/AREA URNS AUTO: ABNORMAL /HPF
RH BLD: POSITIVE
RH BLD: POSITIVE
RSV RNA RESP QL NAA+PROBE: NEGATIVE
RSV RNA RESP QL NAA+PROBE: NEGATIVE
SAO2 % BLD FROM PO2: 46 % (ref 60–85)
SAO2 % BLD FROM PO2: 46 % (ref 60–85)
SARS-COV-2 RNA RESP QL NAA+PROBE: NEGATIVE
SARS-COV-2 RNA RESP QL NAA+PROBE: NEGATIVE
SODIUM BLD-SCNC: 140 MMOL/L (ref 136–145)
SODIUM BLD-SCNC: 140 MMOL/L (ref 136–145)
SODIUM SERPL-SCNC: 139 MMOL/L (ref 135–147)
SODIUM SERPL-SCNC: 139 MMOL/L (ref 135–147)
SP GR UR STRIP.AUTO: 1.01 (ref 1–1.03)
SP GR UR STRIP.AUTO: 1.01 (ref 1–1.03)
SPECIMEN SOURCE: ABNORMAL
SPECIMEN SOURCE: ABNORMAL
TSH SERPL DL<=0.05 MIU/L-ACNC: 1.92 UIU/ML (ref 0.45–4.5)
TSH SERPL DL<=0.05 MIU/L-ACNC: 1.92 UIU/ML (ref 0.45–4.5)
UROBILINOGEN UR STRIP-ACNC: <2 MG/DL
UROBILINOGEN UR STRIP-ACNC: <2 MG/DL
WBC # BLD AUTO: 7.1 THOUSAND/UL (ref 4.31–10.16)
WBC # BLD AUTO: 7.1 THOUSAND/UL (ref 4.31–10.16)
WBC #/AREA URNS AUTO: ABNORMAL /HPF
WBC #/AREA URNS AUTO: ABNORMAL /HPF
WBC CLUMPS # UR AUTO: PRESENT /UL
WBC CLUMPS # UR AUTO: PRESENT /UL

## 2024-01-07 PROCEDURE — 87086 URINE CULTURE/COLONY COUNT: CPT | Performed by: PHYSICIAN ASSISTANT

## 2024-01-07 PROCEDURE — 0241U HB NFCT DS VIR RESP RNA 4 TRGT: CPT | Performed by: PHYSICIAN ASSISTANT

## 2024-01-07 PROCEDURE — 93308 TTE F-UP OR LMTD: CPT | Performed by: PHYSICIAN ASSISTANT

## 2024-01-07 PROCEDURE — 82140 ASSAY OF AMMONIA: CPT | Performed by: PHYSICIAN ASSISTANT

## 2024-01-07 PROCEDURE — 87040 BLOOD CULTURE FOR BACTERIA: CPT | Performed by: PHYSICIAN ASSISTANT

## 2024-01-07 PROCEDURE — 82330 ASSAY OF CALCIUM: CPT

## 2024-01-07 PROCEDURE — 99285 EMERGENCY DEPT VISIT HI MDM: CPT

## 2024-01-07 PROCEDURE — 84145 PROCALCITONIN (PCT): CPT | Performed by: PHYSICIAN ASSISTANT

## 2024-01-07 PROCEDURE — 82947 ASSAY GLUCOSE BLOOD QUANT: CPT

## 2024-01-07 PROCEDURE — 85014 HEMATOCRIT: CPT

## 2024-01-07 PROCEDURE — 1124F ACP DISCUSS-NO DSCNMKR DOCD: CPT | Performed by: PHYSICIAN ASSISTANT

## 2024-01-07 PROCEDURE — 87147 CULTURE TYPE IMMUNOLOGIC: CPT | Performed by: PHYSICIAN ASSISTANT

## 2024-01-07 PROCEDURE — 72125 CT NECK SPINE W/O DYE: CPT

## 2024-01-07 PROCEDURE — 93005 ELECTROCARDIOGRAM TRACING: CPT

## 2024-01-07 PROCEDURE — 81001 URINALYSIS AUTO W/SCOPE: CPT | Performed by: PHYSICIAN ASSISTANT

## 2024-01-07 PROCEDURE — EDAIR PR ED AIR: Performed by: EMERGENCY MEDICINE

## 2024-01-07 PROCEDURE — 85610 PROTHROMBIN TIME: CPT | Performed by: SURGERY

## 2024-01-07 PROCEDURE — 76705 ECHO EXAM OF ABDOMEN: CPT | Performed by: PHYSICIAN ASSISTANT

## 2024-01-07 PROCEDURE — 80048 BASIC METABOLIC PNL TOTAL CA: CPT | Performed by: SURGERY

## 2024-01-07 PROCEDURE — 85049 AUTOMATED PLATELET COUNT: CPT

## 2024-01-07 PROCEDURE — 83605 ASSAY OF LACTIC ACID: CPT | Performed by: PHYSICIAN ASSISTANT

## 2024-01-07 PROCEDURE — 84484 ASSAY OF TROPONIN QUANT: CPT | Performed by: PHYSICIAN ASSISTANT

## 2024-01-07 PROCEDURE — 36415 COLL VENOUS BLD VENIPUNCTURE: CPT | Performed by: SURGERY

## 2024-01-07 PROCEDURE — 85025 COMPLETE CBC W/AUTO DIFF WBC: CPT | Performed by: SURGERY

## 2024-01-07 PROCEDURE — 84295 ASSAY OF SERUM SODIUM: CPT

## 2024-01-07 PROCEDURE — 82803 BLOOD GASES ANY COMBINATION: CPT

## 2024-01-07 PROCEDURE — 82550 ASSAY OF CK (CPK): CPT | Performed by: PHYSICIAN ASSISTANT

## 2024-01-07 PROCEDURE — 70450 CT HEAD/BRAIN W/O DYE: CPT

## 2024-01-07 PROCEDURE — 99285 EMERGENCY DEPT VISIT HI MDM: CPT | Performed by: PHYSICIAN ASSISTANT

## 2024-01-07 PROCEDURE — 99223 1ST HOSP IP/OBS HIGH 75: CPT | Performed by: INTERNAL MEDICINE

## 2024-01-07 PROCEDURE — 84443 ASSAY THYROID STIM HORMONE: CPT

## 2024-01-07 PROCEDURE — 90715 TDAP VACCINE 7 YRS/> IM: CPT | Performed by: PHYSICIAN ASSISTANT

## 2024-01-07 PROCEDURE — 84132 ASSAY OF SERUM POTASSIUM: CPT

## 2024-01-07 PROCEDURE — 80076 HEPATIC FUNCTION PANEL: CPT | Performed by: PHYSICIAN ASSISTANT

## 2024-01-07 PROCEDURE — 87186 SC STD MICRODIL/AGAR DIL: CPT | Performed by: PHYSICIAN ASSISTANT

## 2024-01-07 PROCEDURE — 71045 X-RAY EXAM CHEST 1 VIEW: CPT

## 2024-01-07 RX ORDER — OLANZAPINE 10 MG/2ML
2.5 INJECTION, POWDER, FOR SOLUTION INTRAMUSCULAR
Status: DISCONTINUED | OUTPATIENT
Start: 2024-01-07 | End: 2024-01-08

## 2024-01-07 RX ORDER — SENNOSIDES 8.6 MG
1 TABLET ORAL 2 TIMES DAILY
Status: DISCONTINUED | OUTPATIENT
Start: 2024-01-07 | End: 2024-01-09

## 2024-01-07 RX ORDER — TAMSULOSIN HYDROCHLORIDE 0.4 MG/1
0.4 CAPSULE ORAL
COMMUNITY
End: 2024-02-22

## 2024-01-07 RX ORDER — LANOLIN ALCOHOL/MO/W.PET/CERES
3 CREAM (GRAM) TOPICAL
COMMUNITY
End: 2024-02-22

## 2024-01-07 RX ORDER — FINASTERIDE 5 MG/1
5 TABLET, FILM COATED ORAL DAILY
Status: DISCONTINUED | OUTPATIENT
Start: 2024-01-08 | End: 2024-01-29 | Stop reason: HOSPADM

## 2024-01-07 RX ORDER — OLANZAPINE 5 MG/1
5 TABLET ORAL DAILY PRN
Status: ON HOLD | COMMUNITY
End: 2024-01-29

## 2024-01-07 RX ORDER — DIVALPROEX SODIUM 250 MG/1
250 TABLET, EXTENDED RELEASE ORAL DAILY
Status: DISCONTINUED | OUTPATIENT
Start: 2024-01-08 | End: 2024-01-07

## 2024-01-07 RX ORDER — OLANZAPINE 10 MG/2ML
2.5 INJECTION, POWDER, FOR SOLUTION INTRAMUSCULAR ONCE
Status: COMPLETED | OUTPATIENT
Start: 2024-01-07 | End: 2024-01-07

## 2024-01-07 RX ORDER — DIVALPROEX SODIUM 250 MG/1
250 TABLET, EXTENDED RELEASE ORAL DAILY
COMMUNITY
End: 2024-01-29

## 2024-01-07 RX ORDER — LANOLIN ALCOHOL/MO/W.PET/CERES
CREAM (GRAM) TOPICAL DAILY
COMMUNITY
End: 2024-02-22

## 2024-01-07 RX ORDER — POLYETHYLENE GLYCOL 3350 17 G/17G
17 POWDER, FOR SOLUTION ORAL DAILY
Status: DISCONTINUED | OUTPATIENT
Start: 2024-01-08 | End: 2024-01-29 | Stop reason: HOSPADM

## 2024-01-07 RX ORDER — SENNOSIDES A AND B 8.6 MG/1
1 TABLET, FILM COATED ORAL 2 TIMES DAILY
COMMUNITY
End: 2024-02-22

## 2024-01-07 RX ORDER — DUTASTERIDE 0.5 MG/1
0.5 CAPSULE, LIQUID FILLED ORAL DAILY
COMMUNITY
End: 2024-02-22

## 2024-01-07 RX ORDER — LORAZEPAM 0.5 MG/1
0.5 TABLET ORAL 3 TIMES DAILY
Status: DISCONTINUED | OUTPATIENT
Start: 2024-01-07 | End: 2024-01-12

## 2024-01-07 RX ORDER — WATER 10 ML/10ML
INJECTION INTRAMUSCULAR; INTRAVENOUS; SUBCUTANEOUS
Status: COMPLETED
Start: 2024-01-07 | End: 2024-01-07

## 2024-01-07 RX ORDER — TAMSULOSIN HYDROCHLORIDE 0.4 MG/1
0.4 CAPSULE ORAL
Status: DISCONTINUED | OUTPATIENT
Start: 2024-01-07 | End: 2024-01-09

## 2024-01-07 RX ORDER — POLYETHYLENE GLYCOL 3350 17 G/17G
17 POWDER, FOR SOLUTION ORAL DAILY
COMMUNITY
End: 2024-02-22

## 2024-01-07 RX ORDER — SODIUM CHLORIDE, SODIUM LACTATE, POTASSIUM CHLORIDE, CALCIUM CHLORIDE 600; 310; 30; 20 MG/100ML; MG/100ML; MG/100ML; MG/100ML
75 INJECTION, SOLUTION INTRAVENOUS CONTINUOUS
Status: DISCONTINUED | OUTPATIENT
Start: 2024-01-07 | End: 2024-01-09

## 2024-01-07 RX ORDER — ENOXAPARIN SODIUM 100 MG/ML
40 INJECTION SUBCUTANEOUS DAILY
Status: DISCONTINUED | OUTPATIENT
Start: 2024-01-08 | End: 2024-01-29 | Stop reason: HOSPADM

## 2024-01-07 RX ORDER — DOCUSATE SODIUM 100 MG/1
100 CAPSULE, LIQUID FILLED ORAL 2 TIMES DAILY
Status: DISCONTINUED | OUTPATIENT
Start: 2024-01-07 | End: 2024-01-29 | Stop reason: HOSPADM

## 2024-01-07 RX ORDER — QUETIAPINE FUMARATE 25 MG/1
25 TABLET, FILM COATED ORAL DAILY PRN
Status: DISCONTINUED | OUTPATIENT
Start: 2024-01-07 | End: 2024-01-08

## 2024-01-07 RX ORDER — ESCITALOPRAM OXALATE 5 MG/1
5 TABLET ORAL DAILY
COMMUNITY
End: 2024-02-22

## 2024-01-07 RX ORDER — DOCUSATE SODIUM 100 MG/1
100 CAPSULE, LIQUID FILLED ORAL 2 TIMES DAILY
COMMUNITY
End: 2024-02-22

## 2024-01-07 RX ORDER — GABAPENTIN 100 MG/1
100 CAPSULE ORAL 3 TIMES DAILY
COMMUNITY
End: 2024-01-29

## 2024-01-07 RX ORDER — LORAZEPAM 1 MG/1
1 TABLET ORAL 2 TIMES DAILY
COMMUNITY
End: 2024-01-29

## 2024-01-07 RX ORDER — DIVALPROEX SODIUM 250 MG/1
250 TABLET, EXTENDED RELEASE ORAL DAILY
Status: DISCONTINUED | OUTPATIENT
Start: 2024-01-08 | End: 2024-01-08

## 2024-01-07 RX ORDER — LANOLIN ALCOHOL/MO/W.PET/CERES
3 CREAM (GRAM) TOPICAL
Status: DISCONTINUED | OUTPATIENT
Start: 2024-01-07 | End: 2024-01-10

## 2024-01-07 RX ADMIN — LORAZEPAM 0.5 MG: 0.5 TABLET ORAL at 21:10

## 2024-01-07 RX ADMIN — SODIUM CHLORIDE, SODIUM LACTATE, POTASSIUM CHLORIDE, AND CALCIUM CHLORIDE 75 ML/HR: .6; .31; .03; .02 INJECTION, SOLUTION INTRAVENOUS at 15:22

## 2024-01-07 RX ADMIN — DEXTROSE 1000 MG: 50 INJECTION, SOLUTION INTRAVENOUS at 18:23

## 2024-01-07 RX ADMIN — OLANZAPINE 2.5 MG: 10 INJECTION, POWDER, LYOPHILIZED, FOR SOLUTION INTRAMUSCULAR at 18:08

## 2024-01-07 RX ADMIN — Medication 3 MG: at 21:10

## 2024-01-07 RX ADMIN — WATER 10 ML: 1 INJECTION INTRAMUSCULAR; INTRAVENOUS; SUBCUTANEOUS at 18:09

## 2024-01-07 RX ADMIN — TETANUS TOXOID, REDUCED DIPHTHERIA TOXOID AND ACELLULAR PERTUSSIS VACCINE, ADSORBED 0.5 ML: 5; 2.5; 8; 8; 2.5 SUSPENSION INTRAMUSCULAR at 14:20

## 2024-01-07 RX ADMIN — QUETIAPINE FUMARATE 25 MG: 25 TABLET ORAL at 22:32

## 2024-01-07 RX ADMIN — OLANZAPINE 2.5 MG: 10 INJECTION, POWDER, FOR SOLUTION INTRAMUSCULAR at 22:43

## 2024-01-07 RX ADMIN — WATER 10 ML: 1 INJECTION INTRAMUSCULAR; INTRAVENOUS; SUBCUTANEOUS at 22:52

## 2024-01-07 NOTE — ASSESSMENT & PLAN NOTE
On admission patient had a urinalysis that was significant for leukocytes and nitrites.  Unable to assess with patient is having any pain or burning with urination.  He does have a history of cystitis.  Per son, patient has been receiving catheterization and has tried voiding trials but was unsuccessful.  Per son, patient has had a Pike in place since Tuesday which was placed by his urologist.    -Start IV ceftriaxone daily  -Urology consult placed for potential Pike removal/recs  -Urine cultures pending  -Blood cultures pending

## 2024-01-07 NOTE — ASSESSMENT & PLAN NOTE
Detail Level: Zone Patient presented to the ED after nursing facility found patient to have abrasions on his forehead.  The facility believes the patient fell out of bed last night and got himself back and without telling anybody. He was more confused that usual. Patient was brought to the ED via the trauma pathway, where trauma workup and CT imaging were negative.  Patient's lab work was unremarkable, and vitals have remained stable.    -PT/OT  -Geriatrics consult placed     Detail Level: Generalized
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
## 2024-01-07 NOTE — H&P
H&P - Trauma   Avni Ruiz 83 y.o. 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 Choi has accepted  IV antibiotics per primary service for UTI  Routine scott care, consider scott exchange due to UTI  Geriatrics' evaluation for medication regimen adjustment for advance dementia   Neuro checks  Local wound care to abrasions  Redirection and reorientation/delirium precautions  PT/OT  CM for dispo planning, patient from [facility]  Trauma will f/u and complete tertiary survey 1/8    I did speak at length with nurse at his facility, [facility] as well as his wife Julia, regarding his current status and recent medical and behavioral history. All of wife's questions were answered and she was in agreement with admission. She reports that she last knew the patient would want to be a FULL CODE, however she admits he has declined a lot since then. She will take time to discuss this with her family and will contact us if she'd like to change his code status moving forward. Hospital number was provided to her.     Cervical Collar Clearance:    The patient had a CT scan of the cervical spine demonstrating no acute injury. On exam, the patient had no midline point tenderness or paresthesias/numbness/weakness in the extremities. The patient had full range of motion (was then able to flex, extend, and rotate head laterally) without pain. There were no distracting injuries and the patient was not  intoxicated.      The patient's cervical spine was cleared radiologically and clinically. Cervical collar removed at this time.     Kari Noriega PA-C  1/7/2024 2:00 PM       History of Present Illness     Chief Complaint: Patient offers no complaints  Mechanism:Fall     HPI:    Avni Ruiz is a 83 y.o. male who presents after being found in bed on rounds at [facility] this morning with a bump and abrasion on his forehead. He was last seen the prior evening crawling down the hallway. He is normally confused but able to hold a conversation, ambulate, feed himself, etc with dementia. It is believed that he likely fell out of bed overnight and was able to get himself back into bed. He is altered compared to his baseline today and was reportedly a GCS 12 per EMS, not opening his eyes, more confused, etc. He had recent medication adjustments made by the rounding doctors at the facility due to aggressive/violent behaviors with his dementia a day or two ago. He was recently taken to [facility] as well for these behaviors and discharged. He was recently moved from [facility] to [facility] memory care unit with worsening behaviors.    Review of Systems   Unable to perform ROS: Dementia     12-point, complete review of systems was reviewed and negative except as stated above.     Historical Information     Past Medical History:   Diagnosis Date    Chronic indwelling Pike catheter     Dementia with behavioral disturbance (HCC)     Depression     Frequent UTI     GERD (gastroesophageal reflux disease)     Urinary retention      History reviewed. No pertinent surgical history.   Unable to obtain history due to Dementia    Social History     Tobacco Use    Smoking status: Never    Smokeless tobacco: Never   Substance Use Topics    Alcohol use: Not Currently    Drug use: Never       Last Tetanus: unknown, will update today  Family History: Non-contributory    1. Before the illness or injury that  brought you to the Emergency, did you need someone to help you on a regular basis? 1=Yes   2. Since the illness or injury that brought you to the Emergency, have you needed more help than usual to take care of yourself? 1=Yes   3. Have you been hospitalized for one or more nights during the past 6 months (excluding a stay in the Emergency Department)? 1=Yes   4. In general, do you see well? 0=Yes   5. In general, do you have serious problems with your memory? 1=Yes   6. Do you take more than three different medications everyday? 1=Yes   TOTAL   6     Did you order a geriatric consult if the score was 2 or greater?: n/a     Meds/Allergies   all current active meds have been reviewed       Allergies: Sulfa antibiotics    Objective   Initial Vitals:   Temperature: (!) 96.5 °F (35.8 °C) (01/07/24 1013)  Pulse: 71 (01/07/24 1014)  Respirations: 20 (01/07/24 1014)  Blood Pressure: 122/56 (01/07/24 1014)    Primary Survey:   Airway:        Status: patent;        Pre-hospital Interventions: none        Hospital Interventions: none  Breathing:        Pre-hospital Interventions: none       Effort: normal       Right breath sounds: normal       Left breath sounds: normal  Circulation:        Rhythm: regular       Rate: regular   Right Pulses Left Pulses    R radial: 2+  R femoral: 2+  R pedal: 2+  R carotid: 2+  R popliteal: 2+ L radial: 2+  L femoral: 2+  L pedal: 2+  L carotid: 2+  L popliteal: 2+   Disability:        GCS: Eye: 1; Verbal: 4 Motor: 6 Total: 11       Right Pupil: round;  reactive         Left Pupil:  round;  reactive      R Motor Strength L Motor Strength    R : 5/5  R dorsiflex: 5/5  R plantarflex: 5/5 L : 5/5  L dorsiflex: 5/5  L plantarflex: 5/5        Sensory:  No sensory deficit  Exposure:       Completed: Yes      Secondary Survey:  Physical Exam  Constitutional:       Comments: + somnolent, requires yelling or sternal rub to illicit response   HENT:      Head: Normocephalic.      Comments: +  Left forehead abrasion     Nose: Nose normal.      Mouth/Throat:      Mouth: Mucous membranes are moist.   Eyes:      Pupils: Pupils are equal, round, and reactive to light.      Comments: + pupils 3 mm and reactive bilaterally   Cardiovascular:      Rate and Rhythm: Normal rate and regular rhythm.      Pulses: Normal pulses.   Pulmonary:      Effort: Pulmonary effort is normal. No respiratory distress.      Breath sounds: Normal breath sounds.   Abdominal:      General: Abdomen is flat. There is no distension.      Palpations: Abdomen is soft.      Tenderness: There is no abdominal tenderness. There is no guarding.   Musculoskeletal:         General: No swelling, tenderness or deformity. Normal range of motion.      Cervical back: Normal range of motion and neck supple. No tenderness.      Comments: + Cervical through lumbar spine non-tender with no step offs   Skin:     General: Skin is warm and dry.   Neurological:      General: No focal deficit present.      Sensory: No sensory deficit.      Motor: No weakness.      Comments: + moving all extremities, able to tell me his name and , not oriented to situation and does not answer other questions appropriately; + GCS 11 (1, 4, 6), non-focal, moving all extremities equally and with full strength. No facial droop or garbled speech.    Psychiatric:         Behavior: Behavior normal.         Invasive Devices       Peripheral Intravenous Line  Duration             Peripheral IV 24 Left Antecubital <1 day                  Lab Results: I have personally reviewed all pertinent laboratory/test results 24 and in the preceding 24 hours.  Recent Labs     24  1020 24  1051   WBC 7.10  --    HGB 11.7*  11.2*  --    HCT 37.5  33*  --      --    SODIUM 139  --    K 5.1  --    *  --    CO2 27  31  --    BUN 19  --    CREATININE 0.86  --    GLUC 92  --    CAIONIZED 1.17  --    AST  --  10*   ALT  --  7   ALB  --  3.5   TBILI  --  0.72    ALKPHOS  --  57   INR 1.00  --    LACTICACID  --  1.1       Imaging Results: I have personally reviewed pertinent images saved in PACS. CT scan findings (and other pertinent positive findings on images) were discussed with radiology. My interpretation of the images/reports are as follows:  TRAUMA - CT spine cervical wo contrast    Result Date: 1/7/2024  Impression: No cervical spine fracture or traumatic malalignment. I personally discussed this study with JADEN DE ANDA on 1/7/2024 10:55 AM. Workstation performed: [id number]     TRAUMA - CT head wo contrast    Result Date: 1/7/2024  Impression: No acute intracranial abnormality. I personally discussed this study with JADEN DE ANDA on 1/7/2024 10:55 AM. Workstation performed: [id number]      Other Studies: no new    Code Status: Level 1 - Full Code  Advance Directive and Living Will:      Power of :    POLST:    I have spent 60 minutes with Patient and family today in which greater than 50% of this time was spent in counseling/coordination of care regarding Diagnostic results, Prognosis, Impressions, Counseling / Coordination of care, Documenting in the medical record, Reviewing / ordering tests, medicine, procedures  , Obtaining or reviewing history  , and Communicating with other healthcare professionals .

## 2024-01-07 NOTE — PROCEDURES
POC FAST US    Date/Time: 1/7/2024 10:15 AM    Performed by: Kari Noriega PA-C  Authorized by: Kari Noriega PA-C    Patient location:  Trauma  Procedure details:     Exam Type:  Diagnostic    Indications: blunt abdominal trauma and blunt chest trauma      Assess for:  Intra-abdominal fluid and pericardial effusion    Technique: FAST      Views obtained:  Heart - Pericardial sac, LUQ - Splenorenal space, Suprapubic - Pouch of Kameron and RUQ - Suarez's Pouch    Image quality: diagnostic      Image availability:  Images available in PACS and video obtained  FAST Findings:     RUQ (Hepatorenal) free fluid: absent      LUQ (Splenorenal) free fluid: absent      LUQ (Splenorenal) free fluid comment:  + Pleural effusion noted    Suprapubic free fluid: absent      Suprapubic free fluid comment:  + Decompressed bladder with scott balloon visible    Cardiac wall motion: identified      Pericardial effusion: absent    Interpretation:     Impressions: negative

## 2024-01-07 NOTE — ASSESSMENT & PLAN NOTE
Patient has a history of Alzheimer's dementia.  At baseline patient can hold conversations but does get confused.  He is also able to feed and clothe himself with dementia.  However, patient more confused at this point in time.  Patient slept throughout the course of exam, and was unarousable to gentle shaking.  Per son, patient's medications have been adjusted to increase the dosage of his dementia medications.    Concern for encephalopathy at this point  Differentials: Worsening dementia, polypharmacy, UTI related encephalopathy    -Decrease Ativan to 0.5mg TID  -Depakote 250mg at morning  -Dc lexapro  -Dc gabapentin  -Melatonin at bedtime  -QTC is 438 so can consider IV zyprexa if absolutely necessary for severe agitation   -Geriatrics consult placed, appreciate recommendations   -TSH ordered

## 2024-01-07 NOTE — ED PROVIDER NOTES
Emergency Department Airway Evaluation and Management Form    History  Obtained from: EMS  Patient has no allergy information on record.  Chief Complaint   Patient presents with    Trauma     Pt presents to the ED via EMS from nursing facility, staff reports baseline mental is confused, dementia, conversational. Observed this morning pt sitting in chair with multiple skin tears and a lac to face. Reported making erratic comments off of baseline.      HPI    Elderly male found with head trauma, altered from baseline, airway intact    Rest of eval and treatment by trauma team    No past medical history on file.  No past surgical history on file.  No family history on file.     I have reviewed and agree with the history as documented.    Review of Systems    Physical Exam  There were no vitals taken for this visit.    Physical Exam    ED Medications  Medications - No data to display    Intubation  Procedures    Notes      Final Diagnosis  Final diagnoses:   None       ED Provider  Electronically Signed by     Maricarmen Guillaume MD  01/07/24 0349

## 2024-01-08 PROBLEM — N39.0 UTI (URINARY TRACT INFECTION): Status: ACTIVE | Noted: 2024-01-08

## 2024-01-08 PROBLEM — Z79.899 POLYPHARMACY: Status: ACTIVE | Noted: 2024-01-08

## 2024-01-08 PROBLEM — G93.40 ACUTE ENCEPHALOPATHY: Status: ACTIVE | Noted: 2024-01-08

## 2024-01-08 LAB
ANION GAP SERPL CALCULATED.3IONS-SCNC: 9 MMOL/L
ANION GAP SERPL CALCULATED.3IONS-SCNC: 9 MMOL/L
BUN SERPL-MCNC: 16 MG/DL (ref 5–25)
BUN SERPL-MCNC: 16 MG/DL (ref 5–25)
CALCIUM SERPL-MCNC: 8.6 MG/DL (ref 8.4–10.2)
CALCIUM SERPL-MCNC: 8.6 MG/DL (ref 8.4–10.2)
CHLORIDE SERPL-SCNC: 107 MMOL/L (ref 96–108)
CHLORIDE SERPL-SCNC: 107 MMOL/L (ref 96–108)
CO2 SERPL-SCNC: 24 MMOL/L (ref 21–32)
CO2 SERPL-SCNC: 24 MMOL/L (ref 21–32)
CREAT SERPL-MCNC: 0.72 MG/DL (ref 0.6–1.3)
CREAT SERPL-MCNC: 0.72 MG/DL (ref 0.6–1.3)
ERYTHROCYTE [DISTWIDTH] IN BLOOD BY AUTOMATED COUNT: 13.3 % (ref 11.6–15.1)
ERYTHROCYTE [DISTWIDTH] IN BLOOD BY AUTOMATED COUNT: 13.3 % (ref 11.6–15.1)
GFR SERPL CREATININE-BSD FRML MDRD: 86 ML/MIN/1.73SQ M
GFR SERPL CREATININE-BSD FRML MDRD: 86 ML/MIN/1.73SQ M
GLUCOSE SERPL-MCNC: 79 MG/DL (ref 65–140)
GLUCOSE SERPL-MCNC: 79 MG/DL (ref 65–140)
HCT VFR BLD AUTO: 36.2 % (ref 36.5–49.3)
HCT VFR BLD AUTO: 36.2 % (ref 36.5–49.3)
HGB BLD-MCNC: 12.2 G/DL (ref 12–17)
HGB BLD-MCNC: 12.2 G/DL (ref 12–17)
MCH RBC QN AUTO: 29.6 PG (ref 26.8–34.3)
MCH RBC QN AUTO: 29.6 PG (ref 26.8–34.3)
MCHC RBC AUTO-ENTMCNC: 33.7 G/DL (ref 31.4–37.4)
MCHC RBC AUTO-ENTMCNC: 33.7 G/DL (ref 31.4–37.4)
MCV RBC AUTO: 88 FL (ref 82–98)
MCV RBC AUTO: 88 FL (ref 82–98)
PLATELET # BLD AUTO: 219 THOUSANDS/UL (ref 149–390)
PLATELET # BLD AUTO: 219 THOUSANDS/UL (ref 149–390)
PMV BLD AUTO: 11.2 FL (ref 8.9–12.7)
PMV BLD AUTO: 11.2 FL (ref 8.9–12.7)
POTASSIUM SERPL-SCNC: 4.1 MMOL/L (ref 3.5–5.3)
POTASSIUM SERPL-SCNC: 4.1 MMOL/L (ref 3.5–5.3)
RBC # BLD AUTO: 4.12 MILLION/UL (ref 3.88–5.62)
RBC # BLD AUTO: 4.12 MILLION/UL (ref 3.88–5.62)
SODIUM SERPL-SCNC: 140 MMOL/L (ref 135–147)
SODIUM SERPL-SCNC: 140 MMOL/L (ref 135–147)
WBC # BLD AUTO: 5.96 THOUSAND/UL (ref 4.31–10.16)
WBC # BLD AUTO: 5.96 THOUSAND/UL (ref 4.31–10.16)

## 2024-01-08 PROCEDURE — 99222 1ST HOSP IP/OBS MODERATE 55: CPT | Performed by: FAMILY MEDICINE

## 2024-01-08 PROCEDURE — 99232 SBSQ HOSP IP/OBS MODERATE 35: CPT | Performed by: SURGERY

## 2024-01-08 PROCEDURE — 99232 SBSQ HOSP IP/OBS MODERATE 35: CPT | Performed by: INTERNAL MEDICINE

## 2024-01-08 PROCEDURE — 80048 BASIC METABOLIC PNL TOTAL CA: CPT

## 2024-01-08 PROCEDURE — 85027 COMPLETE CBC AUTOMATED: CPT

## 2024-01-08 PROCEDURE — 99222 1ST HOSP IP/OBS MODERATE 55: CPT | Performed by: UROLOGY

## 2024-01-08 RX ORDER — OLANZAPINE 5 MG/1
2.5 TABLET, ORALLY DISINTEGRATING ORAL EVERY 8 HOURS PRN
Status: DISCONTINUED | OUTPATIENT
Start: 2024-01-08 | End: 2024-01-11

## 2024-01-08 RX ORDER — DIVALPROEX SODIUM 125 MG/1
125 TABLET, DELAYED RELEASE ORAL EVERY MORNING
Status: DISCONTINUED | OUTPATIENT
Start: 2024-01-09 | End: 2024-01-10

## 2024-01-08 RX ORDER — QUETIAPINE FUMARATE 25 MG/1
25 TABLET, FILM COATED ORAL
Status: DISCONTINUED | OUTPATIENT
Start: 2024-01-08 | End: 2024-01-10

## 2024-01-08 RX ORDER — DIVALPROEX SODIUM 250 MG/1
250 TABLET, DELAYED RELEASE ORAL
Status: DISCONTINUED | OUTPATIENT
Start: 2024-01-08 | End: 2024-01-10

## 2024-01-08 RX ORDER — ACETAMINOPHEN 325 MG/1
975 TABLET ORAL EVERY 8 HOURS
Status: DISCONTINUED | OUTPATIENT
Start: 2024-01-08 | End: 2024-01-29 | Stop reason: HOSPADM

## 2024-01-08 RX ORDER — OLANZAPINE 10 MG/2ML
5 INJECTION, POWDER, FOR SOLUTION INTRAMUSCULAR
Status: DISCONTINUED | OUTPATIENT
Start: 2024-01-08 | End: 2024-01-08

## 2024-01-08 RX ORDER — WATER 10 ML/10ML
INJECTION INTRAMUSCULAR; INTRAVENOUS; SUBCUTANEOUS
Status: COMPLETED
Start: 2024-01-08 | End: 2024-01-08

## 2024-01-08 RX ADMIN — WATER 2.1 ML: 1 INJECTION INTRAMUSCULAR; INTRAVENOUS; SUBCUTANEOUS at 04:41

## 2024-01-08 RX ADMIN — DIVALPROEX SODIUM 250 MG: 250 TABLET, EXTENDED RELEASE ORAL at 08:52

## 2024-01-08 RX ADMIN — DOCUSATE SODIUM 100 MG: 100 CAPSULE, LIQUID FILLED ORAL at 16:00

## 2024-01-08 RX ADMIN — TAMSULOSIN HYDROCHLORIDE 0.4 MG: 0.4 CAPSULE ORAL at 15:12

## 2024-01-08 RX ADMIN — QUETIAPINE FUMARATE 25 MG: 25 TABLET ORAL at 16:00

## 2024-01-08 RX ADMIN — Medication 3 MG: at 22:21

## 2024-01-08 RX ADMIN — SODIUM CHLORIDE, SODIUM LACTATE, POTASSIUM CHLORIDE, AND CALCIUM CHLORIDE 75 ML/HR: .6; .31; .03; .02 INJECTION, SOLUTION INTRAVENOUS at 23:34

## 2024-01-08 RX ADMIN — SODIUM CHLORIDE, SODIUM LACTATE, POTASSIUM CHLORIDE, AND CALCIUM CHLORIDE 75 ML/HR: .6; .31; .03; .02 INJECTION, SOLUTION INTRAVENOUS at 06:25

## 2024-01-08 RX ADMIN — ACETAMINOPHEN 975 MG: 325 TABLET, FILM COATED ORAL at 22:21

## 2024-01-08 RX ADMIN — LORAZEPAM 0.5 MG: 0.5 TABLET ORAL at 08:52

## 2024-01-08 RX ADMIN — SENNOSIDES 8.6 MG: 8.6 TABLET, FILM COATED ORAL at 08:53

## 2024-01-08 RX ADMIN — ENOXAPARIN SODIUM 40 MG: 40 INJECTION SUBCUTANEOUS at 08:58

## 2024-01-08 RX ADMIN — CYANOCOBALAMIN TAB 500 MCG 1000 MCG: 500 TAB at 08:52

## 2024-01-08 RX ADMIN — DIVALPROEX SODIUM 250 MG: 250 TABLET, DELAYED RELEASE ORAL at 22:22

## 2024-01-08 RX ADMIN — POLYETHYLENE GLYCOL 3350 17 G: 17 POWDER, FOR SOLUTION ORAL at 08:58

## 2024-01-08 RX ADMIN — DEXTROSE 1000 MG: 50 INJECTION, SOLUTION INTRAVENOUS at 17:58

## 2024-01-08 RX ADMIN — SENNOSIDES 8.6 MG: 8.6 TABLET, FILM COATED ORAL at 16:00

## 2024-01-08 RX ADMIN — FINASTERIDE 5 MG: 5 TABLET, FILM COATED ORAL at 08:53

## 2024-01-08 RX ADMIN — OLANZAPINE 5 MG: 10 INJECTION, POWDER, FOR SOLUTION INTRAMUSCULAR at 04:41

## 2024-01-08 RX ADMIN — LORAZEPAM 0.5 MG: 0.5 TABLET ORAL at 15:12

## 2024-01-08 RX ADMIN — LORAZEPAM 0.5 MG: 0.5 TABLET ORAL at 22:23

## 2024-01-08 NOTE — ASSESSMENT & PLAN NOTE
Patient has history of Alzheimer's dementia  Patient is not oriented to time place or person  Patient had multiple medication changes in the past few weeks  3 most likely secondary to polypharmacy  Geriatrics consulted    PLAN:  Tylenol 975 every 8 scheduled  Decreasing Depakote 125 oral morning  250 Mg Depakote at night  Seroquel 25 Mg 5 PM  Denzapine 2.5 Mg every 8 as needed  Ativan 0.5 Mg 3 times daily

## 2024-01-08 NOTE — ASSESSMENT & PLAN NOTE
Patient presented to the ED after nursing facility found patient to have abrasions on his forehead.  The facility believes the patient fell out of bed last night and got himself back and without telling anybody. He was more confused that usual. Patient was brought to the ED via the trauma pathway, where trauma workup and CT imaging were negative.  Patient's lab work was unremarkable, and vitals have remained stable.    -PT/OT  -Geriatrics consult placed  - PT consult placed

## 2024-01-08 NOTE — CASE MANAGEMENT
Case Management Assessment    Patient name Avni Ruiz  Location S /S - MRN [id number]  : 1940 Date 2024       Current Admission Date: 2024  Current Admission Diagnosis:Acute encephalopathy   Patient Active Problem List    Diagnosis Date Noted    Acute encephalopathy 2024    UTI (urinary tract infection) 2024    Polypharmacy 2024    Ambulatory dysfunction 2024    Dementia (HCC) 2024    Neuropathy 2024    Constipation 2024    BPH (benign prostatic hyperplasia) 2024    Abnormal urine finding 2024      LOS (days): 1  Geometric Mean LOS (GMLOS) (days): 2.6  Days to GMLOS:1.5     OBJECTIVE:    Risk of Unplanned Readmission Score: 13.56         Current admission status: Inpatient       Preferred Pharmacy: No Pharmacies Listed  Primary Care Provider: No primary care provider on file.    Primary Insurance: MEDICARE  Secondary Insurance:  FOR LIFE    ASSESSMENT:  Active Health Care Proxies    There are no active Health Care Proxies on file.                 Readmission Root Cause  30 Day Readmission: No    Patient Information  Admitted from:: Facility  Mental Status: Confused  During Assessment patient was accompanied by: Spouse  Assessment information provided by:: Spouse  Primary Caregiver: Spouse  Caregiver's Name:: Julia - Spouse  Caregiver's Relationship to Patient:: Family Member  Caregiver's Telephone Number:: See face sheet  Support Systems: Son, Spouse/significant other, Other (Comment) ([facility] staff)  County of Residence: [place]  What city do you live in?: [place]  Home entry access options. Select all that apply.: Stairs  Number of steps to enter home.: 3  Do the steps have railings?: Yes  Type of Current Residence: 3 Mobile home (Lives with spouse but was staying at [facility] for respite - last day this Friday.)  Upon entering residence, is there a bedroom on the main floor (no further steps)?:  No  A bedroom is located on the following floor levels of residence (select all that apply):: 2nd Floor  Upon entering residence, is there a bathroom on the main floor (no further steps)?: Yes  Number of steps to 2nd floor from main floor: One Flight  Living Arrangements: Lives w/ Spouse/significant other  Is patient a ?: Yes  Is patient active with [facility] ([employer])?: No    Activities of Daily Living Prior to Admission  Functional Status: Independent  Completes ADLs independently?: No  Level of ADL dependence: Assistance (After recent surgery, patient required assistance as per spouse. Prior to surgery, he was very independent.)  Ambulates independently?: Yes  Does patient use assisted devices?: Yes  Does patient currently own DME?: Yes  What DME does the patient currently own?: Walker, Stra[employer]ane  Does patient have a history of Outpatient Therapy (PT/OT)?: No  Does the patient have a history of Short-Term Rehab?: Yes ([facility])  Does patient have a history of HHC?: Yes  Does patient currently have HHC?: No         Patient Information Continued  Income Source: Pension/penitentiary  Does patient have prescription coverage?: Yes  Does patient receive dialysis treatments?: No  Does patient have a history of substance abuse?: No  Does patient have a history of Mental Health Diagnosis?: No         Means of Transportation  Means [facility] Transport to [facility]:: Family transport      Housing Stability: Low Risk  (1/8/2024)    Housing Stability Vital Sign     Unable to Pay for Housing in the Last Year: No     Number of Places Lived in the Last Year: 1     Unstable Housing in the Last Year: No   Food Insecurity: No Food Insecurity (1/8/2024)    Hunger Vital Sign     Worried About Running Out of Food in the Last Year: Never true     Ran Out of Food in the Last Year: Never true   Transportation Needs: No Transportation Needs (1/8/2024)    PRAPARE - Transportation     Lack of Transportation (Medical): No     Lack of  Transportation (Non-Medical): No   Utilities: Not At Risk (1/8/2024)    C Utilities     Threatened with loss of utilities: No

## 2024-01-08 NOTE — PLAN OF CARE
Problem: PAIN - ADULT  Goal: Verbalizes/displays adequate comfort level or baseline comfort level  Description: Interventions:  - Encourage patient to monitor pain and request assistance  - Assess pain using appropriate pain scale  - Administer analgesics based on type and severity of pain and evaluate response  - Implement non-pharmacological measures as appropriate and evaluate response  - Consider cultural and social influences on pain and pain management  - Notify physician/advanced practitioner if interventions unsuccessful or patient reports new pain  Outcome: Progressing     Problem: INFECTION - ADULT  Goal: Absence or prevention of progression during hospitalization  Description: INTERVENTIONS:  - Assess and monitor for signs and symptoms of infection  - Monitor lab/diagnostic results  - Monitor all insertion sites, i.e. indwelling lines, tubes, and drains  - Monitor endotracheal if appropriate and nasal secretions for changes in amount and color  - Nash appropriate cooling/warming therapies per order  - Administer medications as ordered  - Instruct and encourage patient and family to use good hand hygiene technique  - Identify and instruct in appropriate isolation precautions for identified infection/condition  Outcome: Progressing  Goal: Absence of fever/infection during neutropenic period  Description: INTERVENTIONS:  - Monitor WBC    Outcome: Progressing     Problem: SAFETY ADULT  Goal: Patient will remain free of falls  Description: INTERVENTIONS:  - Educate patient/family on patient safety including physical limitations  - Instruct patient to call for assistance with activity   - Consult OT/PT to assist with strengthening/mobility   - Keep Call bell within reach  - Keep bed low and locked with side rails adjusted as appropriate  - Keep care items and personal belongings within reach  - Initiate and maintain comfort rounds  - Make Fall Risk Sign visible to staff  - Apply yellow socks and bracelet  for high fall risk patients  - Consider moving patient to room near nurses station  Outcome: Progressing  Goal: Maintain or return to baseline ADL function  Description: INTERVENTIONS:  -  Assess patient's ability to carry out ADLs; assess patient's baseline for ADL function and identify physical deficits which impact ability to perform ADLs (bathing, care of mouth/teeth, toileting, grooming, dressing, etc.)  - Assess/evaluate cause of self-care deficits   - Assess range of motion  - Assess patient's mobility; develop plan if impaired  - Assess patient's need for assistive devices and provide as appropriate  - Encourage maximum independence but intervene and supervise when necessary  - Involve family in performance of ADLs  - Assess for home care needs following discharge   - Consider OT consult to assist with ADL evaluation and planning for discharge  - Provide patient education as appropriate  Outcome: Progressing  Goal: Maintains/Returns to pre admission functional level  Description: INTERVENTIONS:  - Perform AM-PAC 6 Click Basic Mobility/ Daily Activity assessment daily.  - Set and communicate daily mobility goal to care team and patient/family/caregiver.   - Collaborate with rehabilitation services on mobility goals if consulted  - Record patient progress and toleration of activity level   Outcome: Progressing     Problem: Knowledge Deficit  Goal: Patient/family/caregiver demonstrates understanding of disease process, treatment plan, medications, and discharge instructions  Description: Complete learning assessment and assess knowledge base.  Interventions:  - Provide teaching at level of understanding  - Provide teaching via preferred learning methods  Outcome: Progressing     Problem: DISCHARGE PLANNING  Goal: Discharge to home or other facility with appropriate resources  Description: INTERVENTIONS:  - Identify barriers to discharge w/patient and caregiver  - Arrange for needed discharge resources and  transportation as appropriate  - Identify discharge learning needs (meds, wound care, etc.)  - Arrange for interpretive services to assist at discharge as needed  - Refer to Case Management Department for coordinating discharge planning if the patient needs post-hospital services based on physician/advanced practitioner order or complex needs related to functional status, cognitive ability, or social support system  Outcome: Progressing     Problem: SAFETY,RESTRAINT: NV/NON-SELF DESTRUCTIVE BEHAVIOR  Goal: Remains free of harm/injury (restraint for non violent/non self-detsructive behavior)  Description: INTERVENTIONS:  - Instruct patient/family regarding restraint use   - Assess and monitor physiologic and psychological status   - Provide interventions and comfort measures to meet assessed patient needs   - Identify and implement measures to help patient regain control  - Assess readiness for release of restraint   Outcome: Not Progressing  Goal: Returns to optimal restraint-free functioning  Description: INTERVENTIONS:  - Assess the patient's behavior and symptoms that indicate continued need for restraint  - Identify and implement measures to help patient regain control  - Assess readiness for release of restraint   Outcome: Not Progressing

## 2024-01-08 NOTE — ASSESSMENT & PLAN NOTE
- seems to be at his new baseline  -Patient is high risk of delirium due to dementia with behavioral disturbance at baseline, UTI, insomnia, change in environment  -Initiate delirium precautions  -maintain normal sleep/wake cycle  -minimize overnight interruptions, group overnight vitals/labs/nursing checks as possible  -dim lights, close blinds and turn off tv to minimize stimulation and encourage sleep environment in evenings  -ensure that pain is well controlled continue Tylenol 975mg Q8H scheduled,   -monitor for fecal and urinary retention which may precipitate delirium  -encourage early mobilization and ambulation  -provide frequent reorientation and redirection  -encourage family and friends at the bedside to help calm patient if anxious  -avoid medications which may precipitate or worsen delirium such as tramadol, benzodiazepine, anticholinergics, and antihistaminics  -encourage hydration and nutrition , assist with feeding if needed  -redirect unwanted behaviors as first line  -Continue Depakote to 250 mg p.o. 3 times a day with meals-monitor CBC CMP  -Seroquel 25 mg p.o. administer at 9 AM 3 PM and 8 PM, monitor QTc  -Continue lorazepam 0.5 mg 3 times a day 9am, 1pm, 8pm  -continue mirtazapine to 15 mg at 8 PM  -For severe agitation only may use olanzapine 2.5 mg every 8 hours as needed,  -Monitor patient off restraints if possible

## 2024-01-08 NOTE — PROGRESS NOTES
[facility]  Progress Note  Name: Avni Ruiz I  MRN: [id number]  Unit/Bed#: S -01 I Date of Admission: 1/7/2024   Date of Service: 1/8/2024 I Hospital Day: 1    Assessment/Plan   * Acute encephalopathy  Assessment & Plan  Patient has history of Alzheimer's dementia  Patient is not oriented to time place or person  Patient had multiple medication changes in the past few weeks  3 most likely secondary to polypharmacy  Geriatrics consulted    PLAN:  Tylenol 975 every 8 scheduled  Decreasing Depakote 125 oral morning  250 Mg Depakote at night  Seroquel 25 Mg 5 PM  Denzapine 2.5 Mg every 8 as needed  Ativan 0.5 Mg 3 times daily      Polypharmacy  Assessment & Plan  Geriatrics consulted    PLAN:  See plan acute encephalopathy    UTI (urinary tract infection)  Assessment & Plan  Patient was afebrile overnight  Has no suprapubic or abdominal pain on palpation  No leukocytosis  Monitor for signs of infection    Abnormal urine finding  Assessment & Plan  On admission patient had a urinalysis that was significant for leukocytes and nitrites.  Unable to assess with patient is having any pain or burning with urination.  He does have a history of cystitis.  Per son, patient has been receiving catheterization and has tried voiding trials but was unsuccessful.  Per son, patient has had a Pike in place since Tuesday which was placed by his urologist.    -Start IV ceftriaxone daily  -Urology consult placed for potential Pike removal/recs  -Urine cultures pending  -Blood cultures pending    BPH (benign prostatic hyperplasia)  Assessment & Plan  Continue Dutasteride and Flomax. Urology consult placed.     Constipation  Assessment & Plan  Continue MiraLAX and senna    Dementia (HCC)  Assessment & Plan  Patient has a history of Alzheimer's dementia.  At baseline patient can hold conversations but does get confused.  He is also able to feed and clothe himself with dementia.  However, patient more  confused at this point in time.  Patient slept throughout the course of exam, and was unarousable to gentle shaking.  Per son, patient's medications have been adjusted to increase the dosage of his dementia medications.    Concern for encephalopathy at this point  Differentials: Worsening dementia, polypharmacy, UTI related encephalopathy    Decrease Ativan to 0.5mg TID  Tylenol 975 every 8 scheduled  Decreasing Depakote 125 oral morning  250 Mg Depakote at night  Seroquel 25 Mg 5 PM  Denzapine 2.5 Mg every 8 as needed  Ativan 0.5 Mg 3 times daily    Ambulatory dysfunction  Assessment & Plan  Patient presented to the ED after nursing facility found patient to have abrasions on his forehead.  The facility believes the patient fell out of bed last night and got himself back and without telling anybody. He was more confused that usual. Patient was brought to the ED via the trauma pathway, where trauma workup and CT imaging were negative.  Patient's lab work was unremarkable, and vitals have remained stable.    -PT/OT  -Geriatrics consult placed  - PT consult placed             VTE Pharmacologic Prophylaxis: VTE Score: 5 High Risk (Score >/= 5) - Pharmacological DVT Prophylaxis Ordered: enoxaparin (Lovenox). Sequential Compression Devices Ordered.    Mobility:   Basic Mobility Inpatient Raw Score: 8  JH-HLM Goal: 3: Sit at edge of bed  JH-HLM Achieved: 2: Bed activities/Dependent transfer  HLM Goal NOT achieved. Continue with multidisciplinary rounding and encourage appropriate mobility to improve upon HLM goals.    Patient Centered Rounds: I performed bedside rounds with nursing staff today.   Discussions with Specialists or Other Care Team Provider: Geriatrics     Education and Discussions with Family / Patient: Updated  (wife) at bedside.    Total Time Spent on Date of Encounter in care of patient: 45 mins. This time was spent on one or more of the following: performing physical exam; counseling and  coordination of care; obtaining or reviewing history; documenting in the medical record; reviewing/ordering tests, medications or procedures; communicating with other healthcare professionals and discussing with patient's family/caregivers.    Current Length of Stay: 1 day(s)  Current Patient Status: Inpatient   Certification Statement: The patient will continue to require additional inpatient hospital stay due to management of acute encephalopathy   Discharge Plan: Anticipate discharge in 24-48 hrs to prior assisted or independent living facility.    Code Status: Level 1 - Full Code    Subjective:   Patient is now alert, oriented to time place or person.  Patient is in restraints.  Patient is talking in clear sentences however is not comprehensive.  Patient is not able to hold a conversation.  Objective:     Vitals:   Temp (24hrs), Av.8 °F (36.6 °C), Min:97.2 °F (36.2 °C), Max:98.7 °F (37.1 °C)    Temp:  [97.2 °F (36.2 °C)-98.7 °F (37.1 °C)] 97.2 °F (36.2 °C)  HR:  [56-72] 56  Resp:  [15-20] 16  BP: (109-127)/() 123/57  SpO2:  [93 %-99 %] 99 %  Body mass index is 24.73 kg/m².     Input and Output Summary (last 24 hours):     Intake/Output Summary (Last 24 hours) at 2024 1408  Last data filed at 2024 0401  Gross per 24 hour   Intake 55 ml   Output 350 ml   Net -295 ml       Physical Exam:   Physical Exam  Vitals and nursing note reviewed.   Constitutional:       Appearance: He is well-developed.      Comments: Frail looking   HENT:      Head: Normocephalic.      Right Ear: External ear normal.      Left Ear: External ear normal.      Mouth/Throat:      Mouth: Mucous membranes are dry.      Pharynx: Oropharynx is clear.   Eyes:      Extraocular Movements: Extraocular movements intact.      Conjunctiva/sclera: Conjunctivae normal.   Cardiovascular:      Rate and Rhythm: Normal rate and regular rhythm.      Heart sounds: No murmur heard.  Pulmonary:      Effort: Pulmonary effort is normal. No  respiratory distress.      Breath sounds: Normal breath sounds.   Abdominal:      General: Abdomen is flat. Bowel sounds are normal.      Palpations: Abdomen is soft.      Tenderness: There is no abdominal tenderness.   Genitourinary:     Comments: Pike catheter  Musculoskeletal:         General: No swelling.      Cervical back: Neck supple.      Right lower leg: No edema.      Left lower leg: No edema.   Skin:     General: Skin is warm and dry.      Capillary Refill: Capillary refill takes less than 2 seconds.   Neurological:      Mental Status: He is alert. He is disoriented.   Psychiatric:      Comments: 3 point restraints          Additional Data:     Labs:  Results from last 7 days   Lab Units 01/08/24  0448 01/07/24  1821 01/07/24  1020   WBC Thousand/uL 5.96  --  7.10   HEMOGLOBIN g/dL 12.2  --  11.7*   I STAT HEMOGLOBIN g/dl  --   --  11.2*   HEMATOCRIT % 36.2*  --  37.5   HEMATOCRIT, ISTAT %  --   --  33*   PLATELETS Thousands/uL 219   < > 173   NEUTROS PCT %  --   --  85*   LYMPHS PCT %  --   --  7*   MONOS PCT %  --   --  7   EOS PCT %  --   --  1    < > = values in this interval not displayed.     Results from last 7 days   Lab Units 01/08/24  0448 01/07/24  1051   SODIUM mmol/L 140  --    POTASSIUM mmol/L 4.1  --    CHLORIDE mmol/L 107  --    CO2 mmol/L 24  --    BUN mg/dL 16  --    CREATININE mg/dL 0.72  --    ANION GAP mmol/L 9  --    CALCIUM mg/dL 8.6  --    ALBUMIN g/dL  --  3.5   TOTAL BILIRUBIN mg/dL  --  0.72   ALK PHOS U/L  --  57   ALT U/L  --  7   AST U/L  --  10*   GLUCOSE RANDOM mg/dL 79  --      Results from last 7 days   Lab Units 01/07/24  1020   INR  1.00             Results from last 7 days   Lab Units 01/07/24  1051   LACTIC ACID mmol/L 1.1   PROCALCITONIN ng/ml <0.05       Lines/Drains:  Invasive Devices       Peripheral Intravenous Line  Duration             Peripheral IV 01/07/24 Right;Ventral (anterior) Forearm <1 day              Drain  Duration             Urethral Catheter <1  day                  Urinary Catheter:  Goal for removal: N/A - Chronic Pike               Imaging: No pertinent imaging reviewed.    Recent Cultures (last 7 days):   Results from last 7 days   Lab Units 01/07/24  1209 01/07/24  1051   BLOOD CULTURE   --  Received in Microbiology Lab. Culture in Progress.  Received in Microbiology Lab. Culture in Progress.   URINE CULTURE  >100,000 cfu/ml Staphylococcus aureus*  --        Last 24 Hours Medication List:   Current Facility-Administered Medications   Medication Dose Route Frequency Provider Last Rate    acetaminophen  975 mg Oral Q8H Sean Mims MD      cefTRIAXone  1,000 mg Intravenous Q24H Sherley Turner MD Stopped (01/07/24 1856)    vitamin B-12  1,000 mcg Oral Daily Sherley Turner MD      [START ON 1/9/2024] divalproex sodium  125 mg Oral QAM Sean Mims MD      divalproex sodium  250 mg Oral HS Sean Mims MD      docusate sodium  100 mg Oral BID Sherley Turner MD      enoxaparin  40 mg Subcutaneous Daily Sherley Turner MD      finasteride  5 mg Oral Daily Sherley Turner MD      lactated ringers  75 mL/hr Intravenous Continuous MARCO Duenas-C 75 mL/hr (01/08/24 0625)    LORazepam  0.5 mg Oral TID Sherley Turner MD      melatonin  3 mg Oral HS Sherley Turner MD      OLANZapine  2.5 mg Oral Q8H PRN Sean Mims MD      polyethylene glycol  17 g Oral Daily Sherley Turner MD      QUEtiapine  25 mg Oral HS Aditi Hanley MD      senna  1 tablet Oral BID Sherley Turner MD      tamsulosin  0.4 mg Oral Daily With Dinner Sherley Turner MD          Today, Patient Was Seen By: Sean Mims MD    **Please Note: This note may have been constructed using a voice recognition system.**

## 2024-01-08 NOTE — TELEPHONE ENCOUNTER
I called and reviewed.  He is now in hospital.  I reviewed chart.  Does not appear to be infection related, but rather ongoing dementia and delirium.  Planning for skilled care facility on discharge.

## 2024-01-08 NOTE — CONSULTS
Consult - Urology   Avni Ruiz 1940, 83 y.o. male MRN: [id number]    Unit/Bed#: S -01 Encounter: [id number]        Assessment & Plan:  Urinary retention  Dementia  - 84 yo hx of BPH on flomax and proscar, admitted after fall.   - Hx of urinary retention. Started in Nov 2023 after hernia surgery. Patient follows with Dr. Manrique. Has had 2 voiding trials at nursing facility, failed  - Urine cx staph aureus. On ceftriaxone  - VSS, afebrile  - No leukocytosis  - Options include maintaining scott vs CIC. Family worried about trauma and recurrent need for straight cath, prefer to maintain scott catheter. Family reports originally Dr. Manrique was considering surgical management, however now would not pursue given clinical decline  - Recommend maintaining scott catheter  - Multiple barriers to removal including ACE wrap, briefs, pants  - Frequent redirection  - Flomax can precipitate falls in elderly patients due to SE profile, orthostatic hypotension, dizziness. Consider removing from patient med list and continue with proscar  - No acute  surgical intervention recommended  - Urology will sign off. Please do not hesitate to reach out with any questions or concerns.     Subjective:   Avni is a 84 yo male with PMH Alzheimer's dementia, constipation, BPH who presented to the ED after a fall from his nursing facility.  According to chart review at the nursing facility patient was much more confused than his baseline.  Patient was admitted through trauma pathway, trauma workup negative.  Vital signs stable, afebrile.  Urinalysis positive for leukocytes and nitrates from Scott catheter.  Patient was started on IV antibiotics.  He was admitted to medicine for further workup.  Geriatric medicine consulted.  Patient known to primary urologist Dr. Manrique, on tamsulosin and finasteride.  Wife and son at bedside provide additional history.  Wife reports patient had hernia surgery on 11/2/2023, postoperatively  "he was noted to have urinary retention, underwent straight cath and sent home.  Patient returned due to urinary retention and TANIA.  He had a Pike catheter placed and was discharged to skilled nursing facility.  Patient has underwent 2 void trials, failing. Urology was consulted due to urinary retention.     Review of Systems   Unable to perform ROS: Other   Patient sleeping, hx dementia. Hx obtained by family at bedside.    Objective:  Vitals: Blood pressure 123/57, pulse 56, temperature (!) 97.2 °F (36.2 °C), resp. rate 16, height 5' 6\" (1.676 m), weight 69.5 kg (153 lb 3.5 oz), SpO2 99%.,Body mass index is 24.73 kg/m².    Physical Exam  Constitutional:       General: He is not in acute distress.     Appearance: Normal appearance. He is normal weight. He is not ill-appearing or toxic-appearing.      Comments: In restraints, Sleeping   HENT:      Head: Normocephalic and atraumatic.      Right Ear: External ear normal.      Left Ear: External ear normal.      Nose: Nose normal.      Mouth/Throat:      Mouth: Mucous membranes are moist.   Eyes:      General: No scleral icterus.     Conjunctiva/sclera: Conjunctivae normal.   Cardiovascular:      Rate and Rhythm: Normal rate.      Pulses: Normal pulses.   Pulmonary:      Effort: Pulmonary effort is normal.   Abdominal:      General: Abdomen is flat. There is no distension.      Palpations: Abdomen is soft.      Tenderness: There is no abdominal tenderness. There is no guarding.   Genitourinary:     Comments: Pike draining clear yellow urine  Musculoskeletal:      Cervical back: Normal range of motion.   Skin:     General: Skin is warm.      Findings: No rash.       Labs:  Recent Labs     01/07/24  1020 01/08/24  0448   WBC 7.10 5.96     Recent Labs     01/07/24  1020 01/08/24  0448   HGB 11.7*  11.2* 12.2       Recent Labs     01/07/24  1020 01/08/24  0448   CREATININE 0.86 0.72         History:  Social History     Socioeconomic History    Marital status: " /Civil Union     Spouse name: None    Number of children: None    Years of education: None    Highest education level: None   Occupational History    None   Tobacco Use    Smoking status: Never    Smokeless tobacco: Never   Substance and Sexual Activity    Alcohol use: Not Currently    Drug use: Never    Sexual activity: None   Other Topics Concern    None   Social History Narrative    None     Social Determinants of Health     Financial Resource Strain: Not on file   Food Insecurity: Not on file   Transportation Needs: Not on file   Physical Activity: Not on file   Stress: Not on file   Social Connections: Not on file   Intimate Partner Violence: Not on file   Housing Stability: Not on file       Past Medical History:   Diagnosis Date    Chronic indwelling Pike catheter     Dementia with behavioral disturbance (HCC)     Depression     Frequent UTI     GERD (gastroesophageal reflux disease)     Urinary retention      Past Surgical History:   Procedure Laterality Date    HERNIA REPAIR       Family History   Problem Relation Age of Onset    Dementia Mother        Antoinette Hu PA-C  Date: 1/8/2024 Time: 1:49 PM

## 2024-01-08 NOTE — CONSULTS
Consultation - Geriatric Medicine   Avni Ruiz 83 y.o. male MRN: [id number]  Unit/Bed#: S -01 Encounter: [id number]      Assessment/Plan     UTI (urinary tract infection)  Assessment & Plan  Urine culture grew Staphylococcus aureus  Continue ceftriaxone as per primary team  Encourage p.o. hydration    Acute encephalopathy  Assessment & Plan    -Patient is high risk of delirium due to dementia with behavioral disturbance at baseline, UTI, insomnia, change in environment  -Initiate delirium precautions  -maintain normal sleep/wake cycle  -minimize overnight interruptions, group overnight vitals/labs/nursing checks as possible  -dim lights, close blinds and turn off tv to minimize stimulation and encourage sleep environment in evenings  -ensure that pain is well controlled consider Tylenol 975mg Q8H scheduled   -monitor for fecal and urinary retention which may precipitate delirium  -encourage early mobilization and ambulation  -provide frequent reorientation and redirection  -encourage family and friends at the bedside to help calm patient if anxious  -avoid medications which may precipitate or worsen delirium such as tramadol, benzodiazepine, anticholinergics, and antihistaminics  -encourage hydration and nutrition , assist with feeding if needed  -redirect unwanted behaviors as first line, avoid physical restraints.   -Decrease Depakote 125 mg p.o. in the morning and add 250 mg p.o. at bedtime-monitor CBC CMP  -Agree with Seroquel 25 mg p.o. at 5 PM  -If patient continues to have difficulty sleeping at bedtime may add mirtazapine 7.5 mg nightly  -For severe agitation only may use olanzapine 2.5 mg every 8 hours as needed    BPH (benign prostatic hyperplasia)  Assessment & Plan  Continue finasteride and Flomax  Pike catheter in place  Continue antibiotic treatment for UTI    Constipation  Assessment & Plan  Continue senna 2 tablets twice daily MiraLAX daily  Add Dulcolax suppository as  needed  Encourage p.o. hydration and out of bed as tolerated    Dementia (HCC)  Assessment & Plan  Patient presented to the hospital with change in mental status.  He has Alzheimer's dementia with behavioral disturbance at baseline recently behaviors are getting worse  He lives at [facility] memory care unit  Will continue to provide supportive care, reorient as needed.  Patient is at high risk for delirium, will monitor closely and place on delirium precautions.  Maintain sleep/wake cycle.  Optimize pain regimen.  Monitor for constipation and urinary retention and manage as needed.  Check B12 level   Encourage family to visit.  Encourage to wear glasses and hearing aids while awake.  Encourage po intake, assist with feeding if needed.   Decrease Depakote to 125 mg in the morning and add 250 mg at bedtime  Agree with Seroquel 25 mg at 5 PM  Continue lorazepam 0.5 mg p.o. 3 times daily and continue to taper off slowly  If patient continues to have difficulty sleeping at night consider adding mirtazapine 7.5 mg nightly    * Ambulatory dysfunction  Assessment & Plan  Patient presented status post fall at the facility  CT head and neck with no acute pathology  Monitor orthostatic vital signs  Encourage p.o. hydration  Avoid hypotension and hypoglycemia   PT OT evaluation              History of Present Illness   Physician Requesting Consult: Rosemary Aldridge*  Reason for Consult / Principal Problem: Dementia with behavioral disturbance, fall  Hx and PE limited by: Dementia and change in mental status  Additional history obtained from: Nursing staff      HPI: Avni Ruiz is a 83 y.o. year old male who presented to the hospital with altered mental status and fall at the facility.  Patient lives in a memory care at [facility].  Recently he is more confused and has increased behaviors.  At the time of encounter patient is somnolent and not able to provide any history.  He is in 3-point  restraints.  Per nursing staff patient ate some breakfast and did not sleep all night.  He was very restless however not physically aggressive.  Patient resides at [facility] in a memory care unit noted to have progressive cognitive decline and increased behaviors.    Inpatient consult to Gerontology  Consult performed by: Marta Jamison MD  Consult ordered by: Sherley Turner MD          Review of Systems   Unable to perform ROS: Mental status change           Historical Information   Past Medical History:   Diagnosis Date    Chronic indwelling Pike catheter     Dementia with behavioral disturbance (HCC)     Depression     Frequent UTI     GERD (gastroesophageal reflux disease)     Urinary retention      Past Surgical History:   Procedure Laterality Date    HERNIA REPAIR       Social History   Social History     Substance and Sexual Activity   Alcohol Use Not Currently     Social History     Substance and Sexual Activity   Drug Use Never     Social History     Tobacco Use   Smoking Status Never   Smokeless Tobacco Never     Family History:   Family History   Problem Relation Age of Onset    Dementia Mother        Meds/Allergies   current meds:   Current Facility-Administered Medications   Medication Dose Route Frequency    ceftriaxone (ROCEPHIN) 1 g/50 mL in dextrose IVPB  1,000 mg Intravenous Q24H    cyanocobalamin (VITAMIN B-12) tablet 1,000 mcg  1,000 mcg Oral Daily    divalproex sodium (DEPAKOTE ER) 24 hr tablet 250 mg  250 mg Oral Daily    docusate sodium (COLACE) capsule 100 mg  100 mg Oral BID    enoxaparin (LOVENOX) subcutaneous injection 40 mg  40 mg Subcutaneous Daily    finasteride (PROSCAR) tablet 5 mg  5 mg Oral Daily    lactated ringers infusion  75 mL/hr Intravenous Continuous    LORazepam (ATIVAN) tablet 0.5 mg  0.5 mg Oral TID    melatonin tablet 3 mg  3 mg Oral HS    OLANZapine (ZyPREXA) IM injection 5 mg  5 mg Intramuscular Q4H PRN Max 6/day    polyethylene glycol (MIRALAX) packet 17 g  17 g  Oral Daily    QUEtiapine (SEROquel) tablet 25 mg  25 mg Oral HS    senna (SENOKOT) tablet 8.6 mg  1 tablet Oral BID    tamsulosin (FLOMAX) capsule 0.4 mg  0.4 mg Oral Daily With Dinner       Allergies   Allergen Reactions    Sulfa Antibiotics Other (See Comments)     unKnown       Home medications  Depakote 250 mg daily  Colace 100 mg twice daily  Lexapro 5 mg daily  Dutasteride 0.5 mg daily  lorazepam 1 mg twice daily  Gabapentin 100 mg 3 times a day  MiraLAX 1 packet daily  Senna 2 tablets twice daily  Tamsulosin 0.4 mg daily  Vitamin B12 1000 mcg daily  Zyprexa 5 mg daily as needed for agitation  Melatonin 3 mg nightly    Objective     Intake/Output Summary (Last 24 hours) at 1/8/2024 1256  Last data filed at 1/8/2024 0401  Gross per 24 hour   Intake 55 ml   Output 350 ml   Net -295 ml     Invasive Devices       Peripheral Intravenous Line  Duration             Peripheral IV 01/07/24 Right;Ventral (anterior) Forearm <1 day              Drain  Duration             Urethral Catheter <1 day                    Physical Exam  Vitals and nursing note reviewed.   Constitutional:       General: He is not in acute distress.     Appearance: He is well-developed.      Comments: Frail looking   HENT:      Head: Normocephalic and atraumatic.      Mouth/Throat:      Mouth: Mucous membranes are dry.   Eyes:      Conjunctiva/sclera: Conjunctivae normal.   Cardiovascular:      Rate and Rhythm: Normal rate and regular rhythm.      Heart sounds: No murmur heard.  Pulmonary:      Effort: Pulmonary effort is normal. No respiratory distress.      Breath sounds: Normal breath sounds.   Abdominal:      Palpations: Abdomen is soft.      Tenderness: There is no abdominal tenderness.   Genitourinary:     Comments: Pike catheter  Musculoskeletal:         General: No swelling.      Cervical back: Neck supple.      Right lower leg: No edema.      Left lower leg: No edema.   Skin:     General: Skin is warm and dry.      Capillary Refill:  Capillary refill takes less than 2 seconds.   Neurological:      Mental Status: He is alert.   Psychiatric:      Comments: 3 point restraints         Lab Results:   I have personally reviewed pertinent lab results including the following:  - Cbc CMP    I have personally reviewed the following imaging study reports in PACS:  - CT head      Therapies:   PT/OT evaluation pending    VTE Prophylaxis: Enoxaparin (Lovenox)    Code Status: Level 1 - Full Code  Advance Directive and Living Will:      Power of :    POLST:      Family and Social Support:   Support Systems: Other (Comment)  Assistance Needed: Skilled nursing home  Type of Current Residence: Nursing home  Current Home Care Services: Other (Comment) (SNF)          Thank you for allowing me to participate in your patients' care. Please do not hesitate to call with any additional questions.  Marta Jamison MD

## 2024-01-08 NOTE — ASSESSMENT & PLAN NOTE
Patient has a history of Alzheimer's dementia.  At baseline patient can hold conversations but does get confused.  He is also able to feed and clothe himself with dementia.  However, patient more confused at this point in time.  Patient slept throughout the course of exam, and was unarousable to gentle shaking.  Per son, patient's medications have been adjusted to increase the dosage of his dementia medications.    Concern for encephalopathy at this point  Differentials: Worsening dementia, polypharmacy, UTI related encephalopathy    Decrease Ativan to 0.5mg TID  Tylenol 975 every 8 scheduled  Decreasing Depakote 125 oral morning  250 Mg Depakote at night  Seroquel 25 Mg 5 PM  Denzapine 2.5 Mg every 8 as needed  Ativan 0.5 Mg 3 times daily

## 2024-01-08 NOTE — PROGRESS NOTES
"Progress Note - Trauma Tertiary Survery   Avni Ruiz 83 y.o. male [id number]   Unit/Bed#: S -01 Encounter: [id number]     Assessment & Plan   Summary of Diagnosed Injuries:   Fall with head strike  Left forehead and bilateral elbow abrasions  Advanced dementia  UTI, present on admission  No acute traumatic injuries identified    PLAN:  No new complaints on tertiary survey. No further work up/imaging from trauma standpoint.   UTI treatment and medication adjustments for behavioral disturbance per primary service/geriatrics.   Wean restraints as able.   Trauma will sign off, please call with concerns. No f/u needed.     VTE Prophylaxis:Enoxaparin (Lovenox)     Disposition: Trauma will sign off at this time. Please reconsult with any questions or concerns.     Code status:  Level 1 - Full Code    Consultants: IP CONSULT TO GERONTOLOGY  IP CONSULT TO UROLOGY     Subjective   Transfer from: N/A    Mechanism of Injury:Fall     Chief Complaint: \"I'm doing good\"    HPI/Last 24 hour events: Patient is pleasant, but confused regarding time, place and situation. He is in restraints. He denies having headache or pain anywhere, including neck, back, extremities, chest or abdomen. He is hungry and eats some pudding with my assistance while I'm seeing him.      Objective   Vitals:   Temp:  [96.5 °F (35.8 °C)-98.7 °F (37.1 °C)] 97.2 °F (36.2 °C)  HR:  [56-77] 56  Resp:  [15-20] 16  BP: (105-136)/() 123/57    I/O         01/06 0701 01/07 0700 01/07 0701 01/08 0700 01/08 0701 01/09 0700    IV Piggyback  55     Total Intake(mL/kg)  55 (0.8)     Urine (mL/kg/hr)  350     Total Output  350     Net  -295                     Physical Exam:   GENERAL APPEARANCE: NAD  NEURO: GCS 14 (4, 4, 6),non-focal  HEENT: NCAT  CV: RRR, no MGR  LUNGS: CTA bilaterally  GI: soft,non-tender,no-distended  :  + chronic scott in place, draining clear, yellow urine  MSK: no edema, contusion or deformities  SKIN: pink, warm, " dry    Invasive Devices       Peripheral Intravenous Line  Duration             Peripheral IV 01/07/24 Right;Ventral (anterior) Forearm <1 day              Drain  Duration             Urethral Catheter <1 day                       1. Before the illness or injury that brought you to the Emergency, did you need someone to help you on a regular basis? 1=Yes   2. Since the illness or injury that brought you to the Emergency, have you needed more help than usual to take care of yourself? 1=Yes   3. Have you been hospitalized for one or more nights during the past 6 months (excluding a stay in the Emergency Department)? 1=Yes   4. In general, do you see well? 0=Yes   5. In general, do you have serious problems with your memory? 0=No   6. Do you take more than three different medications everyday? 1=Yes   TOTAL   6     Did you order a geriatric consult if the score was 2 or greater?: yes         Lab Results: Results: I have personally reviewed all pertinent laboratory/tests results, BMP/CMP:   Lab Results   Component Value Date    SODIUM 140 01/08/2024    K 4.1 01/08/2024     01/08/2024    CO2 24 01/08/2024    CO2 31 01/07/2024    BUN 16 01/08/2024    CREATININE 0.72 01/08/2024    GLUCOSE 92 01/07/2024    CALCIUM 8.6 01/08/2024    AST 10 (L) 01/07/2024    ALT 7 01/07/2024    ALKPHOS 57 01/07/2024    EGFR 86 01/08/2024   , and CBC:   Lab Results   Component Value Date    WBC 5.96 01/08/2024    HGB 12.2 01/08/2024    HCT 36.2 (L) 01/08/2024    MCV 88 01/08/2024     01/08/2024    RBC 4.12 01/08/2024    MCH 29.6 01/08/2024    MCHC 33.7 01/08/2024    RDW 13.3 01/08/2024    MPV 11.2 01/08/2024    NRBC 0 01/07/2024       Imaging Results: I have personally reviewed pertinent reports.    XR chest 1 view    Result Date: 1/8/2024  Impression: No acute cardiopulmonary disease within limitations of supine imaging. Workstation performed: [id number]     XR trauma multiple    Result Date: 1/8/2024  Impression: No acute  cardiopulmonary disease within limitations of supine imaging. Workstation performed: [id number]     TRAUMA - CT spine cervical wo contrast    Result Date: 1/7/2024  Impression: No cervical spine fracture or traumatic malalignment. I personally discussed this study with JADEN DE ANDA on 1/7/2024 10:55 AM. Workstation performed: [id number]     TRAUMA - CT head wo contrast    Result Date: 1/7/2024  Impression: No acute intracranial abnormality. I personally discussed this study with AJDEN DE ANDA on 1/7/2024 10:55 AM. Workstation performed: [id number]      Other Studies: no new

## 2024-01-08 NOTE — ASSESSMENT & PLAN NOTE
Prior to admission to the hospital patient was on multiple medication  Continue to taper off lorazepam as tolerated  Lexapro was discontinued  Use Zyprexa 2.5 mg only for severe agitation

## 2024-01-08 NOTE — ASSESSMENT & PLAN NOTE
Last bowel movement was yesterday  Continue senna 2 tablets twice daily MiraLAX daily   Dulcolax suppository as needed  Encourage p.o. hydration and out of bed as tolerated

## 2024-01-08 NOTE — ASSESSMENT & PLAN NOTE
Patient presented status post fall at the facility  CT head and neck with no acute pathology  Monitor orthostatic vital signs  Encourage p.o. hydration  Avoid hypotension and hypoglycemia   PT OT evaluation

## 2024-01-08 NOTE — ASSESSMENT & PLAN NOTE
Patient was afebrile overnight  Has no suprapubic or abdominal pain on palpation  No leukocytosis  Monitor for signs of infection

## 2024-01-08 NOTE — ASSESSMENT & PLAN NOTE
Patient presented to the hospital with change in mental status.  He has Alzheimer's dementia with behavioral disturbance at baseline recently behaviors are getting worse  Will continue to provide supportive care, reorient as needed.  Patient is at high risk for delirium, will monitor closely and place on delirium precautions.  Maintain sleep/wake cycle.  Optimize pain regimen.  Continue Tylenol 975 mg p.o. 3 times daily  Monitor for constipation and urinary retention and manage as needed.  B12 and TSH within normal limits  Encourage family to visit.  Encourage to wear glasses and hearing aids while awake.  Encourage po intake, assist with feeding if needed.   Continue Depakote to 250 mg p.o. 3 times a day with meals, may sprinkle in food-monitor CBC CMP  Continue lorazepam 0.5 mg p.o. 3 times daily and continue to taper off slowly  continue mirtazapine to 15 mg administer at 8 PM along with melatonin   Seroquel  25 mg 3 times a day, administer at 9 AM 3 PM and 8 PM-monitor Qtc  Family is considering comfort measures only

## 2024-01-09 LAB
ALBUMIN SERPL BCP-MCNC: 3.4 G/DL (ref 3.5–5)
ALBUMIN SERPL BCP-MCNC: 3.4 G/DL (ref 3.5–5)
ALP SERPL-CCNC: 55 U/L (ref 34–104)
ALP SERPL-CCNC: 55 U/L (ref 34–104)
ALT SERPL W P-5'-P-CCNC: 6 U/L (ref 7–52)
ALT SERPL W P-5'-P-CCNC: 6 U/L (ref 7–52)
ANION GAP SERPL CALCULATED.3IONS-SCNC: 5 MMOL/L
ANION GAP SERPL CALCULATED.3IONS-SCNC: 5 MMOL/L
AST SERPL W P-5'-P-CCNC: 15 U/L (ref 13–39)
AST SERPL W P-5'-P-CCNC: 15 U/L (ref 13–39)
ATRIAL RATE: 66 BPM
BILIRUB SERPL-MCNC: 0.59 MG/DL (ref 0.2–1)
BILIRUB SERPL-MCNC: 0.59 MG/DL (ref 0.2–1)
BUN SERPL-MCNC: 11 MG/DL (ref 5–25)
BUN SERPL-MCNC: 11 MG/DL (ref 5–25)
CALCIUM ALBUM COR SERPL-MCNC: 9.1 MG/DL (ref 8.3–10.1)
CALCIUM ALBUM COR SERPL-MCNC: 9.1 MG/DL (ref 8.3–10.1)
CALCIUM SERPL-MCNC: 8.6 MG/DL (ref 8.4–10.2)
CALCIUM SERPL-MCNC: 8.6 MG/DL (ref 8.4–10.2)
CHLORIDE SERPL-SCNC: 108 MMOL/L (ref 96–108)
CHLORIDE SERPL-SCNC: 108 MMOL/L (ref 96–108)
CO2 SERPL-SCNC: 26 MMOL/L (ref 21–32)
CO2 SERPL-SCNC: 26 MMOL/L (ref 21–32)
CREAT SERPL-MCNC: 0.67 MG/DL (ref 0.6–1.3)
CREAT SERPL-MCNC: 0.67 MG/DL (ref 0.6–1.3)
ERYTHROCYTE [DISTWIDTH] IN BLOOD BY AUTOMATED COUNT: 13.2 % (ref 11.6–15.1)
ERYTHROCYTE [DISTWIDTH] IN BLOOD BY AUTOMATED COUNT: 13.2 % (ref 11.6–15.1)
GFR SERPL CREATININE-BSD FRML MDRD: 89 ML/MIN/1.73SQ M
GFR SERPL CREATININE-BSD FRML MDRD: 89 ML/MIN/1.73SQ M
GLUCOSE SERPL-MCNC: 88 MG/DL (ref 65–140)
GLUCOSE SERPL-MCNC: 88 MG/DL (ref 65–140)
HCT VFR BLD AUTO: 41.1 % (ref 36.5–49.3)
HCT VFR BLD AUTO: 41.1 % (ref 36.5–49.3)
HGB BLD-MCNC: 13.5 G/DL (ref 12–17)
HGB BLD-MCNC: 13.5 G/DL (ref 12–17)
MCH RBC QN AUTO: 28.9 PG (ref 26.8–34.3)
MCH RBC QN AUTO: 28.9 PG (ref 26.8–34.3)
MCHC RBC AUTO-ENTMCNC: 32.8 G/DL (ref 31.4–37.4)
MCHC RBC AUTO-ENTMCNC: 32.8 G/DL (ref 31.4–37.4)
MCV RBC AUTO: 88 FL (ref 82–98)
MCV RBC AUTO: 88 FL (ref 82–98)
P AXIS: 65 DEGREES
PLATELET # BLD AUTO: 193 THOUSANDS/UL (ref 149–390)
PLATELET # BLD AUTO: 193 THOUSANDS/UL (ref 149–390)
PMV BLD AUTO: 10.6 FL (ref 8.9–12.7)
PMV BLD AUTO: 10.6 FL (ref 8.9–12.7)
POTASSIUM SERPL-SCNC: 3.9 MMOL/L (ref 3.5–5.3)
POTASSIUM SERPL-SCNC: 3.9 MMOL/L (ref 3.5–5.3)
PR INTERVAL: 158 MS
PROT SERPL-MCNC: 5.6 G/DL (ref 6.4–8.4)
PROT SERPL-MCNC: 5.6 G/DL (ref 6.4–8.4)
QRS AXIS: -17 DEGREES
QRSD INTERVAL: 92 MS
QT INTERVAL: 418 MS
QTC INTERVAL: 438 MS
RBC # BLD AUTO: 4.67 MILLION/UL (ref 3.88–5.62)
RBC # BLD AUTO: 4.67 MILLION/UL (ref 3.88–5.62)
SODIUM SERPL-SCNC: 139 MMOL/L (ref 135–147)
SODIUM SERPL-SCNC: 139 MMOL/L (ref 135–147)
T WAVE AXIS: 48 DEGREES
VENTRICULAR RATE: 66 BPM
WBC # BLD AUTO: 4.4 THOUSAND/UL (ref 4.31–10.16)
WBC # BLD AUTO: 4.4 THOUSAND/UL (ref 4.31–10.16)

## 2024-01-09 PROCEDURE — 99232 SBSQ HOSP IP/OBS MODERATE 35: CPT | Performed by: INTERNAL MEDICINE

## 2024-01-09 PROCEDURE — 85027 COMPLETE CBC AUTOMATED: CPT

## 2024-01-09 PROCEDURE — 80053 COMPREHEN METABOLIC PANEL: CPT

## 2024-01-09 PROCEDURE — 93010 ELECTROCARDIOGRAM REPORT: CPT | Performed by: INTERNAL MEDICINE

## 2024-01-09 PROCEDURE — 97167 OT EVAL HIGH COMPLEX 60 MIN: CPT

## 2024-01-09 PROCEDURE — 87081 CULTURE SCREEN ONLY: CPT | Performed by: INTERNAL MEDICINE

## 2024-01-09 PROCEDURE — 87147 CULTURE TYPE IMMUNOLOGIC: CPT | Performed by: INTERNAL MEDICINE

## 2024-01-09 PROCEDURE — 99232 SBSQ HOSP IP/OBS MODERATE 35: CPT | Performed by: FAMILY MEDICINE

## 2024-01-09 RX ORDER — POTASSIUM CHLORIDE 20 MEQ/1
40 TABLET, EXTENDED RELEASE ORAL ONCE
Status: DISCONTINUED | OUTPATIENT
Start: 2024-01-09 | End: 2024-01-09

## 2024-01-09 RX ORDER — SENNOSIDES 8.6 MG
2 TABLET ORAL 2 TIMES DAILY
Status: DISCONTINUED | OUTPATIENT
Start: 2024-01-09 | End: 2024-01-29 | Stop reason: HOSPADM

## 2024-01-09 RX ORDER — BISACODYL 10 MG
10 SUPPOSITORY, RECTAL RECTAL DAILY PRN
Status: DISCONTINUED | OUTPATIENT
Start: 2024-01-09 | End: 2024-01-29 | Stop reason: HOSPADM

## 2024-01-09 RX ADMIN — LORAZEPAM 0.5 MG: 0.5 TABLET ORAL at 08:33

## 2024-01-09 RX ADMIN — ACETAMINOPHEN 975 MG: 325 TABLET, FILM COATED ORAL at 21:53

## 2024-01-09 RX ADMIN — OLANZAPINE 2.5 MG: 5 TABLET, ORALLY DISINTEGRATING ORAL at 19:39

## 2024-01-09 RX ADMIN — DIVALPROEX SODIUM 250 MG: 250 TABLET, DELAYED RELEASE ORAL at 21:54

## 2024-01-09 RX ADMIN — LORAZEPAM 0.5 MG: 0.5 TABLET ORAL at 20:00

## 2024-01-09 RX ADMIN — Medication 3 MG: at 21:54

## 2024-01-09 RX ADMIN — POLYETHYLENE GLYCOL 3350 17 G: 17 POWDER, FOR SOLUTION ORAL at 08:36

## 2024-01-09 RX ADMIN — DOCUSATE SODIUM 100 MG: 100 CAPSULE, LIQUID FILLED ORAL at 08:33

## 2024-01-09 RX ADMIN — QUETIAPINE FUMARATE 25 MG: 25 TABLET ORAL at 16:29

## 2024-01-09 RX ADMIN — DIVALPROEX SODIUM 125 MG: 125 TABLET, DELAYED RELEASE ORAL at 08:36

## 2024-01-09 RX ADMIN — SENNOSIDES 8.6 MG: 8.6 TABLET, FILM COATED ORAL at 08:33

## 2024-01-09 RX ADMIN — ENOXAPARIN SODIUM 40 MG: 40 INJECTION SUBCUTANEOUS at 08:36

## 2024-01-09 RX ADMIN — FINASTERIDE 5 MG: 5 TABLET, FILM COATED ORAL at 08:33

## 2024-01-09 RX ADMIN — CYANOCOBALAMIN TAB 500 MCG 1000 MCG: 500 TAB at 08:33

## 2024-01-09 RX ADMIN — LORAZEPAM 0.5 MG: 0.5 TABLET ORAL at 16:29

## 2024-01-09 NOTE — ASSESSMENT & PLAN NOTE
Patient has history of Alzheimer's dementia  Patient is not oriented to time place or person  Patient had multiple medication changes in the past few weeks  Metabolic encephalopathy 2/2 UTI and polypharmacy  Geriatrics consulted and recommendations appreciated.  Patient had a episodes of agitation or combative behavior last evening, received one dose of olanzapine 2.5 mg   Pt was put back in restraints     PLAN:  Continual observation  Try to wean off the restraints   Tylenol 975 every 8 scheduled  Medication adjustment can be seen in dementia management

## 2024-01-09 NOTE — PLAN OF CARE
Problem: OCCUPATIONAL THERAPY ADULT  Goal: Performs self-care activities at highest level of function for planned discharge setting.  See evaluation for individualized goals.  Description: Treatment Interventions: ADL retraining, Functional transfer training, Endurance training, Cognitive reorientation, Patient/family training, Equipment evaluation/education, Compensatory technique education, Energy conservation, Activityengagement          See flowsheet documentation for full assessment, interventions and recommendations.   Note: Limitation: Decreased ADL status, Decreased UE strength, Decreased Safe judgement during ADL, Decreased cognition, Decreased endurance, Decreased self-care trans, Decreased high-level ADLs (impaired balance, fxnl mobility, act eran, standing eran, strength, attention to task, direction following, safety awareness, insight, sequencing, orientation, problem solving, learning new tasks, initiation, alertness, response time)  Prognosis: Good  Assessment: Pt is a 83 y.o. male seen for OT evaluation s/p admission to [facility] on 1/7/2024 due to fall. Diagnosed with Acute encephalopathy. Personal and env factors supporting pt at time of IE include  supportive facility staff, (A) as needed with ADLs . Personal and env factors inhibiting engagement in occupations include advanced age and difficulty completing ADLs. Performance deficits that affect the pt’s occupational performance can be seen above. Due to pt's current functional limitations and medical complications pt is functioning below baseline. Pt would benefit from continued skilled OT treatment in order to maximize safety, independence and overall performance with ADLs, functional mobility, functional transfers, and cognition in order to achieve highest level of function.     Rehab Resource Intensity Level, OT: III (Minimum Resource Intensity) (recommend return to facility with (A) + (S))

## 2024-01-09 NOTE — ASSESSMENT & PLAN NOTE
Patient presented to the ED after nursing facility found patient to have abrasions on his forehead.  The facility believes the patient fell out of bed last night and got himself back and without telling anybody. He was more confused that usual. Patient was brought to the ED via the trauma pathway, where trauma workup and CT imaging were negative.  Patient's lab work was unremarkable, and vitals have remained stable.    PLAN:  PT/OT evaluations and recommendations appreciated  PT level 2, OT level 3  DC plan to go back to his prior facility [facility] if patient is tolerating being off restraints and one-to-one for 48 hours

## 2024-01-09 NOTE — PROGRESS NOTES
[facility]  Progress Note  Name: Avni Ruiz I  MRN: [id number]  Unit/Bed#: S -01 I Date of Admission: 1/7/2024   Date of Service: 1/9/2024 I Hospital Day: 2    Assessment/Plan   * Acute encephalopathy  Assessment & Plan  Patient has history of Alzheimer's dementia  Patient is not oriented to time place or person  Patient had multiple medication changes in the past few weeks  3 most likely secondary to polypharmacy  Geriatrics consulted    PLAN:  Tylenol 975 every 8 scheduled  Depakote 125 oral morning  250 Mg Depakote at night  Seroquel 25 Mg 5 PM  Denzapine 2.5 Mg every 8 as needed  Ativan 0.5 Mg 3 times daily      Polypharmacy  Assessment & Plan  Geriatrics consulted    PLAN:  See plan acute encephalopathy    UTI (urinary tract infection)  Assessment & Plan  Patient has a chronic Pike, was replaced in the ED on admission  Patient has history of urinary retention has been following up with urology outpatient  Urology was consulted, offered patient's family Pike's catheter versus CIC, patient's family agreed on Pike's  Patient was afebrile overnight  Has no suprapubic or abdominal pain on palpation  No leukocytosis    PLAN:  Discontinue ceftriaxone  Monitor for signs of infection    Abnormal urine finding  Assessment & Plan  On admission patient had a urinalysis that was significant for leukocytes and nitrites.  Unable to assess with patient is having any pain or burning with urination.  He does have a history of cystitis.  Per son, patient has been receiving catheterization and has tried voiding trials but was unsuccessful.  Per son, patient has had a Pike in place since Tuesday which was placed by his urologist.  Urine culture shows Staph aureus  Urology consulted, offered patient's family options of Pike's versus CIC, patient's family agreed on continuation of Pike    PLAN:  Discontinue ceftriaxone    BPH (benign prostatic hyperplasia)  Assessment & Plan  Patient was  on dutasteride and Flomax at the nursing facility  Urology recommended discontinuation of the tamsulosin    PLAN:  Discontinue tamsulosin  Continue finasteride 5 Mg daily    Constipation  Assessment & Plan  Continue MiraLAX and senna    Dementia (HCC)  Assessment & Plan  Patient has a history of Alzheimer's dementia.  At baseline patient can hold conversations but does get confused.  He is also able to feed and clothe himself with dementia.  However, patient more confused at this point in time.  Patient slept throughout the course of exam, and was unarousable to gentle shaking.  Per son, patient's medications have been adjusted to increase the dosage of his dementia medications.  Concern for encephalopathy at this point  Patient is oriented to himself however not to time and place  According to the son patient has waxing and waning mentation  Patient is currently off restraints    PLAN:  Monitor the patient off restraints  Ativan to 0.5mg TID  Tylenol 975 every 8 scheduled  Depakote 125 oral morning  250 Mg Depakote at night  Seroquel 25 Mg 5 PM  Olanzapine 2.5 Mg every 8 as needed  Ativan 0.5 Mg 3 times daily    Ambulatory dysfunction  Assessment & Plan  Patient presented to the ED after nursing facility found patient to have abrasions on his forehead.  The facility believes the patient fell out of bed last night and got himself back and without telling anybody. He was more confused that usual. Patient was brought to the ED via the trauma pathway, where trauma workup and CT imaging were negative.  Patient's lab work was unremarkable, and vitals have remained stable.    PLAN:  -PT/OT               VTE Pharmacologic Prophylaxis: VTE Score: 5 High Risk (Score >/= 5) - Pharmacological DVT Prophylaxis Ordered: enoxaparin (Lovenox). Sequential Compression Devices Ordered.    Mobility:   Basic Mobility Inpatient Raw Score: 8  -HLM Goal: 3: Sit at edge of bed  -HLM Achieved: 2: Bed activities/Dependent transfer  HLM  Goal NOT achieved. Continue with multidisciplinary rounding and encourage appropriate mobility to improve upon HLM goals.    Patient Centered Rounds: I performed bedside rounds with nursing staff today.   Discussions with Specialists or Other Care Team Provider: Geriatrics     Education and Discussions with Family / Patient: Updated  (son) at bedside.    Total Time Spent on Date of Encounter in care of patient: 45 mins. This time was spent on one or more of the following: performing physical exam; counseling and coordination of care; obtaining or reviewing history; documenting in the medical record; reviewing/ordering tests, medications or procedures; communicating with other healthcare professionals and discussing with patient's family/caregivers.    Current Length of Stay: 2 day(s)  Current Patient Status: Inpatient   Certification Statement: The patient will continue to require additional inpatient hospital stay due to management of acute encephalopathy   Discharge Plan: Anticipate discharge in 24-48 hrs to discharge location to be determined pending rehab evaluations.    Code Status: Level 1 - Full Code    Subjective:   Patient is alert, not oriented to time place or person.  Patient is no longer in restraints.  Patient is talking in clear sentences however is not comprehensive.  Patient is not able to hold the conversation.  Objective:     Vitals:   Temp (24hrs), Av.8 °F (36.6 °C), Min:97.8 °F (36.6 °C), Max:97.8 °F (36.6 °C)    Temp:  [97.8 °F (36.6 °C)] 97.8 °F (36.6 °C)  HR:  [55-60] 55  Resp:  [16] 16  BP: (126-143)/(59-71) 126/59  SpO2:  [100 %] 100 %  Body mass index is 24.73 kg/m².     Input and Output Summary (last 24 hours):     Intake/Output Summary (Last 24 hours) at 2024 1328  Last data filed at 2024 1137  Gross per 24 hour   Intake 1080 ml   Output 1975 ml   Net -895 ml       Physical Exam:   Physical Exam  Vitals and nursing note reviewed.   Constitutional:        Appearance: He is well-developed.      Comments: Frail looking   HENT:      Head: Normocephalic.      Right Ear: External ear normal.      Left Ear: External ear normal.      Mouth/Throat:      Mouth: Mucous membranes are dry.      Pharynx: Oropharynx is clear.   Eyes:      Extraocular Movements: Extraocular movements intact.      Conjunctiva/sclera: Conjunctivae normal.   Cardiovascular:      Rate and Rhythm: Normal rate and regular rhythm.      Heart sounds: No murmur heard.  Pulmonary:      Effort: Pulmonary effort is normal. No respiratory distress.      Breath sounds: Normal breath sounds.   Abdominal:      General: Abdomen is flat. Bowel sounds are normal.      Palpations: Abdomen is soft.      Tenderness: There is no abdominal tenderness.   Genitourinary:     Comments: Pike catheter  Musculoskeletal:         General: No swelling.      Cervical back: Neck supple.      Right lower leg: No edema.      Left lower leg: No edema.   Skin:     General: Skin is warm and dry.      Capillary Refill: Capillary refill takes less than 2 seconds.   Neurological:      Mental Status: He is alert. He is disoriented.          Additional Data:     Labs:  Results from last 7 days   Lab Units 01/09/24  0601 01/07/24  1821 01/07/24  1020   WBC Thousand/uL 4.40   < > 7.10   HEMOGLOBIN g/dL 13.5   < > 11.7*   I STAT HEMOGLOBIN g/dl  --   --  11.2*   HEMATOCRIT % 41.1   < > 37.5   HEMATOCRIT, ISTAT %  --   --  33*   PLATELETS Thousands/uL 193   < > 173   NEUTROS PCT %  --   --  85*   LYMPHS PCT %  --   --  7*   MONOS PCT %  --   --  7   EOS PCT %  --   --  1    < > = values in this interval not displayed.     Results from last 7 days   Lab Units 01/09/24  0601   SODIUM mmol/L 139   POTASSIUM mmol/L 3.9   CHLORIDE mmol/L 108   CO2 mmol/L 26   BUN mg/dL 11   CREATININE mg/dL 0.67   ANION GAP mmol/L 5   CALCIUM mg/dL 8.6   ALBUMIN g/dL 3.4*   TOTAL BILIRUBIN mg/dL 0.59   ALK PHOS U/L 55   ALT U/L 6*   AST U/L 15   GLUCOSE RANDOM mg/dL  88     Results from last 7 days   Lab Units 01/07/24  1020   INR  1.00             Results from last 7 days   Lab Units 01/07/24  1051   LACTIC ACID mmol/L 1.1   PROCALCITONIN ng/ml <0.05       Lines/Drains:  Invasive Devices       Peripheral Intravenous Line  Duration             Peripheral IV 01/07/24 Right;Ventral (anterior) Forearm 1 day              Drain  Duration             Urethral Catheter 1 day                  Urinary Catheter:  Goal for removal: N/A - Chronic Pike               Imaging: No pertinent imaging reviewed.    Recent Cultures (last 7 days):   Results from last 7 days   Lab Units 01/07/24  1209 01/07/24  1051   BLOOD CULTURE   --  No Growth at 24 hrs.  No Growth at 24 hrs.   URINE CULTURE  >100,000 cfu/ml Staphylococcus aureus*  --        Last 24 Hours Medication List:   Current Facility-Administered Medications   Medication Dose Route Frequency Provider Last Rate    acetaminophen  975 mg Oral Q8H Sean Mims MD      bisacodyl  10 mg Rectal Daily PRN Marta Jamison MD      cefTRIAXone  1,000 mg Intravenous Q24H Sherley Turner MD 1,000 mg (01/08/24 3046)    vitamin B-12  1,000 mcg Oral Daily Sherley Turner MD      divalproex sodium  125 mg Oral QAM Sean Mims MD      divalproex sodium  250 mg Oral HS Sean Mims MD      docusate sodium  100 mg Oral BID Sherley Turner MD      enoxaparin  40 mg Subcutaneous Daily Sherley Turner MD      finasteride  5 mg Oral Daily Sherley Turner MD      LORazepam  0.5 mg Oral TID Sherley Turner MD      melatonin  3 mg Oral HS Sherley Turner MD      OLANZapine  2.5 mg Oral Q8H PRN Sean Mims MD      polyethylene glycol  17 g Oral Daily Sherley Turner MD      QUEtiapine  25 mg Oral HS Aditi Hanley MD      senna  2 tablet Oral BID Marta Jamison MD      tamsulosin  0.4 mg Oral Daily With Dinner Sherley Turner MD          Today, Patient Was Seen By: Sean Mims MD    **Please Note: This note may have been constructed using a voice  recognition system.**

## 2024-01-09 NOTE — ASSESSMENT & PLAN NOTE
Patient has a chronic Pike, was replaced in the ED on admission  Patient has history of urinary retention has been following up with urology outpatient  Urology was consulted, offered patient's family Pike's catheter versus CIC, patient's family agreed on Pike's  Patient was afebrile overnight  Has no suprapubic or abdominal pain on palpation  No leukocytosis    PLAN:  Completed 3 days of IV ceftriaxone  Monitor for signs of infection

## 2024-01-09 NOTE — OCCUPATIONAL THERAPY NOTE
"    Occupational Therapy Evaluation     Patient Name: Avni Ruiz  Today's Date: 1/9/2024  Problem List  Principal Problem:    Acute encephalopathy  Active Problems:    Ambulatory dysfunction    Dementia (HCC)    Constipation    BPH (benign prostatic hyperplasia)    Abnormal urine finding    UTI (urinary tract infection)    Polypharmacy    Past Medical History  Past Medical History:   Diagnosis Date    Chronic indwelling Pike catheter     Dementia with behavioral disturbance (HCC)     Depression     Frequent UTI     GERD (gastroesophageal reflux disease)     Urinary retention      Past Surgical History  Past Surgical History:   Procedure Laterality Date    HERNIA REPAIR               01/09/24 1440   OT Last Visit   OT Visit Date 01/09/24   Note Type   Note type Evaluation   Pain Assessment   Pain Assessment Tool 0-10   Pain Score No Pain   Restrictions/Precautions   Weight Bearing Precautions Per Order No   Other Precautions Cognitive;Chair Alarm;Bed Alarm;Multiple lines;Fall Risk  (catheter (chronic))   Home Living   Type of Home SNF  ([facility])   Additional Comments no use of AD at baseline   Prior Function   Level of [place]   (able to fees self, + dress requiring VC throughout)   Lives With Facility staff   Receives Help From Personal care attendant   IADLs Family/Friend/Other provides meals;Family/Friend/Other provides transportation;Family/Friend/Other provides medication management   Falls in the last 6 months   (unable to recall: at least 1)   Vocational Retired   Lifestyle   Autonomy PTA pt living at  dementia unit, pt requiring (A) with ADLs and IADLs, (+)falls, (-)drives, no use of AD at baseline   Reciprocal Relationships supportive facility staff + wife   Service to Others retired   Intrinsic Gratification \"I think I know what I like\"   General   Additional Pertinent History Admit due to fall from [facility]. Found to have Left forehead and bilateral " "elbow abrasions  PMH: advanced dementia, + chronic scott for urinary retention   Family/Caregiver Present No   Subjective   Subjective Pt combing hair stating \"I think he likes it this way\" when asked who 'he' is pt states \"Avni\"   ADL   Eating Assistance 5  Supervision/Setup   Eating Deficit Setup;Increased time to complete;Supervision/safety  (VC for location of items on plate)   Grooming Assistance 4  Minimal Assistance   Grooming Deficit Increased time to complete;Supervision/safety;Verbal cueing  (requiring VC throughout for correct use of materials and sequencing of task, when asked to grab toothpaste pt grasping at faucet and attempting to pull up out of counter)   UB Bathing Assistance 4  Minimal Assistance   LB Bathing Assistance 3  Moderate Assistance   UB Dressing Assistance 4  Minimal Assistance   UB Dressing Deficit Increased time to complete;Thread RUE;Thread LUE;Pull around back  (PT initially attempting to don blanket as a robe trying to find sleeves, provided with robe and able to don with VC + (A) for sleeve location)   LB Dressing Assistance 4  Minimal Assistance   LB Dressing Deficit Increased time to complete;Supervision/safety;Verbal cueing;Don/doff R sock;Don/doff L sock   Toileting Assistance  4  Minimal Assistance   Additional Comments Did not observe UB bathing, LB bathing, and toileting at time of evaluation, with use of clinical reasoning, pt's performance throughout evaluation indicates that pt may be able to perform these tasks at the levels listed above   Bed Mobility   Supine to Sit 3  Moderate assistance   Additional items Assist x 1;Increased time required;LE management;Verbal cues   Transfers   Sit to Stand 4  Minimal assistance   Additional items Assist x 1;Increased time required;Verbal cues   Stand to Sit 4  Minimal assistance   Additional items Assist x 1;Increased time required;Verbal cues   Additional Comments standing at sink for 6 min with close (S), no LOB noted "   Functional Mobility   Functional Mobility 4  Minimal assistance   Additional Comments Ax1, hand held assist, bed > bathroom > recliner   Additional items Hand hold assistance   Balance   Static Sitting Good   Dynamic Sitting Fair +   Static Standing Fair -   Dynamic Standing Poor +   Ambulatory Poor +   Activity Tolerance   Activity Tolerance Patient tolerated treatment well;Other (Comment)  (limited by cognition)   Medical Staff Made Aware ALLEN Gonzalez   RUE Assessment   RUE Assessment WFL   LUE Assessment   LUE Assessment WFL   Hand Function   Gross Motor Coordination Functional   Fine Motor Coordination Functional   Cognition   Overall Cognitive Status Impaired   Arousal/Participation Alert;Cooperative   Attention Attends with cues to redirect   Orientation Level Oriented to person;Disoriented to place;Disoriented to time;Disoriented to situation   Memory Decreased short term memory;Decreased recall of recent events;Decreased recall of precautions;Decreased recall of biographical information   Following Commands Follows one step commands with increased time or repetition   Comments pleasantly confused dementia baselin, requiring cuing throughout and (A) with sequencing of tasks   Assessment   Limitation Decreased ADL status;Decreased UE strength;Decreased Safe judgement during ADL;Decreased cognition;Decreased endurance;Decreased self-care trans;Decreased high-level ADLs  (impaired balance, fxnl mobility, act eran, standing eran, strength, attention to task, direction following, safety awareness, insight, sequencing, orientation, problem solving, learning new tasks, initiation, alertness, response time)   Prognosis Good   Assessment Pt is a 83 y.o. male seen for OT evaluation s/p admission to [facility] on 1/7/2024 due to fall. Diagnosed with Acute encephalopathy. Personal and env factors supporting pt at time of IE include  supportive facility staff, (A) as needed with ADLs . Personal and env factors inhibiting engagement  in occupations include advanced age and difficulty completing ADLs. Performance deficits that affect the pt’s occupational performance can be seen above. Due to pt's current functional limitations and medical complications pt is functioning below baseline. Pt would benefit from continued skilled OT treatment in order to maximize safety, independence and overall performance with ADLs, functional mobility, functional transfers, and cognition in order to achieve highest level of function.   Goals   Patient Goals none stated, will continue to assess   LTG Time Frame 10-14   Long Term Goal see goals listed below   Plan   Treatment Interventions ADL retraining;Functional transfer training;Endurance training;Cognitive reorientation;Patient/family training;Equipment evaluation/education;Compensatory technique education;Energy conservation;Activityengagement   Goal Expiration Date 01/19/24   OT Treatment Day 0   OT Frequency 2-3x/wk   Discharge Recommendation   Rehab Resource Intensity Level, OT III (Minimum Resource Intensity)  (recommend return to facility with (A) + (S))   AM-PAC Daily Activity Inpatient   Lower Body Dressing 3   Bathing 2   Toileting 3   Upper Body Dressing 3   Grooming 3   Eating 3   Daily Activity Raw Score 17   Daily Activity Standardized Score (Calc for Raw Score >=11) 37.26   AM-PAC Applied Cognition Inpatient   Following a Speech/Presentation 1   Understanding Ordinary Conversation 2   Taking Medications 1   Remembering Where Things Are Placed or Put Away 1   Remembering List of 4-5 Errands 1   Taking Care of Complicated Tasks 1   Applied Cognition Raw Score 7   Applied Cognition Standardized Score 15.17   End of Consult   Patient Position at End of Consult Bedside chair;Bed/Chair alarm activated;All needs within reach     GOALS:      -Patient will perform grooming tasks standing at sink with overall Supervision in order to increase overall independence    -Patient will be Supervision with UB  dressing using AE and AD as needed in order to increase (I) with ADLs    -Patient will be Supervision with UB bathing using AE and AD as needed in order to increase (I) with ADLs    -Patient will be Supervision with LB dressing with use of AE and AD as needed in order to increase (I) with ADLs    -Patient will be Supervision with LB bathing with use of AE and AD as needed in order to increase (I) with ADLs    -Patient will complete toileting w/ Supervision w/ G hygiene/thoroughness in order to reduce caregiver burden    -Patient will demonstrate Supervision with bed mobility for ability to manage own comfort and initiate OOB tasks.     -Patient will perform functional transfers with Supervision to/from all surfaces using DME as needed in order to increase (I) with functional tasks    -Patient will be Supervision with functional mobility to/from bathroom for increased independence with toileting tasks    -Patient’s caregivers will be independent with providing appropriate cognitive cues in order to facilitate patient’s independence during functional tasks.        The patient's raw score on the -PAC Daily Activity Inpatient Short Form is 17. A raw score of less than 19 suggests the patient may benefit from discharge to post-acute rehabilitation services. Please refer to the recommendation of the Occupational Therapist for safe discharge planning.      Dea Dooley MS, OTR/L          normal

## 2024-01-09 NOTE — ASSESSMENT & PLAN NOTE
Patient has a history of Alzheimer's dementia.  At baseline patient can hold conversations but does get confused.  He is also able to feed and clothe himself with dementia.  However, patient more confused at this point in time.  Patient slept throughout the course of exam, and was unarousable to gentle shaking.  Per son, patient's medications have been adjusted to increase the dosage of his dementia medications.  Concern for encephalopathy at this point  Patient is oriented to himself however not to time and place  According to the son patient has waxing and waning mentation  Patient had a episodes of agitation or combative behavior last evening, received one dose of olanzapine 2.5 mg   Pt was put back in restraints     PLAN:  Continual observation  Try to wean off the restraints   Tylenol 975 every 8 scheduled  Depakote to 250 twice daily  Increase Seroquel to 25 Mg bid   Increase Mirtazapine to 15 Mg at 8 PM  Melatonin at 8 PM  IM Olanzapine 2.5 Mg every q4 hr as needed  Ativan 0.5 Mg 3 times daily

## 2024-01-09 NOTE — ASSESSMENT & PLAN NOTE
Patient was on dutasteride and Flomax at the nursing facility  Urology recommended discontinuation of the tamsulosin    PLAN:  Discontinue tamsulosin  Continue finasteride 5 Mg daily

## 2024-01-09 NOTE — ASSESSMENT & PLAN NOTE
On admission patient had a urinalysis that was significant for leukocytes and nitrites.  Unable to assess with patient is having any pain or burning with urination.  He does have a history of cystitis.  Per son, patient has been receiving catheterization and has tried voiding trials but was unsuccessful.  Per son, patient has had a Pike in place since Tuesday which was placed by his urologist.  Urine culture shows Staph aureus  Urology consulted, offered patient's family options of Pike's versus CIC, patient's family agreed on continuation of Pike    Plans:  Completed 3 days of IV ceftriaxone  Monitoring off antibiotics

## 2024-01-09 NOTE — PROGRESS NOTES
Progress Note - Geriatric Medicine   Avni Ruiz 83 y.o. male MRN: [id number]  Unit/Bed#: S -01 Encounter: [id number]      Assessment/Plan:  Polypharmacy  Assessment & Plan  Prior to admission to the hospital patient was on multiple medication  Continue to taper off lorazepam as tolerated  Lexapro was discontinued  Use Zyprexa 2.5 mg only for severe agitation    UTI (urinary tract infection)  Assessment & Plan  Urine culture grew Staphylococcus aureus  Continue ceftriaxone as per primary team  Encourage p.o. hydration    BPH (benign prostatic hyperplasia)  Assessment & Plan  Continue finasteride and Flomax  Pike catheter in place  Continue antibiotic treatment for UTI    Constipation  Assessment & Plan  No BM since admission to the hospital  Continue senna 2 tablets twice daily MiraLAX daily  Add Dulcolax suppository as needed  Encourage p.o. hydration and out of bed as tolerated    Dementia (HCC)  Assessment & Plan  Patient presented to the hospital with change in mental status.  He has Alzheimer's dementia with behavioral disturbance at baseline recently behaviors are getting worse  He lives at [facility] memory care unit  Will continue to provide supportive care, reorient as needed.  Patient is at high risk for delirium, will monitor closely and place on delirium precautions.  Maintain sleep/wake cycle.  Optimize pain regimen.  Monitor for constipation and urinary retention and manage as needed.  Check B12 level   Encourage family to visit.  Encourage to wear glasses and hearing aids while awake.  Encourage po intake, assist with feeding if needed.   Decreased Depakote to 125 mg in the morning and add 250 mg at bedtime   Seroquel 25 mg at 5 PM  Continue lorazepam 0.5 mg p.o. 3 times daily and continue to taper off slowly  If patient continues to have difficulty sleeping at night consider adding mirtazapine 7.5 mg nightly    Ambulatory dysfunction  Assessment & Plan  Patient presented status  post fall at the facility  CT head and neck with no acute pathology  Monitor orthostatic vital signs  Encourage p.o. hydration  Avoid hypotension and hypoglycemia   PT OT evaluation    * Acute encephalopathy  Assessment & Plan  - Slight improvement noted since yesterday  -Patient is high risk of delirium due to dementia with behavioral disturbance at baseline, UTI, insomnia, change in environment  -Initiate delirium precautions  -maintain normal sleep/wake cycle  -minimize overnight interruptions, group overnight vitals/labs/nursing checks as possible  -dim lights, close blinds and turn off tv to minimize stimulation and encourage sleep environment in evenings  -ensure that pain is well controlled continue Tylenol 975mg Q8H scheduled   -monitor for fecal and urinary retention which may precipitate delirium  -encourage early mobilization and ambulation  -provide frequent reorientation and redirection  -encourage family and friends at the bedside to help calm patient if anxious  -avoid medications which may precipitate or worsen delirium such as tramadol, benzodiazepine, anticholinergics, and antihistaminics  -encourage hydration and nutrition , assist with feeding if needed  -redirect unwanted behaviors as first line, avoid physical restraints.   -continue Depakote 125 mg p.o. in the morning and 250 mg p.o. at bedtime-monitor CBC CMP  -Seroquel 25 mg p.o. at 5 PM  -If patient continues to have difficulty sleeping at bedtime may add mirtazapine 7.5 mg nightly  -For severe agitation only may use olanzapine 2.5 mg every 8 hours as needed       Subjective:   Patient seen and examined at bedside for geriatric follow-up.  At the time of encounter patient is somnolent denies any acute complaints.  He was able to answer few simple questions    Review of Systems   Cardiovascular:  Negative for chest pain.   Gastrointestinal:  Negative for abdominal pain.   Musculoskeletal:  Negative for arthralgias.     Review of system  "limited due to dementia and change in mental status    Objective:     Vitals: Blood pressure 126/59, pulse 55, temperature 97.8 °F (36.6 °C), resp. rate 16, height 5' 6\" (1.676 m), weight 69.5 kg (153 lb 3.5 oz), SpO2 100%.,Body mass index is 24.73 kg/m².      Intake/Output Summary (Last 24 hours) at 1/9/2024 1339  Last data filed at 1/9/2024 1137  Gross per 24 hour   Intake 1080 ml   Output 1975 ml   Net -895 ml       Current Medications: Reviewed    Physical Exam:   Physical Exam  Vitals and nursing note reviewed.   Constitutional:       General: He is not in acute distress.     Appearance: He is well-developed.      Comments: Frail looking   HENT:      Head: Normocephalic and atraumatic.      Ears:      Comments: Hard of hearing     Mouth/Throat:      Mouth: Mucous membranes are moist.   Eyes:      Conjunctiva/sclera: Conjunctivae normal.   Cardiovascular:      Rate and Rhythm: Normal rate and regular rhythm.      Heart sounds: No murmur heard.  Pulmonary:      Effort: Pulmonary effort is normal. No respiratory distress.      Breath sounds: Normal breath sounds.   Abdominal:      Palpations: Abdomen is soft.      Tenderness: There is no abdominal tenderness.   Musculoskeletal:         General: No swelling.      Cervical back: Neck supple.      Right lower leg: No edema.      Left lower leg: No edema.   Skin:     General: Skin is warm and dry.      Capillary Refill: Capillary refill takes less than 2 seconds.   Neurological:      Mental Status: He is alert. He is disoriented.      Comments: Response to name, was able to tell me that he was born in [birth year]          Invasive Devices       Peripheral Intravenous Line  Duration             Peripheral IV 01/07/24 Right;Ventral (anterior) Forearm 1 day              Drain  Duration             Urethral Catheter 1 day                    Lab, Imaging and other studies: I have personally reviewed pertinent reports.     "

## 2024-01-10 PROBLEM — F03.911 DEMENTIA WITH AGITATION (HCC): Status: ACTIVE | Noted: 2024-01-07

## 2024-01-10 PROBLEM — G93.41 METABOLIC ENCEPHALOPATHY: Status: ACTIVE | Noted: 2024-01-08

## 2024-01-10 LAB
ALBUMIN SERPL BCP-MCNC: 3.4 G/DL (ref 3.5–5)
ALBUMIN SERPL BCP-MCNC: 3.4 G/DL (ref 3.5–5)
ALP SERPL-CCNC: 55 U/L (ref 34–104)
ALP SERPL-CCNC: 55 U/L (ref 34–104)
ALT SERPL W P-5'-P-CCNC: 8 U/L (ref 7–52)
ALT SERPL W P-5'-P-CCNC: 8 U/L (ref 7–52)
ANION GAP SERPL CALCULATED.3IONS-SCNC: 7 MMOL/L
ANION GAP SERPL CALCULATED.3IONS-SCNC: 7 MMOL/L
AST SERPL W P-5'-P-CCNC: 13 U/L (ref 13–39)
AST SERPL W P-5'-P-CCNC: 13 U/L (ref 13–39)
BACTERIA UR CULT: ABNORMAL
BACTERIA UR CULT: ABNORMAL
BILIRUB SERPL-MCNC: 0.66 MG/DL (ref 0.2–1)
BILIRUB SERPL-MCNC: 0.66 MG/DL (ref 0.2–1)
BUN SERPL-MCNC: 16 MG/DL (ref 5–25)
BUN SERPL-MCNC: 16 MG/DL (ref 5–25)
CALCIUM ALBUM COR SERPL-MCNC: 9.4 MG/DL (ref 8.3–10.1)
CALCIUM ALBUM COR SERPL-MCNC: 9.4 MG/DL (ref 8.3–10.1)
CALCIUM SERPL-MCNC: 8.9 MG/DL (ref 8.4–10.2)
CALCIUM SERPL-MCNC: 8.9 MG/DL (ref 8.4–10.2)
CHLORIDE SERPL-SCNC: 108 MMOL/L (ref 96–108)
CHLORIDE SERPL-SCNC: 108 MMOL/L (ref 96–108)
CO2 SERPL-SCNC: 26 MMOL/L (ref 21–32)
CO2 SERPL-SCNC: 26 MMOL/L (ref 21–32)
CREAT SERPL-MCNC: 0.83 MG/DL (ref 0.6–1.3)
CREAT SERPL-MCNC: 0.83 MG/DL (ref 0.6–1.3)
ERYTHROCYTE [DISTWIDTH] IN BLOOD BY AUTOMATED COUNT: 13.2 % (ref 11.6–15.1)
ERYTHROCYTE [DISTWIDTH] IN BLOOD BY AUTOMATED COUNT: 13.2 % (ref 11.6–15.1)
GFR SERPL CREATININE-BSD FRML MDRD: 81 ML/MIN/1.73SQ M
GFR SERPL CREATININE-BSD FRML MDRD: 81 ML/MIN/1.73SQ M
GLUCOSE SERPL-MCNC: 97 MG/DL (ref 65–140)
GLUCOSE SERPL-MCNC: 97 MG/DL (ref 65–140)
HCT VFR BLD AUTO: 38.7 % (ref 36.5–49.3)
HCT VFR BLD AUTO: 38.7 % (ref 36.5–49.3)
HGB BLD-MCNC: 12.6 G/DL (ref 12–17)
HGB BLD-MCNC: 12.6 G/DL (ref 12–17)
MCH RBC QN AUTO: 28.5 PG (ref 26.8–34.3)
MCH RBC QN AUTO: 28.5 PG (ref 26.8–34.3)
MCHC RBC AUTO-ENTMCNC: 32.6 G/DL (ref 31.4–37.4)
MCHC RBC AUTO-ENTMCNC: 32.6 G/DL (ref 31.4–37.4)
MCV RBC AUTO: 88 FL (ref 82–98)
MCV RBC AUTO: 88 FL (ref 82–98)
MRSA NOSE QL CULT: ABNORMAL
PLATELET # BLD AUTO: 198 THOUSANDS/UL (ref 149–390)
PLATELET # BLD AUTO: 198 THOUSANDS/UL (ref 149–390)
PMV BLD AUTO: 11 FL (ref 8.9–12.7)
PMV BLD AUTO: 11 FL (ref 8.9–12.7)
POTASSIUM SERPL-SCNC: 3.2 MMOL/L (ref 3.5–5.3)
POTASSIUM SERPL-SCNC: 3.2 MMOL/L (ref 3.5–5.3)
PROT SERPL-MCNC: 5.8 G/DL (ref 6.4–8.4)
PROT SERPL-MCNC: 5.8 G/DL (ref 6.4–8.4)
RBC # BLD AUTO: 4.42 MILLION/UL (ref 3.88–5.62)
RBC # BLD AUTO: 4.42 MILLION/UL (ref 3.88–5.62)
SODIUM SERPL-SCNC: 141 MMOL/L (ref 135–147)
SODIUM SERPL-SCNC: 141 MMOL/L (ref 135–147)
VIT B12 SERPL-MCNC: 882 PG/ML (ref 180–914)
VIT B12 SERPL-MCNC: 882 PG/ML (ref 180–914)
WBC # BLD AUTO: 5.51 THOUSAND/UL (ref 4.31–10.16)
WBC # BLD AUTO: 5.51 THOUSAND/UL (ref 4.31–10.16)

## 2024-01-10 PROCEDURE — 80053 COMPREHEN METABOLIC PANEL: CPT

## 2024-01-10 PROCEDURE — 99232 SBSQ HOSP IP/OBS MODERATE 35: CPT | Performed by: FAMILY MEDICINE

## 2024-01-10 PROCEDURE — 85027 COMPLETE CBC AUTOMATED: CPT

## 2024-01-10 PROCEDURE — 99232 SBSQ HOSP IP/OBS MODERATE 35: CPT | Performed by: INTERNAL MEDICINE

## 2024-01-10 PROCEDURE — 82607 VITAMIN B-12: CPT | Performed by: INTERNAL MEDICINE

## 2024-01-10 RX ORDER — MIRTAZAPINE 15 MG/1
7.5 TABLET, FILM COATED ORAL
Status: DISCONTINUED | OUTPATIENT
Start: 2024-01-10 | End: 2024-01-12

## 2024-01-10 RX ORDER — QUETIAPINE FUMARATE 25 MG/1
25 TABLET, FILM COATED ORAL
Status: DISCONTINUED | OUTPATIENT
Start: 2024-01-10 | End: 2024-01-12

## 2024-01-10 RX ORDER — POTASSIUM CHLORIDE 20 MEQ/1
40 TABLET, EXTENDED RELEASE ORAL ONCE
Status: COMPLETED | OUTPATIENT
Start: 2024-01-10 | End: 2024-01-10

## 2024-01-10 RX ORDER — DIVALPROEX SODIUM 250 MG/1
250 TABLET, DELAYED RELEASE ORAL
Status: DISCONTINUED | OUTPATIENT
Start: 2024-01-10 | End: 2024-01-16

## 2024-01-10 RX ORDER — DIVALPROEX SODIUM 250 MG/1
250 TABLET, DELAYED RELEASE ORAL EVERY MORNING
Status: DISCONTINUED | OUTPATIENT
Start: 2024-01-11 | End: 2024-01-16

## 2024-01-10 RX ORDER — LANOLIN ALCOHOL/MO/W.PET/CERES
3 CREAM (GRAM) TOPICAL
Status: DISCONTINUED | OUTPATIENT
Start: 2024-01-10 | End: 2024-01-29 | Stop reason: HOSPADM

## 2024-01-10 RX ORDER — POTASSIUM CHLORIDE 20 MEQ/1
40 TABLET, EXTENDED RELEASE ORAL ONCE
Status: DISCONTINUED | OUTPATIENT
Start: 2024-01-10 | End: 2024-01-10

## 2024-01-10 RX ORDER — MIRTAZAPINE 15 MG/1
7.5 TABLET, FILM COATED ORAL
Status: DISCONTINUED | OUTPATIENT
Start: 2024-01-10 | End: 2024-01-10

## 2024-01-10 RX ORDER — RISPERIDONE 0.25 MG/1
0.5 TABLET ORAL
Status: DISCONTINUED | OUTPATIENT
Start: 2024-01-10 | End: 2024-01-10

## 2024-01-10 RX ADMIN — SENNOSIDES 17.2 MG: 8.6 TABLET, FILM COATED ORAL at 17:14

## 2024-01-10 RX ADMIN — DIVALPROEX SODIUM 250 MG: 250 TABLET, DELAYED RELEASE ORAL at 17:14

## 2024-01-10 RX ADMIN — SENNOSIDES 17.2 MG: 8.6 TABLET, FILM COATED ORAL at 08:14

## 2024-01-10 RX ADMIN — QUETIAPINE FUMARATE 25 MG: 25 TABLET ORAL at 20:40

## 2024-01-10 RX ADMIN — MIRTAZAPINE 7.5 MG: 15 TABLET, FILM COATED ORAL at 20:40

## 2024-01-10 RX ADMIN — Medication 3 MG: at 20:40

## 2024-01-10 RX ADMIN — LORAZEPAM 0.5 MG: 0.5 TABLET ORAL at 15:13

## 2024-01-10 RX ADMIN — DIVALPROEX SODIUM 125 MG: 125 TABLET, DELAYED RELEASE ORAL at 08:21

## 2024-01-10 RX ADMIN — CYANOCOBALAMIN TAB 500 MCG 1000 MCG: 500 TAB at 08:14

## 2024-01-10 RX ADMIN — FINASTERIDE 5 MG: 5 TABLET, FILM COATED ORAL at 08:14

## 2024-01-10 RX ADMIN — DOCUSATE SODIUM 100 MG: 100 CAPSULE, LIQUID FILLED ORAL at 17:14

## 2024-01-10 RX ADMIN — POLYETHYLENE GLYCOL 3350 17 G: 17 POWDER, FOR SOLUTION ORAL at 08:14

## 2024-01-10 RX ADMIN — LORAZEPAM 0.5 MG: 0.5 TABLET ORAL at 08:14

## 2024-01-10 RX ADMIN — DOCUSATE SODIUM 100 MG: 100 CAPSULE, LIQUID FILLED ORAL at 08:21

## 2024-01-10 RX ADMIN — ACETAMINOPHEN 975 MG: 325 TABLET, FILM COATED ORAL at 15:13

## 2024-01-10 RX ADMIN — POTASSIUM CHLORIDE 40 MEQ: 1500 TABLET, EXTENDED RELEASE ORAL at 08:14

## 2024-01-10 RX ADMIN — LORAZEPAM 0.5 MG: 0.5 TABLET ORAL at 20:40

## 2024-01-10 RX ADMIN — ENOXAPARIN SODIUM 40 MG: 40 INJECTION SUBCUTANEOUS at 08:14

## 2024-01-10 NOTE — PROGRESS NOTES
[facility]  Progress Note  Name: Avni Ruiz I  MRN: [id number]  Unit/Bed#: S -01 I Date of Admission: 1/7/2024   Date of Service: 1/10/2024 I Hospital Day: 3    Assessment/Plan   * Metabolic encephalopathy  Assessment & Plan  Patient has history of Alzheimer's dementia  Patient is not oriented to time place or person  Patient had multiple medication changes in the past few weeks  Metabolic encephalopathy 2/2 UTI and polypharmacy  Geriatrics consulted and recommendations appreciated.  Another episode of agitation and combative behavior last night.  Got a dose of olanzapine 2.5 Mg however did not improve the symptoms.  Patient was put back on restraints.    PLAN:  Continual observation  Tylenol 975 every 8 scheduled  Increase Depakote to 250 oral morning  250 Mg Depakote at night  Continue Seroquel 25 Mg 5 PM  Mirtazapine 7.5 Mg at 8 PM  Melatonin at 8 PM  Olanzapine 2.5 Mg every q8 as needed  Ativan 0.5 Mg 3 times daily      Polypharmacy  Assessment & Plan  Geriatrics consulted    PLAN:  See plan acute encephalopathy    UTI (urinary tract infection)  Assessment & Plan  Patient has a chronic Pike, was replaced in the ED on admission  Patient has history of urinary retention has been following up with urology outpatient  Urology was consulted, offered patient's family Pike's catheter versus CIC, patient's family agreed on Pike's  Patient was afebrile overnight  Has no suprapubic or abdominal pain on palpation  No leukocytosis    PLAN:  Discontinue ceftriaxone  Monitor for signs of infection    Abnormal urine finding  Assessment & Plan  On admission patient had a urinalysis that was significant for leukocytes and nitrites.  Unable to assess with patient is having any pain or burning with urination.  He does have a history of cystitis.  Per son, patient has been receiving catheterization and has tried voiding trials but was unsuccessful.  Per son, patient has had a Pike in  place since Tuesday which was placed by his urologist.  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  At baseline patient can hold conversations but does get confused.  He is also able to feed and clothe himself with dementia.  However, patient more confused at this point in time.  Patient slept throughout the course of exam, and was unarousable to gentle shaking.  Per son, patient's medications have been adjusted to increase the dosage of his dementia medications.  Concern for encephalopathy at this point  Patient is oriented to himself however not to time and place  According to the son patient has waxing and waning mentation  Patient is currently off restraints    PLAN:  Continual observation  Tylenol 975 every 8 scheduled  Increase Depakote to 250 oral morning  250 Mg Depakote at night  Continue Seroquel 25 Mg 5 PM  Mirtazapine 7.5 Mg at 8 PM  Melatonin at 8 PM  Olanzapine 2.5 Mg every q8 as needed  Ativan 0.5 Mg 3 times daily      Ambulatory dysfunction  Assessment & Plan  Patient presented to the ED after nursing facility found patient to have abrasions on his forehead.  The facility believes the patient fell out of bed last night and got himself back and without telling anybody. He was more confused that usual. Patient was brought to the ED via the trauma pathway, where trauma workup and CT imaging were negative.  Patient's lab work was  unremarkable, and vitals have remained stable.    PLAN:  -PT/OT               VTE Pharmacologic Prophylaxis: VTE Score: 5 High Risk (Score >/= 5) - Pharmacological DVT Prophylaxis Ordered: enoxaparin (Lovenox). Sequential Compression Devices Ordered.    Mobility:   Basic Mobility Inpatient Raw Score: 17  -HLM Goal: 5: Stand one or more mins  JH-HLM Achieved: 2: Bed activities/Dependent transfer  HLM Goal NOT achieved. Continue with multidisciplinary rounding and encourage appropriate mobility to improve upon HLM goals.    Patient Centered Rounds: I performed bedside rounds with nursing staff today.   Discussions with Specialists or Other Care Team Provider: Geriatrics     Education and Discussions with Family / Patient: Attempted to update  (son) via phone. Left voicemail.     Total Time Spent on Date of Encounter in care of patient: 45 mins. This time was spent on one or more of the following: performing physical exam; counseling and coordination of care; obtaining or reviewing history; documenting in the medical record; reviewing/ordering tests, medications or procedures; communicating with other healthcare professionals and discussing with patient's family/caregivers.    Current Length of Stay: 3 day(s)  Current Patient Status: Inpatient   Certification Statement: The patient will continue to require additional inpatient hospital stay due to management of acute encephalopathy   Discharge Plan: Anticipate discharge in 24-48 hrs to discharge location to be determined pending rehab evaluations.    Code Status: Level 1 - Full Code    Subjective:   Patient is alert, not oriented to time place or person.  Patient had episode of agitation and combative behavior as night.  Patient required 1 dose of olanzapine 2.5 Mg however it did not improve his behavior.  Patient was put in restraints.  Patient is a bit less restless at this point and is under continual observation     Objective:     Vitals:   Temp  (24hrs), Av.9 °F (36.6 °C), Min:97.4 °F (36.3 °C), Max:98.4 °F (36.9 °C)    Temp:  [97.4 °F (36.3 °C)-98.4 °F (36.9 °C)] 98.4 °F (36.9 °C)  HR:  [77] 77  Resp:  [18] 18  BP: (103-113)/(59-72) 111/72  SpO2:  [100 %] 100 %  Body mass index is 24.73 kg/m².     Input and Output Summary (last 24 hours):     Intake/Output Summary (Last 24 hours) at 1/10/2024 1130  Last data filed at 1/10/2024 1101  Gross per 24 hour   Intake 900 ml   Output 2275 ml   Net -1375 ml       Physical Exam:   Physical Exam  Vitals and nursing note reviewed.   Constitutional:       General: He is in acute distress.      Appearance: He is well-developed.      Comments: Frail looking   HENT:      Head: Normocephalic.      Right Ear: External ear normal.      Left Ear: External ear normal.      Mouth/Throat:      Mouth: Mucous membranes are dry.      Pharynx: Oropharynx is clear.   Eyes:      Extraocular Movements: Extraocular movements intact.      Conjunctiva/sclera: Conjunctivae normal.   Cardiovascular:      Rate and Rhythm: Normal rate and regular rhythm.      Heart sounds: No murmur heard.  Pulmonary:      Effort: Pulmonary effort is normal. No respiratory distress.      Breath sounds: Normal breath sounds.   Abdominal:      General: Abdomen is flat. Bowel sounds are normal.      Palpations: Abdomen is soft.      Tenderness: There is no abdominal tenderness.   Genitourinary:     Comments: Pike catheter  Musculoskeletal:         General: No swelling.      Cervical back: Neck supple.      Right lower leg: No edema.      Left lower leg: No edema.   Skin:     General: Skin is warm and dry.      Capillary Refill: Capillary refill takes less than 2 seconds.   Neurological:      Mental Status: He is alert. He is disoriented.          Additional Data:     Labs:  Results from last 7 days   Lab Units 01/10/24  0445 24  1821 24  1020   WBC Thousand/uL 5.51   < > 7.10   HEMOGLOBIN g/dL 12.6   < > 11.7*   I STAT HEMOGLOBIN g/dl  --   --   11.2*   HEMATOCRIT % 38.7   < > 37.5   HEMATOCRIT, ISTAT %  --   --  33*   PLATELETS Thousands/uL 198   < > 173   NEUTROS PCT %  --   --  85*   LYMPHS PCT %  --   --  7*   MONOS PCT %  --   --  7   EOS PCT %  --   --  1    < > = values in this interval not displayed.     Results from last 7 days   Lab Units 01/10/24  0445   SODIUM mmol/L 141   POTASSIUM mmol/L 3.2*   CHLORIDE mmol/L 108   CO2 mmol/L 26   BUN mg/dL 16   CREATININE mg/dL 0.83   ANION GAP mmol/L 7   CALCIUM mg/dL 8.9   ALBUMIN g/dL 3.4*   TOTAL BILIRUBIN mg/dL 0.66   ALK PHOS U/L 55   ALT U/L 8   AST U/L 13   GLUCOSE RANDOM mg/dL 97     Results from last 7 days   Lab Units 01/07/24  1020   INR  1.00             Results from last 7 days   Lab Units 01/07/24  1051   LACTIC ACID mmol/L 1.1   PROCALCITONIN ng/ml <0.05       Lines/Drains:  Invasive Devices       Peripheral Intravenous Line  Duration             Peripheral IV 01/07/24 Right;Ventral (anterior) Forearm 2 days              Drain  Duration             Urethral Catheter 2 days                  Urinary Catheter:  Goal for removal: N/A - Chronic Pike               Imaging: No pertinent imaging reviewed.    Recent Cultures (last 7 days):   Results from last 7 days   Lab Units 01/07/24  1209 01/07/24  1051   BLOOD CULTURE   --  No Growth at 48 hrs.  No Growth at 48 hrs.   URINE CULTURE  >100,000 cfu/ml Staphylococcus aureus*  --        Last 24 Hours Medication List:   Current Facility-Administered Medications   Medication Dose Route Frequency Provider Last Rate    acetaminophen  975 mg Oral Q8H Sean Mims MD      bisacodyl  10 mg Rectal Daily PRN Marta Jamison MD      vitamin B-12  1,000 mcg Oral Daily Sherley Turner MD      [START ON 1/11/2024] divalproex sodium  250 mg Oral QAM Sean Mims MD      divalproex sodium  250 mg Oral HS Sean Mims MD      docusate sodium  100 mg Oral BID Sherley Turner MD      enoxaparin  40 mg Subcutaneous Daily Sherley Turner MD      finasteride  5  mg Oral Daily Sherley Turner MD      LORazepam  0.5 mg Oral TID Sherley Turner MD      melatonin  3 mg Oral HS Sean Mims MD      mirtazapine  7.5 mg Oral HS Sean Mims MD      OLANZapine  2.5 mg Oral Q8H PRN Sean Mims MD      polyethylene glycol  17 g Oral Daily Sherley Turner MD      QUEtiapine  25 mg Oral HS Sean Mims MD      senna  2 tablet Oral BID Marta Jamison MD          Today, Patient Was Seen By: Sean Mims MD    **Please Note: This note may have been constructed using a voice recognition system.**

## 2024-01-10 NOTE — NURSING NOTE
Pt became very agitated and combative and threw a table at RN he was given medication and frequent redirection with no relief. Pt continued to pull at his scott and get out of bed provider contacted and pt was placed in restraints.

## 2024-01-10 NOTE — CASE MANAGEMENT
Case Management Progress Note    Patient name Avni Ruiz  Location S /S - MRN [id number]  : 1940 Date 1/10/2024       LOS (days): 3  Geometric Mean LOS (GMLOS) (days): 2.6  Days to GMLOS:-0.3        OBJECTIVE:        Current admission status: Inpatient  Preferred Pharmacy: No Pharmacies Listed  Primary Care Provider: No primary care provider on file.    Primary Insurance: MEDICARE  Secondary Insurance: [employer]    PROGRESS NOTE:    CM s/w Christiane, liaison for [facility] to inform her patient is not medically ready but the facility is spouse choice. Christiane will continue to follow and inform CM if able to accept.

## 2024-01-10 NOTE — PROGRESS NOTES
Progress Note - Geriatric Medicine   Avni Ruiz 83 y.o. male MRN: [id number]  Unit/Bed#: S -01 Encounter: [id number]      Assessment/Plan:  Polypharmacy  Assessment & Plan  Prior to admission to the hospital patient was on multiple medication  Continue to taper off lorazepam as tolerated  Lexapro was discontinued  Use Zyprexa 2.5 mg only for severe agitation    UTI (urinary tract infection)  Assessment & Plan  Urine culture grew Staphylococcus aureus  Completed ceftriaxone  Pike catheter in place  Encourage p.o. hydration    BPH (benign prostatic hyperplasia)  Assessment & Plan  Continue finasteride and Flomax  Pike catheter in place  Continue antibiotic treatment for UTI    Constipation  Assessment & Plan  No BM since admission to the hospital  Continue senna 2 tablets twice daily MiraLAX daily  Add Dulcolax suppository as needed  Encourage p.o. hydration and out of bed as tolerated    Dementia with agitation and other behavioral disturbance (HCC)  Assessment & Plan  Patient presented to the hospital with change in mental status.  He has Alzheimer's dementia with behavioral disturbance at baseline recently behaviors are getting worse  He lives at [facility] memory care unit  Will continue to provide supportive care, reorient as needed.  Patient is at high risk for delirium, will monitor closely and place on delirium precautions.  Maintain sleep/wake cycle.  Optimize pain regimen.  Monitor for constipation and urinary retention and manage as needed.  Check B12 level   Encourage family to visit.  Encourage to wear glasses and hearing aids while awake.  Encourage po intake, assist with feeding if needed.   Increase Depakote to 250 mg in the morning and 10 mg administer at 5 PM  Continue lorazepam 0.5 mg p.o. 3 times daily and continue to taper off slowly  Schedule mirtazapine 7.5 mg administer at 8 PM along with melatonin and Seroquel 25 mg    Ambulatory dysfunction  Assessment & Plan  Patient  presented status post fall at the facility  CT head and neck with no acute pathology  Monitor orthostatic vital signs  Encourage p.o. hydration  Avoid hypotension and hypoglycemia   PT OT evaluation    * Metabolic encephalopathy  Assessment & Plan  - Slight improvement noted since yesterday  -Patient is high risk of delirium due to dementia with behavioral disturbance at baseline, UTI, insomnia, change in environment  -Initiate delirium precautions  -maintain normal sleep/wake cycle  -minimize overnight interruptions, group overnight vitals/labs/nursing checks as possible  -dim lights, close blinds and turn off tv to minimize stimulation and encourage sleep environment in evenings  -ensure that pain is well controlled continue Tylenol 975mg Q8H scheduled   -monitor for fecal and urinary retention which may precipitate delirium  -encourage early mobilization and ambulation  -provide frequent reorientation and redirection  -encourage family and friends at the bedside to help calm patient if anxious  -avoid medications which may precipitate or worsen delirium such as tramadol, benzodiazepine, anticholinergics, and antihistaminics  -encourage hydration and nutrition , assist with feeding if needed  -redirect unwanted behaviors as first line, avoid physical restraints.   -continue Depakote increase to 250 mg p.o. in the morning and 250 mg p.o. at 5pm-monitor CBC CMP  -Seroquel 25 mg p.o. at 8 PM  -Schedule mirtazapine 7.5 mg at 8 PM  -For severe agitation only may use olanzapine 2.5 mg every 8 hours as needed       Subjective:   Patient seen and examined at bedside for geriatric follow-up.  Family present at bedside.  Patient is slightly restless and not able to provide history.  Per nursing staff he was slightly agitated earlier, last evening he received Zyprexa for agitation.  Ate his breakfast this morning    Review of Systems   Unable to perform ROS: Mental status change         Objective:     Vitals: Blood pressure  "111/72, pulse 77, temperature 98.4 °F (36.9 °C), resp. rate 18, height 5' 6\" (1.676 m), weight 69.5 kg (153 lb 3.5 oz), SpO2 100%.,Body mass index is 24.73 kg/m².      Intake/Output Summary (Last 24 hours) at 1/10/2024 1136  Last data filed at 1/10/2024 1101  Gross per 24 hour   Intake 900 ml   Output 2275 ml   Net -1375 ml       Current Medications: Reviewed    Physical Exam:   Physical Exam  Vitals and nursing note reviewed.   Constitutional:       General: He is not in acute distress.     Appearance: He is well-developed.      Comments: Frail looking   HENT:      Head: Normocephalic and atraumatic.      Ears:      Comments: [place]     Mouth/Throat:      Mouth: Mucous membranes are dry.   Eyes:      Conjunctiva/sclera: Conjunctivae normal.   Cardiovascular:      Rate and Rhythm: Normal rate and regular rhythm.      Heart sounds: No murmur heard.  Pulmonary:      Effort: Pulmonary effort is normal. No respiratory distress.      Breath sounds: Normal breath sounds.   Abdominal:      Palpations: Abdomen is soft.      Tenderness: There is no abdominal tenderness.   Genitourinary:     Comments: Pike cath  Musculoskeletal:         General: No swelling.      Cervical back: Neck supple.      Right lower leg: No edema.      Left lower leg: No edema.   Skin:     General: Skin is warm and dry.      Capillary Refill: Capillary refill takes less than 2 seconds.   Neurological:      Mental Status: He is alert.      Comments: AAOx1   Psychiatric:      Comments: Slightly restless          Invasive Devices       Peripheral Intravenous Line  Duration             Peripheral IV 01/07/24 Right;Ventral (anterior) Forearm 2 days              Drain  Duration             Urethral Catheter 2 days                    Lab, Imaging and other studies: I have personally reviewed pertinent reports.     "

## 2024-01-11 LAB
ANION GAP SERPL CALCULATED.3IONS-SCNC: 5 MMOL/L
ANION GAP SERPL CALCULATED.3IONS-SCNC: 5 MMOL/L
BUN SERPL-MCNC: 18 MG/DL (ref 5–25)
BUN SERPL-MCNC: 18 MG/DL (ref 5–25)
CALCIUM SERPL-MCNC: 9 MG/DL (ref 8.4–10.2)
CALCIUM SERPL-MCNC: 9 MG/DL (ref 8.4–10.2)
CHLORIDE SERPL-SCNC: 110 MMOL/L (ref 96–108)
CHLORIDE SERPL-SCNC: 110 MMOL/L (ref 96–108)
CO2 SERPL-SCNC: 27 MMOL/L (ref 21–32)
CO2 SERPL-SCNC: 27 MMOL/L (ref 21–32)
CREAT SERPL-MCNC: 0.83 MG/DL (ref 0.6–1.3)
CREAT SERPL-MCNC: 0.83 MG/DL (ref 0.6–1.3)
ERYTHROCYTE [DISTWIDTH] IN BLOOD BY AUTOMATED COUNT: 13.5 % (ref 11.6–15.1)
ERYTHROCYTE [DISTWIDTH] IN BLOOD BY AUTOMATED COUNT: 13.5 % (ref 11.6–15.1)
GFR SERPL CREATININE-BSD FRML MDRD: 81 ML/MIN/1.73SQ M
GFR SERPL CREATININE-BSD FRML MDRD: 81 ML/MIN/1.73SQ M
GLUCOSE SERPL-MCNC: 103 MG/DL (ref 65–140)
GLUCOSE SERPL-MCNC: 103 MG/DL (ref 65–140)
HCT VFR BLD AUTO: 38.9 % (ref 36.5–49.3)
HCT VFR BLD AUTO: 38.9 % (ref 36.5–49.3)
HGB BLD-MCNC: 12.5 G/DL (ref 12–17)
HGB BLD-MCNC: 12.5 G/DL (ref 12–17)
MCH RBC QN AUTO: 28.5 PG (ref 26.8–34.3)
MCH RBC QN AUTO: 28.5 PG (ref 26.8–34.3)
MCHC RBC AUTO-ENTMCNC: 32.1 G/DL (ref 31.4–37.4)
MCHC RBC AUTO-ENTMCNC: 32.1 G/DL (ref 31.4–37.4)
MCV RBC AUTO: 89 FL (ref 82–98)
MCV RBC AUTO: 89 FL (ref 82–98)
PLATELET # BLD AUTO: 208 THOUSANDS/UL (ref 149–390)
PLATELET # BLD AUTO: 208 THOUSANDS/UL (ref 149–390)
PMV BLD AUTO: 10.7 FL (ref 8.9–12.7)
PMV BLD AUTO: 10.7 FL (ref 8.9–12.7)
POTASSIUM SERPL-SCNC: 4.1 MMOL/L (ref 3.5–5.3)
POTASSIUM SERPL-SCNC: 4.1 MMOL/L (ref 3.5–5.3)
RBC # BLD AUTO: 4.39 MILLION/UL (ref 3.88–5.62)
RBC # BLD AUTO: 4.39 MILLION/UL (ref 3.88–5.62)
SODIUM SERPL-SCNC: 142 MMOL/L (ref 135–147)
SODIUM SERPL-SCNC: 142 MMOL/L (ref 135–147)
WBC # BLD AUTO: 4.86 THOUSAND/UL (ref 4.31–10.16)
WBC # BLD AUTO: 4.86 THOUSAND/UL (ref 4.31–10.16)

## 2024-01-11 PROCEDURE — 85027 COMPLETE CBC AUTOMATED: CPT

## 2024-01-11 PROCEDURE — 99232 SBSQ HOSP IP/OBS MODERATE 35: CPT | Performed by: INTERNAL MEDICINE

## 2024-01-11 PROCEDURE — 80048 BASIC METABOLIC PNL TOTAL CA: CPT

## 2024-01-11 PROCEDURE — 99232 SBSQ HOSP IP/OBS MODERATE 35: CPT | Performed by: FAMILY MEDICINE

## 2024-01-11 RX ORDER — OLANZAPINE 5 MG/1
2.5 TABLET, ORALLY DISINTEGRATING ORAL EVERY 8 HOURS PRN
Status: DISCONTINUED | OUTPATIENT
Start: 2024-01-11 | End: 2024-01-13

## 2024-01-11 RX ORDER — OLANZAPINE 5 MG/1
5 TABLET, ORALLY DISINTEGRATING ORAL EVERY 8 HOURS PRN
Status: DISCONTINUED | OUTPATIENT
Start: 2024-01-11 | End: 2024-01-11

## 2024-01-11 RX ORDER — OLANZAPINE 10 MG/2ML
2.5 INJECTION, POWDER, FOR SOLUTION INTRAMUSCULAR ONCE AS NEEDED
Status: DISCONTINUED | OUTPATIENT
Start: 2024-01-11 | End: 2024-01-11

## 2024-01-11 RX ORDER — OLANZAPINE 10 MG/2ML
2.5 INJECTION, POWDER, FOR SOLUTION INTRAMUSCULAR EVERY 8 HOURS PRN
Status: DISCONTINUED | OUTPATIENT
Start: 2024-01-11 | End: 2024-01-11

## 2024-01-11 RX ORDER — OLANZAPINE 10 MG/2ML
2.5 INJECTION, POWDER, FOR SOLUTION INTRAMUSCULAR ONCE
Status: COMPLETED | OUTPATIENT
Start: 2024-01-11 | End: 2024-01-11

## 2024-01-11 RX ORDER — WATER 10 ML/10ML
INJECTION INTRAMUSCULAR; INTRAVENOUS; SUBCUTANEOUS
Status: COMPLETED
Start: 2024-01-11 | End: 2024-01-11

## 2024-01-11 RX ADMIN — ACETAMINOPHEN 975 MG: 325 TABLET, FILM COATED ORAL at 04:36

## 2024-01-11 RX ADMIN — Medication 3 MG: at 21:23

## 2024-01-11 RX ADMIN — LORAZEPAM 0.5 MG: 0.5 TABLET ORAL at 21:23

## 2024-01-11 RX ADMIN — QUETIAPINE FUMARATE 25 MG: 25 TABLET ORAL at 21:23

## 2024-01-11 RX ADMIN — ENOXAPARIN SODIUM 40 MG: 40 INJECTION SUBCUTANEOUS at 09:19

## 2024-01-11 RX ADMIN — MIRTAZAPINE 7.5 MG: 15 TABLET, FILM COATED ORAL at 21:23

## 2024-01-11 RX ADMIN — SENNOSIDES 17.2 MG: 8.6 TABLET, FILM COATED ORAL at 09:18

## 2024-01-11 RX ADMIN — POLYETHYLENE GLYCOL 3350 17 G: 17 POWDER, FOR SOLUTION ORAL at 09:19

## 2024-01-11 RX ADMIN — CYANOCOBALAMIN TAB 500 MCG 1000 MCG: 500 TAB at 09:18

## 2024-01-11 RX ADMIN — DOCUSATE SODIUM 100 MG: 100 CAPSULE, LIQUID FILLED ORAL at 09:19

## 2024-01-11 RX ADMIN — ACETAMINOPHEN 975 MG: 325 TABLET, FILM COATED ORAL at 15:05

## 2024-01-11 RX ADMIN — LORAZEPAM 0.5 MG: 0.5 TABLET ORAL at 15:05

## 2024-01-11 RX ADMIN — ACETAMINOPHEN 975 MG: 325 TABLET, FILM COATED ORAL at 21:23

## 2024-01-11 RX ADMIN — LORAZEPAM 0.5 MG: 0.5 TABLET ORAL at 09:18

## 2024-01-11 RX ADMIN — OLANZAPINE 2.5 MG: 10 INJECTION, POWDER, LYOPHILIZED, FOR SOLUTION INTRAMUSCULAR at 17:46

## 2024-01-11 RX ADMIN — DIVALPROEX SODIUM 250 MG: 250 TABLET, DELAYED RELEASE ORAL at 09:19

## 2024-01-11 RX ADMIN — WATER 2.1 ML: 1 INJECTION INTRAMUSCULAR; INTRAVENOUS; SUBCUTANEOUS at 17:46

## 2024-01-11 RX ADMIN — FINASTERIDE 5 MG: 5 TABLET, FILM COATED ORAL at 09:18

## 2024-01-11 NOTE — PROGRESS NOTES
Progress Note - Geriatric Medicine   Avni Ruiz 83 y.o. male MRN: [id number]  Unit/Bed#: S -01 Encounter: [id number]      Assessment/Plan:  Polypharmacy  Assessment & Plan  Prior to admission to the hospital patient was on multiple medication  Continue to taper off lorazepam as tolerated  Lexapro was discontinued  Use Zyprexa 2.5 mg only for severe agitation    UTI (urinary tract infection)  Assessment & Plan  Urine culture grew Staphylococcus aureus  Completed ceftriaxone  Pike catheter in place  Encourage p.o. hydration    BPH (benign prostatic hyperplasia)  Assessment & Plan  Continue finasteride and Flomax  Pike catheter in place      Constipation  Assessment & Plan  Last bowel movement was yesterday  Continue senna 2 tablets twice daily MiraLAX daily  Add Dulcolax suppository as needed  Encourage p.o. hydration and out of bed as tolerated    Dementia with agitation and other behavioral disturbance (HCC)  Assessment & Plan  Patient presented to the hospital with change in mental status.  He has Alzheimer's dementia with behavioral disturbance at baseline recently behaviors are getting worse  He lives at [facility] (formerly [facility]) memory care unit  Will continue to provide supportive care, reorient as needed.  Patient is at high risk for delirium, will monitor closely and place on delirium precautions.  Maintain sleep/wake cycle.  Optimize pain regimen.  Monitor for constipation and urinary retention and manage as needed.  Check B12 level   Encourage family to visit.  Encourage to wear glasses and hearing aids while awake.  Encourage po intake, assist with feeding if needed.   continue Depakote to 250 mg in the morning and 250mg administer at 5 PM  Continue lorazepam 0.5 mg p.o. 3 times daily and continue to taper off slowly  Continue mirtazapine 7.5 mg administer at 8 PM along with melatonin and Seroquel 25 mg    Ambulatory dysfunction  Assessment & Plan  Patient presented status post fall at the  facility  CT head and neck with no acute pathology  Monitor orthostatic vital signs  Encourage p.o. hydration  Avoid hypotension and hypoglycemia   PT OT evaluation    * Metabolic encephalopathy  Assessment & Plan  -improvement noted since yesterday  -Patient is high risk of delirium due to dementia with behavioral disturbance at baseline, UTI, insomnia, change in environment  -Initiate delirium precautions  -maintain normal sleep/wake cycle  -minimize overnight interruptions, group overnight vitals/labs/nursing checks as possible  -dim lights, close blinds and turn off tv to minimize stimulation and encourage sleep environment in evenings  -ensure that pain is well controlled continue Tylenol 975mg Q8H scheduled   -monitor for fecal and urinary retention which may precipitate delirium  -encourage early mobilization and ambulation  -provide frequent reorientation and redirection  -encourage family and friends at the bedside to help calm patient if anxious  -avoid medications which may precipitate or worsen delirium such as tramadol, benzodiazepine, anticholinergics, and antihistaminics  -encourage hydration and nutrition , assist with feeding if needed  -redirect unwanted behaviors as first line, avoid physical restraints.   -continue Depakote 250 mg p.o. in the morning and 250 mg p.o. at 5pm-monitor CBC CMP  -Seroquel 25 mg p.o. at 8 PM  -Schedule mirtazapine 7.5 mg at 8 PM  -For severe agitation only may use olanzapine 2.5 mg every 8 hours as needed-last dose was used 1/9 at 7 PM  -Currently off restraints           Subjective:   Patient seen and examined at bedside for geriatric follow-up.  He was pleasant at the time of encounter and able to answer few simple questions.  Per nursing staff he slept well overnight and ate a good breakfast.  Currently off restraints and pleasant    Review of Systems   Constitutional:  Positive for fatigue.   Cardiovascular:  Negative for chest pain.   Gastrointestinal:  Negative  "for abdominal pain.   Genitourinary:  Positive for dysuria.   Musculoskeletal:  Negative for arthralgias.   Review of system limited due to dementia      Objective:     Vitals: Blood pressure 114/59, pulse 63, temperature (!) 97.3 °F (36.3 °C), resp. rate 18, height 5' 6\" (1.676 m), weight 69.5 kg (153 lb 3.5 oz), SpO2 99%.,Body mass index is 24.73 kg/m².      Intake/Output Summary (Last 24 hours) at 1/11/2024 1154  Last data filed at 1/11/2024 0601  Gross per 24 hour   Intake --   Output 700 ml   Net -700 ml       Current Medications: Reviewed    Physical Exam:   Physical Exam  Vitals and nursing note reviewed.   Constitutional:       General: He is not in acute distress.     Appearance: He is well-developed.      Comments: Frail looking   HENT:      Head: Normocephalic and atraumatic.      Ears:      Comments: [place]     Mouth/Throat:      Mouth: Mucous membranes are dry.   Eyes:      Conjunctiva/sclera: Conjunctivae normal.   Cardiovascular:      Rate and Rhythm: Normal rate and regular rhythm.      Heart sounds: No murmur heard.  Pulmonary:      Effort: Pulmonary effort is normal. No respiratory distress.      Breath sounds: Normal breath sounds.   Abdominal:      Palpations: Abdomen is soft.      Tenderness: There is no abdominal tenderness.   Musculoskeletal:         General: No swelling.      Cervical back: Neck supple.      Right lower leg: No edema.      Left lower leg: No edema.   Skin:     General: Skin is warm and dry.      Capillary Refill: Capillary refill takes less than 2 seconds.   Neurological:      Mental Status: He is alert.      Comments: AAOx1   Psychiatric:      Comments: Restless intermittently          Invasive Devices       Peripheral Intravenous Line  Duration             Peripheral IV 01/11/24 Dorsal (posterior);Left Forearm <1 day              Drain  Duration             Urethral Catheter 3 days                    Lab, Imaging and other studies: I have personally reviewed pertinent reports. "

## 2024-01-11 NOTE — PROGRESS NOTES
[facility]  Progress Note  Name: Avni Ruiz I  MRN: [id number]  Unit/Bed#: S -01 I Date of Admission: 1/7/2024   Date of Service: 1/11/2024 I Hospital Day: 4    Assessment/Plan   * Metabolic encephalopathy  Assessment & Plan  Patient has history of Alzheimer's dementia  Patient is not oriented to time place or person  Patient had multiple medication changes in the past few weeks  Metabolic encephalopathy 2/2 UTI and polypharmacy  Geriatrics consulted and recommendations appreciated.  Patient had no episodes of agitation or combative behavior last night.    PLAN:  Continual observation  Tylenol 975 every 8 scheduled  Increase Depakote to 250 oral morning  250 Mg Depakote at night  Continue Seroquel 25 Mg 5 PM  Mirtazapine 7.5 Mg at 8 PM  Melatonin at 8 PM  Olanzapine 2.5 Mg every q8 as needed  Ativan 0.5 Mg 3 times daily      Polypharmacy  Assessment & Plan  Geriatrics consulted    PLAN:  See plan acute encephalopathy    UTI (urinary tract infection)  Assessment & Plan  Patient has a chronic Pike, was replaced in the ED on admission  Patient has history of urinary retention has been following up with urology outpatient  Urology was consulted, offered patient's family Pike's catheter versus CIC, patient's family agreed on Pike's  Patient was afebrile overnight  Has no suprapubic or abdominal pain on palpation  No leukocytosis    PLAN:  Discontinue ceftriaxone  Monitor for signs of infection    Abnormal urine finding  Assessment & Plan  On admission patient had a urinalysis that was significant for leukocytes and nitrites.  Unable to assess with patient is having any pain or burning with urination.  He does have a history of cystitis.  Per son, patient has been receiving catheterization and has tried voiding trials but was unsuccessful.  Per son, patient has had a Pike in place since Tuesday which was placed by his urologist.  Urine culture shows Staph aureus  Urology  consulted, offered patient's family options of Pike's versus CIC, patient's family agreed on continuation of Pike    PLAN:  Discontinue ceftriaxone    BPH (benign prostatic hyperplasia)  Assessment & Plan  Patient was on dutasteride and Flomax at the nursing facility  Urology recommended discontinuation of the tamsulosin    PLAN:  Discontinue tamsulosin  Continue finasteride 5 Mg daily    Constipation  Assessment & Plan  Continue MiraLAX and senna    Dementia with agitation and other behavioral disturbance (HCC)  Assessment & Plan  Patient has a history of Alzheimer's dementia.  At baseline patient can hold conversations but does get confused.  He is also able to feed and clothe himself with dementia.  However, patient more confused at this point in time.  Patient slept throughout the course of exam, and was unarousable to gentle shaking.  Per son, patient's medications have been adjusted to increase the dosage of his dementia medications.  Concern for encephalopathy at this point  Patient is oriented to himself however not to time and place  According to the son patient has waxing and waning mentation  Patient is currently off restraints    PLAN:  Continual observation  Tylenol 975 every 8 scheduled  Increase Depakote to 250 oral morning  250 Mg Depakote at night  Continue Seroquel 25 Mg 5 PM  Mirtazapine 7.5 Mg at 8 PM  Melatonin at 8 PM  Olanzapine 2.5 Mg every q8 as needed  Ativan 0.5 Mg 3 times daily      Ambulatory dysfunction  Assessment & Plan  Patient presented to the ED after nursing facility found patient to have abrasions on his forehead.  The facility believes the patient fell out of bed last night and got himself back and without telling anybody. He was more confused that usual. Patient was brought to the ED via the trauma pathway, where trauma workup and CT imaging were negative.  Patient's lab work was unremarkable, and vitals have remained stable.    PLAN:  -PT/OT               VTE Pharmacologic  Prophylaxis: VTE Score: 5 High Risk (Score >/= 5) - Pharmacological DVT Prophylaxis Ordered: enoxaparin (Lovenox). Sequential Compression Devices Ordered.    Mobility:   Basic Mobility Inpatient Raw Score: 17  JH-HLM Goal: 5: Stand one or more mins  JH-HLM Achieved: 2: Bed activities/Dependent transfer  HLM Goal NOT achieved. Continue with multidisciplinary rounding and encourage appropriate mobility to improve upon HLM goals.    Patient Centered Rounds: I performed bedside rounds with nursing staff today.   Discussions with Specialists or Other Care Team Provider: Geriatrics     Education and Discussions with Family / Patient: Updated  (wife) via phone.    Total Time Spent on Date of Encounter in care of patient: 45 mins. This time was spent on one or more of the following: performing physical exam; counseling and coordination of care; obtaining or reviewing history; documenting in the medical record; reviewing/ordering tests, medications or procedures; communicating with other healthcare professionals and discussing with patient's family/caregivers.    Current Length of Stay: 4 day(s)  Current Patient Status: Inpatient   Certification Statement: The patient will continue to require additional inpatient hospital stay due to management of acute encephalopathy   Discharge Plan: Anticipate discharge in 24-48 hrs to discharge location to be determined pending rehab evaluations.    Code Status: Level 1 - Full Code    Subjective:   Patient is alert, not oriented to time place or person.  Patient had no episode of agitation and combative behavior at night.  Pt is under continuous observation.  Objective:     Vitals:   Temp (24hrs), Av.7 °F (36.5 °C), Min:97.3 °F (36.3 °C), Max:98 °F (36.7 °C)    Temp:  [97.3 °F (36.3 °C)-98 °F (36.7 °C)] 97.3 °F (36.3 °C)  HR:  [53-78] 63  BP: ()/(34-71) 114/59  SpO2:  [97 %-99 %] 99 %  Body mass index is 24.73 kg/m².     Input and Output Summary (last 24 hours):      Intake/Output Summary (Last 24 hours) at 1/11/2024 1329  Last data filed at 1/11/2024 0601  Gross per 24 hour   Intake --   Output 700 ml   Net -700 ml       Physical Exam:   Physical Exam  Vitals and nursing note reviewed.   Constitutional:       General: He is not in acute distress.     Appearance: He is well-developed.      Comments: Frail looking   HENT:      Head: Normocephalic.      Right Ear: External ear normal.      Left Ear: External ear normal.      Mouth/Throat:      Mouth: Mucous membranes are moist.      Pharynx: Oropharynx is clear.   Eyes:      Extraocular Movements: Extraocular movements intact.      Conjunctiva/sclera: Conjunctivae normal.   Cardiovascular:      Rate and Rhythm: Normal rate and regular rhythm.      Heart sounds: No murmur heard.  Pulmonary:      Effort: Pulmonary effort is normal. No respiratory distress.      Breath sounds: Normal breath sounds.   Abdominal:      General: Abdomen is flat. Bowel sounds are normal.      Palpations: Abdomen is soft.      Tenderness: There is no abdominal tenderness.   Genitourinary:     Comments: Pike catheter  Musculoskeletal:         General: No swelling.      Cervical back: Neck supple.      Right lower leg: No edema.      Left lower leg: No edema.   Skin:     General: Skin is warm and dry.      Capillary Refill: Capillary refill takes less than 2 seconds.   Neurological:      Mental Status: He is alert. He is disoriented.          Additional Data:     Labs:  Results from last 7 days   Lab Units 01/11/24  0433 01/07/24  1821 01/07/24  1020   WBC Thousand/uL 4.86   < > 7.10   HEMOGLOBIN g/dL 12.5   < > 11.7*   I STAT HEMOGLOBIN g/dl  --   --  11.2*   HEMATOCRIT % 38.9   < > 37.5   HEMATOCRIT, ISTAT %  --   --  33*   PLATELETS Thousands/uL 208   < > 173   NEUTROS PCT %  --   --  85*   LYMPHS PCT %  --   --  7*   MONOS PCT %  --   --  7   EOS PCT %  --   --  1    < > = values in this interval not displayed.     Results from last 7 days   Lab  Units 01/11/24  0433 01/10/24  0445   SODIUM mmol/L 142 141   POTASSIUM mmol/L 4.1 3.2*   CHLORIDE mmol/L 110* 108   CO2 mmol/L 27 26   BUN mg/dL 18 16   CREATININE mg/dL 0.83 0.83   ANION GAP mmol/L 5 7   CALCIUM mg/dL 9.0 8.9   ALBUMIN g/dL  --  3.4*   TOTAL BILIRUBIN mg/dL  --  0.66   ALK PHOS U/L  --  55   ALT U/L  --  8   AST U/L  --  13   GLUCOSE RANDOM mg/dL 103 97     Results from last 7 days   Lab Units 01/07/24  1020   INR  1.00             Results from last 7 days   Lab Units 01/07/24  1051   LACTIC ACID mmol/L 1.1   PROCALCITONIN ng/ml <0.05       Lines/Drains:  Invasive Devices       Peripheral Intravenous Line  Duration             Peripheral IV 01/11/24 Dorsal (posterior);Left Forearm <1 day              Drain  Duration             Urethral Catheter 3 days                  Urinary Catheter:  Goal for removal: N/A - Chronic Pike               Imaging: No pertinent imaging reviewed.    Recent Cultures (last 7 days):   Results from last 7 days   Lab Units 01/07/24  1209 01/07/24  1051   BLOOD CULTURE   --  No Growth at 72 hrs.  No Growth at 72 hrs.   URINE CULTURE  >100,000 cfu/ml Methicillin Resistant Staphylococcus aureus*  --        Last 24 Hours Medication List:   Current Facility-Administered Medications   Medication Dose Route Frequency Provider Last Rate    acetaminophen  975 mg Oral Q8H Sean Mims MD      bisacodyl  10 mg Rectal Daily PRN Marta Jamison MD      vitamin B-12  1,000 mcg Oral Daily Sherley Turner MD      divalproex sodium  250 mg Oral QAM Sean Mims MD      divalproex sodium  250 mg Oral HS Sean Mims MD      docusate sodium  100 mg Oral BID Sherley Turner MD      enoxaparin  40 mg Subcutaneous Daily Sherley Turner MD      finasteride  5 mg Oral Daily Sherley Turner MD      LORazepam  0.5 mg Oral TID Sherley Turner MD      melatonin  3 mg Oral HS Sean Mims MD      mirtazapine  7.5 mg Oral HS Sean Mims MD      OLANZapine  2.5 mg Oral Q8H PRN  Sean Mims MD      polyethylene glycol  17 g Oral Daily Sherley Turner MD      QUEtiapine  25 mg Oral HS Sean Mims MD      senna  2 tablet Oral BID Marta Jamison MD          Today, Patient Was Seen By: Sean Mims MD    **Please Note: This note may have been constructed using a voice recognition system.**

## 2024-01-12 LAB
BACTERIA BLD CULT: NORMAL

## 2024-01-12 PROCEDURE — 99232 SBSQ HOSP IP/OBS MODERATE 35: CPT | Performed by: INTERNAL MEDICINE

## 2024-01-12 PROCEDURE — 99232 SBSQ HOSP IP/OBS MODERATE 35: CPT | Performed by: FAMILY MEDICINE

## 2024-01-12 PROCEDURE — 97535 SELF CARE MNGMENT TRAINING: CPT

## 2024-01-12 PROCEDURE — 97163 PT EVAL HIGH COMPLEX 45 MIN: CPT

## 2024-01-12 RX ORDER — LORAZEPAM 0.5 MG/1
0.5 TABLET ORAL 3 TIMES DAILY
Status: DISCONTINUED | OUTPATIENT
Start: 2024-01-12 | End: 2024-01-29 | Stop reason: HOSPADM

## 2024-01-12 RX ORDER — QUETIAPINE FUMARATE 25 MG/1
25 TABLET, FILM COATED ORAL 2 TIMES DAILY
Status: DISCONTINUED | OUTPATIENT
Start: 2024-01-12 | End: 2024-01-22

## 2024-01-12 RX ORDER — MIRTAZAPINE 15 MG/1
15 TABLET, FILM COATED ORAL
Status: DISCONTINUED | OUTPATIENT
Start: 2024-01-12 | End: 2024-01-29 | Stop reason: HOSPADM

## 2024-01-12 RX ADMIN — POLYETHYLENE GLYCOL 3350 17 G: 17 POWDER, FOR SOLUTION ORAL at 08:04

## 2024-01-12 RX ADMIN — FINASTERIDE 5 MG: 5 TABLET, FILM COATED ORAL at 08:05

## 2024-01-12 RX ADMIN — ACETAMINOPHEN 975 MG: 325 TABLET, FILM COATED ORAL at 13:22

## 2024-01-12 RX ADMIN — LORAZEPAM 0.5 MG: 0.5 TABLET ORAL at 08:05

## 2024-01-12 RX ADMIN — DIVALPROEX SODIUM 250 MG: 250 TABLET, DELAYED RELEASE ORAL at 08:13

## 2024-01-12 RX ADMIN — ENOXAPARIN SODIUM 40 MG: 40 INJECTION SUBCUTANEOUS at 08:04

## 2024-01-12 RX ADMIN — QUETIAPINE FUMARATE 25 MG: 25 TABLET ORAL at 17:03

## 2024-01-12 RX ADMIN — Medication 3 MG: at 21:01

## 2024-01-12 RX ADMIN — LORAZEPAM 0.5 MG: 0.5 TABLET ORAL at 21:01

## 2024-01-12 RX ADMIN — SENNOSIDES 17.2 MG: 8.6 TABLET, FILM COATED ORAL at 17:03

## 2024-01-12 RX ADMIN — ACETAMINOPHEN 975 MG: 325 TABLET, FILM COATED ORAL at 21:01

## 2024-01-12 RX ADMIN — CYANOCOBALAMIN TAB 500 MCG 1000 MCG: 500 TAB at 08:05

## 2024-01-12 RX ADMIN — OLANZAPINE 2.5 MG: 5 TABLET, ORALLY DISINTEGRATING ORAL at 14:33

## 2024-01-12 RX ADMIN — LORAZEPAM 0.5 MG: 0.5 TABLET ORAL at 13:22

## 2024-01-12 RX ADMIN — SENNOSIDES 17.2 MG: 8.6 TABLET, FILM COATED ORAL at 08:05

## 2024-01-12 RX ADMIN — DOCUSATE SODIUM 100 MG: 100 CAPSULE, LIQUID FILLED ORAL at 08:05

## 2024-01-12 RX ADMIN — MIRTAZAPINE 15 MG: 15 TABLET, FILM COATED ORAL at 21:00

## 2024-01-12 RX ADMIN — DIVALPROEX SODIUM 250 MG: 250 TABLET, DELAYED RELEASE ORAL at 17:03

## 2024-01-12 NOTE — OCCUPATIONAL THERAPY NOTE
Occupational Therapy Treatment Note       01/12/24 1005   OT Last Visit   OT Visit Date 01/12/24   Note Type   Note Type Treatment   Pain Assessment   Pain Assessment Tool Martines-Baker FACES   Martines-Baker FACES Pain Rating 0   Restrictions/Precautions   Other Precautions 1:1;Cognitive;Chair Alarm;Bed Alarm;Restraints;Fall Risk  (4 point soft restraints, 1:1 at bedside)   ADL   Grooming Assistance 4  Minimal Assistance   Grooming Deficit Wash/dry hands   Grooming Comments Standing to sink to wash hands; Increased assist for sequencing of task, locating soap dispenser   Toileting Assistance  4  Minimal Assistance   Toileting Comments Patient completed bowel movement at toilet; able to wipe self while seated at toilet with supervision; min assist required for thoroughness of hygiene   Bed Mobility   Supine to Sit 4  Minimal assistance   Additional items Assist x 1;Increased time required;Verbal cues   Sit to Supine 4  Minimal assistance   Additional items Assist x 1;Increased time required;Verbal cues   Transfers   Sit to Stand 4  Minimal assistance   Additional items Assist x 2;Increased time required;Verbal cues   Stand to Sit 4  Minimal assistance   Additional items Assist x 2;Increased time required;Verbal cues   Additional Comments STS x several trials throughout session; min assist x 2 with bilateral HHA each trial   Functional Mobility   Functional Mobility 4  Minimal assistance  (Assist x 2)   Additional Comments Patient ambulated short household distance to/from bathroom with min assist x 2 w/bilateral hand hold assist; Min-mod verbal cues for safety awareness and attention to task   Toilet Transfers   Toilet Transfer Type To and from   Toilet Transfer to Standard toilet   Toilet Transfer Technique Ambulating   Toilet Transfers Minimal assistance  (Assist x 2)   Toilet Transfers Comments Ambulatory transfer to/from standard toilet with min assist x2, bilateral hand hold assist; mod verbal cues for safe transfer  technique   Cognition   Overall Cognitive Status Impaired   Arousal/Participation Alert;Cooperative   Attention Difficulty attending to directions   Orientation Level Oriented to person;Disoriented to place;Disoriented to time;Disoriented to situation  (Oriented to first name only, not last name)   Memory Decreased short term memory;Decreased recall of recent events   Following Commands Follows one step commands with increased time or repetition   Comments Patient pleasantly confused throughout session; awake and alert, follows one step commands approx 50% of the time with min-mod verbal cues; Patient very easily distracted, requires constant cueing for sustained attention to task and for safety awareness   Activity Tolerance   Activity Tolerance Patient limited by fatigue;Other (Comment)  (Limited by cognition)   Assessment   Assessment Patient seen for OT treatment this AM. Patient pleasantly confused upon entry into room; 1:1 at bedside; pt requesting to use bathroom. Patient able to complete functional transfers today with min assist x 2, bilateral hand hold assist. ADLs with overall min assist. Patient limited by baseline impaired cognition; requires constant cues for safety awareness, direction follow, and for sustained attention to task. Patient with increased difficulty locating various ADL items such as toilet paper and soap dispenser at sink, ? Vision deficits; Pt is a poor historian and unable to elaborate on current vision or visual history. The patient's raw score on the AM-PAC Daily Activity Inpatient Short Form is 16. A raw score of less than 19 suggests the patient may benefit from discharge to post-acute rehabilitation services. Please refer to the recommendation of the Occupational Therapist for safe discharge planning. Patient at baseline is a LTC resident in a memory unit, recommend return with continued level of staff assist/support. At end of session patient supine in bed with all needs met,  1:1 at bedside. Continue OT per POC.   Plan   OT Frequency 2-3x/wk   Discharge Recommendation   Rehab Resource Intensity Level, OT III (Minimum Resource Intensity)   AM-PAC Daily Activity Inpatient   Lower Body Dressing 2   Bathing 2   Toileting 3   Upper Body Dressing 3   Grooming 3   Eating 3   Daily Activity Raw Score 16   Daily Activity Standardized Score (Calc for Raw Score >=11) 35.96   AM-PAC Applied Cognition Inpatient   Following a Speech/Presentation 1   Understanding Ordinary Conversation 2   Taking Medications 1   Remembering Where Things Are Placed or Put Away 1   Remembering List of 4-5 Errands 1   Taking Care of Complicated Tasks 1   Applied Cognition Raw Score 7   Applied Cognition Standardized Score 15.17     Yareli Fong, OTR/L

## 2024-01-12 NOTE — PLAN OF CARE
Problem: OCCUPATIONAL THERAPY ADULT  Goal: Performs self-care activities at highest level of function for planned discharge setting.  See evaluation for individualized goals.  Description: Treatment Interventions: ADL retraining, Functional transfer training, Endurance training, Cognitive reorientation, Patient/family training, Equipment evaluation/education, Compensatory technique education, Energy conservation, Activityengagement          See flowsheet documentation for full assessment, interventions and recommendations.   Outcome: Progressing  Note: Limitation: Decreased ADL status, Decreased UE strength, Decreased Safe judgement during ADL, Decreased cognition, Decreased endurance, Decreased self-care trans, Decreased high-level ADLs (impaired balance, fxnl mobility, act eran, standing eran, strength, attention to task, direction following, safety awareness, insight, sequencing, orientation, problem solving, learning new tasks, initiation, alertness, response time)  Prognosis: Good  Assessment: Patient seen for OT treatment this AM. Patient pleasantly confused upon entry into room; 1:1 at bedside; pt requesting to use bathroom. Patient able to complete functional transfers today with min assist x 2, bilateral hand hold assist. ADLs with overall min assist. Patient limited by baseline impaired cognition; requires constant cues for safety awareness, direction follow, and for sustained attention to task. Patient with increased difficulty locating various ADL items such as toilet paper and soap dispenser at sink, ? Vision deficits; Pt is a poor historian and unable to elaborate on current vision or visual history. The patient's raw score on the AM-PAC Daily Activity Inpatient Short Form is 16. A raw score of less than 19 suggests the patient may benefit from discharge to post-acute rehabilitation services. Please refer to the recommendation of the Occupational Therapist for safe discharge planning. Patient at  baseline is a LTC resident in a memory unit, recommend return with continued level of staff assist/support. At end of session patient supine in bed with all needs met, 1:1 at bedside. Continue OT per POC.     Rehab Resource Intensity Level, OT: III (Minimum Resource Intensity)

## 2024-01-12 NOTE — PLAN OF CARE
Problem: PHYSICAL THERAPY ADULT  Goal: Performs mobility at highest level of function for planned discharge setting.  See evaluation for individualized goals.  Description: Treatment/Interventions: Functional transfer training, LE strengthening/ROM, Elevations, Therapeutic exercise, Endurance training, Cognitive reorientation, Patient/family training, Equipment eval/education, Bed mobility, Gait training, Compensatory technique education          See flowsheet documentation for full assessment, interventions and recommendations.  1/12/2024 1219 by Roxanne Cagle PT  Note: Prognosis: Fair  Problem List: Impaired balance, Decreased mobility, Decreased cognition, Impaired judgement, Decreased safety awareness  Assessment: Avni Ruiz is a 83 y.o. Male who presents to [facility] on 1/7/24 due to questionable fall and diagnosis of metabolic encephalopathy. Orders for PT eval and treat received. Comorbidities affecting pt's functional mobility at time of evaluation include: dementia, UTI, neuropathy. Personal factors affecting DC include: inaccessible home environment, stairs to enter home, inability to ambulate household distances, inability to navigate level surfaces w/o external assistance, decreased cognition, positive fall history, and limited insight into impairments. At baseline, pt mobilizes independently w/ no AD, and w/ at least 1 fall(s) in the previous 6 months. Upon evaluation, pt presents w/ the following deficits: impaired balance, impaired cognition, decreased safety awareness, and gait deviations. Pt currently requires  min Ax1 for bed mobility, min Ax2 for transfers, min Ax2 w/ bilateral hand-held assist for ambulation. Pt's clinical presentation is unstable/unpredictable due to need for increased assistance w/ functional mobility compared to baseline, need for input for mobility technique, need for input for task focus, need to input for safety awareness, recent h/o falls, ongoing medical management.  From a PT/mobility standpoint given the above findings, DC recommendation is level: II (Moderate Rehab Resource Intensity). During current admission, pt will benefit from continued skilled inpatient PT in the acute care setting in order to address the above deficits and to maximize function and mobility prior to DC from acute care.  Barriers to Discharge: Inaccessible home environment     Rehab Resource Intensity Level, PT: II (Moderate Resource Intensity)    See flowsheet documentation for full assessment.

## 2024-01-12 NOTE — PROGRESS NOTES
Progress Note - Geriatric Medicine   Avni Ruiz 83 y.o. male MRN: [id number]  Unit/Bed#: S -01 Encounter: [id number]      Assessment/Plan:  Polypharmacy  Assessment & Plan  Prior to admission to the hospital patient was on multiple medication  Continue to taper off lorazepam as tolerated  Lexapro was discontinued  Use Zyprexa 2.5 mg only for severe agitation    UTI (urinary tract infection)  Assessment & Plan  Urine culture grew Staphylococcus aureus  Completed ceftriaxone  Pike catheter in place  Encourage p.o. hydration    BPH (benign prostatic hyperplasia)  Assessment & Plan  Continue finasteride and Flomax  Pike catheter in place      Constipation  Assessment & Plan  Last bowel movement was yesterday  Continue senna 2 tablets twice daily MiraLAX daily  Add Dulcolax suppository as needed  Encourage p.o. hydration and out of bed as tolerated    Dementia with agitation and other behavioral disturbance (HCC)  Assessment & Plan  Patient presented to the hospital with change in mental status.  He has Alzheimer's dementia with behavioral disturbance at baseline recently behaviors are getting worse  He lives at [facility] memory care unit  Will continue to provide supportive care, reorient as needed.  Patient is at high risk for delirium, will monitor closely and place on delirium precautions.  Maintain sleep/wake cycle.  Optimize pain regimen.  Monitor for constipation and urinary retention and manage as needed.  B12 and TSH within normal limits  Encourage family to visit.  Encourage to wear glasses and hearing aids while awake.  Encourage po intake, assist with feeding if needed.   continue Depakote to 250 mg in the morning and 250mg administer at 4 PM  Continue lorazepam 0.5 mg p.o. 3 times daily and continue to taper off slowly  Increase mirtazapine to 15 mg administer at 8 PM along with melatonin   Increase Seroquel to 25 mg twice daily, administer in a.m. and 6 PM      Ambulatory  dysfunction  Assessment & Plan  Patient presented status post fall at the facility  CT head and neck with no acute pathology  Monitor orthostatic vital signs  Encourage p.o. hydration  Avoid hypotension and hypoglycemia   PT OT evaluation    * Metabolic encephalopathy  Assessment & Plan  -improvement noted since yesterday  -Patient is high risk of delirium due to dementia with behavioral disturbance at baseline, UTI, insomnia, change in environment  -Initiate delirium precautions  -maintain normal sleep/wake cycle  -minimize overnight interruptions, group overnight vitals/labs/nursing checks as possible  -dim lights, close blinds and turn off tv to minimize stimulation and encourage sleep environment in evenings  -ensure that pain is well controlled continue Tylenol 975mg Q8H scheduled   -monitor for fecal and urinary retention which may precipitate delirium  -encourage early mobilization and ambulation  -provide frequent reorientation and redirection  -encourage family and friends at the bedside to help calm patient if anxious  -avoid medications which may precipitate or worsen delirium such as tramadol, benzodiazepine, anticholinergics, and antihistaminics  -encourage hydration and nutrition , assist with feeding if needed  -redirect unwanted behaviors as first line, avoid physical restraints.   -continue Depakote 250 mg p.o. in the morning and 250 mg p.o. at 5pm-monitor CBC CMP  -Seroquel 25 mg p.o. at 8 PM  -Schedule mirtazapine 7.5 mg at 8 PM  -For severe agitation only may use olanzapine 2.5 mg every 8 hours as needed-last dose was used 1/9 at 7 PM  -Currently off restraints       Subjective:   Patient seen and examined at bedside for geriatric follow-up.  At the time of encounter patient was laying in bed brushing his teeth.  Seemed comfortable.  Per nursing staff he is awake since 3 AM.  He completed his breakfast.  Currently in 2 point restraints, no behaviors reported this morning.    Review of Systems  "  Unable to perform ROS: Dementia         Objective:     Vitals: Blood pressure 112/54, pulse 77, temperature 98.6 °F (37 °C), resp. rate 16, height 5' 6\" (1.676 m), weight 69.5 kg (153 lb 3.5 oz), SpO2 97%.,Body mass index is 24.73 kg/m².      Intake/Output Summary (Last 24 hours) at 1/12/2024 0944  Last data filed at 1/12/2024 0907  Gross per 24 hour   Intake 720 ml   Output 1900 ml   Net -1180 ml       Current Medications: Reviewed    Physical Exam:   Physical Exam  Vitals and nursing note reviewed.   Constitutional:       General: He is not in acute distress.     Appearance: He is well-developed.      Comments: Frail looking   HENT:      Head: Normocephalic and atraumatic.      Ears:      Comments: Hard of hearing     Mouth/Throat:      Mouth: Mucous membranes are moist.   Eyes:      Conjunctiva/sclera: Conjunctivae normal.   Cardiovascular:      Rate and Rhythm: Normal rate and regular rhythm.      Heart sounds: No murmur heard.  Pulmonary:      Effort: Pulmonary effort is normal. No respiratory distress.      Breath sounds: Normal breath sounds.   Abdominal:      Palpations: Abdomen is soft.      Tenderness: There is no abdominal tenderness.   Genitourinary:     Comments: Pike catheter  Musculoskeletal:         General: No swelling.      Cervical back: Neck supple.      Right lower leg: No edema.      Left lower leg: No edema.   Skin:     General: Skin is warm and dry.      Capillary Refill: Capillary refill takes less than 2 seconds.   Neurological:      Mental Status: He is alert. He is disoriented.      Comments: Alert oriented x 1  Pleasant at the time of encounter   Psychiatric:      Comments: Agitated at times          Invasive Devices       Peripheral Intravenous Line  Duration             Peripheral IV 01/11/24 Dorsal (posterior);Left Forearm 1 day              Drain  Duration             Urethral Catheter 4 days                    Lab, Imaging and other studies: I have personally reviewed pertinent " reports.

## 2024-01-12 NOTE — PHYSICAL THERAPY NOTE
PHYSICAL THERAPY EVALUATION NOTE          Patient Name: Avni Ruiz  Today's Date: 2024          AGE:   83 y.o.  Mrn:   [id number]  ADMIT DX:  Dementia (HCC) [F03.90]  Laceration of head [S01.91XA]  Abnormal urine finding [R82.90]  Ambulatory dysfunction [R26.2]  Benign prostatic hyperplasia, unspecified whether lower urinary tract symptoms present [N40.0]    Past Medical History:  Past Medical History:   Diagnosis Date    Chronic indwelling Pike catheter     Dementia with behavioral disturbance (HCC)     Depression     Frequent UTI     GERD (gastroesophageal reflux disease)     Urinary retention        Past Surgical History:  Past Surgical History:   Procedure Laterality Date    HERNIA REPAIR       Length Of Stay: 5        PHYSICAL THERAPY EVALUATION:    Patient's identity confirmed via 2 patient identifiers (full name and ) at start of session       24 0948   PT Last Visit   PT Visit Date 24   Note Type   Note type Evaluation   Pain Assessment   Pain Assessment Tool 0-10   Pain Score No Pain   Restrictions/Precautions   Other Precautions Cognitive;Chair Alarm;Bed Alarm;1:1;Fall Risk;Restraints;Multiple lines  (4 limb soft restraints, catheter)   Home Living   Type of Home House   Home Layout Multi-level;Bed/bath upstairs  (3 ROSSI, full flight to 2nd floor bedroom/bathroom)   Home Equipment Walker;Cane   Additional Comments Pt lives w/ his wife, was at [facility] for Respite Care. Pt unable to provide home set-up and/or PLOF due to cognitive status, information obtained from chart review   Prior Function   Level of [place] Independent with functional mobility   Lives With Family   Receives Help From Family   Falls in the last 6 months 1 to 4  (at least 1 per chart review)   Comments At baseline pt amb ind w/o AD   Cognition   Overall Cognitive Status Impaired   Arousal/Participation Cooperative   Attention Attends with cues to  redirect   Orientation Level Oriented to person;Disoriented to place;Disoriented to situation;Disoriented to time  (oriented to first name only, unable to state last name, reported incorrect birth year)   Memory Decreased recall of biographical information;Decreased short term memory;Decreased recall of recent events;Decreased recall of precautions   Following Commands Follows one step commands with increased time or repetition   Comments Pt ID via name and  on ID band. Pt pleasantly confused throughout, easily distracted requiring frequent VC to attend to task at hand and benefits from removal of distractions (objects). Benefits from simple, 1 step commands   RLE Assessment   RLE Assessment WFL  (grossly assessed w/ functional mobility)   LLE Assessment   LLE Assessment WFL  (grossly assessed w/ functional mobility)   Bed Mobility   Supine to Sit 4  Minimal assistance   Additional items Assist x 1;HOB elevated;Increased time required;Verbal cues   Sit to Supine 4  Minimal assistance   Additional items Assist x 1;HOB elevated;Increased time required;Verbal cues   Transfers   Sit to Stand 4  Minimal assistance   Additional items Assist x 2;Increased time required;Verbal cues   Stand to Sit 4  Minimal assistance   Additional items Assist x 2;Increased time required;Verbal cues   Toilet transfer 4  Minimal assistance   Additional items Assist x 2;Increased time required;Standard toilet   Additional Comments pt able to maintain standing balance at bathroom sink for approx 90 sec w/ min Ax1   Ambulation/Elevation   Gait pattern Wide BIJAL;Decreased foot clearance;Decreased hip extension;Decreased heel strike   Gait Assistance 4  Minimal assist   Additional items Assist x 2;Verbal cues;Tactile cues  (bilateral hand-held assist)   Assistive Device None   Distance 20'+25'   Ambulation/Elevation Additional Comments benefits shen visual of the toilet, sink, and bed as an ambulation target   Balance   Static Sitting Fair +    Dynamic Sitting Fair   Static Standing Poor +   Dynamic Standing Poor +   Ambulatory Poor   Activity Tolerance   Activity Tolerance Patient tolerated treatment well;Other (Comment)  (pt limited by cognition)   Medical Staff Made Aware Pt benefited from PT/OT care coordination w/ OT Yareli due to cognitive/behavioral impairments affecting functional mobility, PT and OT goals were addressed individually during session; CM Caroline   Assessment   Prognosis Fair   Problem List Impaired balance;Decreased mobility;Decreased cognition;Impaired judgement;Decreased safety awareness   Assessment Avni Ruiz is a 83 y.o. Male who presents to [facility] on [facility]/7/24 due to questionable fall and diagnosis of metabolic encephalopathy. Orders for PT eval and treat received. Comorbidities affecting pt's functional mobility at time of evaluation include: dementia, UTI, neuropathy. Personal factors affecting DC include: inaccessible home environment, stairs to enter home, inability to ambulate household distances, inability to navigate level surfaces w/o external assistance, decreased cognition, positive fall history, and limited insight into impairments. At baseline, pt mobilizes independently w/ no AD, and w/ at least 1 fall(s) in the previous 6 months. Upon evaluation, pt presents w/ the following deficits: impaired balance, impaired cognition, decreased safety awareness, and gait deviations. Pt currently requires  min Ax1 for bed mobility, min Ax2 for transfers, min Ax2 w/ bilateral hand-held assist for ambulation. Pt's clinical presentation is unstable/unpredictable due to need for increased assistance w/ functional mobility compared to baseline, need for input for mobility technique, need for input for task focus, need to input for safety awareness, recent h/o falls, ongoing medical management. From a PT/mobility standpoint given the above findings, DC recommendation is level: II (Moderate Rehab Resource Intensity). During  current admission, pt will benefit from continued skilled inpatient PT in the acute care setting in order to address the above deficits and to maximize function and mobility prior to DC from acute care.   Barriers to Discharge Inaccessible home environment   Goals   Patient Goals 1:1 reported pt wanted to go to the bathroom   STG Expiration Date 01/22/24   Short Term Goal #1 Pt will: perform bed mobility w/ supervision to decrease pt's burden of care and increase pt's independence w/ repositioning in bed; perform transfers w/ supervision to promote increased OOB mobility; ambulate at least 75' w/ LRAD vs no AD and supervision to increase pt's ambulatory endurance/tolerance; negotiate at least 3 stair(s) w/ UE support and supervision to facilitate pt returning to previous living environment; increase all balance ratings by at least 1 grade to decrease pt's risk of falls   PT Treatment Day 0   Plan   Treatment/Interventions Functional transfer training;LE strengthening/ROM;Elevations;Therapeutic exercise;Endurance training;Cognitive reorientation;Patient/family training;Equipment eval/education;Bed mobility;Gait training;Compensatory technique education   PT Frequency 2-3x/wk   Discharge Recommendation   Rehab Resource Intensity Level, PT II (Moderate Resource Intensity)   AM-PAC Basic Mobility Inpatient   Turning in Flat Bed Without Bedrails 3   Lying on Back to Sitting on Edge of Flat Bed Without Bedrails 3   Moving Bed to Chair 3   Standing Up From Chair Using Arms 2   Walk in Room 2   Climb 3-5 Stairs With Railing 1   Basic Mobility Inpatient Raw Score 14   Basic Mobility Standardized Score 35.55   Highest Level Of Mobility   JH-HL Goal 4: Move to chair/commode   JH-HLM Achieved 7: Walk 25 feet or more   End of Consult   Patient Position at End of Consult Supine;Bed/Chair alarm activated;All needs within reach  (4 soft limb restraints donned, 1:1 present in room)       The patient's AM-PAC Basic Mobility  Inpatient Short Form Raw Score is 14. A Raw score of less than or equal to 16 suggests the patient may benefit from discharge to post-acute rehabilitation services. Please also refer to the recommendation of the Physical Therapist for safe discharge planning.    Pt will benefit from skilled inpatient PT during this admission in order to facilitate progress towards goals and to maximize functional independence prior to DC      DC rec: level II (Moderate Rehab Resource Intensity)        Roxanne Cagle, PT, DPT  01/12/24

## 2024-01-12 NOTE — PROGRESS NOTES
[facility]  Progress Note  Name: Avni Ruiz I  MRN: [id number]  Unit/Bed#: S -01 I Date of Admission: 1/7/2024   Date of Service: 1/12/2024 I Hospital Day: 5    Assessment/Plan   * Metabolic encephalopathy  Assessment & Plan  Patient has history of Alzheimer's dementia  Patient is not oriented to time place or person  Patient had multiple medication changes in the past few weeks  Metabolic encephalopathy 2/2 UTI and polypharmacy  Geriatrics consulted and recommendations appreciated.  Patient had a episodes of agitation or combative behavior last evening, received one dose of olanzapine 2.5 mg   Pt was put back in restraints     PLAN:  Continual observation  Try to wean off the restraints   Tylenol 975 every 8 scheduled  Depakote to 250 oral morning  250 Mg Depakote at night  Increase Seroquel to 25 Mg bid   Increase Mirtazapine to 15 Mg at 8 PM  Melatonin at 8 PM  Olanzapine 2.5 Mg every q8 as needed  Ativan 0.5 Mg 3 times daily      Polypharmacy  Assessment & Plan  Geriatrics consulted    PLAN:  See plan acute encephalopathy    UTI (urinary tract infection)  Assessment & Plan  Patient has a chronic Pike, was replaced in the ED on admission  Patient has history of urinary retention has been following up with urology outpatient  Urology was consulted, offered patient's family Pike's catheter versus CIC, patient's family agreed on Pike's  Patient was afebrile overnight  Has no suprapubic or abdominal pain on palpation  No leukocytosis    PLAN:  Discontinue ceftriaxone  Monitor for signs of infection    Abnormal urine finding  Assessment & Plan  On admission patient had a urinalysis that was significant for leukocytes and nitrites.  Unable to assess with patient is having any pain or burning with urination.  He does have a history of cystitis.  Per son, patient has been receiving catheterization and has tried voiding trials but was unsuccessful.  Per son, patient has had a  Pike in place since Tuesday which was placed by his urologist.  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  At baseline patient can hold conversations but does get confused.  He is also able to feed and clothe himself with dementia.  However, patient more confused at this point in time.  Patient slept throughout the course of exam, and was unarousable to gentle shaking.  Per son, patient's medications have been adjusted to increase the dosage of his dementia medications.  Concern for encephalopathy at this point  Patient is oriented to himself however not to time and place  According to the son patient has waxing and waning mentation  Patient had a episodes of agitation or combative behavior last evening, received one dose of olanzapine 2.5 mg   Pt was put back in restraints     PLAN:  Continual observation  Try to wean off the restraints   Tylenol 975 every 8 scheduled  Depakote to 250 oral morning  250 Mg Depakote at night  Increase Seroquel to 25 Mg bid   Increase Mirtazapine to 15 Mg at 8 PM  Melatonin at 8 PM  Olanzapine 2.5 Mg every q8 as needed  Ativan 0.5 Mg 3 times daily    Ambulatory dysfunction  Assessment & Plan  Patient presented to the ED after nursing facility found patient to have abrasions on his forehead.  The facility believes the patient fell out of bed last night and got himself back and without telling anybody. He was  more confused that usual. Patient was brought to the ED via the trauma pathway, where trauma workup and CT imaging were negative.  Patient's lab work was unremarkable, and vitals have remained stable.    PLAN:  -PT/OT               VTE Pharmacologic Prophylaxis: VTE Score: 5 High Risk (Score >/= 5) - Pharmacological DVT Prophylaxis Ordered: enoxaparin (Lovenox). Sequential Compression Devices Ordered.    Mobility:   Basic Mobility Inpatient Raw Score: 17  JH-HLM Goal: 5: Stand one or more mins  JH-HLM Achieved: 2: Bed activities/Dependent transfer  HLM Goal NOT achieved. Continue with multidisciplinary rounding and encourage appropriate mobility to improve upon HLM goals.    Patient Centered Rounds: I performed bedside rounds with nursing staff today.   Discussions with Specialists or Other Care Team Provider: Geriatrics     Education and Discussions with Family / Patient: Attempted to update  (wife) via phone. Left voicemail.     Total Time Spent on Date of Encounter in care of patient: 45 mins. This time was spent on one or more of the following: performing physical exam; counseling and coordination of care; obtaining or reviewing history; documenting in the medical record; reviewing/ordering tests, medications or procedures; communicating with other healthcare professionals and discussing with patient's family/caregivers.    Current Length of Stay: 5 day(s)  Current Patient Status: Inpatient   Certification Statement: The patient will continue to require additional inpatient hospital stay due to management of acute encephalopathy   Discharge Plan: Anticipate discharge in 24-48 hrs to discharge location to be determined pending rehab evaluations.    Code Status: Level 1 - Full Code    Subjective:   Patient is alert, not oriented to time place or person.  Patient had a episodes of agitation or combative behavior last evening, received one dose of olanzapine 2.5 mg   Pt was put back in restraints. Pt  is under continuous observation.  Objective:     Vitals:   Temp (24hrs), Av.6 °F (37 °C), Min:98.6 °F (37 °C), Max:98.6 °F (37 °C)    Temp:  [98.6 °F (37 °C)] 98.6 °F (37 °C)  HR:  [77-82] 77  Resp:  [16] 16  BP: (102-130)/(52-66) 112/54  SpO2:  [97 %-99 %] 97 %  Body mass index is 24.73 kg/m².     Input and Output Summary (last 24 hours):     Intake/Output Summary (Last 24 hours) at 2024 1117  Last data filed at 2024 0907  Gross per 24 hour   Intake 720 ml   Output 1900 ml   Net -1180 ml       Physical Exam:   Physical Exam  Vitals and nursing note reviewed.   Constitutional:       General: He is not in acute distress.     Appearance: He is well-developed.      Comments: Frail looking   HENT:      Head: Normocephalic.      Right Ear: External ear normal.      Left Ear: External ear normal.      Nose: Nose normal.      Mouth/Throat:      Mouth: Mucous membranes are moist.      Pharynx: Oropharynx is clear.   Eyes:      Extraocular Movements: Extraocular movements intact.      Conjunctiva/sclera: Conjunctivae normal.   Cardiovascular:      Rate and Rhythm: Normal rate and regular rhythm.      Heart sounds: No murmur heard.  Pulmonary:      Effort: Pulmonary effort is normal. No respiratory distress.      Breath sounds: Normal breath sounds.   Abdominal:      General: Abdomen is flat. Bowel sounds are normal.      Palpations: Abdomen is soft.      Tenderness: There is no abdominal tenderness.   Genitourinary:     Comments: Pike catheter  Musculoskeletal:         General: No swelling.      Cervical back: Neck supple.      Right lower leg: No edema.      Left lower leg: No edema.   Skin:     General: Skin is warm and dry.      Capillary Refill: Capillary refill takes less than 2 seconds.   Neurological:      Mental Status: He is alert. He is disoriented.          Additional Data:     Labs:  Results from last 7 days   Lab Units 24  0433 24  1821 24  1020   WBC Thousand/uL 4.86   < > 7.10    HEMOGLOBIN g/dL 12.5   < > 11.7*   I STAT HEMOGLOBIN g/dl  --   --  11.2*   HEMATOCRIT % 38.9   < > 37.5   HEMATOCRIT, ISTAT %  --   --  33*   PLATELETS Thousands/uL 208   < > 173   NEUTROS PCT %  --   --  85*   LYMPHS PCT %  --   --  7*   MONOS PCT %  --   --  7   EOS PCT %  --   --  1    < > = values in this interval not displayed.     Results from last 7 days   Lab Units 01/11/24  0433 01/10/24  0445   SODIUM mmol/L 142 141   POTASSIUM mmol/L 4.1 3.2*   CHLORIDE mmol/L 110* 108   CO2 mmol/L 27 26   BUN mg/dL 18 16   CREATININE mg/dL 0.83 0.83   ANION GAP mmol/L 5 7   CALCIUM mg/dL 9.0 8.9   ALBUMIN g/dL  --  3.4*   TOTAL BILIRUBIN mg/dL  --  0.66   ALK PHOS U/L  --  55   ALT U/L  --  8   AST U/L  --  13   GLUCOSE RANDOM mg/dL 103 97     Results from last 7 days   Lab Units 01/07/24  1020   INR  1.00             Results from last 7 days   Lab Units 01/07/24  1051   LACTIC ACID mmol/L 1.1   PROCALCITONIN ng/ml <0.05       Lines/Drains:  Invasive Devices       Peripheral Intravenous Line  Duration             Peripheral IV 01/11/24 Dorsal (posterior);Left Forearm 1 day              Drain  Duration             Urethral Catheter 4 days                  Urinary Catheter:  Goal for removal: N/A - Chronic Pike               Imaging: No pertinent imaging reviewed.    Recent Cultures (last 7 days):   Results from last 7 days   Lab Units 01/07/24  1209 01/07/24  1051   BLOOD CULTURE   --  No Growth After 4 Days.  No Growth After 4 Days.   URINE CULTURE  >100,000 cfu/ml Methicillin Resistant Staphylococcus aureus*  --        Last 24 Hours Medication List:   Current Facility-Administered Medications   Medication Dose Route Frequency Provider Last Rate    acetaminophen  975 mg Oral Q8H Sean Mims MD      bisacodyl  10 mg Rectal Daily PRN Marta Jamison MD      vitamin B-12  1,000 mcg Oral Daily Sherley Turner MD      divalproex sodium  250 mg Oral QAM Sean Mims MD      divalproex sodium  250 mg Oral HS  Sean Mims MD      docusate sodium  100 mg Oral BID Sherley Turner MD      enoxaparin  40 mg Subcutaneous Daily Sherley Turner MD      finasteride  5 mg Oral Daily Sherley Turner MD      LORazepam  0.5 mg Oral TID Marta Jamison MD      melatonin  3 mg Oral HS Sean Mims MD      mirtazapine  15 mg Oral HS Sean Mims MD      OLANZapine  2.5 mg Oral Q8H PRN Sherley Turner MD      polyethylene glycol  17 g Oral Daily Sherley Turner MD      QUEtiapine  25 mg Oral BID Sean Mims MD      senna  2 tablet Oral BID Marta Jamison MD          Today, Patient Was Seen By: Sean Mims MD    **Please Note: This note may have been constructed using a voice recognition system.**

## 2024-01-12 NOTE — PLAN OF CARE
Problem: PAIN - ADULT  Goal: Verbalizes/displays adequate comfort level or baseline comfort level  Description: Interventions:  - Encourage patient to monitor pain and request assistance  - Assess pain using appropriate pain scale  - Administer analgesics based on type and severity of pain and evaluate response  - Implement non-pharmacological measures as appropriate and evaluate response  - Consider cultural and social influences on pain and pain management  - Notify physician/advanced practitioner if interventions unsuccessful or patient reports new pain  Outcome: Progressing     Problem: INFECTION - ADULT  Goal: Absence or prevention of progression during hospitalization  Description: INTERVENTIONS:  - Assess and monitor for signs and symptoms of infection  - Monitor lab/diagnostic results  - Monitor all insertion sites, i.e. indwelling lines, tubes, and drains  - Monitor endotracheal if appropriate and nasal secretions for changes in amount and color  - [place] appropriate cooling/warming therapies per order  - Administer medications as ordered  - Instruct and encourage patient and family to use good hand hygiene technique  - Identify and instruct in appropriate isolation precautions for identified infection/condition  Outcome: Progressing     Problem: SAFETY ADULT  Goal: Patient will remain free of falls  Description: INTERVENTIONS:  - Educate patient/family on patient safety including physical limitations  - Instruct patient to call for assistance with activity   - Consult OT/PT to assist with strengthening/mobility   - Keep Call bell within reach  - Keep bed low and locked with side rails adjusted as appropriate  - Keep care items and personal belongings within reach  - Initiate and maintain comfort rounds  - Make Fall Risk Sign visible to staff  - Apply yellow socks and bracelet for high fall risk patients  - Consider moving patient to room near nurses station  Outcome: Progressing  Goal: Maintain or  return to baseline ADL function  Description: INTERVENTIONS:  -  Assess patient's ability to carry out ADLs; assess patient's baseline for ADL function and identify physical deficits which impact ability to perform ADLs (bathing, care of mouth/teeth, toileting, grooming, dressing, etc.)  - Assess/evaluate cause of self-care deficits   - Assess range of motion  - Assess patient's mobility; develop plan if impaired  - Assess patient's need for assistive devices and provide as appropriate  - Encourage maximum independence but intervene and supervise when necessary  - Involve family in performance of ADLs  - Assess for home care needs following discharge   - Consider OT consult to assist with ADL evaluation and planning for discharge  - Provide patient education as appropriate  Outcome: Progressing  Goal: Maintains/Returns to pre admission functional level  Description: INTERVENTIONS:  - Perform AM-PAC 6 Click Basic Mobility/ Daily Activity assessment daily.  - Set and communicate daily mobility goal to care team and patient/family/caregiver.   - Collaborate with rehabilitation services on mobility goals if consulted  - Out of bed for toileting  - Record patient progress and toleration of activity level   Outcome: Progressing     Problem: DISCHARGE PLANNING  Goal: Discharge to home or other facility with appropriate resources  Description: INTERVENTIONS:  - Identify barriers to discharge w/patient and caregiver  - Arrange for needed discharge resources and transportation as appropriate  - Identify discharge learning needs (meds, wound care, etc.)  - Arrange for interpretive services to assist at discharge as needed  - Refer to Case Management Department for coordinating discharge planning if the patient needs post-hospital services based on physician/advanced practitioner order or complex needs related to functional status, cognitive ability, or social support system  Outcome: Progressing     Problem: Knowledge  Deficit  Goal: Patient/family/caregiver demonstrates understanding of disease process, treatment plan, medications, and discharge instructions  Description: Complete learning assessment and assess knowledge base.  Interventions:  - Provide teaching at level of understanding  - Provide teaching via preferred learning methods  Outcome: Progressing     Problem: SAFETY,RESTRAINT: NV/NON-SELF DESTRUCTIVE BEHAVIOR  Goal: Remains free of harm/injury (restraint for non violent/non self-detsructive behavior)  Description: INTERVENTIONS:  - Instruct patient/family regarding restraint use   - Assess and monitor physiologic and psychological status   - Provide interventions and comfort measures to meet assessed patient needs   - Identify and implement measures to help patient regain control  - Assess readiness for release of restraint   Outcome: Progressing  Goal: Returns to optimal restraint-free functioning  Description: INTERVENTIONS:  - Assess the patient's behavior and symptoms that indicate continued need for restraint  - Identify and implement measures to help patient regain control  - Assess readiness for release of restraint   Outcome: Progressing     Problem: Prexisting or High Potential for Compromised Skin Integrity  Goal: Skin integrity is maintained or improved  Description: INTERVENTIONS:  - Identify patients at risk for skin breakdown  - Assess and monitor skin integrity  - Assess and monitor nutrition and hydration status  - Monitor labs   - Assess for incontinence   - Turn and reposition patient  - Assist with mobility/ambulation  - Relieve pressure over bony prominences  - Avoid friction and shearing  - Provide appropriate hygiene as needed including keeping skin clean and dry  - Evaluate need for skin moisturizer/barrier cream  - Collaborate with interdisciplinary team   - Patient/family teaching  - Consider wound care consult   Outcome: Progressing     Problem: Nutrition/Hydration-ADULT  Goal:  Nutrient/Hydration intake appropriate for improving, restoring or maintaining nutritional needs  Description: Monitor and assess patient's nutrition/hydration status for malnutrition. Collaborate with interdisciplinary team and initiate plan and interventions as ordered.  Monitor patient's weight and dietary intake as ordered or per policy. Utilize nutrition screening tool and intervene as necessary. Determine patient's food preferences and provide high-protein, high-caloric foods as appropriate.     INTERVENTIONS:  - Monitor oral intake, urinary output, labs, and treatment plans  - Assess nutrition and hydration status and recommend course of action  - Evaluate amount of meals eaten  - Assist patient with eating if necessary   - Allow adequate time for meals  - Recommend/ encourage appropriate diets, oral nutritional supplements, and vitamin/mineral supplements  - Order, calculate, and assess calorie counts as needed  - Recommend, monitor, and adjust tube feedings and TPN/PPN based on assessed needs  - Assess need for intravenous fluids  - Provide specific nutrition/hydration education as appropriate  - Include patient/family/caregiver in decisions related to nutrition  Outcome: Progressing

## 2024-01-12 NOTE — CASE MANAGEMENT
Case Management Progress Note    Patient name Avni Ruiz  Location S /S - MRN [id number]  : 1940 Date 2024       LOS (days): 5  Geometric Mean LOS (GMLOS) (days): 3.4  Days to GMLOS:-1.7        OBJECTIVE:        Current admission status: Inpatient  Preferred Pharmacy: No Pharmacies Listed  Primary Care Provider: No primary care provider on file.    Primary Insurance: MEDICARE  Secondary Insurance:  FOR LIFE    PROGRESS NOTE:      CM s/w Christiane from [facility] and aware he'll not discharge this weekend. [facility] can accept once he's tolerating being off restraints and 1:1 for 48hrs. CM updated spouse. CM to f/u on Monday.

## 2024-01-13 PROBLEM — N30.90 CATHETER CYSTITIS: Status: RESOLVED | Noted: 2023-11-12 | Resolved: 2024-01-13

## 2024-01-13 PROBLEM — T83.518A CATHETER CYSTITIS: Status: RESOLVED | Noted: 2023-11-12 | Resolved: 2024-01-13

## 2024-01-13 PROCEDURE — 99232 SBSQ HOSP IP/OBS MODERATE 35: CPT | Performed by: INTERNAL MEDICINE

## 2024-01-13 RX ORDER — OLANZAPINE 10 MG/2ML
2.5 INJECTION, POWDER, FOR SOLUTION INTRAMUSCULAR ONCE
Status: CANCELLED | OUTPATIENT
Start: 2024-01-13

## 2024-01-13 RX ORDER — WATER 10 ML/10ML
INJECTION INTRAMUSCULAR; INTRAVENOUS; SUBCUTANEOUS
Status: COMPLETED
Start: 2024-01-13 | End: 2024-01-13

## 2024-01-13 RX ORDER — OLANZAPINE 10 MG/2ML
5 INJECTION, POWDER, FOR SOLUTION INTRAMUSCULAR EVERY 4 HOURS PRN
Status: DISCONTINUED | OUTPATIENT
Start: 2024-01-13 | End: 2024-01-15

## 2024-01-13 RX ADMIN — QUETIAPINE FUMARATE 25 MG: 25 TABLET ORAL at 08:13

## 2024-01-13 RX ADMIN — MIRTAZAPINE 15 MG: 15 TABLET, FILM COATED ORAL at 20:49

## 2024-01-13 RX ADMIN — ACETAMINOPHEN 975 MG: 325 TABLET, FILM COATED ORAL at 05:51

## 2024-01-13 RX ADMIN — LORAZEPAM 0.5 MG: 0.5 TABLET ORAL at 13:10

## 2024-01-13 RX ADMIN — LORAZEPAM 0.5 MG: 0.5 TABLET ORAL at 20:49

## 2024-01-13 RX ADMIN — WATER 10 ML: 1 INJECTION INTRAMUSCULAR; INTRAVENOUS; SUBCUTANEOUS at 15:03

## 2024-01-13 RX ADMIN — SENNOSIDES 17.2 MG: 8.6 TABLET, FILM COATED ORAL at 08:13

## 2024-01-13 RX ADMIN — FINASTERIDE 5 MG: 5 TABLET, FILM COATED ORAL at 08:13

## 2024-01-13 RX ADMIN — Medication 3 MG: at 20:49

## 2024-01-13 RX ADMIN — LORAZEPAM 0.5 MG: 0.5 TABLET ORAL at 08:13

## 2024-01-13 RX ADMIN — OLANZAPINE 5 MG: 10 INJECTION, POWDER, FOR SOLUTION INTRAMUSCULAR at 15:02

## 2024-01-13 RX ADMIN — SENNOSIDES 17.2 MG: 8.6 TABLET, FILM COATED ORAL at 17:15

## 2024-01-13 RX ADMIN — ACETAMINOPHEN 975 MG: 325 TABLET, FILM COATED ORAL at 20:49

## 2024-01-13 RX ADMIN — POLYETHYLENE GLYCOL 3350 17 G: 17 POWDER, FOR SOLUTION ORAL at 08:14

## 2024-01-13 RX ADMIN — DIVALPROEX SODIUM 250 MG: 250 TABLET, DELAYED RELEASE ORAL at 17:15

## 2024-01-13 RX ADMIN — ENOXAPARIN SODIUM 40 MG: 40 INJECTION SUBCUTANEOUS at 08:14

## 2024-01-13 RX ADMIN — QUETIAPINE FUMARATE 25 MG: 25 TABLET ORAL at 17:15

## 2024-01-13 RX ADMIN — DIVALPROEX SODIUM 250 MG: 250 TABLET, DELAYED RELEASE ORAL at 10:55

## 2024-01-13 RX ADMIN — CYANOCOBALAMIN TAB 500 MCG 1000 MCG: 500 TAB at 08:13

## 2024-01-13 NOTE — PROGRESS NOTES
[facility]  Progress Note  Name: Avni Ruiz I  MRN: [id number]  Unit/Bed#: S -01 I Date of Admission: 1/7/2024   Date of Service: 1/13/2024 I Hospital Day: 6    Assessment/Plan   * Metabolic encephalopathy  Assessment & Plan  Patient has history of Alzheimer's dementia  Patient is not oriented to time place or person  Patient had multiple medication changes in the past few weeks  Metabolic encephalopathy 2/2 UTI and polypharmacy  Geriatrics consulted and recommendations appreciated.  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  At baseline patient can hold conversations but does get confused.  He is also able to feed and clothe himself with dementia.  However, patient more confused at this point in time.  Patient slept throughout the course of exam, and was unarousable to gentle shaking.  Per son, patient's medications have been adjusted to increase the dosage of his dementia medications.  Concern for encephalopathy at this point  Patient is oriented to himself however not to time and place  According to the son patient has waxing and waning mentation  Patient had a episodes of agitation or combative behavior last evening, received one dose of olanzapine 2.5 mg   Pt was put back in restraints     PLAN:  Continual observation  Try to wean off the restraints   Tylenol 975 every 8 scheduled  Depakote to 250 twice daily  Increase Seroquel to 25 Mg bid   Increase Mirtazapine to 15 Mg at 8 PM  Melatonin at 8 PM  IM Olanzapine 2.5 Mg every q4 hr as needed  Ativan 0.5 Mg 3 times daily    UTI (urinary  tract infection)  Assessment & Plan  Patient has a chronic Pike, was replaced in the ED on admission  Patient has history of urinary retention has been following up with urology outpatient  Urology was consulted, offered patient's family Pike's catheter versus CIC, patient's family agreed on Pike's  Patient was afebrile overnight  Has no suprapubic or abdominal pain on palpation  No leukocytosis    PLAN:  Completed 3 days of IV ceftriaxone  Monitor for signs of infection    Polypharmacy  Assessment & Plan  Geriatrics consulted    PLAN:  See plan acute encephalopathy    Abnormal urine finding  Assessment & Plan  On admission patient had a urinalysis that was significant for leukocytes and nitrites.  Unable to assess with patient is having any pain or burning with urination.  He does have a history of cystitis.  Per son, patient has been receiving catheterization and has tried voiding trials but was unsuccessful.  Per son, patient has had a Pike in place since Tuesday which was placed by his urologist.  Urine culture shows Staph aureus  Urology consulted, offered patient's family options of Pike's versus CIC, patient's family agreed on continuation of Pike    Plans:  Completed 3 days of IV ceftriaxone  Monitoring off antibiotics    BPH (benign prostatic hyperplasia)  Assessment & Plan  Patient was on dutasteride and Flomax at the nursing facility  Urology recommended discontinuation of the tamsulosin    PLAN:  Discontinue tamsulosin  Continue finasteride 5 Mg daily    Constipation  Assessment & Plan  Continue MiraLAX and senna    Ambulatory dysfunction  Assessment & Plan  Patient presented to the ED after nursing facility found patient to have abrasions on his forehead.  The facility believes the patient fell out of bed last night and got himself back and without telling anybody. He was more confused that usual. Patient was brought to the ED via the trauma pathway, where trauma workup and CT imaging were  negative.  Patient's lab work was unremarkable, and vitals have remained stable.    PLAN:  PT/OT evaluations and recommendations appreciated  PT level 2, OT level 3  DC plan to go back to his prior facility [facility] if patient is tolerating being off restraints and one-to-one for 48 hours                   VTE Pharmacologic Prophylaxis: VTE Score: 5 High Risk (Score >/= 5) - Pharmacological DVT Prophylaxis Ordered: enoxaparin (Lovenox). Sequential Compression Devices Ordered.    Mobility:   Basic Mobility Inpatient Raw Score: 14  -[facility] Goal: 4: Move to chair/commode  JH-HLM Achieved: 2: Bed activities/Dependent transfer  HLM Goal achieved. Continue to encourage appropriate mobility.    Patient Centered Rounds: I performed bedside rounds with nursing staff today.   Discussions with Specialists or Other Care Team Provider:     Education and Discussions with Family / Patient: Attempted to update  (son) via phone. Unable to contact.    Total Time Spent on Date of Encounter in care of patient: 45 mins. This time was spent on one or more of the following: performing physical exam; counseling and coordination of care; obtaining or reviewing history; documenting in the medical record; reviewing/ordering tests, medications or procedures; communicating with other healthcare professionals and discussing with patient's family/caregivers.    Current Length of Stay: 6 day(s)  Current Patient Status: Inpatient   Certification Statement: The patient will continue to require additional inpatient hospital stay due to agitation and altered mental status due to underlying dementia  Discharge Plan: Anticipate discharge in 48-72 hrs to prior assisted or independent living facility.    Code Status: Level 1 - Full Code    Subjective:   Patient seen and examined at bedside this morning.  He was falling asleep at the time of my evaluation.  Nursing staff informed that patient was up until 3 AM last night and he fell  asleep around 7 AM this morning.  He did not receive any as needed medication last night.  He required one-to-one observation overnight.     Addendum: Nursing staff reach out to me around 3 PM that patient less agitated and requires 3 points restraint (RA, LA and LL) and required IM Zyprexa for agitation.    Objective:     Vitals:   Temp (24hrs), Av.1 °F (36.7 °C), Min:98.1 °F (36.7 °C), Max:98.1 °F (36.7 °C)    Temp:  [98.1 °F (36.7 °C)] 98.1 °F (36.7 °C)  HR:  [74] 74  BP: (132)/(81) 132/81  SpO2:  [100 %] 100 %  Body mass index is 24.73 kg/m².     Input and Output Summary (last 24 hours):     Intake/Output Summary (Last 24 hours) at 2024 3640  Last data filed at 2024 0820  Gross per 24 hour   Intake --   Output 900 ml   Net -900 ml       Physical Exam:   Physical Exam  Vitals and nursing note reviewed.   Constitutional:       General: He is not in acute distress.     Appearance: He is well-developed.   HENT:      Head: Normocephalic and atraumatic.   Eyes:      Conjunctiva/sclera: Conjunctivae normal.   Cardiovascular:      Rate and Rhythm: Normal rate and regular rhythm.      Heart sounds: Normal heart sounds. No murmur heard.  Pulmonary:      Effort: Pulmonary effort is normal. No respiratory distress.      Breath sounds: Normal breath sounds.   Abdominal:      General: There is no distension.      Palpations: Abdomen is soft.      Tenderness: There is no abdominal tenderness.   Genitourinary:     Comments: Is in place  Musculoskeletal:         General: No swelling.      Cervical back: Neck supple.      Right lower leg: No edema.      Left lower leg: No edema.      Comments: Today patient is on 3 point restraint on bilateral upper extremity and left lower leg   Skin:     General: Skin is warm and dry.      Capillary Refill: Capillary refill takes less than 2 seconds.      Coloration: Skin is not jaundiced.   Neurological:      Mental Status: He is alert. He is disoriented.   Psychiatric:          Mood and Affect: Mood normal.          Additional Data:     Labs:  Results from last 7 days   Lab Units 01/11/24  0433 01/07/24  1821 01/07/24  1020   WBC Thousand/uL 4.86   < > 7.10   HEMOGLOBIN g/dL 12.5   < > 11.7*   I STAT HEMOGLOBIN g/dl  --   --  11.2*   HEMATOCRIT % 38.9   < > 37.5   HEMATOCRIT, ISTAT %  --   --  33*   PLATELETS Thousands/uL 208   < > 173   NEUTROS PCT %  --   --  85*   LYMPHS PCT %  --   --  7*   MONOS PCT %  --   --  7   EOS PCT %  --   --  1    < > = values in this interval not displayed.     Results from last 7 days   Lab Units 01/11/24  0433 01/10/24  0445   SODIUM mmol/L 142 141   POTASSIUM mmol/L 4.1 3.2*   CHLORIDE mmol/L 110* 108   CO2 mmol/L 27 26   BUN mg/dL 18 16   CREATININE mg/dL 0.83 0.83   ANION GAP mmol/L 5 7   CALCIUM mg/dL 9.0 8.9   ALBUMIN g/dL  --  3.4*   TOTAL BILIRUBIN mg/dL  --  0.66   ALK PHOS U/L  --  55   ALT U/L  --  8   AST U/L  --  13   GLUCOSE RANDOM mg/dL 103 97     Results from last 7 days   Lab Units 01/07/24  1020   INR  1.00             Results from last 7 days   Lab Units 01/07/24  1051   LACTIC ACID mmol/L 1.1   PROCALCITONIN ng/ml <0.05       Lines/Drains:  Invasive Devices       Peripheral Intravenous Line  Duration             Peripheral IV 01/11/24 Dorsal (posterior);Left Forearm 2 days              Drain  Duration             Urethral Catheter 5 days                  Urinary Catheter:  Goal for removal: N/A - Chronic Pike        Imaging: Reviewed radiology reports from this admission including: chest xray  TRAUMA - CT head wo contrast   Final Result by Tk Maxwell MD (01/07 1056)      No acute intracranial abnormality.      I personally discussed this study with JADEN DE ANDA on 1/7/2024 10:55 AM.               Workstation performed: [id number]         TRAUMA - CT spine cervical wo contrast   Final Result by Tk Maxwell MD (01/07 1056)      No cervical spine fracture or traumatic malalignment.      I personally discussed this study with  JADEN ADILSON on 1/7/2024 10:55 AM.                        Workstation performed: [id number]         XR trauma multiple   Final Result by Baltazar Bobby MD (01/08 0633)      No acute cardiopulmonary disease within limitations of supine imaging.               Workstation performed: [id number]         XR chest 1 view   Final Result by Baltazar Bobby MD (01/08 0751)      No acute cardiopulmonary disease within limitations of supine imaging.               Workstation performed: [id number]             Recent Cultures (last 7 days):   Results from last 7 days   Lab Units 01/07/24  1209 01/07/24  1051   BLOOD CULTURE   --  No Growth After 5 Days.  No Growth After 5 Days.   URINE CULTURE  >100,000 cfu/ml Methicillin Resistant Staphylococcus aureus*  --        Last 24 Hours Medication List:   Current Facility-Administered Medications   Medication Dose Route Frequency Provider Last Rate    acetaminophen  975 mg Oral Q8H Sean Mims MD      bisacodyl  10 mg Rectal Daily PRN Marta Jamison MD      vitamin B-12  1,000 mcg Oral Daily Sherley Turner MD      divalproex sodium  250 mg Oral QAM Sean Mims MD      divalproex sodium  250 mg Oral HS Sean Mims MD      docusate sodium  100 mg Oral BID Sherley Turner MD      enoxaparin  40 mg Subcutaneous Daily Sherley Turner MD      finasteride  5 mg Oral Daily Sherley Turner MD      LORazepam  0.5 mg Oral TID Marta Jamison MD      melatonin  3 mg Oral HS Sean Mims MD      mirtazapine  15 mg Oral HS Sean Mims MD      OLANZapine  5 mg Intramuscular Q4H PRN Teresa Nation MD      polyethylene glycol  17 g Oral Daily Sherley Turner MD      QUEtiapine  25 mg Oral BID Sean Mims MD      senna  2 tablet Oral BID Marta Jamison MD      sterile water             Nutrition Assessment and Intervention:     Reviewed food recall journal      Physical Activity Assessment and Intervention:    Activity journal reviewed       Today, Patient Was Seen By: May  Jeana Nation MD    **Please Note: This note may have been constructed using a voice recognition system.**

## 2024-01-13 NOTE — PLAN OF CARE
Problem: INFECTION - ADULT  Goal: Absence or prevention of progression during hospitalization  Description: INTERVENTIONS:  - Assess and monitor for signs and symptoms of infection  - Monitor lab/diagnostic results  - Monitor all insertion sites, i.e. indwelling lines, tubes, and drains  - Monitor endotracheal if appropriate and nasal secretions for changes in amount and color  - [place] appropriate cooling/warming therapies per order  - Administer medications as ordered  - Instruct and encourage patient and family to use good hand hygiene technique  - Identify and instruct in appropriate isolation precautions for identified infection/condition  Outcome: Progressing

## 2024-01-14 PROCEDURE — 99232 SBSQ HOSP IP/OBS MODERATE 35: CPT | Performed by: INTERNAL MEDICINE

## 2024-01-14 RX ADMIN — DIVALPROEX SODIUM 250 MG: 250 TABLET, DELAYED RELEASE ORAL at 08:25

## 2024-01-14 RX ADMIN — ACETAMINOPHEN 975 MG: 325 TABLET, FILM COATED ORAL at 13:04

## 2024-01-14 RX ADMIN — Medication 3 MG: at 20:39

## 2024-01-14 RX ADMIN — QUETIAPINE FUMARATE 25 MG: 25 TABLET ORAL at 16:55

## 2024-01-14 RX ADMIN — LORAZEPAM 0.5 MG: 0.5 TABLET ORAL at 20:39

## 2024-01-14 RX ADMIN — DOCUSATE SODIUM 100 MG: 100 CAPSULE, LIQUID FILLED ORAL at 08:11

## 2024-01-14 RX ADMIN — LORAZEPAM 0.5 MG: 0.5 TABLET ORAL at 13:04

## 2024-01-14 RX ADMIN — SENNOSIDES 17.2 MG: 8.6 TABLET, FILM COATED ORAL at 08:11

## 2024-01-14 RX ADMIN — FINASTERIDE 5 MG: 5 TABLET, FILM COATED ORAL at 08:11

## 2024-01-14 RX ADMIN — POLYETHYLENE GLYCOL 3350 17 G: 17 POWDER, FOR SOLUTION ORAL at 08:11

## 2024-01-14 RX ADMIN — QUETIAPINE FUMARATE 25 MG: 25 TABLET ORAL at 08:11

## 2024-01-14 RX ADMIN — ACETAMINOPHEN 975 MG: 325 TABLET, FILM COATED ORAL at 05:30

## 2024-01-14 RX ADMIN — MIRTAZAPINE 15 MG: 15 TABLET, FILM COATED ORAL at 20:39

## 2024-01-14 RX ADMIN — ENOXAPARIN SODIUM 40 MG: 40 INJECTION SUBCUTANEOUS at 08:11

## 2024-01-14 RX ADMIN — CYANOCOBALAMIN TAB 500 MCG 1000 MCG: 500 TAB at 08:11

## 2024-01-14 RX ADMIN — DIVALPROEX SODIUM 250 MG: 250 TABLET, DELAYED RELEASE ORAL at 16:54

## 2024-01-14 RX ADMIN — LORAZEPAM 0.5 MG: 0.5 TABLET ORAL at 08:11

## 2024-01-14 NOTE — PLAN OF CARE
Problem: PAIN - ADULT  Goal: Verbalizes/displays adequate comfort level or baseline comfort level  Description: Interventions:  - Encourage patient to monitor pain and request assistance  - Assess pain using appropriate pain scale  - Administer analgesics based on type and severity of pain and evaluate response  - Implement non-pharmacological measures as appropriate and evaluate response  - Consider cultural and social influences on pain and pain management  - Notify physician/advanced practitioner if interventions unsuccessful or patient reports new pain  Outcome: Progressing     Problem: INFECTION - ADULT  Goal: Absence or prevention of progression during hospitalization  Description: INTERVENTIONS:  - Assess and monitor for signs and symptoms of infection  - Monitor lab/diagnostic results  - Monitor all insertion sites, i.e. indwelling lines, tubes, and drains  - Monitor endotracheal if appropriate and nasal secretions for changes in amount and color  - [place] appropriate cooling/warming therapies per order  - Administer medications as ordered  - Instruct and encourage patient and family to use good hand hygiene technique  - Identify and instruct in appropriate isolation precautions for identified infection/condition  Outcome: Progressing

## 2024-01-14 NOTE — ASSESSMENT & PLAN NOTE
Patient has a history of Alzheimer's dementia.  At baseline patient can hold conversations but does get confused.  He is also able to feed and clothe himself with dementia.  However, patient more confused at this point in time.  Patient slept throughout the course of exam, and was unarousable to gentle shaking.  Per son, patient's medications have been adjusted to increase the dosage of his dementia medications.  Concern for encephalopathy at this point  Patient is oriented to himself however not to time and place  According to the son patient has waxing and waning mentation  Patient had a episodes of agitation or combative behavior last evening, received one dose of olanzapine 2.5 mg   Patient has been off restraints for the past 24 hours  Discontinuing observation  Switching olanzapine from IM to oral  Had discussion with family about goals of care, provided them with information regarding prognosis.  1 person from the family agreed to stay with the patient for 24 hours while he is off restraints  Patient was able to walk with the nurse in the hallway without any agitation or trouble  Patient is currently not in restraints      PLAN:  Discussed with nursing to walk patient 2-3 times a day around unit quarter as this seems to help his calm down during the day  Tylenol 975 every 8 scheduled  Depakote 250 to three times daily  Seroquel to 25 Mg bid   Mirtazapine to 15 Mg at 8 PM  Melatonin at 8 PM  Oral Olanzapine 2.5 Mg every q8 hr as needed  Ativan 0.5 Mg 3 times daily

## 2024-01-14 NOTE — ASSESSMENT & PLAN NOTE
Patient presented to the ED after nursing facility found patient to have abrasions on his forehead.  The facility believes the patient fell out of bed last night and got himself back and without telling anybody. He was more confused that usual. Patient was brought to the ED via the trauma pathway, where trauma workup and CT imaging were negative.  Patient's lab work was unremarkable, and vitals have remained stable.    PLAN:  PT/OT evaluations and recommendations appreciated  PT level 2, OT level 3  DC plan to go back to his prior facility [facility] if patient is tolerating being off restraints and one-to-one for 48 hours

## 2024-01-14 NOTE — PROGRESS NOTES
[facility]  Progress Note  Name: Avni Ruiz I  MRN: [id number]  Unit/Bed#: S -01 I Date of Admission: 1/7/2024   Date of Service: 1/14/2024 I Hospital Day: 7    Assessment/Plan   * Metabolic encephalopathy  Assessment & Plan  Patient has history of Alzheimer's dementia  Patient is not oriented to time place or person  Patient had multiple medication changes in the past few weeks  Metabolic encephalopathy 2/2 UTI and polypharmacy  Geriatrics consulted and recommendations appreciated.  Patient had a episodes of agitation or combative behavior last evening, received one dose of olanzapine 2.5 mg   Pt was put back in restraints     PLAN:  Continual observation  Try to wean off the restraints   Tylenol 975 every 8 scheduled  Medication adjustment can be seen in dementia management    Polypharmacy  Assessment & Plan  Geriatrics consulted    PLAN:  See plan acute encephalopathy    UTI (urinary tract infection)  Assessment & Plan  Patient has a chronic Pike, was replaced in the ED on admission  Patient has history of urinary retention has been following up with urology outpatient  Urology was consulted, offered patient's family Pike's catheter versus CIC, patient's family agreed on Pike's  Patient was afebrile overnight  Has no suprapubic or abdominal pain on palpation  No leukocytosis    PLAN:  Completed 3 days of IV ceftriaxone  Monitor for signs of infection    Abnormal urine finding  Assessment & Plan  On admission patient had a urinalysis that was significant for leukocytes and nitrites.  Unable to assess with patient is having any pain or burning with urination.  He does have a history of cystitis.  Per son, patient has been receiving catheterization and has tried voiding trials but was unsuccessful.  Per son, patient has had a Pike in place since Tuesday which was placed by his urologist.  Urine culture shows Staph aureus  Urology consulted, offered patient's family  options of Pike's versus CIC, patient's family agreed on continuation of Pike    Plans:  Completed 3 days of IV ceftriaxone  Monitoring off antibiotics    BPH (benign prostatic hyperplasia)  Assessment & Plan  Patient was on dutasteride and Flomax at the nursing facility  Urology recommended discontinuation of the tamsulosin    PLAN:  Discontinue tamsulosin  Continue finasteride 5 Mg daily    Constipation  Assessment & Plan  Continue MiraLAX and senna    Dementia with agitation and other behavioral disturbance (HCC)  Assessment & Plan  Patient has a history of Alzheimer's dementia.  At baseline patient can hold conversations but does get confused.  He is also able to feed and clothe himself with dementia.  However, patient more confused at this point in time.  Patient slept throughout the course of exam, and was unarousable to gentle shaking.  Per son, patient's medications have been adjusted to increase the dosage of his dementia medications.  Concern for encephalopathy at this point  Patient is oriented to himself however not to time and place  According to the son patient has waxing and waning mentation  Patient had a episodes of agitation or combative behavior last evening, received one dose of olanzapine 2.5 mg   Pt was put back in restraints     PLAN:  Continual observation  Try to wean off the restraints   Tylenol 975 every 8 scheduled  Depakote 250 twice daily  Seroquel to 25 Mg bid   Mirtazapine to 15 Mg at 8 PM  Melatonin at 8 PM  IM Olanzapine 5 Mg every q4 hr as needed  Ativan 0.5 Mg 3 times daily    Ambulatory dysfunction  Assessment & Plan  Patient presented to the ED after nursing facility found patient to have abrasions on his forehead.  The facility believes the patient fell out of bed last night and got himself back and without telling anybody. He was more confused that usual. Patient was brought to the ED via the trauma pathway, where trauma workup and CT imaging were negative.  Patient's lab  work was unremarkable, and vitals have remained stable.    PLAN:  PT/OT evaluations and recommendations appreciated  PT level 2, OT level 3  DC plan to go back to his prior facility [facility] if patient is tolerating being off restraints and one-to-one for 48 hours               VTE Pharmacologic Prophylaxis: VTE Score: 5 High Risk (Score >/= 5) - Pharmacological DVT Prophylaxis Ordered: enoxaparin (Lovenox). Sequential Compression Devices Ordered.    Mobility:   Basic Mobility Inpatient Raw Score: 14  -HLM Goal: 4: Move to chair/commode  JH-HLM Achieved: 4: Move to chair/commode  HLM Goal NOT achieved. Continue with multidisciplinary rounding and encourage appropriate mobility to improve upon HLM goals.    Patient Centered Rounds: I performed bedside rounds with nursing staff today.   Discussions with Specialists or Other Care Team Provider: Geriatrics     Education and Discussions with Family / Patient: Updated  (wife) via phone.    Total Time Spent on Date of Encounter in care of patient: 45 mins. This time was spent on one or more of the following: performing physical exam; counseling and coordination of care; obtaining or reviewing history; documenting in the medical record; reviewing/ordering tests, medications or procedures; communicating with other healthcare professionals and discussing with patient's family/caregivers.    Current Length of Stay: 7 day(s)  Current Patient Status: Inpatient   Certification Statement: The patient will continue to require additional inpatient hospital stay due to management of acute encephalopathy   Discharge Plan: Anticipate discharge in 24-48 hrs to discharge location to be determined pending rehab evaluations.    Code Status: Level 1 - Full Code    Subjective:   Patient is alert, not oriented to time place or person.  Patient had no episodes of agitation or combative behavior last evening. Pt is taken off from the restraints but is still under continual  observation.  Objective:     Vitals:   Temp (24hrs), Av.1 °F (36.7 °C), Min:97.6 °F (36.4 °C), Max:98.6 °F (37 °C)    Temp:  [97.6 °F (36.4 °C)-98.6 °F (37 °C)] 98.6 °F (37 °C)  HR:  [78-91] 78  BP: (125-149)/(70-88) 125/76  SpO2:  [99 %-100 %] 99 %  Body mass index is 24.73 kg/m².     Input and Output Summary (last 24 hours):     Intake/Output Summary (Last 24 hours) at 2024 1252  Last data filed at 2024 0823  Gross per 24 hour   Intake --   Output 1400 ml   Net -1400 ml       Physical Exam:   Physical Exam  Vitals and nursing note reviewed.   Constitutional:       General: He is not in acute distress.     Appearance: He is well-developed.      Comments: Frail looking   HENT:      Head: Normocephalic.      Right Ear: External ear normal.      Left Ear: External ear normal.      Nose: Nose normal.      Mouth/Throat:      Mouth: Mucous membranes are moist.      Pharynx: Oropharynx is clear.   Eyes:      Extraocular Movements: Extraocular movements intact.      Conjunctiva/sclera: Conjunctivae normal.   Cardiovascular:      Rate and Rhythm: Normal rate and regular rhythm.      Heart sounds: No murmur heard.  Pulmonary:      Effort: Pulmonary effort is normal. No respiratory distress.      Breath sounds: Normal breath sounds.   Abdominal:      General: Abdomen is flat. Bowel sounds are normal.      Palpations: Abdomen is soft.      Tenderness: There is no abdominal tenderness.   Genitourinary:     Comments: Pike catheter  Musculoskeletal:         General: No swelling.      Cervical back: Neck supple.      Right lower leg: No edema.      Left lower leg: No edema.   Skin:     General: Skin is warm and dry.      Capillary Refill: Capillary refill takes less than 2 seconds.   Neurological:      Mental Status: He is alert. He is disoriented.          Additional Data:     Labs:  Results from last 7 days   Lab Units 24  0433   WBC Thousand/uL 4.86   HEMOGLOBIN g/dL 12.5   HEMATOCRIT % 38.9   PLATELETS  Thousands/uL 208     Results from last 7 days   Lab Units 01/11/24  0433 01/10/24  0445   SODIUM mmol/L 142 141   POTASSIUM mmol/L 4.1 3.2*   CHLORIDE mmol/L 110* 108   CO2 mmol/L 27 26   BUN mg/dL 18 16   CREATININE mg/dL 0.83 0.83   ANION GAP mmol/L 5 7   CALCIUM mg/dL 9.0 8.9   ALBUMIN g/dL  --  3.4*   TOTAL BILIRUBIN mg/dL  --  0.66   ALK PHOS U/L  --  55   ALT U/L  --  8   AST U/L  --  13   GLUCOSE RANDOM mg/dL 103 97                           Lines/Drains:  Invasive Devices       Peripheral Intravenous Line  Duration             Peripheral IV 01/14/24 Left Forearm <1 day              Drain  Duration             Urethral Catheter 6 days                  Urinary Catheter:  Goal for removal: N/A - Chronic Pike               Imaging: No pertinent imaging reviewed.    Recent Cultures (last 7 days):           Last 24 Hours Medication List:   Current Facility-Administered Medications   Medication Dose Route Frequency Provider Last Rate    acetaminophen  975 mg Oral Q8H Sean Mims MD      bisacodyl  10 mg Rectal Daily PRN Marta Jamison MD      vitamin B-12  1,000 mcg Oral Daily Sherley Turner MD      divalproex sodium  250 mg Oral QAM Sean Mims MD      divalproex sodium  250 mg Oral HS Sean Mims MD      docusate sodium  100 mg Oral BID Sherley Turner MD      enoxaparin  40 mg Subcutaneous Daily Sherley Turner MD      finasteride  5 mg Oral Daily Sherley Turner MD      LORazepam  0.5 mg Oral TID Marta Jamison MD      melatonin  3 mg Oral HS Sean Mims MD      mirtazapine  15 mg Oral HS Sean Mims MD      OLANZapine  5 mg Intramuscular Q4H PRN May Jeana Nation MD      polyethylene glycol  17 g Oral Daily Sherley Turner MD      QUEtiapine  25 mg Oral BID Sean Mims MD      senna  2 tablet Oral BID Marta Jamison MD          Today, Patient Was Seen By: Sean Mims MD    **Please Note: This note may have been constructed using a voice recognition system.**

## 2024-01-14 NOTE — ASSESSMENT & PLAN NOTE
Patient has history of Alzheimer's dementia  Patient is not oriented to time place or person  Patient had multiple medication changes in the past few weeks  Metabolic encephalopathy 2/2 UTI and polypharmacy  Geriatrics consulted and recommendations appreciated.  Patient had a episodes of agitation or combative behavior last evening, received one dose of olanzapine 2.5 mg   Patient has been off restraints for over 24 hours  Discontinuing continued observation  Switch olanzapine from IM to oral  Patient was able to walk with the nurse in the hallway without any agitation or trouble  Patient is currently not in restraints    PLAN:  Discontinued continual observation  Tylenol 975 every 8 scheduled  Medication adjustment can be seen in dementia management

## 2024-01-15 LAB
ANION GAP SERPL CALCULATED.3IONS-SCNC: 6 MMOL/L
ANION GAP SERPL CALCULATED.3IONS-SCNC: 6 MMOL/L
BUN SERPL-MCNC: 25 MG/DL (ref 5–25)
BUN SERPL-MCNC: 25 MG/DL (ref 5–25)
CALCIUM SERPL-MCNC: 9.3 MG/DL (ref 8.4–10.2)
CALCIUM SERPL-MCNC: 9.3 MG/DL (ref 8.4–10.2)
CHLORIDE SERPL-SCNC: 108 MMOL/L (ref 96–108)
CHLORIDE SERPL-SCNC: 108 MMOL/L (ref 96–108)
CO2 SERPL-SCNC: 26 MMOL/L (ref 21–32)
CO2 SERPL-SCNC: 26 MMOL/L (ref 21–32)
CREAT SERPL-MCNC: 0.86 MG/DL (ref 0.6–1.3)
CREAT SERPL-MCNC: 0.86 MG/DL (ref 0.6–1.3)
ERYTHROCYTE [DISTWIDTH] IN BLOOD BY AUTOMATED COUNT: 13.3 % (ref 11.6–15.1)
ERYTHROCYTE [DISTWIDTH] IN BLOOD BY AUTOMATED COUNT: 13.3 % (ref 11.6–15.1)
GFR SERPL CREATININE-BSD FRML MDRD: 80 ML/MIN/1.73SQ M
GFR SERPL CREATININE-BSD FRML MDRD: 80 ML/MIN/1.73SQ M
GLUCOSE SERPL-MCNC: 85 MG/DL (ref 65–140)
GLUCOSE SERPL-MCNC: 85 MG/DL (ref 65–140)
HCT VFR BLD AUTO: 44.9 % (ref 36.5–49.3)
HCT VFR BLD AUTO: 44.9 % (ref 36.5–49.3)
HGB BLD-MCNC: 14.3 G/DL (ref 12–17)
HGB BLD-MCNC: 14.3 G/DL (ref 12–17)
MCH RBC QN AUTO: 29.1 PG (ref 26.8–34.3)
MCH RBC QN AUTO: 29.1 PG (ref 26.8–34.3)
MCHC RBC AUTO-ENTMCNC: 31.8 G/DL (ref 31.4–37.4)
MCHC RBC AUTO-ENTMCNC: 31.8 G/DL (ref 31.4–37.4)
MCV RBC AUTO: 91 FL (ref 82–98)
MCV RBC AUTO: 91 FL (ref 82–98)
PLATELET # BLD AUTO: 200 THOUSANDS/UL (ref 149–390)
PLATELET # BLD AUTO: 200 THOUSANDS/UL (ref 149–390)
PMV BLD AUTO: 10.8 FL (ref 8.9–12.7)
PMV BLD AUTO: 10.8 FL (ref 8.9–12.7)
POTASSIUM SERPL-SCNC: 4.2 MMOL/L (ref 3.5–5.3)
POTASSIUM SERPL-SCNC: 4.2 MMOL/L (ref 3.5–5.3)
RBC # BLD AUTO: 4.91 MILLION/UL (ref 3.88–5.62)
RBC # BLD AUTO: 4.91 MILLION/UL (ref 3.88–5.62)
SODIUM SERPL-SCNC: 140 MMOL/L (ref 135–147)
SODIUM SERPL-SCNC: 140 MMOL/L (ref 135–147)
WBC # BLD AUTO: 7.5 THOUSAND/UL (ref 4.31–10.16)
WBC # BLD AUTO: 7.5 THOUSAND/UL (ref 4.31–10.16)

## 2024-01-15 PROCEDURE — 99232 SBSQ HOSP IP/OBS MODERATE 35: CPT | Performed by: INTERNAL MEDICINE

## 2024-01-15 PROCEDURE — 80048 BASIC METABOLIC PNL TOTAL CA: CPT

## 2024-01-15 PROCEDURE — 99232 SBSQ HOSP IP/OBS MODERATE 35: CPT | Performed by: FAMILY MEDICINE

## 2024-01-15 PROCEDURE — 85027 COMPLETE CBC AUTOMATED: CPT

## 2024-01-15 RX ORDER — OLANZAPINE 2.5 MG/1
2.5 TABLET, FILM COATED ORAL EVERY 4 HOURS PRN
Status: DISCONTINUED | OUTPATIENT
Start: 2024-01-15 | End: 2024-01-16

## 2024-01-15 RX ORDER — OLANZAPINE 2.5 MG/1
2.5 TABLET, FILM COATED ORAL ONCE
Status: DISCONTINUED | OUTPATIENT
Start: 2024-01-15 | End: 2024-01-29 | Stop reason: HOSPADM

## 2024-01-15 RX ADMIN — LORAZEPAM 0.5 MG: 0.5 TABLET ORAL at 12:52

## 2024-01-15 RX ADMIN — Medication 3 MG: at 19:32

## 2024-01-15 RX ADMIN — QUETIAPINE FUMARATE 25 MG: 25 TABLET ORAL at 17:10

## 2024-01-15 RX ADMIN — DIVALPROEX SODIUM 250 MG: 250 TABLET, DELAYED RELEASE ORAL at 17:10

## 2024-01-15 RX ADMIN — DOCUSATE SODIUM 100 MG: 100 CAPSULE, LIQUID FILLED ORAL at 08:50

## 2024-01-15 RX ADMIN — ENOXAPARIN SODIUM 40 MG: 40 INJECTION SUBCUTANEOUS at 08:50

## 2024-01-15 RX ADMIN — MIRTAZAPINE 15 MG: 15 TABLET, FILM COATED ORAL at 19:32

## 2024-01-15 RX ADMIN — LORAZEPAM 0.5 MG: 0.5 TABLET ORAL at 08:50

## 2024-01-15 RX ADMIN — DIVALPROEX SODIUM 250 MG: 250 TABLET, DELAYED RELEASE ORAL at 08:51

## 2024-01-15 RX ADMIN — ACETAMINOPHEN 975 MG: 325 TABLET, FILM COATED ORAL at 23:44

## 2024-01-15 RX ADMIN — OLANZAPINE 2.5 MG: 2.5 TABLET, FILM COATED ORAL at 19:32

## 2024-01-15 RX ADMIN — OLANZAPINE 2.5 MG: 2.5 TABLET, FILM COATED ORAL at 13:51

## 2024-01-15 RX ADMIN — CYANOCOBALAMIN TAB 500 MCG 1000 MCG: 500 TAB at 08:50

## 2024-01-15 RX ADMIN — QUETIAPINE FUMARATE 25 MG: 25 TABLET ORAL at 08:50

## 2024-01-15 RX ADMIN — LORAZEPAM 0.5 MG: 0.5 TABLET ORAL at 19:32

## 2024-01-15 RX ADMIN — FINASTERIDE 5 MG: 5 TABLET, FILM COATED ORAL at 08:50

## 2024-01-15 RX ADMIN — ACETAMINOPHEN 975 MG: 325 TABLET, FILM COATED ORAL at 12:53

## 2024-01-15 RX ADMIN — SENNOSIDES 17.2 MG: 8.6 TABLET, FILM COATED ORAL at 08:50

## 2024-01-15 NOTE — PROGRESS NOTES
Progress Note - Geriatric Medicine   Avni Ruiz 83 y.o. male MRN: [id number]  Unit/Bed#: S -01 Encounter: [id number]      Assessment/Plan:  Polypharmacy  Assessment & Plan  Prior to admission to the hospital patient was on multiple medication  Continue to taper off lorazepam as tolerated  Lexapro was discontinued  Use Zyprexa 2.5 mg only for severe agitation    UTI (urinary tract infection)  Assessment & Plan  Urine culture grew Staphylococcus aureus  Completed ceftriaxone  Pike catheter in place  Encourage p.o. hydration    BPH (benign prostatic hyperplasia)  Assessment & Plan  Continue finasteride and Flomax  Pike catheter in place      Constipation  Assessment & Plan  Last bowel movement was yesterday  Continue senna 2 tablets twice daily MiraLAX daily  Add Dulcolax suppository as needed  Encourage p.o. hydration and out of bed as tolerated    Dementia with agitation and other behavioral disturbance (HCC)  Assessment & Plan  Patient presented to the hospital with change in mental status.  He has Alzheimer's dementia with behavioral disturbance at baseline recently behaviors are getting worse  He lives at [facility] memory care unit  Will continue to provide supportive care, reorient as needed.  Patient is at high risk for delirium, will monitor closely and place on delirium precautions.  Maintain sleep/wake cycle.  Optimize pain regimen.  Monitor for constipation and urinary retention and manage as needed.  B12 and TSH within normal limits  Encourage family to visit.  Encourage to wear glasses and hearing aids while awake.  Encourage po intake, assist with feeding if needed.   continue Depakote to 250 mg in the morning and 250mg administer at 4 PM  Continue lorazepam 0.5 mg p.o. 3 times daily and continue to taper off slowly  continue mirtazapine to 15 mg administer at 8 PM along with melatonin   Continue Seroquel to 25 mg twice daily, administer in a.m. and 6 PM      Ambulatory  dysfunction  Assessment & Plan  Patient presented status post fall at the facility  CT head and neck with no acute pathology  Monitor orthostatic vital signs  Encourage p.o. hydration  Avoid hypotension and hypoglycemia   PT OT evaluation    * Metabolic encephalopathy  Assessment & Plan  -improvement noted , seems to be at baseline  -Patient is high risk of delirium due to dementia with behavioral disturbance at baseline, UTI, insomnia, change in environment  -Initiate delirium precautions  -maintain normal sleep/wake cycle  -minimize overnight interruptions, group overnight vitals/labs/nursing checks as possible  -dim lights, close blinds and turn off tv to minimize stimulation and encourage sleep environment in evenings  -ensure that pain is well controlled continue Tylenol 975mg Q8H scheduled   -monitor for fecal and urinary retention which may precipitate delirium  -encourage early mobilization and ambulation  -provide frequent reorientation and redirection  -encourage family and friends at the bedside to help calm patient if anxious  -avoid medications which may precipitate or worsen delirium such as tramadol, benzodiazepine, anticholinergics, and antihistaminics  -encourage hydration and nutrition , assist with feeding if needed  -redirect unwanted behaviors as first line, avoid physical restraints.   -continue Depakote 250 mg p.o. in the morning and 250 mg p.o. at 4 pm-monitor CBC CMP  -Seroquel 25 mg p.o. in the morning and 6 PM  -Continue lorazepam 0.5 mg 3 times a day 9am, 1pm, 8pm  -continue mirtazapine to 15 mg at 8 PM  -For severe agitation only may use olanzapine 2.5 mg every 8 hours as needed, dose administered on 1/13  -Currently off restraints       Subjective:   Seen and examined at bedside for geriatric follow-up.  At the time of encounter he was slightly restless was able to answer few simple questions.  Per nursing staff he ate all his breakfast.    Review of Systems   Unable to perform ROS:  "Dementia         Objective:     Vitals: Blood pressure 107/71, pulse 71, temperature 98.2 °F (36.8 °C), temperature source Oral, resp. rate 17, height 5' 6\" (1.676 m), weight 69.5 kg (153 lb 3.5 oz), SpO2 97%.,Body mass index is 24.73 kg/m².      Intake/Output Summary (Last 24 hours) at 1/15/2024 1323  Last data filed at 1/15/2024 0900  Gross per 24 hour   Intake --   Output 1000 ml   Net -1000 ml       Current Medications: Reviewed    Physical Exam:   Physical Exam  Vitals and nursing note reviewed.   Constitutional:       General: He is not in acute distress.     Appearance: He is well-developed.      Comments: Frail looking   HENT:      Head: Normocephalic and atraumatic.      Ears:      Comments: [place]  Eyes:      Conjunctiva/sclera: Conjunctivae normal.   Cardiovascular:      Rate and Rhythm: Normal rate and regular rhythm.      Heart sounds: No murmur heard.  Pulmonary:      Effort: Pulmonary effort is normal. No respiratory distress.      Breath sounds: Normal breath sounds.   Abdominal:      Palpations: Abdomen is soft.      Tenderness: There is no abdominal tenderness.   Genitourinary:     Comments: Pike cath  Musculoskeletal:         General: No swelling.      Cervical back: Neck supple.      Right lower leg: No edema.      Left lower leg: No edema.   Skin:     General: Skin is warm and dry.      Capillary Refill: Capillary refill takes less than 2 seconds.   Neurological:      Mental Status: He is alert.      Comments: AAOx1   Psychiatric:         Mood and Affect: Mood normal.          Invasive Devices       Peripheral Intravenous Line  Duration             Peripheral IV 01/14/24 Left Forearm 1 day              Drain  Duration             Urethral Catheter 7 days                    Lab, Imaging and other studies: I have personally reviewed pertinent reports.     "

## 2024-01-15 NOTE — PLAN OF CARE
Problem: PAIN - ADULT  Goal: Verbalizes/displays adequate comfort level or baseline comfort level  Description: Interventions:  - Encourage patient to monitor pain and request assistance  - Assess pain using appropriate pain scale  - Administer analgesics based on type and severity of pain and evaluate response  - Implement non-pharmacological measures as appropriate and evaluate response  - Consider cultural and social influences on pain and pain management  - Notify physician/advanced practitioner if interventions unsuccessful or patient reports new pain  Outcome: Progressing     Problem: INFECTION - ADULT  Goal: Absence or prevention of progression during hospitalization  Description: INTERVENTIONS:  - Assess and monitor for signs and symptoms of infection  - Monitor lab/diagnostic results  - Monitor all insertion sites, i.e. indwelling lines, tubes, and drains  - Monitor endotracheal if appropriate and nasal secretions for changes in amount and color  - [place] appropriate cooling/warming therapies per order  - Administer medications as ordered  - Instruct and encourage patient and family to use good hand hygiene technique  - Identify and instruct in appropriate isolation precautions for identified infection/condition  Outcome: Progressing     Problem: SAFETY ADULT  Goal: Patient will remain free of falls  Description: INTERVENTIONS:  - Educate patient/family on patient safety including physical limitations  - Instruct patient to call for assistance with activity   - Consult OT/PT to assist with strengthening/mobility   - Keep Call bell within reach  - Keep bed low and locked with side rails adjusted as appropriate  - Keep care items and personal belongings within reach  - Initiate and maintain comfort rounds  - Make Fall Risk Sign visible to staff  - Apply yellow socks and bracelet for high fall risk patients  - Consider moving patient to room near nurses station  Outcome: Progressing

## 2024-01-15 NOTE — PROGRESS NOTES
[facility]  Progress Note  Name: Avni Ruiz I  MRN: [id number]  Unit/Bed#: S -01 I Date of Admission: 1/7/2024   Date of Service: 1/15/2024 I Hospital Day: 8    Assessment/Plan   * Metabolic encephalopathy  Assessment & Plan  Patient has history of Alzheimer's dementia  Patient is not oriented to time place or person  Patient had multiple medication changes in the past few weeks  Metabolic encephalopathy 2/2 UTI and polypharmacy  Geriatrics consulted and recommendations appreciated.  Patient had a episodes of agitation or combative behavior last evening, received one dose of olanzapine 2.5 mg   Patient has been off restraints for over 24 hours  Discontinuing continued observation  Switch olanzapine from IM to oral    PLAN:  Discontinue continual observation  Tylenol 975 every 8 scheduled  Medication adjustment can be seen in dementia management    Polypharmacy  Assessment & Plan  Geriatrics consulted    PLAN:  See plan acute encephalopathy    UTI (urinary tract infection)  Assessment & Plan  Patient has a chronic Pike, was replaced in the ED on admission  Patient has history of urinary retention has been following up with urology outpatient  Urology was consulted, offered patient's family Pike's catheter versus CIC, patient's family agreed on Pike's  Patient was afebrile overnight  Has no suprapubic or abdominal pain on palpation  No leukocytosis    PLAN:  Completed 3 days of IV ceftriaxone  Monitor for signs of infection    Abnormal urine finding  Assessment & Plan  On admission patient had a urinalysis that was significant for leukocytes and nitrites.  Unable to assess with patient is having any pain or burning with urination.  He does have a history of cystitis.  Per son, patient has been receiving catheterization and has tried voiding trials but was unsuccessful.  Per son, patient has had a Pike in place since Tuesday which was placed by his urologist.  Urine culture  shows Staph aureus  Urology consulted, offered patient's family options of Pike's versus CIC, patient's family agreed on continuation of Pike    Plans:  Completed 3 days of IV ceftriaxone  Monitoring off antibiotics    BPH (benign prostatic hyperplasia)  Assessment & Plan  Patient was on dutasteride and Flomax at the nursing facility  Urology recommended discontinuation of the tamsulosin    PLAN:  Discontinue tamsulosin  Continue finasteride 5 Mg daily    Constipation  Assessment & Plan  Continue MiraLAX and senna    Dementia with agitation and other behavioral disturbance (HCC)  Assessment & Plan  Patient has a history of Alzheimer's dementia.  At baseline patient can hold conversations but does get confused.  He is also able to feed and clothe himself with dementia.  However, patient more confused at this point in time.  Patient slept throughout the course of exam, and was unarousable to gentle shaking.  Per son, patient's medications have been adjusted to increase the dosage of his dementia medications.  Concern for encephalopathy at this point  Patient is oriented to himself however not to time and place  According to the son patient has waxing and waning mentation  Patient had a episodes of agitation or combative behavior last evening, received one dose of olanzapine 2.5 mg   Patient has been off restraints for the past 24 hours  Discontinuing observation  Switching olanzapine from IM to oral    PLAN:  Discontinue continual observation  Tylenol 975 every 8 scheduled  Depakote 250 twice daily  Seroquel to 25 Mg bid   Mirtazapine to 15 Mg at 8 PM  Melatonin at 8 PM  Oral Olanzapine 5 Mg every q4 hr as needed  Ativan 0.5 Mg 3 times daily    Ambulatory dysfunction  Assessment & Plan  Patient presented to the ED after nursing facility found patient to have abrasions on his forehead.  The facility believes the patient fell out of bed last night and got himself back and without telling anybody. He was more confused  that usual. Patient was brought to the ED via the trauma pathway, where trauma workup and CT imaging were negative.  Patient's lab work was unremarkable, and vitals have remained stable.    PLAN:  PT/OT evaluations and recommendations appreciated  PT level 2, OT level 3  DC plan to go back to his prior facility [facility] if patient is tolerating being off restraints and one-to-one for 48 hours               VTE Pharmacologic Prophylaxis: VTE Score: 5 High Risk (Score >/= 5) - Pharmacological DVT Prophylaxis Ordered: enoxaparin (Lovenox). Sequential Compression Devices Ordered.    Mobility:   Basic Mobility Inpatient Raw Score: 14  -HLM Goal: 4: Move to chair/commode  JH-HLM Achieved: 2: Bed activities/Dependent transfer  HLM Goal NOT achieved. Continue with multidisciplinary rounding and encourage appropriate mobility to improve upon HLM goals.    Patient Centered Rounds: I performed bedside rounds with nursing staff today.   Discussions with Specialists or Other Care Team Provider: Geriatrics     Education and Discussions with Family / Patient: Updated  (wife) at bedside.    Total Time Spent on Date of Encounter in care of patient: 45 mins. This time was spent on one or more of the following: performing physical exam; counseling and coordination of care; obtaining or reviewing history; documenting in the medical record; reviewing/ordering tests, medications or procedures; communicating with other healthcare professionals and discussing with patient's family/caregivers.    Current Length of Stay: 8 day(s)  Current Patient Status: Inpatient   Certification Statement: The patient will continue to require additional inpatient hospital stay due to management of acute encephalopathy   Discharge Plan: Anticipate discharge in 24-48 hrs to discharge location to be determined pending rehab evaluations.    Code Status: Level 1 - Full Code    Subjective:   Patient is alert, not oriented to time place or  person.  Patient had no episodes of agitation or combative behavior last evening. Pt is taken off from the restraints and is also not on continued observation.  objective:     Vitals:   Temp (24hrs), Av.2 °F (36.8 °C), Min:98.1 °F (36.7 °C), Max:98.2 °F (36.8 °C)    Temp:  [98.1 °F (36.7 °C)-98.2 °F (36.8 °C)] 98.2 °F (36.8 °C)  HR:  [71] 71  Resp:  [17] 17  BP: ()/(70-71) 107/71  SpO2:  [97 %] 97 %  Body mass index is 24.73 kg/m².     Input and Output Summary (last 24 hours):     Intake/Output Summary (Last 24 hours) at 1/15/2024 1311  Last data filed at 1/15/2024 0900  Gross per 24 hour   Intake --   Output 1000 ml   Net -1000 ml       Physical Exam:   Physical Exam  Vitals and nursing note reviewed.   Constitutional:       General: He is not in acute distress.     Appearance: He is well-developed.      Comments: Frail looking   HENT:      Head: Normocephalic.      Right Ear: External ear normal.      Left Ear: External ear normal.      Nose: Nose normal.      Mouth/Throat:      Mouth: Mucous membranes are moist.      Pharynx: Oropharynx is clear.   Eyes:      Extraocular Movements: Extraocular movements intact.      Conjunctiva/sclera: Conjunctivae normal.   Cardiovascular:      Rate and Rhythm: Normal rate and regular rhythm.      Heart sounds: No murmur heard.  Pulmonary:      Effort: Pulmonary effort is normal. No respiratory distress.      Breath sounds: Normal breath sounds.   Abdominal:      General: Abdomen is flat. Bowel sounds are normal.      Palpations: Abdomen is soft.      Tenderness: There is no abdominal tenderness.   Genitourinary:     Comments: Pike catheter  Musculoskeletal:         General: No swelling.      Cervical back: Neck supple.      Right lower leg: No edema.      Left lower leg: No edema.   Skin:     General: Skin is warm and dry.      Capillary Refill: Capillary refill takes less than 2 seconds.   Neurological:      Mental Status: He is alert. He is disoriented.           Additional Data:     Labs:  Results from last 7 days   Lab Units 01/15/24  0652   WBC Thousand/uL 7.50   HEMOGLOBIN g/dL 14.3   HEMATOCRIT % 44.9   PLATELETS Thousands/uL 200     Results from last 7 days   Lab Units 01/15/24  0652 01/11/24  0433 01/10/24  0445   SODIUM mmol/L 140   < > 141   POTASSIUM mmol/L 4.2   < > 3.2*   CHLORIDE mmol/L 108   < > 108   CO2 mmol/L 26   < > 26   BUN mg/dL 25   < > 16   CREATININE mg/dL 0.86   < > 0.83   ANION GAP mmol/L 6   < > 7   CALCIUM mg/dL 9.3   < > 8.9   ALBUMIN g/dL  --   --  3.4*   TOTAL BILIRUBIN mg/dL  --   --  0.66   ALK PHOS U/L  --   --  55   ALT U/L  --   --  8   AST U/L  --   --  13   GLUCOSE RANDOM mg/dL 85   < > 97    < > = values in this interval not displayed.                           Lines/Drains:  Invasive Devices       Peripheral Intravenous Line  Duration             Peripheral IV 01/14/24 Left Forearm 1 day              Drain  Duration             Urethral Catheter 7 days                  Urinary Catheter:  Goal for removal: N/A - Chronic Pike               Imaging: No pertinent imaging reviewed.    Recent Cultures (last 7 days):           Last 24 Hours Medication List:   Current Facility-Administered Medications   Medication Dose Route Frequency Provider Last Rate    acetaminophen  975 mg Oral Q8H Sean Mims MD      bisacodyl  10 mg Rectal Daily PRN Marta Jamison MD      vitamin B-12  1,000 mcg Oral Daily Sherley Turner MD      divalproex sodium  250 mg Oral QAM Sean Mims MD      divalproex sodium  250 mg Oral HS Sean Mims MD      docusate sodium  100 mg Oral BID Sherley Turner MD      enoxaparin  40 mg Subcutaneous Daily Sherley Turner MD      finasteride  5 mg Oral Daily Sherley Turner MD      LORazepam  0.5 mg Oral TID Marta Jamison MD      melatonin  3 mg Oral HS Sean Mims MD      mirtazapine  15 mg Oral HS Sean Mims MD      OLANZapine  2.5 mg Oral Q4H PRN Sean Mims MD      polyethylene glycol  17 g  Oral Daily Sherley Turner MD      QUEtiapine  25 mg Oral BID Sean Mims MD      senna  2 tablet Oral BID Marta Jamison MD          Today, Patient Was Seen By: Sean Mims MD    **Please Note: This note may have been constructed using a voice recognition system.**

## 2024-01-16 PROCEDURE — 99232 SBSQ HOSP IP/OBS MODERATE 35: CPT | Performed by: FAMILY MEDICINE

## 2024-01-16 PROCEDURE — 99232 SBSQ HOSP IP/OBS MODERATE 35: CPT | Performed by: INTERNAL MEDICINE

## 2024-01-16 RX ORDER — DIVALPROEX SODIUM 125 MG/1
250 CAPSULE, COATED PELLETS ORAL ONCE
Status: COMPLETED | OUTPATIENT
Start: 2024-01-16 | End: 2024-01-16

## 2024-01-16 RX ORDER — DIVALPROEX SODIUM 125 MG/1
250 CAPSULE, COATED PELLETS ORAL
Status: DISCONTINUED | OUTPATIENT
Start: 2024-01-16 | End: 2024-01-29 | Stop reason: HOSPADM

## 2024-01-16 RX ORDER — OLANZAPINE 2.5 MG/1
2.5 TABLET, FILM COATED ORAL EVERY 8 HOURS PRN
Status: DISCONTINUED | OUTPATIENT
Start: 2024-01-16 | End: 2024-01-29 | Stop reason: HOSPADM

## 2024-01-16 RX ADMIN — LORAZEPAM 0.5 MG: 0.5 TABLET ORAL at 09:58

## 2024-01-16 RX ADMIN — LORAZEPAM 0.5 MG: 0.5 TABLET ORAL at 19:52

## 2024-01-16 RX ADMIN — MIRTAZAPINE 15 MG: 15 TABLET, FILM COATED ORAL at 19:52

## 2024-01-16 RX ADMIN — OLANZAPINE 2.5 MG: 2.5 TABLET, FILM COATED ORAL at 16:47

## 2024-01-16 RX ADMIN — QUETIAPINE FUMARATE 25 MG: 25 TABLET ORAL at 09:58

## 2024-01-16 RX ADMIN — LORAZEPAM 0.5 MG: 0.5 TABLET ORAL at 13:53

## 2024-01-16 RX ADMIN — ENOXAPARIN SODIUM 40 MG: 40 INJECTION SUBCUTANEOUS at 09:58

## 2024-01-16 RX ADMIN — Medication 3 MG: at 19:52

## 2024-01-16 RX ADMIN — DIVALPROEX SODIUM 250 MG: 125 CAPSULE, COATED PELLETS ORAL at 14:54

## 2024-01-16 RX ADMIN — QUETIAPINE FUMARATE 25 MG: 25 TABLET ORAL at 16:47

## 2024-01-16 RX ADMIN — DIVALPROEX SODIUM 250 MG: 250 TABLET, DELAYED RELEASE ORAL at 09:59

## 2024-01-16 NOTE — PLAN OF CARE
Problem: PAIN - ADULT  Goal: Verbalizes/displays adequate comfort level or baseline comfort level  Description: Interventions:  - Encourage patient to monitor pain and request assistance  - Assess pain using appropriate pain scale  - Administer analgesics based on type and severity of pain and evaluate response  - Implement non-pharmacological measures as appropriate and evaluate response  - Consider cultural and social influences on pain and pain management  - Notify physician/advanced practitioner if interventions unsuccessful or patient reports new pain  Outcome: Progressing     Problem: INFECTION - ADULT  Goal: Absence or prevention of progression during hospitalization  Description: INTERVENTIONS:  - Assess and monitor for signs and symptoms of infection  - Monitor lab/diagnostic results  - Monitor all insertion sites, i.e. indwelling lines, tubes, and drains  - Monitor endotracheal if appropriate and nasal secretions for changes in amount and color  - [place] appropriate cooling/warming therapies per order  - Administer medications as ordered  - Instruct and encourage patient and family to use good hand hygiene technique  - Identify and instruct in appropriate isolation precautions for identified infection/condition  Outcome: Progressing

## 2024-01-16 NOTE — PROGRESS NOTES
[facility]  Progress Note  Name: Avni Ruiz I  MRN: [id number]  Unit/Bed#: S -01 I Date of Admission: 1/7/2024   Date of Service: 1/16/2024 I Hospital Day: 9    Assessment/Plan   * Metabolic encephalopathy  Assessment & Plan  Patient has history of Alzheimer's dementia  Patient is not oriented to time place or person  Patient had multiple medication changes in the past few weeks  Metabolic encephalopathy 2/2 UTI and polypharmacy  Geriatrics consulted and recommendations appreciated.  Patient had a episodes of agitation or combative behavior last evening, received one dose of olanzapine 2.5 mg   Patient has been off restraints for over 24 hours  Discontinuing continued observation  Switch olanzapine from IM to oral  Patient had 2 episode of agitation yesterday required 2 doses of olanzapine 2.5 Mg, however patient did not require any restraints or continued observation    PLAN:  Discontinued continual observation  Tylenol 975 every 8 scheduled  Medication adjustment can be seen in dementia management    Polypharmacy  Assessment & Plan  Geriatrics consulted    PLAN:  See plan acute encephalopathy    UTI (urinary tract infection)  Assessment & Plan  Patient has a chronic Pike, was replaced in the ED on admission  Patient has history of urinary retention has been following up with urology outpatient  Urology was consulted, offered patient's family Pike's catheter versus CIC, patient's family agreed on Pike's  Patient was afebrile overnight  Has no suprapubic or abdominal pain on palpation  No leukocytosis    PLAN:  Completed 3 days of IV ceftriaxone  Monitor for signs of infection    Abnormal urine finding  Assessment & Plan  On admission patient had a urinalysis that was significant for leukocytes and nitrites.  Unable to assess with patient is having any pain or burning with urination.  He does have a history of cystitis.  Per son, patient has been receiving catheterization  and has tried voiding trials but was unsuccessful.  Per son, patient has had a Pike in place since Tuesday which was placed by his urologist.  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  At baseline patient can hold conversations but does get confused.  He is also able to feed and clothe himself with dementia.  However, patient more confused at this point in time.  Patient slept throughout the course of exam, and was unarousable to gentle shaking.  Per son, patient's medications have been adjusted to increase the dosage of his dementia medications.  Concern for encephalopathy at this point  Patient is oriented to himself however not to time and place  According to the son patient has waxing and waning mentation  Patient had a episodes of agitation or combative behavior last evening, received one dose of olanzapine 2.5 mg   Patient has been off restraints for the past 24 hours  Discontinuing observation  Switching olanzapine from IM to oral    PLAN:  Discontinue continual observation  Tylenol 975 every 8 scheduled  Depakote 250 twice daily  Seroquel to 25 Mg bid   Mirtazapine to 15 Mg at 8 PM  Melatonin at 8 PM  Oral Olanzapine 2.5 Mg every q4 hr as needed  Ativan 0.5 Mg 3 times daily    Ambulatory dysfunction  Assessment & Plan  Patient presented to the ED after nursing facility found patient to have  abrasions on his forehead.  The facility believes the patient fell out of bed last night and got himself back and without telling anybody. He was more confused that usual. Patient was brought to the ED via the trauma pathway, where trauma workup and CT imaging were negative.  Patient's lab work was unremarkable, and vitals have remained stable.    PLAN:  PT/OT evaluations and recommendations appreciated  PT level 2, OT level 3  DC plan to go back to his prior facility [facility] if patient is tolerating being off restraints and one-to-one for 48 hours               VTE Pharmacologic Prophylaxis: VTE Score: 5 High Risk (Score >/= 5) - Pharmacological DVT Prophylaxis Ordered: enoxaparin (Lovenox). Sequential Compression Devices Ordered.    Mobility:   Basic Mobility Inpatient Raw Score: 15  -HLM Goal: 4: Move to chair/commode  -HLM Achieved: 5: Stand (1 or more minutes)  HLM Goal NOT achieved. Continue with multidisciplinary rounding and encourage appropriate mobility to improve upon HLM goals.    Patient Centered Rounds: I performed bedside rounds with nursing staff today.   Discussions with Specialists or Other Care Team Provider: Geriatrics     Education and Discussions with Family / Patient: Updated  (wife) via phone.    Total Time Spent on Date of Encounter in care of patient: 45 mins. This time was spent on one or more of the following: performing physical exam; counseling and coordination of care; obtaining or reviewing history; documenting in the medical record; reviewing/ordering tests, medications or procedures; communicating with other healthcare professionals and discussing with patient's family/caregivers.    Current Length of Stay: 9 day(s)  Current Patient Status: Inpatient   Certification Statement: The patient will continue to require additional inpatient hospital stay due to pending placement   Discharge Plan: Anticipate discharge in 24-48 hrs to discharge location to be  determined pending rehab evaluations.    Code Status: Level 1 - Full Code    Subjective:   Patient is alert, not oriented to time place or person.  Patient had 2 episodes of agitation yesterday, required 2 doses of olanzapine 2.5 Mg.  However patient did not require restraints or continued observation.    objective:     Vitals:   Temp (24hrs), Av.2 °F (36.8 °C), Min:98.2 °F (36.8 °C), Max:98.2 °F (36.8 °C)    Temp:  [98.2 °F (36.8 °C)] 98.2 °F (36.8 °C)  HR:  [98] 98  BP: ()/(59-71) 96/59  SpO2:  [100 %] 100 %  Body mass index is 24.73 kg/m².     Input and Output Summary (last 24 hours):     Intake/Output Summary (Last 24 hours) at 2024 0843  Last data filed at 1/15/2024 1901  Gross per 24 hour   Intake --   Output 925 ml   Net -925 ml       Physical Exam:   Physical Exam  Vitals and nursing note reviewed.   Constitutional:       General: He is not in acute distress.     Appearance: He is well-developed.      Comments: Frail looking   HENT:      Head: Normocephalic.      Right Ear: External ear normal.      Left Ear: External ear normal.      Nose: Nose normal.      Mouth/Throat:      Mouth: Mucous membranes are moist.      Pharynx: Oropharynx is clear.   Eyes:      Extraocular Movements: Extraocular movements intact.      Conjunctiva/sclera: Conjunctivae normal.   Cardiovascular:      Rate and Rhythm: Normal rate and regular rhythm.      Heart sounds: No murmur heard.  Pulmonary:      Effort: Pulmonary effort is normal. No respiratory distress.      Breath sounds: Normal breath sounds.   Abdominal:      General: Abdomen is flat. Bowel sounds are normal.      Palpations: Abdomen is soft.      Tenderness: There is no abdominal tenderness.   Genitourinary:     Comments: Pike catheter  Musculoskeletal:         General: No swelling.      Cervical back: Neck supple.      Right lower leg: No edema.      Left lower leg: No edema.   Skin:     General: Skin is warm and dry.      Capillary Refill: Capillary  refill takes less than 2 seconds.   Neurological:      Mental Status: He is alert. He is disoriented.          Additional Data:     Labs:  Results from last 7 days   Lab Units 01/15/24  0652   WBC Thousand/uL 7.50   HEMOGLOBIN g/dL 14.3   HEMATOCRIT % 44.9   PLATELETS Thousands/uL 200     Results from last 7 days   Lab Units 01/15/24  0652 01/11/24  0433 01/10/24  0445   SODIUM mmol/L 140   < > 141   POTASSIUM mmol/L 4.2   < > 3.2*   CHLORIDE mmol/L 108   < > 108   CO2 mmol/L 26   < > 26   BUN mg/dL 25   < > 16   CREATININE mg/dL 0.86   < > 0.83   ANION GAP mmol/L 6   < > 7   CALCIUM mg/dL 9.3   < > 8.9   ALBUMIN g/dL  --   --  3.4*   TOTAL BILIRUBIN mg/dL  --   --  0.66   ALK PHOS U/L  --   --  55   ALT U/L  --   --  8   AST U/L  --   --  13   GLUCOSE RANDOM mg/dL 85   < > 97    < > = values in this interval not displayed.                           Lines/Drains:  Invasive Devices       Peripheral Intravenous Line  Duration             Peripheral IV 01/14/24 Left Forearm 2 days              Drain  Duration             Urethral Catheter 8 days                  Urinary Catheter:  Goal for removal: N/A - Chronic Pike               Imaging: No pertinent imaging reviewed.    Recent Cultures (last 7 days):           Last 24 Hours Medication List:   Current Facility-Administered Medications   Medication Dose Route Frequency Provider Last Rate    acetaminophen  975 mg Oral Q8H Sean Mims MD      bisacodyl  10 mg Rectal Daily PRN Marta Jamison MD      vitamin B-12  1,000 mcg Oral Daily Sherley Turner MD      divalproex sodium  250 mg Oral QAM Sean Mims MD      divalproex sodium  250 mg Oral HS Sean Mims MD      docusate sodium  100 mg Oral BID Sherley Turner MD      enoxaparin  40 mg Subcutaneous Daily Sherley Turner MD      finasteride  5 mg Oral Daily Sherley Turner MD      LORazepam  0.5 mg Oral TID Marta Jamison MD      melatonin  3 mg Oral HS Sean Mims MD      mirtazapine  15 mg Oral HS  Sean Mims MD      OLANZapine  2.5 mg Oral Once Stu Zhang MD      OLANZapine  2.5 mg Oral Q8H PRN Sean Mims MD      polyethylene glycol  17 g Oral Daily Sherley Turner MD      QUEtiapine  25 mg Oral BID Sean Mims MD      senna  2 tablet Oral BID Mrata Jamison MD          Today, Patient Was Seen By: Sean Mims MD    **Please Note: This note may have been constructed using a voice recognition system.**

## 2024-01-16 NOTE — PROGRESS NOTES
Progress Note - Geriatric Medicine   Avni Ruiz 83 y.o. male MRN: [id number]  Unit/Bed#: S -01 Encounter: [id number]      Assessment/Plan:   Polypharmacy  Assessment & Plan  Prior to admission to the hospital patient was on multiple medication  Continue to taper off lorazepam as tolerated  Lexapro was discontinued  Use Zyprexa 2.5 mg only for severe agitation    UTI (urinary tract infection)  Assessment & Plan  Urine culture grew Staphylococcus aureus  Completed ceftriaxone  Pike catheter in place  Encourage p.o. hydration    BPH (benign prostatic hyperplasia)  Assessment & Plan  Continue finasteride and Flomax  Pike catheter in place      Constipation  Assessment & Plan  Last bowel movement was today  Continue senna 2 tablets twice daily MiraLAX daily  Add Dulcolax suppository as needed  Encourage p.o. hydration and out of bed as tolerated    Dementia with agitation and other behavioral disturbance (HCC)  Assessment & Plan  Patient presented to the hospital with change in mental status.  He has Alzheimer's dementia with behavioral disturbance at baseline recently behaviors are getting worse  He lives at [facility] memory care unit  Will continue to provide supportive care, reorient as needed.  Patient is at high risk for delirium, will monitor closely and place on delirium precautions.  Maintain sleep/wake cycle.  Optimize pain regimen.  Monitor for constipation and urinary retention and manage as needed.  B12 and TSH within normal limits  Encourage family to visit.  Encourage to wear glasses and hearing aids while awake.  Encourage po intake, assist with feeding if needed.   Increase Depakote to 250 mg p.o. 3 times a day with meals, may sprinkle in food  Continue lorazepam 0.5 mg p.o. 3 times daily and continue to taper off slowly  continue mirtazapine to 15 mg administer at 8 PM along with melatonin   Continue Seroquel to 25 mg twice daily, administer in a.m. and 6 PM      Ambulatory  dysfunction  Assessment & Plan  Patient presented status post fall at the facility  CT head and neck with no acute pathology  Monitor orthostatic vital signs  Encourage p.o. hydration  Avoid hypotension and hypoglycemia   PT OT evaluation    * Metabolic encephalopathy  Assessment & Plan  -improvement noted , seems to be at baseline  -Patient is high risk of delirium due to dementia with behavioral disturbance at baseline, UTI, insomnia, change in environment  -Initiate delirium precautions  -maintain normal sleep/wake cycle  -minimize overnight interruptions, group overnight vitals/labs/nursing checks as possible  -dim lights, close blinds and turn off tv to minimize stimulation and encourage sleep environment in evenings  -ensure that pain is well controlled continue Tylenol 975mg Q8H scheduled   -monitor for fecal and urinary retention which may precipitate delirium  -encourage early mobilization and ambulation  -provide frequent reorientation and redirection  -encourage family and friends at the bedside to help calm patient if anxious  -avoid medications which may precipitate or worsen delirium such as tramadol, benzodiazepine, anticholinergics, and antihistaminics  -encourage hydration and nutrition , assist with feeding if needed  -redirect unwanted behaviors as first line, avoid physical restraints.   -Increase Depakote to 250 mg p.o. 3 times a day with meals-monitor CBC CMP  -Seroquel 25 mg p.o. in the morning and 6 PM  -Continue lorazepam 0.5 mg 3 times a day 9am, 1pm, 8pm  -continue mirtazapine to 15 mg at 8 PM  -For severe agitation only may use olanzapine 2.5 mg every 8 hours as needed, dose administered on 1/13  -Off restraints       Subjective:   Patient seen and examined at bedside for geriatric follow-up.  At the time of encounter patient was slightly restless however easy to reorient.  Send staff he gets agitated intermittently.   His appetite is good.    Review of Systems   Unable to perform ROS:  "Dementia         Objective:     Vitals: Blood pressure 110/60, pulse 82, temperature 98.5 °F (36.9 °C), resp. rate 18, height 5' 6\" (1.676 m), weight 69.5 kg (153 lb 3.5 oz), SpO2 100%.,Body mass index is 24.73 kg/m².      Intake/Output Summary (Last 24 hours) at 1/16/2024 1504  Last data filed at 1/15/2024 1901  Gross per 24 hour   Intake --   Output 375 ml   Net -375 ml       Current Medications: Reviewed    Physical Exam:   Physical Exam  Vitals and nursing note reviewed.   Constitutional:       General: He is not in acute distress.     Appearance: He is well-developed.      Comments: Frail looking   HENT:      Head: Normocephalic and atraumatic.      Ears:      Comments: [place]     Mouth/Throat:      Mouth: Mucous membranes are dry.   Eyes:      Conjunctiva/sclera: Conjunctivae normal.   Cardiovascular:      Rate and Rhythm: Normal rate and regular rhythm.      Heart sounds: No murmur heard.  Pulmonary:      Effort: Pulmonary effort is normal. No respiratory distress.      Breath sounds: Normal breath sounds.   Abdominal:      Palpations: Abdomen is soft.      Tenderness: There is no abdominal tenderness.   Musculoskeletal:         General: No swelling.      Cervical back: Neck supple.      Right lower leg: No edema.      Left lower leg: No edema.   Skin:     General: Skin is warm and dry.      Capillary Refill: Capillary refill takes less than 2 seconds.   Neurological:      Mental Status: He is alert.      Comments: AAOx1   Psychiatric:      Comments: Agitated intermittently          Invasive Devices       Peripheral Intravenous Line  Duration             Peripheral IV 01/14/24 Left Forearm 2 days              Drain  Duration             Urethral Catheter 8 days                    Lab, Imaging and other studies:    "

## 2024-01-17 LAB
ALBUMIN SERPL BCP-MCNC: 3.9 G/DL (ref 3.5–5)
ALBUMIN SERPL BCP-MCNC: 3.9 G/DL (ref 3.5–5)
ALP SERPL-CCNC: 67 U/L (ref 34–104)
ALP SERPL-CCNC: 67 U/L (ref 34–104)
ALT SERPL W P-5'-P-CCNC: 13 U/L (ref 7–52)
ALT SERPL W P-5'-P-CCNC: 13 U/L (ref 7–52)
ANION GAP SERPL CALCULATED.3IONS-SCNC: 7 MMOL/L
ANION GAP SERPL CALCULATED.3IONS-SCNC: 7 MMOL/L
AST SERPL W P-5'-P-CCNC: 15 U/L (ref 13–39)
AST SERPL W P-5'-P-CCNC: 15 U/L (ref 13–39)
BILIRUB SERPL-MCNC: 0.69 MG/DL (ref 0.2–1)
BILIRUB SERPL-MCNC: 0.69 MG/DL (ref 0.2–1)
BUN SERPL-MCNC: 24 MG/DL (ref 5–25)
BUN SERPL-MCNC: 24 MG/DL (ref 5–25)
CALCIUM SERPL-MCNC: 9.2 MG/DL (ref 8.4–10.2)
CALCIUM SERPL-MCNC: 9.2 MG/DL (ref 8.4–10.2)
CHLORIDE SERPL-SCNC: 107 MMOL/L (ref 96–108)
CHLORIDE SERPL-SCNC: 107 MMOL/L (ref 96–108)
CO2 SERPL-SCNC: 27 MMOL/L (ref 21–32)
CO2 SERPL-SCNC: 27 MMOL/L (ref 21–32)
CREAT SERPL-MCNC: 0.84 MG/DL (ref 0.6–1.3)
CREAT SERPL-MCNC: 0.84 MG/DL (ref 0.6–1.3)
ERYTHROCYTE [DISTWIDTH] IN BLOOD BY AUTOMATED COUNT: 13.4 % (ref 11.6–15.1)
ERYTHROCYTE [DISTWIDTH] IN BLOOD BY AUTOMATED COUNT: 13.4 % (ref 11.6–15.1)
GFR SERPL CREATININE-BSD FRML MDRD: 81 ML/MIN/1.73SQ M
GFR SERPL CREATININE-BSD FRML MDRD: 81 ML/MIN/1.73SQ M
GLUCOSE SERPL-MCNC: 91 MG/DL (ref 65–140)
GLUCOSE SERPL-MCNC: 91 MG/DL (ref 65–140)
HCT VFR BLD AUTO: 40.3 % (ref 36.5–49.3)
HCT VFR BLD AUTO: 40.3 % (ref 36.5–49.3)
HGB BLD-MCNC: 13.5 G/DL (ref 12–17)
HGB BLD-MCNC: 13.5 G/DL (ref 12–17)
MCH RBC QN AUTO: 28.8 PG (ref 26.8–34.3)
MCH RBC QN AUTO: 28.8 PG (ref 26.8–34.3)
MCHC RBC AUTO-ENTMCNC: 33.5 G/DL (ref 31.4–37.4)
MCHC RBC AUTO-ENTMCNC: 33.5 G/DL (ref 31.4–37.4)
MCV RBC AUTO: 86 FL (ref 82–98)
MCV RBC AUTO: 86 FL (ref 82–98)
PLATELET # BLD AUTO: 231 THOUSANDS/UL (ref 149–390)
PLATELET # BLD AUTO: 231 THOUSANDS/UL (ref 149–390)
PMV BLD AUTO: 10.7 FL (ref 8.9–12.7)
PMV BLD AUTO: 10.7 FL (ref 8.9–12.7)
POTASSIUM SERPL-SCNC: 3.6 MMOL/L (ref 3.5–5.3)
POTASSIUM SERPL-SCNC: 3.6 MMOL/L (ref 3.5–5.3)
PROT SERPL-MCNC: 6.7 G/DL (ref 6.4–8.4)
PROT SERPL-MCNC: 6.7 G/DL (ref 6.4–8.4)
RBC # BLD AUTO: 4.69 MILLION/UL (ref 3.88–5.62)
RBC # BLD AUTO: 4.69 MILLION/UL (ref 3.88–5.62)
SODIUM SERPL-SCNC: 141 MMOL/L (ref 135–147)
SODIUM SERPL-SCNC: 141 MMOL/L (ref 135–147)
WBC # BLD AUTO: 8.43 THOUSAND/UL (ref 4.31–10.16)
WBC # BLD AUTO: 8.43 THOUSAND/UL (ref 4.31–10.16)

## 2024-01-17 PROCEDURE — 85027 COMPLETE CBC AUTOMATED: CPT

## 2024-01-17 PROCEDURE — 99232 SBSQ HOSP IP/OBS MODERATE 35: CPT | Performed by: FAMILY MEDICINE

## 2024-01-17 PROCEDURE — 99232 SBSQ HOSP IP/OBS MODERATE 35: CPT | Performed by: INTERNAL MEDICINE

## 2024-01-17 PROCEDURE — 80053 COMPREHEN METABOLIC PANEL: CPT

## 2024-01-17 RX ORDER — QUETIAPINE FUMARATE 25 MG/1
25 TABLET, FILM COATED ORAL
Status: DISCONTINUED | OUTPATIENT
Start: 2024-01-17 | End: 2024-01-29 | Stop reason: HOSPADM

## 2024-01-17 RX ORDER — POTASSIUM CHLORIDE 20 MEQ/1
40 TABLET, EXTENDED RELEASE ORAL ONCE
Status: COMPLETED | OUTPATIENT
Start: 2024-01-17 | End: 2024-01-17

## 2024-01-17 RX ADMIN — Medication 3 MG: at 20:20

## 2024-01-17 RX ADMIN — LORAZEPAM 0.5 MG: 0.5 TABLET ORAL at 08:26

## 2024-01-17 RX ADMIN — DOCUSATE SODIUM 100 MG: 100 CAPSULE, LIQUID FILLED ORAL at 18:28

## 2024-01-17 RX ADMIN — DIVALPROEX SODIUM 250 MG: 125 CAPSULE, COATED PELLETS ORAL at 08:32

## 2024-01-17 RX ADMIN — FINASTERIDE 5 MG: 5 TABLET, FILM COATED ORAL at 08:26

## 2024-01-17 RX ADMIN — CYANOCOBALAMIN TAB 500 MCG 1000 MCG: 500 TAB at 08:26

## 2024-01-17 RX ADMIN — SENNOSIDES 17.2 MG: 8.6 TABLET, FILM COATED ORAL at 08:32

## 2024-01-17 RX ADMIN — ACETAMINOPHEN 975 MG: 325 TABLET, FILM COATED ORAL at 21:16

## 2024-01-17 RX ADMIN — ACETAMINOPHEN 975 MG: 325 TABLET, FILM COATED ORAL at 14:15

## 2024-01-17 RX ADMIN — OLANZAPINE 2.5 MG: 2.5 TABLET, FILM COATED ORAL at 01:30

## 2024-01-17 RX ADMIN — LORAZEPAM 0.5 MG: 0.5 TABLET ORAL at 13:45

## 2024-01-17 RX ADMIN — QUETIAPINE FUMARATE 25 MG: 25 TABLET ORAL at 18:28

## 2024-01-17 RX ADMIN — LORAZEPAM 0.5 MG: 0.5 TABLET ORAL at 20:20

## 2024-01-17 RX ADMIN — DIVALPROEX SODIUM 250 MG: 125 CAPSULE, COATED PELLETS ORAL at 13:15

## 2024-01-17 RX ADMIN — SENNOSIDES 17.2 MG: 8.6 TABLET, FILM COATED ORAL at 18:28

## 2024-01-17 RX ADMIN — ENOXAPARIN SODIUM 40 MG: 40 INJECTION SUBCUTANEOUS at 08:27

## 2024-01-17 RX ADMIN — POTASSIUM CHLORIDE 40 MEQ: 1500 TABLET, EXTENDED RELEASE ORAL at 08:27

## 2024-01-17 RX ADMIN — MIRTAZAPINE 15 MG: 15 TABLET, FILM COATED ORAL at 20:20

## 2024-01-17 RX ADMIN — ACETAMINOPHEN 975 MG: 325 TABLET, FILM COATED ORAL at 05:45

## 2024-01-17 RX ADMIN — QUETIAPINE FUMARATE 25 MG: 25 TABLET ORAL at 08:26

## 2024-01-17 NOTE — PROGRESS NOTES
[facility]  Progress Note  Name: Avni Ruiz I  MRN: [id number]  Unit/Bed#: S -01 I Date of Admission: 1/7/2024   Date of Service: 1/17/2024 I Hospital Day: 10    Assessment/Plan   * Metabolic encephalopathy  Assessment & Plan  Patient has history of Alzheimer's dementia  Patient is not oriented to time place or person  Patient had multiple medication changes in the past few weeks  Metabolic encephalopathy 2/2 UTI and polypharmacy  Geriatrics consulted and recommendations appreciated.  Patient had a episodes of agitation or combative behavior last evening, received one dose of olanzapine 2.5 mg   Patient has been off restraints for over 24 hours  Discontinuing continued observation  Switch olanzapine from IM to oral  Patient had 2 episode of agitation yesterday required 2 doses of olanzapine 2.5 Mg, however patient did not require any restraints or continued observation    PLAN:  Discontinued continual observation  Tylenol 975 every 8 scheduled  Medication adjustment can be seen in dementia management    Polypharmacy  Assessment & Plan  Geriatrics consulted    PLAN:  See plan acute encephalopathy    UTI (urinary tract infection)  Assessment & Plan  Patient has a chronic Pike, was replaced in the ED on admission  Patient has history of urinary retention has been following up with urology outpatient  Urology was consulted, offered patient's family Pike's catheter versus CIC, patient's family agreed on Pike's  Patient was afebrile overnight  Has no suprapubic or abdominal pain on palpation  No leukocytosis    PLAN:  Completed 3 days of IV ceftriaxone  Monitor for signs of infection    Abnormal urine finding  Assessment & Plan  On admission patient had a urinalysis that was significant for leukocytes and nitrites.  Unable to assess with patient is having any pain or burning with urination.  He does have a history of cystitis.  Per son, patient has been receiving catheterization  and has tried voiding trials but was unsuccessful.  Per son, patient has had a Pike in place since Tuesday which was placed by his urologist.  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  At baseline patient can hold conversations but does get confused.  He is also able to feed and clothe himself with dementia.  However, patient more confused at this point in time.  Patient slept throughout the course of exam, and was unarousable to gentle shaking.  Per son, patient's medications have been adjusted to increase the dosage of his dementia medications.  Concern for encephalopathy at this point  Patient is oriented to himself however not to time and place  According to the son patient has waxing and waning mentation  Patient had a episodes of agitation or combative behavior last evening, received one dose of olanzapine 2.5 mg   Patient has been off restraints for the past 24 hours  Discontinuing observation  Switching olanzapine from IM to oral  Pt had been agitated since yesterday, received 3 doses of olanzapine       PLAN:  Restarted restraints   Tylenol 975 every 8 scheduled  Increase Depakote 250 to three times daily  Seroquel to 25 Mg bid   Mirtazapine to 15 Mg at 8 PM  Melatonin at 8 PM  Oral Olanzapine 2.5 Mg every q4 hr as needed  Ativan 0.5 Mg 3 times daily    Ambulatory dysfunction  Assessment &  Plan  Patient presented to the ED after nursing facility found patient to have abrasions on his forehead.  The facility believes the patient fell out of bed last night and got himself back and without telling anybody. He was more confused that usual. Patient was brought to the ED via the trauma pathway, where trauma workup and CT imaging were negative.  Patient's lab work was unremarkable, and vitals have remained stable.    PLAN:  PT/OT evaluations and recommendations appreciated  PT level 2, OT level 3  DC plan to go back to his prior facility [facility] if patient is tolerating being off restraints and one-to-one for 48 hours               VTE Pharmacologic Prophylaxis: VTE Score: 5 High Risk (Score >/= 5) - Pharmacological DVT Prophylaxis Ordered: enoxaparin (Lovenox). Sequential Compression Devices Ordered.    Mobility:   Basic Mobility Inpatient Raw Score: 15  -HLM Goal: 4: Move to chair/commode  JH-HLM Achieved: 2: Bed activities/Dependent transfer  HLM Goal NOT achieved. Continue with multidisciplinary rounding and encourage appropriate mobility to improve upon HLM goals.    Patient Centered Rounds: I performed bedside rounds with nursing staff today.   Discussions with Specialists or Other Care Team Provider: Geriatrics     Education and Discussions with Family / Patient: Updated  (wife) via phone.    Total Time Spent on Date of Encounter in care of patient: 45 mins. This time was spent on one or more of the following: performing physical exam; counseling and coordination of care; obtaining or reviewing history; documenting in the medical record; reviewing/ordering tests, medications or procedures; communicating with other healthcare professionals and discussing with patient's family/caregivers.    Current Length of Stay: 10 day(s)  Current Patient Status: Inpatient   Certification Statement: The patient will continue to require additional inpatient hospital stay due to pending placement    Discharge Plan: Anticipate discharge in 24-48 hrs to discharge location to be determined pending rehab evaluations.    Code Status: Level 1 - Full Code    Subjective:   Patient is alert, not oriented to time place or person.  Patient has been agitated since yesterday, required 3 doses of olanzapine 2.5 Mg in the past 24 hours. Pt is back on restraints.    objective:     Vitals:   Temp (24hrs), Av.5 °F (36.9 °C), Min:98.5 °F (36.9 °C), Max:98.5 °F (36.9 °C)    Temp:  [98.5 °F (36.9 °C)] 98.5 °F (36.9 °C)  HR:  [82] 82  Resp:  [18] 18  BP: (110)/(60) 110/60  Body mass index is 24.73 kg/m².     Input and Output Summary (last 24 hours):     Intake/Output Summary (Last 24 hours) at 2024 1038  Last data filed at 2024 0401  Gross per 24 hour   Intake --   Output 1300 ml   Net -1300 ml       Physical Exam:   Physical Exam  Vitals and nursing note reviewed.   Constitutional:       General: He is in acute distress.      Appearance: He is well-developed.      Comments: Frail looking   HENT:      Head: Normocephalic.      Right Ear: External ear normal.      Left Ear: External ear normal.      Nose: Nose normal.      Mouth/Throat:      Mouth: Mucous membranes are moist.      Pharynx: Oropharynx is clear.   Eyes:      Extraocular Movements: Extraocular movements intact.      Conjunctiva/sclera: Conjunctivae normal.   Cardiovascular:      Rate and Rhythm: Normal rate and regular rhythm.      Heart sounds: No murmur heard.  Pulmonary:      Effort: Pulmonary effort is normal. No respiratory distress.      Breath sounds: Normal breath sounds.   Abdominal:      General: Abdomen is flat. Bowel sounds are normal.      Palpations: Abdomen is soft.      Tenderness: There is no abdominal tenderness.   Genitourinary:     Comments: Pike catheter  Musculoskeletal:         General: No swelling.      Cervical back: Neck supple.      Right lower leg: No edema.      Left lower leg: No edema.   Skin:     General: Skin is warm and  dry.      Capillary Refill: Capillary refill takes less than 2 seconds.   Neurological:      Mental Status: He is alert. He is disoriented.          Additional Data:     Labs:  Results from last 7 days   Lab Units 01/17/24  0544   WBC Thousand/uL 8.43   HEMOGLOBIN g/dL 13.5   HEMATOCRIT % 40.3   PLATELETS Thousands/uL 231     Results from last 7 days   Lab Units 01/17/24  0544   SODIUM mmol/L 141   POTASSIUM mmol/L 3.6   CHLORIDE mmol/L 107   CO2 mmol/L 27   BUN mg/dL 24   CREATININE mg/dL 0.84   ANION GAP mmol/L 7   CALCIUM mg/dL 9.2   ALBUMIN g/dL 3.9   TOTAL BILIRUBIN mg/dL 0.69   ALK PHOS U/L 67   ALT U/L 13   AST U/L 15   GLUCOSE RANDOM mg/dL 91                           Lines/Drains:  Invasive Devices       Peripheral Intravenous Line  Duration             Peripheral IV 01/14/24 Left Forearm 3 days              Drain  Duration             Urethral Catheter 18 Fr. <1 day                  Urinary Catheter:  Goal for removal: N/A - Chronic Pike               Imaging: No pertinent imaging reviewed.    Recent Cultures (last 7 days):           Last 24 Hours Medication List:   Current Facility-Administered Medications   Medication Dose Route Frequency Provider Last Rate    acetaminophen  975 mg Oral Q8H Sean Mims MD      bisacodyl  10 mg Rectal Daily PRN Marta Jamison MD      vitamin B-12  1,000 mcg Oral Daily Sherley Turner MD      divalproex sodium  250 mg Oral TID With Meals Marta Jamison MD      docusate sodium  100 mg Oral BID Sherley Turner MD      enoxaparin  40 mg Subcutaneous Daily Sherley Turner MD      finasteride  5 mg Oral Daily Sherley Turner MD      LORazepam  0.5 mg Oral TID Marta Jamison MD      melatonin  3 mg Oral HS Sean Mims MD      mirtazapine  15 mg Oral HS Sean Mims MD      OLANZapine  2.5 mg Oral Once Stu Zhang MD      OLANZapine  2.5 mg Oral Q8H PRN Sean Mims MD      polyethylene glycol  17 g Oral Daily Sherley Turner MD      QUEtiapine  25 mg Oral BID  Sean Mims MD      senna  2 tablet Oral BID Marta Jamison MD          Today, Patient Was Seen By: Sean Mims MD    **Please Note: This note may have been constructed using a voice recognition system.**

## 2024-01-17 NOTE — CASE MANAGEMENT
"   Case Management Progress Note    Patient name Avni Ruiz  Location S /S -01 MRN [id number]  : 1940 Date 2024       LOS (days): 10  Geometric Mean LOS (GMLOS) (days): 3.4  Days to GMLOS:-6.8        OBJECTIVE:        Current admission status: Inpatient  Preferred Pharmacy: No Pharmacies Listed  Primary Care Provider: No primary care provider on file.    Primary Insurance: MEDICARE  Secondary Insurance:  FOR LIFE    PROGRESS NOTE:    CM met with patient, son and DIL per family request to discuss \"next steps.\" Cm provided family with updates regarding [facility]. Family aware facility cannot accept until patient is off restraints for 48hrs. Family frustrated with patient's decline and requesting a meeting with medical team to discuss medical updates, GOC and next steps. CM inquired if they can meet now however family not able but they are available to meet tomorrow at 11am - CM informed SLIM.       "

## 2024-01-17 NOTE — PROGRESS NOTES
Progress Note - Geriatric Medicine   Avni Ruiz 83 y.o. male MRN: [id number]  Unit/Bed#: S -01 Encounter: [id number]      Assessment/Plan:  Polypharmacy  Assessment & Plan  Prior to admission to the hospital patient was on multiple medication  Continue to taper off lorazepam as tolerated  Lexapro was discontinued  Use Zyprexa 2.5 mg only for severe agitation    UTI (urinary tract infection)  Assessment & Plan  Urine culture grew Staphylococcus aureus  Received 1 dose ceftriaxone in ER  Pike catheter in place  Encourage p.o. hydration  Patient with increased behaviors for the past 24 hours consider repeat urine analysis with reflex to culture    BPH (benign prostatic hyperplasia)  Assessment & Plan  Continue finasteride and Flomax  Pike catheter in place      Constipation  Assessment & Plan  Last bowel movement was yesterday  Continue senna 2 tablets twice daily MiraLAX daily  Add Dulcolax suppository as needed  Encourage p.o. hydration and out of bed as tolerated    Dementia with agitation and other behavioral disturbance (HCC)  Assessment & Plan  Patient presented to the hospital with change in mental status.  He has Alzheimer's dementia with behavioral disturbance at baseline recently behaviors are getting worse  Will continue to provide supportive care, reorient as needed.  Patient is at high risk for delirium, will monitor closely and place on delirium precautions.  Maintain sleep/wake cycle.  Optimize pain regimen.  Encourage patient to take Tylenol 975 mg p.o. 3 times daily, per chart review patient did not take any dose yesterday.  Monitor for constipation and urinary retention and manage as needed.  B12 and TSH within normal limits  Encourage family to visit.  Encourage to wear glasses and hearing aids while awake.  Encourage po intake, assist with feeding if needed.   Increase Depakote to 250 mg p.o. 3 times a day with meals, may sprinkle in food  Continue lorazepam 0.5 mg p.o. 3 times daily  and continue to taper off slowly  continue mirtazapine to 15 mg administer at 8 PM along with melatonin   Continue Seroquel to 25 mg twice daily, administer in a.m. and 6 PM  Noted to be more agitated since yesterday and received 2 doses of Zyprexa p.o. in the last 24 hours.  Consider repeat urinalysis  If patient not able to sleep at night give extra dose Seroquel 25 mg at bedtime      Ambulatory dysfunction  Assessment & Plan  Patient presented status post fall at the facility  CT head and neck with no acute pathology  Monitor orthostatic vital signs  Encourage p.o. hydration  Avoid hypotension and hypoglycemia   PT OT evaluation    * Metabolic encephalopathy  Assessment & Plan  -improvement noted , seems to be at baseline  -Patient is high risk of delirium due to dementia with behavioral disturbance at baseline, UTI, insomnia, change in environment  -Initiate delirium precautions  -maintain normal sleep/wake cycle  -minimize overnight interruptions, group overnight vitals/labs/nursing checks as possible  -dim lights, close blinds and turn off tv to minimize stimulation and encourage sleep environment in evenings  -ensure that pain is well controlled continue Tylenol 975mg Q8H scheduled, encourage patient to take the medication  -monitor for fecal and urinary retention which may precipitate delirium  -encourage early mobilization and ambulation  -provide frequent reorientation and redirection  -encourage family and friends at the bedside to help calm patient if anxious  -avoid medications which may precipitate or worsen delirium such as tramadol, benzodiazepine, anticholinergics, and antihistaminics  -encourage hydration and nutrition , assist with feeding if needed  -redirect unwanted behaviors as first line, avoid physical restraints.   -Increase Depakote to 250 mg p.o. 3 times a day with meals-monitor CBC CMP  -Seroquel 25 mg p.o. in the morning and 6 PM  -Continue lorazepam 0.5 mg 3 times a day 9am, 1pm,  "8pm  -continue mirtazapine to 15 mg at 8 PM  -For severe agitation only may use olanzapine 2.5 mg every 8 hours as needed,  -He received 2 doses of Zyprexa in the past 24 hours and currently he is on wrist restraints  -If patient not able to sleep consider Seroquel 25 mg p.o. nightly in addition to his scheduled dose-monitor QTc  -Consider repeat urine analysis with reflex to culture       Subjective:   Patient seen and examined at bedside for geriatric follow-up.  At time of encounter patient was in 2 point restraints, sleeping comfortably.  Per nursing staff he did not sleep overnight and he was placed back on restraints and received 2 doses of Zyprexa since last evening    Review of Systems   Unable to perform ROS: Dementia         Objective:     Vitals: Blood pressure 110/60, pulse 82, temperature 98.5 °F (36.9 °C), resp. rate 18, height 5' 6\" (1.676 m), weight 69.5 kg (153 lb 3.5 oz), SpO2 100%.,Body mass index is 24.73 kg/m².      Intake/Output Summary (Last 24 hours) at 1/17/2024 1110  Last data filed at 1/17/2024 0401  Gross per 24 hour   Intake --   Output 1300 ml   Net -1300 ml       Current Medications: Reviewed    Physical Exam:   Physical Exam  Vitals and nursing note reviewed.   Constitutional:       General: He is not in acute distress.     Appearance: He is well-developed.      Comments: Frail looking   HENT:      Head: Normocephalic and atraumatic.      Ears:      Comments: [place]     Mouth/Throat:      Mouth: Mucous membranes are dry.   Eyes:      Conjunctiva/sclera: Conjunctivae normal.   Cardiovascular:      Rate and Rhythm: Normal rate and regular rhythm.      Heart sounds: No murmur heard.  Pulmonary:      Effort: Pulmonary effort is normal. No respiratory distress.      Breath sounds: Normal breath sounds.   Abdominal:      Palpations: Abdomen is soft.      Tenderness: There is no abdominal tenderness.   Genitourinary:     Comments: Pike cath  Musculoskeletal:         General: No swelling.      " Cervical back: Neck supple.      Right lower leg: No edema.      Left lower leg: No edema.   Skin:     General: Skin is warm and dry.      Capillary Refill: Capillary refill takes less than 2 seconds.   Neurological:      Mental Status: He is alert. He is disoriented.      Comments: Alert oriented x 1   Psychiatric:      Comments: Agitated at times  With wrist restraints at the time of encounter          Invasive Devices       Peripheral Intravenous Line  Duration             Peripheral IV 01/14/24 Left Forearm 3 days              Drain  Duration             Urethral Catheter 18 Fr. <1 day                    Lab, Imaging and other studies: I have personally reviewed pertinent reports.

## 2024-01-17 NOTE — CASE MANAGEMENT
Case Management Progress Note    Patient name Avni Ruiz  Location S /S - MRN [id number]  : 1940 Date 2024       LOS (days): 10  Geometric Mean LOS (GMLOS) (days): 3.4  Days to GMLOS:-6.4        OBJECTIVE:        Current admission status: Inpatient  Preferred Pharmacy: No Pharmacies Listed  Primary Care Provider: No primary care provider on file.    Primary Insurance: MEDICARE  Secondary Insurance: [employer] FOR LIFE    PROGRESS NOTE:    CM s/w [facility] from [facility] - [facility]'s following patient and can accept once off restraints for 48hrs due to the extent of time he's required restraints off and on this admission.     Cm s/w patient's wife to inform [facility] is still following but he'll need to be off restraints for 48hrs. CM will continue to f/u.

## 2024-01-18 PROCEDURE — 99232 SBSQ HOSP IP/OBS MODERATE 35: CPT | Performed by: INTERNAL MEDICINE

## 2024-01-18 PROCEDURE — 99232 SBSQ HOSP IP/OBS MODERATE 35: CPT | Performed by: FAMILY MEDICINE

## 2024-01-18 PROCEDURE — 97535 SELF CARE MNGMENT TRAINING: CPT

## 2024-01-18 RX ADMIN — SENNOSIDES 17.2 MG: 8.6 TABLET, FILM COATED ORAL at 10:00

## 2024-01-18 RX ADMIN — FINASTERIDE 5 MG: 5 TABLET, FILM COATED ORAL at 10:01

## 2024-01-18 RX ADMIN — SENNOSIDES 17.2 MG: 8.6 TABLET, FILM COATED ORAL at 17:57

## 2024-01-18 RX ADMIN — QUETIAPINE FUMARATE 25 MG: 25 TABLET ORAL at 17:57

## 2024-01-18 RX ADMIN — ACETAMINOPHEN 975 MG: 325 TABLET, FILM COATED ORAL at 14:32

## 2024-01-18 RX ADMIN — CYANOCOBALAMIN TAB 500 MCG 1000 MCG: 500 TAB at 10:01

## 2024-01-18 RX ADMIN — Medication 3 MG: at 19:38

## 2024-01-18 RX ADMIN — ENOXAPARIN SODIUM 40 MG: 40 INJECTION SUBCUTANEOUS at 10:00

## 2024-01-18 RX ADMIN — QUETIAPINE FUMARATE 25 MG: 25 TABLET ORAL at 09:59

## 2024-01-18 RX ADMIN — MIRTAZAPINE 15 MG: 15 TABLET, FILM COATED ORAL at 19:38

## 2024-01-18 RX ADMIN — DIVALPROEX SODIUM 250 MG: 125 CAPSULE, COATED PELLETS ORAL at 17:57

## 2024-01-18 RX ADMIN — DIVALPROEX SODIUM 250 MG: 125 CAPSULE, COATED PELLETS ORAL at 09:58

## 2024-01-18 RX ADMIN — LORAZEPAM 0.5 MG: 0.5 TABLET ORAL at 14:32

## 2024-01-18 RX ADMIN — ACETAMINOPHEN 975 MG: 325 TABLET, FILM COATED ORAL at 22:07

## 2024-01-18 RX ADMIN — LORAZEPAM 0.5 MG: 0.5 TABLET ORAL at 09:58

## 2024-01-18 RX ADMIN — LORAZEPAM 0.5 MG: 0.5 TABLET ORAL at 19:38

## 2024-01-18 NOTE — PLAN OF CARE
Problem: OCCUPATIONAL THERAPY ADULT  Goal: Performs self-care activities at highest level of function for planned discharge setting.  See evaluation for individualized goals.  Description: Treatment Interventions: ADL retraining, Functional transfer training, Endurance training, Cognitive reorientation, Patient/family training, Equipment evaluation/education, Compensatory technique education, Energy conservation, Activityengagement          See flowsheet documentation for full assessment, interventions and recommendations.   Outcome: Progressing  Note: Limitation: Decreased ADL status, Decreased UE strength, Decreased Safe judgement during ADL, Decreased cognition, Decreased endurance, Decreased self-care trans, Decreased high-level ADLs (impaired balance, fxnl mobility, act eran, standing eran, strength, attention to task, direction following, safety awareness, insight, sequencing, orientation, problem solving, learning new tasks, initiation, alertness, response time)  Prognosis: Good  Assessment: Pt seen on this date for skilled OT treatment session. At start of session pt supine in bed. Pt agreeable for participation in skilled treatment session. Pt with improved bed mobility and improved performance with LB dressing. Pt continues to require VC for completion of ADL tasks and VC for sequencing. Pt  with improved functional mobility to/from bathroom, improved standing tolerance at sink. Pt also with improved dynamic reaching and bending attempting to  items from floor. Pt continues to be limited by overall cognition, balance, activity tolerance. Pt would continue to benefit from skilled OT treatment sessions in order to address remaining deficits     Rehab Resource Intensity Level, OT: III (Minimum Resource Intensity)

## 2024-01-18 NOTE — CASE MANAGEMENT
Case Management Progress Note    Patient name Avni Ruiz  Location S /S - MRN [id number]  : 1940 Date 2024       LOS (days): 11  Geometric Mean LOS (GMLOS) (days): 3.4  Days to GMLOS:-7.6        OBJECTIVE:        Current admission status: Inpatient  Preferred Pharmacy: No Pharmacies Listed  Primary Care Provider: No primary care provider on file.    Primary Insurance: MEDICARE  Secondary Insurance:  FOR LIFE    PROGRESS NOTE:  Per family's request, Dr. Ordaz,Residents and CM met with family(Julia-spouse, Benjamin-son and DIL) to discuss Pt's current restraint status, the need for him to be restraint free for 24 to 48 hrs for placement, what can be done to facilitate the Pt to be restraint for that time frame . Family reported their understanding of why the Pt currently need to be restraint for his safety and others.Family reported they would consider coming in to sit with the Pt, keeping him out of restraints to further assist with getting over to the facility.

## 2024-01-18 NOTE — OCCUPATIONAL THERAPY NOTE
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)    Constipation    BPH (benign prostatic hyperplasia)    Abnormal urine finding    UTI (urinary tract infection)    Polypharmacy          01/18/24 0850   OT Last Visit   OT Visit Date 01/18/24   Note Type   Note Type Treatment   Pain Assessment   Pain Assessment Tool 0-10   Pain Score No Pain   Restrictions/Precautions   Weight Bearing Precautions Per Order No   Other Precautions Cognitive;Chair Alarm;Bed Alarm;Restraints;Fall Risk  (chronic catheter)   Lifestyle   Autonomy PTA pt living at  dementia unit, pt requiring (A) with ADLs and IADLs, (+)falls, (-)drives, no use of AD at baseline   Reciprocal Relationships supportive facility staff + wife   Service to Others retired   Intrinsic Gratification \"I love a good breakfast\"   ADL   Grooming Assistance 4  Minimal Assistance   Grooming Deficit Increased time to complete;Supervision/safety;Verbal cueing   Grooming Comments washing hands, combing hair and brushing teeth. tactile cuing for location of materials and proper orientation of toothbrush/comb   LB Dressing Assistance 5  Supervision/Setup   LB Dressing Deficit Increased time to complete;Don/doff R sock;Don/doff L sock;Supervision/safety;Verbal cueing   LB Dressing Comments requiring (A) for orientation of socks, able to don   Bed Mobility   Supine to Sit 5  Supervision   Additional items Increased time required;Verbal cues   Sit to Supine 5  Supervision   Additional items Increased time required;Verbal cues;LE management   Transfers   Sit to Stand 4  Minimal assistance   Additional items Assist x 1;Increased time required;Verbal cues   Stand to Sit 4  Minimal assistance   Additional items Assist x 1;Increased time required;Verbal cues " "  Additional Comments HHA for functional transfers   Functional Mobility   Functional Mobility 4  Minimal assistance   Additional Comments Ax1, bed >< bathroom with x2 LOB, (A) for correction. Able to bend down to floor, attempting to  therapist shoe without LOB.   Additional items Hand hold assistance   Subjective   Subjective \"I really don't think it should be so white\" Pt states while looking out the window   Cognition   Overall Cognitive Status Impaired   Arousal/Participation Alert;Cooperative   Attention Attends with cues to redirect   Orientation Level Oriented to person;Disoriented to time;Disoriented to place;Disoriented to situation   Memory Decreased short term memory;Decreased recall of precautions;Decreased recall of recent events   Following Commands Follows one step commands with increased time or repetition   Comments pleasantly confused, consistent VC +TC cuing throughout   Activity Tolerance   Activity Tolerance Patient tolerated treatment well;Other (Comment)  (limited by cognition)   Medical Staff Made Aware ALLEN Aguilar   Assessment   Assessment Pt seen on this date for skilled OT treatment session. At start of session pt supine in bed. Pt agreeable for participation in skilled treatment session. Pt with improved bed mobility and improved performance with LB dressing. Pt continues to require VC for completion of ADL tasks and VC for sequencing. Pt  with improved functional mobility to/from bathroom, improved standing tolerance at sink. Pt also with improved dynamic reaching and bending attempting to  items from floor. Pt continues to be limited by overall cognition, balance, activity tolerance. Pt would continue to benefit from skilled OT treatment sessions in order to address remaining deficits   Plan   Goal Expiration Date 02/01/24  (goal date extended at this tiem)   OT Treatment Day 2   OT Frequency 2-3x/wk   Discharge Recommendation   Rehab Resource Intensity Level, OT III " (Minimum Resource Intensity)   AM-PAC Daily Activity Inpatient   Lower Body Dressing 3   Bathing 2   Toileting 3   Upper Body Dressing 3   Grooming 3   Eating 3   Daily Activity Raw Score 17   Daily Activity Standardized Score (Calc for Raw Score >=11) 37.26   AM-PAC Applied Cognition Inpatient   Following a Speech/Presentation 1   Understanding Ordinary Conversation 2   Taking Medications 1   Remembering Where Things Are Placed or Put Away 1   Remembering List of 4-5 Errands 1   Taking Care of Complicated Tasks 1   Applied Cognition Raw Score 7   Applied Cognition Standardized Score 15.17   End of Consult   Patient Position at End of Consult Supine;Bed/Chair alarm activated;All needs within reach  (posey waist restraint on and intact)     GOALS:      -Patient will perform grooming tasks standing at sink with overall Supervision in order to increase overall independence PROGRESSING     -Patient will be Supervision with UB dressing using AE and AD as needed in order to increase (I) with ADLs     -Patient will be Supervision with UB bathing using AE and AD as needed in order to increase (I) with ADLs     -Patient will be Supervision with LB dressing with use of AE and AD as needed in order to increase (I) with ADLs MET: continue for consistency     -Patient will be Supervision with LB bathing with use of AE and AD as needed in order to increase (I) with ADLs     -Patient will complete toileting w/ Supervision w/ G hygiene/thoroughness in order to reduce caregiver burden     -Patient will demonstrate Supervision with bed mobility for ability to manage own comfort and initiate OOB tasks. PROGRESSING     -Patient will perform functional transfers with Supervision to/from all surfaces using DME as needed in order to increase (I) with functional tasksPROGRESSING     -Patient will be Supervision with functional mobility to/from bathroom for increased independence with toileting tasksPROGRESSING     -Patient’s caregivers  will be independent with providing appropriate cognitive cues in order to facilitate patient’s independence during functional tasks.        The patient's raw score on the AM-PAC Daily Activity Inpatient Short Form is 17. A raw score of less than 19 suggests the patient may benefit from discharge to post-acute rehabilitation services. Please refer to the recommendation of the Occupational Therapist for safe discharge planning.  Dea Dooley MS, OTR/L

## 2024-01-18 NOTE — PROGRESS NOTES
[facility]  Progress Note  Name: Avni Ruiz I  MRN: [id number]  Unit/Bed#: S -01 I Date of Admission: 1/7/2024   Date of Service: 1/18/2024 I Hospital Day: 11    Assessment/Plan   * Metabolic encephalopathy  Assessment & Plan  Patient has history of Alzheimer's dementia  Patient is not oriented to time place or person  Patient had multiple medication changes in the past few weeks  Metabolic encephalopathy 2/2 UTI and polypharmacy  Geriatrics consulted and recommendations appreciated.  Patient had a episodes of agitation or combative behavior last evening, received one dose of olanzapine 2.5 mg   Patient has been off restraints for over 24 hours  Discontinuing continued observation  Switch olanzapine from IM to oral  Patient had 2 episode of agitation yesterday required 2 doses of olanzapine 2.5 Mg, however patient did not require any restraints or continued observation    PLAN:  Discontinued continual observation  Tylenol 975 every 8 scheduled  Medication adjustment can be seen in dementia management    Polypharmacy  Assessment & Plan  Geriatrics consulted    PLAN:  See plan acute encephalopathy    UTI (urinary tract infection)  Assessment & Plan  Patient has a chronic Pike, was replaced in the ED on admission  Patient has history of urinary retention has been following up with urology outpatient  Urology was consulted, offered patient's family Pike's catheter versus CIC, patient's family agreed on Pike's  Patient was afebrile overnight  Has no suprapubic or abdominal pain on palpation  No leukocytosis    PLAN:  Completed 3 days of IV ceftriaxone  Monitor for signs of infection    Abnormal urine finding  Assessment & Plan  On admission patient had a urinalysis that was significant for leukocytes and nitrites.  Unable to assess with patient is having any pain or burning with urination.  He does have a history of cystitis.  Per son, patient has been receiving catheterization  and has tried voiding trials but was unsuccessful.  Per son, patient has had a Pike in place since Tuesday which was placed by his urologist.  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  At baseline patient can hold conversations but does get confused.  He is also able to feed and clothe himself with dementia.  However, patient more confused at this point in time.  Patient slept throughout the course of exam, and was unarousable to gentle shaking.  Per son, patient's medications have been adjusted to increase the dosage of his dementia medications.  Concern for encephalopathy at this point  Patient is oriented to himself however not to time and place  According to the son patient has waxing and waning mentation  Patient had a episodes of agitation or combative behavior last evening, received one dose of olanzapine 2.5 mg   Patient has been off restraints for the past 24 hours  Discontinuing observation  Switching olanzapine from IM to oral  Had discussion with family about goals of care, provided them with information regarding prognosis.  1 person from the family agreed to stay with the patient for 24 hours while he is off restraints      PLAN:  Restarted restraints   Tylenol 975 every 8 scheduled  Depakote 250 to three times daily  Seroquel to 25 Mg bid   Mirtazapine to 15 Mg at 8 PM  Melatonin at 8  PM  Oral Olanzapine 2.5 Mg every q8 hr as needed  Ativan 0.5 Mg 3 times daily    Ambulatory dysfunction  Assessment & Plan  Patient presented to the ED after nursing facility found patient to have abrasions on his forehead.  The facility believes the patient fell out of bed last night and got himself back and without telling anybody. He was more confused that usual. Patient was brought to the ED via the trauma pathway, where trauma workup and CT imaging were negative.  Patient's lab work was unremarkable, and vitals have remained stable.    PLAN:  PT/OT evaluations and recommendations appreciated  PT level 2, OT level 3  DC plan to go back to his prior facility [facility] if patient is tolerating being off restraints and one-to-one for 48 hours               VTE Pharmacologic Prophylaxis: VTE Score: 5 High Risk (Score >/= 5) - Pharmacological DVT Prophylaxis Ordered: enoxaparin (Lovenox). Sequential Compression Devices Ordered.    Mobility:   Basic Mobility Inpatient Raw Score: 15  -HLM Goal: 4: Move to chair/commode  JH-HLM Achieved: 2: Bed activities/Dependent transfer  HLM Goal NOT achieved. Continue with multidisciplinary rounding and encourage appropriate mobility to improve upon HLM goals.    Patient Centered Rounds: I performed bedside rounds with nursing staff today.   Discussions with Specialists or Other Care Team Provider: Geriatrics     Education and Discussions with Family / Patient: Updated  (wife, son, and daughter in law) at bedside.    Total Time Spent on Date of Encounter in care of patient: 45 mins. This time was spent on one or more of the following: performing physical exam; counseling and coordination of care; obtaining or reviewing history; documenting in the medical record; reviewing/ordering tests, medications or procedures; communicating with other healthcare professionals and discussing with patient's family/caregivers.    Current Length of Stay: 11 day(s)  Current  Patient Status: Inpatient   Certification Statement: The patient will continue to require additional inpatient hospital stay due to pending placement   Discharge Plan: Anticipate discharge in 24-48 hrs to discharge location to be determined pending rehab evaluations.    Code Status: Level 1 - Full Code    Subjective:   Patient is alert, not oriented to time place or person.  Patient has been agitated since yesterday, required no doses of olanzapine or Seroquel. Pt is currently on restraints.    objective:     Vitals:   Temp (24hrs), Av.7 °F (36.5 °C), Min:97.7 °F (36.5 °C), Max:97.7 °F (36.5 °C)    Temp:  [97.7 °F (36.5 °C)] 97.7 °F (36.5 °C)  BP: (116)/(69) 116/69  Body mass index is 24.73 kg/m².     Input and Output Summary (last 24 hours):     Intake/Output Summary (Last 24 hours) at 2024 1328  Last data filed at 2024 0601  Gross per 24 hour   Intake 240 ml   Output 975 ml   Net -735 ml       Physical Exam:   Physical Exam  Vitals and nursing note reviewed.   Constitutional:       General: He is in acute distress.      Appearance: He is well-developed.      Comments: Frail looking   HENT:      Head: Normocephalic.      Right Ear: External ear normal.      Left Ear: External ear normal.      Nose: Nose normal.      Mouth/Throat:      Mouth: Mucous membranes are moist.      Pharynx: Oropharynx is clear.   Eyes:      Extraocular Movements: Extraocular movements intact.      Conjunctiva/sclera: Conjunctivae normal.   Cardiovascular:      Rate and Rhythm: Normal rate and regular rhythm.      Heart sounds: No murmur heard.  Pulmonary:      Effort: Pulmonary effort is normal. No respiratory distress.      Breath sounds: Normal breath sounds.   Abdominal:      General: Abdomen is flat. Bowel sounds are normal.      Palpations: Abdomen is soft.      Tenderness: There is no abdominal tenderness.   Genitourinary:     Comments: Pike catheter  Musculoskeletal:         General: No swelling.      Cervical back:  Neck supple.      Right lower leg: No edema.      Left lower leg: No edema.   Skin:     General: Skin is warm and dry.      Capillary Refill: Capillary refill takes less than 2 seconds.   Neurological:      Mental Status: He is alert. He is disoriented.          Additional Data:     Labs:  Results from last 7 days   Lab Units 01/17/24  0544   WBC Thousand/uL 8.43   HEMOGLOBIN g/dL 13.5   HEMATOCRIT % 40.3   PLATELETS Thousands/uL 231     Results from last 7 days   Lab Units 01/17/24  0544   SODIUM mmol/L 141   POTASSIUM mmol/L 3.6   CHLORIDE mmol/L 107   CO2 mmol/L 27   BUN mg/dL 24   CREATININE mg/dL 0.84   ANION GAP mmol/L 7   CALCIUM mg/dL 9.2   ALBUMIN g/dL 3.9   TOTAL BILIRUBIN mg/dL 0.69   ALK PHOS U/L 67   ALT U/L 13   AST U/L 15   GLUCOSE RANDOM mg/dL 91                           Lines/Drains:  Invasive Devices       Peripheral Intravenous Line  Duration             Peripheral IV 01/14/24 Left Forearm 4 days              Drain  Duration             Urethral Catheter 18 Fr. 1 day                  Urinary Catheter:  Goal for removal: N/A - Chronic Pike               Imaging: No pertinent imaging reviewed.    Recent Cultures (last 7 days):           Last 24 Hours Medication List:   Current Facility-Administered Medications   Medication Dose Route Frequency Provider Last Rate    acetaminophen  975 mg Oral Q8H Sean Mims MD      bisacodyl  10 mg Rectal Daily PRN Marta Jamison MD      vitamin B-12  1,000 mcg Oral Daily Sherley Turner MD      divalproex sodium  250 mg Oral TID With Meals Marta Jamison MD      docusate sodium  100 mg Oral BID Sherley Turner MD      enoxaparin  40 mg Subcutaneous Daily Sherley Turner MD      finasteride  5 mg Oral Daily Sherley Turner MD      LORazepam  0.5 mg Oral TID Marta Jamison MD      melatonin  3 mg Oral HS Sean Mims MD      mirtazapine  15 mg Oral HS Sean Mims MD      OLANZapine  2.5 mg Oral Once Stu Zhang MD      OLANZapine  2.5 mg Oral Q8H PRN  Sean Mims MD      polyethylene glycol  17 g Oral Daily Sherley Turner MD      QUEtiapine  25 mg Oral BID Sean Mims MD      QUEtiapine  25 mg Oral HS PRN Jaiden Pang MD      senna  2 tablet Oral BID Marta Jamison MD          Today, Patient Was Seen By: Sean Mims MD    **Please Note: This note may have been constructed using a voice recognition system.**

## 2024-01-18 NOTE — CASE MANAGEMENT
Case Management Progress Note    Patient name Avni Ruiz  Location S /S - MRN [id number]  : 1940 Date 2024       LOS (days): 10  Geometric Mean LOS (GMLOS) (days): 3.4  Days to GMLOS:-6.8        OBJECTIVE:        Current admission status: Inpatient  Preferred Pharmacy: No Pharmacies Listed  Primary Care Provider: No primary care provider on file.    Primary Insurance: MEDICARE  Secondary Insurance:  FOR LIFE    PROGRESS NOTE:    Weekly Care Management Length of Stay Review     Current LOS: 10 Days    Most Recent Labs:     Lab Results   Component Value Date/Time    WBC 8.43 2024 05:44 AM    HGB 13.5 2024 05:44 AM    HCT 40.3 2024 05:44 AM     2024 05:44 AM    SODIUM 141 2024 05:44 AM    K 3.6 2024 05:44 AM     2024 05:44 AM    CO2 27 2024 05:44 AM    BUN 24 2024 05:44 AM    CREATININE 0.84 2024 05:44 AM    GLUC 91 2024 05:44 AM    ALKPHOS 67 2024 05:44 AM    ALT 13 2024 05:44 AM    AST 15 2024 05:44 AM    ALB 3.9 2024 05:44 AM    TBILI 0.69 2024 05:44 AM       Most Recent Vitals:   Vitals:    24 1046   BP: 110/60   Pulse: 82   Resp: 18   Temp: 98.5 °F (36.9 °C)   SpO2:         Identified Barriers to Discharge/Discharge Goals/Care Management Interventions: dementia with behavioral issuse-worsening, restraints continue, FAMILY MEETING AT 11AM ON     Intended Discharge Disposition: snf-will need to be off restraints for 48hrs.    Expected Discharge Date: TB

## 2024-01-18 NOTE — PROGRESS NOTES
Progress Note - Geriatric Medicine   Avni Ruiz 83 y.o. male MRN: [id number]  Unit/Bed#: S -01 Encounter: [id number]      Assessment/Plan:  Polypharmacy  Assessment & Plan  Prior to admission to the hospital patient was on multiple medication  Continue to taper off lorazepam as tolerated  Lexapro was discontinued  Use Zyprexa 2.5 mg only for severe agitation    UTI (urinary tract infection)  Assessment & Plan  Urine culture grew Staphylococcus aureus  Received 1 dose ceftriaxone in ER  Pike catheter in place  Encourage p.o. hydration  Patient with increased behaviors for the past 24 hours consider repeat urine analysis with reflex to culture    BPH (benign prostatic hyperplasia)  Assessment & Plan  Continue finasteride and Flomax  Pike catheter in place      Constipation  Assessment & Plan  Last bowel movement was 1/16  Continue senna 2 tablets twice daily MiraLAX daily  Add Dulcolax suppository as needed  Encourage p.o. hydration and out of bed as tolerated    Dementia with agitation and other behavioral disturbance (HCC)  Assessment & Plan  Patient presented to the hospital with change in mental status.  He has Alzheimer's dementia with behavioral disturbance at baseline recently behaviors are getting worse  Will continue to provide supportive care, reorient as needed.  Patient is at high risk for delirium, will monitor closely and place on delirium precautions.  Maintain sleep/wake cycle.  Optimize pain regimen.  Encourage patient to take Tylenol 975 mg p.o. 3 times daily, per chart review patient did not take any dose yesterday.  Monitor for constipation and urinary retention and manage as needed.  B12 and TSH within normal limits  Encourage family to visit.  Encourage to wear glasses and hearing aids while awake.  Encourage po intake, assist with feeding if needed.   Increase Depakote to 250 mg p.o. 3 times a day with meals, may sprinkle in food  Continue lorazepam 0.5 mg p.o. 3 times daily and  continue to taper off slowly  continue mirtazapine to 15 mg administer at 8 PM along with melatonin   Continue Seroquel to 25 mg twice daily, administer in a.m. and 6 PM   received 1 dose of Zyprexa p.o. in the last 24 hours.  Consider repeat urinalysis  If patient not able to sleep at night give extra dose Seroquel 25 mg at bedtime      Ambulatory dysfunction  Assessment & Plan  Patient presented status post fall at the facility  CT head and neck with no acute pathology  Monitor orthostatic vital signs  Encourage p.o. hydration  Avoid hypotension and hypoglycemia   PT OT evaluation    * Metabolic encephalopathy  Assessment & Plan  -improvement noted , seems to be at baseline  -Patient is high risk of delirium due to dementia with behavioral disturbance at baseline, UTI, insomnia, change in environment  -Initiate delirium precautions  -maintain normal sleep/wake cycle  -minimize overnight interruptions, group overnight vitals/labs/nursing checks as possible  -dim lights, close blinds and turn off tv to minimize stimulation and encourage sleep environment in evenings  -ensure that pain is well controlled continue Tylenol 975mg Q8H scheduled, encourage patient to take the medication  -monitor for fecal and urinary retention which may precipitate delirium  -encourage early mobilization and ambulation  -provide frequent reorientation and redirection  -encourage family and friends at the bedside to help calm patient if anxious  -avoid medications which may precipitate or worsen delirium such as tramadol, benzodiazepine, anticholinergics, and antihistaminics  -encourage hydration and nutrition , assist with feeding if needed  -redirect unwanted behaviors as first line, avoid physical restraints.   -Increase Depakote to 250 mg p.o. 3 times a day with meals-monitor CBC CMP  -Seroquel 25 mg p.o. in the morning and 6 PM  -Continue lorazepam 0.5 mg 3 times a day 9am, 1pm, 8pm  -continue mirtazapine to 15 mg at 8 PM  -For  "severe agitation only may use olanzapine 2.5 mg every 8 hours as needed,  -He received one dose of Zyprexa in the past 24 hours and currently he is on wrist restraints  -If patient not able to sleep consider Seroquel 25 mg p.o. nightly in addition to his scheduled dose-monitor QTc  -Consider repeat urine analysis with reflex to culture       Subjective:     Examined at bedside for geriatric follow-up.  Patient is slightly restless, not able to provide any useful history.  Per nursing staff he continues to get agitated intermittently    Review of Systems   Unable to perform ROS: Dementia         Objective:     Vitals: Blood pressure 100/59, pulse 82, temperature 97.8 °F (36.6 °C), resp. rate 17, height 5' 6\" (1.676 m), weight 69.5 kg (153 lb 3.5 oz), SpO2 100%.,Body mass index is 24.73 kg/m².      Intake/Output Summary (Last 24 hours) at 1/18/2024 1643  Last data filed at 1/18/2024 0601  Gross per 24 hour   Intake 240 ml   Output 975 ml   Net -735 ml       Current Medications: Reviewed    Physical Exam:   Physical Exam  Vitals and nursing note reviewed.   Constitutional:       General: He is not in acute distress.     Appearance: He is well-developed.      Comments: Frail looking   HENT:      Head: Normocephalic and atraumatic.      Ears:      Comments: [place]     Mouth/Throat:      Mouth: Mucous membranes are moist.   Eyes:      Conjunctiva/sclera: Conjunctivae normal.   Cardiovascular:      Rate and Rhythm: Normal rate and regular rhythm.      Heart sounds: No murmur heard.  Pulmonary:      Effort: Pulmonary effort is normal. No respiratory distress.      Breath sounds: Normal breath sounds.   Abdominal:      Palpations: Abdomen is soft.      Tenderness: There is no abdominal tenderness.   Musculoskeletal:         General: No swelling.      Cervical back: Neck supple.      Right lower leg: No edema.      Left lower leg: No edema.   Skin:     General: Skin is warm and dry.      Capillary Refill: Capillary refill takes " less than 2 seconds.   Neurological:      Mental Status: He is alert. He is disoriented.   Psychiatric:      Comments: Anxious/agitated intermittently           Invasive Devices       Peripheral Intravenous Line  Duration             Peripheral IV 01/14/24 Left Forearm 4 days              Drain  Duration             Urethral Catheter 18 Fr. 1 day                    Lab, Imaging and other studies: I have personally reviewed pertinent reports.

## 2024-01-18 NOTE — PHYSICAL THERAPY NOTE
Physical Therapy Cancellation Note             01/18/24 2645   PT Last Visit   PT Visit Date 01/18/24   Note Type   Note Type Cancelled Session   Cancel Reasons Other   Assessment   Assessment PT intervention not appropriate at this time per RN. pt in bilateral wrist restraints and posey. PT will follow and attempt to see pt when PT schedule will allow     Luciano Cool

## 2024-01-19 PROCEDURE — 99232 SBSQ HOSP IP/OBS MODERATE 35: CPT | Performed by: FAMILY MEDICINE

## 2024-01-19 PROCEDURE — 99232 SBSQ HOSP IP/OBS MODERATE 35: CPT | Performed by: INTERNAL MEDICINE

## 2024-01-19 PROCEDURE — 97116 GAIT TRAINING THERAPY: CPT

## 2024-01-19 RX ORDER — QUETIAPINE FUMARATE 25 MG/1
25 TABLET, FILM COATED ORAL 2 TIMES DAILY
Qty: 30 TABLET | Refills: 0 | Status: CANCELLED | OUTPATIENT
Start: 2024-01-19 | End: 2024-02-03

## 2024-01-19 RX ADMIN — QUETIAPINE FUMARATE 25 MG: 25 TABLET ORAL at 16:48

## 2024-01-19 RX ADMIN — DIVALPROEX SODIUM 250 MG: 125 CAPSULE, COATED PELLETS ORAL at 10:12

## 2024-01-19 RX ADMIN — POLYETHYLENE GLYCOL 3350 17 G: 17 POWDER, FOR SOLUTION ORAL at 10:08

## 2024-01-19 RX ADMIN — Medication 3 MG: at 22:10

## 2024-01-19 RX ADMIN — SENNOSIDES 17.2 MG: 8.6 TABLET, FILM COATED ORAL at 16:48

## 2024-01-19 RX ADMIN — LORAZEPAM 0.5 MG: 0.5 TABLET ORAL at 12:16

## 2024-01-19 RX ADMIN — ACETAMINOPHEN 975 MG: 325 TABLET, FILM COATED ORAL at 22:12

## 2024-01-19 RX ADMIN — DOCUSATE SODIUM 100 MG: 100 CAPSULE, LIQUID FILLED ORAL at 10:08

## 2024-01-19 RX ADMIN — SENNOSIDES 17.2 MG: 8.6 TABLET, FILM COATED ORAL at 10:08

## 2024-01-19 RX ADMIN — DOCUSATE SODIUM 100 MG: 100 CAPSULE, LIQUID FILLED ORAL at 16:48

## 2024-01-19 RX ADMIN — OLANZAPINE 2.5 MG: 2.5 TABLET, FILM COATED ORAL at 01:49

## 2024-01-19 RX ADMIN — DIVALPROEX SODIUM 250 MG: 125 CAPSULE, COATED PELLETS ORAL at 16:48

## 2024-01-19 RX ADMIN — QUETIAPINE FUMARATE 25 MG: 25 TABLET ORAL at 00:18

## 2024-01-19 RX ADMIN — LORAZEPAM 0.5 MG: 0.5 TABLET ORAL at 10:13

## 2024-01-19 RX ADMIN — QUETIAPINE FUMARATE 25 MG: 25 TABLET ORAL at 10:08

## 2024-01-19 RX ADMIN — DIVALPROEX SODIUM 250 MG: 125 CAPSULE, COATED PELLETS ORAL at 12:16

## 2024-01-19 RX ADMIN — QUETIAPINE FUMARATE 25 MG: 25 TABLET ORAL at 23:34

## 2024-01-19 RX ADMIN — CYANOCOBALAMIN TAB 500 MCG 1000 MCG: 500 TAB at 10:08

## 2024-01-19 RX ADMIN — FINASTERIDE 5 MG: 5 TABLET, FILM COATED ORAL at 10:08

## 2024-01-19 RX ADMIN — ACETAMINOPHEN 975 MG: 325 TABLET, FILM COATED ORAL at 13:54

## 2024-01-19 RX ADMIN — LORAZEPAM 0.5 MG: 0.5 TABLET ORAL at 22:08

## 2024-01-19 RX ADMIN — ENOXAPARIN SODIUM 40 MG: 40 INJECTION SUBCUTANEOUS at 10:08

## 2024-01-19 RX ADMIN — OLANZAPINE 2.5 MG: 2.5 TABLET, FILM COATED ORAL at 18:28

## 2024-01-19 RX ADMIN — MIRTAZAPINE 15 MG: 15 TABLET, FILM COATED ORAL at 22:11

## 2024-01-19 NOTE — PROGRESS NOTES
[facility]  Progress Note  Name: Avni Ruiz I  MRN: [id number]  Unit/Bed#: S -01 I Date of Admission: 1/7/2024   Date of Service: 1/20/2024 I Hospital Day: 13    Assessment/Plan   * Metabolic encephalopathy  Assessment & Plan  Patient has history of Alzheimer's dementia  Patient is not oriented to time place or person  Patient had multiple medication changes in the past few weeks  Metabolic encephalopathy 2/2 UTI and polypharmacy  Geriatrics consulted and recommendations appreciated.  Patient had a episodes of agitation or combative behavior last evening, received one dose of olanzapine 2.5 mg   Patient has been off restraints for over 24 hours  Discontinuing continued observation  Switch olanzapine from IM to oral  Patient was able to walk with the nurse in the hallway without any agitation or trouble  Patient is currently not in restraints    PLAN:  Discontinued continual observation  Tylenol 975 every 8 scheduled  Medication adjustment can be seen in dementia management    Dementia with agitation and other behavioral disturbance (HCC)  Assessment & Plan  Patient has a history of Alzheimer's dementia.  At baseline patient can hold conversations but does get confused.  He is also able to feed and clothe himself with dementia.  However, patient more confused at this point in time.  Patient slept throughout the course of exam, and was unarousable to gentle shaking.  Per son, patient's medications have been adjusted to increase the dosage of his dementia medications.  Concern for encephalopathy at this point  Patient is oriented to himself however not to time and place  According to the son patient has waxing and waning mentation  Patient had a episodes of agitation or combative behavior last evening, received one dose of olanzapine 2.5 mg   Patient has been off restraints for the past 24 hours  Discontinuing observation  Switching olanzapine from IM to oral  Had discussion  with family about goals of care, provided them with information regarding prognosis.  1 person from the family agreed to stay with the patient for 24 hours while he is off restraints  Patient was able to walk with the nurse in the hallway without any agitation or trouble  Patient is currently not in restraints      PLAN:  Discussed with nursing to walk patient 2-3 times a day around unit quarter as this seems to help his calm down during the day  Tylenol 975 every 8 scheduled  Depakote 250 to three times daily  Seroquel to 25 Mg bid   Mirtazapine to 15 Mg at 8 PM  Melatonin at 8 PM  Oral Olanzapine 2.5 Mg every q8 hr as needed  Ativan 0.5 Mg 3 times daily    UTI (urinary tract infection)  Assessment & Plan  Patient has a chronic Pike, was replaced in the ED on admission  Patient has history of urinary retention has been following up with urology outpatient  Urology was consulted, offered patient's family Pike's catheter versus CIC, patient's family agreed on Pike's  Patient was afebrile overnight  Has no suprapubic or abdominal pain on palpation  No leukocytosis    PLAN:  Completed 3 days of IV ceftriaxone  Monitor for signs of infection    Polypharmacy  Assessment & Plan  Geriatrics consulted    PLAN:  See plan acute encephalopathy    Abnormal urine finding  Assessment & Plan  On admission patient had a urinalysis that was significant for leukocytes and nitrites.  Unable to assess with patient is having any pain or burning with urination.  He does have a history of cystitis.  Per son, patient has been receiving catheterization and has tried voiding trials but was unsuccessful.  Per son, patient has had a Pike in place since Tuesday which was placed by his urologist.  Urine culture shows Staph aureus  Urology consulted, offered patient's family options of Pike's versus CIC, patient's family agreed on continuation of Pike    Plans:  Completed 3 days of IV ceftriaxone  Monitoring off antibiotics    BPH (benign  prostatic hyperplasia)  Assessment & Plan  Patient was on dutasteride and Flomax at the nursing facility  Urology recommended discontinuation of the tamsulosin    PLAN:  Discontinue tamsulosin  Continue finasteride 5 Mg daily    Constipation  Assessment & Plan  Continue MiraLAX and senna    Ambulatory dysfunction  Assessment & Plan  Patient presented to the ED after nursing facility found patient to have abrasions on his forehead.  The facility believes the patient fell out of bed last night and got himself back and without telling anybody. He was more confused that usual. Patient was brought to the ED via the trauma pathway, where trauma workup and CT imaging were negative.  Patient's lab work was unremarkable, and vitals have remained stable.    PLAN:  PT/OT evaluations and recommendations appreciated  PT level 2, OT level 3  DC plan to go back to his prior facility [facility] if patient is tolerating being off restraints and one-to-one for 48 hours               VTE Pharmacologic Prophylaxis: VTE Score: 5 High Risk (Score >/= 5) - Pharmacological DVT Prophylaxis Ordered: enoxaparin (Lovenox). Sequential Compression Devices Ordered.    Mobility:   Basic Mobility Inpatient Raw Score: 22  JH-HLM Goal: 7: Walk 25 feet or more  JH-HLM Achieved: 7: Walk 25 feet or more  HLM Goal achieved. Continue to encourage appropriate mobility.    Patient Centered Rounds: I performed bedside rounds with nursing staff today.  Discussions with Specialists or Other Care Team Provider: Gerontology     Education and Discussions with Family / Patient: Updated  (wife and son) at bedside.    Current Length of Stay: 13 day(s)  Current Patient Status: Inpatient   Discharge Plan: Anticipate discharge in 48-72 hrs to discharge location to be determined pending rehab evaluations.    Code Status: Level 1 - Full Code    Subjective:   Overnight patient once again became agitated, requiring additional dose of Zyprexa.  Morning,  found sitting in bed, eating, with family at bedside.  When addressed, patient looks in the erection of this provider, however does not respond to questions.     Objective:     Vitals:   Temp (24hrs), Av °F (36.7 °C), Min:98 °F (36.7 °C), Max:98 °F (36.7 °C)    Temp:  [98 °F (36.7 °C)] 98 °F (36.7 °C)  HR:  [85] 85  Resp:  [18] 18  BP: (113-121)/(62-64) 121/64  Body mass index is 24.73 kg/m².     Input and Output Summary (last 24 hours):   No intake or output data in the 24 hours ending 24 1318      Physical Exam:   Physical Exam  Vitals and nursing note reviewed.   Constitutional:       General: He is not in acute distress.     Appearance: Normal appearance. He is not ill-appearing, toxic-appearing or diaphoretic.   HENT:      Head: Normocephalic and atraumatic.      Nose: Nose normal.      Mouth/Throat:      Mouth: Mucous membranes are moist.      Pharynx: Oropharynx is clear.   Eyes:      General: No scleral icterus.        Right eye: No discharge.         Left eye: No discharge.      Extraocular Movements: Extraocular movements intact.      Conjunctiva/sclera: Conjunctivae normal.   Pulmonary:      Effort: No respiratory distress.   Abdominal:      General: Abdomen is flat. There is no distension.   Musculoskeletal:      Cervical back: Normal range of motion and neck supple.      Right lower leg: No edema.      Left lower leg: No edema.   Skin:     General: Skin is warm and dry.      Coloration: Skin is not jaundiced or pale.   Neurological:      Mental Status: He is alert. Mental status is at baseline.   Psychiatric:         Mood and Affect: Affect is flat.          Additional Data:     Labs:  Results from last 7 days   Lab Units 24  0544   WBC Thousand/uL 8.43   HEMOGLOBIN g/dL 13.5   HEMATOCRIT % 40.3   PLATELETS Thousands/uL 231     Results from last 7 days   Lab Units 24  0544   SODIUM mmol/L 141   POTASSIUM mmol/L 3.6   CHLORIDE mmol/L 107   CO2 mmol/L 27   BUN mg/dL 24   CREATININE  mg/dL 0.84   ANION GAP mmol/L 7   CALCIUM mg/dL 9.2   ALBUMIN g/dL 3.9   TOTAL BILIRUBIN mg/dL 0.69   ALK PHOS U/L 67   ALT U/L 13   AST U/L 15   GLUCOSE RANDOM mg/dL 91                       Lines/Drains:  Invasive Devices       Drain  Duration             Urethral Catheter 18 Fr. 3 days                  Urinary Catheter:  Goal for removal: N/A - Chronic Pike               Imaging: No pertinent imaging reviewed.    Recent Cultures (last 7 days):         Last 24 Hours Medication List:   Current Facility-Administered Medications   Medication Dose Route Frequency Provider Last Rate    acetaminophen  975 mg Oral Q8H Sean Mims MD      bisacodyl  10 mg Rectal Daily PRN Marta Jamison MD      vitamin B-12  1,000 mcg Oral Daily Sherley Turner MD      divalproex sodium  250 mg Oral TID With Meals Marta Jamison MD      docusate sodium  100 mg Oral BID Sherley Turner MD      enoxaparin  40 mg Subcutaneous Daily Sherley Turner MD      finasteride  5 mg Oral Daily Sherley Turner MD      LORazepam  0.5 mg Oral TID Marta Jamison MD      melatonin  3 mg Oral HS Sean Mims MD      mirtazapine  15 mg Oral HS Sean Mims MD      OLANZapine  2.5 mg Oral Once Stu Zhang MD      OLANZapine  2.5 mg Oral Q8H PRN Sean Mims MD      polyethylene glycol  17 g Oral Daily Sherley Turner MD      QUEtiapine  25 mg Oral BID Sean Mims MD      QUEtiapine  25 mg Oral HS PRN Jaiden Pang MD      senna  2 tablet Oral BID Marta Jamison MD          Today, Patient Was Seen By: Jaiden Pang MD    **Please Note: This note may have been constructed using a voice recognition system.**

## 2024-01-19 NOTE — PROGRESS NOTES
[facility]  Progress Note  Name: Avni Ruiz I  MRN: [id number]  Unit/Bed#: S -01 I Date of Admission: 1/7/2024   Date of Service: 1/19/2024 I Hospital Day: 12    Assessment/Plan   * Metabolic encephalopathy  Assessment & Plan  Patient has history of Alzheimer's dementia  Patient is not oriented to time place or person  Patient had multiple medication changes in the past few weeks  Metabolic encephalopathy 2/2 UTI and polypharmacy  Geriatrics consulted and recommendations appreciated.  Patient had a episodes of agitation or combative behavior last evening, received one dose of olanzapine 2.5 mg   Patient has been off restraints for over 24 hours  Discontinuing continued observation  Switch olanzapine from IM to oral  Patient was able to walk with the nurse in the hallway without any agitation or trouble  Patient is currently not in restraints    PLAN:  Discontinued continual observation  Tylenol 975 every 8 scheduled  Medication adjustment can be seen in dementia management    Polypharmacy  Assessment & Plan  Geriatrics consulted    PLAN:  See plan acute encephalopathy    UTI (urinary tract infection)  Assessment & Plan  Patient has a chronic Pike, was replaced in the ED on admission  Patient has history of urinary retention has been following up with urology outpatient  Urology was consulted, offered patient's family Pike's catheter versus CIC, patient's family agreed on Pike's  Patient was afebrile overnight  Has no suprapubic or abdominal pain on palpation  No leukocytosis    PLAN:  Completed 3 days of IV ceftriaxone  Monitor for signs of infection    Abnormal urine finding  Assessment & Plan  On admission patient had a urinalysis that was significant for leukocytes and nitrites.  Unable to assess with patient is having any pain or burning with urination.  He does have a history of cystitis.  Per son, patient has been receiving catheterization and has tried voiding  trials but was unsuccessful.  Per son, patient has had a Pike in place since Tuesday which was placed by his urologist.  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  At baseline patient can hold conversations but does get confused.  He is also able to feed and clothe himself with dementia.  However, patient more confused at this point in time.  Patient slept throughout the course of exam, and was unarousable to gentle shaking.  Per son, patient's medications have been adjusted to increase the dosage of his dementia medications.  Concern for encephalopathy at this point  Patient is oriented to himself however not to time and place  According to the son patient has waxing and waning mentation  Patient had a episodes of agitation or combative behavior last evening, received one dose of olanzapine 2.5 mg   Patient has been off restraints for the past 24 hours  Discontinuing observation  Switching olanzapine from IM to oral  Had discussion with family about goals of care, provided them with information regarding prognosis.  1 person from the family agreed to stay with the patient for 24 hours while he is off restraints  Patient was able to walk with the nurse in the hallway without any agitation or trouble  Patient is currently not in restraints      PLAN:  Restarted restraints   Tylenol 975 every 8  scheduled  Depakote 250 to three times daily  Seroquel to 25 Mg bid   Mirtazapine to 15 Mg at 8 PM  Melatonin at 8 PM  Oral Olanzapine 2.5 Mg every q8 hr as needed  Ativan 0.5 Mg 3 times daily    Ambulatory dysfunction  Assessment & Plan  Patient presented to the ED after nursing facility found patient to have abrasions on his forehead.  The facility believes the patient fell out of bed last night and got himself back and without telling anybody. He was more confused that usual. Patient was brought to the ED via the trauma pathway, where trauma workup and CT imaging were negative.  Patient's lab work was unremarkable, and vitals have remained stable.    PLAN:  PT/OT evaluations and recommendations appreciated  PT level 2, OT level 3  DC plan to go back to his prior facility [facility] if patient is tolerating being off restraints and one-to-one for 48 hours               VTE Pharmacologic Prophylaxis: VTE Score: 5 High Risk (Score >/= 5) - Pharmacological DVT Prophylaxis Ordered: enoxaparin (Lovenox). Sequential Compression Devices Ordered.    Mobility:   Basic Mobility Inpatient Raw Score: 22  JH-HLM Goal: 7: Walk 25 feet or more  JH-HLM Achieved: 7: Walk 25 feet or more  HLM Goal NOT achieved. Continue with multidisciplinary rounding and encourage appropriate mobility to improve upon HLM goals.    Patient Centered Rounds: I performed bedside rounds with nursing staff today.   Discussions with Specialists or Other Care Team Provider: Geriatrics     Education and Discussions with Family / Patient: Updated  (wife) via phone.    Total Time Spent on Date of Encounter in care of patient: 45 mins. This time was spent on one or more of the following: performing physical exam; counseling and coordination of care; obtaining or reviewing history; documenting in the medical record; reviewing/ordering tests, medications or procedures; communicating with other healthcare professionals and discussing with  patient's family/caregivers.    Current Length of Stay: 12 day(s)  Current Patient Status: Inpatient   Certification Statement: The patient will continue to require additional inpatient hospital stay due to pending placement   Discharge Plan: Anticipate discharge in 24-48 hrs to discharge location to be determined pending rehab evaluations.    Code Status: Level 1 - Full Code    Subjective:   Patient is alert, not oriented to time place or person.  Patient was able to take a walk with the nurses without any agitation or trouble.  Patient is currently off restraints.  Will monitor the patient for 48 hours before discharge off of the restraints.      Objective:     Vitals:   Temp (24hrs), Av °F (36.7 °C), Min:97.8 °F (36.6 °C), Max:98.2 °F (36.8 °C)    Temp:  [97.8 °F (36.6 °C)-98.2 °F (36.8 °C)] 98.2 °F (36.8 °C)  Resp:  [17] 17  BP: (100-114)/(59-67) 114/67  Body mass index is 24.73 kg/m².     Input and Output Summary (last 24 hours):     Intake/Output Summary (Last 24 hours) at 2024 1414  Last data filed at 2024 1001  Gross per 24 hour   Intake 240 ml   Output 650 ml   Net -410 ml       Physical Exam:   Physical Exam  Vitals and nursing note reviewed.   Constitutional:       General: He is in acute distress.      Appearance: He is well-developed.      Comments: Frail looking   HENT:      Head: Normocephalic.      Right Ear: External ear normal.      Left Ear: External ear normal.      Nose: Nose normal.      Mouth/Throat:      Mouth: Mucous membranes are moist.      Pharynx: Oropharynx is clear.   Eyes:      Extraocular Movements: Extraocular movements intact.      Conjunctiva/sclera: Conjunctivae normal.   Cardiovascular:      Rate and Rhythm: Normal rate and regular rhythm.      Heart sounds: No murmur heard.  Pulmonary:      Effort: Pulmonary effort is normal. No respiratory distress.      Breath sounds: Normal breath sounds.   Abdominal:      General: Abdomen is flat. Bowel sounds are normal.       Palpations: Abdomen is soft.      Tenderness: There is no abdominal tenderness.   Genitourinary:     Comments: Pike catheter  Musculoskeletal:         General: No swelling.      Cervical back: Neck supple.      Right lower leg: No edema.      Left lower leg: No edema.   Skin:     General: Skin is warm and dry.      Capillary Refill: Capillary refill takes less than 2 seconds.   Neurological:      Mental Status: He is alert. He is disoriented.          Additional Data:     Labs:  Results from last 7 days   Lab Units 01/17/24  0544   WBC Thousand/uL 8.43   HEMOGLOBIN g/dL 13.5   HEMATOCRIT % 40.3   PLATELETS Thousands/uL 231     Results from last 7 days   Lab Units 01/17/24  0544   SODIUM mmol/L 141   POTASSIUM mmol/L 3.6   CHLORIDE mmol/L 107   CO2 mmol/L 27   BUN mg/dL 24   CREATININE mg/dL 0.84   ANION GAP mmol/L 7   CALCIUM mg/dL 9.2   ALBUMIN g/dL 3.9   TOTAL BILIRUBIN mg/dL 0.69   ALK PHOS U/L 67   ALT U/L 13   AST U/L 15   GLUCOSE RANDOM mg/dL 91                           Lines/Drains:  Invasive Devices       Drain  Duration             Urethral Catheter 18 Fr. 2 days                  Urinary Catheter:  Goal for removal: N/A - Chronic Pike               Imaging: No pertinent imaging reviewed.    Recent Cultures (last 7 days):           Last 24 Hours Medication List:   Current Facility-Administered Medications   Medication Dose Route Frequency Provider Last Rate    acetaminophen  975 mg Oral Q8H Sean Mims MD      bisacodyl  10 mg Rectal Daily PRN Marta Jamison MD      vitamin B-12  1,000 mcg Oral Daily Sherley Turner MD      divalproex sodium  250 mg Oral TID With Meals Marta Jamison MD      docusate sodium  100 mg Oral BID Sherley Turner MD      enoxaparin  40 mg Subcutaneous Daily Sherley Turner MD      finasteride  5 mg Oral Daily Sherley Turner MD      LORazepam  0.5 mg Oral TID Marta Jamison MD      melatonin  3 mg Oral HS Sean Mims MD      mirtazapine  15 mg Oral HS Sean Mims MD       OLANZapine  2.5 mg Oral Once Stu Zhang MD      OLANZapine  2.5 mg Oral Q8H PRN Sean Mims MD      polyethylene glycol  17 g Oral Daily Sherley Turner MD      QUEtiapine  25 mg Oral BID Sean Mims MD      QUEtiapine  25 mg Oral HS PRN Jaiden Pang MD      senna  2 tablet Oral BID Marta Jamison MD          Today, Patient Was Seen By: Sean Mims MD    **Please Note: This note may have been constructed using a voice recognition system.**

## 2024-01-19 NOTE — DISCHARGE INSTR - AVS FIRST PAGE
Dear Avni Ruiz,     It was our pleasure to care for you here at [facility].  It is our hope that we were always able to exceed the expected standards for your care during your stay.  You were hospitalized due to a fall and decompensated cirrhosis.  You were cared for on the South 2nd floor by Jaiden Pang MD under the service of Elvira Ordaz MD with the [facility] who covers for your primary care physician (PCP), No primary care provider on file., while you were hospitalized.  If you have any questions or concerns related to this hospitalization, you may contact us at .  For follow up as well as any medication refills, we recommend that you follow up with your primary care physician.  A registered nurse will reach out to you by phone within a few days after your discharge to answer any additional questions that you may have after going home.  However, at this time we provide for you here, the most important instructions / recommendations at discharge:     Notable Medication Adjustments -   Please start taking quetiapine (Seroquel) 25 mg at 9am, 3pm and 8pm. Monitor Qtc.  Please start taking mirtazapine (Remeron) 15 mg daily in the evening at 8pm  Please take Lorazepam 0.5mg 3 times a day at 9am, 1pm, 8pm.   Please take Divalproex sodium (Depakote) to 250 mg p.o. 3 times a day with meals-monitor CBC CMP   Continue taking the rest of your medications as prescribed  Testing Required after Discharge -   None  ** Please contact your PCP to request testing orders for any of the testing recommended here **  Important follow up information -   Follow-up with your primary care provider within a week of discharge  Please follow-up with gerontology on outpatient basis  Please follow up with urology on outpatient basis.   Other Instructions -   Please contact your primary care provider, call 911 or go to the nearest emergency  department for worsening symptoms  Practice Social distancing  Please practice adequate hand washing techinque  Wear a mask in public at all times   Please review this entire after visit summary as additional general instructions including medication list, appointments, activity, diet, any pertinent wound care, and other additional recommendations from your care team that may be provided for you.      Sincerely,     Jaiden Pang MD

## 2024-01-19 NOTE — PHYSICAL THERAPY NOTE
"                                                        PHYSICAL THERAPY TREATMENT NOTE          Patient Name: Avni Ruiz  Today's Date: 1/19/2024        AGE:   83 y.o.  Mrn:   [id number]  ADMIT DX:  Dementia (HCC) [F03.90]  Laceration of head [S01.91XA]  Abnormal urine finding [R82.90]  Ambulatory dysfunction [R26.2]  Benign prostatic hyperplasia, unspecified whether lower urinary tract symptoms present [N40.0]    Past Medical History:  Past Medical History:   Diagnosis Date    Chronic indwelling Pike catheter     Dementia with behavioral disturbance (HCC)     Depression     Frequent UTI     GERD (gastroesophageal reflux disease)     Urinary retention           01/19/24 0956   PT Last Visit   PT Visit Date 01/19/24   Note Type   Note Type Treatment   Pain Assessment   Pain Assessment Tool 0-10   Pain Score No Pain   Restrictions/Precautions   Weight Bearing Precautions Per Order No   Other Precautions Cognitive;Chair Alarm;Bed Alarm;Multiple lines;Fall Risk  (catheter)   General   Chart Reviewed Yes   Additional Pertinent History pt ok to be left off restraints at end of session per RN (pt w/ bilateral wrist and posey waist restraint donned upon arrival to room)   Family/Caregiver Present No   Cognition   Overall Cognitive Status Impaired   Arousal/Participation Alert;Cooperative   Attention Attends with cues to redirect   Orientation Level Oriented to person;Disoriented to time;Disoriented to situation;Disoriented to place  (pt prefers to be called \"Ed\")   Memory Decreased recall of biographical information;Decreased short term memory;Decreased recall of recent events;Decreased recall of precautions   Following Commands Follows one step commands with increased time or repetition   Comments Pt pleasantly confused throughout, easily distracted but able to be re-directed w/ cues, benefits from quiet,closed environment   Bed Mobility   Supine to Sit 5  Supervision   Additional items Assist x 1;HOB " elevated;Increased time required;Verbal cues   Sit to Supine 5  Supervision   Additional items Assist x 1;HOB elevated;Increased time required;Verbal cues   Additional Comments 2 supine<>sit trials, pt able to maintain sitting balance at EOB w/ supervision   Transfers   Sit to Stand 4  Minimal assistance   Additional items Assist x 1;Increased time required;Verbal cues   Stand to Sit 4  Minimal assistance   Additional items Assist x 1;Increased time required;Verbal cues   Additional Comments 5 sit<>stand transfer trials total   Ambulation/Elevation   Gait pattern Improper Weight shift;Wide BIJAL;Decreased foot clearance;Knees flexed;Decreased hip extension;Decreased heel strike   Gait Assistance 4  Minimal assist   Additional items Assist x 1;Verbal cues  (hand-held assist)   Assistive Device None   Distance 8'+55'+10'   Balance   Static Sitting Fair +   Dynamic Sitting Fair   Static Standing Fair -   Dynamic Standing Poor +   Ambulatory Poor +   Activity Tolerance   Activity Tolerance Patient tolerated treatment well;Other (Comment)  (pt limited by cognition)   Medical Staff Made Aware ALLEN Hubbard, updated PCAs post   Assessment   Prognosis Fair   Problem List Decreased mobility;Impaired balance;Decreased cognition;Impaired judgement;Decreased safety awareness   Assessment PT interventions provided include: bed mobility, transfer, gait, balance training in order to progress pt's tolerance to functional mobility and to maximize pt independence w/ functional mobility. In comparison to previous session, pt w/ decreased amount of physical assistance required for Oob mobility including ambulation. Pt demonstrated ability to ambulate multiple trials w/ hand-hold Ax1. Pt continues to be easily distracted and pleasantly confused; however, is able to be re-directed for task completion. Pt continues to be functioning below baseline level, and remains limited in functional mobility due to impaired cognition, impaired balance,  decreased safety awareness, and gait deviations. Pt will continue benefit from PT to promote independence w/ functional mobility and progress towards set goals. Recommend DC w/ level: II (Moderate Rehab Resource Intensity) when medically cleared.   Goals   Patient Goals to get ready for [facility] Expiration Date 01/22/24   Short Term Goal #1 Pt will: perform bed mobility w/ supervision to decrease pt's burden of care and increase pt's independence w/ repositioning in bed; perform transfers w/ supervision to promote increased OOB mobility; ambulate at least 75' w/ LRAD vs no AD and supervision to increase pt's ambulatory endurance/tolerance; negotiate at least 3 stair(s) w/ UE support and supervision to facilitate pt returning to previous living environment; increase all balance ratings by at least 1 grade to decrease pt's risk of falls   PT Treatment Day 1   Plan   Treatment/Interventions ADL retraining;Functional transfer training;LE strengthening/ROM;Cognitive reorientation;Patient/family training;Equipment eval/education;Bed mobility;Gait training   Progress Slow progress, cognitive deficits   PT Frequency 3-5x/wk   Discharge Recommendation   Rehab Resource Intensity Level, PT II (Moderate Resource Intensity)   AM-PAC Basic Mobility Inpatient   Turning in Flat Bed Without Bedrails 3   Lying on Back to Sitting on Edge of Flat Bed Without Bedrails 3   Moving Bed to Chair 3   Standing Up From Chair Using Arms 3   Walk in Room 3   Climb 3-5 Stairs With Railing 1   Basic Mobility Inpatient Raw Score 16   Basic Mobility Standardized Score 38.32   Highest Level Of Mobility   JH-HLM Goal 5: Stand one or more mins   JH-HLM Achieved 7: Walk 25 feet or more   Education   Education Provided Mobility training   Patient Reinforcement needed   End of Consult   Patient Position at End of Consult Supine;Bed/Chair alarm activated;All needs within reach       The patient's AM-PAC Basic Mobility Inpatient Short Form Raw Score is  16. A Raw score of less than or equal to 16 suggests the patient may benefit from discharge to home. Please also refer to the recommendation of the Physical Therapist for safe discharge planning.    Pt will continue to benefit from skilled inpatient PT during this admission in order to facilitate progress towards goals and to maximize pt's independence w/ functional mobility.    DC rec: level II (Moderate Rehab Resource Intensity)      Roxanne Cagle PT, DPT  01/19/24

## 2024-01-19 NOTE — PLAN OF CARE
Problem: PHYSICAL THERAPY ADULT  Goal: Performs mobility at highest level of function for planned discharge setting.  See evaluation for individualized goals.  Description: Treatment/Interventions: Functional transfer training, LE strengthening/ROM, Elevations, Therapeutic exercise, Endurance training, Cognitive reorientation, Patient/family training, Equipment eval/education, Bed mobility, Gait training, Compensatory technique education          See flowsheet documentation for full assessment, interventions and recommendations.  Outcome: Progressing  Note: Prognosis: Fair  Problem List: Decreased mobility, Impaired balance, Decreased cognition, Impaired judgement, Decreased safety awareness  Assessment: PT interventions provided include: bed mobility, transfer, gait, balance training in order to progress pt's tolerance to functional mobility and to maximize pt independence w/ functional mobility. In comparison to previous session, pt w/ decreased amount of physical assistance required for Oob mobility including ambulation. Pt demonstrated ability to ambulate multiple trials w/ hand-hold Ax1. Pt continues to be easily distracted and pleasantly confused; however, is able to be re-directed for task completion. Pt continues to be functioning below baseline level, and remains limited in functional mobility due to impaired cognition, impaired balance, decreased safety awareness, and gait deviations. Pt will continue benefit from PT to promote independence w/ functional mobility and progress towards set goals. Recommend DC w/ level: II (Moderate Rehab Resource Intensity) when medically cleared.  Barriers to Discharge: Inaccessible home environment     Rehab Resource Intensity Level, PT: II (Moderate Resource Intensity)    See flowsheet documentation for full assessment.

## 2024-01-19 NOTE — PROGRESS NOTES
----- Message from Mehrdad Ahuja MD sent at 11/20/2017  5:32 PM CST -----  CALCIUM STABLE. NO CHANGE   Progress Note - Geriatric Medicine   Avni Ruiz 83 y.o. male MRN: [id number]  Unit/Bed#: S -01 Encounter: [id number]      Assessment/Plan:  Polypharmacy  Assessment & Plan  Prior to admission to the hospital patient was on multiple medication  Continue to taper off lorazepam as tolerated  Lexapro was discontinued  Use Zyprexa 2.5 mg only for severe agitation    UTI (urinary tract infection)  Assessment & Plan  Urine culture grew Staphylococcus aureus  Received 3 doses ceftriaxone on admission to the hospital  Pike catheter in place  Encourage p.o. hydration  Patient with increased behaviors for the past 24 hours consider repeat urine analysis with reflex to culture    BPH (benign prostatic hyperplasia)  Assessment & Plan  Continue finasteride and Flomax  Pike catheter in place      Constipation  Assessment & Plan  Last bowel movement was 1/16  Continue senna 2 tablets twice daily MiraLAX daily  Add Dulcolax suppository as needed  Encourage p.o. hydration and out of bed as tolerated    Dementia with agitation and other behavioral disturbance (HCC)  Assessment & Plan  Patient presented to the hospital with change in mental status.  He has Alzheimer's dementia with behavioral disturbance at baseline recently behaviors are getting worse  Will continue to provide supportive care, reorient as needed.  Patient is at high risk for delirium, will monitor closely and place on delirium precautions.  Maintain sleep/wake cycle.  Optimize pain regimen.  Encourage patient to take Tylenol 975 mg p.o. 3 times daily, per chart review patient did not take any dose yesterday.  Monitor for constipation and urinary retention and manage as needed.  B12 and TSH within normal limits  Encourage family to visit.  Encourage to wear glasses and hearing aids while awake.  Encourage po intake, assist with feeding if needed.   Continue Depakote to 250 mg p.o. 3 times a day with meals, may sprinkle in food  Continue lorazepam 0.5 mg  p.o. 3 times daily and continue to taper off slowly  continue mirtazapine to 15 mg administer at 8 PM along with melatonin   Increase Seroquel to 25 mg 3 times a day, administered at 9 AM 3 PM and 9 PM   received 1 dose of Zyprexa p.o. in the last 24 hours.  Consider repeat urinalysis        Ambulatory dysfunction  Assessment & Plan  Patient presented status post fall at the facility  CT head and neck with no acute pathology  Monitor orthostatic vital signs  Encourage p.o. hydration  Avoid hypotension and hypoglycemia   PT OT evaluation    * Metabolic encephalopathy  Assessment & Plan  -improvement noted , seems to be at baseline  -Patient is high risk of delirium due to dementia with behavioral disturbance at baseline, UTI, insomnia, change in environment  -Initiate delirium precautions  -maintain normal sleep/wake cycle  -minimize overnight interruptions, group overnight vitals/labs/nursing checks as possible  -dim lights, close blinds and turn off tv to minimize stimulation and encourage sleep environment in evenings  -ensure that pain is well controlled continue Tylenol 975mg Q8H scheduled, encourage patient to take the medication  -monitor for fecal and urinary retention which may precipitate delirium  -encourage early mobilization and ambulation  -provide frequent reorientation and redirection  -encourage family and friends at the bedside to help calm patient if anxious  -avoid medications which may precipitate or worsen delirium such as tramadol, benzodiazepine, anticholinergics, and antihistaminics  -encourage hydration and nutrition , assist with feeding if needed  -redirect unwanted behaviors as first line, avoid physical restraints.   -Continue Depakote to 250 mg p.o. 3 times a day with meals-monitor CBC CMP  -Increase Seroquel 25 mg p.o. administer at 9 AM 3 PM and 9 PM, monitor QTc  -Continue lorazepam 0.5 mg 3 times a day 9am, 1pm, 8pm  -continue mirtazapine to 15 mg at 8 PM  -For severe agitation only  "may use olanzapine 2.5 mg every 8 hours as needed,  -He received one dose of Zyprexa in the past 24 hours   -Currently off restraints  -Consider repeat urine analysis with reflex to culture       Subjective:   Patient seen and examined at bedside for geriatric follow-up.  After below the time of encounter, of restraints.  He slept overnight and no behaviors reported this morning.  Was able to eat breakfast.  No bowel movement today    Review of Systems   Unable to perform ROS: Dementia         Objective:     Vitals: Blood pressure 114/67, pulse 82, temperature 98.2 °F (36.8 °C), resp. rate 17, height 5' 6\" (1.676 m), weight 69.5 kg (153 lb 3.5 oz), SpO2 100%.,Body mass index is 24.73 kg/m².      Intake/Output Summary (Last 24 hours) at 1/19/2024 1324  Last data filed at 1/19/2024 1001  Gross per 24 hour   Intake 240 ml   Output 650 ml   Net -410 ml       Current Medications: Reviewed    Physical Exam:   Physical Exam  Vitals and nursing note reviewed.   Constitutional:       General: He is not in acute distress.     Appearance: He is well-developed.      Comments: Frail looking   HENT:      Head: Normocephalic and atraumatic.      Ears:      Comments: Augustine     Mouth/Throat:      Mouth: Mucous membranes are moist.   Eyes:      Conjunctiva/sclera: Conjunctivae normal.   Cardiovascular:      Rate and Rhythm: Normal rate and regular rhythm.      Heart sounds: No murmur heard.  Pulmonary:      Effort: Pulmonary effort is normal. No respiratory distress.      Breath sounds: Normal breath sounds.   Abdominal:      Palpations: Abdomen is soft.      Tenderness: There is no abdominal tenderness.   Genitourinary:     Comments: Pike in place  Musculoskeletal:         General: No swelling.      Cervical back: Neck supple.   Skin:     General: Skin is warm and dry.      Capillary Refill: Capillary refill takes less than 2 seconds.   Neurological:      Mental Status: He is alert. He is disoriented.      Comments: AAOx1 "   Psychiatric:         Mood and Affect: Mood normal.      Comments: Restless/agitated at times          Invasive Devices       Drain  Duration             Urethral Catheter 18 Fr. 2 days                    Lab, Imaging and other studies: I have personally reviewed pertinent reports.

## 2024-01-19 NOTE — PLAN OF CARE
Problem: PAIN - ADULT  Goal: Verbalizes/displays adequate comfort level or baseline comfort level  Description: Interventions:  - Encourage patient to monitor pain and request assistance  - Assess pain using appropriate pain scale  - Administer analgesics based on type and severity of pain and evaluate response  - Implement non-pharmacological measures as appropriate and evaluate response  - Consider cultural and social influences on pain and pain management  - Notify physician/advanced practitioner if interventions unsuccessful or patient reports new pain  Outcome: Progressing     Problem: INFECTION - ADULT  Goal: Absence or prevention of progression during hospitalization  Description: INTERVENTIONS:  - Assess and monitor for signs and symptoms of infection  - Monitor lab/diagnostic results  - Monitor all insertion sites, i.e. indwelling lines, tubes, and drains  - Monitor endotracheal if appropriate and nasal secretions for changes in amount and color  - Gill appropriate cooling/warming therapies per order  - Administer medications as ordered  - Instruct and encourage patient and family to use good hand hygiene technique  - Identify and instruct in appropriate isolation precautions for identified infection/condition  Outcome: Progressing     Problem: SAFETY ADULT  Goal: Patient will remain free of falls  Description: INTERVENTIONS:  - Educate patient/family on patient safety including physical limitations  - Instruct patient to call for assistance with activity   - Consult OT/PT to assist with strengthening/mobility   - Keep Call bell within reach  - Keep bed low and locked with side rails adjusted as appropriate  - Keep care items and personal belongings within reach  - Initiate and maintain comfort rounds  - Make Fall Risk Sign visible to staff  - Apply yellow socks and bracelet for high fall risk patients  - Consider moving patient to room near nurses station  Outcome: Progressing  Goal: Maintain or  return to baseline ADL function  Description: INTERVENTIONS:  -  Assess patient's ability to carry out ADLs; assess patient's baseline for ADL function and identify physical deficits which impact ability to perform ADLs (bathing, care of mouth/teeth, toileting, grooming, dressing, etc.)  - Assess/evaluate cause of self-care deficits   - Assess range of motion  - Assess patient's mobility; develop plan if impaired  - Assess patient's need for assistive devices and provide as appropriate  - Encourage maximum independence but intervene and supervise when necessary  - Involve family in performance of ADLs  - Assess for home care needs following discharge   - Consider OT consult to assist with ADL evaluation and planning for discharge  - Provide patient education as appropriate  Outcome: Progressing  Goal: Maintains/Returns to pre admission functional level  Description: INTERVENTIONS:  - Perform AM-PAC 6 Click Basic Mobility/ Daily Activity assessment daily.  - Set and communicate daily mobility goal to care team and patient/family/caregiver.   - Collaborate with rehabilitation services on mobility goals if consulted  - Out of bed for toileting  - Record patient progress and toleration of activity level   Outcome: Progressing     Problem: DISCHARGE PLANNING  Goal: Discharge to home or other facility with appropriate resources  Description: INTERVENTIONS:  - Identify barriers to discharge w/patient and caregiver  - Arrange for needed discharge resources and transportation as appropriate  - Identify discharge learning needs (meds, wound care, etc.)  - Arrange for interpretive services to assist at discharge as needed  - Refer to Case Management Department for coordinating discharge planning if the patient needs post-hospital services based on physician/advanced practitioner order or complex needs related to functional status, cognitive ability, or social support system  Outcome: Progressing     Problem: Knowledge  Deficit  Goal: Patient/family/caregiver demonstrates understanding of disease process, treatment plan, medications, and discharge instructions  Description: Complete learning assessment and assess knowledge base.  Interventions:  - Provide teaching at level of understanding  - Provide teaching via preferred learning methods  Outcome: Progressing     Problem: SAFETY,RESTRAINT: NV/NON-SELF DESTRUCTIVE BEHAVIOR  Goal: Remains free of harm/injury (restraint for non violent/non self-detsructive behavior)  Description: INTERVENTIONS:  - Instruct patient/family regarding restraint use   - Assess and monitor physiologic and psychological status   - Provide interventions and comfort measures to meet assessed patient needs   - Identify and implement measures to help patient regain control  - Assess readiness for release of restraint   Outcome: Progressing  Goal: Returns to optimal restraint-free functioning  Description: INTERVENTIONS:  - Assess the patient's behavior and symptoms that indicate continued need for restraint  - Identify and implement measures to help patient regain control  - Assess readiness for release of restraint   Outcome: Progressing     Problem: Prexisting or High Potential for Compromised Skin Integrity  Goal: Skin integrity is maintained or improved  Description: INTERVENTIONS:  - Identify patients at risk for skin breakdown  - Assess and monitor skin integrity  - Assess and monitor nutrition and hydration status  - Monitor labs   - Assess for incontinence   - Turn and reposition patient  - Assist with mobility/ambulation  - Relieve pressure over bony prominences  - Avoid friction and shearing  - Provide appropriate hygiene as needed including keeping skin clean and dry  - Evaluate need for skin moisturizer/barrier cream  - Collaborate with interdisciplinary team   - Patient/family teaching  - Consider wound care consult   Outcome: Progressing     Problem: Nutrition/Hydration-ADULT  Goal:  Nutrient/Hydration intake appropriate for improving, restoring or maintaining nutritional needs  Description: Monitor and assess patient's nutrition/hydration status for malnutrition. Collaborate with interdisciplinary team and initiate plan and interventions as ordered.  Monitor patient's weight and dietary intake as ordered or per policy. Utilize nutrition screening tool and intervene as necessary. Determine patient's food preferences and provide high-protein, high-caloric foods as appropriate.     INTERVENTIONS:  - Monitor oral intake, urinary output, labs, and treatment plans  - Assess nutrition and hydration status and recommend course of action  - Evaluate amount of meals eaten  - Assist patient with eating if necessary   - Allow adequate time for meals  - Recommend/ encourage appropriate diets, oral nutritional supplements, and vitamin/mineral supplements  - Order, calculate, and assess calorie counts as needed  - Recommend, monitor, and adjust tube feedings and TPN/PPN based on assessed needs  - Assess need for intravenous fluids  - Provide specific nutrition/hydration education as appropriate  - Include patient/family/caregiver in decisions related to nutrition  Outcome: Progressing

## 2024-01-20 PROCEDURE — 99232 SBSQ HOSP IP/OBS MODERATE 35: CPT | Performed by: INTERNAL MEDICINE

## 2024-01-20 RX ADMIN — SENNOSIDES 17.2 MG: 8.6 TABLET, FILM COATED ORAL at 10:44

## 2024-01-20 RX ADMIN — SENNOSIDES 17.2 MG: 8.6 TABLET, FILM COATED ORAL at 17:17

## 2024-01-20 RX ADMIN — Medication 3 MG: at 19:57

## 2024-01-20 RX ADMIN — QUETIAPINE FUMARATE 25 MG: 25 TABLET ORAL at 17:17

## 2024-01-20 RX ADMIN — ACETAMINOPHEN 975 MG: 325 TABLET, FILM COATED ORAL at 14:15

## 2024-01-20 RX ADMIN — OLANZAPINE 2.5 MG: 2.5 TABLET, FILM COATED ORAL at 22:19

## 2024-01-20 RX ADMIN — CYANOCOBALAMIN TAB 500 MCG 1000 MCG: 500 TAB at 10:45

## 2024-01-20 RX ADMIN — DOCUSATE SODIUM 100 MG: 100 CAPSULE, LIQUID FILLED ORAL at 17:17

## 2024-01-20 RX ADMIN — LORAZEPAM 0.5 MG: 0.5 TABLET ORAL at 19:57

## 2024-01-20 RX ADMIN — FINASTERIDE 5 MG: 5 TABLET, FILM COATED ORAL at 10:44

## 2024-01-20 RX ADMIN — MIRTAZAPINE 15 MG: 15 TABLET, FILM COATED ORAL at 19:57

## 2024-01-20 RX ADMIN — DOCUSATE SODIUM 100 MG: 100 CAPSULE, LIQUID FILLED ORAL at 10:44

## 2024-01-20 RX ADMIN — OLANZAPINE 2.5 MG: 2.5 TABLET, FILM COATED ORAL at 00:09

## 2024-01-20 RX ADMIN — LORAZEPAM 0.5 MG: 0.5 TABLET ORAL at 10:44

## 2024-01-20 RX ADMIN — LORAZEPAM 0.5 MG: 0.5 TABLET ORAL at 14:15

## 2024-01-20 RX ADMIN — POLYETHYLENE GLYCOL 3350 17 G: 17 POWDER, FOR SOLUTION ORAL at 10:44

## 2024-01-20 RX ADMIN — DIVALPROEX SODIUM 250 MG: 125 CAPSULE, COATED PELLETS ORAL at 14:15

## 2024-01-20 RX ADMIN — QUETIAPINE FUMARATE 25 MG: 25 TABLET ORAL at 10:44

## 2024-01-20 RX ADMIN — DIVALPROEX SODIUM 250 MG: 125 CAPSULE, COATED PELLETS ORAL at 17:17

## 2024-01-20 RX ADMIN — ENOXAPARIN SODIUM 40 MG: 40 INJECTION SUBCUTANEOUS at 10:45

## 2024-01-20 RX ADMIN — ACETAMINOPHEN 975 MG: 325 TABLET, FILM COATED ORAL at 21:26

## 2024-01-20 RX ADMIN — DIVALPROEX SODIUM 250 MG: 125 CAPSULE, COATED PELLETS ORAL at 10:45

## 2024-01-20 NOTE — NURSING NOTE
At approximately 1330 this RN was entering pt's room to find him ambulating toward the hallway with his gown half off. Scott cath noted to be on ground, appears pt accidentally pulled it out while attempting to walk towards bathroom. Balloon remained intact. Minimal blood noted at tip of penis/urethra. Dr. Pang made aware. New scott cath inserted at 1425, pt tolerated well.

## 2024-01-20 NOTE — CASE MANAGEMENT
Case Management Progress Note    Patient name Avni Ruiz  Location S /S - MRN [id number]  : 1940 Date 2024       LOS (days): 13  Geometric Mean LOS (GMLOS) (days): 3.4  Days to GMLOS:-9.7        OBJECTIVE:        Current admission status: Inpatient  Preferred Pharmacy: No Pharmacies Listed  Primary Care Provider: No primary care provider on file.    Primary Insurance: MEDICARE  Secondary Insurance:  [employer]    PROGRESS NOTE:      CM met with patient's spouse and son at bedside to provide them with updates regarding [facility] - the facility is following and pending no behaviors and if he remains off restraints by 11am tomorrow, he maybe able to go to the facility. Cm to f/u with the facility after 11am tomorrow and update family.

## 2024-01-21 PROCEDURE — 99232 SBSQ HOSP IP/OBS MODERATE 35: CPT | Performed by: INTERNAL MEDICINE

## 2024-01-21 RX ADMIN — ENOXAPARIN SODIUM 40 MG: 40 INJECTION SUBCUTANEOUS at 09:42

## 2024-01-21 RX ADMIN — SENNOSIDES 17.2 MG: 8.6 TABLET, FILM COATED ORAL at 09:42

## 2024-01-21 RX ADMIN — DOCUSATE SODIUM 100 MG: 100 CAPSULE, LIQUID FILLED ORAL at 09:42

## 2024-01-21 RX ADMIN — LORAZEPAM 0.5 MG: 0.5 TABLET ORAL at 13:14

## 2024-01-21 RX ADMIN — FINASTERIDE 5 MG: 5 TABLET, FILM COATED ORAL at 09:42

## 2024-01-21 RX ADMIN — LORAZEPAM 0.5 MG: 0.5 TABLET ORAL at 09:42

## 2024-01-21 RX ADMIN — DIVALPROEX SODIUM 250 MG: 125 CAPSULE, COATED PELLETS ORAL at 13:14

## 2024-01-21 RX ADMIN — SENNOSIDES 17.2 MG: 8.6 TABLET, FILM COATED ORAL at 17:16

## 2024-01-21 RX ADMIN — CYANOCOBALAMIN TAB 500 MCG 1000 MCG: 500 TAB at 09:42

## 2024-01-21 RX ADMIN — QUETIAPINE FUMARATE 25 MG: 25 TABLET ORAL at 17:16

## 2024-01-21 RX ADMIN — DIVALPROEX SODIUM 250 MG: 125 CAPSULE, COATED PELLETS ORAL at 15:57

## 2024-01-21 RX ADMIN — Medication 3 MG: at 21:51

## 2024-01-21 RX ADMIN — POLYETHYLENE GLYCOL 3350 17 G: 17 POWDER, FOR SOLUTION ORAL at 09:42

## 2024-01-21 RX ADMIN — LORAZEPAM 0.5 MG: 0.5 TABLET ORAL at 21:51

## 2024-01-21 RX ADMIN — MIRTAZAPINE 15 MG: 15 TABLET, FILM COATED ORAL at 21:51

## 2024-01-21 RX ADMIN — ACETAMINOPHEN 975 MG: 325 TABLET, FILM COATED ORAL at 13:14

## 2024-01-21 RX ADMIN — OLANZAPINE 2.5 MG: 2.5 TABLET, FILM COATED ORAL at 11:09

## 2024-01-21 RX ADMIN — ACETAMINOPHEN 975 MG: 325 TABLET, FILM COATED ORAL at 21:51

## 2024-01-21 RX ADMIN — QUETIAPINE FUMARATE 25 MG: 25 TABLET ORAL at 22:53

## 2024-01-21 RX ADMIN — DIVALPROEX SODIUM 250 MG: 125 CAPSULE, COATED PELLETS ORAL at 09:42

## 2024-01-21 RX ADMIN — QUETIAPINE FUMARATE 25 MG: 25 TABLET ORAL at 09:42

## 2024-01-21 NOTE — ASSESSMENT & PLAN NOTE
On admission patient had a urinalysis that was significant for leukocytes and nitrites.  Unable to assess with patient is having any pain or burning with urination.  He does have a history of cystitis.  Per son, patient has been receiving catheterization and has tried voiding trials but was unsuccessful.  Per son, patient has had a Pike in place since Tuesday which was placed by his urologist.  Urine culture shows Staph aureus  Urology consulted, offered patient's family options of Pike's versus [facility], patient's family agreed on continuation of Pike    Plans:  Completed 3 days of IV ceftriaxone  Monitoring off antibiotics  Resolved   Patient is being discharged to hospice

## 2024-01-21 NOTE — ASSESSMENT & PLAN NOTE
Patient presented to the ED after nursing facility found patient to have abrasions on his forehead.  The facility believes the patient fell out of bed last night and got himself back and without telling anybody. He was more confused that usual. Patient was brought to the ED via the trauma pathway, where trauma workup and CT imaging were negative.  Patient's lab work was unremarkable, and vitals have remained stable.  Initially plan was to discharge the patient to [facility], however the facility has refused to take the patient  PLAN:  PT/OT evaluations and recommendations appreciated  PT level 2, OT level 3  Patient is being discharged to hospice

## 2024-01-21 NOTE — PROGRESS NOTES
[facility]  Progress Note  Name: Avni Ruiz I  MRN: [id number]  Unit/Bed#: S -01 I Date of Admission: 1/7/2024   Date of Service: 1/21/2024 I Hospital Day: 14    Assessment/Plan   * Metabolic encephalopathy  Assessment & Plan  Patient has history of Alzheimer's dementia  Patient is not oriented to time place or person  Patient had multiple medication changes in the past few weeks  Metabolic encephalopathy 2/2 UTI and polypharmacy  Geriatrics consulted and recommendations appreciated.  Patient had a episodes of agitation or combative behavior last evening, received one dose of olanzapine 2.5 mg   Patient has been off restraints for over 24 hours  Discontinuing continued observation  Switch olanzapine from IM to oral  Patient was able to walk with the nurse in the hallway without any agitation or trouble  Patient is currently not in restraints    PLAN:  Discontinued continual observation  Tylenol 975 every 8 scheduled  Medication adjustment can be seen in dementia management    Polypharmacy  Assessment & Plan  Geriatrics consulted    PLAN:  See plan acute encephalopathy    UTI (urinary tract infection)  Assessment & Plan  Patient has a chronic Pike, was replaced in the ED on admission  Patient has history of urinary retention has been following up with urology outpatient  Urology was consulted, offered patient's family Pike's catheter versus CIC, patient's family agreed on Pike's  Patient was afebrile overnight  Has no suprapubic or abdominal pain on palpation  No leukocytosis    PLAN:  Completed 3 days of IV ceftriaxone  Monitor for signs of infection    Abnormal urine finding  Assessment & Plan  On admission patient had a urinalysis that was significant for leukocytes and nitrites.  Unable to assess with patient is having any pain or burning with urination.  He does have a history of cystitis.  Per son, patient has been receiving catheterization and has tried voiding  trials but was unsuccessful.  Per son, patient has had a Pike in place since Tuesday which was placed by his urologist.  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  At baseline patient can hold conversations but does get confused.  He is also able to feed and clothe himself with dementia.  However, patient more confused at this point in time.  Patient slept throughout the course of exam, and was unarousable to gentle shaking.  Per son, patient's medications have been adjusted to increase the dosage of his dementia medications.  Concern for encephalopathy at this point  Patient is oriented to himself however not to time and place  According to the son patient has waxing and waning mentation  Patient had a episodes of agitation or combative behavior last evening, received one dose of olanzapine 2.5 mg   Patient has been off restraints for the past 24 hours  Discontinuing observation  Switching olanzapine from IM to oral  Had discussion with family about goals of care, provided them with information regarding prognosis.  1 person from the family agreed to stay with the patient for 24 hours while he is off restraints  Patient was able to walk with the nurse in the hallway without any agitation or trouble  Patient is currently not in restraints      PLAN:  Discussed with nursing to walk patient 2-3 times a  day around unit quarter as this seems to help his calm down during the day  Tylenol 975 every 8 scheduled  Depakote 250 to three times daily  Seroquel to 25 Mg bid   Mirtazapine to 15 Mg at 8 PM  Melatonin at 8 PM  Oral Olanzapine 2.5 Mg every q8 hr as needed  Ativan 0.5 Mg 3 times daily    Ambulatory dysfunction  Assessment & Plan  Patient presented to the ED after nursing facility found patient to have abrasions on his forehead.  The facility believes the patient fell out of bed last night and got himself back and without telling anybody. He was more confused that usual. Patient was brought to the ED via the trauma pathway, where trauma workup and CT imaging were negative.  Patient's lab work was unremarkable, and vitals have remained stable.    PLAN:  PT/OT evaluations and recommendations appreciated  PT level 2, OT level 3  DC plan to go back to his prior facility [facility] if patient is tolerating being off restraints and one-to-one for 48 hours               VTE Pharmacologic Prophylaxis: VTE Score: 5 High Risk (Score >/= 5) - Pharmacological DVT Prophylaxis Ordered: enoxaparin (Lovenox). Sequential Compression Devices Ordered.    Mobility:   Basic Mobility Inpatient Raw Score: 22  JH-HLM Goal: 7: Walk 25 feet or more  JH-HLM Achieved: 7: Walk 25 feet or more  HLM Goal achieved. Continue to encourage appropriate mobility.    Patient Centered Rounds: I performed bedside rounds with nursing staff today.  Discussions with Specialists or Other Care Team Provider: Gerontology     Education and Discussions with Family / Patient: Attempted to update  (wife) via phone. Left voicemail.     Current Length of Stay: 14 day(s)  Current Patient Status: Inpatient   Discharge Plan: Anticipate discharge in 48-72 hrs to discharge location to be determined pending rehab evaluations.    Code Status: Level 1 - Full Code    Subjective:   Patient is alert, not oriented to time place or person.  Patient is not in  restraints, had no episodes of agitation or combative behavior overnight.    Objective:     Vitals:   Temp (24hrs), Av.4 °F (36.9 °C), Min:98.4 °F (36.9 °C), Max:98.4 °F (36.9 °C)    Temp:  [98.4 °F (36.9 °C)] 98.4 °F (36.9 °C)  HR:  [75] 75  BP: (116)/(65) 116/65  Body mass index is 24.73 kg/m².     Input and Output Summary (last 24 hours):     Intake/Output Summary (Last 24 hours) at 2024 0846  Last data filed at 2024 1335  Gross per 24 hour   Intake --   Output 1025 ml   Net -1025 ml         Physical Exam:   Physical Exam  Vitals and nursing note reviewed.   Constitutional:       General: He is not in acute distress.     Appearance: Normal appearance. He is not ill-appearing, toxic-appearing or diaphoretic.   HENT:      Head: Normocephalic and atraumatic.      Nose: Nose normal.      Mouth/Throat:      Mouth: Mucous membranes are moist.      Pharynx: Oropharynx is clear.   Eyes:      General: No scleral icterus.        Right eye: No discharge.         Left eye: No discharge.      Extraocular Movements: Extraocular movements intact.      Conjunctiva/sclera: Conjunctivae normal.   Pulmonary:      Effort: No respiratory distress.   Abdominal:      General: Abdomen is flat. There is no distension.   Musculoskeletal:      Cervical back: Normal range of motion and neck supple.      Right lower leg: No edema.      Left lower leg: No edema.   Skin:     General: Skin is warm and dry.      Coloration: Skin is not jaundiced or pale.   Neurological:      Mental Status: He is alert. Mental status is at baseline.   Psychiatric:         Mood and Affect: Affect is flat.          Additional Data:     Labs:  Results from last 7 days   Lab Units 24  0544   WBC Thousand/uL 8.43   HEMOGLOBIN g/dL 13.5   HEMATOCRIT % 40.3   PLATELETS Thousands/uL 231     Results from last 7 days   Lab Units 24  0544   SODIUM mmol/L 141   POTASSIUM mmol/L 3.6   CHLORIDE mmol/L 107   CO2 mmol/L 27   BUN mg/dL 24   CREATININE  mg/dL 0.84   ANION GAP mmol/L 7   CALCIUM mg/dL 9.2   ALBUMIN g/dL 3.9   TOTAL BILIRUBIN mg/dL 0.69   ALK PHOS U/L 67   ALT U/L 13   AST U/L 15   GLUCOSE RANDOM mg/dL 91                       Lines/Drains:  Invasive Devices       Drain  Duration             Urethral Catheter 18 Fr. <1 day                  Urinary Catheter:  Goal for removal: N/A - Chronic Pike               Imaging: No pertinent imaging reviewed.    Recent Cultures (last 7 days):         Last 24 Hours Medication List:   Current Facility-Administered Medications   Medication Dose Route Frequency Provider Last Rate    acetaminophen  975 mg Oral Q8H Sean Mims MD      bisacodyl  10 mg Rectal Daily PRN Marta Jamison MD      vitamin B-12  1,000 mcg Oral Daily Sherley Turner MD      divalproex sodium  250 mg Oral TID With Meals Marta Jamison MD      docusate sodium  100 mg Oral BID Sherley Turner MD      enoxaparin  40 mg Subcutaneous Daily Sherley Turner MD      finasteride  5 mg Oral Daily Sherley Turner MD      LORazepam  0.5 mg Oral TID Marta Jamison MD      melatonin  3 mg Oral HS Sean Mims MD      mirtazapine  15 mg Oral HS Sean Mims MD      OLANZapine  2.5 mg Oral Once Stu Zhang MD      OLANZapine  2.5 mg Oral Q8H PRN Sean Mims MD      polyethylene glycol  17 g Oral Daily Sherley Turner MD      QUEtiapine  25 mg Oral BID Sean Mims MD      QUEtiapine  25 mg Oral HS PRN Jaiden Pang MD      senna  2 tablet Oral BID Marta Jamison MD          Today, Patient Was Seen By: Sean Mims MD    **Please Note: This note may have been constructed using a voice recognition system.**

## 2024-01-21 NOTE — ASSESSMENT & PLAN NOTE
Patient has a history of Alzheimer's dementia.  At baseline patient can hold conversations but does get confused.  He is also able to feed and clothe himself with dementia.  However, patient more confused at this point in time.  Patient slept throughout the course of exam, and was unarousable to gentle shaking.  Per son, patient's medications have been adjusted to increase the dosage of his dementia medications.  Concern for encephalopathy at this point  Patient is oriented to himself however not to time and place  According to the son patient has waxing and waning mentation  Patient had a episodes of agitation or combative behavior last evening, received one dose of olanzapine 2.5 mg   Patient has been off restraints for the past 24 hours  Discontinuing observation  Switching olanzapine from IM to oral  Had discussion with family about goals of care, provided them with information regarding prognosis.  1 person from the family agreed to stay with the patient for 24 hours while he is off restraints  Patient was able to walk with the nurse in the hallway without any agitation or trouble  Patient had an episode of agitation yesterday, patient had to be put back in restraints      PLAN:  Discussed with nursing to walk patient 2-3 times a day around unit quarter as this seems to help his calm down during the day  Tylenol 975 every 8 scheduled  Depakote 250 to three times daily  Seroquel to 25 Mg bid   Mirtazapine to 15 Mg at 8 PM  Melatonin at 8 PM  Oral Olanzapine 2.5 Mg every q8 hr as needed  Ativan 0.5 Mg 3 times daily  Patient is being discharged to hospice

## 2024-01-21 NOTE — ASSESSMENT & PLAN NOTE
Patient was on dutasteride and Flomax at the nursing facility  Urology recommended discontinuation of the tamsulosin    PLAN:  Continue tamsulosin  Continue finasteride 5 Mg daily

## 2024-01-21 NOTE — ASSESSMENT & PLAN NOTE
Patient has a chronic Pike, was replaced in the ED on admission  Patient has history of urinary retention has been following up with urology outpatient  Urology was consulted, offered patient's family Pike's catheter versus CIC, patient's family agreed on Pike's  Patient was afebrile overnight  Has no suprapubic or abdominal pain on palpation  No leukocytosis    PLAN:  Completed 3 days of IV ceftriaxone  Monitor for signs of infection  Resolved

## 2024-01-21 NOTE — CASE MANAGEMENT
Case Management Progress Note    Patient name Avni Ruiz  Location S /S - MRN [id number]  : 1940 Date 2024       LOS (days): 14  Geometric Mean LOS (GMLOS) (days): 3.4  Days to GMLOS:-10.4        OBJECTIVE:        Current admission status: Inpatient  Preferred Pharmacy: No Pharmacies Listed  Primary Care Provider: No primary care provider on file.    Primary Insurance: MEDICARE  Secondary Insurance:  FOR LIFE    PROGRESS NOTE:    SHANDRA s/w patient's nurse who states he continues to do well off restraints - no behaviors or combativeness however becomes restless at times but easily redirected. CM reached out to [facility] via [facility] to inquire if he can admit today after being off restraints for 48hrs which will be at 11am today. CM to f/u if no response.

## 2024-01-21 NOTE — CASE MANAGEMENT
Case Management Progress Note    Patient name Avni Ruiz  Location S /S - MRN [id number]  : 1940 Date 2024       LOS (days): 14  Geometric Mean LOS (GMLOS) (days): 3.4  Days to GMLOS:-10.5        OBJECTIVE:        Current admission status: Inpatient  Preferred Pharmacy: No Pharmacies Listed  Primary Care Provider: No primary care provider on file.    Primary Insurance: MEDICARE  Secondary Insurance:  FOR LIFE    PROGRESS NOTE:      CM was informed by Aida from [facility] patient will be re-evaluated in am to see if they can accept him for admission tomorrow as they want to see how he continues to do with behaviors and off restraints. CM left a VM with patient's spouse to update her. CM was able to reach patient's son and updated him regarding above note. CM inquired if we can open referral and expand referrals to see if any facilities are able to accept today as he's medically stable and doing well. Son is requesting to wait until tomorrow after hearing back from [facility] as [facility] is their SNF choice. CM to f/u in am.

## 2024-01-21 NOTE — ASSESSMENT & PLAN NOTE
Patient has history of Alzheimer's dementia  Patient is not oriented to time place or person  Patient had multiple medication changes in the past few weeks  Metabolic encephalopathy 2/2 UTI and polypharmacy  Geriatrics consulted and recommendations appreciated.  Patient had a episodes of agitation or combative behavior last evening, received one dose of olanzapine 2.5 mg   Patient has been off restraints for over 24 hours  Discontinuing continued observation  Switch olanzapine from IM to oral  Patient was able to walk with the nurse in the hallway without any agitation or trouble  Patient had an episode of agitation yesterday, patient had to be put back in restraints    PLAN:  Tylenol 975 every 8 scheduled  Patient is being discharged to hospice

## 2024-01-22 LAB
ANION GAP SERPL CALCULATED.3IONS-SCNC: 3 MMOL/L
ANION GAP SERPL CALCULATED.3IONS-SCNC: 3 MMOL/L
BUN SERPL-MCNC: 24 MG/DL (ref 5–25)
BUN SERPL-MCNC: 24 MG/DL (ref 5–25)
CALCIUM SERPL-MCNC: 8.8 MG/DL (ref 8.4–10.2)
CALCIUM SERPL-MCNC: 8.8 MG/DL (ref 8.4–10.2)
CHLORIDE SERPL-SCNC: 110 MMOL/L (ref 96–108)
CHLORIDE SERPL-SCNC: 110 MMOL/L (ref 96–108)
CO2 SERPL-SCNC: 30 MMOL/L (ref 21–32)
CO2 SERPL-SCNC: 30 MMOL/L (ref 21–32)
CREAT SERPL-MCNC: 0.94 MG/DL (ref 0.6–1.3)
CREAT SERPL-MCNC: 0.94 MG/DL (ref 0.6–1.3)
ERYTHROCYTE [DISTWIDTH] IN BLOOD BY AUTOMATED COUNT: 13.5 % (ref 11.6–15.1)
ERYTHROCYTE [DISTWIDTH] IN BLOOD BY AUTOMATED COUNT: 13.5 % (ref 11.6–15.1)
GFR SERPL CREATININE-BSD FRML MDRD: 75 ML/MIN/1.73SQ M
GFR SERPL CREATININE-BSD FRML MDRD: 75 ML/MIN/1.73SQ M
GLUCOSE SERPL-MCNC: 91 MG/DL (ref 65–140)
GLUCOSE SERPL-MCNC: 91 MG/DL (ref 65–140)
HCT VFR BLD AUTO: 35.6 % (ref 36.5–49.3)
HCT VFR BLD AUTO: 35.6 % (ref 36.5–49.3)
HGB BLD-MCNC: 11.5 G/DL (ref 12–17)
HGB BLD-MCNC: 11.5 G/DL (ref 12–17)
MCH RBC QN AUTO: 28.9 PG (ref 26.8–34.3)
MCH RBC QN AUTO: 28.9 PG (ref 26.8–34.3)
MCHC RBC AUTO-ENTMCNC: 32.3 G/DL (ref 31.4–37.4)
MCHC RBC AUTO-ENTMCNC: 32.3 G/DL (ref 31.4–37.4)
MCV RBC AUTO: 89 FL (ref 82–98)
MCV RBC AUTO: 89 FL (ref 82–98)
PLATELET # BLD AUTO: 215 THOUSANDS/UL (ref 149–390)
PLATELET # BLD AUTO: 215 THOUSANDS/UL (ref 149–390)
PMV BLD AUTO: 11.1 FL (ref 8.9–12.7)
PMV BLD AUTO: 11.1 FL (ref 8.9–12.7)
POTASSIUM SERPL-SCNC: 4.3 MMOL/L (ref 3.5–5.3)
POTASSIUM SERPL-SCNC: 4.3 MMOL/L (ref 3.5–5.3)
RBC # BLD AUTO: 3.98 MILLION/UL (ref 3.88–5.62)
RBC # BLD AUTO: 3.98 MILLION/UL (ref 3.88–5.62)
SODIUM SERPL-SCNC: 143 MMOL/L (ref 135–147)
SODIUM SERPL-SCNC: 143 MMOL/L (ref 135–147)
WBC # BLD AUTO: 5.18 THOUSAND/UL (ref 4.31–10.16)
WBC # BLD AUTO: 5.18 THOUSAND/UL (ref 4.31–10.16)

## 2024-01-22 PROCEDURE — 99232 SBSQ HOSP IP/OBS MODERATE 35: CPT | Performed by: FAMILY MEDICINE

## 2024-01-22 PROCEDURE — 80048 BASIC METABOLIC PNL TOTAL CA: CPT

## 2024-01-22 PROCEDURE — 85027 COMPLETE CBC AUTOMATED: CPT

## 2024-01-22 PROCEDURE — 99232 SBSQ HOSP IP/OBS MODERATE 35: CPT | Performed by: INTERNAL MEDICINE

## 2024-01-22 RX ORDER — LEVALBUTEROL INHALATION SOLUTION 1.25 MG/3ML
SOLUTION RESPIRATORY (INHALATION)
Status: DISPENSED
Start: 2024-01-22 | End: 2024-01-23

## 2024-01-22 RX ORDER — QUETIAPINE FUMARATE 25 MG/1
25 TABLET, FILM COATED ORAL 3 TIMES DAILY
Status: DISCONTINUED | OUTPATIENT
Start: 2024-01-22 | End: 2024-01-29 | Stop reason: HOSPADM

## 2024-01-22 RX ADMIN — ACETAMINOPHEN 975 MG: 325 TABLET, FILM COATED ORAL at 06:14

## 2024-01-22 RX ADMIN — Medication 3 MG: at 20:17

## 2024-01-22 RX ADMIN — DIVALPROEX SODIUM 250 MG: 125 CAPSULE, COATED PELLETS ORAL at 12:15

## 2024-01-22 RX ADMIN — POLYETHYLENE GLYCOL 3350 17 G: 17 POWDER, FOR SOLUTION ORAL at 11:21

## 2024-01-22 RX ADMIN — QUETIAPINE FUMARATE 25 MG: 25 TABLET ORAL at 20:17

## 2024-01-22 RX ADMIN — QUETIAPINE FUMARATE 25 MG: 25 TABLET ORAL at 09:46

## 2024-01-22 RX ADMIN — LORAZEPAM 0.5 MG: 0.5 TABLET ORAL at 09:46

## 2024-01-22 RX ADMIN — FINASTERIDE 5 MG: 5 TABLET, FILM COATED ORAL at 09:46

## 2024-01-22 RX ADMIN — MIRTAZAPINE 15 MG: 15 TABLET, FILM COATED ORAL at 20:18

## 2024-01-22 RX ADMIN — LORAZEPAM 0.5 MG: 0.5 TABLET ORAL at 20:18

## 2024-01-22 RX ADMIN — CYANOCOBALAMIN TAB 500 MCG 1000 MCG: 500 TAB at 09:46

## 2024-01-22 RX ADMIN — DIVALPROEX SODIUM 250 MG: 125 CAPSULE, COATED PELLETS ORAL at 09:46

## 2024-01-22 RX ADMIN — ENOXAPARIN SODIUM 40 MG: 40 INJECTION SUBCUTANEOUS at 09:46

## 2024-01-22 RX ADMIN — QUETIAPINE FUMARATE 25 MG: 25 TABLET ORAL at 16:05

## 2024-01-22 RX ADMIN — SENNOSIDES 17.2 MG: 8.6 TABLET, FILM COATED ORAL at 09:46

## 2024-01-22 RX ADMIN — LORAZEPAM 0.5 MG: 0.5 TABLET ORAL at 12:15

## 2024-01-22 RX ADMIN — DOCUSATE SODIUM 100 MG: 100 CAPSULE, LIQUID FILLED ORAL at 09:46

## 2024-01-22 RX ADMIN — OLANZAPINE 2.5 MG: 2.5 TABLET, FILM COATED ORAL at 15:24

## 2024-01-22 RX ADMIN — DIVALPROEX SODIUM 250 MG: 125 CAPSULE, COATED PELLETS ORAL at 15:43

## 2024-01-22 NOTE — CASE MANAGEMENT
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    Primary Insurance: MEDICARE  Secondary Insurance:  FOR LIFE    DISCHARGE DETAILS:    Discharge planning discussed with:: Julia-spouse     Comments - Freedom of Choice: SHANDRA was in contact with Julia regarding [facility] denial of the Pt's admission to their facility, the need to reopen the referral for the possibility of another accepting facility.  CM contacted family/caregiver?: Yes  Were Treatment Team discharge recommendations reviewed with patient/caregiver?: Yes  Did patient/caregiver verbalize understanding of patient care needs?: Yes  Were patient/caregiver advised of the risks associated with not following Treatment Team discharge recommendations?: Yes    Contacts  Patient Contacts: Julia  Relationship to Patient:: Family  Contact Method: Phone  Reason/Outcome: Discharge Planning    Requested Home Health Care         Is the patient interested in HHC at discharge?: No    DME Referral Provided  Referral made for DME?: No    Other Referral/Resources/Interventions  Provided:  Interventions: SNF  Referral Comments: Pt's LTC placement referral has been reopen for the possibility of another accepting facility.         Treatment Team Recommendation: SNF  Discharge Destination Plan:: SNF

## 2024-01-22 NOTE — PROGRESS NOTES
[facility]  Progress Note  Name: Avni Ruiz I  MRN: [id number]  Unit/Bed#: S -01 I Date of Admission: 1/7/2024   Date of Service: 1/22/2024 I Hospital Day: 15    Assessment/Plan   * Metabolic encephalopathy  Assessment & Plan  Patient has history of Alzheimer's dementia  Patient is not oriented to time place or person  Patient had multiple medication changes in the past few weeks  Metabolic encephalopathy 2/2 UTI and polypharmacy  Geriatrics consulted and recommendations appreciated.  Patient had a episodes of agitation or combative behavior last evening, received one dose of olanzapine 2.5 mg   Patient has been off restraints for over 24 hours  Discontinuing continued observation  Switch olanzapine from IM to oral  Patient was able to walk with the nurse in the hallway without any agitation or trouble  Patient is currently not in restraints    PLAN:  Discontinued continual observation  Tylenol 975 every 8 scheduled  Medication adjustment can be seen in dementia management    Polypharmacy  Assessment & Plan  Geriatrics consulted    PLAN:  See plan acute encephalopathy    UTI (urinary tract infection)  Assessment & Plan  Patient has a chronic Pike, was replaced in the ED on admission  Patient has history of urinary retention has been following up with urology outpatient  Urology was consulted, offered patient's family Pike's catheter versus CIC, patient's family agreed on Pike's  Patient was afebrile overnight  Has no suprapubic or abdominal pain on palpation  No leukocytosis    PLAN:  Completed 3 days of IV ceftriaxone  Monitor for signs of infection    Abnormal urine finding  Assessment & Plan  On admission patient had a urinalysis that was significant for leukocytes and nitrites.  Unable to assess with patient is having any pain or burning with urination.  He does have a history of cystitis.  Per son, patient has been receiving catheterization and has tried voiding  trials but was unsuccessful.  Per son, patient has had a Pike in place since Tuesday which was placed by his urologist.  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  At baseline patient can hold conversations but does get confused.  He is also able to feed and clothe himself with dementia.  However, patient more confused at this point in time.  Patient slept throughout the course of exam, and was unarousable to gentle shaking.  Per son, patient's medications have been adjusted to increase the dosage of his dementia medications.  Concern for encephalopathy at this point  Patient is oriented to himself however not to time and place  According to the son patient has waxing and waning mentation  Patient had a episodes of agitation or combative behavior last evening, received one dose of olanzapine 2.5 mg   Patient has been off restraints for the past 24 hours  Discontinuing observation  Switching olanzapine from IM to oral  Had discussion with family about goals of care, provided them with information regarding prognosis.  1 person from the family agreed to stay with the patient for 24 hours while he is off restraints  Patient was able to walk with the nurse in the hallway without any agitation or trouble  Patient is currently not in restraints      PLAN:  Discussed with nursing to walk patient 2-3 times a  day around unit quarter as this seems to help his calm down during the day  Tylenol 975 every 8 scheduled  Depakote 250 to three times daily  Seroquel to 25 Mg bid   Mirtazapine to 15 Mg at 8 PM  Melatonin at 8 PM  Oral Olanzapine 2.5 Mg every q8 hr as needed  Ativan 0.5 Mg 3 times daily    Ambulatory dysfunction  Assessment & Plan  Patient presented to the ED after nursing facility found patient to have abrasions on his forehead.  The facility believes the patient fell out of bed last night and got himself back and without telling anybody. He was more confused that usual. Patient was brought to the ED via the trauma pathway, where trauma workup and CT imaging were negative.  Patient's lab work was unremarkable, and vitals have remained stable.    PLAN:  PT/OT evaluations and recommendations appreciated  PT level 2, OT level 3  DC plan to go back to his prior facility [facility] if patient is tolerating being off restraints and one-to-one for 48 hours               VTE Pharmacologic Prophylaxis: VTE Score: 5 High Risk (Score >/= 5) - Pharmacological DVT Prophylaxis Ordered: enoxaparin (Lovenox). Sequential Compression Devices Ordered.    Mobility:   Basic Mobility Inpatient Raw Score: 23  JH-HLM Goal: 7: Walk 25 feet or more  JH-HLM Achieved: 8: Walk 250 feet ot more  HLM Goal achieved. Continue to encourage appropriate mobility.    Patient Centered Rounds: I performed bedside rounds with nursing staff today.  Discussions with Specialists or Other Care Team Provider: Gerontology     Education and Discussions with Family / Patient: Attempted to update  (wife) via phone. Left voicemail.     Current Length of Stay: 15 day(s)  Current Patient Status: Inpatient   Discharge Plan: Anticipate discharge in 48-72 hrs to discharge location to be determined pending rehab evaluations.    Code Status: Level 1 - Full Code    Subjective:   Patient is alert, not oriented to time place or person.  Patient is not in  restraints, had no episodes of agitation or combative behavior overnight.    Objective:     Vitals:   No data recorded.       Body mass index is 24.73 kg/m².     Input and Output Summary (last 24 hours):     Intake/Output Summary (Last 24 hours) at 1/22/2024 1404  Last data filed at 1/22/2024 0628  Gross per 24 hour   Intake --   Output 200 ml   Net -200 ml         Physical Exam:   Physical Exam  Vitals and nursing note reviewed.   Constitutional:       General: He is not in acute distress.     Appearance: Normal appearance. He is not ill-appearing, toxic-appearing or diaphoretic.   HENT:      Head: Normocephalic and atraumatic.      Nose: Nose normal.      Mouth/Throat:      Mouth: Mucous membranes are moist.      Pharynx: Oropharynx is clear.   Eyes:      General: No scleral icterus.        Right eye: No discharge.         Left eye: No discharge.      Extraocular Movements: Extraocular movements intact.      Conjunctiva/sclera: Conjunctivae normal.   Pulmonary:      Effort: No respiratory distress.   Abdominal:      General: Abdomen is flat. There is no distension.   Musculoskeletal:      Cervical back: Normal range of motion and neck supple.      Right lower leg: No edema.      Left lower leg: No edema.   Skin:     General: Skin is warm and dry.      Coloration: Skin is not jaundiced or pale.   Neurological:      Mental Status: He is alert. Mental status is at baseline.   Psychiatric:         Mood and Affect: Affect is flat.          Additional Data:     Labs:  Results from last 7 days   Lab Units 01/22/24  0622   WBC Thousand/uL 5.18   HEMOGLOBIN g/dL 11.5*   HEMATOCRIT % 35.6*   PLATELETS Thousands/uL 215     Results from last 7 days   Lab Units 01/22/24  0622 01/17/24  0544   SODIUM mmol/L 143 141   POTASSIUM mmol/L 4.3 3.6   CHLORIDE mmol/L 110* 107   CO2 mmol/L 30 27   BUN mg/dL 24 24   CREATININE mg/dL 0.94 0.84   ANION GAP mmol/L 3 7   CALCIUM mg/dL 8.8 9.2   ALBUMIN g/dL  --  3.9   TOTAL BILIRUBIN mg/dL   --  0.69   ALK PHOS U/L  --  67   ALT U/L  --  13   AST U/L  --  15   GLUCOSE RANDOM mg/dL 91 91                       Lines/Drains:  Invasive Devices       Drain  Duration             Urethral Catheter 18 Fr. 1 day                  Urinary Catheter:  Goal for removal: N/A - Chronic Pike               Imaging: No pertinent imaging reviewed.    Recent Cultures (last 7 days):         Last 24 Hours Medication List:   Current Facility-Administered Medications   Medication Dose Route Frequency Provider Last Rate    acetaminophen  975 mg Oral Q8H Sean Mims MD      bisacodyl  10 mg Rectal Daily PRN Marta Jamison MD      vitamin B-12  1,000 mcg Oral Daily Sherley Turner MD      divalproex sodium  250 mg Oral TID With Meals Marta Jamison MD      docusate sodium  100 mg Oral BID Sherley Turner MD      enoxaparin  40 mg Subcutaneous Daily Sherley Turner MD      finasteride  5 mg Oral Daily Sherley Turner MD      LORazepam  0.5 mg Oral TID Marta Jamison MD      melatonin  3 mg Oral HS Sean Mims MD      mirtazapine  15 mg Oral HS Sean Mims MD      OLANZapine  2.5 mg Oral Once Stu Zhang MD      OLANZapine  2.5 mg Oral Q8H PRN Sean Mims MD      polyethylene glycol  17 g Oral Daily Sherley Turner MD      QUEtiapine  25 mg Oral BID Sean Mims MD      QUEtiapine  25 mg Oral HS PRN Jaiden Pang MD      senna  2 tablet Oral BID Marta Jamison MD          Today, Patient Was Seen By: Sean Mims MD    **Please Note: This note may have been constructed using a voice recognition system.**

## 2024-01-22 NOTE — PROGRESS NOTES
Progress Note - Geriatric Medicine   Avni Ruiz 83 y.o. male MRN: [id number]  Unit/Bed#: S -01 Encounter: [id number]      Assessment/Plan:  Polypharmacy  Assessment & Plan  Prior to admission to the hospital patient was on multiple medication  Continue to taper off lorazepam as tolerated  Lexapro was discontinued  Use Zyprexa 2.5 mg only for severe agitation    UTI (urinary tract infection)  Assessment & Plan  Urine culture grew Staphylococcus aureus  Received 3 doses ceftriaxone on admission to the hospital  Pike catheter in place  Encourage p.o. hydration  Patient with increased behaviors for the past 24 hours consider repeat urine analysis with reflex to culture    BPH (benign prostatic hyperplasia)  Assessment & Plan  Continue finasteride and Flomax  Pike catheter in place      Constipation  Assessment & Plan  Last bowel movement was 1/21  Continue senna 2 tablets twice daily MiraLAX daily   Dulcolax suppository as needed  Encourage p.o. hydration and out of bed as tolerated    Dementia with agitation and other behavioral disturbance (HCC)  Assessment & Plan  Patient presented to the hospital with change in mental status.  He has Alzheimer's dementia with behavioral disturbance at baseline recently behaviors are getting worse  Will continue to provide supportive care, reorient as needed.  Patient is at high risk for delirium, will monitor closely and place on delirium precautions.  Maintain sleep/wake cycle.  Optimize pain regimen.  Encourage patient to take Tylenol 975 mg p.o. 3 times daily, per chart review patient did not take any dose yesterday.  Monitor for constipation and urinary retention and manage as needed.  B12 and TSH within normal limits  Encourage family to visit.  Encourage to wear glasses and hearing aids while awake.  Encourage po intake, assist with feeding if needed.   Continue Depakote to 250 mg p.o. 3 times a day with meals, may sprinkle in food-monitor CBC CMP  Continue  lorazepam 0.5 mg p.o. 3 times daily and continue to taper off slowly  continue mirtazapine to 15 mg administer at 8 PM along with melatonin   Consider increasing Seroquel to 25 mg 3 times a day, administer at 9 AM 3 PM and 9 PM-monitor QTc            Ambulatory dysfunction  Assessment & Plan  Patient presented status post fall at the facility  CT head and neck with no acute pathology  Monitor orthostatic vital signs  Encourage p.o. hydration  Avoid hypotension and hypoglycemia   PT OT evaluation    * Metabolic encephalopathy  Assessment & Plan  -improvement noted , seems to be at baseline  -Patient is high risk of delirium due to dementia with behavioral disturbance at baseline, UTI, insomnia, change in environment  -Initiate delirium precautions  -maintain normal sleep/wake cycle  -minimize overnight interruptions, group overnight vitals/labs/nursing checks as possible  -dim lights, close blinds and turn off tv to minimize stimulation and encourage sleep environment in evenings  -ensure that pain is well controlled continue Tylenol 975mg Q8H scheduled, encourage patient to take the medication  -monitor for fecal and urinary retention which may precipitate delirium  -encourage early mobilization and ambulation  -provide frequent reorientation and redirection  -encourage family and friends at the bedside to help calm patient if anxious  -avoid medications which may precipitate or worsen delirium such as tramadol, benzodiazepine, anticholinergics, and antihistaminics  -encourage hydration and nutrition , assist with feeding if needed  -redirect unwanted behaviors as first line, avoid physical restraints.   -Continue Depakote to 250 mg p.o. 3 times a day with meals-monitor CBC CMP  -Either increasing Seroquel 25 mg p.o. administer at 9 AM 3 PM and 9 PM, monitor QTc  -Continue lorazepam 0.5 mg 3 times a day 9am, 1pm, 8pm  -continue mirtazapine to 15 mg at 8 PM  -For severe agitation only may use olanzapine 2.5 mg every  "8 hours as needed,  -Currently off restraints       Subjective:   Patient seen and examined at bedside for geriatric follow-up.  Pleasant at the time of encounter, currently off restraints.  Appetite is preserved    Review of Systems   Unable to perform ROS: Dementia         Objective:     Vitals: Blood pressure 116/65, pulse 75, temperature 98.4 °F (36.9 °C), resp. rate 18, height 5' 6\" (1.676 m), weight 69.5 kg (153 lb 3.5 oz), SpO2 100%.,Body mass index is 24.73 kg/m².      Intake/Output Summary (Last 24 hours) at 1/22/2024 1512  Last data filed at 1/22/2024 0628  Gross per 24 hour   Intake --   Output 200 ml   Net -200 ml       Current Medications: Reviewed    Physical Exam:   Physical Exam  Vitals and nursing note reviewed.   Constitutional:       General: He is not in acute distress.     Appearance: He is well-developed.      Comments: Frail looking   HENT:      Head: Normocephalic and atraumatic.      Ears:      Comments: [place]     Mouth/Throat:      Mouth: Mucous membranes are moist.   Eyes:      Conjunctiva/sclera: Conjunctivae normal.   Cardiovascular:      Rate and Rhythm: Normal rate and regular rhythm.      Heart sounds: No murmur heard.  Pulmonary:      Effort: Pulmonary effort is normal. No respiratory distress.      Breath sounds: Normal breath sounds.   Abdominal:      Palpations: Abdomen is soft.      Tenderness: There is no abdominal tenderness.   Genitourinary:     Comments: Pike cath  Musculoskeletal:         General: No swelling.      Cervical back: Neck supple.      Right lower leg: No edema.      Left lower leg: No edema.   Skin:     General: Skin is warm and dry.      Capillary Refill: Capillary refill takes less than 2 seconds.   Neurological:      Mental Status: He is alert.      Comments: AAOx1   Psychiatric:         Mood and Affect: Mood normal.          Invasive Devices       Drain  Duration             Urethral Catheter 18 Fr. 2 days                    Lab, Imaging and other studies: I " have personally reviewed pertinent reports.

## 2024-01-23 LAB
BACTERIA UR QL AUTO: ABNORMAL /HPF
BILIRUB UR QL STRIP: NEGATIVE
CLARITY UR: ABNORMAL
COLOR UR: YELLOW
GLUCOSE UR STRIP-MCNC: NEGATIVE MG/DL
HGB UR QL STRIP.AUTO: ABNORMAL
KETONES UR STRIP-MCNC: ABNORMAL MG/DL
LEUKOCYTE ESTERASE UR QL STRIP: ABNORMAL
MUCOUS THREADS UR QL AUTO: ABNORMAL
NITRITE UR QL STRIP: POSITIVE
NON-SQ EPI CELLS URNS QL MICRO: ABNORMAL /HPF
PH UR STRIP.AUTO: 6 [PH]
PROT UR STRIP-MCNC: ABNORMAL MG/DL
RBC #/AREA URNS AUTO: ABNORMAL /HPF
SP GR UR STRIP.AUTO: 1.02 (ref 1–1.03)
UROBILINOGEN UR STRIP-ACNC: <2 MG/DL
WBC #/AREA URNS AUTO: ABNORMAL /HPF
WBC CLUMPS # UR AUTO: PRESENT /UL

## 2024-01-23 PROCEDURE — 97116 GAIT TRAINING THERAPY: CPT

## 2024-01-23 PROCEDURE — 87086 URINE CULTURE/COLONY COUNT: CPT

## 2024-01-23 PROCEDURE — 81001 URINALYSIS AUTO W/SCOPE: CPT

## 2024-01-23 PROCEDURE — 99232 SBSQ HOSP IP/OBS MODERATE 35: CPT | Performed by: FAMILY MEDICINE

## 2024-01-23 PROCEDURE — 99232 SBSQ HOSP IP/OBS MODERATE 35: CPT | Performed by: INTERNAL MEDICINE

## 2024-01-23 PROCEDURE — 97164 PT RE-EVAL EST PLAN CARE: CPT

## 2024-01-23 PROCEDURE — 87077 CULTURE AEROBIC IDENTIFY: CPT

## 2024-01-23 PROCEDURE — 87186 SC STD MICRODIL/AGAR DIL: CPT

## 2024-01-23 PROCEDURE — 97535 SELF CARE MNGMENT TRAINING: CPT

## 2024-01-23 RX ADMIN — FINASTERIDE 5 MG: 5 TABLET, FILM COATED ORAL at 08:00

## 2024-01-23 RX ADMIN — ACETAMINOPHEN 975 MG: 325 TABLET, FILM COATED ORAL at 20:42

## 2024-01-23 RX ADMIN — LORAZEPAM 0.5 MG: 0.5 TABLET ORAL at 08:00

## 2024-01-23 RX ADMIN — Medication 3 MG: at 20:42

## 2024-01-23 RX ADMIN — DIVALPROEX SODIUM 250 MG: 125 CAPSULE, COATED PELLETS ORAL at 13:33

## 2024-01-23 RX ADMIN — QUETIAPINE FUMARATE 25 MG: 25 TABLET ORAL at 23:52

## 2024-01-23 RX ADMIN — DIVALPROEX SODIUM 250 MG: 125 CAPSULE, COATED PELLETS ORAL at 16:12

## 2024-01-23 RX ADMIN — QUETIAPINE FUMARATE 25 MG: 25 TABLET ORAL at 08:00

## 2024-01-23 RX ADMIN — LORAZEPAM 0.5 MG: 0.5 TABLET ORAL at 20:42

## 2024-01-23 RX ADMIN — ENOXAPARIN SODIUM 40 MG: 40 INJECTION SUBCUTANEOUS at 08:00

## 2024-01-23 RX ADMIN — DIVALPROEX SODIUM 250 MG: 125 CAPSULE, COATED PELLETS ORAL at 07:56

## 2024-01-23 RX ADMIN — POLYETHYLENE GLYCOL 3350 17 G: 17 POWDER, FOR SOLUTION ORAL at 08:00

## 2024-01-23 RX ADMIN — QUETIAPINE FUMARATE 25 MG: 25 TABLET ORAL at 20:42

## 2024-01-23 RX ADMIN — SENNOSIDES 17.2 MG: 8.6 TABLET, FILM COATED ORAL at 08:00

## 2024-01-23 RX ADMIN — LORAZEPAM 0.5 MG: 0.5 TABLET ORAL at 13:33

## 2024-01-23 RX ADMIN — MIRTAZAPINE 15 MG: 15 TABLET, FILM COATED ORAL at 20:42

## 2024-01-23 RX ADMIN — QUETIAPINE FUMARATE 25 MG: 25 TABLET ORAL at 14:23

## 2024-01-23 RX ADMIN — CYANOCOBALAMIN TAB 500 MCG 1000 MCG: 500 TAB at 08:00

## 2024-01-23 RX ADMIN — ACETAMINOPHEN 975 MG: 325 TABLET, FILM COATED ORAL at 13:33

## 2024-01-23 RX ADMIN — OLANZAPINE 2.5 MG: 2.5 TABLET, FILM COATED ORAL at 23:54

## 2024-01-23 NOTE — PHYSICAL THERAPY NOTE
PHYSICAL THERAPY RE-EVALUATION NOTE          Patient Name: Avni Ruiz  Today's Date: 2024        AGE:   83 y.o.  Mrn:   [id number]  ADMIT DX:  Dementia (HCC) [F03.90]  Laceration of head [S01.91XA]  Abnormal urine finding [R82.90]  Ambulatory dysfunction [R26.2]  Benign prostatic hyperplasia, unspecified whether lower urinary tract symptoms present [N40.0]    Past Medical History:  Past Medical History:   Diagnosis Date    Chronic indwelling Pike catheter     Dementia with behavioral disturbance (HCC)     Depression     Frequent UTI     GERD (gastroesophageal reflux disease)     Urinary retention        Past Surgical History:  Past Surgical History:   Procedure Laterality Date    HERNIA REPAIR       Length Of Stay: 16      PT re-eval: 4119-4266: 10 min  PT tx: 6525-9853: 13 min        PHYSICAL THERAPY Re-EVALUATION:    Patient's identity confirmed via 2 patient identifiers (full name and ) at start of session       24 1357   PT Last Visit   PT Visit Date 24   Note Type   Note type Re-Evaluation   Pain Assessment   Pain Assessment Tool 0-10   Pain Score No Pain   Restrictions/Precautions   Weight Bearing Precautions Per Order No   Other Precautions Agitated;Cognitive;Chair Alarm;Bed Alarm;Multiple lines;Fall Risk  (catheter)   Home Living   Type of Home House   Home Layout Multi-level;Bed/bath upstairs  (3 ROSSI, full flight to 2nd floor bedroom/bathroom)   Home Equipment Walker;Cane   Additional Comments At baseline pt lives w/ his wife   Prior Function   Level of [place] Independent with functional mobility   Lives With Family   Receives Help From Family   Falls in the last 6 months 1 to 4   Comments At baseline pt amb ind w/o AD   General   Additional Pertinent History Pt seen for PT re-evaluation on this day due to expiration of set goals and to re-establish pt's PT plan of care   Family/Caregiver Present No   Cognition    Attention Attends with cues to redirect   Orientation Level Oriented to person;Disoriented to place;Disoriented to time;Disoriented to situation   Memory Decreased short term memory;Decreased recall of recent events;Decreased recall of precautions   Following Commands Follows one step commands with increased time or repetition   Comments Pt required re-directing for all aspects of mobility and tasks, fluctuating levels of agitation   RLE Assessment   RLE Assessment WFL  (grossly assessed w/ functional mobility)   LLE Assessment   LLE Assessment WFL  (grossly assessed w/ functional mobility)   Bed Mobility   Supine to Sit 5  Supervision   Additional items Assist x 1;Impulsive   Sit to Supine 5  Supervision   Additional items Assist x 1;Impulsive   Transfers   Sit to Stand 5  Supervision   Additional items Assist x 1;Increased time required;Impulsive;Verbal cues   Stand to Sit 5  Supervision   Additional items Assist x 1;Increased time required;Impulsive;Verbal cues   Toilet transfer 4  Minimal assistance   Additional items Assist x 1;Standard toilet;Verbal cues   Ambulation/Elevation   Gait pattern Improper Weight shift;Decreased foot clearance;Short stride   Gait Assistance 4  Minimal assist   Additional items Assist x 1;Verbal cues   Assistive Device   (hand-held assist)   Distance 25'+35'   Ambulation/Elevation Additional Comments requires re-directing for mobility tasks/destination   Balance   Static Sitting Fair +   Dynamic Sitting Fair   Static Standing Fair -   Dynamic Standing Poor +   Ambulatory Poor +   Activity Tolerance   Activity Tolerance Other (Comment)  (pt limited by cognition)   Medical Staff Made Aware Pt benefited from PT/OT care coordination w/ OT Dea due to cognitive/behavioral impairments affecting functional mobility, PT and OT goals were addressed individually during session   Nurse Made Aware ALLEN Covarrubias   Assessment   Prognosis Fair   Problem List Impaired balance;Decreased  "cognition;Impaired judgement;Decreased safety awareness   Assessment Avni Ruiz is a 83 y.o. Male who [facility] due to fall and diagnosis of metabolic encephalopathy. Orders for PT eval and treat previously received. Pt seen for PT re-evaluation due to expiration of goals and to re-establish pt's PT plan of care. Comorbidities continuing to affect pt include: dementia, UTI, ambulatory dysfunction. Personal factors affecting pt DC include: inability to navigate level surfaces w/o external assistance, decreased cognition, positive fall history, and limited insight into impairments. At baseline, pt mobilizes independently w/ no AD. Pt currently presents w/ the following deficits: impaired balance, impaired cognition, decreased safety awareness, and gait deviations. Upon re-eval, pt requires  supervision for bed mobility, supervision-min Ax1 for transfers, min Ax1 w/ hand-held assist for ambulation. From a PT/mobility standpoint given the above findings, DC recommendation is level: III (Minimum Rehab Resource Intensity) vs level II pending level of assistance available at pt's DC facility. During current admission, pt will benefit from continued skilled inpatient PT in the acute care setting in order to address the above deficits and to maximize function and mobility prior to DC from acute care.   Goals   Patient Goals to get the \"crew\" set up and to leave   STG Expiration Date 02/06/24   Short Term Goal #1 Pt will: perform bed mobility w/ supervision to decrease pt's burden of care and increase pt's independence w/ repositioning in bed; perform transfers w/ supervision to promote OOB mobility; ambulate at least 150' w/ LRAD vs no AD and supervision to increase pt's ambulatory endurance/tolerance; increase all balance ratings by at least 1 grade to decrease pt's risk of falls   Plan   Treatment/Interventions Functional transfer training;LE strengthening/ROM;Therapeutic " exercise;Endurance training;Cognitive reorientation;Patient/family training;Equipment eval/education;Bed mobility;Gait training   PT Frequency 1-2x/wk   Discharge Recommendation   Rehab Resource Intensity Level, PT III (Minimum Resource Intensity)  (vs level II pending level of assistance available at pt's DC facility; pt w/ limited rehab potential due to impaired cognition)   AM-PAC Basic Mobility Inpatient   Turning in Flat Bed Without Bedrails 4   Lying on Back to Sitting on Edge of Flat Bed Without Bedrails 3   Moving Bed to Chair 3   Standing Up From Chair Using Arms 3   Walk in Room 3   Climb 3-5 Stairs With Railing 3   Basic Mobility Inpatient Raw Score 19   Basic Mobility Standardized Score 42.48   Highest Level Of Mobility   JH-HLM Goal 6: Walk 10 steps or more   JH-HLM Achieved 7: Walk 25 feet or more   Additional Treatment Session   Start Time 1410   End Time 1423   Treatment Assessment Pt re-eval performed from [id number]. PT and OT re-entered pt's room at 1410 due to pt attempting to get of bed. Additional PT intervention provided including multiple transfer, gait, and bed mobility trials. Pt performed bed mobility and transfers w/ supervision and ambulated an additional 40' w/ min Ax1 via hand-hold assist. Pt able to maintain static standing balance w/ close supervision. Pt continues to require frequent re-directing and remains limited in functional mobility and PT treatment due to impaired cognition. Recommend DC w/ level: III (Minimum Rehab Resource Intensity) vs level II pending level of assistance available at DC facility when medically cleared.   Additional Treatment Day 1   End of Consult   Patient Position at End of Consult Supine;Bed/Chair alarm activated;All needs within reach       The patient's AM-[facility] Basic Mobility Inpatient Short Form Raw Score is 19. A Raw score of greater than 16 suggests the patient may benefit from discharge to home. Please also refer to the recommendation of the  Physical Therapist for safe discharge planning.    Pt will benefit from skilled inpatient PT during this admission in order to facilitate progress towards goals and to maximize functional independence prior to DC      DC rec: level III (Minimum Rehab Resource Intensity), vs level II pending level of assistance available at pt's DC facility        Roxanne Cagle, PT, DPT  01/23/24

## 2024-01-23 NOTE — PLAN OF CARE
Problem: PHYSICAL THERAPY ADULT  Goal: Performs mobility at highest level of function for planned discharge setting.  See evaluation for individualized goals.  Description: Treatment/Interventions: Functional transfer training, LE strengthening/ROM, Elevations, Therapeutic exercise, Endurance training, Cognitive reorientation, Patient/family training, Equipment eval/education, Bed mobility, Gait training, Compensatory technique education          See flowsheet documentation for full assessment, interventions and recommendations.  Note: Prognosis: Fair  Problem List: Impaired balance, Decreased cognition, Impaired judgement, Decreased safety awareness  Assessment: Avni Ruiz is a 83 y.o. Male who originally presented to [facility] on 1/7/24 due to fall and diagnosis of metabolic encephalopathy. Orders for PT eval and treat previously received. Pt seen for PT re-evaluation due to expiration of goals and to re-establish pt's PT plan of care. Comorbidities continuing to affect pt include: dementia, UTI, ambulatory dysfunction. Personal factors affecting pt DC include: inability to navigate level surfaces w/o external assistance, decreased cognition, positive fall history, and limited insight into impairments. At baseline, pt mobilizes independently w/ no AD. Pt currently presents w/ the following deficits: impaired balance, impaired cognition, decreased safety awareness, and gait deviations. Upon re-eval, pt requires  supervision for bed mobility, supervision-min Ax1 for transfers, min Ax1 w/ hand-held assist for ambulation. From a PT/mobility standpoint given the above findings, DC recommendation is level: III (Minimum Rehab Resource Intensity) vs level II pending level of assistance available at pt's DC facility. During current admission, pt will benefit from continued skilled inpatient PT in the acute care setting in order to address the above deficits and to maximize function and mobility prior to DC from acute  care.  Barriers to Discharge: Inaccessible home environment     Rehab Resource Intensity Level, PT: III (Minimum Resource Intensity) (vs level II pending level of assistance available at pt's DC facility; pt w/ limited rehab potential due to impaired cognition)    See flowsheet documentation for full assessment.

## 2024-01-23 NOTE — PLAN OF CARE
Problem: OCCUPATIONAL THERAPY ADULT  Goal: Performs self-care activities at highest level of function for planned discharge setting.  See evaluation for individualized goals.  Description: Treatment Interventions: ADL retraining, Functional transfer training, Endurance training, Cognitive reorientation, Patient/family training, Equipment evaluation/education, Compensatory technique education, Energy conservation, Activityengagement          See flowsheet documentation for full assessment, interventions and recommendations.   Outcome: Progressing  Note: Limitation: Decreased ADL status, Decreased UE strength, Decreased Safe judgement during ADL, Decreased cognition, Decreased endurance, Decreased self-care trans, Decreased high-level ADLs (impaired balance, fxnl mobility, act eran, standing eran, strength, attention to task, direction following, safety awareness, insight, sequencing, orientation, problem solving, learning new tasks, initiation, alertness, response time)  Prognosis: Good  Assessment: Pt seen on this date for skilled OT treatment session. At start of session pt supine in bed. Pt with consistent performance with bed mobility, progressing with functional mobility and transfers. Pt continues to be limited by cognition and impulsiveness and continues to require HHA for safety throughout. Pt continues to require VC for all ADL tasks and encouragement for thoroughness with hygiene tasks. Pt limited by cognition, demonstrating improved strength and endurance. Would continue to benefit from VC for all tasks and encouragement for completion of tasks.     Rehab Resource Intensity Level, OT: III (Minimum Resource Intensity) (recommend continued 24/7 care and (A) with all ADLs and IADLs as needed)

## 2024-01-23 NOTE — PROGRESS NOTES
Progress Note - Geriatric Medicine   Avni Ruiz 83 y.o. male MRN: [id number]  Unit/Bed#: S -01 Encounter: [id number]      Assessment/Plan:  Polypharmacy  Assessment & Plan  Prior to admission to the hospital patient was on multiple medication  Continue to taper off lorazepam as tolerated  Lexapro was discontinued  Use Zyprexa 2.5 mg only for severe agitation    UTI (urinary tract infection)  Assessment & Plan  Urine culture grew Staphylococcus aureus  Received 3 doses ceftriaxone on admission to the hospital  Pike catheter in place  Encourage p.o. hydration  Patient with increased behaviors for the past 24 hours consider repeat urine analysis with reflex to culture    BPH (benign prostatic hyperplasia)  Assessment & Plan  Continue finasteride and Flomax  Pike catheter in place      Constipation  Assessment & Plan  Last bowel movement was today  Continue senna 2 tablets twice daily MiraLAX daily   Dulcolax suppository as needed  Encourage p.o. hydration and out of bed as tolerated    Dementia with agitation and other behavioral disturbance (HCC)  Assessment & Plan  Patient presented to the hospital with change in mental status.  He has Alzheimer's dementia with behavioral disturbance at baseline recently behaviors are getting worse  Will continue to provide supportive care, reorient as needed.  Patient is at high risk for delirium, will monitor closely and place on delirium precautions.  Maintain sleep/wake cycle.  Optimize pain regimen.  Encourage patient to take Tylenol 975 mg p.o. 3 times daily, per chart review patient did not take any dose yesterday.  Monitor for constipation and urinary retention and manage as needed.  B12 and TSH within normal limits  Encourage family to visit.  Encourage to wear glasses and hearing aids while awake.  Encourage po intake, assist with feeding if needed.   Continue Depakote to 250 mg p.o. 3 times a day with meals, may sprinkle in food-monitor CBC CMP  Continue  lorazepam 0.5 mg p.o. 3 times daily and continue to taper off slowly  continue mirtazapine to 15 mg administer at 8 PM along with melatonin   Seroquel  25 mg 3 times a day, administer at 9 AM 3 PM and 9 PM-monitor QTc            Ambulatory dysfunction  Assessment & Plan  Patient presented status post fall at the facility  CT head and neck with no acute pathology  Monitor orthostatic vital signs  Encourage p.o. hydration  Avoid hypotension and hypoglycemia   PT OT evaluation    * Metabolic encephalopathy  Assessment & Plan  -improvement noted , seems to be at baseline  -Patient is high risk of delirium due to dementia with behavioral disturbance at baseline, UTI, insomnia, change in environment  -Initiate delirium precautions  -maintain normal sleep/wake cycle  -minimize overnight interruptions, group overnight vitals/labs/nursing checks as possible  -dim lights, close blinds and turn off tv to minimize stimulation and encourage sleep environment in evenings  -ensure that pain is well controlled continue Tylenol 975mg Q8H scheduled, encourage patient to take the medication  -monitor for fecal and urinary retention which may precipitate delirium  -encourage early mobilization and ambulation  -provide frequent reorientation and redirection  -encourage family and friends at the bedside to help calm patient if anxious  -avoid medications which may precipitate or worsen delirium such as tramadol, benzodiazepine, anticholinergics, and antihistaminics  -encourage hydration and nutrition , assist with feeding if needed  -redirect unwanted behaviors as first line, avoid physical restraints.   -Continue Depakote to 250 mg p.o. 3 times a day with meals-monitor CBC CMP  -Seroquel 25 mg p.o. administer at 9 AM 3 PM and 9 PM, monitor QTc  -Continue lorazepam 0.5 mg 3 times a day 9am, 1pm, 8pm  -continue mirtazapine to 15 mg at 8 PM  -For severe agitation only may use olanzapine 2.5 mg every 8 hours as needed,  -Currently off  "restraints       Subjective:   Patient seen and examined at bedside for geriatric follow-up.  Per nursing staff he does not sleep well overnight and he was placed back in restraints.  He was slightly restless at the time of encounter but could be reoriented.  His appetite is preserved and he had a bowel movement today    Review of Systems   Unable to perform ROS: Dementia         Objective:     Vitals: Blood pressure 116/65, pulse 75, temperature 98.4 °F (36.9 °C), resp. rate 18, height 5' 6\" (1.676 m), weight 69.5 kg (153 lb 3.5 oz), SpO2 100%.,Body mass index is 24.73 kg/m².      Intake/Output Summary (Last 24 hours) at 1/23/2024 1632  Last data filed at 1/23/2024 0636  Gross per 24 hour   Intake --   Output 1000 ml   Net -1000 ml       Current Medications: Reviewed    Physical Exam:   Physical Exam  Vitals and nursing note reviewed.   Constitutional:       General: He is not in acute distress.     Appearance: He is well-developed.      Comments: Frail looking   HENT:      Head: Normocephalic and atraumatic.      Ears:      Comments: [place]     Mouth/Throat:      Mouth: Mucous membranes are moist.   Eyes:      Conjunctiva/sclera: Conjunctivae normal.   Cardiovascular:      Rate and Rhythm: Normal rate and regular rhythm.      Heart sounds: No murmur heard.  Pulmonary:      Effort: Pulmonary effort is normal. No respiratory distress.      Breath sounds: Normal breath sounds.   Abdominal:      Palpations: Abdomen is soft.      Tenderness: There is no abdominal tenderness.   Genitourinary:     Comments: Pike cath  Musculoskeletal:         General: No swelling.      Cervical back: Neck supple.      Right lower leg: No edema.      Left lower leg: No edema.   Skin:     General: Skin is warm and dry.      Capillary Refill: Capillary refill takes less than 2 seconds.   Neurological:      Mental Status: He is alert. He is disoriented.   Psychiatric:         Mood and Affect: Mood normal.      Comments: Agitated at times      "     Invasive Devices       Drain  Duration             Urethral Catheter 18 Fr. 3 days                    Lab, Imaging and other studies: I have personally reviewed pertinent reports.

## 2024-01-23 NOTE — PLAN OF CARE
Problem: PAIN - ADULT  Goal: Verbalizes/displays adequate comfort level or baseline comfort level  Description: Interventions:  - Encourage patient to monitor pain and request assistance  - Assess pain using appropriate pain scale  - Administer analgesics based on type and severity of pain and evaluate response  - Implement non-pharmacological measures as appropriate and evaluate response  - Consider cultural and social influences on pain and pain management  - Notify physician/advanced practitioner if interventions unsuccessful or patient reports new pain  Outcome: Progressing     Problem: INFECTION - ADULT  Goal: Absence or prevention of progression during hospitalization  Description: INTERVENTIONS:  - Assess and monitor for signs and symptoms of infection  - Monitor lab/diagnostic results  - Monitor all insertion sites, i.e. indwelling lines, tubes, and drains  - Monitor endotracheal if appropriate and nasal secretions for changes in amount and color  - [place] appropriate cooling/warming therapies per order  - Administer medications as ordered  - Instruct and encourage patient and family to use good hand hygiene technique  - Identify and instruct in appropriate isolation precautions for identified infection/condition  Outcome: Progressing

## 2024-01-23 NOTE — OCCUPATIONAL THERAPY NOTE
Occupational Therapy Progress Note     Patient Name: Avni Ruiz  Today's Date: 1/23/2024  Problem List  Principal Problem:    Metabolic encephalopathy  Active Problems:    Ambulatory dysfunction    Dementia with agitation and other behavioral disturbance (HCC)    Constipation    BPH (benign prostatic hyperplasia)    Abnormal urine finding    UTI (urinary tract infection)    Polypharmacy          01/23/24 1418   OT Last Visit   OT Visit Date 01/23/24   Note Type   Note Type Treatment   Pain Assessment   Pain Assessment Tool 0-10   Pain Score No Pain   Restrictions/Precautions   Weight Bearing Precautions Per Order No   Other Precautions Cognitive;Chair Alarm;Bed Alarm;Multiple lines;Fall Risk;Pain  (catheter)   Lifestyle   Autonomy PTA pt living at  dementia unit, pt requiring (A) with ADLs and IADLs, (+)falls, (-)drives, no use of AD at baseline   Reciprocal Relationships supportive facility staff + wife   Service to Others retired   ADL   UB Dressing Assistance 4  Minimal Assistance   UB Dressing Deficit Increased time to complete;Supervision/safety;Verbal cueing   UB Dressing Comments doff/donning gown in standing   Toileting Assistance  3  Moderate Assistance   Toileting Deficit Increased time to complete;Supervison/safety;Verbal cueing;Clothing management down;Perineal hygiene;Clothing management up   Toileting Comments incontinent of bowel en route to bathroom. Able to manage hygiene sitting on toilet, A for thoroughness   Bed Mobility   Supine to Sit 5  Supervision   Additional items Increased time required;Verbal cues;Impulsive   Sit to Supine 5  Supervision   Additional items Increased time required;Verbal cues;Impulsive   Transfers   Sit to Stand 5  Supervision   Additional items Increased time required;Impulsive;Verbal cues   Stand to Sit 5  Supervision   Additional items Increased time required;Verbal cues;Impulsive   Toilet transfer 4  Minimal assistance   Additional items Assist x 1;Increased  "time required;Verbal cues;Standard toilet   Additional Comments HHA for functional mobility   Functional Mobility   Functional Mobility 4  Minimal assistance   Additional Comments Ax1, functional household distance, no use of AD, HHA  due to impulsiveness   Subjective   Subjective \"We have to call the crew and see how much more they are going to do today\" \"We have to get going\"   Cognition   Overall Cognitive Status Impaired   Arousal/Participation Alert   Attention Attends with cues to redirect   Orientation Level Oriented to person;Disoriented to place;Disoriented to time;Disoriented to situation   Memory Decreased recall of biographical information;Decreased recall of recent events;Decreased short term memory;Decreased recall of precautions   Following Commands Follows one step commands with increased time or repetition   Comments Pt becoming slightly agitated during session, requiring redirection for all tasks. Pt believing that he has a crew and has to locate materials. Pt requiring redirection throughout   Activity Tolerance   Activity Tolerance Other (Comment)  (limited by cognition)   Medical Staff Made Aware ALLEN Covarrubias, PT Roxanne   Assessment   Assessment Pt seen on this date for skilled OT treatment session. At start of session pt supine in bed. Pt with consistent performance with bed mobility, progressing with functional mobility and transfers. Pt continues to be limited by cognition and impulsiveness and continues to require HHA for safety throughout. Pt continues to require VC for all ADL tasks and encouragement for thoroughness with hygiene tasks. Pt limited by cognition, demonstrating improved strength and endurance. Would continue to benefit from VC for all tasks and encouragement for completion of tasks.   Plan   Goal Expiration Date 02/01/24   OT Treatment Day 3   OT Frequency 1-2x/wk   Discharge Recommendation   Rehab Resource Intensity Level, OT III (Minimum Resource Intensity)  (recommend " continued 24/7 care and (A) with all ADLs and IADLs as needed)   AM-PAC Daily Activity Inpatient   Lower Body Dressing 3   Bathing 2   Toileting 2   Upper Body Dressing 3   Grooming 3   Eating 3   Daily Activity Raw Score 16   Daily Activity Standardized Score (Calc for Raw Score >=11) 35.96   AM-PAC Applied Cognition Inpatient   Following a Speech/Presentation 1   Understanding Ordinary Conversation 2   Taking Medications 1   Remembering Where Things Are Placed or Put Away 1   Remembering List of 4-5 Errands 1   Taking Care of Complicated Tasks 1   Applied Cognition Raw Score 7   Applied Cognition Standardized Score 15.17   End of Consult   Patient Position at End of Consult Supine;All needs within reach;Bed/Chair alarm activated         GOALS:      -Patient will perform grooming tasks standing at sink with overall Supervision in order to increase overall independence      -Patient will be Supervision with UB dressing using AE and AD as needed in order to increase (I) with ADLsPROGRESSING     -Patient will be Supervision with UB bathing using AE and AD as needed in order to increase (I) with ADLs     -Patient will be Supervision with LB dressing with use of AE and AD as needed in order to increase (I) with ADLs MET d/c goal     -Patient will be Supervision with LB bathing with use of AE and AD as needed in order to increase (I) with ADLs     -Patient will complete toileting w/ Supervision w/ G hygiene/thoroughness in order to reduce caregiver burden PROGRESSING     -Patient will demonstrate Supervision with bed mobility for ability to manage own comfort and initiate OOB tasks. MET: d/c goal     -Patient will perform functional transfers with Supervision to/from all surfaces using DME as needed in order to increase (I) with functional tasks MET: continue for consistency     -Patient will be Supervision with functional mobility to/from bathroom for increased independence with toileting tasksPROGRESSING     -Patient’s  caregivers will be independent with providing appropriate cognitive cues in order to facilitate patient’s independence during functional tasks.        The patient's raw score on the AM-PAC Daily Activity Inpatient Short Form is 16. A raw score of less than 19 suggests the patient may benefit from discharge to post-acute rehabilitation services. Please refer to the recommendation of the Occupational Therapist for safe discharge planning.    This session, pt required and most appropriately benefited from skilled OT/PT co-treat due to cognitive-behavioral deficits. OT and PT goals were addressed separately as seen in documentation.     Dea Dooley MS, OTR/L

## 2024-01-24 PROCEDURE — 99232 SBSQ HOSP IP/OBS MODERATE 35: CPT | Performed by: FAMILY MEDICINE

## 2024-01-24 PROCEDURE — 99232 SBSQ HOSP IP/OBS MODERATE 35: CPT | Performed by: INTERNAL MEDICINE

## 2024-01-24 RX ADMIN — ENOXAPARIN SODIUM 40 MG: 40 INJECTION SUBCUTANEOUS at 08:11

## 2024-01-24 RX ADMIN — LORAZEPAM 0.5 MG: 0.5 TABLET ORAL at 08:11

## 2024-01-24 RX ADMIN — ACETAMINOPHEN 975 MG: 325 TABLET, FILM COATED ORAL at 06:43

## 2024-01-24 RX ADMIN — OLANZAPINE 2.5 MG: 2.5 TABLET, FILM COATED ORAL at 15:06

## 2024-01-24 RX ADMIN — MIRTAZAPINE 15 MG: 15 TABLET, FILM COATED ORAL at 20:10

## 2024-01-24 RX ADMIN — LORAZEPAM 0.5 MG: 0.5 TABLET ORAL at 13:28

## 2024-01-24 RX ADMIN — Medication 3 MG: at 20:10

## 2024-01-24 RX ADMIN — QUETIAPINE FUMARATE 25 MG: 25 TABLET ORAL at 20:10

## 2024-01-24 RX ADMIN — DIVALPROEX SODIUM 250 MG: 125 CAPSULE, COATED PELLETS ORAL at 13:28

## 2024-01-24 RX ADMIN — QUETIAPINE FUMARATE 25 MG: 25 TABLET ORAL at 08:10

## 2024-01-24 RX ADMIN — CYANOCOBALAMIN TAB 500 MCG 1000 MCG: 500 TAB at 08:10

## 2024-01-24 RX ADMIN — ACETAMINOPHEN 975 MG: 325 TABLET, FILM COATED ORAL at 13:28

## 2024-01-24 RX ADMIN — DIVALPROEX SODIUM 250 MG: 125 CAPSULE, COATED PELLETS ORAL at 15:06

## 2024-01-24 RX ADMIN — FINASTERIDE 5 MG: 5 TABLET, FILM COATED ORAL at 08:10

## 2024-01-24 RX ADMIN — LORAZEPAM 0.5 MG: 0.5 TABLET ORAL at 20:10

## 2024-01-24 RX ADMIN — QUETIAPINE FUMARATE 25 MG: 25 TABLET ORAL at 15:05

## 2024-01-24 RX ADMIN — DIVALPROEX SODIUM 250 MG: 125 CAPSULE, COATED PELLETS ORAL at 08:11

## 2024-01-24 NOTE — CASE MANAGEMENT
Case Management Progress Note    Patient name Avni Ruiz  Location S /S - MRN [id number]  : 1940 Date 2024       LOS (days): 17  Geometric Mean LOS (GMLOS) (days): 3.4  Days to GMLOS:-13.6        OBJECTIVE:        Current admission status: Inpatient  Preferred Pharmacy: No Pharmacies Listed  Primary Care Provider: No primary care provider on file.    Primary Insurance: MEDICARE  Secondary Insurance:  FOR LIFE    PROGRESS NOTE:    Weekly Care Management Length of Stay Review     Current LOS: 17 Days    Most Recent Labs:     Lab Results   Component Value Date/Time    WBC 5.18 2024 06:22 AM    HGB 11.5 (L) 2024 06:22 AM    HCT 35.6 (L) 2024 06:22 AM     2024 06:22 AM    SODIUM 143 2024 06:22 AM    K 4.3 2024 06:22 AM     (H) 2024 06:22 AM    CO2 30 2024 06:22 AM    BUN 24 2024 06:22 AM    CREATININE 0.94 2024 06:22 AM    GLUC 91 2024 06:22 AM       Most Recent Vitals:   Vitals:    24 0820   BP: 125/85   Pulse:    Resp:    Temp: (!) 97.4 °F (36.3 °C)   SpO2:         Identified Barriers to Discharge/Discharge Goals/Care Management Interventions: dementia with behavioral issues, back on restraints    Intended Discharge Disposition: will need LTC, can not find accepting agency.     Expected Discharge Date: TBD

## 2024-01-24 NOTE — CASE MANAGEMENT
Case Management Progress Note    Patient name Avni Ruiz  Location S /S - MRN [id number]  : 1940 Date 2024       LOS (days): 17  Geometric Mean LOS (GMLOS) (days): 3.4  Days to GMLOS:-13.6        OBJECTIVE:        Current admission status: Inpatient  Preferred Pharmacy: No Pharmacies Listed  Primary Care Provider: No primary care provider on file.    Primary Insurance: MEDICARE  Secondary Insurance:  FOR LIFE    PROGRESS NOTE:    CM completed optioning paperwork for pt and [email or web address] for review for LOC determination. CM will continue to follow.

## 2024-01-24 NOTE — PROGRESS NOTES
[facility]  Progress Note  Name: Avni Ruiz I  MRN: [id number]  Unit/Bed#: S -01 I Date of Admission: 1/7/2024   Date of Service: 1/24/2024 I Hospital Day: 17    Assessment/Plan   * Dementia with agitation and other behavioral disturbance (HCC)  Assessment & Plan  Patient has a history of Alzheimer's dementia.  At baseline patient can hold conversations but does get confused.  He is also able to feed and clothe himself with dementia.  However, patient more confused at this point in time.  Patient slept throughout the course of exam, and was unarousable to gentle shaking.  Per son, patient's medications have been adjusted to increase the dosage of his dementia medications.  Concern for encephalopathy at this point  Patient is oriented to himself however not to time and place  According to the son patient has waxing and waning mentation  Patient had a episodes of agitation or combative behavior last evening, received one dose of olanzapine 2.5 mg   Patient has been off restraints for the past 24 hours  Discontinuing observation  Switching olanzapine from IM to oral  Had discussion with family about goals of care, provided them with information regarding prognosis.  1 person from the family agreed to stay with the patient for 24 hours while he is off restraints  Patient was able to walk with the nurse in the hallway without any agitation or trouble  Patient had an episode of agitation yesterday, patient had to be put back in restraints      PLAN:  Discussed with nursing to walk patient 2-3 times a day around unit quarter as this seems to help his calm down during the day  Tylenol 975 every 8 scheduled  Depakote 250 to three times daily  Seroquel to 25 Mg bid   Mirtazapine to 15 Mg at 8 PM  Melatonin at 8 PM  Oral Olanzapine 2.5 Mg every q8 hr as needed  Ativan 0.5 Mg 3 times daily    Metabolic encephalopathy  Assessment & Plan  Patient has history of Alzheimer's  dementia  Patient is not oriented to time place or person  Patient had multiple medication changes in the past few weeks  Metabolic encephalopathy 2/2 UTI and polypharmacy  Geriatrics consulted and recommendations appreciated.  Patient had a episodes of agitation or combative behavior last evening, received one dose of olanzapine 2.5 mg   Patient has been off restraints for over 24 hours  Discontinuing continued observation  Switch olanzapine from IM to oral  Patient was able to walk with the nurse in the hallway without any agitation or trouble  Patient had an episode of agitation yesterday, patient had to be put back in restraints    PLAN:  Discontinued continual observation  Tylenol 975 every 8 scheduled  Medication adjustment can be seen in dementia management    UTI (urinary tract infection)  Assessment & Plan  Patient has a chronic Pike, was replaced in the ED on admission  Patient has history of urinary retention has been following up with urology outpatient  Urology was consulted, offered patient's family Pike's catheter versus CIC, patient's family agreed on Pike's  Patient was afebrile overnight  Has no suprapubic or abdominal pain on palpation  No leukocytosis    PLAN:  Completed 3 days of IV ceftriaxone  Monitor for signs of infection    Polypharmacy  Assessment & Plan  Geriatrics consulted    PLAN:  See plan acute encephalopathy    Abnormal urine finding  Assessment & Plan  On admission patient had a urinalysis that was significant for leukocytes and nitrites.  Unable to assess with patient is having any pain or burning with urination.  He does have a history of cystitis.  Per son, patient has been receiving catheterization and has tried voiding trials but was unsuccessful.  Per son, patient has had a Pike in place since Tuesday which was placed by his urologist.  Urine culture shows Staph aureus  Urology consulted, offered patient's family options of Pike's versus CIC, patient's family agreed on  continuation of Pike    Plans:  Completed 3 days of IV ceftriaxone  Monitoring off antibiotics    BPH (benign prostatic hyperplasia)  Assessment & Plan  Patient was on dutasteride and Flomax at the nursing facility  Urology recommended discontinuation of the tamsulosin    PLAN:  Discontinue tamsulosin  Continue finasteride 5 Mg daily    Constipation  Assessment & Plan  Continue MiraLAX and senna    Ambulatory dysfunction  Assessment & Plan  Patient presented to the ED after nursing facility found patient to have abrasions on his forehead.  The facility believes the patient fell out of bed last night and got himself back and without telling anybody. He was more confused that usual. Patient was brought to the ED via the trauma pathway, where trauma workup and CT imaging were negative.  Patient's lab work was unremarkable, and vitals have remained stable.  Initially plan was to discharge the patient to [facility], however the facility has refused to take the patient  PLAN:  PT/OT evaluations and recommendations appreciated  PT level 2, OT level 3                 VTE Pharmacologic Prophylaxis: VTE Score: 5 High Risk (Score >/= 5) - Pharmacological DVT Prophylaxis Ordered: enoxaparin (Lovenox). Sequential Compression Devices Ordered.    Mobility:   Basic Mobility Inpatient Raw Score: 19  JH-HLM Goal: 6: Walk 10 steps or more  JH-HLM Achieved: 7: Walk 25 feet or more  HLM Goal achieved. Continue to encourage appropriate mobility.    Patient Centered Rounds: I performed bedside rounds with nursing staff today.  Discussions with Specialists or Other Care Team Provider: Gerontology     Education and Discussions with Family / Patient: Updated  (wife) via phone.    Current Length of Stay: 17 day(s)  Current Patient Status: Inpatient   Discharge Plan: Anticipate discharge in 48-72 hrs to discharge location to be determined pending rehab evaluations.    Code Status: Level 1 - Full Code    Subjective:   Patient  is alert, not oriented to time place or person.  Patient is in restraints.  Objective:     Vitals:   Temp (24hrs), Av.4 °F (36.3 °C), Min:97.4 °F (36.3 °C), Max:97.4 °F (36.3 °C)    Temp:  [97.4 °F (36.3 °C)] 97.4 °F (36.3 °C)  BP: ()/(72-85) 125/85  Body mass index is 24.73 kg/m².     Input and Output Summary (last 24 hours):     Intake/Output Summary (Last 24 hours) at 2024 1055  Last data filed at 2024 0901  Gross per 24 hour   Intake 720 ml   Output 1900 ml   Net -1180 ml         Physical Exam:   Physical Exam  Vitals and nursing note reviewed.   Constitutional:       General: He is not in acute distress.     Appearance: Normal appearance. He is not ill-appearing, toxic-appearing or diaphoretic.   HENT:      Head: Normocephalic and atraumatic.      Nose: Nose normal.      Mouth/Throat:      Mouth: Mucous membranes are moist.      Pharynx: Oropharynx is clear.   Eyes:      General: No scleral icterus.        Right eye: No discharge.         Left eye: No discharge.      Extraocular Movements: Extraocular movements intact.      Conjunctiva/sclera: Conjunctivae normal.   Pulmonary:      Effort: No respiratory distress.   Abdominal:      General: Abdomen is flat. There is no distension.   Musculoskeletal:      Cervical back: Normal range of motion and neck supple.      Right lower leg: No edema.      Left lower leg: No edema.   Skin:     General: Skin is warm and dry.      Coloration: Skin is not jaundiced or pale.   Neurological:      Mental Status: He is alert. Mental status is at baseline.   Psychiatric:         Mood and Affect: Affect is flat.          Additional Data:     Labs:  Results from last 7 days   Lab Units 24  0622   WBC Thousand/uL 5.18   HEMOGLOBIN g/dL 11.5*   HEMATOCRIT % 35.6*   PLATELETS Thousands/uL 215     Results from last 7 days   Lab Units 24  0622   SODIUM mmol/L 143   POTASSIUM mmol/L 4.3   CHLORIDE mmol/L 110*   CO2 mmol/L 30   BUN mg/dL 24   CREATININE  mg/dL 0.94   ANION GAP mmol/L 3   CALCIUM mg/dL 8.8   GLUCOSE RANDOM mg/dL 91                       Lines/Drains:  Invasive Devices       Drain  Duration             Urethral Catheter 18 Fr. 3 days                  Urinary Catheter:  Goal for removal: N/A - Chronic Pike               Imaging: No pertinent imaging reviewed.    Recent Cultures (last 7 days):         Last 24 Hours Medication List:   Current Facility-Administered Medications   Medication Dose Route Frequency Provider Last Rate    acetaminophen  975 mg Oral Q8H Sean Mims MD      bisacodyl  10 mg Rectal Daily PRN Marta Jamison MD      vitamin B-12  1,000 mcg Oral Daily Sherley Turner MD      divalproex sodium  250 mg Oral TID With Meals Marta Jamison MD      docusate sodium  100 mg Oral BID Sherley Turner MD      enoxaparin  40 mg Subcutaneous Daily Sherley Turner MD      finasteride  5 mg Oral Daily Sherley Turner MD      LORazepam  0.5 mg Oral TID Marta Jamison MD      melatonin  3 mg Oral HS Sean Mims MD      mirtazapine  15 mg Oral HS Sean Mims MD      OLANZapine  2.5 mg Oral Once Stu Zhang MD      OLANZapine  2.5 mg Oral Q8H PRN Sean Mims MD      polyethylene glycol  17 g Oral Daily Sherley Turner MD      QUEtiapine  25 mg Oral HS PRN Jaiden Pang MD      QUEtiapine  25 mg Oral TID Jaiden Pang MD      senna  2 tablet Oral BID Marta Jamison MD          Today, Patient Was Seen By: Sean Mims MD    **Please Note: This note may have been constructed using a voice recognition system.**

## 2024-01-24 NOTE — PLAN OF CARE
Problem: PAIN - ADULT  Goal: Verbalizes/displays adequate comfort level or baseline comfort level  Description: Interventions:  - Encourage patient to monitor pain and request assistance  - Assess pain using appropriate pain scale  - Administer analgesics based on type and severity of pain and evaluate response  - Implement non-pharmacological measures as appropriate and evaluate response  - Consider cultural and social influences on pain and pain management  - Notify physician/advanced practitioner if interventions unsuccessful or patient reports new pain  Outcome: Progressing     Problem: INFECTION - ADULT  Goal: Absence or prevention of progression during hospitalization  Description: INTERVENTIONS:  - Assess and monitor for signs and symptoms of infection  - Monitor lab/diagnostic results  - Monitor all insertion sites, i.e. indwelling lines, tubes, and drains  - Monitor endotracheal if appropriate and nasal secretions for changes in amount and color  - Fortine appropriate cooling/warming therapies per order  - Administer medications as ordered  - Instruct and encourage patient and family to use good hand hygiene technique  - Identify and instruct in appropriate isolation precautions for identified infection/condition  Outcome: Progressing

## 2024-01-24 NOTE — CASE MANAGEMENT
Case Management Progress Note    Patient name Avni Ruiz  Location S /S - MRN [id number]  : 1940 Date 2024       LOS (days): 17  Geometric Mean LOS (GMLOS) (days): 3.4  Days to GMLOS:-13.7        OBJECTIVE:        Current admission status: Inpatient  Preferred Pharmacy: No Pharmacies Listed  Primary Care Provider: No primary care provider on file.    Primary Insurance: MEDICARE  Secondary Insurance:  FOR LIFE    PROGRESS NOTE:  SHANDRA received a call back from Sharri from [facility] [phone], reporting she was able to get a scheduled tour for the family at [facility] for Friday, and if acceptable by family the family can be admitted as soon as Saturday. Sharri requested a hospice order from the MD.    SHANDRA was in contact with Ms Ruiz regarding Sharri's hospice request and received her permission for the hospice order with hospice referral to [facility]. SHANDRA made the requested hospice referral and will continue to follow. Ms Ruiz confirmed her plan to tour [facility] once her son is available to accompany her. SHANDRA made Dr. Mims aware of the need for the hospice order.

## 2024-01-24 NOTE — PROGRESS NOTES
Progress Note - Geriatric Medicine   Avni Ruiz 83 y.o. male MRN: [id number]  Unit/Bed#: S -01 Encounter: [id number]      Assessment/Plan:  Polypharmacy  Assessment & Plan  Prior to admission to the hospital patient was on multiple medication  Continue to taper off lorazepam as tolerated  Lexapro was discontinued  Use Zyprexa 2.5 mg only for severe agitation    UTI (urinary tract infection)  Assessment & Plan  Urine culture grew Staphylococcus aureus  Received 3 doses ceftriaxone on admission to the hospital  Pike catheter in place  Encourage p.o. hydration  Patient with increased behaviors for the past 24 hours consider repeat urine analysis with reflex to culture    Metabolic encephalopathy  Assessment & Plan  -improvement noted , seems to be at baseline  -Patient is high risk of delirium due to dementia with behavioral disturbance at baseline, UTI, insomnia, change in environment  -Initiate delirium precautions  -maintain normal sleep/wake cycle  -minimize overnight interruptions, group overnight vitals/labs/nursing checks as possible  -dim lights, close blinds and turn off tv to minimize stimulation and encourage sleep environment in evenings  -ensure that pain is well controlled continue Tylenol 975mg Q8H scheduled, encourage patient to take the medication  -monitor for fecal and urinary retention which may precipitate delirium  -encourage early mobilization and ambulation  -provide frequent reorientation and redirection  -encourage family and friends at the bedside to help calm patient if anxious  -avoid medications which may precipitate or worsen delirium such as tramadol, benzodiazepine, anticholinergics, and antihistaminics  -encourage hydration and nutrition , assist with feeding if needed  -redirect unwanted behaviors as first line, avoid physical restraints.   -Continue Depakote to 250 mg p.o. 3 times a day with meals-monitor CBC CMP  -Seroquel 25 mg p.o. administer at 9 AM 3 PM and 9 PM,  monitor QTc  -Continue lorazepam 0.5 mg 3 times a day 9am, 1pm, 8pm  -continue mirtazapine to 15 mg at 8 PM  -For severe agitation only may use olanzapine 2.5 mg every 8 hours as needed,  -Currently off restraints    BPH (benign prostatic hyperplasia)  Assessment & Plan  Continue finasteride and Flomax  Pike catheter in place      Constipation  Assessment & Plan  Last bowel movement was today  Continue senna 2 tablets twice daily MiraLAX daily   Dulcolax suppository as needed  Encourage p.o. hydration and out of bed as tolerated    Ambulatory dysfunction  Assessment & Plan  Patient presented status post fall at the facility  CT head and neck with no acute pathology  Monitor orthostatic vital signs  Encourage p.o. hydration  Avoid hypotension and hypoglycemia   PT OT evaluation    * Dementia with agitation and other behavioral disturbance (HCC)  Assessment & Plan  Patient presented to the hospital with change in mental status.  He has Alzheimer's dementia with behavioral disturbance at baseline recently behaviors are getting worse  Will continue to provide supportive care, reorient as needed.  Patient is at high risk for delirium, will monitor closely and place on delirium precautions.  Maintain sleep/wake cycle.  Optimize pain regimen.  Encourage patient to take Tylenol 975 mg p.o. 3 times daily, per chart review patient did not take any dose yesterday.  Monitor for constipation and urinary retention and manage as needed.  B12 and TSH within normal limits  Encourage family to visit.  Encourage to wear glasses and hearing aids while awake.  Encourage po intake, assist with feeding if needed.   Continue Depakote to 250 mg p.o. 3 times a day with meals, may sprinkle in food-monitor CBC CMP  Continue lorazepam 0.5 mg p.o. 3 times daily and continue to taper off slowly  continue mirtazapine to 15 mg administer at 8 PM along with melatonin   Seroquel  25 mg 3 times a day, administer at 9 AM 3 PM and 9 PM-monitor  "QTc               Subjective:   Patient seen and examined at bedside for geriatric follow-up.  Patient is back in wrist restraints.  At the time of encounter he was calmer and answer few questions    Review of Systems   Unable to perform ROS: Dementia         Objective:     Vitals: Blood pressure 125/85, pulse 75, temperature (!) 97.4 °F (36.3 °C), resp. rate 18, height 5' 6\" (1.676 m), weight 69.5 kg (153 lb 3.5 oz), SpO2 100%.,Body mass index is 24.73 kg/m².      Intake/Output Summary (Last 24 hours) at 1/24/2024 1501  Last data filed at 1/24/2024 0901  Gross per 24 hour   Intake 720 ml   Output 1900 ml   Net -1180 ml       Current Medications: Reviewed    Physical Exam:   Physical Exam  Vitals and nursing note reviewed.   Constitutional:       General: He is not in acute distress.     Appearance: He is well-developed.      Comments: Frail looking   HENT:      Head: Normocephalic and atraumatic.      Ears:      Comments: [place]     Mouth/Throat:      Mouth: Mucous membranes are dry.   Eyes:      Conjunctiva/sclera: Conjunctivae normal.   Cardiovascular:      Rate and Rhythm: Normal rate and regular rhythm.      Heart sounds: No murmur heard.  Pulmonary:      Effort: Pulmonary effort is normal. No respiratory distress.      Breath sounds: Normal breath sounds.   Abdominal:      Palpations: Abdomen is soft.      Tenderness: There is no abdominal tenderness.   Genitourinary:     Comments: Pike cath  Musculoskeletal:         General: No swelling.      Cervical back: Neck supple.      Right lower leg: No edema.      Left lower leg: No edema.   Skin:     General: Skin is warm and dry.      Capillary Refill: Capillary refill takes less than 2 seconds.   Neurological:      Mental Status: He is alert.      Comments: AAox1   Psychiatric:      Comments: Agitated at times  Tangential  Wrist restraints          Invasive Devices       Drain  Duration             Urethral Catheter 18 Fr. 4 days                    Lab, Imaging and " other studies: I have personally reviewed pertinent reports.

## 2024-01-24 NOTE — CASE MANAGEMENT
Case Management Progress Note    Patient name Avni Ruiz  Location S /S - MRN [id number]  : 1940 Date 2024       LOS (days): 17  Geometric Mean LOS (GMLOS) (days): 3.4  Days to GMLOS:-13.5        OBJECTIVE:        Current admission status: Inpatient  Preferred Pharmacy: No Pharmacies Listed  Primary Care Provider: No primary care provider on file.    Primary Insurance: MEDICARE  Secondary Insurance:  FOR LIFE    PROGRESS NOTE:  SHANDRA received a call from Sharri of [facility] [phone] reporting the Pt's spouse reached out to her, requesting assistance with the Pt's placement. Sharri reported to will be making some calls and follow up with Ms Ruiz and SHANDRA. SHANDRA will continue to follow.

## 2024-01-24 NOTE — PROGRESS NOTES
[facility]  Progress Note  Name: Avni Ruiz I  MRN: [id number]  Unit/Bed#: S -01 I Date of Admission: 1/7/2024   Date of Service: 1/24/2024 I Hospital Day: 17    Assessment/Plan   * Metabolic encephalopathy  Assessment & Plan  Patient has history of Alzheimer's dementia  Patient is not oriented to time place or person  Patient had multiple medication changes in the past few weeks  Metabolic encephalopathy 2/2 UTI and polypharmacy  Geriatrics consulted and recommendations appreciated.  Patient had a episodes of agitation or combative behavior last evening, received one dose of olanzapine 2.5 mg   Patient has been off restraints for over 24 hours  Discontinuing continued observation  Switch olanzapine from IM to oral  Patient was able to walk with the nurse in the hallway without any agitation or trouble  Patient had an episode of agitation yesterday, patient had to be put back in restraints    PLAN:  Discontinued continual observation  Tylenol 975 every 8 scheduled  Medication adjustment can be seen in dementia management    Polypharmacy  Assessment & Plan  Geriatrics consulted    PLAN:  See plan acute encephalopathy    UTI (urinary tract infection)  Assessment & Plan  Patient has a chronic Pike, was replaced in the ED on admission  Patient has history of urinary retention has been following up with urology outpatient  Urology was consulted, offered patient's family Pike's catheter versus CIC, patient's family agreed on Pike's  Patient was afebrile overnight  Has no suprapubic or abdominal pain on palpation  No leukocytosis    PLAN:  Completed 3 days of IV ceftriaxone  Monitor for signs of infection    Abnormal urine finding  Assessment & Plan  On admission patient had a urinalysis that was significant for leukocytes and nitrites.  Unable to assess with patient is having any pain or burning with urination.  He does have a history of cystitis.  Per son, patient has been  receiving catheterization and has tried voiding trials but was unsuccessful.  Per son, patient has had a Pike in place since Tuesday which was placed by his urologist.  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  At baseline patient can hold conversations but does get confused.  He is also able to feed and clothe himself with dementia.  However, patient more confused at this point in time.  Patient slept throughout the course of exam, and was unarousable to gentle shaking.  Per son, patient's medications have been adjusted to increase the dosage of his dementia medications.  Concern for encephalopathy at this point  Patient is oriented to himself however not to time and place  According to the son patient has waxing and waning mentation  Patient had a episodes of agitation or combative behavior last evening, received one dose of olanzapine 2.5 mg   Patient has been off restraints for the past 24 hours  Discontinuing observation  Switching olanzapine from IM to oral  Had discussion with family about goals of care, provided them with information regarding prognosis.  1 person from the family agreed to stay with the patient for 24 hours while he is off restraints  Patient was able to walk with the nurse in the hallway without any agitation or trouble  Patient had an episode of agitation yesterday,  patient had to be put back in restraints      PLAN:  Discussed with nursing to walk patient 2-3 times a day around unit quarter as this seems to help his calm down during the day  Tylenol 975 every 8 scheduled  Depakote 250 to three times daily  Seroquel to 25 Mg bid   Mirtazapine to 15 Mg at 8 PM  Melatonin at 8 PM  Oral Olanzapine 2.5 Mg every q8 hr as needed  Ativan 0.5 Mg 3 times daily    Ambulatory dysfunction  Assessment & Plan  Patient presented to the ED after nursing facility found patient to have abrasions on his forehead.  The facility believes the patient fell out of bed last night and got himself back and without telling anybody. He was more confused that usual. Patient was brought to the ED via the trauma pathway, where trauma workup and CT imaging were negative.  Patient's lab work was unremarkable, and vitals have remained stable.  Initially plan was to discharge the patient to [facility], however the facility has refused to take the patient  PLAN:  PT/OT evaluations and recommendations appreciated  PT level 2, OT level 3                 VTE Pharmacologic Prophylaxis: VTE Score: 5 High Risk (Score >/= 5) - Pharmacological DVT Prophylaxis Ordered: enoxaparin (Lovenox). Sequential Compression Devices Ordered.    Mobility:   Basic Mobility Inpatient Raw Score: 19  JH-HLM Goal: 6: Walk 10 steps or more  JH-HLM Achieved: 7: Walk 25 feet or more  HLM Goal achieved. Continue to encourage appropriate mobility.    Patient Centered Rounds: I performed bedside rounds with nursing staff today.  Discussions with Specialists or Other Care Team Provider: Gerontology     Education and Discussions with Family / Patient: Updated  (wife) via phone.    Current Length of Stay: 17 day(s)  Current Patient Status: Inpatient   Discharge Plan: Anticipate discharge in 48-72 hrs to discharge location to be determined pending rehab evaluations.    Code Status: Level 1 - Full Code    Subjective:   Patient is  alert, not oriented to time place or person.  Patient is in restraints  Objective:     Vitals:   No data recorded.    BP: (93)/(72) 93/72  SpO2:  [100 %] 100 %  Body mass index is 24.73 kg/m².     Input and Output Summary (last 24 hours):     Intake/Output Summary (Last 24 hours) at 1/24/2024 0655  Last data filed at 1/24/2024 0601  Gross per 24 hour   Intake 480 ml   Output 1900 ml   Net -1420 ml         Physical Exam:   Physical Exam  Vitals and nursing note reviewed.   Constitutional:       General: He is not in acute distress.     Appearance: Normal appearance. He is not ill-appearing, toxic-appearing or diaphoretic.   HENT:      Head: Normocephalic and atraumatic.      Nose: Nose normal.      Mouth/Throat:      Mouth: Mucous membranes are moist.      Pharynx: Oropharynx is clear.   Eyes:      General: No scleral icterus.        Right eye: No discharge.         Left eye: No discharge.      Extraocular Movements: Extraocular movements intact.      Conjunctiva/sclera: Conjunctivae normal.   Pulmonary:      Effort: No respiratory distress.   Abdominal:      General: Abdomen is flat. There is no distension.   Musculoskeletal:      Cervical back: Normal range of motion and neck supple.      Right lower leg: No edema.      Left lower leg: No edema.   Skin:     General: Skin is warm and dry.      Coloration: Skin is not jaundiced or pale.   Neurological:      Mental Status: He is alert. Mental status is at baseline.   Psychiatric:         Mood and Affect: Affect is flat.          Additional Data:     Labs:  Results from last 7 days   Lab Units 01/22/24  0622   WBC Thousand/uL 5.18   HEMOGLOBIN g/dL 11.5*   HEMATOCRIT % 35.6*   PLATELETS Thousands/uL 215     Results from last 7 days   Lab Units 01/22/24  0622   SODIUM mmol/L 143   POTASSIUM mmol/L 4.3   CHLORIDE mmol/L 110*   CO2 mmol/L 30   BUN mg/dL 24   CREATININE mg/dL 0.94   ANION GAP mmol/L 3   CALCIUM mg/dL 8.8   GLUCOSE RANDOM mg/dL 91                        Lines/Drains:  Invasive Devices       Drain  Duration             Urethral Catheter 18 Fr. 3 days                  Urinary Catheter:  Goal for removal: N/A - Chronic Pike               Imaging: No pertinent imaging reviewed.    Recent Cultures (last 7 days):         Last 24 Hours Medication List:   Current Facility-Administered Medications   Medication Dose Route Frequency Provider Last Rate    acetaminophen  975 mg Oral Q8H Sean Mims MD      bisacodyl  10 mg Rectal Daily PRN Marta Jamison MD      vitamin B-12  1,000 mcg Oral Daily Sherley Turner MD      divalproex sodium  250 mg Oral TID With Meals Marta Jamison MD      docusate sodium  100 mg Oral BID Sherley Turner MD      enoxaparin  40 mg Subcutaneous Daily Sherley Turner MD      finasteride  5 mg Oral Daily Sherley Turner MD      LORazepam  0.5 mg Oral TID Marta Jamison MD      melatonin  3 mg Oral HS Sean Mims MD      mirtazapine  15 mg Oral HS Sean Mims MD      OLANZapine  2.5 mg Oral Once Stu Zhang MD      OLANZapine  2.5 mg Oral Q8H PRN Sean Mims MD      polyethylene glycol  17 g Oral Daily Sherley Turner MD      QUEtiapine  25 mg Oral HS PRN Jaiden Pang MD      QUEtiapine  25 mg Oral TID Jaiden Pang MD      senna  2 tablet Oral BID Marta Jamiosn MD          Today, Patient Was Seen By: Sean Mims MD    **Please Note: This note may have been constructed using a voice recognition system.**

## 2024-01-25 LAB
BACTERIA UR CULT: ABNORMAL
BACTERIA UR CULT: ABNORMAL

## 2024-01-25 PROCEDURE — 99232 SBSQ HOSP IP/OBS MODERATE 35: CPT | Performed by: FAMILY MEDICINE

## 2024-01-25 PROCEDURE — 99232 SBSQ HOSP IP/OBS MODERATE 35: CPT | Performed by: INTERNAL MEDICINE

## 2024-01-25 RX ADMIN — ACETAMINOPHEN 975 MG: 325 TABLET, FILM COATED ORAL at 13:21

## 2024-01-25 RX ADMIN — DOCUSATE SODIUM 100 MG: 100 CAPSULE, LIQUID FILLED ORAL at 10:03

## 2024-01-25 RX ADMIN — DIVALPROEX SODIUM 250 MG: 125 CAPSULE, COATED PELLETS ORAL at 13:21

## 2024-01-25 RX ADMIN — QUETIAPINE FUMARATE 25 MG: 25 TABLET ORAL at 10:03

## 2024-01-25 RX ADMIN — Medication 3 MG: at 21:53

## 2024-01-25 RX ADMIN — FINASTERIDE 5 MG: 5 TABLET, FILM COATED ORAL at 10:03

## 2024-01-25 RX ADMIN — DIVALPROEX SODIUM 250 MG: 125 CAPSULE, COATED PELLETS ORAL at 17:34

## 2024-01-25 RX ADMIN — LORAZEPAM 0.5 MG: 0.5 TABLET ORAL at 21:52

## 2024-01-25 RX ADMIN — SENNOSIDES 17.2 MG: 8.6 TABLET, FILM COATED ORAL at 17:33

## 2024-01-25 RX ADMIN — QUETIAPINE FUMARATE 25 MG: 25 TABLET ORAL at 17:33

## 2024-01-25 RX ADMIN — LORAZEPAM 0.5 MG: 0.5 TABLET ORAL at 13:20

## 2024-01-25 RX ADMIN — ACETAMINOPHEN 975 MG: 325 TABLET, FILM COATED ORAL at 21:52

## 2024-01-25 RX ADMIN — DOCUSATE SODIUM 100 MG: 100 CAPSULE, LIQUID FILLED ORAL at 17:33

## 2024-01-25 RX ADMIN — ENOXAPARIN SODIUM 40 MG: 40 INJECTION SUBCUTANEOUS at 10:02

## 2024-01-25 RX ADMIN — LORAZEPAM 0.5 MG: 0.5 TABLET ORAL at 10:02

## 2024-01-25 RX ADMIN — POLYETHYLENE GLYCOL 3350 17 G: 17 POWDER, FOR SOLUTION ORAL at 10:02

## 2024-01-25 RX ADMIN — CYANOCOBALAMIN TAB 500 MCG 1000 MCG: 500 TAB at 10:04

## 2024-01-25 RX ADMIN — SENNOSIDES 17.2 MG: 8.6 TABLET, FILM COATED ORAL at 10:02

## 2024-01-25 RX ADMIN — MIRTAZAPINE 15 MG: 15 TABLET, FILM COATED ORAL at 21:53

## 2024-01-25 RX ADMIN — DIVALPROEX SODIUM 250 MG: 125 CAPSULE, COATED PELLETS ORAL at 10:03

## 2024-01-25 RX ADMIN — QUETIAPINE FUMARATE 25 MG: 25 TABLET ORAL at 21:53

## 2024-01-25 NOTE — PLAN OF CARE
Problem: PAIN - ADULT  Goal: Verbalizes/displays adequate comfort level or baseline comfort level  Description: Interventions:  - Encourage patient to monitor pain and request assistance  - Assess pain using appropriate pain scale  - Administer analgesics based on type and severity of pain and evaluate response  - Implement non-pharmacological measures as appropriate and evaluate response  - Consider cultural and social influences on pain and pain management  - Notify physician/advanced practitioner if interventions unsuccessful or patient reports new pain  Outcome: Progressing     Problem: INFECTION - ADULT  Goal: Absence or prevention of progression during hospitalization  Description: INTERVENTIONS:  - Assess and monitor for signs and symptoms of infection  - Monitor lab/diagnostic results  - Monitor all insertion sites, i.e. indwelling lines, tubes, and drains  - Monitor endotracheal if appropriate and nasal secretions for changes in amount and color  - [place] appropriate cooling/warming therapies per order  - Administer medications as ordered  - Instruct and encourage patient and family to use good hand hygiene technique  - Identify and instruct in appropriate isolation precautions for identified infection/condition  Outcome: Progressing     Problem: SAFETY ADULT  Goal: Patient will remain free of falls  Description: INTERVENTIONS:  - Educate patient/family on patient safety including physical limitations  - Instruct patient to call for assistance with activity   - Consult OT/PT to assist with strengthening/mobility   - Keep Call bell within reach  - Keep bed low and locked with side rails adjusted as appropriate  - Keep care items and personal belongings within reach  - Initiate and maintain comfort rounds  - Make Fall Risk Sign visible to staff  - Initiate/Maintain bed alarm  - Obtain necessary fall risk management equipment  - Apply yellow socks and bracelet for high fall risk patients  - Consider  moving patient to room near nurses station  Outcome: Progressing  Goal: Maintain or return to baseline ADL function  Description: INTERVENTIONS:  -  Assess patient's ability to carry out ADLs; assess patient's baseline for ADL function and identify physical deficits which impact ability to perform ADLs (bathing, care of mouth/teeth, toileting, grooming, dressing, etc.)  - Assess/evaluate cause of self-care deficits   - Assess range of motion  - Assess patient's mobility; develop plan if impaired  - Assess patient's need for assistive devices and provide as appropriate  - Encourage maximum independence but intervene and supervise when necessary  - Involve family in performance of ADLs  - Assess for home care needs following discharge   - Consider OT consult to assist with ADL evaluation and planning for discharge  - Provide patient education as appropriate  Outcome: Progressing  Goal: Maintains/Returns to pre admission functional level  Description: INTERVENTIONS:  - Perform AM-PAC 6 Click Basic Mobility/ Daily Activity assessment daily.  - Set and communicate daily mobility goal to care team and patient/family/caregiver.   - Collaborate with rehabilitation services on mobility goals if consulted  - Ambulate patient 4 times a day  - Out of bed for meals 3 times a day  - Out of bed for toileting  - Record patient progress and toleration of activity level   Outcome: Progressing     Problem: DISCHARGE PLANNING  Goal: Discharge to home or other facility with appropriate resources  Description: INTERVENTIONS:  - Identify barriers to discharge w/patient and caregiver  - Arrange for needed discharge resources and transportation as appropriate  - Identify discharge learning needs (meds, wound care, etc.)  - Arrange for interpretive services to assist at discharge as needed  - Refer to Case Management Department for coordinating discharge planning if the patient needs post-hospital services based on physician/advanced  practitioner order or complex needs related to functional status, cognitive ability, or social support system  Outcome: Progressing     Problem: Knowledge Deficit  Goal: Patient/family/caregiver demonstrates understanding of disease process, treatment plan, medications, and discharge instructions  Description: Complete learning assessment and assess knowledge base.  Interventions:  - Provide teaching at level of understanding  - Provide teaching via preferred learning methods  Outcome: Progressing     Problem: SAFETY,RESTRAINT: NV/NON-SELF DESTRUCTIVE BEHAVIOR  Goal: Remains free of harm/injury (restraint for non violent/non self-detsructive behavior)  Description: INTERVENTIONS:  - Instruct patient/family regarding restraint use   - Assess and monitor physiologic and psychological status   - Provide interventions and comfort measures to meet assessed patient needs   - Identify and implement measures to help patient regain control  - Assess readiness for release of restraint   Outcome: Progressing  Goal: Returns to optimal restraint-free functioning  Description: INTERVENTIONS:  - Assess the patient's behavior and symptoms that indicate continued need for restraint  - Identify and implement measures to help patient regain control  - Assess readiness for release of restraint   Outcome: Progressing     Problem: Prexisting or High Potential for Compromised Skin Integrity  Goal: Skin integrity is maintained or improved  Description: INTERVENTIONS:  - Identify patients at risk for skin breakdown  - Assess and monitor skin integrity  - Assess and monitor nutrition and hydration status  - Monitor labs   - Assess for incontinence   - Turn and reposition patient  - Assist with mobility/ambulation  - Relieve pressure over bony prominences  - Avoid friction and shearing  - Provide appropriate hygiene as needed including keeping skin clean and dry  - Evaluate need for skin moisturizer/barrier cream  - Collaborate with  interdisciplinary team   - Patient/family teaching  - Consider wound care consult   Outcome: Progressing     Problem: Nutrition/Hydration-ADULT  Goal: Nutrient/Hydration intake appropriate for improving, restoring or maintaining nutritional needs  Description: Monitor and assess patient's nutrition/hydration status for malnutrition. Collaborate with interdisciplinary team and initiate plan and interventions as ordered.  Monitor patient's weight and dietary intake as ordered or per policy. Utilize nutrition screening tool and intervene as necessary. Determine patient's food preferences and provide high-protein, high-caloric foods as appropriate.     INTERVENTIONS:  - Monitor oral intake, urinary output, labs, and treatment plans  - Assess nutrition and hydration status and recommend course of action  - Evaluate amount of meals eaten  - Assist patient with eating if necessary   - Allow adequate time for meals  - Recommend/ encourage appropriate diets, oral nutritional supplements, and vitamin/mineral supplements  - Order, calculate, and assess calorie counts as needed  - Recommend, monitor, and adjust tube feedings and TPN/PPN based on assessed needs  - Assess need for intravenous fluids  - Provide specific nutrition/hydration education as appropriate  - Include patient/family/caregiver in decisions related to nutrition  Outcome: Progressing

## 2024-01-25 NOTE — PROGRESS NOTES
Progress Note - Geriatric Medicine   Avni Ruiz 83 y.o. male MRN: [id number]  Unit/Bed#: S -01 Encounter: [id number]      Assessment/Plan:  Polypharmacy  Assessment & Plan  Prior to admission to the hospital patient was on multiple medication  Continue to taper off lorazepam as tolerated  Lexapro was discontinued  Use Zyprexa 2.5 mg only for severe agitation    UTI (urinary tract infection)  Assessment & Plan  Urine culture grew Staphylococcus aureus  Received 3 doses ceftriaxone on admission to the hospital  Pike catheter in place  Encourage p.o. hydration  Patient with increased behaviors for the past 24 hours consider repeat urine analysis with reflex to culture    Metabolic encephalopathy  Assessment & Plan  - seems to be at his new baseline  -Patient is high risk of delirium due to dementia with behavioral disturbance at baseline, UTI, insomnia, change in environment  -Initiate delirium precautions  -maintain normal sleep/wake cycle  -minimize overnight interruptions, group overnight vitals/labs/nursing checks as possible  -dim lights, close blinds and turn off tv to minimize stimulation and encourage sleep environment in evenings  -ensure that pain is well controlled continue Tylenol 975mg Q8H scheduled,   -monitor for fecal and urinary retention which may precipitate delirium  -encourage early mobilization and ambulation  -provide frequent reorientation and redirection  -encourage family and friends at the bedside to help calm patient if anxious  -avoid medications which may precipitate or worsen delirium such as tramadol, benzodiazepine, anticholinergics, and antihistaminics  -encourage hydration and nutrition , assist with feeding if needed  -redirect unwanted behaviors as first line  -Continue Depakote to 250 mg p.o. 3 times a day with meals-monitor CBC CMP  -Seroquel 25 mg p.o. administer at 9 AM 3 PM and 8 PM, monitor QTc  -Continue lorazepam 0.5 mg 3 times a day 9am, 1pm, 8pm  -continue  mirtazapine to 15 mg at 8 PM  -For severe agitation only may use olanzapine 2.5 mg every 8 hours as needed,  -Monitor patient off restraints if possible    BPH (benign prostatic hyperplasia)  Assessment & Plan  Continue finasteride and Flomax  Pike catheter in place      Constipation  Assessment & Plan  Last bowel movement was today  Continue senna 2 tablets twice daily MiraLAX daily   Dulcolax suppository as needed  Encourage p.o. hydration and out of bed as tolerated    Ambulatory dysfunction  Assessment & Plan  Patient presented status post fall at the facility  CT head and neck with no acute pathology  Monitor orthostatic vital signs  Encourage p.o. hydration  Avoid hypotension and hypoglycemia   PT OT evaluation    * Dementia with agitation and other behavioral disturbance (HCC)  Assessment & Plan  Patient presented to the hospital with change in mental status.  He has Alzheimer's dementia with behavioral disturbance at baseline recently behaviors are getting worse  Will continue to provide supportive care, reorient as needed.  Patient is at high risk for delirium, will monitor closely and place on delirium precautions.  Maintain sleep/wake cycle.  Optimize pain regimen.  Continue Tylenol 975 mg p.o. 3 times daily  Monitor for constipation and urinary retention and manage as needed.  B12 and TSH within normal limits  Encourage family to visit.  Encourage to wear glasses and hearing aids while awake.  Encourage po intake, assist with feeding if needed.   Continue Depakote to 250 mg p.o. 3 times a day with meals, may sprinkle in food-monitor CBC CMP  Continue lorazepam 0.5 mg p.o. 3 times daily and continue to taper off slowly  continue mirtazapine to 15 mg administer at 8 PM along with melatonin   Seroquel  25 mg 3 times a day, administer at 9 AM 3 PM and 8 PM-monitor Qtc  Family is considering comfort measures only               Subjective:   Patient seen and examined at bedside for geriatric follow-up.   "Currently in 2 point restraints.  No acute events reported. Appetite is preserved    Review of Systems   Unable to perform ROS: Dementia         Objective:     Vitals: Blood pressure 125/85, pulse 75, temperature (!) 97.4 °F (36.3 °C), resp. rate 18, height 5' 6\" (1.676 m), weight 69.5 kg (153 lb 3.5 oz), SpO2 100%.,Body mass index is 24.73 kg/m².      Intake/Output Summary (Last 24 hours) at 1/25/2024 1502  Last data filed at 1/25/2024 0631  Gross per 24 hour   Intake --   Output 350 ml   Net -350 ml       Current Medications: Reviewed    Physical Exam:   Physical Exam  Vitals and nursing note reviewed.   Constitutional:       General: He is not in acute distress.     Appearance: He is well-developed.      Comments: Frail looking   HENT:      Head: Normocephalic and atraumatic.      Ears:      Comments: Hard of hearing     Mouth/Throat:      Mouth: Mucous membranes are dry.   Eyes:      Conjunctiva/sclera: Conjunctivae normal.   Cardiovascular:      Rate and Rhythm: Normal rate and regular rhythm.      Heart sounds: No murmur heard.  Pulmonary:      Effort: Pulmonary effort is normal. No respiratory distress.      Breath sounds: Normal breath sounds.   Abdominal:      Palpations: Abdomen is soft.      Tenderness: There is no abdominal tenderness.   Musculoskeletal:         General: No swelling.      Cervical back: Neck supple.      Right lower leg: No edema.      Left lower leg: No edema.   Skin:     General: Skin is warm and dry.      Capillary Refill: Capillary refill takes less than 2 seconds.   Neurological:      Mental Status: He is alert.      Comments: Alert oriented x 1   Psychiatric:      Comments: Restless intermittently  Wrist restraints at the time of encounter          Invasive Devices       Drain  Duration             Urethral Catheter 18 Fr. 5 days                    Lab, Imaging and other studies: lat blood work on 1/22 "

## 2024-01-25 NOTE — PROGRESS NOTES
[facility]  Progress Note  Name: Avni Ruiz I  MRN: [id number]  Unit/Bed#: S -01 I Date of Admission: 1/7/2024   Date of Service: 1/25/2024 I Hospital Day: 18    Assessment/Plan   * Dementia with agitation and other behavioral disturbance (HCC)  Assessment & Plan  Patient has a history of Alzheimer's dementia.  At baseline patient can hold conversations but does get confused.  He is also able to feed and clothe himself with dementia.  However, patient more confused at this point in time.  Patient slept throughout the course of exam, and was unarousable to gentle shaking.  Per son, patient's medications have been adjusted to increase the dosage of his dementia medications.  Concern for encephalopathy at this point  Patient is oriented to himself however not to time and place  According to the son patient has waxing and waning mentation  Patient had a episodes of agitation or combative behavior last evening, received one dose of olanzapine 2.5 mg   Patient has been off restraints for the past 24 hours  Discontinuing observation  Switching olanzapine from IM to oral  Had discussion with family about goals of care, provided them with information regarding prognosis.  1 person from the family agreed to stay with the patient for 24 hours while he is off restraints  Patient was able to walk with the nurse in the hallway without any agitation or trouble  Patient had an episode of agitation yesterday, patient had to be put back in restraints      PLAN:  Discussed with nursing to walk patient 2-3 times a day around unit quarter as this seems to help his calm down during the day  Tylenol 975 every 8 scheduled  Depakote 250 to three times daily  Seroquel to 25 Mg bid   Mirtazapine to 15 Mg at 8 PM  Melatonin at 8 PM  Oral Olanzapine 2.5 Mg every q8 hr as needed  Ativan 0.5 Mg 3 times daily  Hospice consulted per family's request    Metabolic encephalopathy  Assessment & Plan  Patient has  history of Alzheimer's dementia  Patient is not oriented to time place or person  Patient had multiple medication changes in the past few weeks  Metabolic encephalopathy 2/2 UTI and polypharmacy  Geriatrics consulted and recommendations appreciated.  Patient had a episodes of agitation or combative behavior last evening, received one dose of olanzapine 2.5 mg   Patient has been off restraints for over 24 hours  Discontinuing continued observation  Switch olanzapine from IM to oral  Patient was able to walk with the nurse in the hallway without any agitation or trouble  Patient had an episode of agitation yesterday, patient had to be put back in restraints    PLAN:  Discontinued continual observation  Tylenol 975 every 8 scheduled  Medication adjustment can be seen in dementia management    UTI (urinary tract infection)  Assessment & Plan  Patient has a chronic Pike, was replaced in the ED on admission  Patient has history of urinary retention has been following up with urology outpatient  Urology was consulted, offered patient's family Pike's catheter versus CIC, patient's family agreed on Pike's  Patient was afebrile overnight  Has no suprapubic or abdominal pain on palpation  No leukocytosis    PLAN:  Completed 3 days of IV ceftriaxone  Monitor for signs of infection  Resolved    Polypharmacy  Assessment & Plan  Geriatrics consulted    PLAN:  See plan acute encephalopathy    Abnormal urine finding  Assessment & Plan  On admission patient had a urinalysis that was significant for leukocytes and nitrites.  Unable to assess with patient is having any pain or burning with urination.  He does have a history of cystitis.  Per son, patient has been receiving catheterization and has tried voiding trials but was unsuccessful.  Per son, patient has had a Piek in place since Tuesday which was placed by his urologist.  Urine culture shows Staph aureus  Urology consulted, offered patient's family options of Pike's versus  CIC, patient's family agreed on continuation of Pike    Plans:  Completed 3 days of IV ceftriaxone  Monitoring off antibiotics  Resolved     BPH (benign prostatic hyperplasia)  Assessment & Plan  Patient was on dutasteride and Flomax at the nursing facility  Urology recommended discontinuation of the tamsulosin    PLAN:  Discontinue tamsulosin  Continue finasteride 5 Mg daily    Constipation  Assessment & Plan  Continue MiraLAX and senna    Ambulatory dysfunction  Assessment & Plan  Patient presented to the ED after nursing facility found patient to have abrasions on his forehead.  The facility believes the patient fell out of bed last night and got himself back and without telling anybody. He was more confused that usual. Patient was brought to the ED via the trauma pathway, where trauma workup and CT imaging were negative.  Patient's lab work was unremarkable, and vitals have remained stable.  Initially plan was to discharge the patient to [facility], however the facility has refused to take the patient  PLAN:  PT/OT evaluations and recommendations appreciated  PT level 2, OT level 3                 VTE Pharmacologic Prophylaxis: VTE Score: 5 High Risk (Score >/= 5) - Pharmacological DVT Prophylaxis Ordered: enoxaparin (Lovenox). Sequential Compression Devices Ordered.    Mobility:   Basic Mobility Inpatient Raw Score: 19  JH-HLM Goal: 6: Walk 10 steps or more  JH-HLM Achieved: 7: Walk 25 feet or more  HLM Goal achieved. Continue to encourage appropriate mobility.    Patient Centered Rounds: I performed bedside rounds with nursing staff today.  Discussions with Specialists or Other Care Team Provider: Gerontology     Education and Discussions with Family / Patient: Updated  (wife) via phone.    Current Length of Stay: 18 day(s)  Current Patient Status: Inpatient   Discharge Plan: Anticipate discharge in 48-72 hrs to discharge location to be determined pending rehab evaluations.    Code Status:  Level 1 - Full Code    Subjective:   Patient is alert, not oriented to time place or person.  Patient is in restraints.  Objective:     Vitals:   No data recorded.       Body mass index is 24.73 kg/m².     Input and Output Summary (last 24 hours):     Intake/Output Summary (Last 24 hours) at 1/25/2024 1338  Last data filed at 1/25/2024 0631  Gross per 24 hour   Intake --   Output 675 ml   Net -675 ml         Physical Exam:   Physical Exam  Vitals and nursing note reviewed.   Constitutional:       General: He is not in acute distress.     Appearance: Normal appearance. He is not ill-appearing, toxic-appearing or diaphoretic.   HENT:      Head: Normocephalic and atraumatic.      Nose: Nose normal.      Mouth/Throat:      Mouth: Mucous membranes are moist.      Pharynx: Oropharynx is clear.   Eyes:      General: No scleral icterus.        Right eye: No discharge.         Left eye: No discharge.      Extraocular Movements: Extraocular movements intact.      Conjunctiva/sclera: Conjunctivae normal.   Pulmonary:      Effort: No respiratory distress.   Abdominal:      General: Abdomen is flat. There is no distension.   Musculoskeletal:      Cervical back: Normal range of motion and neck supple.      Right lower leg: No edema.      Left lower leg: No edema.   Skin:     General: Skin is warm and dry.      Coloration: Skin is not jaundiced or pale.   Neurological:      Mental Status: He is alert. Mental status is at baseline.   Psychiatric:         Mood and Affect: Affect is flat.          Additional Data:     Labs:  Results from last 7 days   Lab Units 01/22/24  0622   WBC Thousand/uL 5.18   HEMOGLOBIN g/dL 11.5*   HEMATOCRIT % 35.6*   PLATELETS Thousands/uL 215     Results from last 7 days   Lab Units 01/22/24  0622   SODIUM mmol/L 143   POTASSIUM mmol/L 4.3   CHLORIDE mmol/L 110*   CO2 mmol/L 30   BUN mg/dL 24   CREATININE mg/dL 0.94   ANION GAP mmol/L 3   CALCIUM mg/dL 8.8   GLUCOSE RANDOM mg/dL 91                        Lines/Drains:  Invasive Devices       Drain  Duration             Urethral Catheter 18 Fr. 4 days                  Urinary Catheter:  Goal for removal: N/A - Chronic Pike               Imaging: No pertinent imaging reviewed.    Recent Cultures (last 7 days):   Results from last 7 days   Lab Units 01/23/24  1615   URINE CULTURE  >100,000 cfu/ml Methicillin Resistant Staphylococcus aureus*       Last 24 Hours Medication List:   Current Facility-Administered Medications   Medication Dose Route Frequency Provider Last Rate    acetaminophen  975 mg Oral Q8H Sean Mims MD      bisacodyl  10 mg Rectal Daily PRN Marta Jamison MD      vitamin B-12  1,000 mcg Oral Daily Sherley Turner MD      divalproex sodium  250 mg Oral TID With Meals Marta Jamison MD      docusate sodium  100 mg Oral BID Sherley Turner MD      enoxaparin  40 mg Subcutaneous Daily Sherley Turner MD      finasteride  5 mg Oral Daily Sherley Turner MD      LORazepam  0.5 mg Oral TID Marta Jamison MD      melatonin  3 mg Oral HS Sean Mims MD      mirtazapine  15 mg Oral HS Sean Mims MD      OLANZapine  2.5 mg Oral Once Stu Zhang MD      OLANZapine  2.5 mg Oral Q8H PRN Sean Mims MD      polyethylene glycol  17 g Oral Daily Sherley Turner MD      QUEtiapine  25 mg Oral HS PRN Jaiden Pang MD      QUEtiapine  25 mg Oral TID Jaiden Pang MD      senna  2 tablet Oral BID Marta Jamison MD          Today, Patient Was Seen By: Sean Mims MD    **Please Note: This note may have been constructed using a voice recognition system.**

## 2024-01-25 NOTE — PROGRESS NOTES
01/24/24 1700   Clinical Encounter Type   Visited With Patient  (Visited w/Pt and family previously)   Routine Visit Follow-up   Referral From Family  (Initial referral from son w/request to keep visiting)   Patient Spiritual Encounters   Suffering Severity 2   Fear Level 3   Coping 2   Spiritual Encounter Notes Pt has dementia (per RN).  He is able to communicate calmly and with a clear voice but is disoriented to space and time, apparently, from his dialogue.   Family Spiritual Encounters   Family Coping Accepting;Open/discussion;Sadness   Family Normalization 5   Family Participation in Care 5   Family Support During Treatment 5   Caregiver-Patient Relationship 5

## 2024-01-26 PROCEDURE — 99232 SBSQ HOSP IP/OBS MODERATE 35: CPT | Performed by: FAMILY MEDICINE

## 2024-01-26 PROCEDURE — 99232 SBSQ HOSP IP/OBS MODERATE 35: CPT | Performed by: INTERNAL MEDICINE

## 2024-01-26 RX ORDER — LORAZEPAM 0.5 MG/1
0.5 TABLET ORAL 3 TIMES DAILY
Qty: 15 TABLET | Refills: 0 | Status: SHIPPED | OUTPATIENT
Start: 2024-01-26 | End: 2024-02-22

## 2024-01-26 RX ADMIN — CYANOCOBALAMIN TAB 500 MCG 1000 MCG: 500 TAB at 09:48

## 2024-01-26 RX ADMIN — LORAZEPAM 0.5 MG: 0.5 TABLET ORAL at 20:39

## 2024-01-26 RX ADMIN — FINASTERIDE 5 MG: 5 TABLET, FILM COATED ORAL at 09:48

## 2024-01-26 RX ADMIN — QUETIAPINE FUMARATE 25 MG: 25 TABLET ORAL at 20:38

## 2024-01-26 RX ADMIN — POLYETHYLENE GLYCOL 3350 17 G: 17 POWDER, FOR SOLUTION ORAL at 09:48

## 2024-01-26 RX ADMIN — LORAZEPAM 0.5 MG: 0.5 TABLET ORAL at 13:37

## 2024-01-26 RX ADMIN — Medication 3 MG: at 20:39

## 2024-01-26 RX ADMIN — DOCUSATE SODIUM 100 MG: 100 CAPSULE, LIQUID FILLED ORAL at 09:48

## 2024-01-26 RX ADMIN — DOCUSATE SODIUM 100 MG: 100 CAPSULE, LIQUID FILLED ORAL at 17:54

## 2024-01-26 RX ADMIN — LORAZEPAM 0.5 MG: 0.5 TABLET ORAL at 09:48

## 2024-01-26 RX ADMIN — DIVALPROEX SODIUM 250 MG: 125 CAPSULE, COATED PELLETS ORAL at 17:54

## 2024-01-26 RX ADMIN — SENNOSIDES 17.2 MG: 8.6 TABLET, FILM COATED ORAL at 09:48

## 2024-01-26 RX ADMIN — QUETIAPINE FUMARATE 25 MG: 25 TABLET ORAL at 09:48

## 2024-01-26 RX ADMIN — DIVALPROEX SODIUM 250 MG: 125 CAPSULE, COATED PELLETS ORAL at 09:48

## 2024-01-26 RX ADMIN — MIRTAZAPINE 15 MG: 15 TABLET, FILM COATED ORAL at 20:39

## 2024-01-26 RX ADMIN — SENNOSIDES 17.2 MG: 8.6 TABLET, FILM COATED ORAL at 17:54

## 2024-01-26 RX ADMIN — DIVALPROEX SODIUM 250 MG: 125 CAPSULE, COATED PELLETS ORAL at 13:37

## 2024-01-26 RX ADMIN — ACETAMINOPHEN 975 MG: 325 TABLET, FILM COATED ORAL at 13:37

## 2024-01-26 RX ADMIN — ENOXAPARIN SODIUM 40 MG: 40 INJECTION SUBCUTANEOUS at 09:47

## 2024-01-26 RX ADMIN — QUETIAPINE FUMARATE 25 MG: 25 TABLET ORAL at 17:54

## 2024-01-26 NOTE — DISCHARGE SUMMARY
[facility]  Discharge- Avni Ruiz 1940, 83 y.o. male MRN: [id number]  Unit/Bed#: S MS [facility] Encounter: [id number]  Primary Care Provider: No primary care provider on file.   Date and time admitted to hospital: 1/7/2024 10:10 AM    * Dementia with agitation and other behavioral disturbance (HCC)  Assessment & Plan  Patient has a history of Alzheimer's dementia.  At baseline patient can hold conversations but does get confused.  He is also able to feed and clothe himself with dementia.  However, patient more confused at this point in time.  Patient slept throughout the course of exam, and was unarousable to gentle shaking.  Per son, patient's medications have been adjusted to increase the dosage of his dementia medications.  Patient is oriented to himself however not to time and place  According to the son patient has waxing and waning mentation  Geriatrics on board to provide recommendations during his hospitalization  Patient had episodes of agitation or combative behavior during hospitalization, requiring olanzapine 2.5 mg   Patient has continued to need restraints  Patient's mental status did not improve, even after resolution of UTI, and adjustment of medications by Geriatrics  Had discussion with family about goals of care, provided them with information regarding prognosis. Decision made to discharge patient on hospice.    PLAN:  Discussed with nursing to walk patient 2-3 times a day around unit quarter as this seems to help his calm down during the day  Tylenol 975 every 8 scheduled  Depakote 250 to three times daily  Seroquel to 25 Mg tid   Mirtazapine to 15 Mg at 8 PM  Melatonin at 8 PM  Oral Olanzapine 2.5 Mg every q8 hr as needed  Ativan 0.5 Mg 3 times daily  Patient is being discharged to hospice    Polypharmacy  Assessment & Plan  Geriatrics consulted, medications adjusted    PLAN:  Discharge on hospice    UTI (urinary tract infection)  Assessment & Plan  Patient  has a chronic Pike, was replaced in the ED on admission  Patient has history of urinary retention has been following up with urology outpatient  Urology was consulted, offered patient's family Pike's catheter versus CIC, patient's family agreed on Pike's  Patient was afebrile overnight  Has no suprapubic or abdominal pain on palpation  No leukocytosis    PLAN:  Completed 3 days of IV ceftriaxone  Resolved    Metabolic encephalopathy  Assessment & Plan  Patient has history of Alzheimer's dementia  Patient is not oriented to time place or person  Patient had multiple medication changes in the past few weeks  Metabolic encephalopathy 2/2 UTI and polypharmacy  Geriatrics consulted and recommendations appreciated.  Discontinuing continued observation  Switch olanzapine from IM to oral  Patient was able to walk with the nurse in the hallway without any agitation or trouble  Patient's mental status did not improve, even after resolution of UTI, and adjustment of medications by Geriatrics    PLAN:  Tylenol 975 every 8 scheduled  Patient is being discharged to hospice    Abnormal urine finding  Assessment & Plan  On admission patient had a urinalysis that was significant for leukocytes and nitrites.  Unable to assess with patient is having any pain or burning with urination.  He does have a history of cystitis.  Per son, patient has been receiving catheterization and has tried voiding trials but was unsuccessful.  Per son, patient has had a Pike in place since Tuesday which was placed by his urologist.  Urine culture shows Staph aureus  Urology consulted, offered patient's family options of Pike's versus CIC, patient's family agreed on continuation of Pike  Completed 3 days of IV ceftriaxone  Monitoring off antibiotics  Resolved     Plans:  Patient is being discharged to hospice    BPH (benign prostatic hyperplasia)  Assessment & Plan  Continue finasteride   Discharge to hospice    Constipation  Assessment &  Plan  Continue MiraLAX and senna    Ambulatory dysfunction  Assessment & Plan  Patient is being discharged to hospice            Medical Problems       Resolved Problems  Date Reviewed: 1/26/2024   None       Discharging Physician / Practitioner: Rajni Mckeon MD  PCP: No primary care provider on file.  Admission Date:   Admission Orders (From admission, onward)       Ordered        01/07/24 1347  INPATIENT ADMISSION  Once                          Discharge Date: 01/29/24    Consultations During Hospital Stay:  Geriatrics    Procedures Performed:   None    Significant Findings / Test Results:   XR chest 1 view    Result Date: 1/8/2024  Impression: No acute cardiopulmonary disease within limitations of supine imaging. Workstation performed: [id number]     XR trauma multiple    Result Date: 1/8/2024  Impression: No acute cardiopulmonary disease within limitations of supine imaging. Workstation performed: [id number]     TRAUMA - CT spine cervical wo contrast    Result Date: 1/7/2024  Impression: No cervical spine fracture or traumatic malalignment. I personally discussed this study with JADEN DE ANDA on 1/7/2024 10:55 AM. Workstation performed: [id number]     TRAUMA - CT head wo contrast    Result Date: 1/7/2024  Impression: No acute intracranial abnormality. I personally discussed this study with JADEN DE ANDA on 1/7/2024 10:55 AM. Workstation performed: [id number]       No Chest XR results available for this patient.      Incidental Findings:   None      Test Results Pending at Discharge (will require follow up):   None      Outpatient Tests Requested:  None     Complications:  None     Reason for Admission: Dementia with agitation and other behavior behavior disturbance     Hospital Course:   Avni Ruiz is a 83 y.o. male patient who originally presented to the hospital on 1/7/2024 due to a fall from his nursing facility.  Patient was found in the morning with bruises and abrasions on his forehead.  Multiple  "imaging was done in the ED which was negative.  Patient's medication was adjusted prior to his admission which could have led to confusion due to polypharmacy.  Patient was disoriented to time place and person. Geriatrics was consulted.  Adjustments to his medications were made however patient still remained agitated and in restraints.  Patient was on and off restraint during admission during the past weeks, most recently requiring restraints.  Patient also had abnormal urinalysis, most given ceftriaxone for 3 days.  However the urine culture shows Staph aureus which can be secondary to his Pike's catheter.  Urinary symptoms resolved, and medications were adjusted by Geriatrics, with no improvement of mental status. After multiple discussions with the family and the facility, the patient patient's family has decided to make patient hospice. He is stable for discharge.     Please see above list of diagnoses and related plan for additional information.     Condition at Discharge: stable    Discharge Day Visit / Exam:   Subjective: The patient was seen and examined sitting up in bed this morning, with bilateral soft wrist restraints.  He was agitated, saying that he had a lot of things to do and needed to get going.  He was alert and oriented only to self.  He denied any pain or physical complaints this morning.   Vitals: Blood Pressure: 129/74 (01/28/24 2216)  Pulse: 88 (01/26/24 2006)  Temperature: 98.1 °F (36.7 °C) (01/28/24 2216)  Temp Source: Oral (01/26/24 2006)  Respirations: 17 (01/28/24 0730)  Height: 5' 6\" (167.6 cm) (01/07/24 1742)  Weight - Scale: 69.5 kg (153 lb 3.5 oz) (01/07/24 1742)  SpO2: 97 % (01/26/24 2006)  Exam:   Physical Exam  Vitals reviewed.   Constitutional:       Interventions: He is restrained.   HENT:      Right Ear: External ear normal.      Left Ear: External ear normal.      Nose: Nose normal.      Mouth/Throat:      Pharynx: Oropharynx is clear.   Eyes:      Conjunctiva/sclera: " Conjunctivae normal.   Cardiovascular:      Rate and Rhythm: Normal rate and regular rhythm.      Heart sounds: Normal heart sounds.   Pulmonary:      Effort: Pulmonary effort is normal.      Breath sounds: Normal breath sounds.   Abdominal:      General: Abdomen is flat. Bowel sounds are normal.      Tenderness: There is no abdominal tenderness.   Genitourinary:     Comments: Pike bag draining clear yellow urine  Musculoskeletal:      Cervical back: Neck supple.      Right lower leg: No edema.      Left lower leg: No edema.   Skin:     General: Skin is warm and dry.   Neurological:      Mental Status: He is alert.      Comments: Alert and oriented to self only.         Discussion with Family: Attempted to update  (wife) via phone. Left voicemail.     Discharge instructions/Information to patient and family:   See after visit summary for information provided to patient and family.      Provisions for Follow-Up Care:  See after visit summary for information related to follow-up care and any pertinent home health orders.      Mobility at time of Discharge:   Basic Mobility Inpatient Raw Score: 20  JH-HLM Goal: 6: Walk 10 steps or more  JH-HLM Achieved: 5: Stand (1 or more minutes)  HLM Goal achieved. Continue to encourage appropriate mobility.     Disposition:   Other Skilled Nursing Facility at [facility]    Planned Readmission: No     Discharge Statement:  I spent 45 minutes discharging the patient. This time was spent on the day of discharge. I had direct contact with the patient on the day of discharge. Greater than 50% of the total time was spent examining patient, answering all patient questions, arranging and discussing plan of care with patient as well as directly providing post-discharge instructions.  Additional time then spent on discharge activities.    Discharge Medications:  See after visit summary for reconciled discharge medications provided to patient and/or family.      **Please  Note: This note may have been constructed using a voice recognition system**

## 2024-01-26 NOTE — CASE MANAGEMENT
Case Management Progress Note    Patient name Avni Ruiz  Location S /S - MRN [id number]  : 1940 Date 2024       LOS (days): 19  Geometric Mean LOS (GMLOS) (days): 3.4  Days to GMLOS:-15.5        OBJECTIVE:        Current admission status: Inpatient  Preferred Pharmacy: No Pharmacies Listed  Primary Care Provider: No primary care provider on file.    Primary Insurance: MEDICARE  Secondary Insurance:  FOR LIFE    PROGRESS NOTE:    Cm contacted patient's wife to discuss her tour of [facility]. Cm informed wife that current update is that she and patient's son were to tour the facility so that hospice through [facility] could start providing services. Cm informed by spouse that she has not toured yet, she stated that she is having car troubles.     Cm informed that hospital could assist with giving wife a lyft ride to help with getting a decision from family. Reportedly patient would be able to admit on Saturday after tour. Cm stated that she would contact wife at noon to complete follow up.     Cm spoke with wife and son in regards to reaching out to [facility] in regards to paperwork and admission for tomorrow. Cm was able to speak with Nai, who is the owner of [facility]. Cm informed by Nai that patient will need medical evaluation form completed, which are done in patient's chart, a cognitive evaluation form, and a script for Ativan. Cm contacted MD and confirmed all the above. Per Nai, patient will go to a mid level locked dementia floor at [facility]. Keenan informed that facility will have room available on Monday.

## 2024-01-26 NOTE — PROGRESS NOTES
Progress Note - Geriatric Medicine   Avni Ruiz 83 y.o. male MRN: [id number]  Unit/Bed#: S -01 Encounter: [id number]      Assessment/Plan:  Polypharmacy  Assessment & Plan  Prior to admission to the hospital patient was on multiple medication  Continue to taper off lorazepam as tolerated  Lexapro was discontinued  Use Zyprexa 2.5 mg only for severe agitation    UTI (urinary tract infection)  Assessment & Plan  Urine culture grew Staphylococcus aureus  Received 3 doses ceftriaxone on admission to the hospital  Pike catheter in place  Encourage p.o. hydration  Patient with increased behaviors for the past 24 hours consider repeat urine analysis with reflex to culture    Metabolic encephalopathy  Assessment & Plan  - seems to be at his new baseline  -Patient is high risk of delirium due to dementia with behavioral disturbance at baseline, UTI, insomnia, change in environment  -Initiate delirium precautions  -maintain normal sleep/wake cycle  -minimize overnight interruptions, group overnight vitals/labs/nursing checks as possible  -dim lights, close blinds and turn off tv to minimize stimulation and encourage sleep environment in evenings  -ensure that pain is well controlled continue Tylenol 975mg Q8H scheduled,   -monitor for fecal and urinary retention which may precipitate delirium  -encourage early mobilization and ambulation  -provide frequent reorientation and redirection  -encourage family and friends at the bedside to help calm patient if anxious  -avoid medications which may precipitate or worsen delirium such as tramadol, benzodiazepine, anticholinergics, and antihistaminics  -encourage hydration and nutrition , assist with feeding if needed  -redirect unwanted behaviors as first line  -Continue Depakote to 250 mg p.o. 3 times a day with meals-monitor CBC CMP  -Seroquel 25 mg p.o. administer at 9 AM 3 PM and 8 PM, monitor QTc  -Continue lorazepam 0.5 mg 3 times a day 9am, 1pm, 8pm  -continue  mirtazapine to 15 mg at 8 PM  -For severe agitation only may use olanzapine 2.5 mg every 8 hours as needed,  -Monitor patient off restraints if possible    BPH (benign prostatic hyperplasia)  Assessment & Plan  Continue finasteride and Flomax  Pike catheter in place      Constipation  Assessment & Plan  Last bowel movement was yesterday  Continue senna 2 tablets twice daily MiraLAX daily   Dulcolax suppository as needed  Encourage p.o. hydration and out of bed as tolerated    Ambulatory dysfunction  Assessment & Plan  Patient presented status post fall at the facility  CT head and neck with no acute pathology  Monitor orthostatic vital signs  Encourage p.o. hydration  Avoid hypotension and hypoglycemia   PT OT evaluation    * Dementia with agitation and other behavioral disturbance (HCC)  Assessment & Plan  Patient presented to the hospital with change in mental status.  He has Alzheimer's dementia with behavioral disturbance at baseline recently behaviors are getting worse  Will continue to provide supportive care, reorient as needed.  Patient is at high risk for delirium, will monitor closely and place on delirium precautions.  Maintain sleep/wake cycle.  Optimize pain regimen.  Continue Tylenol 975 mg p.o. 3 times daily  Monitor for constipation and urinary retention and manage as needed.  B12 and TSH within normal limits  Encourage family to visit.  Encourage to wear glasses and hearing aids while awake.  Encourage po intake, assist with feeding if needed.   Continue Depakote to 250 mg p.o. 3 times a day with meals, may sprinkle in food-monitor CBC CMP  Continue lorazepam 0.5 mg p.o. 3 times daily and continue to taper off slowly  continue mirtazapine to 15 mg administer at 8 PM along with melatonin   Seroquel  25 mg 3 times a day, administer at 9 AM 3 PM and 8 PM-monitor Qtc  Family is considering comfort measures only               Subjective:   Patient seen and examined at bedside geriatric follow-up.  He is  "pleasant and seems comfortable at the time of encounter, however per nursing staff he continues to be agitated intermittently.  He did sleep well overnight and his appetite is good.    Review of Systems   Unable to perform ROS: Dementia         Objective:     Vitals: Blood pressure 126/79, pulse 75, temperature (!) 97.4 °F (36.3 °C), resp. rate 18, height 5' 6\" (1.676 m), weight 69.5 kg (153 lb 3.5 oz), SpO2 100%.,Body mass index is 24.73 kg/m².    No intake or output data in the 24 hours ending 01/26/24 1521    Current Medications: Reviewed    Physical Exam:   Physical Exam  Vitals and nursing note reviewed.   Constitutional:       General: He is not in acute distress.     Appearance: He is well-developed.      Comments: Frail looking   HENT:      Head: Normocephalic and atraumatic.      Ears:      Comments: Hard of hearing     Mouth/Throat:      Mouth: Mucous membranes are moist.   Eyes:      Conjunctiva/sclera: Conjunctivae normal.   Cardiovascular:      Rate and Rhythm: Normal rate and regular rhythm.      Heart sounds: No murmur heard.  Pulmonary:      Effort: Pulmonary effort is normal. No respiratory distress.      Breath sounds: Normal breath sounds.   Abdominal:      Palpations: Abdomen is soft.      Tenderness: There is no abdominal tenderness.   Genitourinary:     Comments: Pike catheter  Musculoskeletal:         General: No swelling.      Cervical back: Neck supple.      Right lower leg: No edema.      Left lower leg: No edema.   Skin:     General: Skin is warm and dry.      Capillary Refill: Capillary refill takes less than 2 seconds.   Neurological:      Mental Status: He is alert. He is disoriented.      Comments: AAox1   Psychiatric:         Mood and Affect: Mood normal.      Comments: Agitated at times          Invasive Devices       Drain  Duration             Urethral Catheter 18 Fr. 6 days                    Lab, Imaging and other studies: no new labs  "

## 2024-01-26 NOTE — OCCUPATIONAL THERAPY NOTE
Occupational Therapy Cancellation Note     Patient Name: Avni Ruiz  Today's Date: 1/26/2024 01/26/24 1136   Note Type   Note Type Cancelled Session   Cancel Reasons Medical status  (Attempted to see patient for OT treatment. Per EMR, pt is tentatively planned for d/c Saturday to SNF under hospice care. Will hold OT treatment at this time.)     Marci Lennon MS OTR/L   [place] Licensure# [id number]

## 2024-01-26 NOTE — PROGRESS NOTES
[facility]  Progress Note  Name: Avni Ruiz I  MRN: [id number]  Unit/Bed#: S -01 I Date of Admission: 1/7/2024   Date of Service: 1/26/2024 I Hospital Day: 19    Assessment/Plan   * Dementia with agitation and other behavioral disturbance (HCC)  Assessment & Plan  Patient has a history of Alzheimer's dementia.  At baseline patient can hold conversations but does get confused.  He is also able to feed and clothe himself with dementia.  However, patient more confused at this point in time.  Patient slept throughout the course of exam, and was unarousable to gentle shaking.  Per son, patient's medications have been adjusted to increase the dosage of his dementia medications.  Concern for encephalopathy at this point  Patient is oriented to himself however not to time and place  According to the son patient has waxing and waning mentation  Patient had a episodes of agitation or combative behavior last evening, received one dose of olanzapine 2.5 mg   Patient has been off restraints for the past 24 hours  Discontinuing observation  Switching olanzapine from IM to oral  Had discussion with family about goals of care, provided them with information regarding prognosis.  1 person from the family agreed to stay with the patient for 24 hours while he is off restraints  Patient was able to walk with the nurse in the hallway without any agitation or trouble  Patient had an episode of agitation yesterday, patient had to be put back in restraints      PLAN:  Discussed with nursing to walk patient 2-3 times a day around unit quarter as this seems to help his calm down during the day  Tylenol 975 every 8 scheduled  Depakote 250 to three times daily  Seroquel to 25 Mg bid   Mirtazapine to 15 Mg at 8 PM  Melatonin at 8 PM  Oral Olanzapine 2.5 Mg every q8 hr as needed  Ativan 0.5 Mg 3 times daily  Patient is being discharged to hospice    Metabolic encephalopathy  Assessment & Plan  Patient has  history of Alzheimer's dementia  Patient is not oriented to time place or person  Patient had multiple medication changes in the past few weeks  Metabolic encephalopathy 2/2 UTI and polypharmacy  Geriatrics consulted and recommendations appreciated.  Patient had a episodes of agitation or combative behavior last evening, received one dose of olanzapine 2.5 mg   Patient has been off restraints for over 24 hours  Discontinuing continued observation  Switch olanzapine from IM to oral  Patient was able to walk with the nurse in the hallway without any agitation or trouble  Patient had an episode of agitation yesterday, patient had to be put back in restraints    PLAN:  Tylenol 975 every 8 scheduled  Patient is being discharged to hospice    UTI (urinary tract infection)  Assessment & Plan  Patient has a chronic Pike, was replaced in the ED on admission  Patient has history of urinary retention has been following up with urology outpatient  Urology was consulted, offered patient's family Pike's catheter versus CIC, patient's family agreed on Pike's  Patient was afebrile overnight  Has no suprapubic or abdominal pain on palpation  No leukocytosis    PLAN:  Completed 3 days of IV ceftriaxone  Monitor for signs of infection  Resolved    Polypharmacy  Assessment & Plan  Geriatrics consulted    PLAN:  See plan acute encephalopathy    Abnormal urine finding  Assessment & Plan  On admission patient had a urinalysis that was significant for leukocytes and nitrites.  Unable to assess with patient is having any pain or burning with urination.  He does have a history of cystitis.  Per son, patient has been receiving catheterization and has tried voiding trials but was unsuccessful.  Per son, patient has had a Pike in place since Tuesday which was placed by his urologist.  Urine culture shows Staph aureus  Urology consulted, offered patient's family options of Pike's versus CIC, patient's family agreed on continuation of  Pike    Plans:  Completed 3 days of IV ceftriaxone  Monitoring off antibiotics  Resolved   Patient is being discharged to hospice    BPH (benign prostatic hyperplasia)  Assessment & Plan  Patient was on dutasteride and Flomax at the nursing facility  Urology recommended discontinuation of the tamsulosin    PLAN:  Continue tamsulosin  Continue finasteride 5 Mg daily    Constipation  Assessment & Plan  Continue MiraLAX and senna    Ambulatory dysfunction  Assessment & Plan  Patient presented to the ED after nursing facility found patient to have abrasions on his forehead.  The facility believes the patient fell out of bed last night and got himself back and without telling anybody. He was more confused that usual. Patient was brought to the ED via the trauma pathway, where trauma workup and CT imaging were negative.  Patient's lab work was unremarkable, and vitals have remained stable.  Initially plan was to [facility] the patient to [facility], however the facility has refused to take the patient  PLAN:  PT/OT evaluations and recommendations appreciated  PT level 2, OT level 3  Patient is being discharged to hospice                 VTE Pharmacologic Prophylaxis: VTE Score: 5 High Risk (Score >/= 5) - Pharmacological DVT Prophylaxis Ordered: enoxaparin (Lovenox). Sequential Compression Devices Ordered.    Mobility:   Basic Mobility Inpatient Raw Score: 19  JH-HLM Goal: 6: Walk 10 steps or more  JH-HLM Achieved: 7: Walk 25 feet or more  HLM Goal achieved. Continue to encourage appropriate mobility.    Patient Centered Rounds: I performed bedside rounds with nursing staff today.  Discussions with Specialists or Other Care Team Provider: Gerontology     Education and Discussions with Family / Patient: Updated  (wife) at bedside.    Current Length of Stay: 19 day(s)  Current Patient Status: Inpatient   Discharge Plan: Anticipate discharge in 48-72 hrs to discharge location to be determined pending rehab  evaluations.    Code Status: Level 1 - Full Code    Subjective:   Patient is alert, not oriented to time place or person.  Patient is in restraints.  Objective:     Vitals:   Temp (24hrs), Av.4 °F (36.3 °C), Min:97.4 °F (36.3 °C), Max:97.4 °F (36.3 °C)    Temp:  [97.4 °F (36.3 °C)] 97.4 °F (36.3 °C)  BP: (126)/(79) 126/79  Body mass index is 24.73 kg/m².     Input and Output Summary (last 24 hours):   No intake or output data in the 24 hours ending 24 1532        Physical Exam:   Physical Exam  Vitals and nursing note reviewed.   Constitutional:       General: He is not in acute distress.     Appearance: Normal appearance. He is not ill-appearing, toxic-appearing or diaphoretic.   HENT:      Head: Normocephalic and atraumatic.      Nose: Nose normal.      Mouth/Throat:      Mouth: Mucous membranes are moist.      Pharynx: Oropharynx is clear.   Eyes:      General: No scleral icterus.        Right eye: No discharge.         Left eye: No discharge.      Extraocular Movements: Extraocular movements intact.      Conjunctiva/sclera: Conjunctivae normal.   Pulmonary:      Effort: No respiratory distress.   Abdominal:      General: Abdomen is flat. There is no distension.   Musculoskeletal:      Cervical back: Normal range of motion and neck supple.      Right lower leg: No edema.      Left lower leg: No edema.   Skin:     General: Skin is warm and dry.      Coloration: Skin is not jaundiced or pale.   Neurological:      Mental Status: He is alert. Mental status is at baseline.   Psychiatric:         Mood and Affect: Affect is flat.          Additional Data:     Labs:  Results from last 7 days   Lab Units 24  0622   WBC Thousand/uL 5.18   HEMOGLOBIN g/dL 11.5*   HEMATOCRIT % 35.6*   PLATELETS Thousands/uL 215     Results from last 7 days   Lab Units 24  0622   SODIUM mmol/L 143   POTASSIUM mmol/L 4.3   CHLORIDE mmol/L 110*   CO2 mmol/L 30   BUN mg/dL 24   CREATININE mg/dL 0.94   ANION GAP mmol/L 3    CALCIUM mg/dL 8.8   GLUCOSE RANDOM mg/dL 91                       Lines/Drains:  Invasive Devices       Drain  Duration             Urethral Catheter 18 Fr. 6 days                  Urinary Catheter:  Goal for removal: N/A - Chronic Pike               Imaging: No pertinent imaging reviewed.    Recent Cultures (last 7 days):   Results from last 7 days   Lab Units 01/23/24  1615   URINE CULTURE  >100,000 cfu/ml Methicillin Resistant Staphylococcus aureus*       Last 24 Hours Medication List:   Current Facility-Administered Medications   Medication Dose Route Frequency Provider Last Rate    acetaminophen  975 mg Oral Q8H Sean Mims MD      bisacodyl  10 mg Rectal Daily PRN Marta Jamison MD      vitamin B-12  1,000 mcg Oral Daily Sherley Turner MD      divalproex sodium  250 mg Oral TID With Meals Marta Jamison MD      docusate sodium  100 mg Oral BID Sherley Turner MD      enoxaparin  40 mg Subcutaneous Daily Sherley Turner MD      finasteride  5 mg Oral Daily Sherley Turner MD      LORazepam  0.5 mg Oral TID Marat Jamison MD      melatonin  3 mg Oral HS Sean Mims MD      mirtazapine  15 mg Oral HS Sean Mims MD      OLANZapine  2.5 mg Oral Once Stu Zhang MD      OLANZapine  2.5 mg Oral Q8H PRN Sean Mims MD      polyethylene glycol  17 g Oral Daily Sherley Turner MD      QUEtiapine  25 mg Oral HS PRN Jaiden Pang MD      QUEtiapine  25 mg Oral TID Jaiden Pang MD      senna  2 tablet Oral BID Marta Jamison MD          Today, Patient Was Seen By: Sean Mims MD    **Please Note: This note may have been constructed using a voice recognition system.**

## 2024-01-26 NOTE — PLAN OF CARE
Problem: PAIN - ADULT  Goal: Verbalizes/displays adequate comfort level or baseline comfort level  Description: Interventions:  - Encourage patient to monitor pain and request assistance  - Assess pain using appropriate pain scale  - Administer analgesics based on type and severity of pain and evaluate response  - Implement non-pharmacological measures as appropriate and evaluate response  - Consider cultural and social influences on pain and pain management  - Notify physician/advanced practitioner if interventions unsuccessful or patient reports new pain  Outcome: Progressing     Problem: INFECTION - ADULT  Goal: Absence or prevention of progression during hospitalization  Description: INTERVENTIONS:  - Assess and monitor for signs and symptoms of infection  - Monitor lab/diagnostic results  - Monitor all insertion sites, i.e. indwelling lines, tubes, and drains  - Monitor endotracheal if appropriate and nasal secretions for changes in amount and color  - [place] appropriate cooling/warming therapies per order  - Administer medications as ordered  - Instruct and encourage patient and family to use good hand hygiene technique  - Identify and instruct in appropriate isolation precautions for identified infection/condition  Outcome: Progressing     Problem: SAFETY ADULT  Goal: Patient will remain free of falls  Description: INTERVENTIONS:  - Educate patient/family on patient safety including physical limitations  - Instruct patient to call for assistance with activity   - Consult OT/PT to assist with strengthening/mobility   - Keep Call bell within reach  - Keep bed low and locked with side rails adjusted as appropriate  - Keep care items and personal belongings within reach  - Initiate and maintain comfort rounds  - Make Fall Risk Sign visible to staff  - Apply yellow socks and bracelet for high fall risk patients  - Consider moving patient to room near nurses station  Outcome: Progressing  Goal: Maintain or  return to baseline ADL function  Description: INTERVENTIONS:  -  Assess patient's ability to carry out ADLs; assess patient's baseline for ADL function and identify physical deficits which impact ability to perform ADLs (bathing, care of mouth/teeth, toileting, grooming, dressing, etc.)  - Assess/evaluate cause of self-care deficits   - Assess range of motion  - Assess patient's mobility; develop plan if impaired  - Assess patient's need for assistive devices and provide as appropriate  - Encourage maximum independence but intervene and supervise when necessary  - Involve family in performance of ADLs  - Assess for home care needs following discharge   - Consider OT consult to assist with ADL evaluation and planning for discharge  - Provide patient education as appropriate  Outcome: Progressing  Goal: Maintains/Returns to pre admission functional level  Description: INTERVENTIONS:  - Perform AM-PAC 6 Click Basic Mobility/ Daily Activity assessment daily.  - Set and communicate daily mobility goal to care team and patient/family/caregiver.   - Collaborate with rehabilitation services on mobility goals if consulted  - Out of bed for toileting  - Record patient progress and toleration of activity level   Outcome: Progressing     Problem: DISCHARGE PLANNING  Goal: Discharge to home or other facility with appropriate resources  Description: INTERVENTIONS:  - Identify barriers to discharge w/patient and caregiver  - Arrange for needed discharge resources and transportation as appropriate  - Identify discharge learning needs (meds, wound care, etc.)  - Arrange for interpretive services to assist at discharge as needed  - Refer to Case Management Department for coordinating discharge planning if the patient needs post-hospital services based on physician/advanced practitioner order or complex needs related to functional status, cognitive ability, or social support system  Outcome: Progressing     Problem: Knowledge  Deficit  Goal: Patient/family/caregiver demonstrates understanding of disease process, treatment plan, medications, and discharge instructions  Description: Complete learning assessment and assess knowledge base.  Interventions:  - Provide teaching at level of understanding  - Provide teaching via preferred learning methods  Outcome: Progressing     Problem: SAFETY,RESTRAINT: NV/NON-SELF DESTRUCTIVE BEHAVIOR  Goal: Remains free of harm/injury (restraint for non violent/non self-detsructive behavior)  Description: INTERVENTIONS:  - Instruct patient/family regarding restraint use   - Assess and monitor physiologic and psychological status   - Provide interventions and comfort measures to meet assessed patient needs   - Identify and implement measures to help patient regain control  - Assess readiness for release of restraint   Outcome: Progressing  Goal: Returns to optimal restraint-free functioning  Description: INTERVENTIONS:  - Assess the patient's behavior and symptoms that indicate continued need for restraint  - Identify and implement measures to help patient regain control  - Assess readiness for release of restraint   Outcome: Progressing     Problem: Prexisting or High Potential for Compromised Skin Integrity  Goal: Skin integrity is maintained or improved  Description: INTERVENTIONS:  - Identify patients at risk for skin breakdown  - Assess and monitor skin integrity  - Assess and monitor nutrition and hydration status  - Monitor labs   - Assess for incontinence   - Turn and reposition patient  - Assist with mobility/ambulation  - Relieve pressure over bony prominences  - Avoid friction and shearing  - Provide appropriate hygiene as needed including keeping skin clean and dry  - Evaluate need for skin moisturizer/barrier cream  - Collaborate with interdisciplinary team   - Patient/family teaching  - Consider wound care consult   Outcome: Progressing     Problem: Nutrition/Hydration-ADULT  Goal:  Nutrient/Hydration intake appropriate for improving, restoring or maintaining nutritional needs  Description: Monitor and assess patient's nutrition/hydration status for malnutrition. Collaborate with interdisciplinary team and initiate plan and interventions as ordered.  Monitor patient's weight and dietary intake as ordered or per policy. Utilize nutrition screening tool and intervene as necessary. Determine patient's food preferences and provide high-protein, high-caloric foods as appropriate.     INTERVENTIONS:  - Monitor oral intake, urinary output, labs, and treatment plans  - Assess nutrition and hydration status and recommend course of action  - Evaluate amount of meals eaten  - Assist patient with eating if necessary   - Allow adequate time for meals  - Recommend/ encourage appropriate diets, oral nutritional supplements, and vitamin/mineral supplements  - Order, calculate, and assess calorie counts as needed  - Recommend, monitor, and adjust tube feedings and TPN/PPN based on assessed needs  - Assess need for intravenous fluids  - Provide specific nutrition/hydration education as appropriate  - Include patient/family/caregiver in decisions related to nutrition  Outcome: Progressing

## 2024-01-26 NOTE — PHYSICAL THERAPY NOTE
Physical Therapy Cancellation Note       01/26/24 1135   Note Type   Note Type Cancelled Session   Cancel Reasons Medical status  (PT treatment attempted. Per EMR, pt tentatively planned for d/c Saturday to SNF under hospice care. Will hold PT services.)       Anai Davenport, PT, DPT   Available via [employer]  NPI # [id number]  [place] License - [id number]  1/26/2024

## 2024-01-27 LAB
ANION GAP SERPL CALCULATED.3IONS-SCNC: 6 MMOL/L
ANION GAP SERPL CALCULATED.3IONS-SCNC: 6 MMOL/L
BUN SERPL-MCNC: 25 MG/DL (ref 5–25)
BUN SERPL-MCNC: 25 MG/DL (ref 5–25)
CALCIUM SERPL-MCNC: 8.8 MG/DL (ref 8.4–10.2)
CALCIUM SERPL-MCNC: 8.8 MG/DL (ref 8.4–10.2)
CHLORIDE SERPL-SCNC: 110 MMOL/L (ref 96–108)
CHLORIDE SERPL-SCNC: 110 MMOL/L (ref 96–108)
CO2 SERPL-SCNC: 26 MMOL/L (ref 21–32)
CO2 SERPL-SCNC: 26 MMOL/L (ref 21–32)
CREAT SERPL-MCNC: 0.89 MG/DL (ref 0.6–1.3)
CREAT SERPL-MCNC: 0.89 MG/DL (ref 0.6–1.3)
ERYTHROCYTE [DISTWIDTH] IN BLOOD BY AUTOMATED COUNT: 13.7 % (ref 11.6–15.1)
ERYTHROCYTE [DISTWIDTH] IN BLOOD BY AUTOMATED COUNT: 13.7 % (ref 11.6–15.1)
GFR SERPL CREATININE-BSD FRML MDRD: 79 ML/MIN/1.73SQ M
GFR SERPL CREATININE-BSD FRML MDRD: 79 ML/MIN/1.73SQ M
GLUCOSE SERPL-MCNC: 92 MG/DL (ref 65–140)
GLUCOSE SERPL-MCNC: 92 MG/DL (ref 65–140)
HCT VFR BLD AUTO: 40.9 % (ref 36.5–49.3)
HCT VFR BLD AUTO: 40.9 % (ref 36.5–49.3)
HGB BLD-MCNC: 13.4 G/DL (ref 12–17)
HGB BLD-MCNC: 13.4 G/DL (ref 12–17)
MCH RBC QN AUTO: 28.9 PG (ref 26.8–34.3)
MCH RBC QN AUTO: 28.9 PG (ref 26.8–34.3)
MCHC RBC AUTO-ENTMCNC: 32.8 G/DL (ref 31.4–37.4)
MCHC RBC AUTO-ENTMCNC: 32.8 G/DL (ref 31.4–37.4)
MCV RBC AUTO: 88 FL (ref 82–98)
MCV RBC AUTO: 88 FL (ref 82–98)
PLATELET # BLD AUTO: 207 THOUSANDS/UL (ref 149–390)
PLATELET # BLD AUTO: 207 THOUSANDS/UL (ref 149–390)
PMV BLD AUTO: 11.2 FL (ref 8.9–12.7)
PMV BLD AUTO: 11.2 FL (ref 8.9–12.7)
POTASSIUM SERPL-SCNC: 4 MMOL/L (ref 3.5–5.3)
POTASSIUM SERPL-SCNC: 4 MMOL/L (ref 3.5–5.3)
RBC # BLD AUTO: 4.64 MILLION/UL (ref 3.88–5.62)
RBC # BLD AUTO: 4.64 MILLION/UL (ref 3.88–5.62)
SODIUM SERPL-SCNC: 142 MMOL/L (ref 135–147)
SODIUM SERPL-SCNC: 142 MMOL/L (ref 135–147)
WBC # BLD AUTO: 6.12 THOUSAND/UL (ref 4.31–10.16)
WBC # BLD AUTO: 6.12 THOUSAND/UL (ref 4.31–10.16)

## 2024-01-27 PROCEDURE — 85027 COMPLETE CBC AUTOMATED: CPT

## 2024-01-27 PROCEDURE — 99232 SBSQ HOSP IP/OBS MODERATE 35: CPT | Performed by: INTERNAL MEDICINE

## 2024-01-27 PROCEDURE — 80048 BASIC METABOLIC PNL TOTAL CA: CPT

## 2024-01-27 RX ADMIN — DIVALPROEX SODIUM 250 MG: 125 CAPSULE, COATED PELLETS ORAL at 08:25

## 2024-01-27 RX ADMIN — SENNOSIDES 17.2 MG: 8.6 TABLET, FILM COATED ORAL at 08:25

## 2024-01-27 RX ADMIN — SENNOSIDES 17.2 MG: 8.6 TABLET, FILM COATED ORAL at 17:26

## 2024-01-27 RX ADMIN — DIVALPROEX SODIUM 250 MG: 125 CAPSULE, COATED PELLETS ORAL at 17:26

## 2024-01-27 RX ADMIN — DOCUSATE SODIUM 100 MG: 100 CAPSULE, LIQUID FILLED ORAL at 17:27

## 2024-01-27 RX ADMIN — POLYETHYLENE GLYCOL 3350 17 G: 17 POWDER, FOR SOLUTION ORAL at 08:26

## 2024-01-27 RX ADMIN — QUETIAPINE FUMARATE 25 MG: 25 TABLET ORAL at 17:26

## 2024-01-27 RX ADMIN — QUETIAPINE FUMARATE 25 MG: 25 TABLET ORAL at 19:24

## 2024-01-27 RX ADMIN — DIVALPROEX SODIUM 250 MG: 125 CAPSULE, COATED PELLETS ORAL at 13:38

## 2024-01-27 RX ADMIN — CYANOCOBALAMIN TAB 500 MCG 1000 MCG: 500 TAB at 08:25

## 2024-01-27 RX ADMIN — FINASTERIDE 5 MG: 5 TABLET, FILM COATED ORAL at 08:25

## 2024-01-27 RX ADMIN — LORAZEPAM 0.5 MG: 0.5 TABLET ORAL at 13:38

## 2024-01-27 RX ADMIN — QUETIAPINE FUMARATE 25 MG: 25 TABLET ORAL at 08:25

## 2024-01-27 RX ADMIN — MIRTAZAPINE 15 MG: 15 TABLET, FILM COATED ORAL at 22:03

## 2024-01-27 RX ADMIN — ENOXAPARIN SODIUM 40 MG: 40 INJECTION SUBCUTANEOUS at 08:27

## 2024-01-27 RX ADMIN — DOCUSATE SODIUM 100 MG: 100 CAPSULE, LIQUID FILLED ORAL at 08:26

## 2024-01-27 RX ADMIN — Medication 3 MG: at 22:02

## 2024-01-27 RX ADMIN — ACETAMINOPHEN 975 MG: 325 TABLET, FILM COATED ORAL at 13:38

## 2024-01-27 RX ADMIN — LORAZEPAM 0.5 MG: 0.5 TABLET ORAL at 22:02

## 2024-01-27 RX ADMIN — ACETAMINOPHEN 975 MG: 325 TABLET, FILM COATED ORAL at 22:02

## 2024-01-27 RX ADMIN — LORAZEPAM 0.5 MG: 0.5 TABLET ORAL at 08:25

## 2024-01-27 NOTE — PROGRESS NOTES
[facility]  Progress Note  Name: Avni Ruiz I  MRN: [id number]  Unit/Bed#: S -01 I Date of Admission: 1/7/2024   Date of Service: 1/27/2024 I Hospital Day: 20    Assessment/Plan   Metabolic encephalopathy  Assessment & Plan  Patient has history of Alzheimer's dementia  Patient is not oriented to time place or person  Patient had multiple medication changes in the past few weeks  Metabolic encephalopathy 2/2 UTI and polypharmacy  Geriatrics consulted and recommendations appreciated.  Patient had a episodes of agitation or combative behavior last evening, received one dose of olanzapine 2.5 mg   Patient has been off restraints for over 24 hours  Discontinuing continued observation  Switch olanzapine from IM to oral  Patient was able to walk with the nurse in the hallway without any agitation or trouble  Patient had an episode of agitation yesterday, patient had to be put back in restraints    PLAN:  Tylenol 975 every 8 scheduled  Patient is being discharged to hospice    * Dementia with agitation and other behavioral disturbance (HCC)  Assessment & Plan  Patient has a history of Alzheimer's dementia.  At baseline patient can hold conversations but does get confused.  He is also able to feed and clothe himself with dementia.  However, patient more confused at this point in time.  Patient slept throughout the course of exam, and was unarousable to gentle shaking.  Per son, patient's medications have been adjusted to increase the dosage of his dementia medications.  Concern for encephalopathy at this point  Patient is oriented to himself however not to time and place  According to the son patient has waxing and waning mentation  Patient had a episodes of agitation or combative behavior last evening, received one dose of olanzapine 2.5 mg   Patient has been off restraints for the past 24 hours  Discontinuing observation  Switching olanzapine from IM to oral  Had discussion with  family about goals of care, provided them with information regarding prognosis.  1 person from the family agreed to stay with the patient for 24 hours while he is off restraints  Patient was able to walk with the nurse in the hallway without any agitation or trouble  Patient had an episode of agitation yesterday, patient had to be put back in restraints      PLAN:  Discussed with nursing to walk patient 2-3 times a day around unit quarter as this seems to help his calm down during the day  Tylenol 975 every 8 scheduled  Depakote 250 to three times daily  Seroquel to 25 Mg bid   Mirtazapine to 15 Mg at 8 PM  Melatonin at 8 PM  Oral Olanzapine 2.5 Mg every q8 hr as needed  Ativan 0.5 Mg 3 times daily  Patient is being discharged to hospice    UTI (urinary tract infection)  Assessment & Plan  Patient has a chronic Pike, was replaced in the ED on admission  Patient has history of urinary retention has been following up with urology outpatient  Urology was consulted, offered patient's family Pike's catheter versus CIC, patient's family agreed on Pike's  Patient was afebrile overnight  Has no suprapubic or abdominal pain on palpation  No leukocytosis    PLAN:  Completed 3 days of IV ceftriaxone  Monitor for signs of infection  Resolved    Polypharmacy  Assessment & Plan  Geriatrics consulted    PLAN:  See plan acute encephalopathy    Abnormal urine finding  Assessment & Plan  On admission patient had a urinalysis that was significant for leukocytes and nitrites.  Unable to assess with patient is having any pain or burning with urination.  He does have a history of cystitis.  Per son, patient has been receiving catheterization and has tried voiding trials but was unsuccessful.  Per son, patient has had a Pike in place since Tuesday which was placed by his urologist.  Urine culture shows Staph aureus  Urology consulted, offered patient's family options of Pike's versus CIC, patient's family agreed on continuation of  Pike    Plans:  Completed 3 days of IV ceftriaxone  Monitoring off antibiotics  Resolved   Patient is being discharged to hospice    BPH (benign prostatic hyperplasia)  Assessment & Plan  Patient was on dutasteride and Flomax at the nursing facility  Urology recommended discontinuation of the tamsulosin    PLAN:  Continue tamsulosin  Continue finasteride 5 Mg daily    Constipation  Assessment & Plan  Continue MiraLAX and senna    Ambulatory dysfunction  Assessment & Plan  Patient presented to the ED after nursing facility found patient to have abrasions on his forehead.  The facility believes the patient fell out of bed last night and got himself back and without telling anybody. He was more confused that usual. Patient was brought to the ED via the trauma pathway, where trauma workup and CT imaging were negative.  Patient's lab work was unremarkable, and vitals have remained stable.  Initially plan was to discharge the patient to [facility], however the facility has refused to take the patient  PLAN:  PT/OT evaluations and recommendations appreciated  PT level 2, OT level 3  Patient is being discharged to hospice                     VTE Pharmacologic Prophylaxis: VTE Score: 5 High Risk (Score >/= 5) - Pharmacological DVT Prophylaxis Ordered: enoxaparin (Lovenox). Sequential Compression Devices Ordered.    Mobility:   Basic Mobility Inpatient Raw Score: 20  JH-HLM Goal: 6: Walk 10 steps or more  JH-HLM Achieved: 2: Bed activities/Dependent transfer  HLM Goal NOT achieved. Continue with multidisciplinary rounding and encourage appropriate mobility to improve upon HLM goals.    Patient Centered Rounds: I performed bedside rounds with nursing staff today.  Discussions with Specialists or Other Care Team Provider: Geriatrics    Education and Discussions with Family / Patient:  Family updated at bedside.     Current Length of Stay: 20 day(s)  Current Patient Status: Inpatient   Discharge Plan: Anticipate discharge  in 24-48 hrs to hospice    Code Status: Level 1 - Full Code    Subjective:   Patient seen and examined at the bedside.  Denies any acute complaints.  Eating breakfast at the time of my evaluation.  Denied dizziness, lightheadedness, chest pain, shortness of breath, abdominal tenderness, changes in urinary or bowel habits.    Objective:     Vitals:   Temp (24hrs), Av.8 °F (36.6 °C), Min:97.5 °F (36.4 °C), Max:98 °F (36.7 °C)    Temp:  [97.5 °F (36.4 °C)-98 °F (36.7 °C)] 97.5 °F (36.4 °C)  HR:  [88] 88  Resp:  [18] 18  BP: (102-110)/(65-76) 110/76  SpO2:  [97 %] 97 %  Body mass index is 24.73 kg/m².     Input and Output Summary (last 24 hours):     Intake/Output Summary (Last 24 hours) at 2024 1319  Last data filed at 2024 0949  Gross per 24 hour   Intake 720 ml   Output 450 ml   Net 270 ml       Physical Exam:   Physical Exam  Vitals and nursing note reviewed.   Constitutional:       General: He is not in acute distress.     Appearance: Normal appearance. He is not ill-appearing, toxic-appearing or diaphoretic.   HENT:      Head: Normocephalic and atraumatic.      Nose: Nose normal.      Mouth/Throat:      Mouth: Mucous membranes are moist.      Pharynx: Oropharynx is clear.   Eyes:      General: No scleral icterus.        Right eye: No discharge.         Left eye: No discharge.      Extraocular Movements: Extraocular movements intact.      Conjunctiva/sclera: Conjunctivae normal.   Cardiovascular:      Rate and Rhythm: Normal rate and regular rhythm.      Pulses: Normal pulses.      Heart sounds: Normal heart sounds. No murmur heard.  Pulmonary:      Effort: Pulmonary effort is normal. No respiratory distress.      Breath sounds: Normal breath sounds. No wheezing, rhonchi or rales.   Abdominal:      General: Abdomen is flat. Bowel sounds are normal. There is no distension.      Palpations: Abdomen is soft.      Tenderness: There is no abdominal tenderness. There is no guarding or rebound.    Musculoskeletal:      Cervical back: Normal range of motion and neck supple.      Right lower leg: No edema.      Left lower leg: No edema.   Skin:     General: Skin is warm and dry.      Coloration: Skin is not jaundiced or pale.   Neurological:      Mental Status: He is alert. Mental status is at baseline.          Additional Data:     Labs:  Results from last 7 days   Lab Units 01/27/24  0526   WBC Thousand/uL 6.12   HEMOGLOBIN g/dL 13.4   HEMATOCRIT % 40.9   PLATELETS Thousands/uL 207     Results from last 7 days   Lab Units 01/27/24  0526   SODIUM mmol/L 142   POTASSIUM mmol/L 4.0   CHLORIDE mmol/L 110*   CO2 mmol/L 26   BUN mg/dL 25   CREATININE mg/dL 0.89   ANION GAP mmol/L 6   CALCIUM mg/dL 8.8   GLUCOSE RANDOM mg/dL 92                       Lines/Drains:  Invasive Devices       Drain  Duration             Urethral Catheter 18 Fr. 6 days                  Urinary Catheter:  Goal for removal: N/A - Chronic Pike               Imaging: No pertinent imaging reviewed.    Recent Cultures (last 7 days):   Results from last 7 days   Lab Units 01/23/24  1615   URINE CULTURE  >100,000 cfu/ml Methicillin Resistant Staphylococcus aureus*       Last 24 Hours Medication List:   Current Facility-Administered Medications   Medication Dose Route Frequency Provider Last Rate    acetaminophen  975 mg Oral Q8H Sean Mims MD      bisacodyl  10 mg Rectal Daily PRN Marta Jamison MD      vitamin B-12  1,000 mcg Oral Daily Sherley Turner MD      divalproex sodium  250 mg Oral TID With Meals Marta Jamison MD      docusate sodium  100 mg Oral BID Sherley Turner MD      enoxaparin  40 mg Subcutaneous Daily Sherley Turner MD      finasteride  5 mg Oral Daily Sherley Turner MD      LORazepam  0.5 mg Oral TID Marta Jamison MD      melatonin  3 mg Oral HS Sean Mims MD      mirtazapine  15 mg Oral HS Sean Mims MD      OLANZapine  2.5 mg Oral Once Stu Zhang MD      OLANZapine  2.5 mg Oral Q8H PRN Sean Mims MD       polyethylene glycol  17 g Oral Daily Sherley Turner MD      QUEtiapine  25 mg Oral HS PRN Jaiden Pang MD      QUEtiapine  25 mg Oral TID Jaiden Pang MD      senna  2 tablet Oral BID Marta Jamison MD          Today, Patient Was Seen By: Jaiden Pang MD    **Please Note: This note may have been constructed using a voice recognition system.**

## 2024-01-27 NOTE — PLAN OF CARE
Problem: PAIN - ADULT  Goal: Verbalizes/displays adequate comfort level or baseline comfort level  Description: Interventions:  - Encourage patient to monitor pain and request assistance  - Assess pain using appropriate pain scale  - Administer analgesics based on type and severity of pain and evaluate response  - Implement non-pharmacological measures as appropriate and evaluate response  - Consider cultural and social influences on pain and pain management  - Notify physician/advanced practitioner if interventions unsuccessful or patient reports new pain  Outcome: Progressing     Problem: INFECTION - ADULT  Goal: Absence or prevention of progression during hospitalization  Description: INTERVENTIONS:  - Assess and monitor for signs and symptoms of infection  - Monitor lab/diagnostic results  - Monitor all insertion sites, i.e. indwelling lines, tubes, and drains  - Monitor endotracheal if appropriate and nasal secretions for changes in amount and color  - [place] appropriate cooling/warming therapies per order  - Administer medications as ordered  - Instruct and encourage patient and family to use good hand hygiene technique  - Identify and instruct in appropriate isolation precautions for identified infection/condition  Outcome: Progressing     Problem: SAFETY ADULT  Goal: Patient will remain free of falls  Description: INTERVENTIONS:  - Educate patient/family on patient safety including physical limitations  - Instruct patient to call for assistance with activity   - Consult OT/PT to assist with strengthening/mobility   - Keep Call bell within reach  - Keep bed low and locked with side rails adjusted as appropriate  - Keep care items and personal belongings within reach  - Initiate and maintain comfort rounds  - Make Fall Risk Sign visible to staff  - Apply yellow socks and bracelet for high fall risk patients  - Consider moving patient to room near nurses station  Outcome: Progressing  Goal: Maintain or  return to baseline ADL function  Description: INTERVENTIONS:  -  Assess patient's ability to carry out ADLs; assess patient's baseline for ADL function and identify physical deficits which impact ability to perform ADLs (bathing, care of mouth/teeth, toileting, grooming, dressing, etc.)  - Assess/evaluate cause of self-care deficits   - Assess range of motion  - Assess patient's mobility; develop plan if impaired  - Assess patient's need for assistive devices and provide as appropriate  - Encourage maximum independence but intervene and supervise when necessary  - Involve family in performance of ADLs  - Assess for home care needs following discharge   - Consider OT consult to assist with ADL evaluation and planning for discharge  - Provide patient education as appropriate  Outcome: Progressing  Goal: Maintains/Returns to pre admission functional level  Description: INTERVENTIONS:  - Perform AM-PAC 6 Click Basic Mobility/ Daily Activity assessment daily.  - Set and communicate daily mobility goal to care team and patient/family/caregiver.   - Collaborate with rehabilitation services on mobility goals if consulted  - Out of bed for toileting  - Record patient progress and toleration of activity level   Outcome: Progressing     Problem: DISCHARGE PLANNING  Goal: Discharge to home or other facility with appropriate resources  Description: INTERVENTIONS:  - Identify barriers to discharge w/patient and caregiver  - Arrange for needed discharge resources and transportation as appropriate  - Identify discharge learning needs (meds, wound care, etc.)  - Arrange for interpretive services to assist at discharge as needed  - Refer to Case Management Department for coordinating discharge planning if the patient needs post-hospital services based on physician/advanced practitioner order or complex needs related to functional status, cognitive ability, or social support system  Outcome: Progressing     Problem: Knowledge  Deficit  Goal: Patient/family/caregiver demonstrates understanding of disease process, treatment plan, medications, and discharge instructions  Description: Complete learning assessment and assess knowledge base.  Interventions:  - Provide teaching at level of understanding  - Provide teaching via preferred learning methods  Outcome: Progressing     Problem: SAFETY,RESTRAINT: NV/NON-SELF DESTRUCTIVE BEHAVIOR  Goal: Remains free of harm/injury (restraint for non violent/non self-detsructive behavior)  Description: INTERVENTIONS:  - Instruct patient/family regarding restraint use   - Assess and monitor physiologic and psychological status   - Provide interventions and comfort measures to meet assessed patient needs   - Identify and implement measures to help patient regain control  - Assess readiness for release of restraint   Outcome: Progressing  Goal: Returns to optimal restraint-free functioning  Description: INTERVENTIONS:  - Assess the patient's behavior and symptoms that indicate continued need for restraint  - Identify and implement measures to help patient regain control  - Assess readiness for release of restraint   Outcome: Progressing     Problem: Prexisting or High Potential for Compromised Skin Integrity  Goal: Skin integrity is maintained or improved  Description: INTERVENTIONS:  - Identify patients at risk for skin breakdown  - Assess and monitor skin integrity  - Assess and monitor nutrition and hydration status  - Monitor labs   - Assess for incontinence   - Turn and reposition patient  - Assist with mobility/ambulation  - Relieve pressure over bony prominences  - Avoid friction and shearing  - Provide appropriate hygiene as needed including keeping skin clean and dry  - Evaluate need for skin moisturizer/barrier cream  - Collaborate with interdisciplinary team   - Patient/family teaching  - Consider wound care consult   Outcome: Progressing     Problem: Nutrition/Hydration-ADULT  Goal:  Nutrient/Hydration intake appropriate for improving, restoring or maintaining nutritional needs  Description: Monitor and assess patient's nutrition/hydration status for malnutrition. Collaborate with interdisciplinary team and initiate plan and interventions as ordered.  Monitor patient's weight and dietary intake as ordered or per policy. Utilize nutrition screening tool and intervene as necessary. Determine patient's food preferences and provide high-protein, high-caloric foods as appropriate.     INTERVENTIONS:  - Monitor oral intake, urinary output, labs, and treatment plans  - Assess nutrition and hydration status and recommend course of action  - Evaluate amount of meals eaten  - Assist patient with eating if necessary   - Allow adequate time for meals  - Recommend/ encourage appropriate diets, oral nutritional supplements, and vitamin/mineral supplements  - Order, calculate, and assess calorie counts as needed  - Recommend, monitor, and adjust tube feedings and TPN/PPN based on assessed needs  - Assess need for intravenous fluids  - Provide specific nutrition/hydration education as appropriate  - Include patient/family/caregiver in decisions related to nutrition  Outcome: Progressing

## 2024-01-27 NOTE — PHYSICIAN ADVISOR
Current patient class: Inpatient  The patient is currently on Hospital Day: 21      The patient was admitted to the hospital at  1:48 PM on 1/7/24 for the following diagnosis:  Dementia (HCC) [F03.90]  Laceration of head [S01.91XA]  Abnormal urine finding [R82.90]  Ambulatory dysfunction [R26.2]  Benign prostatic hyperplasia, unspecified whether lower urinary tract symptoms present [N40.0]     CMS OUTLIER STAY REVIEW    After review of the relevant documentation, the patient is appropriate for CONTINUED INPATIENT ADMISSION.     The patient continues to remain hospitalized and has surpassed the expected duration of stay. This review is conducted at 20 days, to help satisfy the requirements for significant outlier stay review as per CMS.  Given the current condition of this patient, the patient satisfies this review was determination for continued inpatient stay.    Rationale is as follows:    The patient is currently requiring restraints and there is ongoing discharge planning.  The patient may be discharged to a facility within the next 48 hours to receive hospice care.  Patient has underlying dementia and the primary diagnosis was metabolic encephalopathy.  He underwent medication changes due to concern for polypharmacy and urinary tract infection was also a possible contributor.  The patient however has had episodes of agitation and required restraints again.     The patient’s vitals on arrival were   ED Triage Vitals   Temperature Pulse Respirations Blood Pressure SpO2   01/07/24 1013 01/07/24 1014 01/07/24 1014 01/07/24 1014 01/07/24 1014   (!) 96.5 °F (35.8 °C) 71 20 122/56 99 %      Temp Source Heart Rate Source Patient Position - Orthostatic VS BP Location FiO2 (%)   01/07/24 1013 01/07/24 1014 01/07/24 1020 01/07/24 1020 --   Axillary Monitor Sitting Left arm       Pain Score       01/08/24 1512       No Pain           Past Medical History:   Diagnosis Date    Chronic indwelling Pike catheter     Dementia  with behavioral disturbance (HCC)     Depression     Frequent UTI     GERD (gastroesophageal reflux disease)     Urinary retention      Past Surgical History:   Procedure Laterality Date    HERNIA REPAIR             Consults have been placed to:   IP CONSULT TO GERONTOLOGY  IP CONSULT TO UROLOGY  IP CONSULT TO HOSPICE    Vitals:    01/25/24 1541 01/26/24 2006 01/27/24 0744 01/27/24 1501   BP: 126/79 102/65 110/76 95/59   BP Location:  Right arm     Pulse:  88     Resp:  18     Temp: (!) 97.4 °F (36.3 °C) 98 °F (36.7 °C) 97.5 °F (36.4 °C) (!) 97.4 °F (36.3 °C)   TempSrc:  Oral     SpO2:  97%     Weight:       Height:           Most recent labs:    Recent Labs     01/27/24  0526   WBC 6.12   HGB 13.4   HCT 40.9      K 4.0   CALCIUM 8.8   BUN 25   CREATININE 0.89       Scheduled Meds:  Current Facility-Administered Medications   Medication Dose Route Frequency Provider Last Rate    acetaminophen  975 mg Oral Q8H Sean Mims MD      bisacodyl  10 mg Rectal Daily PRN Marta Jamison MD      vitamin B-12  1,000 mcg Oral Daily Sherley Turner MD      divalproex sodium  250 mg Oral TID With Meals Marta Jamison MD      docusate sodium  100 mg Oral BID Sherley Turner MD      enoxaparin  40 mg Subcutaneous Daily Sherley Turner MD      finasteride  5 mg Oral Daily Sherley Turner MD      LORazepam  0.5 mg Oral TID Marta Jamison MD      melatonin  3 mg Oral HS Sean Mims MD      mirtazapine  15 mg Oral HS Sean Mims MD      OLANZapine  2.5 mg Oral Once Stu Zhang MD      OLANZapine  2.5 mg Oral Q8H PRN Sean Mims MD      polyethylene glycol  17 g Oral Daily Sherley Turner MD      QUEtiapine  25 mg Oral HS PRN Jaiden Pang MD      QUEtiapine  25 mg Oral TID Jaiden Pang MD      senna  2 tablet Oral BID Marta Jamison MD       Continuous Infusions:   PRN Meds:.  bisacodyl    OLANZapine    QUEtiapine    Surgical procedures (if appropriate):

## 2024-01-28 PROCEDURE — 99232 SBSQ HOSP IP/OBS MODERATE 35: CPT | Performed by: INTERNAL MEDICINE

## 2024-01-28 RX ADMIN — SENNOSIDES 17.2 MG: 8.6 TABLET, FILM COATED ORAL at 09:26

## 2024-01-28 RX ADMIN — DIVALPROEX SODIUM 250 MG: 125 CAPSULE, COATED PELLETS ORAL at 17:30

## 2024-01-28 RX ADMIN — LORAZEPAM 0.5 MG: 0.5 TABLET ORAL at 13:39

## 2024-01-28 RX ADMIN — OLANZAPINE 2.5 MG: 2.5 TABLET, FILM COATED ORAL at 05:08

## 2024-01-28 RX ADMIN — OLANZAPINE 2.5 MG: 2.5 TABLET, FILM COATED ORAL at 23:58

## 2024-01-28 RX ADMIN — DIVALPROEX SODIUM 250 MG: 125 CAPSULE, COATED PELLETS ORAL at 09:27

## 2024-01-28 RX ADMIN — LORAZEPAM 0.5 MG: 0.5 TABLET ORAL at 09:25

## 2024-01-28 RX ADMIN — MIRTAZAPINE 15 MG: 15 TABLET, FILM COATED ORAL at 21:06

## 2024-01-28 RX ADMIN — POLYETHYLENE GLYCOL 3350 17 G: 17 POWDER, FOR SOLUTION ORAL at 09:27

## 2024-01-28 RX ADMIN — DOCUSATE SODIUM 100 MG: 100 CAPSULE, LIQUID FILLED ORAL at 09:26

## 2024-01-28 RX ADMIN — ACETAMINOPHEN 975 MG: 325 TABLET, FILM COATED ORAL at 21:15

## 2024-01-28 RX ADMIN — LORAZEPAM 0.5 MG: 0.5 TABLET ORAL at 21:06

## 2024-01-28 RX ADMIN — ENOXAPARIN SODIUM 40 MG: 40 INJECTION SUBCUTANEOUS at 09:27

## 2024-01-28 RX ADMIN — ACETAMINOPHEN 975 MG: 325 TABLET, FILM COATED ORAL at 13:39

## 2024-01-28 RX ADMIN — FINASTERIDE 5 MG: 5 TABLET, FILM COATED ORAL at 09:26

## 2024-01-28 RX ADMIN — DIVALPROEX SODIUM 250 MG: 125 CAPSULE, COATED PELLETS ORAL at 14:15

## 2024-01-28 RX ADMIN — QUETIAPINE FUMARATE 25 MG: 25 TABLET ORAL at 21:06

## 2024-01-28 RX ADMIN — Medication 3 MG: at 21:06

## 2024-01-28 RX ADMIN — QUETIAPINE FUMARATE 25 MG: 25 TABLET ORAL at 09:24

## 2024-01-28 RX ADMIN — QUETIAPINE FUMARATE 25 MG: 25 TABLET ORAL at 17:30

## 2024-01-28 RX ADMIN — CYANOCOBALAMIN TAB 500 MCG 1000 MCG: 500 TAB at 09:26

## 2024-01-28 NOTE — ASSESSMENT & PLAN NOTE
On admission patient had a urinalysis that was significant for leukocytes and nitrites.  Unable to assess with patient is having any pain or burning with urination.  He does have a history of cystitis.  Per son, patient has been receiving catheterization and has tried voiding trials but was unsuccessful.  Per son, patient has had a Pike in place since Tuesday which was placed by his urologist.  Urine culture shows Staph aureus  Urology consulted, offered patient's family options of Pike's versus CIC, patient's family agreed on continuation of Pike  Completed 3 days of IV ceftriaxone  Monitoring off antibiotics  Resolved     Plans:  Patient is being discharged to hospice

## 2024-01-28 NOTE — ASSESSMENT & PLAN NOTE
Patient has a chronic Pike, was replaced in the ED on admission  Patient has history of urinary retention has been following up with urology outpatient  Urology was consulted, offered patient's family Pike's catheter versus CIC, patient's family agreed on Pike's  Patient was afebrile overnight  Has no suprapubic or abdominal pain on palpation  No leukocytosis    PLAN:  Completed 3 days of IV ceftriaxone  Resolved

## 2024-01-28 NOTE — ASSESSMENT & PLAN NOTE
Patient has a history of Alzheimer's dementia.  At baseline patient can hold conversations but does get confused.  He is also able to feed and clothe himself with dementia.  However, patient more confused at this point in time.  Patient slept throughout the course of exam, and was unarousable to gentle shaking.  Per son, patient's medications have been adjusted to increase the dosage of his dementia medications.  Patient is oriented to himself however not to time and place  According to the son patient has waxing and waning mentation  Geriatrics on board to provide recommendations during his hospitalization  Patient had episodes of agitation or combative behavior during hospitalization, requiring olanzapine 2.5 mg   Patient has continued to need restraints  Patient's mental status did not improve, even after resolution of UTI, and adjustment of medications by Geriatrics  Had discussion with family about goals of care, provided them with information regarding prognosis. Decision made to discharge patient on hospice.    PLAN:  Discussed with nursing to walk patient 2-3 times a day around unit quarter as this seems to help his calm down during the day  Tylenol 975 every 8 scheduled  Depakote 250 to three times daily  Seroquel to 25 Mg tid   Mirtazapine to 15 Mg at 8 PM  Melatonin at 8 PM  Oral Olanzapine 2.5 Mg every q8 hr as needed  Ativan 0.5 Mg 3 times daily  Patient is being discharged to hospice

## 2024-01-28 NOTE — ASSESSMENT & PLAN NOTE
Patient has history of Alzheimer's dementia  Patient is not oriented to time place or person  Patient had multiple medication changes in the past few weeks  Metabolic encephalopathy 2/2 UTI and polypharmacy  Geriatrics consulted and recommendations appreciated.  Discontinuing continued observation  Switch olanzapine from IM to oral  Patient was able to walk with the nurse in the hallway without any agitation or trouble  Patient's mental status did not improve, even after resolution of UTI, and adjustment of medications by Geriatrics    PLAN:  Tylenol 975 every 8 scheduled  Patient is being discharged to hospice

## 2024-01-28 NOTE — PROGRESS NOTES
[facility]  Progress Note  Name: Avni Ruiz I  MRN: [id number]  Unit/Bed#: S -01 I Date of Admission: 1/7/2024   Date of Service: 1/28/2024 I Hospital Day: 21    Assessment/Plan   * Dementia with agitation and other behavioral disturbance (HCC)  Assessment & Plan  Patient has a history of Alzheimer's dementia.  At baseline patient can hold conversations but does get confused.  He is also able to feed and clothe himself with dementia.  However, patient more confused at this point in time.  Patient slept throughout the course of exam, and was unarousable to gentle shaking.  Per son, patient's medications have been adjusted to increase the dosage of his dementia medications.  Concern for encephalopathy at this point  Patient is oriented to himself however not to time and place  According to the son patient has waxing and waning mentation  Patient had a episodes of agitation or combative behavior last evening, received one dose of olanzapine 2.5 mg   Patient has been off restraints for the past 24 hours  Discontinuing observation  Switching olanzapine from IM to oral  Had discussion with family about goals of care, provided them with information regarding prognosis.  1 person from the family agreed to stay with the patient for 24 hours while he is off restraints  Patient was able to walk with the nurse in the hallway without any agitation or trouble  Patient had an episode of agitation yesterday, patient had to be put back in restraints      PLAN:  Discussed with nursing to walk patient 2-3 times a day around unit quarter as this seems to help his calm down during the day  Tylenol 975 every 8 scheduled  Depakote 250 to three times daily  Seroquel to 25 Mg bid   Mirtazapine to 15 Mg at 8 PM  Melatonin at 8 PM  Oral Olanzapine 2.5 Mg every q8 hr as needed  Ativan 0.5 Mg 3 times daily  Patient is being discharged to hospice    Metabolic encephalopathy  Assessment & Plan  Patient has  history of Alzheimer's dementia  Patient is not oriented to time place or person  Patient had multiple medication changes in the past few weeks  Metabolic encephalopathy 2/2 UTI and polypharmacy  Geriatrics consulted and recommendations appreciated.  Patient had a episodes of agitation or combative behavior last evening, received one dose of olanzapine 2.5 mg   Patient has been off restraints for over 24 hours  Discontinuing continued observation  Switch olanzapine from IM to oral  Patient was able to walk with the nurse in the hallway without any agitation or trouble  Patient had an episode of agitation yesterday, patient had to be put back in restraints    PLAN:  Tylenol 975 every 8 scheduled  Patient is being discharged to hospice    UTI (urinary tract infection)  Assessment & Plan  Patient has a chronic Pike, was replaced in the ED on admission  Patient has history of urinary retention has been following up with urology outpatient  Urology was consulted, offered patient's family Pike's catheter versus CIC, patient's family agreed on Pike's  Patient was afebrile overnight  Has no suprapubic or abdominal pain on palpation  No leukocytosis    PLAN:  Completed 3 days of IV ceftriaxone  Monitor for signs of infection  Resolved    Polypharmacy  Assessment & Plan  Geriatrics consulted    PLAN:  See plan acute encephalopathy    Abnormal urine finding  Assessment & Plan  On admission patient had a urinalysis that was significant for leukocytes and nitrites.  Unable to assess with patient is having any pain or burning with urination.  He does have a history of cystitis.  Per son, patient has been receiving catheterization and has tried voiding trials but was unsuccessful.  Per son, patient has had a Pike in place since Tuesday which was placed by his urologist.  Urine culture shows Staph aureus  Urology consulted, offered patient's family options of Pike's versus CIC, patient's family agreed on continuation of  Pike    Plans:  Completed 3 days of IV ceftriaxone  Monitoring off antibiotics  Resolved   Patient is being discharged to hospice    BPH (benign prostatic hyperplasia)  Assessment & Plan  Patient was on dutasteride and Flomax at the nursing facility  Urology recommended discontinuation of the tamsulosin    PLAN:  Continue tamsulosin  Continue finasteride 5 Mg daily    Constipation  Assessment & Plan  Continue MiraLAX and senna    Ambulatory dysfunction  Assessment & Plan  Patient presented to the ED after nursing facility found patient to have abrasions on his forehead.  The facility believes the patient fell out of bed last night and got himself back and without telling anybody. He was more confused that usual. Patient was brought to the ED via the trauma pathway, where trauma workup and CT imaging were negative.  Patient's lab work was unremarkable, and vitals have remained stable.  Initially plan was to [facility] the patient to [facility], however the facility has refused to take the patient  PLAN:  PT/OT evaluations and recommendations appreciated  PT level 2, OT level 3  Patient is being discharged to hospice                     VTE Pharmacologic Prophylaxis: VTE Score: 5 High Risk (Score >/= 5) - Pharmacological DVT Prophylaxis Ordered: enoxaparin (Lovenox). Sequential Compression Devices Ordered.    Mobility:   Basic Mobility Inpatient Raw Score: 20  JH-HLM Goal: 6: Walk 10 steps or more  JH-HLM Achieved: 5: Stand (1 or more minutes)  HLM Goal NOT achieved. Continue with multidisciplinary rounding and encourage appropriate mobility to improve upon HLM goals.    Patient Centered Rounds: I performed bedside rounds with nursing staff today.  Discussions with Specialists or Other Care Team Provider: Geriatrics    Education and Discussions with Family / Patient: Updated  (wife) via phone.    Current Length of Stay: 21 day(s)  Current Patient Status: Inpatient   Discharge Plan: Anticipate  discharge in 24-48 hrs to hospice    Code Status: Level 1 - Full Code    Subjective:   Patient seen and examined at the bedside.  Denies any acute complaints. Denied dizziness, lightheadedness, chest pain, shortness of breath, abdominal tenderness, changes in urinary or bowel habits. Pt is currently in restraints.    Objective:     Vitals:   Temp (24hrs), Av.6 °F (36.4 °C), Min:97.4 °F (36.3 °C), Max:97.8 °F (36.6 °C)    Temp:  [97.4 °F (36.3 °C)-97.8 °F (36.6 °C)] 97.8 °F (36.6 °C)  Resp:  [17] 17  BP: ()/(59-75) 139/75  Body mass index is 24.73 kg/m².     Input and Output Summary (last 24 hours):     Intake/Output Summary (Last 24 hours) at 2024 0833  Last data filed at 2024 2352  Gross per 24 hour   Intake 480 ml   Output 900 ml   Net -420 ml       Physical Exam:   Physical Exam  Vitals and nursing note reviewed.   Constitutional:       General: He is not in acute distress.     Appearance: Normal appearance. He is not ill-appearing, toxic-appearing or diaphoretic.   HENT:      Head: Normocephalic and atraumatic.      Nose: Nose normal.      Mouth/Throat:      Mouth: Mucous membranes are moist.      Pharynx: Oropharynx is clear.   Eyes:      General: No scleral icterus.        Right eye: No discharge.         Left eye: No discharge.      Extraocular Movements: Extraocular movements intact.      Conjunctiva/sclera: Conjunctivae normal.   Cardiovascular:      Rate and Rhythm: Normal rate and regular rhythm.      Pulses: Normal pulses.      Heart sounds: Normal heart sounds. No murmur heard.  Pulmonary:      Effort: Pulmonary effort is normal. No respiratory distress.      Breath sounds: Normal breath sounds. No wheezing, rhonchi or rales.   Abdominal:      General: Abdomen is flat. Bowel sounds are normal. There is no distension.      Palpations: Abdomen is soft.      Tenderness: There is no abdominal tenderness. There is no guarding or rebound.   Musculoskeletal:      Cervical back: Normal range  of motion and neck supple.      Right lower leg: No edema.      Left lower leg: No edema.   Skin:     General: Skin is warm and dry.      Coloration: Skin is not jaundiced or pale.   Neurological:      Mental Status: He is alert. Mental status is at baseline. He is disoriented.          Additional Data:     Labs:  Results from last 7 days   Lab Units 01/27/24  0526   WBC Thousand/uL 6.12   HEMOGLOBIN g/dL 13.4   HEMATOCRIT % 40.9   PLATELETS Thousands/uL 207     Results from last 7 days   Lab Units 01/27/24  0526   SODIUM mmol/L 142   POTASSIUM mmol/L 4.0   CHLORIDE mmol/L 110*   CO2 mmol/L 26   BUN mg/dL 25   CREATININE mg/dL 0.89   ANION GAP mmol/L 6   CALCIUM mg/dL 8.8   GLUCOSE RANDOM mg/dL 92                       Lines/Drains:  Invasive Devices       Drain  Duration             Urethral Catheter 18 Fr. 7 days                  Urinary Catheter:  Goal for removal: N/A - Chronic Pike               Imaging: No pertinent imaging reviewed.    Recent Cultures (last 7 days):   Results from last 7 days   Lab Units 01/23/24  1615   URINE CULTURE  >100,000 cfu/ml Methicillin Resistant Staphylococcus aureus*       Last 24 Hours Medication List:   Current Facility-Administered Medications   Medication Dose Route Frequency Provider Last Rate    acetaminophen  975 mg Oral Q8H Sean Mims MD      bisacodyl  10 mg Rectal Daily PRN Marta Jamison MD      vitamin B-12  1,000 mcg Oral Daily Sherley Turner MD      divalproex sodium  250 mg Oral TID With Meals Marta Jamison MD      docusate sodium  100 mg Oral BID Sherley Turner MD      enoxaparin  40 mg Subcutaneous Daily Sherley Turner MD      finasteride  5 mg Oral Daily Sherley Turner MD      LORazepam  0.5 mg Oral TID Marta Jamison MD      melatonin  3 mg Oral HS Sean Mims MD      mirtazapine  15 mg Oral HS Saen Mims MD      OLANZapine  2.5 mg Oral Once Stu Zhang MD      OLANZapine  2.5 mg Oral Q8H PRN Sean Mims MD      polyethylene glycol  17 g  Oral Daily Sherley Turner MD      QUEtiapine  25 mg Oral HS PRN Jaiden Pang MD      QUEtiapine  25 mg Oral TID Jaiden Pang MD      senna  2 tablet Oral BID Marta Jamison MD          Today, Patient Was Seen By: Sean Mims MD    **Please Note: This note may have been constructed using a voice recognition system.**

## 2024-01-29 VITALS
WEIGHT: 153.22 LBS | RESPIRATION RATE: 17 BRPM | OXYGEN SATURATION: 97 % | BODY MASS INDEX: 24.62 KG/M2 | HEART RATE: 88 BPM | WEIGHT: 153.22 LBS | HEART RATE: 88 BPM | TEMPERATURE: 98.1 F | DIASTOLIC BLOOD PRESSURE: 74 MMHG | TEMPERATURE: 98.1 F | HEIGHT: 66 IN | SYSTOLIC BLOOD PRESSURE: 129 MMHG | HEIGHT: 66 IN | BODY MASS INDEX: 24.62 KG/M2 | OXYGEN SATURATION: 97 % | DIASTOLIC BLOOD PRESSURE: 74 MMHG | RESPIRATION RATE: 17 BRPM | SYSTOLIC BLOOD PRESSURE: 129 MMHG

## 2024-01-29 PROCEDURE — 99239 HOSP IP/OBS DSCHRG MGMT >30: CPT | Performed by: INTERNAL MEDICINE

## 2024-01-29 RX ORDER — MIRTAZAPINE 15 MG/1
15 TABLET, FILM COATED ORAL
Start: 2024-01-29 | End: 2024-02-13

## 2024-01-29 RX ORDER — QUETIAPINE FUMARATE 25 MG/1
25 TABLET, FILM COATED ORAL 3 TIMES DAILY
Start: 2024-01-29 | End: 2024-02-22

## 2024-01-29 RX ORDER — OLANZAPINE 5 MG/1
2.5 TABLET ORAL DAILY PRN
Start: 2024-01-29 | End: 2024-02-22

## 2024-01-29 RX ORDER — DIVALPROEX SODIUM 125 MG/1
250 CAPSULE, COATED PELLETS ORAL
Start: 2024-01-29 | End: 2024-02-28

## 2024-01-29 RX ADMIN — OLANZAPINE 2.5 MG: 2.5 TABLET, FILM COATED ORAL at 12:23

## 2024-01-29 RX ADMIN — LORAZEPAM 0.5 MG: 0.5 TABLET ORAL at 08:40

## 2024-01-29 RX ADMIN — LORAZEPAM 0.5 MG: 0.5 TABLET ORAL at 12:23

## 2024-01-29 RX ADMIN — DIVALPROEX SODIUM 250 MG: 125 CAPSULE, COATED PELLETS ORAL at 12:50

## 2024-01-29 RX ADMIN — ENOXAPARIN SODIUM 40 MG: 40 INJECTION SUBCUTANEOUS at 08:40

## 2024-01-29 RX ADMIN — FINASTERIDE 5 MG: 5 TABLET, FILM COATED ORAL at 08:40

## 2024-01-29 RX ADMIN — CYANOCOBALAMIN TAB 500 MCG 1000 MCG: 500 TAB at 08:40

## 2024-01-29 RX ADMIN — DIVALPROEX SODIUM 250 MG: 125 CAPSULE, COATED PELLETS ORAL at 08:39

## 2024-01-29 RX ADMIN — QUETIAPINE FUMARATE 25 MG: 25 TABLET ORAL at 08:39

## 2024-01-29 NOTE — CASE MANAGEMENT
Case Management Discharge Planning Note    Patient name Avni uRiz  Location S /S - MRN [id number]  : 1940 Date 2024       Current Admission Date: 2024  Current Admission Diagnosis:Dementia with agitation and other behavioral disturbance (HCC)   Patient Active Problem List    Diagnosis Date Noted    Metabolic encephalopathy 2024    UTI (urinary tract infection) 2024    Polypharmacy 2024    Ambulatory dysfunction 2024    Dementia with agitation and other behavioral disturbance (HCC) 2024    Neuropathy 2024    Constipation 2024    BPH (benign prostatic hyperplasia) 2024    Abnormal urine finding 2024      LOS (days): 22  Geometric Mean LOS (GMLOS) (days): 3.4  Days to GMLOS:-18.5     OBJECTIVE:  Risk of Unplanned Readmission Score: 18.05         Current admission status: Inpatient   Preferred Pharmacy: No Pharmacies Listed  Primary Care Provider: No primary care provider on file.    Primary Insurance: MEDICARE  Secondary Insurance:  FOR LIFE    DISCHARGE DETAILS:    Discharge planning discussed with:: Julia - spouse  Freedom of Choice: Yes  Comments - Freedom of Choice: [facility] with [facility]  CM contacted family/caregiver?: Yes  Were Treatment Team discharge recommendations reviewed with patient/caregiver?: Yes  Did patient/caregiver verbalize understanding of patient care needs?: N/A- going to facility  Were patient/caregiver advised of the risks associated with not following Treatment Team discharge recommendations?: Yes    Contacts  Patient Contacts: Julia  Relationship to Patient:: Family  Contact Method: In Person  Reason/Outcome: Discharge Planning    Requested Home Health Care         Is the patient interested in C at discharge?: No    DME Referral Provided  Referral made for DME?: No    Other Referral/Resources/Interventions Provided:  Interventions: Assisted Living, Hospice  Referral  Comments: [facility] with [facility]    Would you like to participate in our [facility] service program?  : No - Declined    Treatment Team Recommendation: Assisted Living  Discharge Destination Plan:: Assisted Living  Transport at Discharge : [facility]  Dispatcher Contacted: Yes  Number/Name of Dispatcher: [employer]  Transported by (Company and Unit #): [employer]  ETA of Transport (Date): 01/29/24  ETA of Transport (Time): 1330  Transport Service Arrived: No  Transfer Mode: Stretcher  Accompanied by: EMS personnel     IMM Given (Date):: 01/29/24  IMM Given to:: Family  Family notified:: Spouse   ..IMM reviewed with patient's caregiver, patient's caregiver agrees with discharge determination.     Accepting Facility Name, City & State : [facility]  Receiving Facility/Agency Phone Number: [phone] (Ask for Med A sub unit)  Facility/Agency Fax Number: [phone]

## 2024-01-31 ENCOUNTER — HOSPITAL ENCOUNTER (EMERGENCY)
Facility: HOSPITAL | Age: 84
Discharge: HOME | End: 2024-01-31
Attending: EMERGENCY MEDICINE | Admitting: EMERGENCY MEDICINE
Payer: MEDICARE

## 2024-01-31 VITALS
SYSTOLIC BLOOD PRESSURE: 107 MMHG | RESPIRATION RATE: 18 BRPM | DIASTOLIC BLOOD PRESSURE: 62 MMHG | TEMPERATURE: 97.8 F | HEART RATE: 78 BPM | OXYGEN SATURATION: 98 %

## 2024-01-31 DIAGNOSIS — N39.0 UTI (URINARY TRACT INFECTION): ICD-10-CM

## 2024-01-31 DIAGNOSIS — Z97.8 FOLEY CATHETER IN PLACE: ICD-10-CM

## 2024-01-31 DIAGNOSIS — R41.82 ALTERED MENTAL STATUS: Primary | ICD-10-CM

## 2024-01-31 LAB
ATRIAL RATE: 178 BPM
BACTERIA UR QL AUTO: ABNORMAL /HPF
BILIRUB UR QL STRIP: NEGATIVE
CAOX CRY URNS QL MICRO: ABNORMAL /HPF
CLARITY UR: ABNORMAL
COLOR UR: YELLOW
GLUCOSE UR STRIP-MCNC: NEGATIVE MG/DL
HGB UR QL STRIP.AUTO: ABNORMAL
KETONES UR STRIP-MCNC: ABNORMAL MG/DL
LEUKOCYTE ESTERASE UR QL STRIP: ABNORMAL
MUCOUS THREADS UR QL AUTO: ABNORMAL
NITRITE UR QL STRIP: POSITIVE
NON-SQ EPI CELLS URNS QL MICRO: ABNORMAL /HPF
PH UR STRIP.AUTO: 6 [PH]
PROT UR STRIP-MCNC: ABNORMAL MG/DL
QRS AXIS: 9 DEGREES
QRSD INTERVAL: 92 MS
QT INTERVAL: 376 MS
QTC INTERVAL: 433 MS
RBC #/AREA URNS AUTO: ABNORMAL /HPF
SP GR UR STRIP.AUTO: 1.03 (ref 1–1.03)
T WAVE AXIS: 83 DEGREES
TRANS CELLS #/AREA URNS HPF: PRESENT /[HPF]
UROBILINOGEN UR STRIP-ACNC: <2 MG/DL
VENTRICULAR RATE: 80 BPM
WBC #/AREA URNS AUTO: ABNORMAL /HPF

## 2024-01-31 PROCEDURE — 99284 EMERGENCY DEPT VISIT MOD MDM: CPT | Performed by: EMERGENCY MEDICINE

## 2024-01-31 PROCEDURE — 81001 URINALYSIS AUTO W/SCOPE: CPT

## 2024-01-31 PROCEDURE — 93005 ELECTROCARDIOGRAM TRACING: CPT

## 2024-01-31 PROCEDURE — 99284 EMERGENCY DEPT VISIT MOD MDM: CPT

## 2024-01-31 PROCEDURE — 87147 CULTURE TYPE IMMUNOLOGIC: CPT

## 2024-01-31 PROCEDURE — 87186 SC STD MICRODIL/AGAR DIL: CPT

## 2024-01-31 PROCEDURE — 87086 URINE CULTURE/COLONY COUNT: CPT

## 2024-01-31 RX ORDER — DOXYCYCLINE HYCLATE 100 MG/1
100 CAPSULE ORAL ONCE
Status: COMPLETED | OUTPATIENT
Start: 2024-01-31 | End: 2024-01-31

## 2024-01-31 RX ORDER — DOXYCYCLINE HYCLATE 100 MG/1
100 CAPSULE ORAL 2 TIMES DAILY
Qty: 14 CAPSULE | Refills: 0 | Status: ON HOLD | OUTPATIENT
Start: 2024-01-31 | End: 2024-02-07

## 2024-01-31 RX ADMIN — DOXYCYCLINE 100 MG: 100 CAPSULE ORAL at 06:31

## 2024-01-31 NOTE — ED PROVIDER NOTES
History  Chief Complaint   Patient presents with    Altered Mental Status     EMS reports pt is new to [facility] and was sent in for getting out of his bed and trying to pull his mcduffie out     HPI    Patient is an 82 y/o M with PMH Alzheimer's dementia presenting via EMS from dementia unit for concern of agitation. Per EMS, pt was getting out of his bed and trying to pull his Mcduffie out, staff concerned and called EMS. Pt does not provide significant history 2/2 dementia. On chart review, pt has history of UTI and encephalopathy, was discharged to hospice a few weeks ago.     Prior to Admission Medications   Prescriptions Last Dose Informant Patient Reported? Taking?   LORazepam (ATIVAN) 1 mg tablet   No No   Sig: Take 1 tablet (1 mg total) by mouth 2 (two) times a day as needed for anxiety (agitation)   OLANZapine (ZyPREXA) 5 mg tablet   No No   Sig: Take 1 tablet (5 mg total) by mouth daily as needed (agitation)   cyanocobalamin (VITAMIN B-12) 1000 MCG tablet   No No   Sig: Take 1 tablet (1,000 mcg total) by mouth daily   divalproex sodium (DEPAKOTE) 250 mg DR tablet   No No   Sig: Take 1 tablet (250 mg total) by mouth in the morning   docusate sodium (COLACE) 100 mg capsule   No No   Sig: Take 1 capsule (100 mg total) by mouth 2 (two) times a day   dutasteride (AVODART) 0.5 mg capsule   No No   Sig: Take 1 capsule (0.5 mg total) by mouth daily   escitalopram (LEXAPRO) 5 mg tablet   No No   Sig: Take 1 tablet (5 mg total) by mouth daily   gabapentin (NEURONTIN) 100 mg capsule   No No   Sig: Take 1 capsule (100 mg total) by mouth 3 (three) times a day   melatonin 3 mg   No No   Sig: Take 1 tablet (3 mg total) by mouth daily at bedtime   polyethylene glycol (MIRALAX) 17 g packet   No No   Sig: Take 17 g by mouth daily Do not start before November 17, 2023.   senna (SENOKOT) 8.6 mg   No No   Sig: Take 2 tablets (17.2 mg total) by mouth 2 (two) times a day   tamsulosin (FLOMAX) 0.4 mg   No No   Sig:  TAKE 1 CAPSULE ORALLY TWICE DAILY (BENIGN PROSTATIC HYPERPLASIA)      Facility-Administered Medications: None       Past Medical History:   Diagnosis Date    Anxiety     BPH (benign prostatic hyperplasia)     Colon polyp     Disorder of eye     disorder of choroid of eye    Diverticulitis of rectum     Infected hernioplasty mesh (HCC)     Malignant melanoma of eye, left (HCC)     Memory impairment     Mixed hyperlipidemia     Skin cancer (melanoma) (HCC)     Uveal, Caroidal    Vision loss of left eye     Wears glasses        Past Surgical History:   Procedure Laterality Date    ABDOMINAL SURGERY      mesh removal    EGD      EGD AND COLONOSCOPY      EGD AND COLONOSCOPY  2022    HERNIA REPAIR Right     Inguinal/Umbilical    [place] RPR RECRT INGUINAL HERNIA ANY AGE REDUCIBLE Right 2023    Procedure: OPEN REPAIR OF RECURRENT REDUCIBLE RIGHT INGUINAL  HERNIAS WITH MESH;  Surgeon: Riley Chavez MD;  Location: [facility];  Service: General    SKIN CANCER EXCISION  2019    R upper cheek, benign lesion including margins, face, ears, eyelids, nose, lips, mucous membrane, excised diameter 2.1 to 3.0 CM       Family History   Problem Relation Age of Onset    Alzheimer's disease Mother     No Known Problems Father      I have reviewed and agree with the history as documented.    E-Cigarette/Vaping    E-Cigarette Use Never User      E-Cigarette/Vaping Substances    Nicotine No     THC No     CBD No     Flavoring No     Other No     Unknown No      Social History     Tobacco Use    Smoking status: Former     Current packs/day: 0.00     Average packs/day: 1 pack/day for 9.0 years (9.0 ttl pk-yrs)     Types: Cigarettes     Start date:      Quit date:      Years since quittin.1    Smokeless tobacco: Never    Tobacco comments:     QUIT IN    Vaping Use    Vaping status: Never Used   Substance Use Topics    Alcohol use: Not Currently     Alcohol/week: 7.0 standard drinks of alcohol     Types: 7  Glasses of wine per week     Comment: 1 glass a night    Drug use: Not Currently     Comment: DENIES        Review of Systems   Unable to perform ROS: Dementia       Physical Exam  ED Triage Vitals [01/31/24 0448]   Temperature Pulse Respirations Blood Pressure SpO2   97.8 °F (36.6 °C) 67 16 118/66 97 %      Temp Source Heart Rate Source Patient Position - Orthostatic VS BP Location FiO2 (%)   Axillary Monitor Sitting Right arm --      Pain Score       No Pain             Orthostatic Vital Signs  Vitals:    01/31/24 0448 01/31/24 0500   BP: 118/66 107/62   Pulse: 67 78   Patient Position - Orthostatic VS: Sitting Sitting       Physical Exam  Vitals and nursing note reviewed.   Constitutional:       General: He is not in acute distress.     Appearance: He is well-developed. He is not toxic-appearing.   HENT:      Head: Normocephalic and atraumatic.      Right Ear: External ear normal.      Left Ear: External ear normal.      Nose: Nose normal.      Mouth/Throat:      Pharynx: Oropharynx is clear. No oropharyngeal exudate or posterior oropharyngeal erythema.   Eyes:      Extraocular Movements: Extraocular movements intact.      Conjunctiva/sclera: Conjunctivae normal.      Pupils: Pupils are equal, round, and reactive to light.   Cardiovascular:      Rate and Rhythm: Normal rate and regular rhythm.      Pulses: Normal pulses.      Heart sounds: Normal heart sounds. No murmur heard.     No friction rub. No gallop.   Pulmonary:      Effort: Pulmonary effort is normal. No respiratory distress.      Breath sounds: Normal breath sounds. No wheezing, rhonchi or rales.   Abdominal:      General: Abdomen is flat.      Palpations: Abdomen is soft.      Tenderness: There is no abdominal tenderness. There is no guarding or rebound.   Musculoskeletal:         General: Normal range of motion.      Cervical back: Normal range of motion. No rigidity.      Right lower leg: No edema.      Left lower leg: No edema.   Skin:     General:  Skin is warm and dry.      Capillary Refill: Capillary refill takes less than 2 seconds.   Neurological:      General: No focal deficit present.      Mental Status: He is alert. Mental status is at baseline. He is disoriented.      GCS: GCS eye subscore is 4. GCS verbal subscore is 4. GCS motor subscore is 6.         ED Medications  Medications   doxycycline hyclate (VIBRAMYCIN) capsule 100 mg (has no administration in time range)       Diagnostic Studies  Results Reviewed       Procedure Component Value Units Date/Time    UA w Reflex to Microscopic w Reflex to Culture [[id number]]  (Abnormal) Collected: 01/31/24 0516    Lab Status: Final result Specimen: Urine, Indwelling Choi Catheter Updated: 01/31/24 0529     Color, UA Yellow     Clarity, UA Extra Turbid     Specific Gravity, UA 1.026     pH, UA 6.0     Leukocytes, UA Large     Nitrite, UA Positive     Protein,  (3+) mg/dl      Glucose, UA Negative mg/dl      Ketones, UA 20 (1+) mg/dl      Urobilinogen, UA <2.0 mg/dl      Bilirubin, UA Negative     Occult Blood, UA Moderate    Urine Microscopic [[id number]] Collected: 01/31/24 0516    Lab Status: In process Specimen: Urine, Indwelling Choi Catheter Updated: 01/31/24 0529                   No orders to display         Procedures  Procedures      ED Course  ED Course as of 01/31/24 0551   Wed Jan 31, 2024   0537 Nitrite, UA(!): Positive               Identification of Seniors at Risk      Flowsheet Row Most Recent Value   (ISAR) Identification of Seniors at Risk    Before the illness or injury that brought you to the Emergency, did you need someone to help you on a regular basis? 1 Filed at: 01/31/2024 0450   In the last 24 hours, have you needed more help than usual? 1 Filed at: 01/31/2024 0450   Have you been hospitalized for one or more nights during the past 6 months? 1 Filed at: 01/31/2024 0450   In general, do you see well? 0 Filed at: 01/31/2024 0450   In general, do you have serious problems with  your memory? 1 Filed at: 01/31/2024 0450   Do you take more than three different medications every day? 1 Filed at: 01/31/2024 0450   ISAR Score 5 Filed at: 01/31/2024 0450                      SBIRT 20yo+      Flowsheet Row Most Recent Value   Initial Alcohol Screen: US AUDIT-C     1. How often do you have a drink containing alcohol? 0 Filed at: 01/31/2024 0450   2. How many drinks containing alcohol do you have on a typical day you are drinking?  0 Filed at: 01/31/2024 0450   3a. Male UNDER 65: How often do you have five or more drinks on one occasion? 0 Filed at: 01/31/2024 0450   3b. FEMALE Any Age, or MALE 65+: How often do you have 4 or more drinks on one occassion? 0 Filed at: 01/31/2024 0450   Audit-C Score 0 Filed at: 01/31/2024 0450   NIMESH: How many times in the past year have you...    Used an illegal drug or used a prescription medication for non-medical reasons? Never Filed at: 01/31/2024 0450                  Medical Decision Making  84 y/o M presenting for evaluation of agitation. VSS. No focal exam findings, pt is calm and cooperative in the ED with Choi in place. Will send for UA to eval for possible UTI however pt does not require additional w/u given stable vitals and calm behavior. Pt is on hospice and there is no plan to admit the patient at this time. Pt nitrite + urine, hx of MRSA in urine, will treat with course of doxycyline and have pt f/u with PCP, may need to change abx based on urine culture results.           Disposition  Final diagnoses:   Altered mental status   UTI (urinary tract infection)   Choi catheter in place     Time reflects when diagnosis was documented in both MDM as applicable and the Disposition within this note       Time User Action Codes Description Comment    1/31/2024  5:31 AM CheckJamaal Add [R41.82] Altered mental status     1/31/2024  5:31 AM Jamaal William Add [N17.9] RENATO (acute kidney injury) (HCC)     1/31/2024  5:31 AM Jmaaal William Remove [N17.9] RENATO  (acute kidney injury) (HCC)     1/31/2024  5:31 AM Check, Jamaal Young [N39.0] UTI (urinary tract infection)     1/31/2024  5:31 AM Check, Jamaal Add [Z97.8] Choi catheter in place           ED Disposition       ED Disposition   Discharge    Condition   Stable    Date/Time   Wed Jan 31, 2024 0538    Comment   Gino Hernandez discharge to home/self care.                   Follow-up Information       Follow up With Specialties Details Why Contact Mini Davis MD Internal Medicine Schedule an appointment as soon as possible for a visit in 3 days  [address] [place] [id number] [phone]              Patient's Medications   Discharge Prescriptions    DOXYCYCLINE HYCLATE (VIBRAMYCIN) 100 MG CAPSULE    Take 1 capsule (100 mg total) by mouth 2 (two) times a day for 7 days       Start Date: 1/31/2024 End Date: 2/7/2024       Order Dose: 100 mg       Quantity: 14 capsule    Refills: 0     No discharge procedures on file.    PDMP Review       None             ED Provider  Attending physically available and evaluated Gino Hernandez. I managed the patient along with the ED Attending.    Electronically Signed by           Cortes Dang MD  01/31/24 0521

## 2024-01-31 NOTE — ED ATTENDING ATTESTATION
1/31/2024  I, Jamaal William MD, saw and evaluated the patient. I have discussed the patient with the resident/non-physician practitioner and agree with the resident's/non-physician practitioner's findings, Plan of Care, and MDM as documented in the resident's/non-physician practitioner's note, except where noted. All available labs and Radiology studies were reviewed.  I was present for key portions of any procedure(s) performed by the resident/non-physician practitioner and I was immediately available to provide assistance.       At this point I agree with the current assessment done in the Emergency Department.  I have conducted an independent evaluation of this patient a history and physical is as follows:    83-year-old male with Alzheimer's dementia presents to the emergency department from nursing facility via EMS for evaluation of altered mental status and agitation.  Per EMS report, patient was getting out of bed and attempting to remove his Choi catheter and staff became concerned and called EMS.  Patient currently denying any complaints at this time.  History is limited secondary to dementia.  No reported falls.  Of note, per chart review, patient was recently admitted to the hospital and at time of discharge it was noted that the patient would be transition to hospice.    On exam, patient chronically ill-appearing, but in no acute distress, head is normocephalic atraumatic, pupils equal round reactive to light, neck is supple without meningismus signs, heart is regular rate and rhythm with intact distal pulses, no increased work of breathing, respiratory distress, or stridor.  Abdomen is soft, nontender nondistended, Choi catheter in place.  No focal neurologic deficits.    Differential diagnosis includes but is not limited to dementia, delirium, urinary tract infection.  No focal neurologic deficits present so doubt acute CVA.  Given reassuring physical examination, plan to just check urinalysis for  signs of infection and if positive treat with doxycycline as prior urine culture was positive for MRSA.    ED Course  ED Course as of 01/31/24 0616   Wed Jan 31, 2024   0530 Leukocytes, UA(!): Large   0530 Nitrite, UA(!): Positive         Critical Care Time  Procedures

## 2024-01-31 NOTE — DISCHARGE INSTRUCTIONS
Take antibiotic as prescribed for UTI.     Follow up with PCP as needed.    If you develop new or worsening symptoms, please return to the Emergency Department for further evaluation.

## 2024-02-02 LAB — BACTERIA UR CULT: ABNORMAL

## 2024-02-07 ENCOUNTER — HOSPITAL ENCOUNTER (INPATIENT)
Facility: HOSPITAL | Age: 84
LOS: 15 days | Discharge: HOSPICE | DRG: 057 | End: 2024-02-22
Attending: EMERGENCY MEDICINE | Admitting: INTERNAL MEDICINE
Payer: MEDICARE

## 2024-02-07 DIAGNOSIS — N40.1 BENIGN PROSTATIC HYPERPLASIA WITH URINARY RETENTION: ICD-10-CM

## 2024-02-07 DIAGNOSIS — F51.05 INSOMNIA DUE TO OTHER MENTAL DISORDER: ICD-10-CM

## 2024-02-07 DIAGNOSIS — K59.01 SLOW TRANSIT CONSTIPATION: Chronic | ICD-10-CM

## 2024-02-07 DIAGNOSIS — F99 INSOMNIA DUE TO OTHER MENTAL DISORDER: ICD-10-CM

## 2024-02-07 DIAGNOSIS — F02.C11 SEVERE ALZHEIMER'S DEMENTIA WITH AGITATION, UNSPECIFIED TIMING OF DEMENTIA ONSET (HCC): ICD-10-CM

## 2024-02-07 DIAGNOSIS — R33.8 BENIGN PROSTATIC HYPERPLASIA WITH URINARY RETENTION: ICD-10-CM

## 2024-02-07 DIAGNOSIS — R46.89 AGGRESSIVE BEHAVIOR: ICD-10-CM

## 2024-02-07 DIAGNOSIS — Z79.899 POLYPHARMACY: ICD-10-CM

## 2024-02-07 DIAGNOSIS — F03.90 DEMENTIA (HCC): Primary | ICD-10-CM

## 2024-02-07 DIAGNOSIS — G30.9 SEVERE ALZHEIMER'S DEMENTIA WITH AGITATION, UNSPECIFIED TIMING OF DEMENTIA ONSET (HCC): ICD-10-CM

## 2024-02-07 LAB
ALBUMIN SERPL BCP-MCNC: 3.3 G/DL (ref 3.5–5)
ALP SERPL-CCNC: 54 U/L (ref 34–104)
ALT SERPL W P-5'-P-CCNC: 13 U/L (ref 7–52)
ANION GAP SERPL CALCULATED.3IONS-SCNC: 5 MMOL/L
AST SERPL W P-5'-P-CCNC: 13 U/L (ref 13–39)
BASOPHILS # BLD AUTO: 0.02 THOUSANDS/ÂΜL (ref 0–0.1)
BASOPHILS NFR BLD AUTO: 0 % (ref 0–1)
BILIRUB SERPL-MCNC: 0.35 MG/DL (ref 0.2–1)
BUN SERPL-MCNC: 19 MG/DL (ref 5–25)
CALCIUM ALBUM COR SERPL-MCNC: 9.1 MG/DL (ref 8.3–10.1)
CALCIUM SERPL-MCNC: 8.5 MG/DL (ref 8.4–10.2)
CHLORIDE SERPL-SCNC: 106 MMOL/L (ref 96–108)
CO2 SERPL-SCNC: 27 MMOL/L (ref 21–32)
CREAT SERPL-MCNC: 0.85 MG/DL (ref 0.6–1.3)
EOSINOPHIL # BLD AUTO: 0.06 THOUSAND/ÂΜL (ref 0–0.61)
EOSINOPHIL NFR BLD AUTO: 1 % (ref 0–6)
ERYTHROCYTE [DISTWIDTH] IN BLOOD BY AUTOMATED COUNT: 14.4 % (ref 11.6–15.1)
GFR SERPL CREATININE-BSD FRML MDRD: 81 ML/MIN/1.73SQ M
GLUCOSE SERPL-MCNC: 82 MG/DL (ref 65–140)
HCT VFR BLD AUTO: 33.3 % (ref 36.5–49.3)
HGB BLD-MCNC: 10.8 G/DL (ref 12–17)
IMM GRANULOCYTES # BLD AUTO: 0.01 THOUSAND/UL (ref 0–0.2)
IMM GRANULOCYTES NFR BLD AUTO: 0 % (ref 0–2)
LYMPHOCYTES # BLD AUTO: 0.73 THOUSANDS/ÂΜL (ref 0.6–4.47)
LYMPHOCYTES NFR BLD AUTO: 15 % (ref 14–44)
MCH RBC QN AUTO: 28.8 PG (ref 26.8–34.3)
MCHC RBC AUTO-ENTMCNC: 32.4 G/DL (ref 31.4–37.4)
MCV RBC AUTO: 89 FL (ref 82–98)
MONOCYTES # BLD AUTO: 0.39 THOUSAND/ÂΜL (ref 0.17–1.22)
MONOCYTES NFR BLD AUTO: 8 % (ref 4–12)
NEUTROPHILS # BLD AUTO: 3.56 THOUSANDS/ÂΜL (ref 1.85–7.62)
NEUTS SEG NFR BLD AUTO: 76 % (ref 43–75)
NRBC BLD AUTO-RTO: 0 /100 WBCS
PLATELET # BLD AUTO: 175 THOUSANDS/UL (ref 149–390)
PMV BLD AUTO: 11.4 FL (ref 8.9–12.7)
POTASSIUM SERPL-SCNC: 3.9 MMOL/L (ref 3.5–5.3)
PROT SERPL-MCNC: 5.5 G/DL (ref 6.4–8.4)
RBC # BLD AUTO: 3.75 MILLION/UL (ref 3.88–5.62)
SODIUM SERPL-SCNC: 138 MMOL/L (ref 135–147)
WBC # BLD AUTO: 4.77 THOUSAND/UL (ref 4.31–10.16)

## 2024-02-07 PROCEDURE — 99284 EMERGENCY DEPT VISIT MOD MDM: CPT

## 2024-02-07 PROCEDURE — 85025 COMPLETE CBC W/AUTO DIFF WBC: CPT | Performed by: PHYSICIAN ASSISTANT

## 2024-02-07 PROCEDURE — 36415 COLL VENOUS BLD VENIPUNCTURE: CPT | Performed by: PHYSICIAN ASSISTANT

## 2024-02-07 PROCEDURE — 99285 EMERGENCY DEPT VISIT HI MDM: CPT | Performed by: PHYSICIAN ASSISTANT

## 2024-02-07 PROCEDURE — 80053 COMPREHEN METABOLIC PANEL: CPT | Performed by: PHYSICIAN ASSISTANT

## 2024-02-07 PROCEDURE — 96374 THER/PROPH/DIAG INJ IV PUSH: CPT

## 2024-02-07 RX ORDER — ENOXAPARIN SODIUM 100 MG/ML
40 INJECTION SUBCUTANEOUS DAILY
Status: DISCONTINUED | OUTPATIENT
Start: 2024-02-08 | End: 2024-02-22 | Stop reason: HOSPADM

## 2024-02-07 RX ORDER — ACETAMINOPHEN 325 MG/1
650 TABLET ORAL EVERY 6 HOURS PRN
Status: DISCONTINUED | OUTPATIENT
Start: 2024-02-07 | End: 2024-02-22 | Stop reason: HOSPADM

## 2024-02-07 RX ORDER — FINASTERIDE 5 MG/1
5 TABLET, FILM COATED ORAL DAILY
Status: DISCONTINUED | OUTPATIENT
Start: 2024-02-08 | End: 2024-02-22 | Stop reason: HOSPADM

## 2024-02-07 RX ORDER — MIRTAZAPINE 15 MG/1
15 TABLET, FILM COATED ORAL
Status: DISCONTINUED | OUTPATIENT
Start: 2024-02-07 | End: 2024-02-22 | Stop reason: HOSPADM

## 2024-02-07 RX ORDER — OLANZAPINE 5 MG/1
5 TABLET ORAL DAILY
Status: DISCONTINUED | OUTPATIENT
Start: 2024-02-08 | End: 2024-02-19

## 2024-02-07 RX ORDER — OLANZAPINE 2.5 MG/1
5 TABLET, FILM COATED ORAL DAILY
Status: DISCONTINUED | OUTPATIENT
Start: 2024-02-07 | End: 2024-02-07

## 2024-02-07 RX ORDER — WATER 10 ML/10ML
10 INJECTION INTRAMUSCULAR; INTRAVENOUS; SUBCUTANEOUS ONCE
Status: COMPLETED | OUTPATIENT
Start: 2024-02-07 | End: 2024-02-07

## 2024-02-07 RX ORDER — WATER 10 ML/10ML
INJECTION INTRAMUSCULAR; INTRAVENOUS; SUBCUTANEOUS
Status: COMPLETED
Start: 2024-02-07 | End: 2024-02-07

## 2024-02-07 RX ORDER — DIVALPROEX SODIUM 125 MG/1
250 CAPSULE, COATED PELLETS ORAL
Status: DISCONTINUED | OUTPATIENT
Start: 2024-02-07 | End: 2024-02-22 | Stop reason: HOSPADM

## 2024-02-07 RX ORDER — LORAZEPAM 2 MG/ML
0.5 INJECTION INTRAMUSCULAR ONCE
Status: COMPLETED | OUTPATIENT
Start: 2024-02-07 | End: 2024-02-07

## 2024-02-07 RX ORDER — OLANZAPINE 2.5 MG/1
5 TABLET, FILM COATED ORAL DAILY PRN
Status: DISCONTINUED | OUTPATIENT
Start: 2024-02-07 | End: 2024-02-07

## 2024-02-07 RX ORDER — LANOLIN ALCOHOL/MO/W.PET/CERES
3 CREAM (GRAM) TOPICAL
Status: DISCONTINUED | OUTPATIENT
Start: 2024-02-07 | End: 2024-02-16

## 2024-02-07 RX ORDER — AMOXICILLIN 250 MG
1 CAPSULE ORAL
Status: DISCONTINUED | OUTPATIENT
Start: 2024-02-07 | End: 2024-02-15

## 2024-02-07 RX ORDER — DOCUSATE SODIUM 100 MG/1
100 CAPSULE, LIQUID FILLED ORAL 2 TIMES DAILY
Status: DISCONTINUED | OUTPATIENT
Start: 2024-02-07 | End: 2024-02-15

## 2024-02-07 RX ORDER — OLANZAPINE 10 MG/2ML
2.5 INJECTION, POWDER, FOR SOLUTION INTRAMUSCULAR DAILY PRN
Status: DISCONTINUED | OUTPATIENT
Start: 2024-02-07 | End: 2024-02-22 | Stop reason: HOSPADM

## 2024-02-07 RX ORDER — QUETIAPINE FUMARATE 25 MG/1
50 TABLET, FILM COATED ORAL 3 TIMES DAILY
Status: DISCONTINUED | OUTPATIENT
Start: 2024-02-07 | End: 2024-02-08

## 2024-02-07 RX ORDER — LORAZEPAM 2 MG/ML
0.5 INJECTION INTRAMUSCULAR ONCE
Status: DISCONTINUED | OUTPATIENT
Start: 2024-02-07 | End: 2024-02-16

## 2024-02-07 RX ORDER — HALOPERIDOL 1 MG/1
1 TABLET ORAL EVERY 6 HOURS PRN
Status: DISCONTINUED | OUTPATIENT
Start: 2024-02-07 | End: 2024-02-22 | Stop reason: HOSPADM

## 2024-02-07 RX ADMIN — LORAZEPAM 0.5 MG: 2 INJECTION INTRAMUSCULAR; INTRAVENOUS at 16:19

## 2024-02-07 RX ADMIN — OLANZAPINE 2.5 MG: 10 INJECTION, POWDER, FOR SOLUTION INTRAMUSCULAR at 18:38

## 2024-02-07 RX ADMIN — WATER 10 ML: 10 INJECTION INTRAMUSCULAR; INTRAVENOUS; SUBCUTANEOUS at 18:38

## 2024-02-07 RX ADMIN — WATER 10 ML: 1 INJECTION INTRAMUSCULAR; INTRAVENOUS; SUBCUTANEOUS at 18:38
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
## 2024-02-07 NOTE — ASSESSMENT & PLAN NOTE
Patient with history of progressive dementia.  Prior to admission medications include Depakote, melatonin, mirtazapine, olanzapine, quetiapine, Haldol as needed, olanzapine as needed.  These medications were reviewed at admission.  Medications currently being ministered at [facility] were continued.  Given patient's continued behavioral disturbances and extensive list of current medications, geriatric service is consulted and their recommendations are greatly appreciated.  Plan:  -as above

## 2024-02-07 NOTE — ASSESSMENT & PLAN NOTE
Patient with history of BPH.  Voiding trial initiated while inpatient upon request of wife. Patient failed voiding trial, Mcduffie catheter placed 2/11.  Patient will require chronic Mcduffie catheter. Outpatient follow-up with urology.  *Spoke with wife and son at bedside. Wife is concerned about pt having discomfort with the mcduffie catheter. Had discussion regarding voiding trial and decided if pt is comfortable with the mcduffie, we can leave it for now. If it begins to cause discomfort, we can attempt a voiding trial. Informed nurse.  2/15: Pt still pulling at his mcduffie, attempted voiding trial, mcduffie removed  2/16: pt underwent straight cath with 900 cc output   2/17: pt doing well without mcduffie for now  2/18: pt was straight cathed twice; bladder scans showed 427cc and 593 cc  2/19: mcduffie was replaced     Plan:   Continue prior to admission finasteride 5 mg daily  Continue mcduffie,  follow up with urology outpatient

## 2024-02-07 NOTE — ASSESSMENT & PLAN NOTE
Patient with history of constipation. Had several bowel movements overnight.   Plan:  Decrease bowel regimen to Miralax qd and sennakot qd  Monitor for bowel movements.

## 2024-02-07 NOTE — ED NOTES
Pt had bowel movement in his pants. Pt tried removing soiled pants. Multiple RNs to room to assist. Pt becoming increasingly agitated and will not take anything by mouth. Tiger text sent to [facility] requesting zyprexa PO order change to zyprexa IM. Pt cleaned and RN changed mcduffie catheter leg bag and securing device.      Candi Hilario RN  02/07/24 1821       Candi Hilario RN  02/07/24 1821       Candi Hilario RN  02/07/24 1822

## 2024-02-08 LAB
ANION GAP SERPL CALCULATED.3IONS-SCNC: 15 MMOL/L
BUN SERPL-MCNC: 15 MG/DL (ref 5–25)
CALCIUM SERPL-MCNC: 9.1 MG/DL (ref 8.4–10.2)
CHLORIDE SERPL-SCNC: 106 MMOL/L (ref 96–108)
CO2 SERPL-SCNC: 19 MMOL/L (ref 21–32)
CREAT SERPL-MCNC: 0.83 MG/DL (ref 0.6–1.3)
ERYTHROCYTE [DISTWIDTH] IN BLOOD BY AUTOMATED COUNT: 13.9 % (ref 11.6–15.1)
GFR SERPL CREATININE-BSD FRML MDRD: 81 ML/MIN/1.73SQ M
GLUCOSE SERPL-MCNC: 105 MG/DL (ref 65–140)
HCT VFR BLD AUTO: 37.9 % (ref 36.5–49.3)
HGB BLD-MCNC: 12.8 G/DL (ref 12–17)
MCH RBC QN AUTO: 28.4 PG (ref 26.8–34.3)
MCHC RBC AUTO-ENTMCNC: 33.8 G/DL (ref 31.4–37.4)
MCV RBC AUTO: 84 FL (ref 82–98)
PLATELET # BLD AUTO: 243 THOUSANDS/UL (ref 149–390)
PMV BLD AUTO: 11.5 FL (ref 8.9–12.7)
POTASSIUM SERPL-SCNC: 3.5 MMOL/L (ref 3.5–5.3)
RBC # BLD AUTO: 4.51 MILLION/UL (ref 3.88–5.62)
SODIUM SERPL-SCNC: 140 MMOL/L (ref 135–147)
WBC # BLD AUTO: 6.2 THOUSAND/UL (ref 4.31–10.16)

## 2024-02-08 PROCEDURE — 80048 BASIC METABOLIC PNL TOTAL CA: CPT | Performed by: STUDENT IN AN ORGANIZED HEALTH CARE EDUCATION/TRAINING PROGRAM

## 2024-02-08 PROCEDURE — NC001 PR NO CHARGE: Performed by: INTERNAL MEDICINE

## 2024-02-08 PROCEDURE — 99223 1ST HOSP IP/OBS HIGH 75: CPT | Performed by: INTERNAL MEDICINE

## 2024-02-08 PROCEDURE — 36415 COLL VENOUS BLD VENIPUNCTURE: CPT | Performed by: STUDENT IN AN ORGANIZED HEALTH CARE EDUCATION/TRAINING PROGRAM

## 2024-02-08 PROCEDURE — 85027 COMPLETE CBC AUTOMATED: CPT | Performed by: STUDENT IN AN ORGANIZED HEALTH CARE EDUCATION/TRAINING PROGRAM

## 2024-02-08 RX ORDER — ARIPIPRAZOLE 2 MG/1
2 TABLET ORAL DAILY
Status: DISCONTINUED | OUTPATIENT
Start: 2024-02-08 | End: 2024-02-20

## 2024-02-08 RX ADMIN — DIVALPROEX SODIUM 250 MG: 125 CAPSULE, COATED PELLETS ORAL at 13:37

## 2024-02-08 RX ADMIN — MIRTAZAPINE 15 MG: 15 TABLET, FILM COATED ORAL at 21:05

## 2024-02-08 RX ADMIN — OLANZAPINE 2.5 MG: 10 INJECTION, POWDER, FOR SOLUTION INTRAMUSCULAR at 21:05

## 2024-02-08 RX ADMIN — DIVALPROEX SODIUM 250 MG: 125 CAPSULE, COATED PELLETS ORAL at 10:38

## 2024-02-08 RX ADMIN — CYANOCOBALAMIN TAB 500 MCG 1000 MCG: 500 TAB at 10:38

## 2024-02-08 RX ADMIN — OLANZAPINE 5 MG: 10 TABLET, FILM COATED ORAL at 13:37

## 2024-02-08 RX ADMIN — FINASTERIDE 5 MG: 5 TABLET, FILM COATED ORAL at 10:38

## 2024-02-08 RX ADMIN — Medication 3 MG: at 21:05

## 2024-02-08 NOTE — ED NOTES
Dr. Navarro aware patient EKG not done on 2/7. Per , ok to do once patient wakes up.      Daisy Álvarez RN  02/08/24 0608

## 2024-02-08 NOTE — CONSULTS
Consultation - Geriatric Medicine   Gino Hernandez 83 y.o. male MRN: [id number]  Unit/Bed#: ED 07 Encounter: [id number]      Assessment/Plan     Dementia with agitation and behavioral disturbance  -moderate to severe and progressive  -at baseline requiring assist with ADLs, dependent for iADLs  -MoCA 12/30 (12/2023)  -CT[facility] 1/7/24 images personally viewed, moderate/severe diffuse chronic microangiopathic changes appreciated  -TSH WNL 1.916, B12 WNL at 882 on daily supplementation  -refractory behaviors despite extensive medication regimen as outlined below now enrolled on hospice for same  -given refractory behaviors recommend trial of Ability 2mg daily in place of Seroquel, continue rest of regimen as below for now with close monitoring of QTc and mentation and can consider increasing evening depakote dose to 375mg starting tonight if necessary   -monitor for pain/discomfort and fecal/urinary retention and tx if present as all may be precipitators of behaviors   -encourage calm quiet environment to reduce risk overstimulation   -redirect unwanted behaviors as first line and wean restraints as safely possible, direct one to one observation preferred to physical restraints when possible as physical restraints may contribute to agitation/behaviors      Insomnia  -maintained on mirtazapine and melatonin chronically as o/p, cont current regimen   -Encourage consistent sleep/wake times and establishment of bedtime routine  -Minimize daytime naps and encourage activity earlier in morning to reduce risk of late afternoon/early evening activity/stimulation disrupting normal circadian rhythm    BPH with LUTS  -Continue home finasteride regimen  -Recommend urinary retention protocol    Constipation   -hx recurrent constipation, continue bowel regimen  -Encourage hydration and mobilization to encourage bowel motility     Impaired Vision  -recommend use of corrective lenses at all appropriate times  -encourage adequate  lighting and encourage use of assistance with ambulation  -keep personal belongings close to person to avoid reaching  -encourage appropriate footwear at all times  -Consider large font for printed materials provided to patient    Impaired Hearing  -Encourage use of hearing aids at all appropriate times  -encourage providers and caregivers to speak slowly and clearly directly to patient  -minimize background noise to encourage patient engagement  -consider use of hearing amplifier to reduce risk of straining to hear if hearing aids are not present or are not sufficient   -Encourage use of teach back method to ensure clear communication    Deconditioning/debility/frailty   -clinical frailty scale stage V, mildly frail  -Multifactorial including age, ambulatory dysfunction with history of falls, advanced dementia with behavioral disturbance and multiple additional chronic medical co-morbidities  -Encourage well-balanced nutrition  -Continue optimization of chronic medical conditions and address acute metabolic derangements as arise   -Continue psychosocial supports of patient and caregivers  -although no plan for patient to return home spouse may still benefit from referral to caregiver support group for overall support and coping given sudden progression and severity of symptoms       Home medication review   Personally confirmed with medication list obtained from [facility]:    Depakote 250 Mg 3 times daily  Finasteride 5 Mg daily  Melatonin 3 Mg at bedtime  Mirtazapine 15 Mg at bedtime  Olanzapine 5 Mg daily at 2 PM  MiraLAX 17 G daily  Quetiapine 50 Mg 3 times daily  Senna 8.6 Mg twice daily  Stool softener 100 Mg twice daily  B12 1000 mcg daily  Acetaminophen 650 Mg suppository every 6H PRN  Bisacodyl 10 Mg suppository daily as needed  Haldol 1 Mg Q6H PRN  Hyoscyamine 0.125 Mg Q4H PRN  Morphine 5 Mg SL Q3H PRN  Olanzapine 5 Mg twice daily PRN  Prochlorperazine 10 Mg Q4H PRN    Care coordination:  personally discussed assessment and recommendations with Dr. Early    History of Present Illness   Physician Requesting Consult: Ascencion Baldwin MD  Reason for Consult / Principal Problem: Dementia with behavorial disturbance   Hx and PE limited by: Dementia   Additional history obtained from: Chart review and patient evaluation, discussion with current medical teams, [facility] and [facility]    HPI: Gino Hernandez is a 83 y.o. year old male with ambulatory dysfunction and history of falls, BPH with LUTS, colon diverticulosis, insomnia, history of slow transit constipation and dementia with severe behavioral disturbances refractory to multiple medication regimens who is admitted to the medical service from his long-term care facility with persistent agitated behaviors related to his underlying dementia.  He was recently hospitalized 1/7/24-1/29/24 with same ultimately discharged to SNF enrolled with hospice services however despite ongoing medication regimen adjustments he has continued to have uncontrolled behaviors and per care facility is no longer welcome to return due to safety concerns for staff, other patients and family members. Per admission documentation he was recommended for consideration of [facility] given reportedly permanent discharge from multiple other care facilities in past due to similar safety concerns. At baseline he is alert with variable orientation, can usually hold basic conversation but is confused easily. He requires assist with ADLs and is dependent for iADLs. He does not consistently use assist device for ambulation, he does wear glasses and hearing aid, no reported use of dentures.     Inpatient consult to Gerontology  Consult performed by: Jesika Hill DO  Consult ordered by: Max Early DO      Review of Systems   Unable to perform ROS: Mental status change     Historical Information   Past Medical History:    Diagnosis Date    Anxiety     BPH (benign prostatic hyperplasia)     Chronic indwelling Choi catheter     Colon polyp     Dementia with behavioral disturbance (HCC)     Depression     Disorder of eye     disorder of choroid of eye    Diverticulitis of rectum     Frequent UTI     GERD (gastroesophageal reflux disease)     Infected hernioplasty mesh (HCC)     Malignant melanoma of eye, left (HCC)     Memory impairment     Mixed hyperlipidemia     Skin cancer (melanoma) (HCC)     Uveal, Caroidal    Urinary retention     Vision loss of left eye     Wears glasses      Past Surgical History:   Procedure Laterality Date    ABDOMINAL SURGERY      mesh removal    EGD      EGD AND COLONOSCOPY      EGD AND COLONOSCOPY  2022    HERNIA REPAIR      HERNIA REPAIR Right     Inguinal/Umbilical    ID RPR RECRT INGUINAL HERNIA ANY AGE REDUCIBLE Right 2023    Procedure: OPEN REPAIR OF RECURRENT REDUCIBLE RIGHT INGUINAL  HERNIAS WITH MESH;  Surgeon: Riley Chavez MD;  Location:  MAIN OR;  Service: General    SKIN CANCER EXCISION  2019    R upper cheek, benign lesion including margins, face, ears, eyelids, nose, lips, mucous membrane, excised diameter 2.1 to 3.0 CM     Social History   Social History     Substance and Sexual Activity   Alcohol Use Not Currently    Alcohol/week: 7.0 standard drinks of alcohol    Types: 7 Glasses of wine per week    Comment: 1 glass a night     Social History     Substance and Sexual Activity   Drug Use Not Currently    Comment: DENIES     Social History     Tobacco Use   Smoking Status Former    Current packs/day: 0.00    Average packs/day: 1 pack/day for 9.0 years (9.0 ttl pk-yrs)    Types: Cigarettes    Start date:     Quit date:     Years since quittin.1   Smokeless Tobacco Never   Tobacco Comments    QUIT IN      Family History:   Family History   Problem Relation Age of Onset    Alzheimer's disease Mother     No Known Problems Father     Dementia Mother       Meds/Allergies   all current active meds have been reviewed    Allergies   Allergen Reactions    Sulfa Antibiotics Other (See Comments)     Childhood- unknown    Sulfa Antibiotics Other (See Comments)     unKnown     Objective   No intake or output data in the 24 hours ending 02/08/24 0803  Invasive Devices       Peripheral Intravenous Line  Duration             Peripheral IV 02/08/24 Left;Proximal;Ventral (anterior) Forearm <1 day              Drain  Duration             Urethral Catheter 16 Fr. 93 days    Urethral Catheter 18 Fr. 18 days                  Physical Exam  Vitals and nursing note reviewed.   Constitutional:       General: He is not in acute distress.     Appearance: He is not toxic-appearing.      Comments: Elderly male appears stated age   HENT:      Head: Normocephalic.      Nose: Nose normal.      Mouth/Throat:      Mouth: Mucous membranes are moist.   Eyes:      General: No scleral icterus.        Right eye: No discharge.         Left eye: No discharge.      Conjunctiva/sclera: Conjunctivae normal.      Comments: Wearing glasses   Neck:      Comments: Trachea midline, phonation normal  Cardiovascular:      Rate and Rhythm: Normal rate and regular rhythm.   Pulmonary:      Effort: Pulmonary effort is normal. No respiratory distress.      Breath sounds: No wheezing.      Comments: Saturating well on room air  Abdominal:      General: There is no distension.      Palpations: Abdomen is soft.      Tenderness: There is no abdominal tenderness.      Comments: Soft waist belt Posey in place   Musculoskeletal:      Cervical back: Neck supple.      Right lower leg: No edema.      Left lower leg: No edema.      Comments: Reduced overall muscle mass   Skin:     General: Skin is warm and dry.   Neurological:      Mental Status: He is alert.      Comments: Awake and alert, oriented to self   Psychiatric:      Comments: Inattentive and restless, not overtly agitated or aggressive at time of eval       Lab  Results:     I have personally reviewed pertinent lab results including the following:    Results from last 7 days   Lab Units 02/08/24  0424 02/07/24  1317   WBC Thousand/uL 6.20 4.77   HEMOGLOBIN g/dL 12.8 10.8*   HEMATOCRIT % 37.9 33.3*   PLATELETS Thousands/uL 243 175   NEUTROS PCT %  --  76*   MONOS PCT %  --  8   EOS PCT %  --  1     Results from last 7 days   Lab Units 02/08/24  0424 02/07/24  1317   POTASSIUM mmol/L 3.5 3.9   CHLORIDE mmol/L 106 106   CO2 mmol/L 19* 27   BUN mg/dL 15 19   CREATININE mg/dL 0.83 0.85   CALCIUM mg/dL 9.1 8.5   ALK PHOS U/L  --  54   ALT U/L  --  13   AST U/L  --  13     I have personally reviewed the following imaging study reports in PACS:    1/7/24 - CXR, CT head without contrast, CT C-spine without contrast    Therapies:   PT: Pending  OT: Pending    VTE Prophylaxis: Enoxaparin (Lovenox)    Code Status: Level 3 - DNAR and DNI  Advance Directive and Living Will:      Power of :    POLST:      Family and Social Support:   Support Systems: Son; Spouse/significant other; Family members  Type of Current Residence: Facility (Currently unable to return home due to agression, no current housing or placement)  Discharge planning discussed with:: Reinaldo  Freedom of Choice: Yes    Goals of Care: better control of behaviors

## 2024-02-08 NOTE — PROGRESS NOTES
INTERNAL MEDICINE RESIDENCY PROGRESS NOTE     Name: Gino Hernandez   Age & Sex: 83 y.o. male   MRN: [id number]  Unit/Bed#: ED 07   Encounter: [id number]  Team: SOD Team C     PATIENT INFORMATION     Name: Gino Hernandez   Age & Sex: 83 y.o. male   MRN: [id number]  Hospital Stay Days: 1    ASSESSMENT/PLAN     Principal Problem:    Dementia with agitation and other behavioral disturbance (HCC)  Active Problems:    Constipation    BPH (benign prostatic hyperplasia)    Polypharmacy      * Dementia with agitation and other behavioral disturbance (HCC)  Assessment & Plan  Patient with history of progressive Alzheimer's dementia.  At baseline, patient may be alert and oriented to person, but not to place or time.  He holds conversations but does get confused at baseline.  Patient had recent admission in January 2024 patient has dementia with agitation and other behavioral disturbances.  Was seen in consultation by geriatrics for polypharmacy and dementia treatment at that time.  Following conclusion of hospitalization patient was discharged to hospice at [facility].  However, patient returned to ED at [facility] on 02/07 as patient was having behaviors and acting aggressive towards staff and other residents at [facility].  There were no longer able to care for him without a continuous one-to-one observer, which was not covered by insurance in which family could not afford.  Because of this, he was taken back to the ED to find placement at another facility.  Patient currently without any acute complaints or signs/symptoms of infection.  Per wife, patient has not been acting any different recently than he has since last discharge.  No apparent metabolic/infectious changes to explain behaviors at this time.  Likely just progression of underlying dementia.  Reviewed medicine reconciliation sent by [facility] at admission.  Continue PTA Depakote 250 mg 3 times daily.  Continue PTA  melatonin 3 mg nightly  Continue PTA mirtazapine 15 mg nightly  Continue PTA olanzapine 5 mg p.o. daily at 2 PM  Will DC home Seroquel per geriatric recommendation  Will start Abilify 2 mg daily per geriatric recommendation  Continue PTA vitamin B-12 supplementation  Continue PTA olanzapine 2.5 mg IM as needed for agitation  Continue PTA haloperidol 1 mg p.o. every 6 hours as needed for agitation unresponsive to as needed olanzapine  Geriatrics service consulted at this admission given patient's dementia with behavioral disturbances and polypharmacy.  Their recommendations are very much appreciated.    Polypharmacy  Assessment & Plan  Patient with history of progressive dementia.  Prior to admission medications include Depakote, melatonin, mirtazapine, olanzapine, quetiapine, Haldol as needed, olanzapine as needed.  These medications were reviewed at admission.  Medications currently being ministered at [facility] were continued.  Given patient's continued behavioral disturbances and extensive list of current medications, geriatric service is consulted and their recommendations are greatly appreciated.    BPH (benign prostatic hyperplasia)  Assessment & Plan  Patient with history of BPH.  Continue prior to admission finasteride 5 mg daily    Constipation  Assessment & Plan  Patient with history of constipation.  Bowel regimen ordered with Colace 100 mg twice daily and Senokot nightly.  Monitor for bowel movements.  May use MiraLAX if needed.        Disposition: Pending placement     SUBJECTIVE     Patient seen and examined. No acute events overnight.  Patient is a pleasant 83-year-old man resting well in bed.  He is alert and oriented to person.  Denies nausea, vomiting, dysuria, constipation, diarrhea.  discussed with one-to-one observer and she reports patient was calm throughout her shift with no behavioral concerns    OBJECTIVE     Vitals:    02/07/24 1134 02/07/24 1427 02/08/24 0620   BP: 97/53 149/91  133/62   BP Location:   Left arm   Pulse: 71 61 85   Resp: 16 16 16   Temp: 97.5 °F (36.4 °C)     TempSrc: Oral     SpO2: 100% 97% 98%      Temperature:   No data recorded.    Temperature: 97.5 °F (36.4 °C)  Intake & Output:  I/O         02/06 0701 02/07 0700 02/07 0701 02/08 0700 02/08 0701 02/09 0700           Unmeasured Urine Occurrence  1 x     Unmeasured Stool Occurrence  1 x           Weights:        There is no height or weight on file to calculate BMI.  Weight (last 2 days)       None          Physical Exam  Vitals and nursing note reviewed.   Constitutional:       General: He is not in acute distress.     Appearance: He is well-developed.   HENT:      Head: Normocephalic and atraumatic.   Eyes:      Conjunctiva/sclera: Conjunctivae normal.   Cardiovascular:      Rate and Rhythm: Normal rate and regular rhythm.      Heart sounds: No murmur heard.  Pulmonary:      Effort: Pulmonary effort is normal. No respiratory distress.      Breath sounds: Normal breath sounds.   Abdominal:      Palpations: Abdomen is soft.      Tenderness: There is no abdominal tenderness.   Musculoskeletal:         General: No swelling.      Cervical back: Neck supple.      Right lower leg: No edema.      Left lower leg: No edema.   Skin:     General: Skin is warm and dry.      Capillary Refill: Capillary refill takes less than 2 seconds.   Neurological:      Mental Status: He is alert. Mental status is at baseline. He is disoriented.      Comments: AO x 1   Psychiatric:         Mood and Affect: Mood normal.       LABORATORY DATA     Labs: I have personally reviewed pertinent reports.  Results from last 7 days   Lab Units 02/08/24  0424 02/07/24  1317   WBC Thousand/uL 6.20 4.77   HEMOGLOBIN g/dL 12.8 10.8*   HEMATOCRIT % 37.9 33.3*   PLATELETS Thousands/uL 243 175   NEUTROS PCT %  --  76*   MONOS PCT %  --  8   EOS PCT %  --  1      Results from last 7 days   Lab Units 02/08/24  0424 02/07/24  1317   POTASSIUM mmol/L 3.5 3.9  "  CHLORIDE mmol/L 106 106   CO2 mmol/L 19* 27   BUN mg/dL 15 19   CREATININE mg/dL 0.83 0.85   CALCIUM mg/dL 9.1 8.5   ALK PHOS U/L  --  54   ALT U/L  --  13   AST U/L  --  13                            IMAGING & DIAGNOSTIC TESTING     Radiology Results: I have personally reviewed pertinent reports.  No results found.  Other Diagnostic Testing: I have personally reviewed pertinent reports.    ACTIVE MEDICATIONS     Current Facility-Administered Medications   Medication Dose Route Frequency    acetaminophen (TYLENOL) tablet 650 mg  650 mg Oral Q6H PRN    ARIPiprazole (ABILIFY) tablet 2 mg  2 mg Oral Daily    cyanocobalamin (VITAMIN B-12) tablet 1,000 mcg  1,000 mcg Oral Daily    divalproex sodium (DEPAKOTE SPRINKLE) capsule 250 mg  250 mg Oral TID With Meals    docusate sodium (COLACE) capsule 100 mg  100 mg Oral BID    enoxaparin (LOVENOX) subcutaneous injection 40 mg  40 mg Subcutaneous Daily    finasteride (PROSCAR) tablet 5 mg  5 mg Oral Daily    haloperidol (HALDOL) tablet 1 mg  1 mg Oral Q6H PRN    LORazepam (ATIVAN) injection 0.5 mg  0.5 mg Intravenous Once    melatonin tablet 3 mg  3 mg Oral HS    mirtazapine (REMERON) tablet 15 mg  15 mg Oral HS    OLANZapine (ZyPREXA) IM injection 2.5 mg  2.5 mg Intramuscular Daily PRN    OLANZapine (ZyPREXA) tablet 5 mg  5 mg Oral Daily    senna-docusate sodium (SENOKOT S) 8.6-50 mg per tablet 1 tablet  1 tablet Oral HS       VTE Pharmacologic Prophylaxis: Enoxaparin (Lovenox)  VTE Mechanical Prophylaxis: sequential compression device    Portions of the record may have been created with voice recognition software.  Occasional wrong word or \"sound a like\" substitutions may have occurred due to the inherent limitations of voice recognition software.  Read the chart carefully and recognize, using context, where substitutions have occurred.  ==  Howard Hoffman DO  [facility]  Internal Medicine Residency PGY-1       "

## 2024-02-08 NOTE — PLAN OF CARE
Problem: Potential for Falls  Goal: Patient will remain free of falls  Description: INTERVENTIONS:  - Educate patient/family on patient safety including physical limitations  - Instruct patient to call for assistance with activity   - Consult OT/PT to assist with strengthening/mobility   - Keep Call bell within reach  - Keep bed low and locked with side rails adjusted as appropriate  - Keep care items and personal belongings within reach  - Initiate and maintain comfort rounds  - Make Fall Risk Sign visible to staff  - Offer Toileting every 2 Hours, in advance of need  - Initiate/Maintain bed alarm  - Obtain necessary fall risk management equipment:   - Apply yellow socks and bracelet for high fall risk patients  - Consider moving patient to room near nurses station  Outcome: Progressing     Problem: Nutrition/Hydration-ADULT  Goal: Nutrient/Hydration intake appropriate for improving, restoring or maintaining nutritional needs  Description: Monitor and assess patient's nutrition/hydration status for malnutrition. Collaborate with interdisciplinary team and initiate plan and interventions as ordered.  Monitor patient's weight and dietary intake as ordered or per policy. Utilize nutrition screening tool and intervene as necessary. Determine patient's food preferences and provide high-protein, high-caloric foods as appropriate.     INTERVENTIONS:  - Monitor oral intake, urinary output, labs, and treatment plans  - Assess nutrition and hydration status and recommend course of action  - Evaluate amount of meals eaten  - Assist patient with eating if necessary   - Allow adequate time for meals  - Recommend/ encourage appropriate diets, oral nutritional supplements, and vitamin/mineral supplements  - Order, calculate, and assess calorie counts as needed  - Recommend, monitor, and adjust tube feedings and TPN/PPN based on assessed needs  - Assess need for intravenous fluids  - Provide specific nutrition/hydration  education as appropriate  - Include patient/family/caregiver in decisions related to nutrition  Outcome: Progressing     Problem: SAFETY,RESTRAINT: NV/NON-SELF DESTRUCTIVE BEHAVIOR  Goal: Remains free of harm/injury (restraint for non violent/non self-detsructive behavior)  Description: INTERVENTIONS:  - Instruct patient/family regarding restraint use   - Assess and monitor physiologic and psychological status   - Provide interventions and comfort measures to meet assessed patient needs   - Identify and implement measures to help patient regain control  - Assess readiness for release of restraint   Outcome: Progressing  Goal: Returns to optimal restraint-free functioning  Description: INTERVENTIONS:  - Assess the patient's behavior and symptoms that indicate continued need for restraint  - Identify and implement measures to help patient regain control  - Assess readiness for release of restraint   Outcome: Progressing

## 2024-02-09 LAB
ATRIAL RATE: 66 BPM
P AXIS: 65 DEGREES
PR INTERVAL: 158 MS
QRS AXIS: -17 DEGREES
QRSD INTERVAL: 92 MS
QT INTERVAL: 418 MS
QTC INTERVAL: 438 MS
T WAVE AXIS: 48 DEGREES
VENTRICULAR RATE: 66 BPM

## 2024-02-09 PROCEDURE — 99232 SBSQ HOSP IP/OBS MODERATE 35: CPT | Performed by: INTERNAL MEDICINE

## 2024-02-09 PROCEDURE — 87081 CULTURE SCREEN ONLY: CPT | Performed by: INTERNAL MEDICINE

## 2024-02-09 RX ORDER — DIVALPROEX SODIUM 125 MG/1
125 CAPSULE, COATED PELLETS ORAL ONCE
Status: COMPLETED | OUTPATIENT
Start: 2024-02-09 | End: 2024-02-09

## 2024-02-09 RX ORDER — DIVALPROEX SODIUM 125 MG/1
125 CAPSULE, COATED PELLETS ORAL DAILY
Status: DISCONTINUED | OUTPATIENT
Start: 2024-02-09 | End: 2024-02-22 | Stop reason: HOSPADM

## 2024-02-09 RX ADMIN — OLANZAPINE 5 MG: 10 TABLET, FILM COATED ORAL at 13:54

## 2024-02-09 RX ADMIN — ARIPIPRAZOLE 2 MG: 2 TABLET ORAL at 08:57

## 2024-02-09 RX ADMIN — CYANOCOBALAMIN TAB 500 MCG 1000 MCG: 500 TAB at 08:58

## 2024-02-09 RX ADMIN — DIVALPROEX SODIUM 250 MG: 125 CAPSULE, COATED PELLETS ORAL at 08:58

## 2024-02-09 RX ADMIN — FINASTERIDE 5 MG: 5 TABLET, FILM COATED ORAL at 08:58

## 2024-02-09 RX ADMIN — DIVALPROEX SODIUM 250 MG: 125 CAPSULE, COATED PELLETS ORAL at 17:37

## 2024-02-09 RX ADMIN — MIRTAZAPINE 15 MG: 15 TABLET, FILM COATED ORAL at 21:00

## 2024-02-09 RX ADMIN — Medication 3 MG: at 21:00

## 2024-02-09 RX ADMIN — DIVALPROEX SODIUM 250 MG: 125 CAPSULE, COATED PELLETS ORAL at 12:18

## 2024-02-09 RX ADMIN — DIVALPROEX SODIUM 125 MG: 125 CAPSULE, COATED PELLETS ORAL at 03:43

## 2024-02-09 RX ADMIN — DIVALPROEX SODIUM 125 MG: 125 CAPSULE, COATED PELLETS ORAL at 17:37

## 2024-02-09 RX ADMIN — ENOXAPARIN SODIUM 40 MG: 40 INJECTION SUBCUTANEOUS at 08:57

## 2024-02-09 RX ADMIN — HALOPERIDOL 1 MG: 1 TABLET ORAL at 00:00

## 2024-02-09 NOTE — PROGRESS NOTES
.      INTERNAL MEDICINE RESIDENCY PROGRESS NOTE     Name: Gino Hernandez   Age & Sex: 83 y.o. male   MRN: [id number]  [facility]   Encounter: [id number]  Team: SOD Team C     PATIENT INFORMATION     Name: Gino Hernandez   Age & Sex: 83 y.o. male   MRN: [id number]  Hospital Stay Days: 2    ASSESSMENT/PLAN     Principal Problem:    Dementia with agitation and other behavioral disturbance (HCC)  Active Problems:    Constipation    BPH (benign prostatic hyperplasia)    Polypharmacy      * Dementia with agitation and other behavioral disturbance (HCC)  Assessment & Plan  Patient with history of progressive Alzheimer's dementia.  At baseline, patient may be alert and oriented to person, but not to place or time.  He holds conversations but does get confused at baseline.  Patient had recent admission in January 2024 patient has dementia with agitation and other behavioral disturbances.  Was seen in consultation by geriatrics for polypharmacy and dementia treatment at that time.  Following conclusion of hospitalization patient was discharged to hospice at [facility].  However, patient returned to ED at [facility] on 02/07 as patient was having behaviors and acting aggressive towards staff and other residents at [facility].  There were no longer able to care for him without a continuous one-to-one observer, which was not covered by insurance in which family could not afford.  Because of this, he was taken back to the ED to find placement at another facility.  Patient currently without any acute complaints or signs/symptoms of infection.  Per wife, patient has not been acting any different recently than he has since last discharge.  No apparent metabolic/infectious changes to explain behaviors at this time.  Likely just progression of underlying dementia.  Reviewed medicine reconciliation sent by [facility] at admission.  Continue PTA Depakote 250 mg 3 times daily. Will  schedule extra 125 mg dose with PM dose  Continue PTA melatonin 3 mg nightly  Continue PTA mirtazapine 15 mg nightly  Continue PTA olanzapine 5 mg p.o. daily at 2 PM  Will DC home Seroquel per geriatric recommendation  Will start Abilify 2 mg daily per geriatric recommendation  Continue PTA vitamin B-12 supplementation  Continue PTA olanzapine 2.5 mg IM as needed for agitation  Continue PTA haloperidol 1 mg p.o. every 6 hours as needed for agitation unresponsive to as needed olanzapine  Geriatrics service consulted at this admission given patient's dementia with behavioral disturbances and polypharmacy.  Their recommendations are very much appreciated.    Polypharmacy  Assessment & Plan  Patient with history of progressive dementia.  Prior to admission medications include Depakote, melatonin, mirtazapine, olanzapine, quetiapine, Haldol as needed, olanzapine as needed.  These medications were reviewed at admission.  Medications currently being ministered at [facility] were continued.  Given patient's continued behavioral disturbances and extensive list of current medications, geriatric service is consulted and their recommendations are greatly appreciated.    BPH (benign prostatic hyperplasia)  Assessment & Plan  Patient with history of BPH.  Continue prior to admission finasteride 5 mg daily    Constipation  Assessment & Plan  Patient with history of constipation.  Bowel regimen ordered with Colace 100 mg twice daily and Senokot nightly.  Monitor for bowel movements.  May use MiraLAX if needed.        Disposition: Being optioned     SUBJECTIVE     Patient seen and examined. No acute events overnight. Patient is pleasant on my examination. He is in mitts and posey from overnight due to behavioral disturbances. Patient is pleasantly demented.    OBJECTIVE     Vitals:    02/08/24 1727 02/08/24 2231 02/09/24 0719 02/09/24 1447   BP: 118/78 126/66 122/56 134/69   BP Location:       Pulse: 78 72 56 88   Resp: 16  16 17 18   Temp: 98.2 °F (36.8 °C) 98.3 °F (36.8 °C) 98.2 °F (36.8 °C) (!) 97.4 °F (36.3 °C)   TempSrc:       SpO2: 100% 100% 98% 96%      Temperature:   Temp (24hrs), Av °F (36.7 °C), Min:97.4 °F (36.3 °C), Max:98.3 °F (36.8 °C)    Temperature: (!) 97.4 °F (36.3 °C)  Intake & Output:  I/O          07 07 07 07 0701  02/10 07    P.O.  150 460    Total Intake  150 460    Urine  600 300    Total Output  600 300    Net  -450 +160           Unmeasured Urine Occurrence 1 x      Unmeasured Stool Occurrence 1 x            Weights:        There is no height or weight on file to calculate BMI.  Weight (last 2 days)       None          Physical Exam  Vitals and nursing note reviewed.   Constitutional:       General: He is not in acute distress.     Appearance: He is well-developed.   HENT:      Head: Normocephalic and atraumatic.   Eyes:      Conjunctiva/sclera: Conjunctivae normal.   Cardiovascular:      Rate and Rhythm: Normal rate and regular rhythm.      Heart sounds: No murmur heard.  Pulmonary:      Effort: Pulmonary effort is normal. No respiratory distress.      Breath sounds: Normal breath sounds.   Abdominal:      Palpations: Abdomen is soft.      Tenderness: There is no abdominal tenderness.   Genitourinary:     Comments: Choi in place  Musculoskeletal:         General: No swelling.      Cervical back: Neck supple.      Right lower leg: No edema.      Left lower leg: No edema.   Skin:     General: Skin is warm and dry.      Capillary Refill: Capillary refill takes less than 2 seconds.   Neurological:      Mental Status: He is alert. Mental status is at baseline. He is disoriented.      Comments: AO x 1   Psychiatric:         Mood and Affect: Mood normal.       LABORATORY DATA     Labs: I have personally reviewed pertinent reports.  Results from last 7 days   Lab Units 24  0424 24  1317   WBC Thousand/uL 6.20 4.77   HEMOGLOBIN g/dL 12.8 10.8*   HEMATOCRIT %  "37.9 33.3*   PLATELETS Thousands/uL 243 175   NEUTROS PCT %  --  76*   MONOS PCT %  --  8   EOS PCT %  --  1      Results from last 7 days   Lab Units 02/08/24  0424 02/07/24  1317   POTASSIUM mmol/L 3.5 3.9   CHLORIDE mmol/L 106 106   CO2 mmol/L 19* 27   BUN mg/dL 15 19   CREATININE mg/dL 0.83 0.85   CALCIUM mg/dL 9.1 8.5   ALK PHOS U/L  --  54   ALT U/L  --  13   AST U/L  --  13                            IMAGING & DIAGNOSTIC TESTING     Radiology Results: I have personally reviewed pertinent reports.  No results found.  Other Diagnostic Testing: I have personally reviewed pertinent reports.    ACTIVE MEDICATIONS     Current Facility-Administered Medications   Medication Dose Route Frequency    acetaminophen (TYLENOL) tablet 650 mg  650 mg Oral Q6H PRN    ARIPiprazole (ABILIFY) tablet 2 mg  2 mg Oral Daily    cyanocobalamin (VITAMIN B-12) tablet 1,000 mcg  1,000 mcg Oral Daily    divalproex sodium (DEPAKOTE SPRINKLE) capsule 125 mg  125 mg Oral Daily    divalproex sodium (DEPAKOTE SPRINKLE) capsule 250 mg  250 mg Oral TID With Meals    docusate sodium (COLACE) capsule 100 mg  100 mg Oral BID    enoxaparin (LOVENOX) subcutaneous injection 40 mg  40 mg Subcutaneous Daily    finasteride (PROSCAR) tablet 5 mg  5 mg Oral Daily    haloperidol (HALDOL) tablet 1 mg  1 mg Oral Q6H PRN    LORazepam (ATIVAN) injection 0.5 mg  0.5 mg Intravenous Once    melatonin tablet 3 mg  3 mg Oral HS    mirtazapine (REMERON) tablet 15 mg  15 mg Oral HS    OLANZapine (ZyPREXA) IM injection 2.5 mg  2.5 mg Intramuscular Daily PRN    OLANZapine (ZyPREXA) tablet 5 mg  5 mg Oral Daily    senna-docusate sodium (SENOKOT S) 8.6-50 mg per tablet 1 tablet  1 tablet Oral HS       VTE Pharmacologic Prophylaxis: Enoxaparin (Lovenox)  VTE Mechanical Prophylaxis: sequential compression device    Portions of the record may have been created with voice recognition software.  Occasional wrong word or \"sound a like\" substitutions may have occurred due to " the inherent limitations of voice recognition software.  Read the chart carefully and recognize, using context, where substitutions have occurred.  ==  Howard Hoffman DO  [facility]  Internal Medicine Residency PGY-1

## 2024-02-09 NOTE — PLAN OF CARE
Problem: Nutrition/Hydration-ADULT  Goal: Nutrient/Hydration intake appropriate for improving, restoring or maintaining nutritional needs  Description: Monitor and assess patient's nutrition/hydration status for malnutrition. Collaborate with interdisciplinary team and initiate plan and interventions as ordered.  Monitor patient's weight and dietary intake as ordered or per policy. Utilize nutrition screening tool and intervene as necessary. Determine patient's food preferences and provide high-protein, high-caloric foods as appropriate.     INTERVENTIONS:  - Monitor oral intake, urinary output, labs, and treatment plans  - Assess nutrition and hydration status and recommend course of action  - Evaluate amount of meals eaten  - Assist patient with eating if necessary   - Allow adequate time for meals  - Recommend/ encourage appropriate diets, oral nutritional supplements, and vitamin/mineral supplements  - Order, calculate, and assess calorie counts as needed  - Recommend, monitor, and adjust tube feedings and TPN/PPN based on assessed needs  - Assess need for intravenous fluids  - Provide specific nutrition/hydration education as appropriate  - Include patient/family/caregiver in decisions related to nutrition  Outcome: Progressing     Problem: SAFETY,RESTRAINT: NV/NON-SELF DESTRUCTIVE BEHAVIOR  Goal: Remains free of harm/injury (restraint for non violent/non self-detsructive behavior)  Description: INTERVENTIONS:  - Instruct patient/family regarding restraint use   - Assess and monitor physiologic and psychological status   - Provide interventions and comfort measures to meet assessed patient needs   - Identify and implement measures to help patient regain control  - Assess readiness for release of restraint   Outcome: Progressing  Goal: Returns to optimal restraint-free functioning  Description: INTERVENTIONS:  - Assess the patient's behavior and symptoms that indicate continued need for restraint  - Identify  and implement measures to help patient regain control  - Assess readiness for release of restraint   Outcome: Progressing     Problem: Prexisting or High Potential for Compromised Skin Integrity  Goal: Skin integrity is maintained or improved  Description: INTERVENTIONS:  - Identify patients at risk for skin breakdown  - Assess and monitor skin integrity  - Assess and monitor nutrition and hydration status  - Monitor labs   - Assess for incontinence   - Turn and reposition patient  - Assist with mobility/ambulation  - Relieve pressure over bony prominences  - Avoid friction and shearing  - Provide appropriate hygiene as needed including keeping skin clean and dry  - Evaluate need for skin moisturizer/barrier cream  - Collaborate with interdisciplinary team   - Patient/family teaching  - Consider wound care consult   Outcome: Progressing

## 2024-02-09 NOTE — CASE MANAGEMENT
Case Management Discharge Planning Note    Patient name Gino Hernandez  Location [facility] 834/[facility] 834-01 MRN [id number]  : 1940 Date 2024       Current Admission Date: 2024  Current Admission Diagnosis:Dementia with agitation and other behavioral disturbance (HCC)   Patient Active Problem List    Diagnosis Date Noted    Metabolic encephalopathy 2024    UTI (urinary tract infection) 2024    Polypharmacy 2024    Ambulatory dysfunction 2024    Dementia with agitation and other behavioral disturbance (HCC) 2024    Neuropathy 2024    Constipation 2024    BPH (benign prostatic hyperplasia) 2024    Abnormal urine finding 2024    Delirium 2023    H/O inguinal hernia repair 2023    Benign prostatic hyperplasia with urinary obstruction 2023    Sherwood's esophagus with dysplasia 2023    History of colonic diverticulitis 2022    Slow transit constipation 2022    Pancreas cyst 2021    Schatzki's ring 2021    Gastric polyps 2021    Hx of adenomatous colonic polyps 2021    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    Insomnia 2020      LOS (days): 2  Geometric Mean LOS (GMLOS) (days): 5  Days to GMLOS:3.1     OBJECTIVE:  Risk of Unplanned Readmission Score: 25.82         Current admission status: Inpatient   Preferred Pharmacy:   [id number] [place] - [address]  [address] [address]  Phone: [phone] Fax: [phone]    [facility] [place] - [address]  [address] [address]  Phone: [phone] Fax: [phone]    [facility] [place] - [address]  [address] [facility] [address]  Phone: [phone] Fax: [phone]    Primary Care Provider: Augusta Davis MD    Primary Insurance: MEDICARE  Secondary Insurance:  FOR LIFE    DISCHARGE DETAILS:        Pt  transferred to p8 from ED. Spoke to  Silvia Olivo with [facility] ( [phone] ) who reports [facility] not an [facility] as patient is not/ never was service connected.   CM contacted [facility] and left message for Director, Alexandra to call back. Per Alexandra, patient was unenrolled on 2/8, and a referral was made to [facility]. ( George Learner: [phone]).     CM placed call to George Childers regarding patient- provided admission details.  Per George, CM contact information was provided to his  who will be outreaching CM to set up a time to evaluate patient for appropriateness for facility.     Additionally, placed SNF referrals- optioning process started, waiting on provider to complete MA 51 paperwork.

## 2024-02-09 NOTE — PHYSICAL THERAPY NOTE
PHYSICAL THERAPY NOTE          Patient Name: Gino Hernandez  Today's Date: 2/9/2024 02/09/24 0926   PT Last Visit   PT Visit Date 02/09/24   Note Type   Note type Screen       PT orders received and chart reviewed. Pt from SNF with hospice services. Plan for patient to return to a similar level of care with continued hospice services. PT plan to sign off of inpatient PT at this time. Thank you.    Rose Mary Marin, PT, DPT

## 2024-02-09 NOTE — RESTORATIVE TECHNICIAN NOTE
Restorative Technician Note      Patient Name: Gino Hernandez     Restorative Tech Visit Date: 02/09/24  Note Type: Mobility  Patient Position Upon Consult: Supine (responded to bed alarm)  Activity Performed: Repositioned  Education Provided: Yes  Patient Position at End of Consult: Supine; All needs within reach; Bed/Chair alarm activated    Sharon Ching  DPT, Restorative Technician

## 2024-02-09 NOTE — PROGRESS NOTES
Progress Note - Geriatric Medicine   Gino Hernandez 83 y.o. male MRN: [id number]  Unit/Bed#: [facility] 834-01 Encounter: [id number]      Assessment/Plan:    Dementia with agitation and behavioral disturbance  -moderate to severe and progressive with difficult to manage behaviors prompting recent enrollment onto hospice   -initially diagnosed by Neurology as o/p in , symptoms prominent for at least one year prior to diagnosis   -strong family hx, mother had dementia  in 60s  -at baseline requiring assist with ADLs, dependent for iADLs  -MoCA  (2023)  -CTH 24 images personally viewed, moderate/severe diffuse chronic microangiopathic changes appreciated  -TSH WNL 1.916, B12 WNL at 882 on daily supplementation  -refractory behaviors despite extensive medication regimen as outlined below now enrolled on hospice for same  -given refractory agitated behaviors recommend trial of Ability 2mg daily in place of Seroquel, if tolerates can consider increasing to 4mg and would limit use of other antipsychotics as possible, monitor mentation and QTc with use   -consider increasing evening depakote dose to 375mg, keep daytime Depakote dosing same, recommend checking CBC, LFTs and Depakote level 24-48H after dose increase   -monitor for pain/discomfort and fecal/urinary retention and tx if present as all may be precipitators of behaviors   -encourage calm quiet environment to reduce risk overstimulation   -redirect unwanted behaviors as first line and wean restraints as safely possible, direct one to one observation preferred to physical restraints as physical restraints may actually exacerbate behaviors   -encourage calm quiet env to reduce risk overstimulation     Hospice patient   -discharged to SNF with hospice services from recent prolonged hospitalization   -currently with symptom directed cares     Insomnia  -maintained on mirtazapine and melatonin chronically as o/p, cont current regimen   -Encourage  consistent sleep/wake times and establishment of bedtime routine  -Minimize daytime naps and encourage activity earlier in morning to reduce risk of late afternoon/early evening activity/stimulation disrupting normal circadian rhythm     BPH with LUTS  -Continue home finasteride regimen  -Recommend urinary retention protocol     Constipation   -hx recurrent constipation, continue bowel regimen  -Encourage hydration and mobilization to encourage bowel motility      Impaired Vision  -continue use of corrective lenses at all appropriate times  -Consider large font for printed materials provided to patient     Impaired Hearing  -Encourage use of hearing aids at all appropriate times  -encourage providers and caregivers to speak slowly and clearly directly to patient  -minimize background noise to encourage patient engagement  -Encourage use of teach back method to ensure clear communication     Frailty syndrome in geriatric patient  -clinical frailty scale stage V, mildly frail  -Multifactorial including age, ambulatory dysfunction with history of falls, advanced dementia with behavioral disturbance and multiple additional chronic medical co-morbidities  -Encourage well-balanced nutrition  -Continue optimization of chronic medical conditions and address acute metabolic derangements as arise   -Continue psychosocial supports of patient and caregivers  -although no plan for patient to return home spouse may still benefit from referral to caregiver support group for overall support and coping given sudden progression and severity of symptoms       Delirium precautions  -Patient is high risk of delirium due to age, dementia, hospitalization, polypharmacy   -continue delirium precautions  -encourage normal sleep/wake cycle  -encourage early mobilization and ambulation with assist as appropriate to do so, per records patient greatly enjoys walking and frequent walks throughout the day reportedly helped some with behaviors  during prior admissions  -provide frequent reorientation and redirection as indicated and appropriate   -encourage hydration and nutrition     Care coordination: rounded with Sierra (RN)    Subjective:     Ed is seen and examined at bedside where he is lying resting, he is pleasantly confused but very restless and inattentive unable to follow directions. Nursing reports same for most of the evening, no other acute events reported.    Review of Systems   Unable to perform ROS: Dementia     Objective:     Vitals: Blood pressure 126/66, pulse 72, temperature 98.3 °F (36.8 °C), resp. rate 16, SpO2 100%.,There is no height or weight on file to calculate BMI.      Intake/Output Summary (Last 24 hours) at 2/9/2024 0632  Last data filed at 2/9/2024 0346  Gross per 24 hour   Intake 150 ml   Output 600 ml   Net -450 ml     Current Medications: Reviewed    Physical Exam:   Physical Exam  Vitals and nursing note reviewed.   Constitutional:       General: He is not in acute distress.     Comments: Thin frail elderly male    HENT:      Head: Normocephalic.      Comments: Wearing glasses      Nose: Nose normal.      Mouth/Throat:      Mouth: Mucous membranes are moist.   Eyes:      General:         Right eye: No discharge.         Left eye: No discharge.   Neck:      Comments: Phonation norm  Cardiovascular:      Rate and Rhythm: Normal rate and regular rhythm.   Pulmonary:      Effort: Pulmonary effort is normal. No respiratory distress.      Breath sounds: No wheezing.   Abdominal:      General: There is no distension.      Palpations: Abdomen is soft.      Comments: Soft waist posey in place    Musculoskeletal:      Cervical back: Neck supple.      Right lower leg: No edema.      Left lower leg: No edema.      Comments: Diffuse severe subcutaneous fat and muscle wasting     Bilateral soft mitt and wrist restraints in place   Skin:     General: Skin is warm and dry.   Neurological:      Mental Status: He is alert.      Comments:  Awake and alert, confused, answers questions but answers not appropriate for questions asked, appears to be conversing with self at times    Psychiatric:         Judgment: Judgment is impulsive.      Comments: restless and inattentive, but polite        Invasive Devices       Peripheral Intravenous Line  Duration             Peripheral IV 02/08/24 Left;Proximal;Ventral (anterior) Forearm 1 day              Drain  Duration             Urethral Catheter 16 Fr. 94 days    Urethral Catheter 18 Fr. 19 days                  Lab Results:     I have personally reviewed pertinent lab results including the following:    Results from last 7 days   Lab Units 02/08/24  0424 02/07/24  1317   WBC Thousand/uL 6.20 4.77   HEMOGLOBIN g/dL 12.8 10.8*   HEMATOCRIT % 37.9 33.3*   PLATELETS Thousands/uL 243 175   NEUTROS PCT %  --  76*   MONOS PCT %  --  8   EOS PCT %  --  1     Results from last 7 days   Lab Units 02/08/24  0424 02/07/24  1317   POTASSIUM mmol/L 3.5 3.9   CHLORIDE mmol/L 106 106   CO2 mmol/L 19* 27   BUN mg/dL 15 19   CREATININE mg/dL 0.83 0.85   CALCIUM mg/dL 9.1 8.5   ALK PHOS U/L  --  54   ALT U/L  --  13   AST U/L  --  13     I have personally reviewed the following imaging study reports in PACS:    1/7/24- CTH, CT C-spine

## 2024-02-09 NOTE — OCCUPATIONAL THERAPY NOTE
Occupational Therapy         Patient Name: Gino Hernandez  Today's Date: 2/9/2024 02/09/24 1200   OT Last Visit   OT Visit Date 02/09/24   Note Type   Note type Screen       OT orders received and chart reviewed. Pt on hospice care prior to admission. Pt with dementia and assist for all ADLs PLOF. No skilled acute care OT needs identified at this time. Will sign off OT. Please re-consult if skilled OT needs arise.     DENNYS Gayle, OTR/L

## 2024-02-10 LAB
ANION GAP SERPL CALCULATED.3IONS-SCNC: 4 MMOL/L
BUN SERPL-MCNC: 13 MG/DL (ref 5–25)
CALCIUM SERPL-MCNC: 8.5 MG/DL (ref 8.4–10.2)
CHLORIDE SERPL-SCNC: 108 MMOL/L (ref 96–108)
CO2 SERPL-SCNC: 27 MMOL/L (ref 21–32)
CREAT SERPL-MCNC: 0.88 MG/DL (ref 0.6–1.3)
ERYTHROCYTE [DISTWIDTH] IN BLOOD BY AUTOMATED COUNT: 14.5 % (ref 11.6–15.1)
GFR SERPL CREATININE-BSD FRML MDRD: 79 ML/MIN/1.73SQ M
GLUCOSE SERPL-MCNC: 77 MG/DL (ref 65–140)
HCT VFR BLD AUTO: 37.7 % (ref 36.5–49.3)
HGB BLD-MCNC: 12.5 G/DL (ref 12–17)
MCH RBC QN AUTO: 29.6 PG (ref 26.8–34.3)
MCHC RBC AUTO-ENTMCNC: 33.2 G/DL (ref 31.4–37.4)
MCV RBC AUTO: 89 FL (ref 82–98)
PLATELET # BLD AUTO: 168 THOUSANDS/UL (ref 149–390)
PMV BLD AUTO: 12.3 FL (ref 8.9–12.7)
POTASSIUM SERPL-SCNC: 4.1 MMOL/L (ref 3.5–5.3)
RBC # BLD AUTO: 4.22 MILLION/UL (ref 3.88–5.62)
SODIUM SERPL-SCNC: 139 MMOL/L (ref 135–147)
VALPROATE SERPL-MCNC: 53 UG/ML (ref 50–100)
WBC # BLD AUTO: 3.94 THOUSAND/UL (ref 4.31–10.16)

## 2024-02-10 PROCEDURE — 80048 BASIC METABOLIC PNL TOTAL CA: CPT

## 2024-02-10 PROCEDURE — 99232 SBSQ HOSP IP/OBS MODERATE 35: CPT | Performed by: INTERNAL MEDICINE

## 2024-02-10 PROCEDURE — 85027 COMPLETE CBC AUTOMATED: CPT

## 2024-02-10 PROCEDURE — 80164 ASSAY DIPROPYLACETIC ACD TOT: CPT

## 2024-02-10 RX ORDER — POLYETHYLENE GLYCOL 3350 17 G/17G
17 POWDER, FOR SOLUTION ORAL DAILY
Status: DISCONTINUED | OUTPATIENT
Start: 2024-02-10 | End: 2024-02-15

## 2024-02-10 RX ORDER — WATER 10 ML/10ML
INJECTION INTRAMUSCULAR; INTRAVENOUS; SUBCUTANEOUS
Status: COMPLETED
Start: 2024-02-10 | End: 2024-02-11

## 2024-02-10 RX ADMIN — SENNOSIDES, DOCUSATE SODIUM 1 TABLET: 8.6; 5 TABLET ORAL at 21:50

## 2024-02-10 RX ADMIN — Medication 3 MG: at 21:50

## 2024-02-10 RX ADMIN — OLANZAPINE 5 MG: 10 TABLET, FILM COATED ORAL at 14:41

## 2024-02-10 RX ADMIN — MIRTAZAPINE 15 MG: 15 TABLET, FILM COATED ORAL at 21:50

## 2024-02-10 RX ADMIN — FINASTERIDE 5 MG: 5 TABLET, FILM COATED ORAL at 08:35

## 2024-02-10 RX ADMIN — ENOXAPARIN SODIUM 40 MG: 40 INJECTION SUBCUTANEOUS at 08:35

## 2024-02-10 RX ADMIN — POLYETHYLENE GLYCOL 3350 17 G: 17 POWDER, FOR SOLUTION ORAL at 10:33

## 2024-02-10 RX ADMIN — ARIPIPRAZOLE 2 MG: 2 TABLET ORAL at 08:35

## 2024-02-10 RX ADMIN — DIVALPROEX SODIUM 250 MG: 125 CAPSULE, COATED PELLETS ORAL at 08:34

## 2024-02-10 RX ADMIN — DIVALPROEX SODIUM 250 MG: 125 CAPSULE, COATED PELLETS ORAL at 17:05

## 2024-02-10 RX ADMIN — DIVALPROEX SODIUM 250 MG: 125 CAPSULE, COATED PELLETS ORAL at 12:18

## 2024-02-10 RX ADMIN — DOCUSATE SODIUM 100 MG: 100 CAPSULE, LIQUID FILLED ORAL at 08:35

## 2024-02-10 RX ADMIN — OLANZAPINE 2.5 MG: 10 INJECTION, POWDER, FOR SOLUTION INTRAMUSCULAR at 23:42

## 2024-02-10 RX ADMIN — DOCUSATE SODIUM 100 MG: 100 CAPSULE, LIQUID FILLED ORAL at 17:04

## 2024-02-10 RX ADMIN — CYANOCOBALAMIN TAB 500 MCG 1000 MCG: 500 TAB at 08:35

## 2024-02-10 RX ADMIN — DIVALPROEX SODIUM 125 MG: 125 CAPSULE, COATED PELLETS ORAL at 17:05

## 2024-02-10 NOTE — PLAN OF CARE
Problem: Potential for Falls  Goal: Patient will remain free of falls  Description: INTERVENTIONS:  - Educate patient/family on patient safety including physical limitations  - Instruct patient to call for assistance with activity   - Consult OT/PT to assist with strengthening/mobility   - Keep Call bell within reach  - Keep bed low and locked with side rails adjusted as appropriate  - Keep care items and personal belongings within reach  - Initiate and maintain comfort rounds  - Make Fall Risk Sign visible to staff  - Offer Toileting every 2 Hours, in advance of need  - Initiate/Maintain bed alarm  - Apply yellow socks and bracelet for high fall risk patients  - Consider moving patient to room near nurses station  Outcome: Progressing     Problem: Nutrition/Hydration-ADULT  Goal: Nutrient/Hydration intake appropriate for improving, restoring or maintaining nutritional needs  Description: Monitor and assess patient's nutrition/hydration status for malnutrition. Collaborate with interdisciplinary team and initiate plan and interventions as ordered.  Monitor patient's weight and dietary intake as ordered or per policy. Utilize nutrition screening tool and intervene as necessary. Determine patient's food preferences and provide high-protein, high-caloric foods as appropriate.     INTERVENTIONS:  - Monitor oral intake, urinary output, labs, and treatment plans  - Assess nutrition and hydration status and recommend course of action  - Evaluate amount of meals eaten  - Assist patient with eating if necessary   - Allow adequate time for meals  - Recommend/ encourage appropriate diets, oral nutritional supplements, and vitamin/mineral supplements  - Order, calculate, and assess calorie counts as needed  - Recommend, monitor, and adjust tube feedings and TPN/PPN based on assessed needs  - Assess need for intravenous fluids  - Provide specific nutrition/hydration education as appropriate  - Include patient/family/caregiver  in decisions related to nutrition  Outcome: Progressing     Problem: SAFETY,RESTRAINT: NV/NON-SELF DESTRUCTIVE BEHAVIOR  Goal: Remains free of harm/injury (restraint for non violent/non self-detsructive behavior)  Description: INTERVENTIONS:  - Instruct patient/family regarding restraint use   - Assess and monitor physiologic and psychological status   - Provide interventions and comfort measures to meet assessed patient needs   - Identify and implement measures to help patient regain control  - Assess readiness for release of restraint   Outcome: Progressing  Goal: Returns to optimal restraint-free functioning  Description: INTERVENTIONS:  - Assess the patient's behavior and symptoms that indicate continued need for restraint  - Identify and implement measures to help patient regain control  - Assess readiness for release of restraint   Outcome: Progressing     Problem: Prexisting or High Potential for Compromised Skin Integrity  Goal: Skin integrity is maintained or improved  Description: INTERVENTIONS:  - Identify patients at risk for skin breakdown  - Assess and monitor skin integrity  - Assess and monitor nutrition and hydration status  - Monitor labs   - Assess for incontinence   - Turn and reposition patient  - Assist with mobility/ambulation  - Relieve pressure over bony prominences  - Avoid friction and shearing  - Provide appropriate hygiene as needed including keeping skin clean and dry  - Evaluate need for skin moisturizer/barrier cream  - Collaborate with interdisciplinary team   - Patient/family teaching  - Consider wound care consult   Outcome: Progressing

## 2024-02-10 NOTE — PROGRESS NOTES
INTERNAL MEDICINE RESIDENCY PROGRESS NOTE     Name: Gino Hernandez   Age & Sex: 83 y.o. male   MRN: [id number]  Unit/Bed#: [facility] 834-01   Encounter: [id number]  Team: SOD Team C     PATIENT INFORMATION     Name: Gino Hernandez   Age & Sex: 83 y.o. male   MRN: [id number]  Hospital Stay Days: 3    ASSESSMENT/PLAN     Principal Problem:    Dementia with agitation and other behavioral disturbance (HCC)  Active Problems:    Constipation    BPH (benign prostatic hyperplasia)    Polypharmacy      * Dementia with agitation and other behavioral disturbance (HCC)  Assessment & Plan  Patient with history of progressive Alzheimer's dementia.  At baseline, patient may be alert and oriented to person, but not to place or time.  He holds conversations but does get confused at baseline.  Patient had recent admission in January 2024 patient has dementia with agitation and other behavioral disturbances.  Was seen in consultation by geriatrics for polypharmacy and dementia treatment at that time.  Following conclusion of hospitalization patient was discharged to hospice at [facility].  However, patient returned to ED at [facility] on 02/07 as patient was having behaviors and acting aggressive towards staff and other residents at [facility].  There were no longer able to care for him without a continuous one-to-one observer, which was not covered by insurance in which family could not afford.  Because of this, he was taken back to the ED to find placement at another facility.  Patient currently without any acute complaints or signs/symptoms of infection.  Per wife, patient has not been acting any different recently than he has since last discharge.  No apparent metabolic/infectious changes to explain behaviors at this time.  Likely just progression of underlying dementia.  Reviewed medicine reconciliation sent by [facility] at admission.  Continue PTA Depakote 250 mg 3 times daily. Will  schedule extra 125 mg dose with PM dose. Patient's behavior was well controlled overnight with medication alteration  Continue PTA melatonin 3 mg nightly  Continue PTA mirtazapine 15 mg nightly  Continue PTA olanzapine 5 mg p.o. daily at 2 PM  Will DC home Seroquel per geriatric recommendation  Continue Abilify 2 mg daily per geriatric recommendation  Continue PTA vitamin B-12 supplementation  Continue PTA olanzapine 2.5 mg IM as needed for agitation  Continue PTA haloperidol 1 mg p.o. every 6 hours as needed for agitation unresponsive to as needed olanzapine  Geriatrics service consulted at this admission given patient's dementia with behavioral disturbances and polypharmacy.  Their recommendations are very much appreciated.    Polypharmacy  Assessment & Plan  Patient with history of progressive dementia.  Prior to admission medications include Depakote, melatonin, mirtazapine, olanzapine, quetiapine, Haldol as needed, olanzapine as needed.  These medications were reviewed at admission.  Medications currently being ministered at [facility] were continued.  Given patient's continued behavioral disturbances and extensive list of current medications, geriatric service is consulted and their recommendations are greatly appreciated.    BPH (benign prostatic hyperplasia)  Assessment & Plan  Patient with history of BPH.  Continue prior to admission finasteride 5 mg daily    Constipation  Assessment & Plan  Patient with history of constipation.  Bowel regimen ordered with Colace 100 mg twice daily and Senokot nightly. Added miralax daily  Monitor for bowel movements.          Disposition: Being optioned     SUBJECTIVE     Patient seen and examined. No acute events overnight. Patient is pleasantly demented and redirectable.  He denies chest pain, fever, chills, nausea, vomiting, dysuria, constipation, diarrhea    OBJECTIVE     Vitals:    02/09/24 1447 02/09/24 2241 02/10/24 0557 02/10/24 0809   BP: 134/69 131/67   125/65   Pulse: 88 64  (!) 54   Resp: 18 18  18   Temp: (!) 97.4 °F (36.3 °C) (!) 97.2 °F (36.2 °C)  97.5 °F (36.4 °C)   TempSrc:       SpO2: 96% 100%  98%   Weight:   59.1 kg (130 lb 4.7 oz)       Temperature:   Temp (24hrs), Av.4 °F (36.3 °C), Min:97.2 °F (36.2 °C), Max:97.5 °F (36.4 °C)    Temperature: 97.5 °F (36.4 °C)  Intake & Output:  I/O          0701   0700  0701  02/10 0700 02/10 07 0700    P.O. 150 460 280    Total Intake(mL/kg) 150 460 (7.8) 280 (4.7)    Urine (mL/kg/hr) 600 675 (0.5) 400 (1.1)    Total Output 600 675 400    Net 450 -215 -120                 Weights:        Body mass index is 21.03 kg/m².  Weight (last 2 days)       Date/Time Weight    02/10/24 0557 59.1 (130.29)          Physical Exam  Vitals and nursing note reviewed.   Constitutional:       General: He is not in acute distress.     Appearance: He is well-developed.   HENT:      Head: Normocephalic and atraumatic.   Eyes:      Conjunctiva/sclera: Conjunctivae normal.   Cardiovascular:      Rate and Rhythm: Normal rate and regular rhythm.      Heart sounds: No murmur heard.  Pulmonary:      Effort: Pulmonary effort is normal. No respiratory distress.      Breath sounds: Normal breath sounds.   Abdominal:      Palpations: Abdomen is soft.      Tenderness: There is no abdominal tenderness.   Musculoskeletal:         General: No swelling.      Cervical back: Neck supple.      Right lower leg: No edema.      Left lower leg: No edema.   Skin:     General: Skin is warm and dry.      Capillary Refill: Capillary refill takes less than 2 seconds.   Neurological:      Mental Status: He is alert. Mental status is at baseline. He is disoriented.      Comments: AO x 1   Psychiatric:         Mood and Affect: Mood normal.       LABORATORY DATA     Labs: I have personally reviewed pertinent reports.  Results from last 7 days   Lab Units 02/10/24  0557 24  0424 24  1317   WBC Thousand/uL 3.94* 6.20 4.77    HEMOGLOBIN g/dL 12.5 12.8 10.8*   HEMATOCRIT % 37.7 37.9 33.3*   PLATELETS Thousands/uL 168 243 175   NEUTROS PCT %  --   --  76*   MONOS PCT %  --   --  8   EOS PCT %  --   --  1      Results from last 7 days   Lab Units 02/10/24  0557 02/08/24  0424 02/07/24  1317   POTASSIUM mmol/L 4.1 3.5 3.9   CHLORIDE mmol/L 108 106 106   CO2 mmol/L 27 19* 27   BUN mg/dL 13 15 19   CREATININE mg/dL 0.88 0.83 0.85   CALCIUM mg/dL 8.5 9.1 8.5   ALK PHOS U/L  --   --  54   ALT U/L  --   --  13   AST U/L  --   --  13                            IMAGING & DIAGNOSTIC TESTING     Radiology Results: I have personally reviewed pertinent reports.  No results found.  Other Diagnostic Testing: I have personally reviewed pertinent reports.    ACTIVE MEDICATIONS     Current Facility-Administered Medications   Medication Dose Route Frequency    acetaminophen (TYLENOL) tablet 650 mg  650 mg Oral Q6H PRN    ARIPiprazole (ABILIFY) tablet 2 mg  2 mg Oral Daily    cyanocobalamin (VITAMIN B-12) tablet 1,000 mcg  1,000 mcg Oral Daily    divalproex sodium (DEPAKOTE SPRINKLE) capsule 125 mg  125 mg Oral Daily    divalproex sodium (DEPAKOTE SPRINKLE) capsule 250 mg  250 mg Oral TID With Meals    docusate sodium (COLACE) capsule 100 mg  100 mg Oral BID    enoxaparin (LOVENOX) subcutaneous injection 40 mg  40 mg Subcutaneous Daily    finasteride (PROSCAR) tablet 5 mg  5 mg Oral Daily    haloperidol (HALDOL) tablet 1 mg  1 mg Oral Q6H PRN    LORazepam (ATIVAN) injection 0.5 mg  0.5 mg Intravenous Once    melatonin tablet 3 mg  3 mg Oral HS    mirtazapine (REMERON) tablet 15 mg  15 mg Oral HS    OLANZapine (ZyPREXA) IM injection 2.5 mg  2.5 mg Intramuscular Daily PRN    OLANZapine (ZyPREXA) tablet 5 mg  5 mg Oral Daily    polyethylene glycol (MIRALAX) packet 17 g  17 g Oral Daily    senna-docusate sodium (SENOKOT S) 8.6-50 mg per tablet 1 tablet  1 tablet Oral HS       VTE Pharmacologic Prophylaxis: Enoxaparin (Lovenox)  VTE Mechanical Prophylaxis:  "sequential compression device    Portions of the record may have been created with voice recognition software.  Occasional wrong word or \"sound a like\" substitutions may have occurred due to the inherent limitations of voice recognition software.  Read the chart carefully and recognize, using context, where substitutions have occurred.  ==  Howard Hoffman DO  [facility]  Internal Medicine Residency PGY-1       "

## 2024-02-10 NOTE — PLAN OF CARE
Problem: Potential for Falls  Goal: Patient will remain free of falls  Description: INTERVENTIONS:  - Educate patient/family on patient safety including physical limitations  - Instruct patient to call for assistance with activity   - Consult OT/PT to assist with strengthening/mobility   - Keep Call bell within reach  - Keep bed low and locked with side rails adjusted as appropriate  - Keep care items and personal belongings within reach  - Initiate and maintain comfort rounds  - Make Fall Risk Sign visible to staff  - Apply yellow socks and bracelet for high fall risk patients  - Consider moving patient to room near nurses station  Outcome: Progressing     Problem: Nutrition/Hydration-ADULT  Goal: Nutrient/Hydration intake appropriate for improving, restoring or maintaining nutritional needs  Description: Monitor and assess patient's nutrition/hydration status for malnutrition. Collaborate with interdisciplinary team and initiate plan and interventions as ordered.  Monitor patient's weight and dietary intake as ordered or per policy. Utilize nutrition screening tool and intervene as necessary. Determine patient's food preferences and provide high-protein, high-caloric foods as appropriate.     INTERVENTIONS:  - Monitor oral intake, urinary output, labs, and treatment plans  - Assess nutrition and hydration status and recommend course of action  - Evaluate amount of meals eaten  - Assist patient with eating if necessary   - Allow adequate time for meals  - Recommend/ encourage appropriate diets, oral nutritional supplements, and vitamin/mineral supplements  - Order, calculate, and assess calorie counts as needed  - Recommend, monitor, and adjust tube feedings and TPN/PPN based on assessed needs  - Assess need for intravenous fluids  - Provide specific nutrition/hydration education as appropriate  - Include patient/family/caregiver in decisions related to nutrition  Outcome: Progressing     Problem:  SAFETY,RESTRAINT: NV/NON-SELF DESTRUCTIVE BEHAVIOR  Goal: Remains free of harm/injury (restraint for non violent/non self-detsructive behavior)  Description: INTERVENTIONS:  - Instruct patient/family regarding restraint use   - Assess and monitor physiologic and psychological status   - Provide interventions and comfort measures to meet assessed patient needs   - Identify and implement measures to help patient regain control  - Assess readiness for release of restraint   Outcome: Progressing  Goal: Returns to optimal restraint-free functioning  Description: INTERVENTIONS:  - Assess the patient's behavior and symptoms that indicate continued need for restraint  - Identify and implement measures to help patient regain control  - Assess readiness for release of restraint   Outcome: Progressing     Problem: Prexisting or High Potential for Compromised Skin Integrity  Goal: Skin integrity is maintained or improved  Description: INTERVENTIONS:  - Identify patients at risk for skin breakdown  - Assess and monitor skin integrity  - Assess and monitor nutrition and hydration status  - Monitor labs   - Assess for incontinence   - Turn and reposition patient  - Assist with mobility/ambulation  - Relieve pressure over bony prominences  - Avoid friction and shearing  - Provide appropriate hygiene as needed including keeping skin clean and dry  - Evaluate need for skin moisturizer/barrier cream  - Collaborate with interdisciplinary team   - Patient/family teaching  - Consider wound care consult   Outcome: Progressing

## 2024-02-10 NOTE — CASE MANAGEMENT
Case Management Discharge Planning Note    Patient name Gino Hernandez  Location [facility] 834/[facility] 834-01 MRN [id number]  : 1940 Date 2/10/2024       Current Admission Date: 2024  Current Admission Diagnosis:Dementia with agitation and other behavioral disturbance (HCC)   Patient Active Problem List    Diagnosis Date Noted    Metabolic encephalopathy 2024    UTI (urinary tract infection) 2024    Polypharmacy 2024    Ambulatory dysfunction 2024    Dementia with agitation and other behavioral disturbance (HCC) 2024    Neuropathy 2024    Constipation 2024    BPH (benign prostatic hyperplasia) 2024    Abnormal urine finding 2024    Delirium 2023    H/O inguinal hernia repair 2023    Benign prostatic hyperplasia with urinary obstruction 2023    Sherwood's esophagus with dysplasia 2023    History of colonic diverticulitis 2022    Slow transit constipation 2022    Pancreas cyst 2021    Schatzki's ring 2021    Gastric polyps 2021    Hx of adenomatous colonic polyps 2021    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    Insomnia 2020      LOS (days): 3  Geometric Mean LOS (GMLOS) (days): 5  Days to GMLOS:2.2     OBJECTIVE:  Risk of Unplanned Readmission Score: 26.1         Current admission status: Inpatient   Preferred Pharmacy:   [id number] - [place] - [address]  [address] [address]  Phone: [phone] Fax: [phone]    [facility] [place] - [address]  [address] [address]  Phone: [phone] Fax: [phone]    [facility] [place] - [address]  7 [facility] [address]  Phone: [phone] Fax: [phone]    Primary Care Provider: Augusta Davis MD    Primary Insurance: MEDICARE  Secondary Insurance:  FOR LIFE    DISCHARGE DETAILS:    Received  signed MA51.  Emailed SAYDA BENÍTEZ, Consent for LCD, Facility referral form, and clinicals/therapy notes with facesheet to [email or web address].  Optioning paperwork submitted.

## 2024-02-11 LAB — MRSA NOSE QL CULT: NORMAL

## 2024-02-11 PROCEDURE — 99232 SBSQ HOSP IP/OBS MODERATE 35: CPT | Performed by: INTERNAL MEDICINE

## 2024-02-11 RX ORDER — WATER 10 ML/10ML
INJECTION INTRAMUSCULAR; INTRAVENOUS; SUBCUTANEOUS
Status: COMPLETED
Start: 2024-02-11 | End: 2024-02-12

## 2024-02-11 RX ADMIN — POLYETHYLENE GLYCOL 3350 17 G: 17 POWDER, FOR SOLUTION ORAL at 08:51

## 2024-02-11 RX ADMIN — ENOXAPARIN SODIUM 40 MG: 40 INJECTION SUBCUTANEOUS at 08:51

## 2024-02-11 RX ADMIN — CYANOCOBALAMIN TAB 500 MCG 1000 MCG: 500 TAB at 08:51

## 2024-02-11 RX ADMIN — WATER: 1 INJECTION INTRAMUSCULAR; INTRAVENOUS; SUBCUTANEOUS at 02:07

## 2024-02-11 RX ADMIN — MIRTAZAPINE 15 MG: 15 TABLET, FILM COATED ORAL at 22:06

## 2024-02-11 RX ADMIN — DOCUSATE SODIUM 100 MG: 100 CAPSULE, LIQUID FILLED ORAL at 17:47

## 2024-02-11 RX ADMIN — SENNOSIDES, DOCUSATE SODIUM 1 TABLET: 8.6; 5 TABLET ORAL at 22:06

## 2024-02-11 RX ADMIN — OLANZAPINE 2.5 MG: 10 INJECTION, POWDER, FOR SOLUTION INTRAMUSCULAR at 23:55

## 2024-02-11 RX ADMIN — OLANZAPINE 5 MG: 10 TABLET, FILM COATED ORAL at 13:10

## 2024-02-11 RX ADMIN — ARIPIPRAZOLE 2 MG: 2 TABLET ORAL at 08:50

## 2024-02-11 RX ADMIN — DIVALPROEX SODIUM 250 MG: 125 CAPSULE, COATED PELLETS ORAL at 17:42

## 2024-02-11 RX ADMIN — DOCUSATE SODIUM 100 MG: 100 CAPSULE, LIQUID FILLED ORAL at 08:51

## 2024-02-11 RX ADMIN — DIVALPROEX SODIUM 250 MG: 125 CAPSULE, COATED PELLETS ORAL at 08:50

## 2024-02-11 RX ADMIN — FINASTERIDE 5 MG: 5 TABLET, FILM COATED ORAL at 08:51

## 2024-02-11 RX ADMIN — DIVALPROEX SODIUM 125 MG: 125 CAPSULE, COATED PELLETS ORAL at 17:42

## 2024-02-11 RX ADMIN — DIVALPROEX SODIUM 250 MG: 125 CAPSULE, COATED PELLETS ORAL at 13:09

## 2024-02-11 RX ADMIN — Medication 3 MG: at 22:06

## 2024-02-11 NOTE — PROGRESS NOTES
INTERNAL MEDICINE RESIDENCY PROGRESS NOTE     Name: Gino Hernandez   Age & Sex: 83 y.o. male   MRN: [id number]  Unit/Bed#: [facility] 834-01   Encounter: [id number]  Team: SOD Team C     PATIENT INFORMATION     Name: Gino Hernandez   Age & Sex: 83 y.o. male   MRN: [id number]  Hospital Stay Days: 4    ASSESSMENT/PLAN     Principal Problem:    Dementia with agitation and other behavioral disturbance (HCC)  Active Problems:    Constipation    BPH (benign prostatic hyperplasia)    Polypharmacy      * Dementia with agitation and other behavioral disturbance (HCC)  Assessment & Plan  Patient with history of progressive Alzheimer's dementia.  At baseline, patient may be alert and oriented to person, but not to place or time.  He holds conversations but does get confused at baseline.  Patient had recent admission in January 2024 patient has dementia with agitation and other behavioral disturbances.  Was seen in consultation by geriatrics for polypharmacy and dementia treatment at that time.  Following conclusion of hospitalization patient was discharged to hospice at [facility].  However, patient returned to ED at [facility] on 02/07 as patient was having behaviors and acting aggressive towards staff and other residents at [facility].  There were no longer able to care for him without a continuous one-to-one observer, which was not covered by insurance in which family could not afford.  Because of this, he was taken back to the ED to find placement at another facility.  Patient currently without any acute complaints or signs/symptoms of infection.  Per wife, patient has not been acting any different recently than he has since last discharge.  No apparent metabolic/infectious changes to explain behaviors at this time.  Likely just progression of underlying dementia.  Reviewed medicine reconciliation sent by [facility] at admission.  Continue PTA Depakote 250 mg 3 times daily. Will  schedule extra 125 mg dose with PM dose. Patient's behavior was well controlled overnight with medication alteration  Continue PTA melatonin 3 mg nightly  Continue PTA mirtazapine 15 mg nightly  Continue PTA olanzapine 5 mg p.o. daily at 2 PM  Will DC home Seroquel per geriatric recommendation  Continue Abilify 2 mg daily per geriatric recommendation  Continue PTA vitamin B-12 supplementation  Continue PTA olanzapine 2.5 mg IM as needed for agitation  Continue PTA haloperidol 1 mg p.o. every 6 hours as needed for agitation unresponsive to as needed olanzapine  Geriatrics service consulted at this admission given patient's dementia with behavioral disturbances and polypharmacy.  Their recommendations are very much appreciated.    Polypharmacy  Assessment & Plan  Patient with history of progressive dementia.  Prior to admission medications include Depakote, melatonin, mirtazapine, olanzapine, quetiapine, Haldol as needed, olanzapine as needed.  These medications were reviewed at admission.  Medications currently being ministered at [facility] were continued.  Given patient's continued behavioral disturbances and extensive list of current medications, geriatric service is consulted and their recommendations are greatly appreciated.    BPH (benign prostatic hyperplasia)  Assessment & Plan  Patient with history of BPH.  Continue prior to admission finasteride 5 mg daily  Voiding trial initiated while inpatient upon request of wife.  Patient failed voiding trial, Choi catheter placed.  Patient will require chronic Choi catheter.  Outpatient follow-up with urology.    Constipation  Assessment & Plan  Patient with history of constipation.  Bowel regimen ordered with Colace 100 mg twice daily and Senokot nightly. Added miralax daily  Monitor for bowel movements.          Disposition: Patient's behaviors seem to be improving on the regimen.  Continue to monitor for behavior and adjust regimen with geriatric  consultation as needed.  Continue to work with case management for placement.    SUBJECTIVE     Patient seen and examined. No acute events overnight.  Upon my encounter patient was sleeping comfortably in hospital bed.  Patient alert to person and was able to say his first name, not to place or time.  He was conversive, though nonsensical.  Pleasant, without any agitation/behavior/aggression.  Presently denies any fever, chills, nausea, vomiting, diarrhea, constipation, chest pain, shortness breath.  No other new or worsening complaints.    OBJECTIVE     Vitals:    02/10/24 1539 02/10/24 2152 24 0629 24 0630   BP: 114/64 111/63 101/59    Pulse: 78 70 60    Resp: 18 18 16    Temp: 97.7 °F (36.5 °C) 97.6 °F (36.4 °C) (!) 97.1 °F (36.2 °C)    TempSrc:       SpO2: 100% 97% 100%    Weight:    59.5 kg (131 lb 2.8 oz)      Temperature:   Temp (24hrs), Av.5 °F (36.4 °C), Min:97.1 °F (36.2 °C), Max:97.7 °F (36.5 °C)    Temperature: (!) 97.1 °F (36.2 °C)  Intake & Output:  I/O          0701  02/10 0700 02/10 0701   0700  0701   0700    P.O. 460 400     Total Intake(mL/kg) 460 (7.8) 400 (6.7)     Urine (mL/kg/hr) 675 (0.5) 1250 (0.9)     Total Output 675 1250     Net -215 -850                  Weights:        Body mass index is 21.17 kg/m².  Weight (last 2 days)       Date/Time Weight    24 0630 59.5 (131.17)    02/10/24 0557 59.1 (130.29)          Physical Exam  Constitutional:       General: He is not in acute distress.     Appearance: Normal appearance. He is not ill-appearing.   Cardiovascular:      Rate and Rhythm: Normal rate and regular rhythm.      Pulses: Normal pulses.      Heart sounds: Normal heart sounds. No murmur heard.  Pulmonary:      Effort: Pulmonary effort is normal. No respiratory distress.      Breath sounds: Normal breath sounds. No wheezing, rhonchi or rales.   Abdominal:      General: Abdomen is flat. Bowel sounds are normal. There is no distension.       Palpations: Abdomen is soft.      Tenderness: There is no abdominal tenderness.   Musculoskeletal:      Right lower leg: No edema.      Left lower leg: No edema.   Neurological:      Mental Status: He is alert. Mental status is at baseline. He is disoriented.      Comments: Patient is alert and oriented to person (first name), but not to place or time.       LABORATORY DATA     Labs: I have personally reviewed pertinent reports.  Results from last 7 days   Lab Units 02/10/24  0557 02/08/24  0424 02/07/24  1317   WBC Thousand/uL 3.94* 6.20 4.77   HEMOGLOBIN g/dL 12.5 12.8 10.8*   HEMATOCRIT % 37.7 37.9 33.3*   PLATELETS Thousands/uL 168 243 175   NEUTROS PCT %  --   --  76*   MONOS PCT %  --   --  8   EOS PCT %  --   --  1      Results from last 7 days   Lab Units 02/10/24  0557 02/08/24  0424 02/07/24  1317   POTASSIUM mmol/L 4.1 3.5 3.9   CHLORIDE mmol/L 108 106 106   CO2 mmol/L 27 19* 27   BUN mg/dL 13 15 19   CREATININE mg/dL 0.88 0.83 0.85   CALCIUM mg/dL 8.5 9.1 8.5   ALK PHOS U/L  --   --  54   ALT U/L  --   --  13   AST U/L  --   --  13                            IMAGING & DIAGNOSTIC TESTING     Radiology Results: I have personally reviewed pertinent reports.  No results found.  Other Diagnostic Testing: I have personally reviewed pertinent reports.    ACTIVE MEDICATIONS     Current Facility-Administered Medications   Medication Dose Route Frequency    acetaminophen (TYLENOL) tablet 650 mg  650 mg Oral Q6H PRN    ARIPiprazole (ABILIFY) tablet 2 mg  2 mg Oral Daily    cyanocobalamin (VITAMIN B-12) tablet 1,000 mcg  1,000 mcg Oral Daily    divalproex sodium (DEPAKOTE SPRINKLE) capsule 125 mg  125 mg Oral Daily    divalproex sodium (DEPAKOTE SPRINKLE) capsule 250 mg  250 mg Oral TID With Meals    docusate sodium (COLACE) capsule 100 mg  100 mg Oral BID    enoxaparin (LOVENOX) subcutaneous injection 40 mg  40 mg Subcutaneous Daily    finasteride (PROSCAR) tablet 5 mg  5 mg Oral Daily    haloperidol (HALDOL)  "tablet 1 mg  1 mg Oral Q6H PRN    LORazepam (ATIVAN) injection 0.5 mg  0.5 mg Intravenous Once    melatonin tablet 3 mg  3 mg Oral HS    mirtazapine (REMERON) tablet 15 mg  15 mg Oral HS    OLANZapine (ZyPREXA) IM injection 2.5 mg  2.5 mg Intramuscular Daily PRN    OLANZapine (ZyPREXA) tablet 5 mg  5 mg Oral Daily    polyethylene glycol (MIRALAX) packet 17 g  17 g Oral Daily    senna-docusate sodium (SENOKOT S) 8.6-50 mg per tablet 1 tablet  1 tablet Oral HS       VTE Pharmacologic Prophylaxis: Enoxaparin (Lovenox)  VTE Mechanical Prophylaxis: sequential compression device    Portions of the record may have been created with voice recognition software.  Occasional wrong word or \"sound a like\" substitutions may have occurred due to the inherent limitations of voice recognition software.  Read the chart carefully and recognize, using context, where substitutions have occurred.  ==  Max Early DO  [facility] [address]  Internal Medicine Residency PGY-3     "

## 2024-02-11 NOTE — PLAN OF CARE
Problem: Potential for Falls  Goal: Patient will remain free of falls  Description: INTERVENTIONS:  - Educate patient/family on patient safety including physical limitations  - Instruct patient to call for assistance with activity   - Consult OT/PT to assist with strengthening/mobility   - Keep Call bell within reach  - Keep bed low and locked with side rails adjusted as appropriate  - Keep care items and personal belongings within reach  - Initiate and maintain comfort rounds  - Make Fall Risk Sign visible to staff  - Offer Toileting every 2 Hours, in advance of need  - Initiate/Maintain alarm  - Obtain necessary fall risk management equipment:   - Apply yellow socks and bracelet for high fall risk patients  - Consider moving patient to room near nurses station  Outcome: Progressing     Problem: Nutrition/Hydration-ADULT  Goal: Nutrient/Hydration intake appropriate for improving, restoring or maintaining nutritional needs  Description: Monitor and assess patient's nutrition/hydration status for malnutrition. Collaborate with interdisciplinary team and initiate plan and interventions as ordered.  Monitor patient's weight and dietary intake as ordered or per policy. Utilize nutrition screening tool and intervene as necessary. Determine patient's food preferences and provide high-protein, high-caloric foods as appropriate.     INTERVENTIONS:  - Monitor oral intake, urinary output, labs, and treatment plans  - Assess nutrition and hydration status and recommend course of action  - Evaluate amount of meals eaten  - Assist patient with eating if necessary   - Allow adequate time for meals  - Recommend/ encourage appropriate diets, oral nutritional supplements, and vitamin/mineral supplements  - Order, calculate, and assess calorie counts as needed  - Recommend, monitor, and adjust tube feedings and TPN/PPN based on assessed needs  - Assess need for intravenous fluids  - Provide specific nutrition/hydration education as  appropriate  - Include patient/family/caregiver in decisions related to nutrition  Outcome: Progressing     Problem: SAFETY,RESTRAINT: NV/NON-SELF DESTRUCTIVE BEHAVIOR  Goal: Remains free of harm/injury (restraint for non violent/non self-detsructive behavior)  Description: INTERVENTIONS:  - Instruct patient/family regarding restraint use   - Assess and monitor physiologic and psychological status   - Provide interventions and comfort measures to meet assessed patient needs   - Identify and implement measures to help patient regain control  - Assess readiness for release of restraint   Outcome: Progressing  Goal: Returns to optimal restraint-free functioning  Description: INTERVENTIONS:  - Assess the patient's behavior and symptoms that indicate continued need for restraint  - Identify and implement measures to help patient regain control  - Assess readiness for release of restraint   Outcome: Progressing     Problem: Prexisting or High Potential for Compromised Skin Integrity  Goal: Skin integrity is maintained or improved  Description: INTERVENTIONS:  - Identify patients at risk for skin breakdown  - Assess and monitor skin integrity  - Assess and monitor nutrition and hydration status  - Monitor labs   - Assess for incontinence   - Turn and reposition patient  - Assist with mobility/ambulation  - Relieve pressure over bony prominences  - Avoid friction and shearing  - Provide appropriate hygiene as needed including keeping skin clean and dry  - Evaluate need for skin moisturizer/barrier cream  - Collaborate with interdisciplinary team   - Patient/family teaching  - Consider wound care consult   Outcome: Progressing

## 2024-02-12 LAB
ANION GAP SERPL CALCULATED.3IONS-SCNC: 6 MMOL/L
BASOPHILS # BLD AUTO: 0.03 THOUSANDS/ÂΜL (ref 0–0.1)
BASOPHILS NFR BLD AUTO: 0 % (ref 0–1)
BUN SERPL-MCNC: 14 MG/DL (ref 5–25)
CALCIUM SERPL-MCNC: 8.8 MG/DL (ref 8.4–10.2)
CHLORIDE SERPL-SCNC: 105 MMOL/L (ref 96–108)
CO2 SERPL-SCNC: 29 MMOL/L (ref 21–32)
CREAT SERPL-MCNC: 0.95 MG/DL (ref 0.6–1.3)
EOSINOPHIL # BLD AUTO: 0.16 THOUSAND/ÂΜL (ref 0–0.61)
EOSINOPHIL NFR BLD AUTO: 2 % (ref 0–6)
ERYTHROCYTE [DISTWIDTH] IN BLOOD BY AUTOMATED COUNT: 14.2 % (ref 11.6–15.1)
GFR SERPL CREATININE-BSD FRML MDRD: 74 ML/MIN/1.73SQ M
GLUCOSE SERPL-MCNC: 84 MG/DL (ref 65–140)
HCT VFR BLD AUTO: 38 % (ref 36.5–49.3)
HGB BLD-MCNC: 12.7 G/DL (ref 12–17)
IMM GRANULOCYTES # BLD AUTO: 0.04 THOUSAND/UL (ref 0–0.2)
IMM GRANULOCYTES NFR BLD AUTO: 1 % (ref 0–2)
LYMPHOCYTES # BLD AUTO: 1 THOUSANDS/ÂΜL (ref 0.6–4.47)
LYMPHOCYTES NFR BLD AUTO: 13 % (ref 14–44)
MCH RBC QN AUTO: 29.3 PG (ref 26.8–34.3)
MCHC RBC AUTO-ENTMCNC: 33.4 G/DL (ref 31.4–37.4)
MCV RBC AUTO: 88 FL (ref 82–98)
MONOCYTES # BLD AUTO: 0.6 THOUSAND/ÂΜL (ref 0.17–1.22)
MONOCYTES NFR BLD AUTO: 8 % (ref 4–12)
NEUTROPHILS # BLD AUTO: 5.71 THOUSANDS/ÂΜL (ref 1.85–7.62)
NEUTS SEG NFR BLD AUTO: 76 % (ref 43–75)
NRBC BLD AUTO-RTO: 0 /100 WBCS
PLATELET # BLD AUTO: 195 THOUSANDS/UL (ref 149–390)
PMV BLD AUTO: 11.4 FL (ref 8.9–12.7)
POTASSIUM SERPL-SCNC: 3.7 MMOL/L (ref 3.5–5.3)
RBC # BLD AUTO: 4.34 MILLION/UL (ref 3.88–5.62)
SODIUM SERPL-SCNC: 140 MMOL/L (ref 135–147)
WBC # BLD AUTO: 7.54 THOUSAND/UL (ref 4.31–10.16)

## 2024-02-12 PROCEDURE — 85025 COMPLETE CBC W/AUTO DIFF WBC: CPT | Performed by: STUDENT IN AN ORGANIZED HEALTH CARE EDUCATION/TRAINING PROGRAM

## 2024-02-12 PROCEDURE — 99232 SBSQ HOSP IP/OBS MODERATE 35: CPT | Performed by: INTERNAL MEDICINE

## 2024-02-12 PROCEDURE — 80048 BASIC METABOLIC PNL TOTAL CA: CPT | Performed by: STUDENT IN AN ORGANIZED HEALTH CARE EDUCATION/TRAINING PROGRAM

## 2024-02-12 RX ADMIN — ARIPIPRAZOLE 2 MG: 2 TABLET ORAL at 09:52

## 2024-02-12 RX ADMIN — DOCUSATE SODIUM 100 MG: 100 CAPSULE, LIQUID FILLED ORAL at 09:51

## 2024-02-12 RX ADMIN — POLYETHYLENE GLYCOL 3350 17 G: 17 POWDER, FOR SOLUTION ORAL at 09:52

## 2024-02-12 RX ADMIN — Medication 3 MG: at 21:02

## 2024-02-12 RX ADMIN — MIRTAZAPINE 15 MG: 15 TABLET, FILM COATED ORAL at 21:02

## 2024-02-12 RX ADMIN — DIVALPROEX SODIUM 250 MG: 125 CAPSULE, COATED PELLETS ORAL at 09:50

## 2024-02-12 RX ADMIN — DIVALPROEX SODIUM 250 MG: 125 CAPSULE, COATED PELLETS ORAL at 17:20

## 2024-02-12 RX ADMIN — DOCUSATE SODIUM 100 MG: 100 CAPSULE, LIQUID FILLED ORAL at 17:20

## 2024-02-12 RX ADMIN — DIVALPROEX SODIUM 125 MG: 125 CAPSULE, COATED PELLETS ORAL at 17:20

## 2024-02-12 RX ADMIN — WATER: 1 INJECTION INTRAMUSCULAR; INTRAVENOUS; SUBCUTANEOUS at 00:47

## 2024-02-12 RX ADMIN — SENNOSIDES, DOCUSATE SODIUM 1 TABLET: 8.6; 5 TABLET ORAL at 21:02

## 2024-02-12 RX ADMIN — DIVALPROEX SODIUM 250 MG: 125 CAPSULE, COATED PELLETS ORAL at 13:09

## 2024-02-12 RX ADMIN — ENOXAPARIN SODIUM 40 MG: 40 INJECTION SUBCUTANEOUS at 09:51

## 2024-02-12 RX ADMIN — CYANOCOBALAMIN TAB 500 MCG 1000 MCG: 500 TAB at 09:51

## 2024-02-12 RX ADMIN — OLANZAPINE 5 MG: 10 TABLET, FILM COATED ORAL at 15:11

## 2024-02-12 RX ADMIN — FINASTERIDE 5 MG: 5 TABLET, FILM COATED ORAL at 09:51

## 2024-02-12 NOTE — PLAN OF CARE
Problem: Potential for Falls  Goal: Patient will remain free of falls  Description: INTERVENTIONS:  - Educate patient/family on patient safety including physical limitations  - Instruct patient to call for assistance with activity   - Consult OT/PT to assist with strengthening/mobility   - Keep Call bell within reach  - Keep bed low and locked with side rails adjusted as appropriate  - Keep care items and personal belongings within reach  - Initiate and maintain comfort rounds  - Make Fall Risk Sign visible to staff  - Offer Toileting every 2 Hours, in advance of need  - Initiate/Maintain alarm  - Apply yellow socks and bracelet for high fall risk patients  - Consider moving patient to room near nurses station  Outcome: Progressing     Problem: Nutrition/Hydration-ADULT  Goal: Nutrient/Hydration intake appropriate for improving, restoring or maintaining nutritional needs  Description: Monitor and assess patient's nutrition/hydration status for malnutrition. Collaborate with interdisciplinary team and initiate plan and interventions as ordered.  Monitor patient's weight and dietary intake as ordered or per policy. Utilize nutrition screening tool and intervene as necessary. Determine patient's food preferences and provide high-protein, high-caloric foods as appropriate.     INTERVENTIONS:  - Monitor oral intake, urinary output, labs, and treatment plans  - Assess nutrition and hydration status and recommend course of action  - Evaluate amount of meals eaten  - Assist patient with eating if necessary   - Allow adequate time for meals  - Recommend/ encourage appropriate diets, oral nutritional supplements, and vitamin/mineral supplements  - Order, calculate, and assess calorie counts as needed  - Recommend, monitor, and adjust tube feedings and TPN/PPN based on assessed needs  - Assess need for intravenous fluids  - Provide specific nutrition/hydration education as appropriate  - Include patient/family/caregiver in  decisions related to nutrition  Outcome: Progressing     Problem: SAFETY,RESTRAINT: NV/NON-SELF DESTRUCTIVE BEHAVIOR  Goal: Remains free of harm/injury (restraint for non violent/non self-detsructive behavior)  Description: INTERVENTIONS:  - Instruct patient/family regarding restraint use   - Assess and monitor physiologic and psychological status   - Provide interventions and comfort measures to meet assessed patient needs   - Identify and implement measures to help patient regain control  - Assess readiness for release of restraint   Outcome: Progressing  Goal: Returns to optimal restraint-free functioning  Description: INTERVENTIONS:  - Assess the patient's behavior and symptoms that indicate continued need for restraint  - Identify and implement measures to help patient regain control  - Assess readiness for release of restraint   Outcome: Progressing     Problem: Prexisting or High Potential for Compromised Skin Integrity  Goal: Skin integrity is maintained or improved  Description: INTERVENTIONS:  - Identify patients at risk for skin breakdown  - Assess and monitor skin integrity  - Assess and monitor nutrition and hydration status  - Monitor labs   - Assess for incontinence   - Turn and reposition patient  - Assist with mobility/ambulation  - Relieve pressure over bony prominences  - Avoid friction and shearing  - Provide appropriate hygiene as needed including keeping skin clean and dry  - Evaluate need for skin moisturizer/barrier cream  - Collaborate with interdisciplinary team   - Patient/family teaching  - Consider wound care consult   Outcome: Progressing

## 2024-02-12 NOTE — PROGRESS NOTES
INTERNAL MEDICINE RESIDENCY PROGRESS NOTE     Name: Gino Hernandez   Age & Sex: 83 y.o. male   MRN: [id number]  Unit/Bed#: [facility] 834-01   Encounter: [id number]  Team: SOD Team C     PATIENT INFORMATION     Name: Gino Hernandez   Age & Sex: 83 y.o. male   MRN: [id number]  Hospital Stay Days: 5    ASSESSMENT/PLAN     Principal Problem:    Dementia with agitation and other behavioral disturbance (HCC)  Active Problems:    Constipation    BPH (benign prostatic hyperplasia)    Polypharmacy      Polypharmacy  Assessment & Plan  Patient with history of progressive dementia.  Prior to admission medications include Depakote, melatonin, mirtazapine, olanzapine, quetiapine, Haldol as needed, olanzapine as needed.  These medications were reviewed at admission.  Medications currently being ministered at [facility] were continued.  Given patient's continued behavioral disturbances and extensive list of current medications, geriatric service is consulted and their recommendations are greatly appreciated.    BPH (benign prostatic hyperplasia)  Assessment & Plan  Patient with history of BPH.  Continue prior to admission finasteride 5 mg daily  Voiding trial initiated while inpatient upon request of wife. Patient failed voiding trial, Mcduffie catheter placed 2/11.  Patient will require chronic Mcduffie catheter. Outpatient follow-up with urology.  *Spoke with wife and son at bedside. Wife is concerned about pt having discomfort with the mcduffie catheter. Had discussion regarding voiding trial and decided if pt is comfortable with the mcduffie, we can leave it for now. If it begins to cause discomfort, we can attempt a voiding trial.     Constipation  Assessment & Plan  Patient with history of constipation.  Bowel regimen ordered with Colace 100 mg twice daily and Senokot nightly. Added miralax daily  Monitor for bowel movements.      * Dementia with agitation and other behavioral disturbance (HCC)  Assessment & Plan  Patient  with history of progressive Alzheimer's dementia.  At baseline, patient may be alert and oriented to person, but not to place or time.  He holds conversations but does get confused at baseline.  Patient had recent admission in January 2024 patient has dementia with agitation and other behavioral disturbances.  Was seen in consultation by geriatrics for polypharmacy and dementia treatment at that time.  Following conclusion of hospitalization patient was discharged to hospice at [facility].  However, patient returned to ED at [facility] on 02/07 as patient was having behaviors and acting aggressive towards staff and other residents at [facility].  There were no longer able to care for him without a continuous one-to-one observer, which was not covered by insurance in which family could not afford.  Because of this, he was taken back to the ED to find placement at another facility.  Patient currently without any acute complaints or signs/symptoms of infection.  Per wife, patient has not been acting any different recently than he has since last discharge.  No apparent metabolic/infectious changes to explain behaviors at this time.  Likely just progression of underlying dementia.  Reviewed medicine reconciliation sent by [facility] at admission.  Continue PTA Depakote 250 mg 3 times daily and cont extra 125 mg dose with PM dose.   Continue PTA melatonin 3 mg nightly  Continue PTA mirtazapine 15 mg nightly  Continue PTA olanzapine 5 mg p.o. daily at 2 PM  Continue Abilify 2 mg daily per geriatric recommendation  Continue PTA vitamin B-12 supplementation  Continue PTA olanzapine 2.5 mg IM as needed for agitation  Continue PTA haloperidol 1 mg p.o. every 6 hours as needed for agitation unresponsive to as needed olanzapine  Geriatrics service consulted at this admission given patient's dementia with behavioral disturbances and polypharmacy.  Their recommendations are very much  appreciated.        Disposition: Pt more comfortable on new regimen. Cont to monitor for behavior changes; working with case management for placement.      SUBJECTIVE     Patient seen and examined. No acute events overnight.  Patient pleasantly demented.  Does not endorse any acute complaints.  Per nurse no acute events overnight.  Oriented to person, not place or time. Denies fevers, chills, nausea, vomiting, diarrhea, or constipation.     Spoke with wife and son at bedside. Wife is concerned about pt having discomfort with the mcduffie catheter. Had discussion regarding voiding trial and decided if pt is comfortable with the mcduffie, we can leave it for now. If it begins to cause discomfort, we can attempt a voiding trial.     OBJECTIVE     Vitals:    24 0524 0617   BP: 106/56 106/56  120/79   Pulse: 82 61  62   Resp: 18   16   Temp: 97.7 °F (36.5 °C) 97.7 °F (36.5 °C)  (!) 97.2 °F (36.2 °C)   TempSrc:       SpO2: 92% 99%  100%   Weight:   59.1 kg (130 lb 4.7 oz)       Temperature:   Temp (24hrs), Av.6 °F (36.4 °C), Min:97.2 °F (36.2 °C), Max:97.7 °F (36.5 °C)    Temperature: (!) 97.2 °F (36.2 °C)  Intake & Output:  I/O         02/10 07 07 07 07 0700    P.O. 400 300     Total Intake(mL/kg) 400 (6.7) 300 (5.1)     Urine (mL/kg/hr) 1250 (0.9) 900 (0.6)     Total Output 1250 900     Net -850 -600                  Weights:        Body mass index is 21.03 kg/m².  Weight (last 2 days)       Date/Time Weight    24 0544 59.1 (130.29)    24 0630 59.5 (131.17)    02/10/24 0557 59.1 (130.29)          Physical Exam  Constitutional:       Appearance: Normal appearance.   HENT:      Head: Normocephalic and atraumatic.   Eyes:      Extraocular Movements: Extraocular movements intact.      Pupils: Pupils are equal, round, and reactive to light.   Cardiovascular:      Rate and Rhythm: Normal rate and regular rhythm.   Pulmonary:       Effort: No respiratory distress.      Breath sounds: Normal breath sounds. No wheezing.   Abdominal:      General: There is no distension.      Palpations: Abdomen is soft.      Tenderness: There is no abdominal tenderness.   Musculoskeletal:         General: No swelling or tenderness.      Cervical back: Normal range of motion and neck supple.   Skin:     General: Skin is warm and dry.   Neurological:      Mental Status: Mental status is at baseline.      Comments: AOx1       LABORATORY DATA     Labs: I have personally reviewed pertinent reports.  Results from last 7 days   Lab Units 02/12/24  0610 02/10/24  0557 02/08/24  0424 02/07/24  1317   WBC Thousand/uL 7.54 3.94* 6.20 4.77   HEMOGLOBIN g/dL 12.7 12.5 12.8 10.8*   HEMATOCRIT % 38.0 37.7 37.9 33.3*   PLATELETS Thousands/uL 195 168 243 175   NEUTROS PCT % 76*  --   --  76*   MONOS PCT % 8  --   --  8   EOS PCT % 2  --   --  1      Results from last 7 days   Lab Units 02/10/24  0557 02/08/24 0424 02/07/24  1317   POTASSIUM mmol/L 4.1 3.5 3.9   CHLORIDE mmol/L 108 106 106   CO2 mmol/L 27 19* 27   BUN mg/dL 13 15 19   CREATININE mg/dL 0.88 0.83 0.85   CALCIUM mg/dL 8.5 9.1 8.5   ALK PHOS U/L  --   --  54   ALT U/L  --   --  13   AST U/L  --   --  13                            IMAGING & DIAGNOSTIC TESTING     Radiology Results: I have personally reviewed pertinent reports.  No results found.  Other Diagnostic Testing: I have personally reviewed pertinent reports.    ACTIVE MEDICATIONS     Current Facility-Administered Medications   Medication Dose Route Frequency    acetaminophen (TYLENOL) tablet 650 mg  650 mg Oral Q6H PRN    ARIPiprazole (ABILIFY) tablet 2 mg  2 mg Oral Daily    cyanocobalamin (VITAMIN B-12) tablet 1,000 mcg  1,000 mcg Oral Daily    divalproex sodium (DEPAKOTE SPRINKLE) capsule 125 mg  125 mg Oral Daily    divalproex sodium (DEPAKOTE SPRINKLE) capsule 250 mg  250 mg Oral TID With Meals    docusate sodium (COLACE) capsule 100 mg  100 mg Oral  "BID    enoxaparin (LOVENOX) subcutaneous injection 40 mg  40 mg Subcutaneous Daily    finasteride (PROSCAR) tablet 5 mg  5 mg Oral Daily    haloperidol (HALDOL) tablet 1 mg  1 mg Oral Q6H PRN    LORazepam (ATIVAN) injection 0.5 mg  0.5 mg Intravenous Once    melatonin tablet 3 mg  3 mg Oral HS    mirtazapine (REMERON) tablet 15 mg  15 mg Oral HS    OLANZapine (ZyPREXA) IM injection 2.5 mg  2.5 mg Intramuscular Daily PRN    OLANZapine (ZyPREXA) tablet 5 mg  5 mg Oral Daily    polyethylene glycol (MIRALAX) packet 17 g  17 g Oral Daily    senna-docusate sodium (SENOKOT S) 8.6-50 mg per tablet 1 tablet  1 tablet Oral HS       VTE Pharmacologic Prophylaxis: Enoxaparin (Lovenox)  VTE Mechanical Prophylaxis: sequential compression device    Portions of the record may have been created with voice recognition software.  Occasional wrong word or \"sound a like\" substitutions may have occurred due to the inherent limitations of voice recognition software.  Read the chart carefully and recognize, using context, where substitutions have occurred.  ==  Lizbet Beckett DO  [facility]  Internal Medicine Residency PGY-1      "

## 2024-02-12 NOTE — MALNUTRITION/BMI
This medical record reflects one or more clinical indicators suggestive of malnutrition and/or morbid obesity.    Malnutrition Findings:   Adult Malnutrition type: Acute illness  Adult Degree of Malnutrition: Malnutrition of moderate degree  Malnutrition Characteristics: Inadequate energy, Weight loss                  360 Statement: Acute moderate pro, lety malnutrition d/t condition as evidence by significant weigh loss,  23lbs/15% wt. loss x 1 month 2/12/24 130lbs, 1/7/2024 153#, 12/13/2023 144#, 9/11/2023 155#, 10/31/2022 162#, <75% energy intake > 1 month, treated with regular diet and oral nutrition supplements, PT is currently a total feed.    BMI Findings:           Body mass index is 20.98 kg/m².     See Nutrition note dated 2/12/24 for additional details.  Completed nutrition assessment is viewable in the nutrition documentation.

## 2024-02-12 NOTE — RESTORATIVE TECHNICIAN NOTE
Restorative Technician Note      Patient Name: Gino Hernandez     Restorative Tech Visit Date: 02/12/24  Note Type: Mobility  Patient Position Upon Consult: Supine  Activity Performed: Range of motion; Repositioned; Other (Comment) (BLE therex - AROM/AAROM; BUE AROM; bed mobility)  Education Provided: Yes  Patient Position at End of Consult: Supine; All needs within reach; Bed/Chair alarm activated; Other (comment) (wrist restraints donned as well as waist timoteo)    Sharon Ching  DPT, Restorative Technician

## 2024-02-13 PROBLEM — E44.0 MODERATE PROTEIN-CALORIE MALNUTRITION (HCC): Status: ACTIVE | Noted: 2024-02-13

## 2024-02-13 PROCEDURE — 99232 SBSQ HOSP IP/OBS MODERATE 35: CPT | Performed by: INTERNAL MEDICINE

## 2024-02-13 RX ORDER — WATER 10 ML/10ML
INJECTION INTRAMUSCULAR; INTRAVENOUS; SUBCUTANEOUS
Status: COMPLETED
Start: 2024-02-13 | End: 2024-02-13

## 2024-02-13 RX ADMIN — CYANOCOBALAMIN TAB 500 MCG 1000 MCG: 500 TAB at 08:32

## 2024-02-13 RX ADMIN — OLANZAPINE 2.5 MG: 10 INJECTION, POWDER, FOR SOLUTION INTRAMUSCULAR at 20:10

## 2024-02-13 RX ADMIN — Medication 3 MG: at 21:22

## 2024-02-13 RX ADMIN — SENNOSIDES, DOCUSATE SODIUM 1 TABLET: 8.6; 5 TABLET ORAL at 21:22

## 2024-02-13 RX ADMIN — FINASTERIDE 5 MG: 5 TABLET, FILM COATED ORAL at 08:32

## 2024-02-13 RX ADMIN — DIVALPROEX SODIUM 250 MG: 125 CAPSULE, COATED PELLETS ORAL at 08:32

## 2024-02-13 RX ADMIN — WATER 10 ML: 1 INJECTION INTRAMUSCULAR; INTRAVENOUS; SUBCUTANEOUS at 20:10

## 2024-02-13 RX ADMIN — ENOXAPARIN SODIUM 40 MG: 40 INJECTION SUBCUTANEOUS at 08:32

## 2024-02-13 RX ADMIN — DIVALPROEX SODIUM 125 MG: 125 CAPSULE, COATED PELLETS ORAL at 17:19

## 2024-02-13 RX ADMIN — POLYETHYLENE GLYCOL 3350 17 G: 17 POWDER, FOR SOLUTION ORAL at 08:32

## 2024-02-13 RX ADMIN — MIRTAZAPINE 15 MG: 15 TABLET, FILM COATED ORAL at 21:22

## 2024-02-13 RX ADMIN — ARIPIPRAZOLE 2 MG: 2 TABLET ORAL at 08:41

## 2024-02-13 RX ADMIN — OLANZAPINE 5 MG: 10 TABLET, FILM COATED ORAL at 14:49

## 2024-02-13 RX ADMIN — DIVALPROEX SODIUM 250 MG: 125 CAPSULE, COATED PELLETS ORAL at 17:19

## 2024-02-13 RX ADMIN — DIVALPROEX SODIUM 250 MG: 125 CAPSULE, COATED PELLETS ORAL at 11:46

## 2024-02-13 NOTE — PLAN OF CARE
Problem: Nutrition/Hydration-ADULT  Goal: Nutrient/Hydration intake appropriate for improving, restoring or maintaining nutritional needs  Description: Monitor and assess patient's nutrition/hydration status for malnutrition. Collaborate with interdisciplinary team and initiate plan and interventions as ordered.  Monitor patient's weight and dietary intake as ordered or per policy. Utilize nutrition screening tool and intervene as necessary. Determine patient's food preferences and provide high-protein, high-caloric foods as appropriate.     INTERVENTIONS:  - Monitor oral intake, urinary output, labs, and treatment plans  - Assess nutrition and hydration status and recommend course of action  - Evaluate amount of meals eaten  - Assist patient with eating if necessary   - Allow adequate time for meals  - Recommend/ encourage appropriate diets, oral nutritional supplements, and vitamin/mineral supplements  - Order, calculate, and assess calorie counts as needed  - Recommend, monitor, and adjust tube feedings and TPN/PPN based on assessed needs  - Assess need for intravenous fluids  - Provide specific nutrition/hydration education as appropriate  - Include patient/family/caregiver in decisions related to nutrition  Outcome: Progressing     Problem: SAFETY,RESTRAINT: NV/NON-SELF DESTRUCTIVE BEHAVIOR  Goal: Remains free of harm/injury (restraint for non violent/non self-detsructive behavior)  Description: INTERVENTIONS:  - Instruct patient/family regarding restraint use   - Assess and monitor physiologic and psychological status   - Provide interventions and comfort measures to meet assessed patient needs   - Identify and implement measures to help patient regain control  - Assess readiness for release of restraint   Outcome: Progressing  Goal: Returns to optimal restraint-free functioning  Description: INTERVENTIONS:  - Assess the patient's behavior and symptoms that indicate continued need for restraint  - Identify  and implement measures to help patient regain control  - Assess readiness for release of restraint   Outcome: Progressing

## 2024-02-13 NOTE — RESTORATIVE TECHNICIAN NOTE
Restorative Technician Note      Patient Name: Gino Hernandez     Restorative Tech Visit Date: 02/13/24  Note Type: Mobility (pt attempting to get OOB; responded to alarm)  Patient Position Upon Consult: Other (Comment) (legs out of bed)  Activity Performed: Range of motion; Repositioned; Other (Comment) (BLE therex; BUE therex; AAROM/AROM)  Education Provided: Yes  Patient Position at End of Consult: Supine; All needs within reach; Bed/Chair alarm activated; Other (comment) (wrist and waist restraints donned)    Sharon Ching  DPT, Restorative Technician

## 2024-02-13 NOTE — CASE MANAGEMENT
Case Management Discharge Planning Note    Patient name Gino Hernandez  Location [facility] 834/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) 2024    Neuropathy 2024    Constipation 2024    BPH (benign prostatic hyperplasia) 2024    Abnormal urine finding 2024    Delirium 2023    H/O inguinal hernia repair 2023    Benign prostatic hyperplasia with urinary obstruction 2023    Sherwood's esophagus with dysplasia 2023    History of colonic diverticulitis 2022    Slow transit constipation 2022    Pancreas cyst 2021    Schatzki's ring 2021    Gastric polyps 2021    Hx of adenomatous colonic polyps 2021    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    Insomnia 2020      LOS (days): 6  Geometric Mean LOS (GMLOS) (days): 5  Days to GMLOS:-0.8     OBJECTIVE:  Risk of Unplanned Readmission Score: 27.43         Current admission status: Inpatient   Preferred Pharmacy:   [id number] - [place] - [address]  [address] [address]  Phone: [phone] Fax: [phone]    [id number] - [place] - [address]  [address] [address]  Phone: [phone] Fax: [phone]    [facility] [place], [place] - [address] [facility]  [address] [facility] [address]  Phone: [phone] Fax: [phone]    Primary Care Provider: Augusta Davis MD    Primary Insurance: MEDICARE  Secondary  Insurance:  FOR LIFE    DISCHARGE DETAILS:      Spoke to Bill Leiner, at [facility] re: outcome of assessment. Per George, facility would like to see pt up and moving to ensure he can ambulate without assistance prior to making a decision regarding acceptance. Patient also needs to be out of restraints ( posey and wrist)   Updated clinicals sent to [facility] for review for LTC.   CM continues to follow.

## 2024-02-13 NOTE — PROGRESS NOTES
INTERNAL MEDICINE RESIDENCY PROGRESS NOTE     Name: Gino Hernandez   Age & Sex: 83 y.o. male   MRN: [id number]  [facility]   Encounter: [id number]  Team: SOD Team C     PATIENT INFORMATION     Name: Gino Hernandez   Age & Sex: 83 y.o. 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  At baseline, patient may be alert and oriented to person, but not to place or time.  He holds conversations but does get confused at baseline.  Patient had recent admission in January 2024 patient has dementia with agitation and other behavioral disturbances.  Was seen in consultation by geriatrics for polypharmacy and dementia treatment at that time.  Following conclusion of hospitalization patient was discharged to hospice at [facility].  However, patient returned to ED at [facility] on 02/07 as patient was having behaviors and acting aggressive towards staff and other residents at [facility].  There were no longer able to care for him without a continuous one-to-one observer, which was not covered by insurance in which family could not afford.  Because of this, he was taken back to the ED to find placement at another facility.  Patient currently without any acute complaints or signs/symptoms of infection.  Per wife, patient has not been acting any different recently than he has since last discharge.  No apparent metabolic/infectious changes to explain behaviors at this time.  Likely just progression of underlying dementia.   Reviewed medicine reconciliation sent by [facility] at admission.  2/13: Pt has not been combative. Occasionally pulls  at mcduffie/IV, so reordered soft restraints overnight. Otherwise feels well.   Plan:  Continue PTA Depakote 250 mg 3 times daily and cont extra 125 mg dose with PM dose.   Continue PTA melatonin 3 mg nightly  Continue PTA mirtazapine 15 mg nightly  Continue PTA olanzapine 5 mg p.o. daily at 2 PM  Continue Abilify 2 mg daily per geriatric recommendation  Continue PTA vitamin B-12 supplementation  Continue PTA olanzapine 2.5 mg IM as needed for agitation  Continue PTA haloperidol 1 mg p.o. every 6 hours as needed for agitation unresponsive to as needed olanzapine  Geriatrics service consulted at this admission given patient's dementia with behavioral disturbances and polypharmacy.  Their recommendations are very much appreciated.  Reconsulted PT to encourage ambulation    Polypharmacy  Assessment & Plan  Patient with history of progressive dementia.  Prior to admission medications include Depakote, melatonin, mirtazapine, olanzapine, quetiapine, Haldol as needed, olanzapine as needed.  These medications were reviewed at admission.  Medications currently being ministered at [facility] were continued.  Given patient's continued behavioral disturbances and extensive list of current medications, geriatric service is consulted and their recommendations are greatly appreciated.  Plan:  -as above    BPH (benign prostatic hyperplasia)  Assessment & Plan  Patient with history of BPH.  Continue prior to admission finasteride 5 mg daily  Voiding trial initiated while inpatient upon request of wife. Patient failed voiding trial, Mcduffie catheter placed 2/11.  Patient will require chronic Mcduffie catheter. Outpatient follow-up with urology.  *Spoke with wife and son at bedside. Wife is concerned about pt having discomfort with the mcduffie catheter. Had discussion regarding voiding trial and decided if pt is comfortable with the mcduffie, we can leave it for now. If it begins to cause discomfort, we can attempt a voiding trial.  "Informed nurse.   Overnight,  pt did pull at mcduffie and IV, but did not appear to be in pain. Will cont to monitor for discomfort.     Constipation  Assessment & Plan  Patient with history of constipation.  Bowel regimen ordered with Colace 100 mg twice daily and Senokot nightly. Added miralax daily  Monitor for bowel movements.          Disposition: Will remain inpatient, working with CM for placement     SUBJECTIVE     Patient seen and examined. Overnight, pt was pulling at IV and mcduffie, so night team reordered restraints. Pt pleasantly demented. Aox1, to person. No complaints. Denies fevers, chills, nausea, vomiting, chest pain, or SOB.     OBJECTIVE     Vitals:    24 0900 24 1611 24 2302 24 0600   BP:  117/57 108/61    Pulse:       Resp:  17 16    Temp:  97.9 °F (36.6 °C) (!) 97.3 °F (36.3 °C)    TempSrc:       SpO2:       Weight: 59 kg (130 lb)   59.1 kg (130 lb 4.7 oz)   Height: 5' 6\" (1.676 m)         Temperature:   Temp (24hrs), Av.6 °F (36.4 °C), Min:97.3 °F (36.3 °C), Max:97.9 °F (36.6 °C)    Temperature: (!) 97.3 °F (36.3 °C)  Intake & Output:  I/O          0701   0700  0701   0700    P.O. 300     Total Intake(mL/kg) 300 (5.1)     Urine (mL/kg/hr) 900 (0.6) 1075 (0.8)    Total Output 900 1075    Net -600 -1075                Weights:   IBW (Ideal Body Weight): 63.8 kg    Body mass index is 21.03 kg/m².  Weight (last 2 days)       Date/Time Weight    24 0600 59.1 (130.29)    24 0900 59 (130)    24 0544 59.1 (130.29)    24 0630 59.5 (131.17)          Physical Exam  Constitutional:       Appearance: Normal appearance.   HENT:      Head: Normocephalic and atraumatic.   Eyes:      Extraocular Movements: Extraocular movements intact.   Cardiovascular:      Rate and Rhythm: Normal rate and regular rhythm.      Pulses: Normal pulses.      Heart sounds: No murmur heard.  Pulmonary:      Effort: No respiratory distress.      Breath sounds: " Normal breath sounds. No wheezing.   Abdominal:      General: There is no distension.      Palpations: Abdomen is soft.      Tenderness: There is no abdominal tenderness.   Musculoskeletal:         General: No swelling or tenderness.      Cervical back: Normal range of motion and neck supple.   Skin:     General: Skin is warm and dry.   Neurological:      Mental Status: He is alert.       LABORATORY DATA     Labs: I have personally reviewed pertinent reports.  Results from last 7 days   Lab Units 02/12/24  0610 02/10/24  0557 02/08/24 0424 02/07/24  1317   WBC Thousand/uL 7.54 3.94* 6.20 4.77   HEMOGLOBIN g/dL 12.7 12.5 12.8 10.8*   HEMATOCRIT % 38.0 37.7 37.9 33.3*   PLATELETS Thousands/uL 195 168 243 175   NEUTROS PCT % 76*  --   --  76*   MONOS PCT % 8  --   --  8   EOS PCT % 2  --   --  1      Results from last 7 days   Lab Units 02/12/24  0610 02/10/24  0557 02/08/24 0424 02/07/24  1317   POTASSIUM mmol/L 3.7 4.1 3.5 3.9   CHLORIDE mmol/L 105 108 106 106   CO2 mmol/L 29 27 19* 27   BUN mg/dL 14 13 15 19   CREATININE mg/dL 0.95 0.88 0.83 0.85   CALCIUM mg/dL 8.8 8.5 9.1 8.5   ALK PHOS U/L  --   --   --  54   ALT U/L  --   --   --  13   AST U/L  --   --   --  13                            IMAGING & DIAGNOSTIC TESTING     Radiology Results: I have personally reviewed pertinent reports.  No results found.  Other Diagnostic Testing: I have personally reviewed pertinent reports.    ACTIVE MEDICATIONS     Current Facility-Administered Medications   Medication Dose Route Frequency    acetaminophen (TYLENOL) tablet 650 mg  650 mg Oral Q6H PRN    ARIPiprazole (ABILIFY) tablet 2 mg  2 mg Oral Daily    cyanocobalamin (VITAMIN B-12) tablet 1,000 mcg  1,000 mcg Oral Daily    divalproex sodium (DEPAKOTE SPRINKLE) capsule 125 mg  125 mg Oral Daily    divalproex sodium (DEPAKOTE SPRINKLE) capsule 250 mg  250 mg Oral TID With Meals    docusate sodium (COLACE) capsule 100 mg  100 mg Oral BID    enoxaparin (LOVENOX) subcutaneous  "injection 40 mg  40 mg Subcutaneous Daily    finasteride (PROSCAR) tablet 5 mg  5 mg Oral Daily    haloperidol (HALDOL) tablet 1 mg  1 mg Oral Q6H PRN    LORazepam (ATIVAN) injection 0.5 mg  0.5 mg Intravenous Once    melatonin tablet 3 mg  3 mg Oral HS    mirtazapine (REMERON) tablet 15 mg  15 mg Oral HS    OLANZapine (ZyPREXA) IM injection 2.5 mg  2.5 mg Intramuscular Daily PRN    OLANZapine (ZyPREXA) tablet 5 mg  5 mg Oral Daily    polyethylene glycol (MIRALAX) packet 17 g  17 g Oral Daily    senna-docusate sodium (SENOKOT S) 8.6-50 mg per tablet 1 tablet  1 tablet Oral HS       VTE Pharmacologic Prophylaxis: Enoxaparin (Lovenox)  VTE Mechanical Prophylaxis: sequential compression device    Portions of the record may have been created with voice recognition software.  Occasional wrong word or \"sound a like\" substitutions may have occurred due to the inherent limitations of voice recognition software.  Read the chart carefully and recognize, using context, where substitutions have occurred.  ==  Lizbet Beckett DO  [facility]  Internal Medicine Residency PGY-1      "

## 2024-02-13 NOTE — PLAN OF CARE
Problem: SAFETY,RESTRAINT: NV/NON-SELF DESTRUCTIVE BEHAVIOR  Goal: Remains free of harm/injury (restraint for non violent/non self-detsructive behavior)  Description: INTERVENTIONS:  - Instruct patient/family regarding restraint use   - Assess and monitor physiologic and psychological status   - Provide interventions and comfort measures to meet assessed patient needs   - Identify and implement measures to help patient regain control  - Assess readiness for release of restraint   Outcome: Progressing  Goal: Returns to optimal restraint-free functioning  Description: INTERVENTIONS:  - Assess the patient's behavior and symptoms that indicate continued need for restraint  - Identify and implement measures to help patient regain control  - Assess readiness for release of restraint   Outcome: Progressing     Problem: Prexisting or High Potential for Compromised Skin Integrity  Goal: Skin integrity is maintained or improved  Description: INTERVENTIONS:  - Identify patients at risk for skin breakdown  - Assess and monitor skin integrity  - Assess and monitor nutrition and hydration status  - Monitor labs   - Assess for incontinence   - Turn and reposition patient  - Assist with mobility/ambulation  - Relieve pressure over bony prominences  - Avoid friction and shearing  - Provide appropriate hygiene as needed including keeping skin clean and dry  - Evaluate need for skin moisturizer/barrier cream  - Collaborate with interdisciplinary team   - Patient/family teaching  - Consider wound care consult   Outcome: Progressing     Problem: Nutrition/Hydration-ADULT  Goal: Nutrient/Hydration intake appropriate for improving, restoring or maintaining nutritional needs  Description: Monitor and assess patient's nutrition/hydration status for malnutrition. Collaborate with interdisciplinary team and initiate plan and interventions as ordered.  Monitor patient's weight and dietary intake as ordered or per policy. Utilize nutrition  screening tool and intervene as necessary. Determine patient's food preferences and provide high-protein, high-caloric foods as appropriate.     INTERVENTIONS:  - Monitor oral intake, urinary output, labs, and treatment plans  - Assess nutrition and hydration status and recommend course of action  - Evaluate amount of meals eaten  - Assist patient with eating if necessary   - Allow adequate time for meals  - Recommend/ encourage appropriate diets, oral nutritional supplements, and vitamin/mineral supplements  - Order, calculate, and assess calorie counts as needed  - Recommend, monitor, and adjust tube feedings and TPN/PPN based on assessed needs  - Assess need for intravenous fluids  - Provide specific nutrition/hydration education as appropriate  - Include patient/family/caregiver in decisions related to nutrition  Outcome: Progressing

## 2024-02-14 PROCEDURE — 97167 OT EVAL HIGH COMPLEX 60 MIN: CPT

## 2024-02-14 PROCEDURE — 99232 SBSQ HOSP IP/OBS MODERATE 35: CPT | Performed by: INTERNAL MEDICINE

## 2024-02-14 PROCEDURE — 97163 PT EVAL HIGH COMPLEX 45 MIN: CPT

## 2024-02-14 RX ORDER — GABAPENTIN 100 MG/1
100 CAPSULE ORAL ONCE
Status: COMPLETED | OUTPATIENT
Start: 2024-02-14 | End: 2024-02-14

## 2024-02-14 RX ADMIN — GABAPENTIN 100 MG: 100 CAPSULE ORAL at 00:50

## 2024-02-14 RX ADMIN — MIRTAZAPINE 15 MG: 15 TABLET, FILM COATED ORAL at 21:13

## 2024-02-14 RX ADMIN — Medication 3 MG: at 21:13

## 2024-02-14 RX ADMIN — DIVALPROEX SODIUM 125 MG: 125 CAPSULE, COATED PELLETS ORAL at 17:30

## 2024-02-14 RX ADMIN — DIVALPROEX SODIUM 250 MG: 125 CAPSULE, COATED PELLETS ORAL at 17:39

## 2024-02-14 RX ADMIN — OLANZAPINE 5 MG: 10 TABLET, FILM COATED ORAL at 13:44

## 2024-02-14 NOTE — PHYSICAL THERAPY NOTE
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)    Insomnia    Pancreas cyst    Schatzki's ring    Gastric polyps    Hx of adenomatous colonic polyps    History of colonic diverticulitis    Slow transit constipation    Sherwood's esophagus with dysplasia    H/O inguinal hernia repair    Benign prostatic hyperplasia with urinary obstruction    Delirium    Ambulatory dysfunction    Dementia with agitation and other behavioral disturbance (HCC)    Neuropathy    Constipation    BPH (benign prostatic hyperplasia)    Abnormal urine finding    Metabolic encephalopathy    UTI (urinary tract infection)    Polypharmacy    Moderate protein-calorie malnutrition (HCC)       Past Medical History  Past Medical History:   Diagnosis Date    Anxiety     BPH (benign prostatic hyperplasia)     Chronic indwelling Choi catheter     Colon polyp     Dementia with behavioral disturbance (HCC)     Depression     Disorder of eye     disorder of choroid of eye    Diverticulitis of rectum     Frequent UTI     GERD (gastroesophageal reflux disease)     Infected hernioplasty mesh (HCC)     Malignant melanoma of eye, left (HCC)     Memory impairment     Mixed hyperlipidemia     Skin cancer (melanoma) (HCC)     Uveal, Caroidal    Urinary retention     Vision loss of left eye     Wears glasses        Past Surgical History  Past Surgical History:   Procedure Laterality Date    ABDOMINAL SURGERY      mesh removal    EGD  2019    EGD AND COLONOSCOPY  2017    EGD AND COLONOSCOPY  03/11/2022    HERNIA REPAIR      HERNIA REPAIR Right     Inguinal/Umbilical    [place] RPR RECRT INGUINAL HERNIA ANY AGE REDUCIBLE Right 11/2/2023    Procedure: OPEN REPAIR OF RECURRENT REDUCIBLE RIGHT INGUINAL  HERNIAS WITH MESH;  Surgeon: Riley  MD Kathy;  Location:  MAIN OR;  Service: General    SKIN CANCER EXCISION  02/21/2019    R upper cheek, benign lesion including margins, face, ears, eyelids, nose, lips, mucous membrane, excised diameter 2.1 to 3.0 CM          02/14/24 1302   PT Last Visit   PT Visit Date 02/14/24   Note Type   Note type Evaluation   Pain Assessment   Pain Assessment Tool FLACC   Pain Rating: FLACC (Rest) - Face 0   Pain Rating: FLACC (Rest) - Legs 0   Pain Rating: FLACC (Rest) - Activity 0   Pain Rating: FLACC (Rest) - Cry 0   Pain Rating: FLACC (Rest) - Consolability 0   Score: FLACC (Rest) 0   Pain Rating: FLACC (Activity) - Face 0   Pain Rating: FLACC (Activity) - Legs 0   Pain Rating: FLACC (Activity) - Activity 0   Pain Rating: FLACC (Activity) - Cry 0   Pain Rating: FLACC (Activity) - Consolability 0   Score: FLACC (Activity) 0   Restrictions/Precautions   Weight Bearing Precautions Per Order No   Other Precautions Cognitive;Chair Alarm;Bed Alarm;Restraints;Multiple lines;Fall Risk   Home Living   Type of Home SNF  (pt presents from [facility])   Home Layout One level   Bathroom Shower/Tub Walk-in shower   Bathroom Toilet Raised   Bathroom Equipment Grab bars in shower;Shower chair;Grab bars around toilet   Bathroom Accessibility Accessible   Additional Comments pt ? historian, unable to provide home living information   Prior Function   Level of Wallace Needs assistance with ADLs;Needs assistance with functional mobility;Needs assistance with IADLS   Lives With Spouse;Facility staff   Receives Help From   (facility staff)   IADLs Family/Friend/Other provides transportation;Family/Friend/Other provides meals;Family/Friend/Other provides medication management   Vocational Retired   Comments pt ? historian, unable to provide prior function information   General   Family/Caregiver Present No   Cognition   Overall Cognitive Status (S)  Impaired   Arousal/Participation Poorly responsive   Orientation Level Oriented to  person;Disoriented to place;Disoriented to time;Disoriented to situation   Memory Decreased recall of recent events;Decreased recall of precautions;Decreased short term memory   Following Commands Follows one step commands inconsistently   Subjective   Subjective pt pleasantly confused and cooperative throughout therapy session. pt received supine in bed with posey belt in place   RLE Assessment   RLE Assessment X  (4-/5 grossly)   LLE Assessment   LLE Assessment X  (4-/5 grossly)   Bed Mobility   Supine to Sit 5  Supervision   Additional items Increased time required;Verbal cues   Sit to Supine 4  Minimal assistance   Additional items Assist x 1;Increased time required;Verbal cues;LE management   Transfers   Sit to Stand 4  Minimal assistance   Additional items Assist x 1;Increased time required;Verbal cues   Stand to Sit 4  Minimal assistance   Additional items Assist x 1;Increased time required;Verbal cues   Additional Comments transfers w/o AD   Ambulation/Elevation   Gait pattern Improper Weight shift;Decreased foot clearance;Narrow CELI;Shuffling;Short stride;Excessively slow;Knees flexed   Gait Assistance 4  Minimal assist   Additional items Assist x 1;Verbal cues;Tactile cues   Assistive Device None   Distance 10'   Balance   Static Sitting Fair +   Dynamic Sitting Fair   Static Standing Fair -   Dynamic Standing Poor +   Ambulatory Poor +   Endurance Deficit   Endurance Deficit Yes   Endurance Deficit Description generalized weakness, decreased exercise tolerance   Activity Tolerance   Activity Tolerance Patient limited by fatigue   Medical Staff Made Aware mono Lopez due to medical complexity and multiple comorbidities   Nurse Made Aware RN cleared and updated   Assessment   Prognosis Fair   Problem List Decreased strength;Decreased range of motion;Decreased endurance;Impaired balance;Decreased mobility;Decreased safety awareness;Impaired judgement;Decreased cognition   Assessment PT orders received  and acknowledged. Patient was seen today for high complexity PT evaluation. High complexity evaluation due to Ongoing medical management for primary dx, Decreased activity tolerance compared to baseline, Fall risk, Cog status, Continuous pulse oximetry monitoring  Patient is a 83 y.o. male  who was admitted to [facility] on 2/7/2024  with Dementia with agitation (HCC) . Pt presents to [facility] from [facility] due to inability to provide appropriate level of care . Patient performed functional mobility as described above.  At baseline, pt resides with wife in SNF and was requiring assistance prior to hospital admission. Currently, upon initial examination, pt  is requiring supervision   for bed mobility skills;  min assist x1 for functional transfers and  min assist x1 for ambulation with no AD.  Patient currently presents below baseline with limitations in gait, balance, and transfers. Patient will benefit from continued PT services while in hospital in order to address remaining limitations. The patients AM-PAC Basic Mobility Inpatient Short From Raw Score is 19 . Based on AM-PAC scoring and patient presentation, PT currently recommending Level II (Moderate Resource Intensity). Please also refer to the recommendation of the Physical Therapist for safe discharge planning.   Barriers to Discharge Decreased caregiver support   Goals   Patient Goals to eat lunch   STG Expiration Date 02/28/24   Short Term Goal #1 Patient will be able to perform bed mobility tasks with  modified independent   in order to improve overall functional mobility and assist in safe d/c. STG 2 Patient will sit EOB for at least 25 minutes at  modified independent   level in order to strengthen abdominal musculature and assist in future transfers and ambulation. STG 3 Patient will be able to perform functional transfer with  modified independent   in order to improve overall functional mobility and assist in safe discharge. STG 4 Patient will be  able to ambulate at least 150 feet with least restrictive device with  supervision   assist in order to improve overall functional mobility and assist in safe discharge. STG 5 Patient will improve sitting/standing static/dynamic balance 1/2 grade in order to improve functional mobility and assist in safe discharge. STG 6 Patient will improve LE strength by 1/2 grade in order to improve functional mobility and assist in safe discharge.   PT Treatment Day 0   Plan   Treatment/Interventions ADL retraining;Functional transfer training;LE strengthening/ROM;Elevations;Therapeutic exercise;Endurance training;Gait training;Bed mobility;Spoke to nursing;OT   PT Frequency 3-5x/wk   Discharge Recommendation   Rehab Resource Intensity Level, PT II (Moderate Resource Intensity)   AM-PAC Basic Mobility Inpatient   Turning in Flat Bed Without Bedrails 4   Lying on Back to Sitting on Edge of Flat Bed Without Bedrails 4   Moving Bed to Chair 3   Standing Up From Chair Using Arms 3   Walk in Room 3   Climb 3-5 Stairs With Railing 2   Basic Mobility Inpatient Raw Score 19   Basic Mobility Standardized Score 42.48   Highest Level Of Mobility   JH-HLM Goal 6: Walk 10 steps or more   JH-HLM Achieved 6: Walk 10 steps or more   End of Consult   Patient Position at End of Consult Supine;Bed/Chair alarm activated;All needs within reach  (posey belt in place)         Lalo Cao, PT

## 2024-02-14 NOTE — PLAN OF CARE
Problem: PHYSICAL THERAPY ADULT  Goal: Performs mobility at highest level of function for planned discharge setting.  See evaluation for individualized goals.  Description: Treatment/Interventions: ADL retraining, Functional transfer training, LE strengthening/ROM, Elevations, Therapeutic exercise, Endurance training, Gait training, Bed mobility, Spoke to nursing, OT          See flowsheet documentation for full assessment, interventions and recommendations.  Note: Prognosis: Fair  Problem List: Decreased strength, Decreased range of motion, Decreased endurance, Impaired balance, Decreased mobility, Decreased safety awareness, Impaired judgement, Decreased cognition  Assessment: PT orders received and acknowledged. Patient was seen today for high complexity PT evaluation. High complexity evaluation due to Ongoing medical management for primary dx, Decreased activity tolerance compared to baseline, Fall risk, Cog status, Continuous pulse oximetry monitoring  Patient is a 83 y.o. male  who was admitted to [facility] on 2/7/2024  with Dementia with agitation (HCC) . Pt presents to [facility] from [facility] due to inability to provide appropriate level of care . Patient performed functional mobility as described above.  At baseline, pt resides with wife in SNF and was requiring assistance prior to hospital admission. Currently, upon initial examination, pt  is requiring supervision   for bed mobility skills;  min assist x1 for functional transfers and  min assist x1 for ambulation with no AD.  Patient currently presents below baseline with limitations in gait, balance, and transfers. Patient will benefit from continued PT services while in hospital in order to address remaining limitations. The patients AM-PAC Basic Mobility Inpatient Short From Raw Score is 19 . Based on AM-PAC scoring and patient presentation, PT currently recommending Level II (Moderate Resource Intensity). Please also refer to the recommendation of  the Physical Therapist for safe discharge planning.  Barriers to Discharge: Decreased caregiver support     Rehab Resource Intensity Level, PT: II (Moderate Resource Intensity)    See flowsheet documentation for full assessment.

## 2024-02-14 NOTE — RESTORATIVE TECHNICIAN NOTE
Restorative Technician Note      Patient Name: Gino Hernandez     Restorative Tech Visit Date: 02/14/24  Note Type: Mobility  Patient Position Upon Consult: Supine  Activity Performed: Range of motion; Repositioned; Other (Comment) (Attempted therex however pt not following one step commands and difficult to redirect today. Performed AAROM as able. Donned waist restraint.)  Education Provided: Yes  Patient Position at End of Consult: Supine; All needs within reach; Bed/Chair alarm activated    Sharon Ching  DPT, Restorative Technician

## 2024-02-14 NOTE — QUICK NOTE
Spoke with Sobia, pt's wife on the phone. Provided her with updates. Answered all questions to her satisfaction.    Lizbet Beckett,   PGY-1  Internal Medicine  [facility]

## 2024-02-14 NOTE — OCCUPATIONAL THERAPY NOTE
Occupational Therapy Evaluation     Patient Name: Gino Hernandez  Today's Date: 2/14/2024  Problem List  Principal Problem:    Dementia with agitation and other behavioral disturbance (HCC)  Active Problems:    Constipation    BPH (benign prostatic hyperplasia)    Polypharmacy    Moderate protein-calorie malnutrition (HCC)    Past Medical History  Past Medical History:   Diagnosis Date    Anxiety     BPH (benign prostatic hyperplasia)     Chronic indwelling Choi catheter     Colon polyp     Dementia with behavioral disturbance (HCC)     Depression     Disorder of eye     disorder of choroid of eye    Diverticulitis of rectum     Frequent UTI     GERD (gastroesophageal reflux disease)     Infected hernioplasty mesh (HCC)     Malignant melanoma of eye, left (HCC)     Memory impairment     Mixed hyperlipidemia     Skin cancer (melanoma) (HCC)     Uveal, Caroidal    Urinary retention     Vision loss of left eye     Wears glasses      Past Surgical History  Past Surgical History:   Procedure Laterality Date    ABDOMINAL SURGERY      mesh removal    EGD  2019    EGD AND COLONOSCOPY  2017    EGD AND COLONOSCOPY  03/11/2022    HERNIA REPAIR      HERNIA REPAIR Right     Inguinal/Umbilical    AK RPR RECRT INGUINAL HERNIA ANY AGE REDUCIBLE Right 11/2/2023    Procedure: OPEN REPAIR OF RECURRENT REDUCIBLE RIGHT INGUINAL  HERNIAS WITH MESH;  Surgeon: Riley Chavez MD;  Location:  MAIN OR;  Service: General    SKIN CANCER EXCISION  02/21/2019    R upper cheek, benign lesion including margins, face, ears, eyelids, nose, lips, mucous membrane, excised diameter 2.1 to 3.0 CM           02/14/24 1303   OT Last Visit   OT Visit Date 02/14/24   Note Type   Note type Evaluation   Pain Assessment   Pain Assessment Tool FLACC   Pain Rating: FLACC (Rest) - Face 0   Pain Rating: FLACC (Rest) - Legs 0   Pain Rating: FLACC (Rest) - Activity 0   Pain Rating: FLACC (Rest) - Cry 0   Pain Rating: FLACC (Rest) - Consolability 0   Score:  "FLACC (Rest) 0   Pain Rating: FLACC (Activity) - Face 0   Pain Rating: FLACC (Activity) - Legs 0   Pain Rating: FLACC (Activity) - Activity 0   Pain Rating: FLACC (Activity) - Cry 0   Pain Rating: FLACC (Activity) - Consolability 0   Score: FLACC (Activity) 0   Restrictions/Precautions   Weight Bearing Precautions Per Order No   Other Precautions Cognitive;Bed Alarm;Chair Alarm;Restraints;Fall Risk  (+posey, mcduffie, bed alarm)   Home Living   Type of Home SNF  (Pt from [facility], dementia unit)   Home Layout One level   Bathroom Shower/Tub Walk-in shower   Bathroom Toilet Raised   Bathroom Equipment Grab bars in shower   Additional Comments Pt is a questionable historian, responding inconsistently to questions. Pt history found in chart per CM and previous encounters   Prior Function   Level of [place] Needs assistance with ADLs;Needs assistance with IADLS;Needs assistance with functional mobility   Lives With Facility staff   Receives Help From Other (Comment)  (facility staff)   IADLs Family/Friend/Other provides transportation;Family/Friend/Other provides meals;Family/Friend/Other provides medication management  (facility)   Vocational Retired   Comments Pt unable to provide accurate history. Per chart, pt lives at SNF where he receives assist for ADLs/IADLs   Lifestyle   Autonomy Pt admit from SNF where he receives assist for ADLs/IADLs. No DME used   Reciprocal Relationships Pt is    Service to Others Pt is a [employer]    Subjective   Subjective \"I was in the [employer]\"   ADL   Where Assessed Edge of bed   Eating Assistance 5  Supervision/Setup   Eating Deficit Setup;Verbal cueing;Supervision/safety   Grooming Assistance 4  Minimal Assistance   UB Bathing Assistance 4  Minimal Assistance   LB Bathing Assistance 3  Moderate Assistance   UB Dressing Assistance 4  Minimal Assistance   LB Dressing Assistance 3  Moderate Assistance   Toileting Assistance  3  Moderate Assistance   Functional Assistance 3  Moderate " Assistance   Bed Mobility   Supine to Sit 5  Supervision   Additional items Increased time required;Verbal cues   Sit to Supine 4  Minimal assistance   Additional items Assist x 1;Increased time required;Verbal cues;LE management   Additional Comments Pt returned to supine in bed post session, all needs met, call bell within reach   Transfers   Sit to Stand 4  Minimal assistance   Additional items Assist x 1;Increased time required;Verbal cues   Stand to Sit 4  Minimal assistance   Additional items Assist x 1;Increased time required;Verbal cues   Additional Comments HHA, LOB noted with functional mobility   Functional Mobility   Functional Mobility 4  Minimal assistance   Additional Comments Ax1, HHA, household distances in room   Additional items Hand hold assistance   Balance   Static Sitting Fair +   Dynamic Sitting Fair   Static Standing Fair -   Dynamic Standing Poor +   Ambulatory Poor   Activity Tolerance   Activity Tolerance Patient limited by fatigue   Medical Staff Made Aware PT mono May 2/2 increased medical complexity and comorbidities   Nurse Made Aware RN cleared to see and updated   RUE Assessment   RUE Assessment WFL  (grossly at least 3+/5)   LUE Assessment   LUE Assessment WFL  (grossly at least 3+/5)   Hand Function   Gross Motor Coordination Functional   Fine Motor Coordination Functional   Cognition   Overall Cognitive Status (S)  Impaired   Arousal/Participation Arousable;Persistent stimuli required   Attention Difficulty attending to directions   Orientation Level Oriented to person;Disoriented to place;Disoriented to time;Disoriented to situation  (Pt able to state year with VC and multiple choice options)   Memory Decreased short term memory;Decreased recall of recent events;Decreased recall of precautions   Following Commands Follows one step commands inconsistently   Comments Pt cooperative, requiring VCs and tactile cues for task completion. Impulsivity noted, VCs for safety.  Responding inconsistently to questions. Limited ST memory, able to recall LT memory.   Assessment   Prognosis Fair   Assessment 83 year old pt seen today for an OT evaluation following admission to [facility] 2/2 dementia with agitation and other behavioral disturbance with present symptoms significant for decreased cognition, fatigue, decreased functional ability. Pt has a past medical history of Anxiety, BPH (benign prostatic hyperplasia), Chronic indwelling Choi catheter, Colon polyp, Dementia with behavioral disturbance (HCC), Depression, Disorder of eye, Diverticulitis of rectum, Frequent UTI, GERD (gastroesophageal reflux disease), Infected hernioplasty mesh (HCC), Malignant melanoma of eye, left (HCC), Memory impairment, Mixed hyperlipidemia, Skin cancer (melanoma) ([facility]), Urinary retention, Vision loss of left eye, and Wears glasses. . Pt is a poor historian, unable to provide detailed history. Per chart, pt is admitted from SNF with assist for all ADLs/IADLs. Pt was cooperative t/o session, requiring increased VCs for task completion and safety awareness. Pt with noted impulsivity, requiring VCs and tactile cues to maintain attention to task. Pt with significant cognitive deficits limiting participation. Pt completed functional bed mobility with min A. Min A for functional STS txfs with HHA. Min A for functional mobility with HHA to walk in room. Pt unsteady and requiring assist to maintain balance. Pt was min A for UB ADLs and mod A for LB ADLs. The patient's raw score on the -PAC Daily Activity Inpatient Short Form is 13. A raw score of less than 19 suggests the patient may benefit from discharge to post-acute rehabilitation services. Please refer to the recommendation of the Occupational Therapist for safe discharge planning. Pt is functioning at or near baseline level of function with assist for ADLs/IADLs, and no further skilled acute OT needs are identified. At this time, current  OT recommendation is Level 2 resources - return to rehab with assist and SUP. Will remain available if skilled acute OT needs arise. D/c OT.    Goals   Patient Goals to eat lunch   Plan   OT Frequency Eval only   Discharge Recommendation   Rehab Resource Intensity Level, OT II (Moderate Resource Intensity)   AM-PAC Daily Activity Inpatient   Lower Body Dressing 2   Bathing 2   Toileting 2   Upper Body Dressing 2   Grooming 2   Eating 3   Daily Activity Raw Score 13   Daily Activity Standardized Score (Calc for Raw Score >=11) 32.03   AM-PAC Applied Cognition Inpatient   Following a Speech/Presentation 2   Understanding Ordinary Conversation 2   Taking Medications 2   Remembering Where Things Are Placed or Put Away 2   Remembering List of 4-5 Errands 2   Taking Care of Complicated Tasks 1   Applied Cognition Raw Score 11   Applied Cognition Standardized Score 27.03   End of Consult   Education Provided Yes   Patient Position at End of Consult Supine;Bed/Chair alarm activated;All needs within reach   Nurse Communication Nurse aware of consult       [place] Gayle, OTR/L

## 2024-02-14 NOTE — PLAN OF CARE
Problem: SAFETY,RESTRAINT: NV/NON-SELF DESTRUCTIVE BEHAVIOR  Goal: Remains free of harm/injury (restraint for non violent/non self-detsructive behavior)  Description: INTERVENTIONS:  - Instruct patient/family regarding restraint use   - Assess and monitor physiologic and psychological status   - Provide interventions and comfort measures to meet assessed patient needs   - Identify and implement measures to help patient regain control  - Assess readiness for release of restraint   Outcome: Progressing  Goal: Returns to optimal restraint-free functioning  Description: INTERVENTIONS:  - Assess the patient's behavior and symptoms that indicate continued need for restraint  - Identify and implement measures to help patient regain control  - Assess readiness for release of restraint   Outcome: Progressing     Problem: Nutrition/Hydration-ADULT  Goal: Nutrient/Hydration intake appropriate for improving, restoring or maintaining nutritional needs  Description: Monitor and assess patient's nutrition/hydration status for malnutrition. Collaborate with interdisciplinary team and initiate plan and interventions as ordered.  Monitor patient's weight and dietary intake as ordered or per policy. Utilize nutrition screening tool and intervene as necessary. Determine patient's food preferences and provide high-protein, high-caloric foods as appropriate.     INTERVENTIONS:  - Monitor oral intake, urinary output, labs, and treatment plans  - Assess nutrition and hydration status and recommend course of action  - Evaluate amount of meals eaten  - Assist patient with eating if necessary   - Allow adequate time for meals  - Recommend/ encourage appropriate diets, oral nutritional supplements, and vitamin/mineral supplements  - Order, calculate, and assess calorie counts as needed  - Recommend, monitor, and adjust tube feedings and TPN/PPN based on assessed needs  - Assess need for intravenous fluids  - Provide specific  nutrition/hydration education as appropriate  - Include patient/family/caregiver in decisions related to nutrition  Outcome: Progressing     Problem: Prexisting or High Potential for Compromised Skin Integrity  Goal: Skin integrity is maintained or improved  Description: INTERVENTIONS:  - Identify patients at risk for skin breakdown  - Assess and monitor skin integrity  - Assess and monitor nutrition and hydration status  - Monitor labs   - Assess for incontinence   - Turn and reposition patient  - Assist with mobility/ambulation  - Relieve pressure over bony prominences  - Avoid friction and shearing  - Provide appropriate hygiene as needed including keeping skin clean and dry  - Evaluate need for skin moisturizer/barrier cream  - Collaborate with interdisciplinary team   - Patient/family teaching  - Consider wound care consult   Outcome: Progressing

## 2024-02-14 NOTE — CASE MANAGEMENT
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)   Patient Active Problem List    Diagnosis Date Noted    Moderate protein-calorie malnutrition (HCC) 2024    Metabolic encephalopathy 2024    UTI (urinary tract infection) 2024    Polypharmacy 2024    Ambulatory dysfunction 2024    Dementia with agitation and other behavioral disturbance (HCC) 2024    Neuropathy 2024    Constipation 2024    BPH (benign prostatic hyperplasia) 2024    Abnormal urine finding 2024    Delirium 2023    H/O inguinal hernia repair 2023    Benign prostatic hyperplasia with urinary obstruction 2023    Sherwood's esophagus with dysplasia 2023    History of colonic diverticulitis 2022    Slow transit constipation 2022    Pancreas cyst 2021    Schatzki's ring 2021    Gastric polyps 2021    Hx of adenomatous colonic polyps 2021    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    Insomnia 2020      LOS (days): 7  Geometric Mean LOS (GMLOS) (days): 5  Days to GMLOS:-2     OBJECTIVE:  Risk of Unplanned Readmission Score: 28.34         Current admission status: Inpatient   Preferred Pharmacy:   [id number] - [place] - [address]  [address] [address]  Phone: [phone] Fax: [phone]    [id number] - [place] - [address]  [address] [address]  Phone: [phone] Fax: [phone]    [facility] [place] [place] - [address]  7 [facility] [address]  Phone: [phone] Fax: [phone]    Primary Care Provider: Augusta Davis MD    Primary Insurance: MEDICARE  Secondary  Insurance:  FOR LIFE    DISCHARGE DETAILS:     KUNAL reached out to [facility] re: update.   Awaits response

## 2024-02-14 NOTE — PROGRESS NOTES
INTERNAL MEDICINE RESIDENCY PROGRESS NOTE     Name: Gino Hernandez   Age & Sex: 83 y.o. male   MRN: [id number]  [facility]   Encounter: [id number]  Team: SOD Team C     PATIENT INFORMATION     Name: Gino Hernandez   Age & Sex: 83 y.o. male   MRN: [id number]  Hospital Stay Days: 7    ASSESSMENT/PLAN     Principal Problem:    Dementia with agitation and other behavioral disturbance (HCC)  Active Problems:    Constipation    BPH (benign prostatic hyperplasia)    Polypharmacy    Moderate protein-calorie malnutrition (HCC)      * Dementia with agitation and other behavioral disturbance (HCC)  Assessment & Plan  Patient with history of progressive Alzheimer's dementia.  At baseline, patient may be alert and oriented to person, but not to place or time.  He holds conversations but does get confused at baseline.  Patient had recent admission in January 2024 patient has dementia with agitation and other behavioral disturbances.  Was seen in consultation by geriatrics for polypharmacy and dementia treatment at that time.  Following conclusion of hospitalization patient was discharged to hospice at [facility].  However, patient returned to ED at [facility] on 02/07 as patient was having behaviors and acting aggressive towards staff and other residents at [facility].  There were no longer able to care for him without a continuous one-to-one observer, which was not covered by insurance in which family could not afford.  Because of this, he was taken back to the ED to find placement at another facility.  Patient currently without any acute complaints or signs/symptoms of infection.  Per wife, patient has not been acting any different recently than he has since last discharge.  No apparent metabolic/infectious changes to explain behaviors at this time.  Likely just progression of underlying dementia.   Reviewed medicine reconciliation sent by [facility] at admission.  2/13:  Pt has not been combative. Occasionally pulls at mcduffie/IV, so reordered soft restraints overnight. Otherwise feels well.   2/14: Overnight was agitated. S/p 1 time dose of gabapentin 100mg. Improved. Nurse removed wrist restraints this am. Pt sleeping comfortably. Aox1  Plan:  Continue PTA Depakote 250 mg 3 times daily and cont extra 125 mg dose with PM dose.   Continue PTA melatonin 3 mg nightly  Continue PTA mirtazapine 15 mg nightly  Continue PTA olanzapine 5 mg p.o. daily at 2 PM  Continue Abilify 2 mg daily per geriatric recommendation  Continue PTA vitamin B-12 supplementation  Continue PTA olanzapine 2.5 mg IM as needed for agitation  Continue PTA haloperidol 1 mg p.o. every 6 hours as needed for agitation unresponsive to as needed olanzapine  Geriatrics service consulted at this admission given patient's dementia with behavioral disturbances and polypharmacy.  Their recommendations are very much appreciated.  PT/OT will see today to encourage ambulation    Polypharmacy  Assessment & Plan  Patient with history of progressive dementia.  Prior to admission medications include Depakote, melatonin, mirtazapine, olanzapine, quetiapine, Haldol as needed, olanzapine as needed.  These medications were reviewed at admission.  Medications currently being ministered at [facility] were continued.  Given patient's continued behavioral disturbances and extensive list of current medications, geriatric service is consulted and their recommendations are greatly appreciated.  Plan:  -as above    BPH (benign prostatic hyperplasia)  Assessment & Plan  Patient with history of BPH.  Continue prior to admission finasteride 5 mg daily  Voiding trial initiated while inpatient upon request of wife. Patient failed voiding trial, Mcduffie catheter placed 2/11.  Patient will require chronic Mcduffie catheter. Outpatient follow-up with urology.  *Spoke with wife and son at bedside. Wife is concerned about pt having discomfort with the  mcduffie catheter. Had discussion regarding voiding trial and decided if pt is comfortable with the mcduffie, we can leave it for now. If it begins to cause discomfort, we can attempt a voiding trial. Informed nurse.   Overnight,  pt did pull at mcduffie and IV, but did not appear to be in pain. Will cont to monitor for discomfort.   No concerns , mcduffie in place    Constipation  Assessment & Plan  Patient with history of constipation.  Bowel regimen ordered with Colace 100 mg twice daily and Senokot nightly. Added miralax daily  Monitor for bowel movements.          Disposition: Patient will remain inpatient pending placement. CM sent to [facility] for review for LTC.    SUBJECTIVE     Patient seen and examined. Overnight patient was mildly agitated and was given 1 dose of gabapentin 100 mg.  Restraints were reordered.  Patient sleeping on arrival.  Per nurse, patient was pleasant in the morning and she removed wrist restraints.  No other overnight events.  AO x 1, oriented to person.  Unable to obtain ROS due to dementia/heavy sleeping.    OBJECTIVE     Vitals:    24 0822 24 1624 24 1915 24   BP: 119/85 107/68  107/69   Pulse:    88   Resp: 16 18  18   Temp: (!) 97.4 °F (36.3 °C) 98.9 °F (37.2 °C)  97.8 °F (36.6 °C)   TempSrc:    Axillary   SpO2:   96% 93%   Weight:       Height:          Temperature:   Temp (24hrs), Av °F (36.7 °C), Min:97.4 °F (36.3 °C), Max:98.9 °F (37.2 °C)    Temperature: 97.8 °F (36.6 °C)  Intake & Output:  I/O          0701   0700  0701   0700  0701  02/15 0700    P.O.  120     Total Intake(mL/kg)  120 (2)     Urine (mL/kg/hr) 1075 (0.8) 950 (0.7)     Total Output 1075 950     Net -6305 -835                  Weights:   IBW (Ideal Body Weight): 63.8 kg    Body mass index is 21.03 kg/m².  Weight (last 2 days)       Date/Time Weight    24 0600 59.1 (130.29)    24 0900 59 (130)    24 0544 59.1 (130.29)           Physical Exam  Constitutional:       General: He is sleeping.   HENT:      Head: Normocephalic and atraumatic.   Eyes:      Extraocular Movements: Extraocular movements intact.   Cardiovascular:      Rate and Rhythm: Normal rate and regular rhythm.      Pulses: Normal pulses.      Heart sounds: No murmur heard.  Pulmonary:      Effort: Pulmonary effort is normal. No respiratory distress.      Breath sounds: Normal breath sounds. No wheezing.   Abdominal:      General: Abdomen is flat. There is no distension.      Palpations: Abdomen is soft.      Tenderness: There is no abdominal tenderness.   Musculoskeletal:         General: No swelling or tenderness.      Cervical back: Normal range of motion and neck supple.   Skin:     General: Skin is warm and dry.   Neurological:      General: No focal deficit present.      Comments: AOx1       LABORATORY DATA     Labs: I have personally reviewed pertinent reports.  Results from last 7 days   Lab Units 02/12/24  0610 02/10/24  0557 02/08/24  0424 02/07/24  1317   WBC Thousand/uL 7.54 3.94* 6.20 4.77   HEMOGLOBIN g/dL 12.7 12.5 12.8 10.8*   HEMATOCRIT % 38.0 37.7 37.9 33.3*   PLATELETS Thousands/uL 195 168 243 175   NEUTROS PCT % 76*  --   --  76*   MONOS PCT % 8  --   --  8   EOS PCT % 2  --   --  1      Results from last 7 days   Lab Units 02/12/24  0610 02/10/24  0557 02/08/24  0424 02/07/24  1317   POTASSIUM mmol/L 3.7 4.1 3.5 3.9   CHLORIDE mmol/L 105 108 106 106   CO2 mmol/L 29 27 19* 27   BUN mg/dL 14 13 15 19   CREATININE mg/dL 0.95 0.88 0.83 0.85   CALCIUM mg/dL 8.8 8.5 9.1 8.5   ALK PHOS U/L  --   --   --  54   ALT U/L  --   --   --  13   AST U/L  --   --   --  13                            IMAGING & DIAGNOSTIC TESTING     Radiology Results: I have personally reviewed pertinent reports.  No results found.  Other Diagnostic Testing: I have personally reviewed pertinent reports.    ACTIVE MEDICATIONS     Current Facility-Administered Medications   Medication Dose  "Route Frequency    acetaminophen (TYLENOL) tablet 650 mg  650 mg Oral Q6H PRN    ARIPiprazole (ABILIFY) tablet 2 mg  2 mg Oral Daily    cyanocobalamin (VITAMIN B-12) tablet 1,000 mcg  1,000 mcg Oral Daily    divalproex sodium (DEPAKOTE SPRINKLE) capsule 125 mg  125 mg Oral Daily    divalproex sodium (DEPAKOTE SPRINKLE) capsule 250 mg  250 mg Oral TID With Meals    docusate sodium (COLACE) capsule 100 mg  100 mg Oral BID    enoxaparin (LOVENOX) subcutaneous injection 40 mg  40 mg Subcutaneous Daily    finasteride (PROSCAR) tablet 5 mg  5 mg Oral Daily    haloperidol (HALDOL) tablet 1 mg  1 mg Oral Q6H PRN    LORazepam (ATIVAN) injection 0.5 mg  0.5 mg Intravenous Once    melatonin tablet 3 mg  3 mg Oral HS    mirtazapine (REMERON) tablet 15 mg  15 mg Oral HS    OLANZapine (ZyPREXA) IM injection 2.5 mg  2.5 mg Intramuscular Daily PRN    OLANZapine (ZyPREXA) tablet 5 mg  5 mg Oral Daily    polyethylene glycol (MIRALAX) packet 17 g  17 g Oral Daily    senna-docusate sodium (SENOKOT S) 8.6-50 mg per tablet 1 tablet  1 tablet Oral HS       VTE Pharmacologic Prophylaxis: Enoxaparin (Lovenox)  VTE Mechanical Prophylaxis: sequential compression device    Portions of the record may have been created with voice recognition software.  Occasional wrong word or \"sound a like\" substitutions may have occurred due to the inherent limitations of voice recognition software.  Read the chart carefully and recognize, using context, where substitutions have occurred.  ==  Lizbet Beckett DO  [facility]  Internal Medicine Residency PGY-1      "

## 2024-02-15 LAB
ANION GAP SERPL CALCULATED.3IONS-SCNC: 7 MMOL/L
BUN SERPL-MCNC: 25 MG/DL (ref 5–25)
CALCIUM SERPL-MCNC: 8.9 MG/DL (ref 8.4–10.2)
CHLORIDE SERPL-SCNC: 107 MMOL/L (ref 96–108)
CO2 SERPL-SCNC: 28 MMOL/L (ref 21–32)
CREAT SERPL-MCNC: 0.93 MG/DL (ref 0.6–1.3)
ERYTHROCYTE [DISTWIDTH] IN BLOOD BY AUTOMATED COUNT: 14.3 % (ref 11.6–15.1)
GFR SERPL CREATININE-BSD FRML MDRD: 76 ML/MIN/1.73SQ M
GLUCOSE SERPL-MCNC: 87 MG/DL (ref 65–140)
HCT VFR BLD AUTO: 39.5 % (ref 36.5–49.3)
HGB BLD-MCNC: 13.1 G/DL (ref 12–17)
MCH RBC QN AUTO: 28.9 PG (ref 26.8–34.3)
MCHC RBC AUTO-ENTMCNC: 33.2 G/DL (ref 31.4–37.4)
MCV RBC AUTO: 87 FL (ref 82–98)
PLATELET # BLD AUTO: 178 THOUSANDS/UL (ref 149–390)
PMV BLD AUTO: 11.3 FL (ref 8.9–12.7)
POTASSIUM SERPL-SCNC: 3.7 MMOL/L (ref 3.5–5.3)
RBC # BLD AUTO: 4.53 MILLION/UL (ref 3.88–5.62)
SODIUM SERPL-SCNC: 142 MMOL/L (ref 135–147)
WBC # BLD AUTO: 4.84 THOUSAND/UL (ref 4.31–10.16)

## 2024-02-15 PROCEDURE — 99232 SBSQ HOSP IP/OBS MODERATE 35: CPT | Performed by: INTERNAL MEDICINE

## 2024-02-15 PROCEDURE — 85027 COMPLETE CBC AUTOMATED: CPT | Performed by: STUDENT IN AN ORGANIZED HEALTH CARE EDUCATION/TRAINING PROGRAM

## 2024-02-15 PROCEDURE — 97116 GAIT TRAINING THERAPY: CPT

## 2024-02-15 PROCEDURE — 80048 BASIC METABOLIC PNL TOTAL CA: CPT | Performed by: STUDENT IN AN ORGANIZED HEALTH CARE EDUCATION/TRAINING PROGRAM

## 2024-02-15 PROCEDURE — 97110 THERAPEUTIC EXERCISES: CPT

## 2024-02-15 RX ORDER — POLYETHYLENE GLYCOL 3350 17 G/17G
17 POWDER, FOR SOLUTION ORAL 2 TIMES DAILY
Status: DISCONTINUED | OUTPATIENT
Start: 2024-02-15 | End: 2024-02-17

## 2024-02-15 RX ORDER — WATER 10 ML/10ML
INJECTION INTRAMUSCULAR; INTRAVENOUS; SUBCUTANEOUS
Status: DISPENSED
Start: 2024-02-15 | End: 2024-02-16

## 2024-02-15 RX ORDER — AMOXICILLIN 250 MG
1 CAPSULE ORAL 2 TIMES DAILY
Status: DISCONTINUED | OUTPATIENT
Start: 2024-02-15 | End: 2024-02-17

## 2024-02-15 RX ORDER — WATER 10 ML/10ML
INJECTION INTRAMUSCULAR; INTRAVENOUS; SUBCUTANEOUS
Status: COMPLETED
Start: 2024-02-15 | End: 2024-02-15

## 2024-02-15 RX ADMIN — HALOPERIDOL 1 MG: 1 TABLET ORAL at 16:57

## 2024-02-15 RX ADMIN — DIVALPROEX SODIUM 250 MG: 125 CAPSULE, COATED PELLETS ORAL at 16:57

## 2024-02-15 RX ADMIN — Medication 3 MG: at 21:26

## 2024-02-15 RX ADMIN — FINASTERIDE 5 MG: 5 TABLET, FILM COATED ORAL at 08:24

## 2024-02-15 RX ADMIN — POLYETHYLENE GLYCOL 3350 17 G: 17 POWDER, FOR SOLUTION ORAL at 21:26

## 2024-02-15 RX ADMIN — DIVALPROEX SODIUM 250 MG: 125 CAPSULE, COATED PELLETS ORAL at 08:24

## 2024-02-15 RX ADMIN — CYANOCOBALAMIN TAB 500 MCG 1000 MCG: 500 TAB at 08:24

## 2024-02-15 RX ADMIN — OLANZAPINE 2.5 MG: 10 INJECTION, POWDER, FOR SOLUTION INTRAMUSCULAR at 05:51

## 2024-02-15 RX ADMIN — ENOXAPARIN SODIUM 40 MG: 40 INJECTION SUBCUTANEOUS at 08:25

## 2024-02-15 RX ADMIN — DIVALPROEX SODIUM 125 MG: 125 CAPSULE, COATED PELLETS ORAL at 16:57

## 2024-02-15 RX ADMIN — DIVALPROEX SODIUM 250 MG: 125 CAPSULE, COATED PELLETS ORAL at 11:16

## 2024-02-15 RX ADMIN — ARIPIPRAZOLE 2 MG: 2 TABLET ORAL at 08:24

## 2024-02-15 RX ADMIN — MIRTAZAPINE 15 MG: 15 TABLET, FILM COATED ORAL at 21:26

## 2024-02-15 RX ADMIN — OLANZAPINE 5 MG: 10 TABLET, FILM COATED ORAL at 14:05

## 2024-02-15 RX ADMIN — OLANZAPINE 2.5 MG: 10 INJECTION, POWDER, FOR SOLUTION INTRAMUSCULAR at 15:38

## 2024-02-15 RX ADMIN — WATER: 1 INJECTION INTRAMUSCULAR; INTRAVENOUS; SUBCUTANEOUS at 05:50

## 2024-02-15 RX ADMIN — POLYETHYLENE GLYCOL 3350 17 G: 17 POWDER, FOR SOLUTION ORAL at 08:36

## 2024-02-15 NOTE — PROGRESS NOTES
INTERNAL MEDICINE RESIDENCY PROGRESS NOTE     Name: Gino Hernandez   Age & Sex: 83 y.o. male   MRN: [id number]  [facility]   Encounter: [id number]  Team: SOD Team C     PATIENT INFORMATION     Name: Gino Hernandez   Age & Sex: 83 y.o. male   MRN: [id number]  Hospital Stay Days: 8    ASSESSMENT/PLAN     Principal Problem:    Dementia with agitation and other behavioral disturbance (HCC)  Active Problems:    Constipation    BPH (benign prostatic hyperplasia)    Polypharmacy    Moderate protein-calorie malnutrition (HCC)      * Dementia with agitation and other behavioral disturbance (HCC)  Assessment & Plan  Patient with history of progressive Alzheimer's dementia.  At baseline, patient may be alert and oriented to person, but not to place or time.  He holds conversations but does get confused at baseline.  Patient had recent admission in January 2024 patient has dementia with agitation and other behavioral disturbances.  Was seen in consultation by geriatrics for polypharmacy and dementia treatment at that time.  Following conclusion of hospitalization patient was discharged to hospice at [facility].  However, patient returned to ED at [facility] on 02/07 as patient was having behaviors and acting aggressive towards staff and other residents at [facility].  There were no longer able to care for him without a continuous one-to-one observer, which was not covered by insurance in which family could not afford.  Because of this, he was taken back to the ED to find placement at another facility.  Patient currently without any acute complaints or signs/symptoms of infection.  Per wife, patient has not been acting any different recently than he has since last discharge.  No apparent metabolic/infectious changes to explain behaviors at this time.  Likely just progression of underlying dementia.   Reviewed medicine reconciliation sent by [facility] at admission.  2/13:  Pt has not been combative. Occasionally pulls at mcduffie/IV, so reordered soft restraints overnight. Otherwise feels well.   2/14: Overnight was agitated. S/p 1 time dose of gabapentin 100mg. Improved. Nurse removed wrist restraints this am. Pt sleeping comfortably. Aox1  2/15: Stable today.   Plan:  Continue PTA Depakote 250 mg 3 times daily and cont extra 125 mg dose with PM dose.   Continue PTA melatonin 3 mg nightly  Continue PTA mirtazapine 15 mg nightly  Continue PTA olanzapine 5 mg p.o. daily at 2 PM  Continue Abilify 2 mg daily per geriatric recommendation  Continue PTA vitamin B-12 supplementation  Continue PTA olanzapine 2.5 mg IM as needed for agitation  Continue PTA haloperidol 1 mg p.o. every 6 hours as needed for agitation unresponsive to as needed olanzapine  Geriatrics service consulted at this admission given patient's dementia with behavioral disturbances and polypharmacy.  Their recommendations are very much appreciated.  PT/OT will continue to see and work on promoting ambulation  Avoid physical restraints, use anxiolytics prn for agitation     BPH (benign prostatic hyperplasia)  Assessment & Plan  Patient with history of BPH.  Voiding trial initiated while inpatient upon request of wife. Patient failed voiding trial, Mcduffie catheter placed 2/11.  Patient will require chronic Mcduffie catheter. Outpatient follow-up with urology.  *Spoke with wife and son at bedside. Wife is concerned about pt having discomfort with the mcduffie catheter. Had discussion regarding voiding trial and decided if pt is comfortable with the mcduffie, we can leave it for now. If it begins to cause discomfort, we can attempt a voiding trial. Informed nurse.  2/15: Pt still pulling at his mcduffie    Plan:   Continue prior to admission finasteride 5 mg daily  Remove mcduffie, attempt voiding trial today    Polypharmacy  Assessment & Plan  Patient with history of progressive dementia.  Prior to admission medications include Depakote,  melatonin, mirtazapine, olanzapine, quetiapine, Haldol as needed, olanzapine as needed.  These medications were reviewed at admission.  Medications currently being ministered at [facility] were continued.  Given patient's continued behavioral disturbances and extensive list of current medications, geriatric service is consulted and their recommendations are greatly appreciated.  Plan:  -as above    Constipation  Assessment & Plan  Patient with history of constipation.  Bowel regimen ordered with Colace 100 mg twice daily and Senokot nightly. Added miralax daily  Monitor for bowel movements.          Disposition: Working with case management to find placement; this is dependent on patient being without all restraints and his ability to ambulate.    SUBJECTIVE     Patient seen and examined.  Patient was slightly agitated this a.m. and pulling on lines.  Nurse required soft restraints.  Otherwise pleasantly demented.  Much more awake today than yesterday. Was excited for breakfast. AO x 1.  Patient endorses no pain or additional complaints.  Denies fevers, chills, nausea, vomiting, shortness of breath, or dizziness.    OBJECTIVE     Vitals:    24 1238 24 1439 02/15/24 0000 02/15/24 0721   BP: 123/62 104/56 104/56 98/57   Pulse:   69    Resp:  16 16 17   Temp:  98 °F (36.7 °C) (!) 97.1 °F (36.2 °C) 97.5 °F (36.4 °C)   TempSrc:   Axillary    SpO2:       Weight:       Height:          Temperature:   Temp (24hrs), Av.5 °F (36.4 °C), Min:97.1 °F (36.2 °C), Max:98 °F (36.7 °C)    Temperature: 97.5 °F (36.4 °C)  Intake & Output:  I/O          07 0700  0701  02/15 0700 02/15 07 0700    P.O. 120      Total Intake(mL/kg) 120 (2)      Urine (mL/kg/hr) 950 (0.7) 250 (0.2)     Total Output 950 250     Net -830 -250                  Weights:   IBW (Ideal Body Weight): 63.8 kg    Body mass index is 21.03 kg/m².  Weight (last 2 days)       Date/Time Weight    24 0600 59.1  (130.29)          Physical Exam  Constitutional:       Appearance: Normal appearance.   HENT:      Head: Normocephalic and atraumatic.   Eyes:      Extraocular Movements: Extraocular movements intact.   Cardiovascular:      Rate and Rhythm: Normal rate and regular rhythm.      Pulses: Normal pulses.      Heart sounds: No murmur heard.  Pulmonary:      Effort: No respiratory distress.      Breath sounds: Normal breath sounds. No wheezing.   Abdominal:      General: There is no distension.      Palpations: Abdomen is soft.      Tenderness: There is no abdominal tenderness.   Musculoskeletal:         General: No swelling or tenderness.      Cervical back: Normal range of motion and neck supple.   Skin:     General: Skin is warm and dry.   Neurological:      Mental Status: He is alert. Mental status is at baseline.      Comments: Aox1  Pleasantly demented       LABORATORY DATA     Labs: I have personally reviewed pertinent reports.  Results from last 7 days   Lab Units 02/15/24  0841 02/12/24  0610 02/10/24  0557   WBC Thousand/uL 4.84 7.54 3.94*   HEMOGLOBIN g/dL 13.1 12.7 12.5   HEMATOCRIT % 39.5 38.0 37.7   PLATELETS Thousands/uL 178 195 168   NEUTROS PCT %  --  76*  --    MONOS PCT %  --  8  --    EOS PCT %  --  2  --       Results from last 7 days   Lab Units 02/15/24  0841 02/12/24  0610 02/10/24  0557   POTASSIUM mmol/L 3.7 3.7 4.1   CHLORIDE mmol/L 107 105 108   CO2 mmol/L 28 29 27   BUN mg/dL 25 14 13   CREATININE mg/dL 0.93 0.95 0.88   CALCIUM mg/dL 8.9 8.8 8.5                            IMAGING & DIAGNOSTIC TESTING     Radiology Results: I have personally reviewed pertinent reports.  No results found.  Other Diagnostic Testing: I have personally reviewed pertinent reports.    ACTIVE MEDICATIONS     Current Facility-Administered Medications   Medication Dose Route Frequency    acetaminophen (TYLENOL) tablet 650 mg  650 mg Oral Q6H PRN    ARIPiprazole (ABILIFY) tablet 2 mg  2 mg Oral Daily    cyanocobalamin  "(VITAMIN B-12) tablet 1,000 mcg  1,000 mcg Oral Daily    divalproex sodium (DEPAKOTE SPRINKLE) capsule 125 mg  125 mg Oral Daily    divalproex sodium (DEPAKOTE SPRINKLE) capsule 250 mg  250 mg Oral TID With Meals    enoxaparin (LOVENOX) subcutaneous injection 40 mg  40 mg Subcutaneous Daily    finasteride (PROSCAR) tablet 5 mg  5 mg Oral Daily    haloperidol (HALDOL) tablet 1 mg  1 mg Oral Q6H PRN    LORazepam (ATIVAN) injection 0.5 mg  0.5 mg Intravenous Once    melatonin tablet 3 mg  3 mg Oral HS    mirtazapine (REMERON) tablet 15 mg  15 mg Oral HS    OLANZapine (ZyPREXA) IM injection 2.5 mg  2.5 mg Intramuscular Daily PRN    OLANZapine (ZyPREXA) tablet 5 mg  5 mg Oral Daily    polyethylene glycol (MIRALAX) packet 17 g  17 g Oral BID    senna-docusate sodium (SENOKOT S) 8.6-50 mg per tablet 1 tablet  1 tablet Oral BID       VTE Pharmacologic Prophylaxis: Enoxaparin (Lovenox)  VTE Mechanical Prophylaxis: sequential compression device    Portions of the record may have been created with voice recognition software.  Occasional wrong word or \"sound a like\" substitutions may have occurred due to the inherent limitations of voice recognition software.  Read the chart carefully and recognize, using context, where substitutions have occurred.  ==  Lizbet Beckett DO  [facility]  Internal Medicine Residency PGY-1      "

## 2024-02-15 NOTE — PLAN OF CARE
Problem: PHYSICAL THERAPY ADULT  Goal: Performs mobility at highest level of function for planned discharge setting.  See evaluation for individualized goals.  Description: Treatment/Interventions: ADL retraining, Functional transfer training, LE strengthening/ROM, Elevations, Therapeutic exercise, Endurance training, Gait training, Bed mobility, Spoke to nursing, OT          See flowsheet documentation for full assessment, interventions and recommendations.  Outcome: Progressing  Note: Prognosis: Fair  Problem List: Decreased strength, Decreased range of motion, Decreased endurance, Impaired balance, Decreased mobility, Decreased safety awareness, Impaired judgement, Decreased cognition  Assessment: Patient was received supine in bed . Patient was agreeable to therapy services today. PT session focused on gait and exercises today in order to improve functional mobility and independence. Functional mobility was performed as described above.  Pt was eager to participate in therapy services today. Pt displays markedly improved independence and functional mobility compared to previous therapy session. Pt had increased difficulty with RW so PT provided Ramonita throughout ambulation instead. Pt was able to ambulate long distance toady, but displayed several losses of balance requiring assist of therapist to prevent fall. Upon return to room, pt was seated at EOB and led through several seated exercises for BLE strengthening and to  reduce risk of muscle atrophy. Pt was then assisted back to supine and restraints were returned.  Patient will benefit from continued PT services while in hospital in order to address remaining limitations. The patients AM-PAC Basic Mobility Inpatient Short From Raw Score is 19 .  Based on AM-PAC scoring and patient presentation, PT currently recommending Level II (Moderate Resource Intensity). Please also refer to the recommendation of the Physical Therapist for safe discharge  planning.  Barriers to Discharge: Decreased caregiver support     Rehab Resource Intensity Level, PT: II (Moderate Resource Intensity)    See flowsheet documentation for full assessment.

## 2024-02-15 NOTE — PHYSICAL THERAPY NOTE
PHYSICAL THERAPY NOTE          Patient Name: Gino Hernandez  Today's Date: 2/15/2024       02/15/24 1108   PT Last Visit   PT Visit Date 02/15/24   Note Type   Note Type Treatment   Pain Assessment   Pain Assessment Tool FLACC   Pain Rating: FLACC (Rest) - Face 0   Pain Rating: FLACC (Rest) - Legs 0   Pain Rating: FLACC (Rest) - Activity 0   Pain Rating: FLACC (Rest) - Cry 0   Pain Rating: FLACC (Rest) - Consolability 0   Score: FLACC (Rest) 0   Pain Rating: FLACC (Activity) - Face 0   Pain Rating: FLACC (Activity) - Legs 0   Pain Rating: FLACC (Activity) - Activity 0   Pain Rating: FLACC (Activity) - Cry 0   Pain Rating: FLACC (Activity) - Consolability 0   Score: FLACC (Activity) 0   Restrictions/Precautions   Weight Bearing Precautions Per Order No   Other Precautions Cognitive;Chair Alarm;Bed Alarm;Restraints;Fall Risk   General   Chart Reviewed Yes   Response to Previous Treatment Patient with no complaints from previous session.   Family/Caregiver Present No   Cognition   Overall Cognitive Status (S)  Impaired   Arousal/Participation Arousable;Persistent stimuli required   Attention Difficulty attending to directions   Orientation Level Oriented to person;Disoriented to place;Disoriented to time;Disoriented to situation   Memory Decreased short term memory;Decreased recall of recent events;Decreased recall of precautions   Following Commands Follows one step commands inconsistently   Subjective   Subjective pt pleasantly confused and cooperative throughout therapy session. pt received supine in bed   Bed Mobility   Supine to Sit 5  Supervision   Additional items Increased time required;Verbal cues   Sit to Supine 5  Supervision   Additional items Increased time required;Verbal cues   Transfers   Sit to Stand 4  Minimal assistance   Additional items Assist x 1;Increased time required;Verbal cues   Stand to Sit 4  Minimal  assistance   Additional items Assist x 1;Increased time required;Verbal cues   Additional Comments transfers w HHA   Ambulation/Elevation   Gait pattern Improper Weight shift;L Knee Nehal;Antalgic;Narrow CELI;Decreased foot clearance;Forward Flexion;Shuffling;Short stride;Excessively slow   Gait Assistance 4  Minimal assist   Additional items Assist x 1;Tactile cues;Verbal cues   Assistive Device   (HHA)   Distance 500'   Balance   Static Sitting Fair +   Dynamic Sitting Fair +   Static Standing Fair   Dynamic Standing Fair -   Ambulatory Fair -   Endurance Deficit   Endurance Deficit No   Activity Tolerance   Activity Tolerance Patient tolerated treatment well   Nurse Made Aware RN cleared and updated   Exercises   Hip Flexion Sitting;15 reps;AROM;Bilateral   Knee AROM Long Arc Quad Sitting;15 reps;AROM;Bilateral   Ankle Pumps Sitting;15 reps;AROM;Bilateral   Assessment   Prognosis Fair   Problem List Decreased strength;Decreased range of motion;Decreased endurance;Impaired balance;Decreased mobility;Decreased safety awareness;Impaired judgement;Decreased cognition   Assessment Patient was received supine in bed . Patient was agreeable to therapy services today. PT session focused on gait and exercises today in order to improve functional mobility and independence. Functional mobility was performed as described above.  Pt was eager to participate in therapy services today. Pt displays markedly improved independence and functional mobility compared to previous therapy session. Pt had increased difficulty with RW so PT provided Ramonita throughout ambulation instead. Pt was able to ambulate long distance toady, but displayed several losses of balance requiring assist of therapist to prevent fall. Upon return to room, pt was seated at EOB and led through several seated exercises for BLE strengthening and to  reduce risk of muscle atrophy. Pt was then assisted back to supine and restraints were returned.  Patient will  benefit from continued PT services while in hospital in order to address remaining limitations. The patients AM-PAC Basic Mobility Inpatient Short From Raw Score is 19 .  Based on AM-PAC scoring and patient presentation, PT currently recommending Level II (Moderate Resource Intensity). Please also refer to the recommendation of the Physical Therapist for safe discharge planning.   Barriers to Discharge Decreased caregiver support   Goals   Patient Goals to sit down   STG Expiration Date 02/28/24   PT Treatment Day 1   Plan   Treatment/Interventions ADL retraining;Functional transfer training;LE strengthening/ROM;Elevations;Therapeutic exercise;Endurance training;Bed mobility;Gait training;Spoke to nursing;OT   Progress Slow progress, cognitive deficits   PT Frequency 2-3x/wk  (decreased due to improved functional performance)   Discharge Recommendation   Rehab Resource Intensity Level, PT II (Moderate Resource Intensity)   AM-PAC Basic Mobility Inpatient   Turning in Flat Bed Without Bedrails 4   Lying on Back to Sitting on Edge of Flat Bed Without Bedrails 4   Moving Bed to Chair 3   Standing Up From Chair Using Arms 3   Walk in Room 3   Climb 3-5 Stairs With Railing 2   Basic Mobility Inpatient Raw Score 19   Basic Mobility Standardized Score 42.48   Highest Level Of Mobility   JH-HLM Goal 6: Walk 10 steps or more   JH-HLM Achieved 8: Walk 250 feet ot more   Education   Education Provided Mobility training   Patient Reinforcement needed   End of Consult   Patient Position at End of Consult Supine;Bed/Chair alarm activated;All needs within reach  (restraints in place)       Lalo Cao PT, DPT

## 2024-02-15 NOTE — RESTORATIVE TECHNICIAN NOTE
Restorative Technician Note      Patient Name: Gino Hernandez     Restorative Tech Visit Date: 02/15/24  Note Type: Mobility  Patient Position Upon Consult: Supine  Activity Performed: attempted ambulation however only performed EOB sitting and sit to stands; some steps bed side but pt getting distracted easily  Assistive Device: Other (Comment) (HHA; pt significantly disoriented and difficult to redirect; takes socks off everytime they are put on; unsafe to ambulate at this time.)  Education Provided: Yes  Patient Position at End of Consult: Supine; All needs within reach; Bed/Chair alarm activated; Other (comment) (restraints discontinued per RN)    Sharno Ching  DPT, Restorative Technician

## 2024-02-16 PROCEDURE — 99232 SBSQ HOSP IP/OBS MODERATE 35: CPT | Performed by: INTERNAL MEDICINE

## 2024-02-16 RX ORDER — LANOLIN ALCOHOL/MO/W.PET/CERES
6 CREAM (GRAM) TOPICAL
Status: DISCONTINUED | OUTPATIENT
Start: 2024-02-16 | End: 2024-02-22 | Stop reason: HOSPADM

## 2024-02-16 RX ORDER — GABAPENTIN 100 MG/1
100 CAPSULE ORAL 2 TIMES DAILY
Status: DISCONTINUED | OUTPATIENT
Start: 2024-02-16 | End: 2024-02-16

## 2024-02-16 RX ORDER — GABAPENTIN 100 MG/1
100 CAPSULE ORAL DAILY
Status: DISCONTINUED | OUTPATIENT
Start: 2024-02-17 | End: 2024-02-20

## 2024-02-16 RX ORDER — GABAPENTIN 100 MG/1
200 CAPSULE ORAL
Status: DISCONTINUED | OUTPATIENT
Start: 2024-02-16 | End: 2024-02-19

## 2024-02-16 RX ADMIN — SENNOSIDES, DOCUSATE SODIUM 1 TABLET: 8.6; 5 TABLET ORAL at 10:15

## 2024-02-16 RX ADMIN — GABAPENTIN 200 MG: 100 CAPSULE ORAL at 22:08

## 2024-02-16 RX ADMIN — OLANZAPINE 5 MG: 10 TABLET, FILM COATED ORAL at 14:30

## 2024-02-16 RX ADMIN — DIVALPROEX SODIUM 250 MG: 125 CAPSULE, COATED PELLETS ORAL at 18:50

## 2024-02-16 RX ADMIN — FINASTERIDE 5 MG: 5 TABLET, FILM COATED ORAL at 10:15

## 2024-02-16 RX ADMIN — ARIPIPRAZOLE 2 MG: 2 TABLET ORAL at 10:17

## 2024-02-16 RX ADMIN — CYANOCOBALAMIN TAB 500 MCG 1000 MCG: 500 TAB at 10:15

## 2024-02-16 RX ADMIN — MELATONIN 6 MG: at 22:08

## 2024-02-16 RX ADMIN — GABAPENTIN 100 MG: 100 CAPSULE ORAL at 10:15

## 2024-02-16 RX ADMIN — DIVALPROEX SODIUM 250 MG: 125 CAPSULE, COATED PELLETS ORAL at 10:21

## 2024-02-16 RX ADMIN — ENOXAPARIN SODIUM 40 MG: 40 INJECTION SUBCUTANEOUS at 10:24

## 2024-02-16 RX ADMIN — POLYETHYLENE GLYCOL 3350 17 G: 17 POWDER, FOR SOLUTION ORAL at 10:17

## 2024-02-16 RX ADMIN — DIVALPROEX SODIUM 125 MG: 125 CAPSULE, COATED PELLETS ORAL at 18:50

## 2024-02-16 RX ADMIN — MIRTAZAPINE 15 MG: 15 TABLET, FILM COATED ORAL at 22:08

## 2024-02-16 NOTE — PROGRESS NOTES
INTERNAL MEDICINE RESIDENCY PROGRESS NOTE     Name: Gino Hernandez   Age & Sex: 83 y.o. male   MRN: [id number]  [facility]   Encounter: [id number]  Team: SOD Team C     PATIENT INFORMATION     Name: Gino Hernnadez   Age & Sex: 83 y.o. male   MRN: [id number]  Hospital Stay Days: 9    ASSESSMENT/PLAN     Principal Problem:    Dementia with agitation and other behavioral disturbance (HCC)  Active Problems:    BPH (benign prostatic hyperplasia)    Constipation    Polypharmacy    Moderate protein-calorie malnutrition (HCC)      * Dementia with agitation and other behavioral disturbance (HCC)  Assessment & Plan  Patient with history of progressive Alzheimer's dementia.  At baseline, patient may be alert and oriented to person, but not to place or time.  He holds conversations but does get confused at baseline.  Patient had recent admission in January 2024 patient has dementia with agitation and other behavioral disturbances.  Was seen in consultation by geriatrics for polypharmacy and dementia treatment at that time.  Following conclusion of hospitalization patient was discharged to hospice at [facility].  However, patient returned to ED at [facility] on 02/07 as patient was having behaviors and acting aggressive towards staff and other residents at [facility].  There were no longer able to care for him without a continuous one-to-one observer, which was not covered by insurance in which family could not afford.  Because of this, he was taken back to the ED to find placement at another facility.  Patient currently without any acute complaints or signs/symptoms of infection.  Per wife, patient has not been acting any different recently than he has since last discharge.  No apparent metabolic/infectious changes to explain behaviors at this time.  Likely just progression of underlying dementia.   Reviewed medicine reconciliation sent by [facility] at admission.  2/13:  Pt has not been combative. Occasionally pulls at mcduffie/IV, so reordered soft restraints overnight. Otherwise feels well.   2/14: Overnight was agitated. S/p 1 time dose of gabapentin 100mg. Improved. Nurse removed wrist restraints this am. Pt sleeping comfortably. Aox1  2/15: Stable today.   Plan:  Continue PTA Depakote 250 mg 3 times daily and cont extra 125 mg dose with PM dose.   Continue PTA melatonin 3 mg nightly  Continue PTA mirtazapine 15 mg nightly  Continue PTA olanzapine 5 mg p.o. daily at 2 PM  Continue Abilify 2 mg daily per geriatric recommendation  Continue PTA vitamin B-12 supplementation  Continue PTA olanzapine 2.5 mg IM as needed for agitation  Continue PTA haloperidol 1 mg p.o. every 6 hours as needed for agitation unresponsive to as needed olanzapine  Geriatrics service consulted at this admission given patient's dementia with behavioral disturbances and polypharmacy.  Their recommendations are very much appreciated.  PT/OT will continue to see and work on promoting ambulation  Avoid physical restraints, use anxiolytics prn for agitation   Added gabapentin 100mg bid for agitation     BPH (benign prostatic hyperplasia)  Assessment & Plan  Patient with history of BPH.  Voiding trial initiated while inpatient upon request of wife. Patient failed voiding trial, Mcduffie catheter placed 2/11.  Patient will require chronic Mcduffie catheter. Outpatient follow-up with urology.  *Spoke with wife and son at bedside. Wife is concerned about pt having discomfort with the mcduffie catheter. Had discussion regarding voiding trial and decided if pt is comfortable with the mcduffie, we can leave it for now. If it begins to cause discomfort, we can attempt a voiding trial. Informed nurse.  2/15: Pt still pulling at his mcduffie    Plan:   Continue prior to admission finasteride 5 mg daily  Pt doing well without mcduffie     Polypharmacy  Assessment & Plan  Patient with history of progressive dementia.  Prior to admission  medications include Depakote, melatonin, mirtazapine, olanzapine, quetiapine, Haldol as needed, olanzapine as needed.  These medications were reviewed at admission.  Medications currently being ministered at [facility] were continued.  Given patient's continued behavioral disturbances and extensive list of current medications, geriatric service is consulted and their recommendations are greatly appreciated.  Plan:  -as above    Constipation  Assessment & Plan  Patient with history of constipation.  Bowel regimen ordered with Colace 100 mg twice daily and Senokot nightly. Added miralax daily  Monitor for bowel movements.          Disposition: CM working to find placement; reaching out to [facility] today     SUBJECTIVE     Patient seen and examined. Pt was off restraints in the evening and began spitting at nurses.  Was given as needed Haldol and as needed olanzapine without improvement in behavior. Restraints were reordered.  This morning patient was found sleeping comfortably.  Per nurse, no events overnight.  Unable to obtain ROS as patient was sleeping.    OBJECTIVE     Vitals:    02/15/24 0000 02/15/24 0721 02/15/24 1539 02/15/24 2221   BP: 104/56 98/57 117/60 115/61   Pulse: 69      Resp: 16 17 18 18   Temp: (!) 97.1 °F (36.2 °C) 97.5 °F (36.4 °C) 98.1 °F (36.7 °C) (!) 97.4 °F (36.3 °C)   TempSrc: Axillary      SpO2:       Weight:       Height:          Temperature:   Temp (24hrs), Av.8 °F (36.6 °C), Min:97.4 °F (36.3 °C), Max:98.1 °F (36.7 °C)    Temperature: (!) 97.4 °F (36.3 °C)  Intake & Output:  I/O          0701  02/15 0700 02/15 0701   0700  0701   0700    P.O.  400     Total Intake(mL/kg)  400 (6.8)     Urine (mL/kg/hr) 250 (0.2) 600 (0.4)     Total Output 250 600     Net -250 -200                  Weights:   IBW (Ideal Body Weight): 63.8 kg    Body mass index is 21.03 kg/m².  Weight (last 2 days)       None          Physical Exam  Constitutional:       General: He is sleeping.    HENT:      Head: Normocephalic and atraumatic.   Eyes:      Extraocular Movements: Extraocular movements intact.   Cardiovascular:      Rate and Rhythm: Normal rate and regular rhythm.      Heart sounds: No murmur heard.  Pulmonary:      Effort: Pulmonary effort is normal.      Breath sounds: Normal breath sounds.   Abdominal:      General: There is no distension.      Palpations: Abdomen is soft.      Tenderness: There is no abdominal tenderness.   Musculoskeletal:         General: No swelling or tenderness.      Cervical back: Normal range of motion and neck supple.   Skin:     General: Skin is warm and dry.   Neurological:      General: No focal deficit present.      Mental Status: He is oriented to person, place, and time.   Psychiatric:         Mood and Affect: Mood normal.         Behavior: Behavior normal.       LABORATORY DATA     Labs: I have personally reviewed pertinent reports.  Results from last 7 days   Lab Units 02/15/24  0841 02/12/24  0610 02/10/24  0557   WBC Thousand/uL 4.84 7.54 3.94*   HEMOGLOBIN g/dL 13.1 12.7 12.5   HEMATOCRIT % 39.5 38.0 37.7   PLATELETS Thousands/uL 178 195 168   NEUTROS PCT %  --  76*  --    MONOS PCT %  --  8  --    EOS PCT %  --  2  --       Results from last 7 days   Lab Units 02/15/24  0841 02/12/24  0610 02/10/24  0557   POTASSIUM mmol/L 3.7 3.7 4.1   CHLORIDE mmol/L 107 105 108   CO2 mmol/L 28 29 27   BUN mg/dL 25 14 13   CREATININE mg/dL 0.93 0.95 0.88   CALCIUM mg/dL 8.9 8.8 8.5                            IMAGING & DIAGNOSTIC TESTING     Radiology Results: I have personally reviewed pertinent reports.  No results found.  Other Diagnostic Testing: I have personally reviewed pertinent reports.    ACTIVE MEDICATIONS     Current Facility-Administered Medications   Medication Dose Route Frequency    acetaminophen (TYLENOL) tablet 650 mg  650 mg Oral Q6H PRN    ARIPiprazole (ABILIFY) tablet 2 mg  2 mg Oral Daily    cyanocobalamin (VITAMIN B-12) tablet 1,000 mcg  1,000  "mcg Oral Daily    divalproex sodium (DEPAKOTE SPRINKLE) capsule 125 mg  125 mg Oral Daily    divalproex sodium (DEPAKOTE SPRINKLE) capsule 250 mg  250 mg Oral TID With Meals    enoxaparin (LOVENOX) subcutaneous injection 40 mg  40 mg Subcutaneous Daily    finasteride (PROSCAR) tablet 5 mg  5 mg Oral Daily    haloperidol (HALDOL) tablet 1 mg  1 mg Oral Q6H PRN    LORazepam (ATIVAN) injection 0.5 mg  0.5 mg Intravenous Once    melatonin tablet 3 mg  3 mg Oral HS    mirtazapine (REMERON) tablet 15 mg  15 mg Oral HS    OLANZapine (ZyPREXA) IM injection 2.5 mg  2.5 mg Intramuscular Daily PRN    OLANZapine (ZyPREXA) tablet 5 mg  5 mg Oral Daily    polyethylene glycol (MIRALAX) packet 17 g  17 g Oral BID    senna-docusate sodium (SENOKOT S) 8.6-50 mg per tablet 1 tablet  1 tablet Oral BID       VTE Pharmacologic Prophylaxis: Enoxaparin (Lovenox)  VTE Mechanical Prophylaxis: sequential compression device    Portions of the record may have been created with voice recognition software.  Occasional wrong word or \"sound a like\" substitutions may have occurred due to the inherent limitations of voice recognition software.  Read the chart carefully and recognize, using context, where substitutions have occurred.  ==  Lizbet Beckett DO  [facility]  Internal Medicine Residency PGY-1      "

## 2024-02-16 NOTE — RESTORATIVE TECHNICIAN NOTE
Restorative Technician Note      Patient Name: Gino Hernandez     Restorative Tech Visit Date: 02/16/24  Note Type: Mobility (RN requested to follow up when pt is awake; pt has been confused and agitated. Will follow up when pt wakes up)    Sharon Ching  DPT, Restorative Technician

## 2024-02-16 NOTE — CASE MANAGEMENT
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)   Patient Active Problem List    Diagnosis Date Noted    Moderate protein-calorie malnutrition (HCC) 2024    Metabolic encephalopathy 2024    UTI (urinary tract infection) 2024    Polypharmacy 2024    Ambulatory dysfunction 2024    Dementia with agitation and other behavioral disturbance (HCC) 2024    Neuropathy 2024    Constipation 2024    BPH (benign prostatic hyperplasia) 2024    Abnormal urine finding 2024    Delirium 2023    H/O inguinal hernia repair 2023    Benign prostatic hyperplasia with urinary obstruction 2023    Sherwood's esophagus with dysplasia 2023    History of colonic diverticulitis 2022    Slow transit constipation 2022    Pancreas cyst 2021    Schatzki's ring 2021    Gastric polyps 2021    Hx of adenomatous colonic polyps 2021    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 [address]  Phone: [phone] Fax: [phone]    Primary Care Provider: Augusta Davis MD    Primary Insurance: MEDICARE  Secondary  Insurance: [facility] [employer]    DISCHARGE DETAILS:         Met with family at bedside to provide update. Informed family, at this time, until patient can be successfully weaned off restraints ( physical/ chemical), there are no accepting facilities.   Will continue to follow and work towards placement.

## 2024-02-16 NOTE — PLAN OF CARE
Problem: Potential for Falls  Goal: Patient will remain free of falls  Description: INTERVENTIONS:  - Educate patient/family on patient safety including physical limitations  - Instruct patient to call for assistance with activity   - Consult OT/PT to assist with strengthening/mobility   - Keep Call bell within reach  - Keep bed low and locked with side rails adjusted as appropriate  - Keep care items and personal belongings within reach  - Initiate and maintain comfort rounds  - Make Fall Risk Sign visible to staff  - Offer Toileting every 2 Hours, in advance of need  - Initiate/Maintain bed alarm  - Obtain necessary fall risk management equipment: bed alarm  - Apply yellow socks and bracelet for high fall risk patients  - Consider moving patient to room near nurses station  Outcome: Progressing     Problem: Nutrition/Hydration-ADULT  Goal: Nutrient/Hydration intake appropriate for improving, restoring or maintaining nutritional needs  Description: Monitor and assess patient's nutrition/hydration status for malnutrition. Collaborate with interdisciplinary team and initiate plan and interventions as ordered.  Monitor patient's weight and dietary intake as ordered or per policy. Utilize nutrition screening tool and intervene as necessary. Determine patient's food preferences and provide high-protein, high-caloric foods as appropriate.     INTERVENTIONS:  - Monitor oral intake, urinary output, labs, and treatment plans  - Assess nutrition and hydration status and recommend course of action  - Evaluate amount of meals eaten  - Assist patient with eating if necessary   - Allow adequate time for meals  - Recommend/ encourage appropriate diets, oral nutritional supplements, and vitamin/mineral supplements  - Order, calculate, and assess calorie counts as needed  - Recommend, monitor, and adjust tube feedings and TPN/PPN based on assessed needs  - Assess need for intravenous fluids  - Provide specific nutrition/hydration  education as appropriate  - Include patient/family/caregiver in decisions related to nutrition  Outcome: Progressing     Problem: SAFETY,RESTRAINT: NV/NON-SELF DESTRUCTIVE BEHAVIOR  Goal: Remains free of harm/injury (restraint for non violent/non self-detsructive behavior)  Description: INTERVENTIONS:  - Instruct patient/family regarding restraint use   - Assess and monitor physiologic and psychological status   - Provide interventions and comfort measures to meet assessed patient needs   - Identify and implement measures to help patient regain control  - Assess readiness for release of restraint   Outcome: Progressing  Goal: Returns to optimal restraint-free functioning  Description: INTERVENTIONS:  - Assess the patient's behavior and symptoms that indicate continued need for restraint  - Identify and implement measures to help patient regain control  - Assess readiness for release of restraint   Outcome: Progressing     Problem: Prexisting or High Potential for Compromised Skin Integrity  Goal: Skin integrity is maintained or improved  Description: INTERVENTIONS:  - Identify patients at risk for skin breakdown  - Assess and monitor skin integrity  - Assess and monitor nutrition and hydration status  - Monitor labs   - Assess for incontinence   - Turn and reposition patient  - Assist with mobility/ambulation  - Relieve pressure over bony prominences  - Avoid friction and shearing  - Provide appropriate hygiene as needed including keeping skin clean and dry  - Evaluate need for skin moisturizer/barrier cream  - Collaborate with interdisciplinary team   - Patient/family teaching  - Consider wound care consult   Outcome: Progressing

## 2024-02-16 NOTE — RESTORATIVE TECHNICIAN NOTE
Restorative Technician Note      Patient Name: Gino Hernandez     Restorative Tech Visit Date: 02/16/24  Note Type: Mobility (attempted to ambulate pt multiple times this afternoon; pt with episodes of BM and then family visit with case management; will follow up as able)  Patient Position Upon Consult: Supine  Activity Performed: Other (Comment) (bed mobility to perform carlie care and linen change)  Education Provided: Yes  Patient Position at End of Consult: Supine; All needs within reach; Other (comment) (left in the care of RN and PCA)    Sharon Ching  DPT, Restorative Technician

## 2024-02-17 LAB
ANION GAP SERPL CALCULATED.3IONS-SCNC: 5 MMOL/L
BASOPHILS # BLD AUTO: 0.02 THOUSANDS/ÂΜL (ref 0–0.1)
BASOPHILS NFR BLD AUTO: 1 % (ref 0–1)
BUN SERPL-MCNC: 23 MG/DL (ref 5–25)
CALCIUM SERPL-MCNC: 8.9 MG/DL (ref 8.4–10.2)
CHLORIDE SERPL-SCNC: 108 MMOL/L (ref 96–108)
CO2 SERPL-SCNC: 28 MMOL/L (ref 21–32)
CREAT SERPL-MCNC: 0.78 MG/DL (ref 0.6–1.3)
EOSINOPHIL # BLD AUTO: 0.13 THOUSAND/ÂΜL (ref 0–0.61)
EOSINOPHIL NFR BLD AUTO: 3 % (ref 0–6)
ERYTHROCYTE [DISTWIDTH] IN BLOOD BY AUTOMATED COUNT: 14.3 % (ref 11.6–15.1)
GFR SERPL CREATININE-BSD FRML MDRD: 84 ML/MIN/1.73SQ M
GLUCOSE SERPL-MCNC: 99 MG/DL (ref 65–140)
HCT VFR BLD AUTO: 37.2 % (ref 36.5–49.3)
HGB BLD-MCNC: 12.5 G/DL (ref 12–17)
IMM GRANULOCYTES # BLD AUTO: 0.01 THOUSAND/UL (ref 0–0.2)
IMM GRANULOCYTES NFR BLD AUTO: 0 % (ref 0–2)
LYMPHOCYTES # BLD AUTO: 0.88 THOUSANDS/ÂΜL (ref 0.6–4.47)
LYMPHOCYTES NFR BLD AUTO: 23 % (ref 14–44)
MCH RBC QN AUTO: 28.8 PG (ref 26.8–34.3)
MCHC RBC AUTO-ENTMCNC: 33.6 G/DL (ref 31.4–37.4)
MCV RBC AUTO: 86 FL (ref 82–98)
MONOCYTES # BLD AUTO: 0.33 THOUSAND/ÂΜL (ref 0.17–1.22)
MONOCYTES NFR BLD AUTO: 9 % (ref 4–12)
NEUTROPHILS # BLD AUTO: 2.48 THOUSANDS/ÂΜL (ref 1.85–7.62)
NEUTS SEG NFR BLD AUTO: 64 % (ref 43–75)
NRBC BLD AUTO-RTO: 0 /100 WBCS
PLATELET # BLD AUTO: 169 THOUSANDS/UL (ref 149–390)
PMV BLD AUTO: 11.6 FL (ref 8.9–12.7)
POTASSIUM SERPL-SCNC: 3.6 MMOL/L (ref 3.5–5.3)
RBC # BLD AUTO: 4.34 MILLION/UL (ref 3.88–5.62)
SODIUM SERPL-SCNC: 141 MMOL/L (ref 135–147)
WBC # BLD AUTO: 3.85 THOUSAND/UL (ref 4.31–10.16)

## 2024-02-17 PROCEDURE — 85025 COMPLETE CBC W/AUTO DIFF WBC: CPT

## 2024-02-17 PROCEDURE — 99232 SBSQ HOSP IP/OBS MODERATE 35: CPT | Performed by: INTERNAL MEDICINE

## 2024-02-17 PROCEDURE — 80048 BASIC METABOLIC PNL TOTAL CA: CPT

## 2024-02-17 RX ORDER — POLYETHYLENE GLYCOL 3350 17 G/17G
17 POWDER, FOR SOLUTION ORAL DAILY
Status: DISCONTINUED | OUTPATIENT
Start: 2024-02-18 | End: 2024-02-22 | Stop reason: HOSPADM

## 2024-02-17 RX ORDER — AMOXICILLIN 250 MG
1 CAPSULE ORAL DAILY
Status: DISCONTINUED | OUTPATIENT
Start: 2024-02-18 | End: 2024-02-22 | Stop reason: HOSPADM

## 2024-02-17 RX ADMIN — ENOXAPARIN SODIUM 40 MG: 40 INJECTION SUBCUTANEOUS at 09:29

## 2024-02-17 RX ADMIN — FINASTERIDE 5 MG: 5 TABLET, FILM COATED ORAL at 09:28

## 2024-02-17 RX ADMIN — GABAPENTIN 100 MG: 100 CAPSULE ORAL at 09:28

## 2024-02-17 RX ADMIN — DIVALPROEX SODIUM 250 MG: 125 CAPSULE, COATED PELLETS ORAL at 17:58

## 2024-02-17 RX ADMIN — OLANZAPINE 5 MG: 10 TABLET, FILM COATED ORAL at 15:38

## 2024-02-17 RX ADMIN — DIVALPROEX SODIUM 250 MG: 125 CAPSULE, COATED PELLETS ORAL at 12:40

## 2024-02-17 RX ADMIN — DIVALPROEX SODIUM 125 MG: 125 CAPSULE, COATED PELLETS ORAL at 17:58

## 2024-02-17 RX ADMIN — MIRTAZAPINE 15 MG: 15 TABLET, FILM COATED ORAL at 22:27

## 2024-02-17 RX ADMIN — MELATONIN 6 MG: at 22:27

## 2024-02-17 RX ADMIN — CYANOCOBALAMIN TAB 500 MCG 1000 MCG: 500 TAB at 09:28

## 2024-02-17 RX ADMIN — ARIPIPRAZOLE 2 MG: 2 TABLET ORAL at 09:28

## 2024-02-17 RX ADMIN — GABAPENTIN 200 MG: 100 CAPSULE ORAL at 22:27

## 2024-02-17 RX ADMIN — DIVALPROEX SODIUM 250 MG: 125 CAPSULE, COATED PELLETS ORAL at 09:28

## 2024-02-17 NOTE — PLAN OF CARE
Problem: Potential for Falls  Goal: Patient will remain free of falls  Description: INTERVENTIONS:  - Educate patient/family on patient safety including physical limitations  - Instruct patient to call for assistance with activity   - Consult OT/PT to assist with strengthening/mobility   - Keep Call bell within reach  - Keep bed low and locked with side rails adjusted as appropriate  - Keep care items and personal belongings within reach  - Initiate and maintain comfort rounds  - Make Fall Risk Sign visible to staff  - Offer Toileting every 2 Hours, in advance of need  - Initiate/Maintain bed alarm  - Obtain necessary fall risk management equipment: bed alarm  - Apply yellow socks and bracelet for high fall risk patients  - Consider moving patient to room near nurses station  Outcome: Progressing     Problem: Nutrition/Hydration-ADULT  Goal: Nutrient/Hydration intake appropriate for improving, restoring or maintaining nutritional needs  Description: Monitor and assess patient's nutrition/hydration status for malnutrition. Collaborate with interdisciplinary team and initiate plan and interventions as ordered.  Monitor patient's weight and dietary intake as ordered or per policy. Utilize nutrition screening tool and intervene as necessary. Determine patient's food preferences and provide high-protein, high-caloric foods as appropriate.     INTERVENTIONS:  - Monitor oral intake, urinary output, labs, and treatment plans  - Assess nutrition and hydration status and recommend course of action  - Evaluate amount of meals eaten  - Assist patient with eating if necessary   - Allow adequate time for meals  - Recommend/ encourage appropriate diets, oral nutritional supplements, and vitamin/mineral supplements  - Order, calculate, and assess calorie counts as needed  - Recommend, monitor, and adjust tube feedings and TPN/PPN based on assessed needs  - Assess need for intravenous fluids  - Provide specific nutrition/hydration  education as appropriate  - Include patient/family/caregiver in decisions related to nutrition  Outcome: Progressing     Problem: SAFETY,RESTRAINT: NV/NON-SELF DESTRUCTIVE BEHAVIOR  Goal: Remains free of harm/injury (restraint for non violent/non self-detsructive behavior)  Description: INTERVENTIONS:  - Instruct patient/family regarding restraint use   - Assess and monitor physiologic and psychological status   - Provide interventions and comfort measures to meet assessed patient needs   - Identify and implement measures to help patient regain control  - Assess readiness for release of restraint   Outcome: Progressing  Goal: Returns to optimal restraint-free functioning  Description: INTERVENTIONS:  - Assess the patient's behavior and symptoms that indicate continued need for restraint  - Identify and implement measures to help patient regain control  - Assess readiness for release of restraint   Outcome: Progressing     Problem: Prexisting or High Potential for Compromised Skin Integrity  Goal: Skin integrity is maintained or improved  Description: INTERVENTIONS:  - Identify patients at risk for skin breakdown  - Assess and monitor skin integrity  - Assess and monitor nutrition and hydration status  - Monitor labs   - Assess for incontinence   - Turn and reposition patient  - Assist with mobility/ambulation  - Relieve pressure over bony prominences  - Avoid friction and shearing  - Provide appropriate hygiene as needed including keeping skin clean and dry  - Evaluate need for skin moisturizer/barrier cream  - Collaborate with interdisciplinary team   - Patient/family teaching  - Consider wound care consult   Outcome: Progressing     Problem: PAIN - ADULT  Goal: Verbalizes/displays adequate comfort level or baseline comfort level  Description: Interventions:  - Encourage patient to monitor pain and request assistance  - Assess pain using appropriate pain scale  - Administer analgesics based on type and severity of  pain and evaluate response  - Implement non-pharmacological measures as appropriate and evaluate response  - Consider cultural and social influences on pain and pain management  - Notify physician/advanced practitioner if interventions unsuccessful or patient reports new pain  Outcome: Progressing     Problem: INFECTION - ADULT  Goal: Absence or prevention of progression during hospitalization  Description: INTERVENTIONS:  - Assess and monitor for signs and symptoms of infection  - Monitor lab/diagnostic results  - Monitor all insertion sites, i.e. indwelling lines, tubes, and drains  - Monitor endotracheal if appropriate and nasal secretions for changes in amount and color  - [place] appropriate cooling/warming therapies per order  - Administer medications as ordered  - Instruct and encourage patient and family to use good hand hygiene technique  - Identify and instruct in appropriate isolation precautions for identified infection/condition  Outcome: Progressing  Goal: Absence of fever/infection during neutropenic period  Description: INTERVENTIONS:  - Monitor WBC    Outcome: Progressing     Problem: SAFETY ADULT  Goal: Patient will remain free of falls  Description: INTERVENTIONS:  - Educate patient/family on patient safety including physical limitations  - Instruct patient to call for assistance with activity   - Consult OT/PT to assist with strengthening/mobility   - Keep Call bell within reach  - Keep bed low and locked with side rails adjusted as appropriate  - Keep care items and personal belongings within reach  - Initiate and maintain comfort rounds  - Make Fall Risk Sign visible to staff  - Offer Toileting every 2 Hours, in advance of need  - Initiate/Maintain bed alarm  - Obtain necessary fall risk management equipment: bed alarm  - Apply yellow socks and bracelet for high fall risk patients  - Consider moving patient to room near nurses station  Outcome: Progressing  Goal: Maintain or return to  baseline ADL function  Description: INTERVENTIONS:  -  Assess patient's ability to carry out ADLs; assess patient's baseline for ADL function and identify physical deficits which impact ability to perform ADLs (bathing, care of mouth/teeth, toileting, grooming, dressing, etc.)  - Assess/evaluate cause of self-care deficits   - Assess range of motion  - Assess patient's mobility; develop plan if impaired  - Assess patient's need for assistive devices and provide as appropriate  - Encourage maximum independence but intervene and supervise when necessary  - Involve family in performance of ADLs  - Assess for home care needs following discharge   - Consider OT consult to assist with ADL evaluation and planning for discharge  - Provide patient education as appropriate  Outcome: Progressing  Goal: Maintains/Returns to pre admission functional level  Description: INTERVENTIONS:  - Perform AM-PAC 6 Click Basic Mobility/ Daily Activity assessment daily.  - Set and communicate daily mobility goal to care team and patient/family/caregiver.   - Collaborate with rehabilitation services on mobility goals if consulted  - Perform Range of Motion 3 times a day.  - Reposition patient every 2 hours.  - Dangle patient 3 times a day  - Stand patient 3 times a day  - Ambulate patient 3 times a day  - Out of bed to chair 3 times a day   - Out of bed for meals 3 times a day  - Out of bed for toileting  - Record patient progress and toleration of activity level   Outcome: Progressing     Problem: DISCHARGE PLANNING  Goal: Discharge to home or other facility with appropriate resources  Description: INTERVENTIONS:  - Identify barriers to discharge w/patient and caregiver  - Arrange for needed discharge resources and transportation as appropriate  - Identify discharge learning needs (meds, wound care, etc.)  - Arrange for interpretive services to assist at discharge as needed  - Refer to Case Management Department for coordinating discharge  planning if the patient needs post-hospital services based on physician/advanced practitioner order or complex needs related to functional status, cognitive ability, or social support system  Outcome: Progressing     Problem: Knowledge Deficit  Goal: Patient/family/caregiver demonstrates understanding of disease process, treatment plan, medications, and discharge instructions  Description: Complete learning assessment and assess knowledge base.  Interventions:  - Provide teaching at level of understanding  - Provide teaching via preferred learning methods  Outcome: Progressing

## 2024-02-17 NOTE — PROGRESS NOTES
INTERNAL MEDICINE RESIDENCY PROGRESS NOTE     Name: Gino Hernandez   Age & Sex: 83 y.o. male   MRN: [id number]  [facility]   Encounter: [id number]  Team: SOD Team C     PATIENT INFORMATION     Name: Gino Hernandez   Age & Sex: 83 y.o. male   MRN: [id number]  Hospital Stay Days: 9    ASSESSMENT/PLAN     Principal Problem:    Dementia with agitation and other behavioral disturbance (HCC)  Active Problems:    BPH (benign prostatic hyperplasia)    Constipation    Polypharmacy    Moderate protein-calorie malnutrition (HCC)      * Dementia with agitation and other behavioral disturbance (HCC)  Assessment & Plan  Patient with history of progressive Alzheimer's dementia.  At baseline, patient may be alert and oriented to person, but not to place or time.  He holds conversations but does get confused at baseline.  Patient had recent admission in January 2024 patient has dementia with agitation and other behavioral disturbances.  Was seen in consultation by geriatrics for polypharmacy and dementia treatment at that time.  Following conclusion of hospitalization patient was discharged to hospice at [facility].  However, patient returned to ED at [facility] on 02/07 as patient was having behaviors and acting aggressive towards staff and other residents at [facility].  There were no longer able to care for him without a continuous one-to-one observer, which was not covered by insurance in which family could not afford.  Because of this, he was taken back to the ED to find placement at another facility.  Patient currently without any acute complaints or signs/symptoms of infection.  Per wife, patient has not been acting any different recently than he has since last discharge.  No apparent metabolic/infectious changes to explain behaviors at this time.  Likely just progression of underlying dementia.   Reviewed medicine reconciliation sent by [facility] at admission.  2/13:  Pt has not been combative. Occasionally pulls at mcduffie/IV, so reordered soft restraints overnight. Otherwise feels well.   2/14: Overnight was agitated. S/p 1 time dose of gabapentin 100mg. Improved. Nurse removed wrist restraints this am. Pt sleeping comfortably. Aox1  2/17: Stable today. No overnight events.   Plan:  Continue PTA Depakote 250 mg 3 times daily and cont extra 125 mg dose with PM dose.   Continue PTA melatonin 3 mg nightly  Continue PTA mirtazapine 15 mg nightly  Continue PTA olanzapine 5 mg p.o. daily at 2 PM  Continue Abilify 2 mg daily per geriatric recommendation  Continue PTA vitamin B-12 supplementation  Continue PTA olanzapine 2.5 mg IM as needed for agitation  Continue PTA haloperidol 1 mg p.o. every 6 hours as needed for agitation unresponsive to as needed olanzapine  Geriatrics service consulted at this admission given patient's dementia with behavioral disturbances and polypharmacy.  Their recommendations are very much appreciated.  PT/OT will continue to see and work on promoting ambulation  Avoid physical restraints, use anxiolytics prn for agitation   Cont gabapentin 100mg in am, 200mg in pm for agitation; will escalate if necessary     BPH (benign prostatic hyperplasia)  Assessment & Plan  Patient with history of BPH.  Voiding trial initiated while inpatient upon request of wife. Patient failed voiding trial, Mcduffie catheter placed 2/11.  Patient will require chronic Mcduffie catheter. Outpatient follow-up with urology.  *Spoke with wife and son at bedside. Wife is concerned about pt having discomfort with the mcduffie catheter. Had discussion regarding voiding trial and decided if pt is comfortable with the mcduffie, we can leave it for now. If it begins to cause discomfort, we can attempt a voiding trial. Informed nurse.  2/15: Pt still pulling at his mcduffie, attempted voiding trial, mcduffie removed  2/16: pt underwent straight cath with 900 cc output   2/17: pt doing well without mcduffie for  now    Plan:   Continue prior to admission finasteride 5 mg daily  Will monitor for need for chronic mcduffie     Polypharmacy  Assessment & Plan  Patient with history of progressive dementia.  Prior to admission medications include Depakote, melatonin, mirtazapine, olanzapine, quetiapine, Haldol as needed, olanzapine as needed.  These medications were reviewed at admission.  Medications currently being ministered at [facility] were continued.  Given patient's continued behavioral disturbances and extensive list of current medications, geriatric service is consulted and their recommendations are greatly appreciated.  Plan:  -as above    Constipation  Assessment & Plan  Patient with history of constipation. Had several bowel movements overnight.   Plan:  Decrease bowel regimen to Miralax qd and sennakot qd  Monitor for bowel movements.          Disposition: Patient was declined from several facilities; have not heard response from [facility]; working with CM to find placement    SUBJECTIVE     Patient seen and examined. No acute events overnight.  Patient sleeping through exam. Per nurse no changes overnight.  Unable to obtain ROS due to sleeping/chronic dementia.     OBJECTIVE     Vitals:    02/15/24 2221 24 0700 24 1528 24 1529   BP: 115/61 116/62 98/57 104/58   Pulse:  72     Resp: 18 17 16    Temp: (!) 97.4 °F (36.3 °C) 97.6 °F (36.4 °C) (!) 97.4 °F (36.3 °C) (!) 97.4 °F (36.3 °C)   TempSrc:  Tympanic     SpO2:       Weight:       Height:          Temperature:   Temp (24hrs), Av.5 °F (36.4 °C), Min:97.4 °F (36.3 °C), Max:97.6 °F (36.4 °C)    Temperature: (!) 97.4 °F (36.3 °C)  Intake & Output:  I/O         02/15 0701   0700  0701   0700    P.O. 400 480    Total Intake(mL/kg) 400 (6.8) 480 (8.1)    Urine (mL/kg/hr) 600 (0.4) 900 (1)    Stool  0    Total Output 600 900    Net -200 -420          Unmeasured Urine Occurrence  1 x    Unmeasured Stool Occurrence  2 x           Weights:   IBW (Ideal Body Weight): 63.8 kg    Body mass index is 21.03 kg/m².  Weight (last 2 days)       None          Physical Exam  Constitutional:       General: He is sleeping.   HENT:      Head: Normocephalic and atraumatic.   Eyes:      Extraocular Movements: Extraocular movements intact.   Cardiovascular:      Rate and Rhythm: Normal rate and regular rhythm.      Pulses: Normal pulses.      Heart sounds: No murmur heard.  Abdominal:      General: There is no distension.      Palpations: Abdomen is soft.      Tenderness: There is no abdominal tenderness.   Musculoskeletal:         General: No swelling or tenderness.      Cervical back: Normal range of motion and neck supple.   Skin:     General: Skin is warm and dry.   Neurological:      General: No focal deficit present.      Comments: AOx1   Psychiatric:         Behavior: Behavior normal.       LABORATORY DATA     Labs: I have personally reviewed pertinent reports.  Results from last 7 days   Lab Units 02/15/24  0841 02/12/24  0610 02/10/24  0557   WBC Thousand/uL 4.84 7.54 3.94*   HEMOGLOBIN g/dL 13.1 12.7 12.5   HEMATOCRIT % 39.5 38.0 37.7   PLATELETS Thousands/uL 178 195 168   NEUTROS PCT %  --  76*  --    MONOS PCT %  --  8  --    EOS PCT %  --  2  --       Results from last 7 days   Lab Units 02/15/24  0841 02/12/24  0610 02/10/24  0557   POTASSIUM mmol/L 3.7 3.7 4.1   CHLORIDE mmol/L 107 105 108   CO2 mmol/L 28 29 27   BUN mg/dL 25 14 13   CREATININE mg/dL 0.93 0.95 0.88   CALCIUM mg/dL 8.9 8.8 8.5                            IMAGING & DIAGNOSTIC TESTING     Radiology Results: I have personally reviewed pertinent reports.  No results found.  Other Diagnostic Testing: I have personally reviewed pertinent reports.    ACTIVE MEDICATIONS     Current Facility-Administered Medications   Medication Dose Route Frequency    acetaminophen (TYLENOL) tablet 650 mg  650 mg Oral Q6H PRN    ARIPiprazole (ABILIFY) tablet 2 mg  2 mg Oral Daily    cyanocobalamin  "(VITAMIN B-12) tablet 1,000 mcg  1,000 mcg Oral Daily    divalproex sodium (DEPAKOTE SPRINKLE) capsule 125 mg  125 mg Oral Daily    divalproex sodium (DEPAKOTE SPRINKLE) capsule 250 mg  250 mg Oral TID With Meals    enoxaparin (LOVENOX) subcutaneous injection 40 mg  40 mg Subcutaneous Daily    finasteride (PROSCAR) tablet 5 mg  5 mg Oral Daily    [START ON 2/17/2024] gabapentin (NEURONTIN) capsule 100 mg  100 mg Oral Daily    gabapentin (NEURONTIN) capsule 200 mg  200 mg Oral HS    haloperidol (HALDOL) tablet 1 mg  1 mg Oral Q6H PRN    melatonin tablet 6 mg  6 mg Oral HS    mirtazapine (REMERON) tablet 15 mg  15 mg Oral HS    OLANZapine (ZyPREXA) IM injection 2.5 mg  2.5 mg Intramuscular Daily PRN    OLANZapine (ZyPREXA) tablet 5 mg  5 mg Oral Daily    polyethylene glycol (MIRALAX) packet 17 g  17 g Oral BID    senna-docusate sodium (SENOKOT S) 8.6-50 mg per tablet 1 tablet  1 tablet Oral BID       VTE Pharmacologic Prophylaxis: Enoxaparin (Lovenox)  VTE Mechanical Prophylaxis: sequential compression device    Portions of the record may have been created with voice recognition software.  Occasional wrong word or \"sound a like\" substitutions may have occurred due to the inherent limitations of voice recognition software.  Read the chart carefully and recognize, using context, where substitutions have occurred.  ==  Lizbet Beckett DO  [facility]  Internal Medicine Residency PGY-1      "

## 2024-02-17 NOTE — PLAN OF CARE
Problem: PAIN - ADULT  Goal: Verbalizes/displays adequate comfort level or baseline comfort level  Description: Interventions:  - Encourage patient to monitor pain and request assistance  - Assess pain using appropriate pain scale  - Administer analgesics based on type and severity of pain and evaluate response  - Implement non-pharmacological measures as appropriate and evaluate response  - Consider cultural and social influences on pain and pain management  - Notify physician/advanced practitioner if interventions unsuccessful or patient reports new pain  Outcome: Progressing     Problem: INFECTION - ADULT  Goal: Absence or prevention of progression during hospitalization  Description: INTERVENTIONS:  - Assess and monitor for signs and symptoms of infection  - Monitor lab/diagnostic results  - Monitor all insertion sites, i.e. indwelling lines, tubes, and drains  - Monitor endotracheal if appropriate and nasal secretions for changes in amount and color  - [place] appropriate cooling/warming therapies per order  - Administer medications as ordered  - Instruct and encourage patient and family to use good hand hygiene technique  - Identify and instruct in appropriate isolation precautions for identified infection/condition  Outcome: Progressing

## 2024-02-18 PROCEDURE — 99232 SBSQ HOSP IP/OBS MODERATE 35: CPT | Performed by: INTERNAL MEDICINE

## 2024-02-18 RX ORDER — WATER 10 ML/10ML
INJECTION INTRAMUSCULAR; INTRAVENOUS; SUBCUTANEOUS
Status: COMPLETED
Start: 2024-02-18 | End: 2024-02-18

## 2024-02-18 RX ADMIN — OLANZAPINE 5 MG: 10 TABLET, FILM COATED ORAL at 15:04

## 2024-02-18 RX ADMIN — ARIPIPRAZOLE 2 MG: 2 TABLET ORAL at 08:18

## 2024-02-18 RX ADMIN — GABAPENTIN 200 MG: 100 CAPSULE ORAL at 19:59

## 2024-02-18 RX ADMIN — WATER: 1 INJECTION INTRAMUSCULAR; INTRAVENOUS; SUBCUTANEOUS at 19:07

## 2024-02-18 RX ADMIN — DIVALPROEX SODIUM 250 MG: 125 CAPSULE, COATED PELLETS ORAL at 08:18

## 2024-02-18 RX ADMIN — OLANZAPINE 2.5 MG: 10 INJECTION, POWDER, FOR SOLUTION INTRAMUSCULAR at 17:58

## 2024-02-18 RX ADMIN — ENOXAPARIN SODIUM 40 MG: 40 INJECTION SUBCUTANEOUS at 08:25

## 2024-02-18 RX ADMIN — HALOPERIDOL 1 MG: 1 TABLET ORAL at 20:30

## 2024-02-18 RX ADMIN — FINASTERIDE 5 MG: 5 TABLET, FILM COATED ORAL at 08:18

## 2024-02-18 RX ADMIN — DIVALPROEX SODIUM 125 MG: 125 CAPSULE, COATED PELLETS ORAL at 16:49

## 2024-02-18 RX ADMIN — DIVALPROEX SODIUM 250 MG: 125 CAPSULE, COATED PELLETS ORAL at 16:50

## 2024-02-18 RX ADMIN — CYANOCOBALAMIN TAB 500 MCG 1000 MCG: 500 TAB at 08:18

## 2024-02-18 RX ADMIN — MELATONIN 6 MG: at 21:58

## 2024-02-18 RX ADMIN — GABAPENTIN 100 MG: 100 CAPSULE ORAL at 08:18

## 2024-02-18 RX ADMIN — MIRTAZAPINE 15 MG: 15 TABLET, FILM COATED ORAL at 21:58

## 2024-02-18 RX ADMIN — DIVALPROEX SODIUM 250 MG: 125 CAPSULE, COATED PELLETS ORAL at 11:57

## 2024-02-18 NOTE — PLAN OF CARE
Problem: PAIN - ADULT  Goal: Verbalizes/displays adequate comfort level or baseline comfort level  Description: Interventions:  - Encourage patient to monitor pain and request assistance  - Assess pain using appropriate pain scale  - Administer analgesics based on type and severity of pain and evaluate response  - Implement non-pharmacological measures as appropriate and evaluate response  - Consider cultural and social influences on pain and pain management  - Notify physician/advanced practitioner if interventions unsuccessful or patient reports new pain  Outcome: Progressing     Problem: INFECTION - ADULT  Goal: Absence or prevention of progression during hospitalization  Description: INTERVENTIONS:  - Assess and monitor for signs and symptoms of infection  - Monitor lab/diagnostic results  - Monitor all insertion sites, i.e. indwelling lines, tubes, and drains  - Monitor endotracheal if appropriate and nasal secretions for changes in amount and color  - [place] appropriate cooling/warming therapies per order  - Administer medications as ordered  - Instruct and encourage patient and family to use good hand hygiene technique  - Identify and instruct in appropriate isolation precautions for identified infection/condition  Outcome: Progressing

## 2024-02-18 NOTE — PROGRESS NOTES
2.5mg IM Zyprexa given in right ventrogluteal for restlessness.  Removed left wrist restraint.  Pt just seemed so uncomfortable and I believe this contributed to his restelessness.  I will trial and if it does not work - I will obtain order to reapply

## 2024-02-18 NOTE — PROGRESS NOTES
INTERNAL MEDICINE RESIDENCY PROGRESS NOTE     Name: Gino Heranndez   Age & Sex: 83 y.o. male   MRN: [id number]  Unit/Bed#: [facility] 834-01   Encounter: [id number]  Team: SOD Team C     PATIENT INFORMATION     Name: Gino Hernandez   Age & Sex: 83 y.o. 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  Prior to admission medications include Depakote, melatonin, mirtazapine, olanzapine, quetiapine, Haldol as needed, olanzapine as needed.  These medications were reviewed at admission.  Medications currently being ministered at [facility] were continued.  Given patient's continued behavioral disturbances and extensive list of current medications, geriatric service is consulted and their recommendations are greatly appreciated.  Plan:  -as above    BPH (benign prostatic hyperplasia)  Assessment & Plan  Patient with history of BPH.  Voiding trial initiated while inpatient upon request of wife. Patient failed voiding trial, Mcduffie catheter placed 2/11.  Patient will require chronic Mcduffie catheter. Outpatient follow-up with urology.  *Spoke with wife and son at bedside. Wife is concerned about pt having discomfort with the mcduffie catheter. Had discussion regarding voiding trial and decided if pt is comfortable with the mcduffie, we can leave it for now. If it begins to cause discomfort, we can attempt a voiding trial. Informed nurse.  2/15: Pt still pulling at his mcduffie, attempted voiding trial, mcduffie removed  2/16: pt underwent straight cath with 900 cc output   2/17: pt doing well without mcduffie for now    Plan:   Continue prior to admission finasteride 5 mg daily  Will monitor for need for  chronic mcduffie     Constipation  Assessment & Plan  Patient with history of constipation. Had several bowel movements overnight.   Plan:  Decrease bowel regimen to Miralax qd and sennakot qd  Monitor for bowel movements.      * Dementia with agitation and other behavioral disturbance (HCC)  Assessment & Plan  Patient with history of progressive Alzheimer's dementia.  At baseline, patient may be alert and oriented to person, but not to place or time.  He holds conversations but does get confused at baseline.  Patient had recent admission in January 2024 patient has dementia with agitation and other behavioral disturbances.  Was seen in consultation by geriatrics for polypharmacy and dementia treatment at that time.  Following conclusion of hospitalization patient was discharged to hospice at [facility].  However, patient returned to ED at [facility] on 02/07 as patient was having behaviors and acting aggressive towards staff and other residents at [facility].  There were no longer able to care for him without a continuous one-to-one observer, which was not covered by insurance in which family could not afford.  Because of this, he was taken back to the ED to find placement at another facility.  Patient currently without any acute complaints or signs/symptoms of infection.  Per wife, patient has not been acting any different recently than he has since last discharge.  No apparent metabolic/infectious changes to explain behaviors at this time.  Likely just progression of underlying dementia.   Reviewed medicine reconciliation sent by [facility] at admission.  2/13: Pt has not been combative. Occasionally pulls at mcduffie/IV, so reordered soft restraints overnight. Otherwise feels well.   2/14: Overnight was agitated. S/p 1 time dose of gabapentin 100mg. Improved. Nurse removed wrist restraints this am. Pt sleeping comfortably. Aox1  2/17: Stable today. No overnight events.    Plan:  Continue PTA Depakote 250 mg 3 times daily and cont extra 125 mg dose with PM dose.   Continue PTA melatonin 3 mg nightly  Continue PTA mirtazapine 15 mg nightly  Continue PTA olanzapine 5 mg p.o. daily at 2 PM  Continue Abilify 2 mg daily per geriatric recommendation  Continue PTA vitamin B-12 supplementation  Continue PTA olanzapine 2.5 mg IM as needed for agitation  Continue PTA haloperidol 1 mg p.o. every 6 hours as needed for agitation unresponsive to as needed olanzapine  Geriatrics service consulted at this admission given patient's dementia with behavioral disturbances and polypharmacy.  Their recommendations are very much appreciated.  PT/OT will continue to see and work on promoting ambulation  Avoid physical restraints, use anxiolytics prn for agitation   Cont gabapentin 100mg in am, 200mg in pm for agitation; will escalate if necessary         Disposition: Pending placement.  Lab holiday tomorrow.     SUBJECTIVE     Patient seen and examined. No acute events overnight. Patient is awake and alert. Not oriented -- baseline.     OBJECTIVE     Vitals:    24 0745 24 1528 24 2218 24 0749   BP: 102/60 105/62 111/62 134/62   Pulse:       Resp:    Temp: (!) 96.3 °F (35.7 °C) 97.6 °F (36.4 °C) 97.8 °F (36.6 °C) (!) 95.9 °F (35.5 °C)   TempSrc:    Axillary   SpO2:       Weight:       Height:          Temperature:   Temp (24hrs), Av.1 °F (36.2 °C), Min:95.9 °F (35.5 °C), Max:97.8 °F (36.6 °C)    Temperature: (!) 95.9 °F (35.5 °C)  Intake & Output:  I/O          0701   0700  0701   0700  0701   0700    P.O. 480 598 120    Total Intake(mL/kg) 480 (8.1) 598 (10.1) 120 (2)    Urine (mL/kg/hr) 900 (0.6) 625 (0.4) 475 (1.6)    Stool 0      Total Output 900 625 475    Net -420 -27 -355           Unmeasured Urine Occurrence 1 x      Unmeasured Stool Occurrence 5 x            Weights:   IBW (Ideal Body Weight): 63.8 kg    Body mass index is 21.03  kg/m².  Weight (last 2 days)       None          Physical Exam  Vitals and nursing note reviewed.   Constitutional:       General: He is not in acute distress.     Appearance: He is well-developed.   HENT:      Head: Normocephalic and atraumatic.   Eyes:      Conjunctiva/sclera: Conjunctivae normal.   Cardiovascular:      Rate and Rhythm: Normal rate and regular rhythm.      Heart sounds: No murmur heard.  Pulmonary:      Effort: Pulmonary effort is normal. No respiratory distress.      Breath sounds: Normal breath sounds.   Abdominal:      Palpations: Abdomen is soft.      Tenderness: There is no abdominal tenderness.      Comments: [place] belt on   Musculoskeletal:         General: No swelling.      Cervical back: Neck supple.   Skin:     General: Skin is warm and dry.      Capillary Refill: Capillary refill takes less than 2 seconds.   Neurological:      Mental Status: He is alert. Mental status is at baseline. He is disoriented.   Psychiatric:         Mood and Affect: Mood normal.       LABORATORY DATA     Labs: I have personally reviewed pertinent reports.  Results from last 7 days   Lab Units 02/17/24  0651 02/15/24  0841 02/12/24  0610   WBC Thousand/uL 3.85* 4.84 7.54   HEMOGLOBIN g/dL 12.5 13.1 12.7   HEMATOCRIT % 37.2 39.5 38.0   PLATELETS Thousands/uL 169 178 195   NEUTROS PCT % 64  --  76*   MONOS PCT % 9  --  8   EOS PCT % 3  --  2      Results from last 7 days   Lab Units 02/17/24  0651 02/15/24  0841 02/12/24  0610   POTASSIUM mmol/L 3.6 3.7 3.7   CHLORIDE mmol/L 108 107 105   CO2 mmol/L 28 28 29   BUN mg/dL 23 25 14   CREATININE mg/dL 0.78 0.93 0.95   CALCIUM mg/dL 8.9 8.9 8.8                            IMAGING & DIAGNOSTIC TESTING     Radiology Results: I have personally reviewed pertinent reports.  No results found.  Other Diagnostic Testing: I have personally reviewed pertinent reports.    ACTIVE MEDICATIONS     Current Facility-Administered Medications   Medication Dose Route Frequency     "acetaminophen (TYLENOL) tablet 650 mg  650 mg Oral Q6H PRN    ARIPiprazole (ABILIFY) tablet 2 mg  2 mg Oral Daily    cyanocobalamin (VITAMIN B-12) tablet 1,000 mcg  1,000 mcg Oral Daily    divalproex sodium (DEPAKOTE SPRINKLE) capsule 125 mg  125 mg Oral Daily    divalproex sodium (DEPAKOTE SPRINKLE) capsule 250 mg  250 mg Oral TID With Meals    enoxaparin (LOVENOX) subcutaneous injection 40 mg  40 mg Subcutaneous Daily    finasteride (PROSCAR) tablet 5 mg  5 mg Oral Daily    gabapentin (NEURONTIN) capsule 100 mg  100 mg Oral Daily    gabapentin (NEURONTIN) capsule 200 mg  200 mg Oral HS    haloperidol (HALDOL) tablet 1 mg  1 mg Oral Q6H PRN    melatonin tablet 6 mg  6 mg Oral HS    mirtazapine (REMERON) tablet 15 mg  15 mg Oral HS    OLANZapine (ZyPREXA) IM injection 2.5 mg  2.5 mg Intramuscular Daily PRN    OLANZapine (ZyPREXA) tablet 5 mg  5 mg Oral Daily    polyethylene glycol (MIRALAX) packet 17 g  17 g Oral Daily    senna-docusate sodium (SENOKOT S) 8.6-50 mg per tablet 1 tablet  1 tablet Oral Daily       VTE Pharmacologic Prophylaxis: Enoxaparin (Lovenox)  VTE Mechanical Prophylaxis: sequential compression device    Portions of the record may have been created with voice recognition software.  Occasional wrong word or \"sound a like\" substitutions may have occurred due to the inherent limitations of voice recognition software.  Read the chart carefully and recognize, using context, where substitutions have occurred.  ==  Emilie Soyeon Kim, MD  [facility]  Internal Medicine Residency PGY-3       "

## 2024-02-18 NOTE — PLAN OF CARE
Problem: Potential for Falls  Goal: Patient will remain free of falls  Description: INTERVENTIONS:  - Educate patient/family on patient safety including physical limitations  - Instruct patient to call for assistance with activity   - Consult OT/PT to assist with strengthening/mobility   - Keep Call bell within reach  - Keep bed low and locked with side rails adjusted as appropriate  - Keep care items and personal belongings within reach  - Initiate and maintain comfort rounds  - Make Fall Risk Sign visible to staff  - Offer Toileting every 2 Hours, in advance of need  - Initiate/Maintain bed alarm  - Obtain necessary fall risk management equipment: bed alarm  - Apply yellow socks and bracelet for high fall risk patients  - Consider moving patient to room near nurses station  Outcome: Progressing     Problem: Nutrition/Hydration-ADULT  Goal: Nutrient/Hydration intake appropriate for improving, restoring or maintaining nutritional needs  Description: Monitor and assess patient's nutrition/hydration status for malnutrition. Collaborate with interdisciplinary team and initiate plan and interventions as ordered.  Monitor patient's weight and dietary intake as ordered or per policy. Utilize nutrition screening tool and intervene as necessary. Determine patient's food preferences and provide high-protein, high-caloric foods as appropriate.     INTERVENTIONS:  - Monitor oral intake, urinary output, labs, and treatment plans  - Assess nutrition and hydration status and recommend course of action  - Evaluate amount of meals eaten  - Assist patient with eating if necessary   - Allow adequate time for meals  - Recommend/ encourage appropriate diets, oral nutritional supplements, and vitamin/mineral supplements  - Order, calculate, and assess calorie counts as needed  - Recommend, monitor, and adjust tube feedings and TPN/PPN based on assessed needs  - Assess need for intravenous fluids  - Provide specific nutrition/hydration  education as appropriate  - Include patient/family/caregiver in decisions related to nutrition  Outcome: Progressing     Problem: SAFETY,RESTRAINT: NV/NON-SELF DESTRUCTIVE BEHAVIOR  Goal: Remains free of harm/injury (restraint for non violent/non self-detsructive behavior)  Description: INTERVENTIONS:  - Instruct patient/family regarding restraint use   - Assess and monitor physiologic and psychological status   - Provide interventions and comfort measures to meet assessed patient needs   - Identify and implement measures to help patient regain control  - Assess readiness for release of restraint   Outcome: Progressing  Goal: Returns to optimal restraint-free functioning  Description: INTERVENTIONS:  - Assess the patient's behavior and symptoms that indicate continued need for restraint  - Identify and implement measures to help patient regain control  - Assess readiness for release of restraint   Outcome: Progressing     Problem: Prexisting or High Potential for Compromised Skin Integrity  Goal: Skin integrity is maintained or improved  Description: INTERVENTIONS:  - Identify patients at risk for skin breakdown  - Assess and monitor skin integrity  - Assess and monitor nutrition and hydration status  - Monitor labs   - Assess for incontinence   - Turn and reposition patient  - Assist with mobility/ambulation  - Relieve pressure over bony prominences  - Avoid friction and shearing  - Provide appropriate hygiene as needed including keeping skin clean and dry  - Evaluate need for skin moisturizer/barrier cream  - Collaborate with interdisciplinary team   - Patient/family teaching  - Consider wound care consult   Outcome: Progressing     Problem: PAIN - ADULT  Goal: Verbalizes/displays adequate comfort level or baseline comfort level  Description: Interventions:  - Encourage patient to monitor pain and request assistance  - Assess pain using appropriate pain scale  - Administer analgesics based on type and severity of  pain and evaluate response  - Implement non-pharmacological measures as appropriate and evaluate response  - Consider cultural and social influences on pain and pain management  - Notify physician/advanced practitioner if interventions unsuccessful or patient reports new pain  Outcome: Progressing     Problem: INFECTION - ADULT  Goal: Absence or prevention of progression during hospitalization  Description: INTERVENTIONS:  - Assess and monitor for signs and symptoms of infection  - Monitor lab/diagnostic results  - Monitor all insertion sites, i.e. indwelling lines, tubes, and drains  - Monitor endotracheal if appropriate and nasal secretions for changes in amount and color  - Wilsey appropriate cooling/warming therapies per order  - Administer medications as ordered  - Instruct and encourage patient and family to use good hand hygiene technique  - Identify and instruct in appropriate isolation precautions for identified infection/condition  Outcome: Progressing  Goal: Absence of fever/infection during neutropenic period  Description: INTERVENTIONS:  - Monitor WBC    Outcome: Progressing     Problem: SAFETY ADULT  Goal: Patient will remain free of falls  Description: INTERVENTIONS:  - Educate patient/family on patient safety including physical limitations  - Instruct patient to call for assistance with activity   - Consult OT/PT to assist with strengthening/mobility   - Keep Call bell within reach  - Keep bed low and locked with side rails adjusted as appropriate  - Keep care items and personal belongings within reach  - Initiate and maintain comfort rounds  - Make Fall Risk Sign visible to staff  - Offer Toileting every 2 Hours, in advance of need  - Initiate/Maintain bed alarm  - Obtain necessary fall risk management equipment: bed alarm  - Apply yellow socks and bracelet for high fall risk patients  - Consider moving patient to room near nurses station  Outcome: Progressing  Goal: Maintain or return to  baseline ADL function  Description: INTERVENTIONS:  -  Assess patient's ability to carry out ADLs; assess patient's baseline for ADL function and identify physical deficits which impact ability to perform ADLs (bathing, care of mouth/teeth, toileting, grooming, dressing, etc.)  - Assess/evaluate cause of self-care deficits   - Assess range of motion  - Assess patient's mobility; develop plan if impaired  - Assess patient's need for assistive devices and provide as appropriate  - Encourage maximum independence but intervene and supervise when necessary  - Involve family in performance of ADLs  - Assess for home care needs following discharge   - Consider OT consult to assist with ADL evaluation and planning for discharge  - Provide patient education as appropriate  Outcome: Progressing  Goal: Maintains/Returns to pre admission functional level  Description: INTERVENTIONS:  - Perform AM-PAC 6 Click Basic Mobility/ Daily Activity assessment daily.  - Set and communicate daily mobility goal to care team and patient/family/caregiver.   - Collaborate with rehabilitation services on mobility goals if consulted  - Perform Range of Motion 3 times a day.  - Reposition patient every 2 hours.  - Dangle patient 3 times a day  - Stand patient 3 times a day  - Ambulate patient 3 times a day  - Out of bed to chair 3 times a day   - Out of bed for meals 3 times a day  - Out of bed for toileting  - Record patient progress and toleration of activity level   Outcome: Progressing     Problem: DISCHARGE PLANNING  Goal: Discharge to home or other facility with appropriate resources  Description: INTERVENTIONS:  - Identify barriers to discharge w/patient and caregiver  - Arrange for needed discharge resources and transportation as appropriate  - Identify discharge learning needs (meds, wound care, etc.)  - Arrange for interpretive services to assist at discharge as needed  - Refer to Case Management Department for coordinating discharge  planning if the patient needs post-hospital services based on physician/advanced practitioner order or complex needs related to functional status, cognitive ability, or social support system  Outcome: Progressing     Problem: Knowledge Deficit  Goal: Patient/family/caregiver demonstrates understanding of disease process, treatment plan, medications, and discharge instructions  Description: Complete learning assessment and assess knowledge base.  Interventions:  - Provide teaching at level of understanding  - Provide teaching via preferred learning methods  Outcome: Progressing

## 2024-02-19 PROCEDURE — 99232 SBSQ HOSP IP/OBS MODERATE 35: CPT | Performed by: INTERNAL MEDICINE

## 2024-02-19 RX ORDER — GABAPENTIN 300 MG/1
300 CAPSULE ORAL
Status: DISCONTINUED | OUTPATIENT
Start: 2024-02-19 | End: 2024-02-20

## 2024-02-19 RX ORDER — GABAPENTIN 100 MG/1
100 CAPSULE ORAL
Status: DISCONTINUED | OUTPATIENT
Start: 2024-02-19 | End: 2024-02-20

## 2024-02-19 RX ADMIN — OLANZAPINE 7.5 MG: 5 TABLET, FILM COATED ORAL at 15:09

## 2024-02-19 RX ADMIN — GABAPENTIN 300 MG: 300 CAPSULE ORAL at 20:26

## 2024-02-19 RX ADMIN — DIVALPROEX SODIUM 250 MG: 125 CAPSULE, COATED PELLETS ORAL at 16:52

## 2024-02-19 RX ADMIN — MIRTAZAPINE 15 MG: 15 TABLET, FILM COATED ORAL at 21:12

## 2024-02-19 RX ADMIN — DIVALPROEX SODIUM 250 MG: 125 CAPSULE, COATED PELLETS ORAL at 07:49

## 2024-02-19 RX ADMIN — SENNOSIDES, DOCUSATE SODIUM 1 TABLET: 8.6; 5 TABLET ORAL at 08:35

## 2024-02-19 RX ADMIN — GABAPENTIN 100 MG: 100 CAPSULE ORAL at 08:35

## 2024-02-19 RX ADMIN — ARIPIPRAZOLE 2 MG: 2 TABLET ORAL at 08:36

## 2024-02-19 RX ADMIN — DIVALPROEX SODIUM 250 MG: 125 CAPSULE, COATED PELLETS ORAL at 11:26

## 2024-02-19 RX ADMIN — POLYETHYLENE GLYCOL 3350 17 G: 17 POWDER, FOR SOLUTION ORAL at 08:36

## 2024-02-19 RX ADMIN — CYANOCOBALAMIN TAB 500 MCG 1000 MCG: 500 TAB at 08:35

## 2024-02-19 RX ADMIN — MELATONIN 6 MG: at 21:12

## 2024-02-19 RX ADMIN — ENOXAPARIN SODIUM 40 MG: 40 INJECTION SUBCUTANEOUS at 08:35

## 2024-02-19 RX ADMIN — GABAPENTIN 100 MG: 100 CAPSULE ORAL at 15:09

## 2024-02-19 RX ADMIN — DIVALPROEX SODIUM 125 MG: 125 CAPSULE, COATED PELLETS ORAL at 16:52

## 2024-02-19 RX ADMIN — FINASTERIDE 5 MG: 5 TABLET, FILM COATED ORAL at 08:35

## 2024-02-19 NOTE — RESTORATIVE TECHNICIAN NOTE
Restorative Technician Note      Patient Name: Gino Hernandez     Restorative Tech Visit Date: 02/19/24  Note Type: Mobility  Patient Position Upon Consult: Supine  Activity Performed: Other (Comment) (Ambulated HHAx1 within room (not enough staff for chair follow); seated therex BLE/BUE)  Education Provided: Yes  Patient Position at End of Consult: All needs within reach; Supine; Bed/Chair alarm activated    Donned waist posey and wrist restraints    Sharon Ching  DPT, Restorative Technician

## 2024-02-19 NOTE — PROGRESS NOTES
INTERNAL MEDICINE RESIDENCY PROGRESS NOTE     Name: Gino Hernandez   Age & Sex: 83 y.o. male   MRN: [id number]  [facility]   Encounter: [id number]  Team: SOD Team C     PATIENT INFORMATION     Name: Gino Hernandez   Age & Sex: 83 y.o. male   MRN: [id number]  Hospital Stay Days: 11    ASSESSMENT/PLAN     Principal Problem:    Dementia with agitation and other behavioral disturbance (HCC)  Active Problems:    BPH (benign prostatic hyperplasia)    Constipation    Polypharmacy    Moderate protein-calorie malnutrition (HCC)      * Dementia with agitation and other behavioral disturbance (HCC)  Assessment & Plan  Patient with history of progressive Alzheimer's dementia.  At baseline, patient may be alert and oriented to person, but not to place or time.  He holds conversations but does get confused at baseline.  Patient had recent admission in January 2024 patient has dementia with agitation and other behavioral disturbances.  Was seen in consultation by geriatrics for polypharmacy and dementia treatment at that time.  Following conclusion of hospitalization patient was discharged to hospice at [facility].  However, patient returned to ED at [facility] on 02/07 as patient was having behaviors and acting aggressive towards staff and other residents at [facility].  There were no longer able to care for him without a continuous one-to-one observer, which was not covered by insurance in which family could not afford.  Because of this, he was taken back to the ED to find placement at another facility.  Patient currently without any acute complaints or signs/symptoms of infection.  Per wife, patient has not been acting any different recently than he has since last discharge.  No apparent metabolic/infectious changes to explain behaviors at this time.  Likely just progression of underlying dementia.   Reviewed medicine reconciliation sent by [facility] at  admission.  2/13: Pt has not been combative. Occasionally pulls at mcduffie/IV, so reordered soft restraints overnight. Otherwise feels well.   2/14: Overnight was agitated. S/p 1 time dose of gabapentin 100mg. Improved. Nurse removed wrist restraints this am. Pt sleeping comfortably. Aox1  2/19: Stable today. No overnight events.   Plan:  Continue PTA Depakote 250 mg 3 times daily and cont extra 125 mg dose with PM dose.   Continue PTA melatonin 3 mg nightly  Continue PTA mirtazapine 15 mg nightly  Increase PTA olanzapine from 5 mg to 7.5 mg p.o. daily at 2 PM  Continue Abilify 2 mg daily per geriatric recommendation  Continue PTA vitamin B-12 supplementation  Continue PTA olanzapine 2.5 mg IM as needed for agitation  Continue PTA haloperidol 1 mg p.o. every 6 hours as needed for agitation unresponsive to as needed olanzapine  Geriatrics service consulted at this admission given patient's dementia with behavioral disturbances and polypharmacy.  Their recommendations are very much appreciated.  PT/OT will continue to see and work on promoting ambulation  Avoid physical restraints, use anxiolytics prn for agitation   Cont gabapentin 100mg in am, add gabapentin 100mg in afternoon, increase night dose to 300mg      BPH (benign prostatic hyperplasia)  Assessment & Plan  Patient with history of BPH.  Voiding trial initiated while inpatient upon request of wife. Patient failed voiding trial, Mcduffie catheter placed 2/11.  Patient will require chronic Mcduffie catheter. Outpatient follow-up with urology.  *Spoke with wife and son at bedside. Wife is concerned about pt having discomfort with the mcduffie catheter. Had discussion regarding voiding trial and decided if pt is comfortable with the mcduffie, we can leave it for now. If it begins to cause discomfort, we can attempt a voiding trial. Informed nurse.  2/15: Pt still pulling at his mcduffie, attempted voiding trial, mcduffie removed  2/16: pt underwent straight cath with 900 cc output    : pt doing well without mcduffie for now  : pt was straight cathed twice; bladder scans showed 427cc and 593 cc  : mcduffie was replaced     Plan:   Continue prior to admission finasteride 5 mg daily  Continue mcduffie     Polypharmacy  Assessment & Plan  Patient with history of progressive dementia.  Prior to admission medications include Depakote, melatonin, mirtazapine, olanzapine, quetiapine, Haldol as needed, olanzapine as needed.  These medications were reviewed at admission.  Medications currently being ministered at [facility] were continued.  Given patient's continued behavioral disturbances and extensive list of current medications, geriatric service is consulted and their recommendations are greatly appreciated.  Plan:  -as above    Constipation  Assessment & Plan  Patient with history of constipation. Had several bowel movements overnight.   Plan:  Decrease bowel regimen to Miralax qd and sennakot qd  Monitor for bowel movements.          Disposition: Will remain inpatient pending placement; CM working to find placement       SUBJECTIVE     Patient seen and examined. No acute events overnight.  Patient pleasantly demented.  Talking to himself during exam.  Answers some questions but unreliable. Unable to obtain ROS due to dementia.    OBJECTIVE     Vitals:    24 1528 24 2218 24 0749 24 1606   BP: 105/62 111/62 134/62 99/61   Pulse:       Resp:    Temp: 97.6 °F (36.4 °C) 97.8 °F (36.6 °C) (!) 95.9 °F (35.5 °C) 98 °F (36.7 °C)   TempSrc:   Axillary    SpO2:       Weight:       Height:          Temperature:   Temp (24hrs), Av.2 °F (36.2 °C), Min:95.9 °F (35.5 °C), Max:98 °F (36.7 °C)    Temperature: 98 °F (36.7 °C)  Intake & Output:  I/O          0701   0700  0701   0700    P.O. 598 120    Total Intake(mL/kg) 598 (10.1) 120 (2)    Urine (mL/kg/hr) 625 (0.4) 475 (0.6)    Stool      Total Output 625 475    Net -27 -460                 Weights:   IBW (Ideal Body Weight): 63.8 kg    Body mass index is 21.03 kg/m².  Weight (last 2 days)       None          Physical Exam  Constitutional:       Appearance: Normal appearance.   HENT:      Head: Normocephalic and atraumatic.   Eyes:      Extraocular Movements: Extraocular movements intact.   Cardiovascular:      Rate and Rhythm: Normal rate and regular rhythm.      Pulses: Normal pulses.      Heart sounds: Normal heart sounds.   Pulmonary:      Effort: Pulmonary effort is normal. No respiratory distress.      Breath sounds: Normal breath sounds. No wheezing.   Abdominal:      General: There is no distension.      Palpations: Abdomen is soft.      Tenderness: There is no abdominal tenderness.   Musculoskeletal:         General: No swelling or tenderness. Normal range of motion.      Cervical back: Normal range of motion and neck supple.   Skin:     General: Skin is warm and dry.   Neurological:      General: No focal deficit present.      Mental Status: He is alert.      Comments: AOx1       LABORATORY DATA     Labs: I have personally reviewed pertinent reports.  Results from last 7 days   Lab Units 02/17/24  0651 02/15/24  0841 02/12/24  0610   WBC Thousand/uL 3.85* 4.84 7.54   HEMOGLOBIN g/dL 12.5 13.1 12.7   HEMATOCRIT % 37.2 39.5 38.0   PLATELETS Thousands/uL 169 178 195   NEUTROS PCT % 64  --  76*   MONOS PCT % 9  --  8   EOS PCT % 3  --  2      Results from last 7 days   Lab Units 02/17/24  0651 02/15/24  0841 02/12/24  0610   POTASSIUM mmol/L 3.6 3.7 3.7   CHLORIDE mmol/L 108 107 105   CO2 mmol/L 28 28 29   BUN mg/dL 23 25 14   CREATININE mg/dL 0.78 0.93 0.95   CALCIUM mg/dL 8.9 8.9 8.8                            IMAGING & DIAGNOSTIC TESTING     Radiology Results: I have personally reviewed pertinent reports.  No results found.  Other Diagnostic Testing: I have personally reviewed pertinent reports.    ACTIVE MEDICATIONS     Current Facility-Administered Medications   Medication Dose Route  "Frequency    acetaminophen (TYLENOL) tablet 650 mg  650 mg Oral Q6H PRN    ARIPiprazole (ABILIFY) tablet 2 mg  2 mg Oral Daily    cyanocobalamin (VITAMIN B-12) tablet 1,000 mcg  1,000 mcg Oral Daily    divalproex sodium (DEPAKOTE SPRINKLE) capsule 125 mg  125 mg Oral Daily    divalproex sodium (DEPAKOTE SPRINKLE) capsule 250 mg  250 mg Oral TID With Meals    enoxaparin (LOVENOX) subcutaneous injection 40 mg  40 mg Subcutaneous Daily    finasteride (PROSCAR) tablet 5 mg  5 mg Oral Daily    gabapentin (NEURONTIN) capsule 100 mg  100 mg Oral Daily    gabapentin (NEURONTIN) capsule 200 mg  200 mg Oral HS    haloperidol (HALDOL) tablet 1 mg  1 mg Oral Q6H PRN    melatonin tablet 6 mg  6 mg Oral HS    mirtazapine (REMERON) tablet 15 mg  15 mg Oral HS    OLANZapine (ZyPREXA) IM injection 2.5 mg  2.5 mg Intramuscular Daily PRN    OLANZapine (ZyPREXA) tablet 5 mg  5 mg Oral Daily    polyethylene glycol (MIRALAX) packet 17 g  17 g Oral Daily    senna-docusate sodium (SENOKOT S) 8.6-50 mg per tablet 1 tablet  1 tablet Oral Daily       VTE Pharmacologic Prophylaxis: Enoxaparin (Lovenox)  VTE Mechanical Prophylaxis: sequential compression device    Portions of the record may have been created with voice recognition software.  Occasional wrong word or \"sound a like\" substitutions may have occurred due to the inherent limitations of voice recognition software.  Read the chart carefully and recognize, using context, where substitutions have occurred.  ==  Lizbet Beckett DO  [facility]  Internal Medicine Residency PGY-1      "

## 2024-02-20 PROCEDURE — 97530 THERAPEUTIC ACTIVITIES: CPT

## 2024-02-20 PROCEDURE — 99232 SBSQ HOSP IP/OBS MODERATE 35: CPT | Performed by: INTERNAL MEDICINE

## 2024-02-20 PROCEDURE — 99233 SBSQ HOSP IP/OBS HIGH 50: CPT | Performed by: INTERNAL MEDICINE

## 2024-02-20 RX ORDER — GABAPENTIN 100 MG/1
200 CAPSULE ORAL DAILY
Status: DISCONTINUED | OUTPATIENT
Start: 2024-02-21 | End: 2024-02-22 | Stop reason: HOSPADM

## 2024-02-20 RX ORDER — GABAPENTIN 400 MG/1
400 CAPSULE ORAL
Status: DISCONTINUED | OUTPATIENT
Start: 2024-02-20 | End: 2024-02-22 | Stop reason: HOSPADM

## 2024-02-20 RX ORDER — ARIPIPRAZOLE 5 MG/1
5 TABLET ORAL DAILY
Status: DISCONTINUED | OUTPATIENT
Start: 2024-02-21 | End: 2024-02-22 | Stop reason: HOSPADM

## 2024-02-20 RX ORDER — GABAPENTIN 100 MG/1
200 CAPSULE ORAL
Status: DISCONTINUED | OUTPATIENT
Start: 2024-02-21 | End: 2024-02-22 | Stop reason: HOSPADM

## 2024-02-20 RX ADMIN — OLANZAPINE 2.5 MG: 10 INJECTION, POWDER, FOR SOLUTION INTRAMUSCULAR at 17:49

## 2024-02-20 RX ADMIN — MELATONIN 6 MG: at 21:29

## 2024-02-20 RX ADMIN — GABAPENTIN 400 MG: 400 CAPSULE ORAL at 20:36

## 2024-02-20 RX ADMIN — DIVALPROEX SODIUM 125 MG: 125 CAPSULE, COATED PELLETS ORAL at 16:30

## 2024-02-20 RX ADMIN — CYANOCOBALAMIN TAB 500 MCG 1000 MCG: 500 TAB at 09:20

## 2024-02-20 RX ADMIN — DIVALPROEX SODIUM 250 MG: 125 CAPSULE, COATED PELLETS ORAL at 16:30

## 2024-02-20 RX ADMIN — MIRTAZAPINE 15 MG: 15 TABLET, FILM COATED ORAL at 21:29

## 2024-02-20 RX ADMIN — DIVALPROEX SODIUM 250 MG: 125 CAPSULE, COATED PELLETS ORAL at 09:17

## 2024-02-20 RX ADMIN — FINASTERIDE 5 MG: 5 TABLET, FILM COATED ORAL at 09:20

## 2024-02-20 RX ADMIN — ARIPIPRAZOLE 2 MG: 2 TABLET ORAL at 09:17

## 2024-02-20 RX ADMIN — DIVALPROEX SODIUM 250 MG: 125 CAPSULE, COATED PELLETS ORAL at 13:06

## 2024-02-20 RX ADMIN — SENNOSIDES, DOCUSATE SODIUM 1 TABLET: 8.6; 5 TABLET ORAL at 09:18

## 2024-02-20 RX ADMIN — GABAPENTIN 100 MG: 100 CAPSULE ORAL at 13:46

## 2024-02-20 RX ADMIN — GABAPENTIN 100 MG: 100 CAPSULE ORAL at 09:17

## 2024-02-20 RX ADMIN — OLANZAPINE 7.5 MG: 5 TABLET, FILM COATED ORAL at 13:46

## 2024-02-20 RX ADMIN — ENOXAPARIN SODIUM 40 MG: 40 INJECTION SUBCUTANEOUS at 09:18

## 2024-02-20 RX ADMIN — POLYETHYLENE GLYCOL 3350 17 G: 17 POWDER, FOR SOLUTION ORAL at 09:18

## 2024-02-20 NOTE — PLAN OF CARE
"  Problem: PHYSICAL THERAPY ADULT  Goal: Performs mobility at highest level of function for planned discharge setting.  See evaluation for individualized goals.  Description: Treatment/Interventions: ADL retraining, Functional transfer training, LE strengthening/ROM, Elevations, Therapeutic exercise, Endurance training, Gait training, Bed mobility, Spoke to nursing, OT          See flowsheet documentation for full assessment, interventions and recommendations.  Outcome: Completed  Note: Prognosis: Fair  Problem List: Impaired balance, Decreased cognition, Impaired judgement, Decreased safety awareness  Assessment: pt seen for PT session as documented above. Upon start of session pt wanting to get up and mobilize OOB; performed bed skills and STS xfers w/ S + assist for mcduffie management. Pt 1* limited by safety/ cognitive barriers which are likely new normal. Pt will require S environment on d/c for safety. Pt is noted  to perform all functional mobility at S/ CGA  + cues and redirection for safety. Pt was frequently reoriented throughout session. Was easily redirected to task and he reports 'likes to walk and talk\"  Pt ambulated w/ Pt w/ HHA > 550' without 1 rest of 10 mins at window where he performed standing marching ; Heel raises and mini squats 2x10 each. PT to sign off at this time w/ recommendation for restorative nursing/ staff  to continue to ambulate 2-4x daily to maintain CLOF until time of d/c to supervised environment .  Skilled PT no longer warranted in acute setting as pt functioning near max potential give cognitive / safety deficits.  D/C PT  Barriers to Discharge: Decreased caregiver support     Rehab Resource Intensity Level, PT: II (Moderate Resource Intensity)    See flowsheet documentation for full assessment.        "

## 2024-02-20 NOTE — PROGRESS NOTES
INTERNAL MEDICINE RESIDENCY PROGRESS NOTE     Name: Gino Hernandez   Age & Sex: 83 y.o. male   MRN: [id number]  Unit/Bed#: [facility] 834-01   Encounter: [id number]  Team: SOD Team C     PATIENT INFORMATION     Name: Gino Hernandez   Age & Sex: 83 y.o. male   MRN: [id number]  Hospital Stay Days: 13    ASSESSMENT/PLAN     Principal Problem:    Dementia with agitation and other behavioral disturbance (HCC)  Active Problems:    Constipation    BPH (benign prostatic hyperplasia)    Polypharmacy    Moderate protein-calorie malnutrition (HCC)      Polypharmacy  Assessment & Plan  Patient with history of progressive dementia.  Prior to admission medications include Depakote, melatonin, mirtazapine, olanzapine, quetiapine, Haldol as needed, olanzapine as needed.  These medications were reviewed at admission.  Medications currently being ministered at [facility] were continued.  Given patient's continued behavioral disturbances and extensive list of current medications, geriatric service is consulted and their recommendations are greatly appreciated.  Plan:  -as above    BPH (benign prostatic hyperplasia)  Assessment & Plan  Patient with history of BPH.  Voiding trial initiated while inpatient upon request of wife. Patient failed voiding trial, Mcduffie catheter placed 2/11.  Patient will require chronic Mcduffie catheter. Outpatient follow-up with urology.  *Spoke with wife and son at bedside. Wife is concerned about pt having discomfort with the mcduffie catheter. Had discussion regarding voiding trial and decided if pt is comfortable with the mcduffie, we can leave it for now. If it begins to cause discomfort, we can attempt a voiding trial. Informed nurse.  2/15: Pt still pulling at his mcduffie, attempted voiding trial, mcduffie removed  2/16: pt underwent straight cath with 900 cc output   2/17: pt doing well without mcduffie for now  2/18: pt was straight cathed twice; bladder scans showed 427cc and 593 cc  2/19: mcduffie was  replaced     Plan:   Continue prior to admission finasteride 5 mg daily  Continue mcduffie     Constipation  Assessment & Plan  Patient with history of constipation. Had several bowel movements overnight.   Plan:  Decrease bowel regimen to Miralax qd and sennakot qd  Monitor for bowel movements.      * Dementia with agitation and other behavioral disturbance (HCC)  Assessment & Plan  Patient with history of progressive Alzheimer's dementia.  At baseline, patient may be alert and oriented to person, but not to place or time.  He holds conversations but does get confused at baseline.  Patient had recent admission in January 2024 patient has dementia with agitation and other behavioral disturbances.  Was seen in consultation by geriatrics for polypharmacy and dementia treatment at that time.  Following conclusion of hospitalization patient was discharged to hospice at [facility].  However, patient returned to ED at [facility] on 02/07 as patient was having behaviors and acting aggressive towards staff and other residents at [facility].  There were no longer able to care for him without a continuous one-to-one observer, which was not covered by insurance in which family could not afford.  Because of this, he was taken back to the ED to find placement at another facility.  Patient currently without any acute complaints or signs/symptoms of infection.  Per wife, patient has not been acting any different recently than he has since last discharge.  No apparent metabolic/infectious changes to explain behaviors at this time.  Likely just progression of underlying dementia.   Reviewed medicine reconciliation sent by [facility] at admission.  2/13: Pt has not been combative. Occasionally pulls at mcduffie/IV, so reordered soft restraints overnight. Otherwise feels well.   2/14: Overnight was agitated. S/p 1 time dose of gabapentin 100mg. Improved. Nurse removed wrist restraints this am. Pt sleeping  comfortably. Aox1  : Stable today. No overnight events.   Plan:  Continue PTA Depakote 250 mg 3 times daily and cont extra 125 mg dose with PM dose.   Continue PTA melatonin 3 mg nightly  Continue PTA mirtazapine 15 mg nightly  Increase PTA olanzapine from 5 mg to 7.5 mg p.o. daily at 2 PM  Continue Abilify 2 mg daily per geriatric recommendation  Continue PTA vitamin B-12 supplementation  Continue PTA olanzapine 2.5 mg IM as needed for agitation  Continue PTA haloperidol 1 mg p.o. every 6 hours as needed for agitation unresponsive to as needed olanzapine  Geriatrics service consulted at this admission given patient's dementia with behavioral disturbances and polypharmacy.  Their recommendations are very much appreciated.  PT/OT will continue to see and work on promoting ambulation  Avoid physical restraints, use anxiolytics prn for agitation  Increase gabapentin 100mg in am, 200mg in afternoon; increase night dose to 400mg          Disposition: inpatient; med/surg. Pending placement.    SUBJECTIVE     Patient seen and examined.  Patient had restraints ordered overnight, sleeping comfortably this morning.  Physical restraints were discontinued.  On rounds, patient noted to be much more calm and collected this morning, though still disoriented.  Family at bedside, they state that this is roughly his baseline.    OBJECTIVE     Vitals:    24 0722 24 1527 24 2113 24 0839   BP:  94/58 116/60 113/58   Pulse:       Resp:     Temp: 97.5 °F (36.4 °C) 99.3 °F (37.4 °C) 98.5 °F (36.9 °C) (!) 97.1 °F (36.2 °C)   TempSrc: Axillary      SpO2:       Weight:       Height:          Temperature:   Temp (24hrs), Av.3 °F (36.8 °C), Min:97.1 °F (36.2 °C), Max:99.3 °F (37.4 °C)    Temperature: (!) 97.1 °F (36.2 °C)  Intake & Output:  I/O          07 07 07 07 07 0700    P.O. 120 580     Total Intake(mL/kg) 120 (2.2) 580 (10.8)     Urine (mL/kg/hr) 475  (0.4) 1400 (1.1)     Total Output 475 9164     Net -996 -682                  Weights:   IBW (Ideal Body Weight): 63.8 kg    Body mass index is 19.11 kg/m².  Weight (last 2 days)       Date/Time Weight    02/19/24 0600 53.7 (118.39)          Physical Exam  Constitutional:       Appearance: Normal appearance.   HENT:      Head: Normocephalic and atraumatic.   Eyes:      Extraocular Movements: Extraocular movements intact.   Cardiovascular:      Rate and Rhythm: Normal rate and regular rhythm.      Pulses: Normal pulses.      Heart sounds: Normal heart sounds.   Pulmonary:      Effort: Pulmonary effort is normal. No respiratory distress.      Breath sounds: Normal breath sounds. No wheezing.   Abdominal:      General: There is no distension.      Palpations: Abdomen is soft.      Tenderness: There is no abdominal tenderness.   Musculoskeletal:         General: No swelling or tenderness. Normal range of motion.      Cervical back: Normal range of motion and neck supple.   Skin:     General: Skin is warm and dry.   Neurological:      General: No focal deficit present.      Mental Status: He is alert.      Comments: AOx1       LABORATORY DATA     Labs: I have personally reviewed pertinent reports.  Results from last 7 days   Lab Units 02/17/24  0651 02/15/24  0841   WBC Thousand/uL 3.85* 4.84   HEMOGLOBIN g/dL 12.5 13.1   HEMATOCRIT % 37.2 39.5   PLATELETS Thousands/uL 169 178   NEUTROS PCT % 64  --    MONOS PCT % 9  --    EOS PCT % 3  --       Results from last 7 days   Lab Units 02/17/24  0651 02/15/24  0841   POTASSIUM mmol/L 3.6 3.7   CHLORIDE mmol/L 108 107   CO2 mmol/L 28 28   BUN mg/dL 23 25   CREATININE mg/dL 0.78 0.93   CALCIUM mg/dL 8.9 8.9                            IMAGING & DIAGNOSTIC TESTING     Radiology Results: I have personally reviewed pertinent reports.  No results found.  Other Diagnostic Testing: I have personally reviewed pertinent reports.    ACTIVE MEDICATIONS     Current Facility-Administered  "Medications   Medication Dose Route Frequency    acetaminophen (TYLENOL) tablet 650 mg  650 mg Oral Q6H PRN    ARIPiprazole (ABILIFY) tablet 2 mg  2 mg Oral Daily    cyanocobalamin (VITAMIN B-12) tablet 1,000 mcg  1,000 mcg Oral Daily    divalproex sodium (DEPAKOTE SPRINKLE) capsule 125 mg  125 mg Oral Daily    divalproex sodium (DEPAKOTE SPRINKLE) capsule 250 mg  250 mg Oral TID With Meals    enoxaparin (LOVENOX) subcutaneous injection 40 mg  40 mg Subcutaneous Daily    finasteride (PROSCAR) tablet 5 mg  5 mg Oral Daily    [START ON 2/21/2024] gabapentin (NEURONTIN) capsule 200 mg  200 mg Oral After Lunch    [START ON 2/21/2024] gabapentin (NEURONTIN) capsule 200 mg  200 mg Oral Daily    gabapentin (NEURONTIN) capsule 400 mg  400 mg Oral HS    haloperidol (HALDOL) tablet 1 mg  1 mg Oral Q6H PRN    melatonin tablet 6 mg  6 mg Oral HS    mirtazapine (REMERON) tablet 15 mg  15 mg Oral HS    OLANZapine (ZyPREXA) IM injection 2.5 mg  2.5 mg Intramuscular Daily PRN    OLANZapine (ZyPREXA) tablet 7.5 mg  7.5 mg Oral Daily    polyethylene glycol (MIRALAX) packet 17 g  17 g Oral Daily    senna-docusate sodium (SENOKOT S) 8.6-50 mg per tablet 1 tablet  1 tablet Oral Daily       VTE Pharmacologic Prophylaxis: Enoxaparin (Lovenox)  VTE Mechanical Prophylaxis: sequential compression device    Portions of the record may have been created with voice recognition software.  Occasional wrong word or \"sound a like\" substitutions may have occurred due to the inherent limitations of voice recognition software.  Read the chart carefully and recognize, using context, where substitutions have occurred.  ==  Johnie Hinson MD  [facility]  Internal Medicine Residency PGY-1  "

## 2024-02-20 NOTE — PROGRESS NOTES
Progress Note - Geriatric Medicine   Gino Hernandez 83 y.o. male MRN: [id number]  Unit/Bed#: [facility] 834-01 Encounter: [id number]      Assessment/Plan:  Dementia w/ agitation and behavioral disturbance   -Moderate to severe; progressively worsening which lead to placement difficulties and ultimate enrollment into hospice    -Strong family hx of dementia  -Neurology dx Dementia in 2021 in op  -[facility] 12/30 (12/2023)   -[facility] 1/7/24 images personally viewed, moderate/severe diffuse chronic microangiopathic changes  -TSH WNL 1.916, B12 WNL at 882 on daily supplementation   -Recent LFTs- WNL since medication adjustments   -Depakote Level 2/10/24- 53     -Patient is high risk of delirium due to age, dementia, hospitalization, polypharmacy   -Continue delirium precautions  -Encourage normal sleep/wake cycle  -Encourage early mobilization and ambulation with assist as appropriate to do so, per records patient greatly enjoys walking and frequent walks throughout the day reportedly helped some with behaviors during prior admissions  -Provide frequent reorientation and redirection as indicated and appropriate   -Encourage hydration and nutrition     Most recent changes made by primary team include Gabapentin 100mg BID and 300mg HS, increased Zyprexa to 7.5mg   Unfortunately pt remained on physical restraints- bilateral upper arm and waist   Would trial off restraints  Recommend increasing Abilify to 5mg QD only if during day patient is combative and aggressive however will hold off for now, continue Depakote dosing 250mg TID w/ evening dose 375mg, continue B12 supplementation, would repeat EKG to monitor QTC when able   Reassessed pt after AM and significant improvement; up walking conversing answering questions appropriately   Recommend avoiding Haloperidol     OOB as tolerated    Hospice Patient   Secondary to severe dementia since recent hospitalization where he was discharged to SNF w/ hospice      Insomnia  -Recommend and  Encourage consistent sleep/wake times and establishment of bedtime routine  -Minimize daytime naps and encourage activity earlier in morning to reduce risk of late afternoon/early evening activity/stimulation disrupting normal circadian rhythm  -Outpatient regimen includes Mirtazapine and Melatonin, will continue current regimen     BPH with LUTS  -Continue home Finasteride   -Now with mcduffie catheter for urinary retention      Constipation   -Hx of recurrent constipation, continue bowel regimen including Miralax and Senokot scheduled   -Encourage hydration and mobilization to encourage bowel motility  - Last BM documented appears to be on 2/17     Impaired Vision  -Continue use of corrective lenses at all appropriate times  -Consider large font for printed materials provided to patient     Impaired Hearing  -Encourage use of hearing aids at all appropriate times  -Encourage providers and caregivers to speak slowly and clearly directly to patient  -Minimize background noise to encourage patient engagement  -Encourage use of teach back method to ensure clear communication     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  -Clinical frailty scale stage V, mildly frail  -Multifactorial including age, ambulatory dysfunction with history of falls, advanced dementia with behavioral disturbance and multiple additional chronic medical co-morbidities  -Encourage well-balanced nutrition  -Continue optimization of chronic medical conditions and address acute metabolic derangements as arise   -Continue psychosocial supports of patient and caregivers  -Although no plan for patient to return home spouse may still benefit from referral to caregiver support group for overall support and coping given sudden progression and severity of symptoms          Subjective:   Patient was seen and examined at the bedside. Patient was resting comfortably in no overt acute distress. When patient was prompted he awoken and responded to minimal questions. However  "he did endorse he doesn't have any complaints at this time then mentioned he tried to contact them yesterday then went back to sleep during encounter.      Review of Systems   Unable to perform ROS: Dementia         Objective:     Vitals: Blood pressure 116/60, pulse 72, temperature 98.5 °F (36.9 °C), resp. rate 17, height 5' 6\" (1.676 m), weight 53.7 kg (118 lb 6.2 oz), SpO2 93%.,Body mass index is 19.11 kg/m².      Intake/Output Summary (Last 24 hours) at 2/20/2024 0815  Last data filed at 2/20/2024 0618  Gross per 24 hour   Intake 460 ml   Output 875 ml   Net -415 ml       Current Medications: Reviewed    Physical Exam:   Physical Exam  Vitals and nursing note reviewed.   Constitutional:       General: He is not in acute distress.     Appearance: He is well-developed.      Comments: Cachectic    HENT:      Head: Normocephalic and atraumatic.      Mouth/Throat:      Mouth: Mucous membranes are dry.   Eyes:      General:         Right eye: No discharge.         Left eye: No discharge.      Conjunctiva/sclera: Conjunctivae normal.      Comments: Minimally reactive    Cardiovascular:      Rate and Rhythm: Normal rate and regular rhythm.      Pulses: Normal pulses.      Heart sounds: Normal heart sounds. No murmur heard.     No friction rub. No gallop.   Pulmonary:      Effort: Pulmonary effort is normal. No respiratory distress.      Breath sounds: Normal breath sounds. No wheezing or rhonchi.   Chest:      Chest wall: No tenderness.   Abdominal:      General: Bowel sounds are normal. There is no distension.      Palpations: Abdomen is soft.      Tenderness: There is no abdominal tenderness.   Genitourinary:     Comments: Choi in place   Musculoskeletal:         General: No swelling or tenderness.      Cervical back: Neck supple.   Skin:     General: Skin is warm and dry.      Capillary Refill: Capillary refill takes less than 2 seconds.   Neurological:      Mental Status: He is alert.      Comments: Somnolent on " exam, able to awaken when prompted, opened eyes minimally responded with a few words however did answer question appropriately, able to follow minimal commands to test for strength in upper extremities, during exam pt fell back asleep and did not participate for lower extremity exam.   Reassessed patient up and walking with staff answering questions appropriately   Psychiatric:         Mood and Affect: Mood normal.          Invasive Devices       Peripheral Intravenous Line  Duration             Peripheral IV 02/18/24 Left;Proximal;Ventral (anterior) Forearm 1 day              Drain  Duration             Urethral Catheter 18 Fr. 1 day                    Lab, Imaging and other studies:    Lab Results:   I have personally reviewed pertinent lab results including the following:  -    I have personally reviewed the following imaging study reports in PACS:    - I have personally reviewed pertinent reports.

## 2024-02-20 NOTE — CASE MANAGEMENT
Case Management Discharge Planning Note    Patient name Gino Hernandez  Location [facility] 834/[facility] 834-01 MRN [id number]  : 1940 Date 2024       Current Admission Date: 2024  Current Admission Diagnosis:Dementia with agitation and other behavioral disturbance (HCC)   Patient Active Problem List    Diagnosis Date Noted    Moderate protein-calorie malnutrition (HCC) 2024    Metabolic encephalopathy 2024    UTI (urinary tract infection) 2024    Polypharmacy 2024    Ambulatory dysfunction 2024    Dementia with agitation and other behavioral disturbance (HCC) 2024    Neuropathy 2024    Constipation 2024    BPH (benign prostatic hyperplasia) 2024    Abnormal urine finding 2024    Delirium 2023    H/O inguinal hernia repair 2023    Benign prostatic hyperplasia with urinary obstruction 2023    Sherwood's esophagus with dysplasia 2023    History of colonic diverticulitis 2022    Slow transit constipation 2022    Pancreas cyst 2021    Schatzki's ring 2021    Gastric polyps 2021    Hx of adenomatous colonic polyps 2021    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    Insomnia 2020      LOS (days): 13  Geometric Mean LOS (GMLOS) (days): 5  Days to GMLOS:-8     OBJECTIVE:  Risk of Unplanned Readmission Score: 30.26         Current admission status: Inpatient   Preferred Pharmacy:   [id number] - [place] - [address]  [address] [address]  Phone: [phone] Fax: [phone]    [id number] - [place] - [address]  [address] [address]  Phone: [phone] Fax: [phone]    [facility] [place] [place] - [address]  7 [facility] [address]  Phone: [phone] Fax: [phone]    Primary Care Provider: Augusta Davis MD    Primary Insurance: MEDICARE  Secondary  Insurance:  FOR LIFE    DISCHARGE DETAILS:        AIDIN message received from last available SNF- [facility]. Report they are unable to accommodate patients' needs. No available SNFs at this time.     CM reached out to Maryam Go at [facility] and requested further assistance with facilitating CAROLYN placement in secure dementia unit.   After discussion with family, Maryam will work with family on identifying possible facilities for dementia placement.     Update 3:30pm: Received telephone call from Rossana at [facility] who requests clinical information be faxed for review at [phone]. Rossana reports they will assess patient tomorrow afternoon at 1pm for potential placement in secure dementia unit.    CM to follow.

## 2024-02-20 NOTE — PLAN OF CARE
Problem: Nutrition/Hydration-ADULT  Goal: Nutrient/Hydration intake appropriate for improving, restoring or maintaining nutritional needs  Description: Monitor and assess patient's nutrition/hydration status for malnutrition. Collaborate with interdisciplinary team and initiate plan and interventions as ordered.  Monitor patient's weight and dietary intake as ordered or per policy. Utilize nutrition screening tool and intervene as necessary. Determine patient's food preferences and provide high-protein, high-caloric foods as appropriate.     INTERVENTIONS:  - Monitor oral intake, urinary output, labs, and treatment plans  - Assess nutrition and hydration status and recommend course of action  - Evaluate amount of meals eaten  - Assist patient with eating if necessary   - Allow adequate time for meals  - Recommend/ encourage appropriate diets, oral nutritional supplements, and vitamin/mineral supplements  - Order, calculate, and assess calorie counts as needed  - Recommend, monitor, and adjust tube feedings and TPN/PPN based on assessed needs  - Assess need for intravenous fluids  - Provide specific nutrition/hydration education as appropriate  - Include patient/family/caregiver in decisions related to nutrition  Outcome: Progressing     Problem: SAFETY,RESTRAINT: NV/NON-SELF DESTRUCTIVE BEHAVIOR  Goal: Remains free of harm/injury (restraint for non violent/non self-detsructive behavior)  Description: INTERVENTIONS:  - Instruct patient/family regarding restraint use   - Assess and monitor physiologic and psychological status   - Provide interventions and comfort measures to meet assessed patient needs   - Identify and implement measures to help patient regain control  - Assess readiness for release of restraint   Outcome: Progressing  Goal: Returns to optimal restraint-free functioning  Description: INTERVENTIONS:  - Assess the patient's behavior and symptoms that indicate continued need for restraint  - Identify  and implement measures to help patient regain control  - Assess readiness for release of restraint   Outcome: Progressing     Problem: Prexisting or High Potential for Compromised Skin Integrity  Goal: Skin integrity is maintained or improved  Description: INTERVENTIONS:  - Identify patients at risk for skin breakdown  - Assess and monitor skin integrity  - Assess and monitor nutrition and hydration status  - Monitor labs   - Assess for incontinence   - Turn and reposition patient  - Assist with mobility/ambulation  - Relieve pressure over bony prominences  - Avoid friction and shearing  - Provide appropriate hygiene as needed including keeping skin clean and dry  - Evaluate need for skin moisturizer/barrier cream  - Collaborate with interdisciplinary team   - Patient/family teaching  - Consider wound care consult   Outcome: Progressing     Problem: DISCHARGE PLANNING  Goal: Discharge to home or other facility with appropriate resources  Description: INTERVENTIONS:  - Identify barriers to discharge w/patient and caregiver  - Arrange for needed discharge resources and transportation as appropriate  - Identify discharge learning needs (meds, wound care, etc.)  - Arrange for interpretive services to assist at discharge as needed  - Refer to Case Management Department for coordinating discharge planning if the patient needs post-hospital services based on physician/advanced practitioner order or complex needs related to functional status, cognitive ability, or social support system  Outcome: Progressing

## 2024-02-20 NOTE — PHYSICAL THERAPY NOTE
PHYSICAL THERAPY TREATMENT  NAME:  Gino Hernandez  DATE: 02/20/24    AGE:   83 y.o.  Mrn:   [id number]  ADMIT DX:  Aggressive behavior [R46.89]  Polypharmacy [Z79.899]  Dementia (HCC) [F03.90]    Past Medical History:   Diagnosis Date    Anxiety     BPH (benign prostatic hyperplasia)     Chronic indwelling Choi catheter     Colon polyp     Dementia with behavioral disturbance (HCC)     Depression     Disorder of eye     disorder of choroid of eye    Diverticulitis of rectum     Frequent UTI     GERD (gastroesophageal reflux disease)     Infected hernioplasty mesh (HCC)     Malignant melanoma of eye, left (HCC)     Memory impairment     Mixed hyperlipidemia     Skin cancer (melanoma) (HCC)     Uveal, Caroidal    Urinary retention     Vision loss of left eye     Wears glasses      Past Surgical History:   Procedure Laterality Date    ABDOMINAL SURGERY      mesh removal    EGD  2019    EGD AND COLONOSCOPY  2017    EGD AND COLONOSCOPY  03/11/2022    HERNIA REPAIR      HERNIA REPAIR Right     Inguinal/Umbilical    ND RPR RECRT INGUINAL HERNIA ANY AGE REDUCIBLE Right 11/2/2023    Procedure: OPEN REPAIR OF RECURRENT REDUCIBLE RIGHT INGUINAL  HERNIAS WITH MESH;  Surgeon: Riley Chavez MD;  Location:  MAIN OR;  Service: General    SKIN CANCER EXCISION  02/21/2019    R upper cheek, benign lesion including margins, face, ears, eyelids, nose, lips, mucous membrane, excised diameter 2.1 to 3.0 CM       Length Of Stay: 13     02/20/24 1014   PT Last Visit   PT Visit Date 02/20/24   Note Type   Note Type Treatment   Pain Assessment   Pain Assessment Tool FLACC   Pain Rating: FLACC (Rest) - Face 0   Pain Rating: FLACC (Rest) - Legs 0   Pain Rating: FLACC (Rest) - Activity 0   Pain Rating: FLACC (Rest) - Cry 0   Pain Rating: FLACC (Rest) - Consolability 0   Score: FLACC (Rest) 0   Pain Rating: FLACC (Activity) - Face 0   Pain Rating: FLACC (Activity) - Legs 0   Pain Rating: FLACC (Activity) - Activity 0   Pain Rating:  FLACC (Activity) - Cry 0   Pain Rating: FLACC (Activity) - Consolability 0   Score: FLACC (Activity) 0   Restrictions/Precautions   Weight Bearing Precautions Per Order No   Other Precautions Impulsive;Cognitive;Chair Alarm;Bed Alarm;Fall Risk  (MCDUFFIE)   General   Chart Reviewed Yes   Response to Previous Treatment Patient with no complaints from previous session.   Family/Caregiver Present No   Cognition   Overall Cognitive Status Impaired   Arousal/Participation Arousable   Attention Attends with cues to redirect   Orientation Level Oriented to person;Disoriented to place;Disoriented to time;Disoriented to situation   Memory Decreased recall of precautions;Decreased recall of recent events;Decreased short term memory;Decreased recall of biographical information   Following Commands Follows one step commands with increased time or repetition   Comments pt overall cooperative; impulsive but redirectable; poor awarenss of deificits. pleasantly confused throughout   Bed Mobility   Supine to Sit 5  Supervision   Additional items Impulsive   Additional Comments pt set up in recliner w/ newspaper/ all needs in reach; LE elevated and on alarm following session   Transfers   Sit to Stand 5  Supervision  (CGA on occasion for balance- assist for mcduffie management (pt forgets he has it))   Additional items Assist x 1;Increased time required;Impulsive;Verbal cues   Stand to Sit 5  Supervision   Additional items Assist x 1;Increased time required;Verbal cues  (occasional CGA for safety/ balacne)   Additional Comments all transfers w/ HHA / cues for safety   Ambulation/Elevation   Gait pattern Improper Weight shift;Shuffling;Excessively slow;Decreased foot clearance   Gait Assistance   (CGA/ HHA)   Additional items Assist x 1;Verbal cues;Tactile cues  (for safety/ attention only- 2* cognitive deficits)   Assistive Device   (HHA provided)   Distance > 550' w/ turns and obstacles/ HHA- very slow dg at times; but pt happy to  "walk adn talk w/ PT; light HHA given for support- pt unable to safely utilize RW or SPC when trialed 2* cognitive deificit/ barrier to new learning and attention. pt w/o overt LOB; 0 dizziness or complaints throughout.   Balance   Static Sitting Fair +   Dynamic Sitting Fair +   Static Standing Fair   Dynamic Standing Fair   Ambulatory Fair -  (-P+; HHA)   Endurance Deficit   Endurance Deficit No   Activity Tolerance   Activity Tolerance Patient tolerated treatment well   Medical Staff Made Aware yes- CM aware/ RN also updated post session   Exercises   Balance training  standing static and  dynamic balance deficits   Assessment   Prognosis Fair   Problem List Impaired balance;Decreased cognition;Impaired judgement;Decreased safety awareness   Assessment pt seen for PT session as documented above. Upon start of session pt wanting to get up and mobilize OOB; performed bed skills and STS xfers w/ S + assist for mcduffie management. Pt 1* limited by safety/ cognitive barriers which are likely new normal. Pt will require S environment on d/c for safety. Pt is noted  to perform all functional mobility at S/ CGA  + cues and redirection for safety. Pt was frequently reoriented throughout session. Was easily redirected to task and he reports 'likes to walk and talk\"  Pt ambulated w/ Pt w/ HHA > 550' without 1 rest of 10 mins at window where he performed standing marching ; Heel raises and mini squats 2x10 each. PT to sign off at this time w/ recommendation for restorative nursing/ staff  to continue to ambulate 2-4x daily to maintain CLOF until time of d/c to supervised environment .  Skilled PT no longer warranted in acute setting as pt functioning near max potential give cognitive / safety deficits.  D/C PT   Barriers to Discharge Decreased caregiver support   Goals   Patient Goals to walk and talk   STG Expiration Date 02/28/24   PT Treatment Day 2   Plan   Progress Discontinue PT  (pt HHA/ S for all mobility- likely new " normal/ baseline given cognitive deficits/ barrier to new learning. skilled PT not warrented in acute setting. Pt is S/ HHA > 550' .  PT recommends  evals on d/c to appropriatly supervised environment.)   AM-PAC Basic Mobility Inpatient   Turning in Flat Bed Without Bedrails 4   Lying on Back to Sitting on Edge of Flat Bed Without Bedrails 4   Moving Bed to Chair 3   Standing Up From Chair Using Arms 3   Walk in Room 3   Climb 3-5 Stairs With Railing 3   Basic Mobility Inpatient Raw Score 20   Basic Mobility Standardized Score 43.99   Highest Level Of Mobility   JH-HLM Goal 6: Walk 10 steps or more   JH-HLM Achieved 8: Walk 250 feet ot more   Education   Education Provided Mobility training   Patient Reinforcement needed   End of Consult   Patient Position at End of Consult All needs within reach;Bed/Chair alarm activated;Bedside chair       The patient's AM-PAC Basic Mobility Inpatient Short Form Raw Score is 20. A Raw score of greater than 16 suggests the patient may benefit from discharge to home. Please also refer to the recommendation of the Physical Therapist for safe discharge planning.          Tracy Vasquez, PT

## 2024-02-21 PROBLEM — N39.0 UTI (URINARY TRACT INFECTION): Status: RESOLVED | Noted: 2024-01-08 | Resolved: 2024-02-21

## 2024-02-21 PROBLEM — K59.00 CONSTIPATION: Status: RESOLVED | Noted: 2024-01-07 | Resolved: 2024-02-21

## 2024-02-21 PROCEDURE — 99233 SBSQ HOSP IP/OBS HIGH 50: CPT | Performed by: INTERNAL MEDICINE

## 2024-02-21 PROCEDURE — 99232 SBSQ HOSP IP/OBS MODERATE 35: CPT | Performed by: INTERNAL MEDICINE

## 2024-02-21 RX ORDER — GABAPENTIN 100 MG/1
200 CAPSULE ORAL DAILY
Status: CANCELLED
Start: 2024-02-21

## 2024-02-21 RX ORDER — GABAPENTIN 400 MG/1
400 CAPSULE ORAL
Status: CANCELLED
Start: 2024-02-21

## 2024-02-21 RX ORDER — FINASTERIDE 5 MG/1
5 TABLET, FILM COATED ORAL DAILY
Status: CANCELLED
Start: 2024-02-21

## 2024-02-21 RX ORDER — ARIPIPRAZOLE 5 MG/1
5 TABLET ORAL DAILY
Status: CANCELLED
Start: 2024-02-22

## 2024-02-21 RX ORDER — OLANZAPINE 7.5 MG/1
7.5 TABLET, FILM COATED ORAL DAILY
Status: CANCELLED
Start: 2024-02-21

## 2024-02-21 RX ORDER — LANOLIN ALCOHOL/MO/W.PET/CERES
6 CREAM (GRAM) TOPICAL
Status: CANCELLED
Start: 2024-02-21

## 2024-02-21 RX ORDER — GABAPENTIN 100 MG/1
200 CAPSULE ORAL
Status: CANCELLED
Start: 2024-02-21

## 2024-02-21 RX ORDER — ACETAMINOPHEN 325 MG/1
650 TABLET ORAL EVERY 6 HOURS PRN
Status: CANCELLED
Start: 2024-02-21

## 2024-02-21 RX ORDER — AMOXICILLIN 250 MG
1 CAPSULE ORAL DAILY
Status: CANCELLED
Start: 2024-02-21

## 2024-02-21 RX ORDER — WATER 10 ML/10ML
INJECTION INTRAMUSCULAR; INTRAVENOUS; SUBCUTANEOUS
Status: COMPLETED
Start: 2024-02-21 | End: 2024-02-21

## 2024-02-21 RX ORDER — POLYETHYLENE GLYCOL 3350 17 G/17G
17 POWDER, FOR SOLUTION ORAL DAILY
Status: CANCELLED
Start: 2024-02-21

## 2024-02-21 RX ORDER — DIVALPROEX SODIUM 125 MG/1
125 CAPSULE, COATED PELLETS ORAL DAILY
Status: CANCELLED
Start: 2024-02-21

## 2024-02-21 RX ADMIN — ARIPIPRAZOLE 5 MG: 2 TABLET ORAL at 12:03

## 2024-02-21 RX ADMIN — GABAPENTIN 200 MG: 100 CAPSULE ORAL at 09:00

## 2024-02-21 RX ADMIN — OLANZAPINE 2.5 MG: 10 INJECTION, POWDER, FOR SOLUTION INTRAMUSCULAR at 21:08

## 2024-02-21 RX ADMIN — CYANOCOBALAMIN TAB 500 MCG 1000 MCG: 500 TAB at 14:54

## 2024-02-21 RX ADMIN — MELATONIN 6 MG: at 21:09

## 2024-02-21 RX ADMIN — GABAPENTIN 200 MG: 100 CAPSULE ORAL at 14:54

## 2024-02-21 RX ADMIN — WATER 10 ML: 1 INJECTION INTRAMUSCULAR; INTRAVENOUS; SUBCUTANEOUS at 21:08

## 2024-02-21 RX ADMIN — OLANZAPINE 7.5 MG: 5 TABLET, FILM COATED ORAL at 14:54

## 2024-02-21 RX ADMIN — DIVALPROEX SODIUM 250 MG: 125 CAPSULE, COATED PELLETS ORAL at 12:02

## 2024-02-21 RX ADMIN — MIRTAZAPINE 15 MG: 15 TABLET, FILM COATED ORAL at 21:09

## 2024-02-21 RX ADMIN — DIVALPROEX SODIUM 250 MG: 125 CAPSULE, COATED PELLETS ORAL at 09:30

## 2024-02-21 RX ADMIN — FINASTERIDE 5 MG: 5 TABLET, FILM COATED ORAL at 14:54

## 2024-02-21 RX ADMIN — GABAPENTIN 400 MG: 400 CAPSULE ORAL at 21:09

## 2024-02-21 RX ADMIN — DIVALPROEX SODIUM 250 MG: 125 CAPSULE, COATED PELLETS ORAL at 16:49

## 2024-02-21 RX ADMIN — DIVALPROEX SODIUM 125 MG: 125 CAPSULE, COATED PELLETS ORAL at 16:48

## 2024-02-21 NOTE — RESTORATIVE TECHNICIAN NOTE
Restorative Technician Note      Patient Name: Gino Hernandez     Restorative Tech Visit Date: 02/21/24  Note Type: Mobility (pt seen again per RN request)  Patient Position Upon Consult: Supine  Activity Performed: Ambulated  Assistive Device: Other (Comment) (HHA)  Education Provided: Yes  Patient Position at End of Consult: Supine; All needs within reach; Bed/Chair alarm activated; Other (comment) (waist timoteo donned)    Sharon Ching  DPT, Restorative Technician

## 2024-02-21 NOTE — PROGRESS NOTES
Progress Note - Geriatric Medicine   Gino Hernandez 83 y.o. male MRN: [id number]  Unit/Bed#: [facility] 834-01 Encounter: [id number]      Assessment/Plan:  Dementia w/ agitation and behavioral disturbance   -Moderate to severe; progressively worsening which lead to placement difficulties and ultimate enrollment into hospice    -Strong family hx of dementia  -Neurology dx Dementia in 2021 in op  -[facility] 12/30 (12/2023)   -[facility] 1/7/24 images personally viewed, moderate/severe diffuse chronic microangiopathic changes  -TSH WNL 1.916, B12 WNL at 882 on daily supplementation   -Recent LFTs- WNL since medication adjustments   -Depakote Level 2/10/24- 53      -Patient is high risk of delirium due to age, dementia, hospitalization, polypharmacy   -Continue delirium precautions  -Encourage normal sleep/wake cycle  -Encourage early mobilization and ambulation with assist as appropriate to do so, per records patient greatly enjoys walking and frequent walks throughout the day reportedly helped some with behaviors during prior admissions  -Provide frequent reorientation and redirection as indicated and appropriate   -Encourage hydration and nutrition      Regimen includes Gabapentin 100mg BID and 300mg HS, Zyprexa to 7.5mg   Would remain off restraints as able  Received 1 PRN dose of Zyprexa 2.5mg in evening   Recommend Abilify to 5mg QD, continue Depakote dosing 250mg TID w/ evening dose 375mg, if he becomes restless can take evening dose earlier, continue B12 supplementation, would repeat EKG to monitor QTC when able   Recommend avoiding Haloperidol    OOB as tolerated, walking is a mechanism that he prefers as he used to walk up to 10 miles/day with dog at home      Hospice Patient   Secondary to severe dementia since recent hospitalization where he was discharged to SNF w/ hospice  Pending placement in secure dementia unit        Insomnia  -Recommend and Encourage consistent sleep/wake times and establishment of bedtime  routine  -Minimize daytime naps and encourage activity earlier in morning to reduce risk of late afternoon/early evening activity/stimulation disrupting normal circadian rhythm  -Outpatient regimen includes Mirtazapine and Melatonin, will continue current regimen      BPH with LUTS  -Continue home Finasteride   -Now with mcduffie catheter for urinary retention; voiding trial and remove as appropriate       Constipation   -Hx of recurrent constipation, continue bowel regimen including Miralax and Senokot scheduled   -Encourage hydration and mobilization to encourage bowel motility  -Last BM documented appears to be on 2/17     Impaired Vision  -Continue use of corrective lenses at all appropriate times  -Consider large font for printed materials provided to patient     Impaired Hearing  -Encourage use of hearing aids at all appropriate times  -Encourage providers and caregivers to speak slowly and clearly directly to patient  -Minimize background noise to encourage patient engagement  -Encourage use of teach back method to ensure clear communication     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  -Clinical frailty scale stage V, mildly frail  -Multifactorial including age, ambulatory dysfunction with history of falls, advanced dementia with behavioral disturbance and multiple additional chronic medical co-morbidities  -Encourage well-balanced nutrition  -Continue optimization of chronic medical conditions and address acute metabolic derangements as arise   -Continue psychosocial supports of patient and caregivers  -Although no plan for patient to return home spouse may still benefit from referral to caregiver support group for overall support and coping given sudden progression and severity of symptoms      Subjective:   Patient was seen and examined at the bedside. Patient was resting comfortably in the bed in no overt acute distress. Patient endorsed he had a good night, slept well, and denies any other complaints at the time of  "my evaluation.       Review of Systems   Constitutional:  Negative for chills and fever.   HENT:  Negative for ear pain and sore throat.    Eyes:  Negative for pain and visual disturbance.   Respiratory:  Negative for cough, shortness of breath and wheezing.    Cardiovascular:  Negative for chest pain and palpitations.   Gastrointestinal:  Negative for abdominal pain and vomiting.   Genitourinary:  Negative for dysuria, flank pain, frequency, hematuria and urgency.   Musculoskeletal:  Negative for arthralgias and back pain.   Skin:  Negative for color change and rash.   Neurological:  Negative for seizures, syncope and headaches.   All other systems reviewed and are negative.        Objective:     Vitals: Blood pressure 115/51, pulse 72, temperature (!) 97.3 °F (36.3 °C), resp. rate 14, height 5' 6\" (1.676 m), weight 53.7 kg (118 lb 6.2 oz), SpO2 93%.,Body mass index is 19.11 kg/m².      Intake/Output Summary (Last 24 hours) at 2/21/2024 0833  Last data filed at 2/21/2024 0452  Gross per 24 hour   Intake 200 ml   Output 650 ml   Net -450 ml       Current Medications: Reviewed    Physical Exam:   Physical Exam  Vitals and nursing note reviewed.   Constitutional:       General: He is not in acute distress.     Appearance: He is well-developed. He is not toxic-appearing or diaphoretic.      Comments: Cachectic    HENT:      Head: Normocephalic and atraumatic.      Mouth/Throat:      Mouth: Mucous membranes are dry.   Eyes:      General:         Right eye: No discharge.         Left eye: No discharge.      Conjunctiva/sclera: Conjunctivae normal.      Pupils: Pupils are equal, round, and reactive to light.      Comments: Minimally reactive    Cardiovascular:      Rate and Rhythm: Normal rate and regular rhythm.      Pulses: Normal pulses.      Heart sounds: Normal heart sounds. No murmur heard.     No friction rub. No gallop.   Pulmonary:      Effort: Pulmonary effort is normal. No respiratory distress.      Breath " sounds: Normal breath sounds. No wheezing or rhonchi.   Chest:      Chest wall: No tenderness.   Abdominal:      General: Abdomen is flat. Bowel sounds are normal. There is no distension.      Palpations: Abdomen is soft.      Tenderness: There is no abdominal tenderness.      Comments: In posey waist restraint    Genitourinary:     Comments: Choi in place   Musculoskeletal:         General: No swelling or tenderness.      Cervical back: Neck supple.   Skin:     General: Skin is warm and dry.      Capillary Refill: Capillary refill takes less than 2 seconds.   Neurological:      Mental Status: He is alert.      Comments: Sleepy on exam, awakens when prompted, opened eyes, conversed appropriately, no focal neurologic deficits appreciated    Psychiatric:         Mood and Affect: Mood normal.          Invasive Devices       Peripheral Intravenous Line  Duration             Peripheral IV 02/18/24 Left;Proximal;Ventral (anterior) Forearm 2 days              Drain  Duration             Urethral Catheter 18 Fr. 2 days                    Lab, Imaging and other studies:    Lab Results:   I have personally reviewed pertinent lab results including the following:  -    I have personally reviewed the following imaging study reports in PACS:    - I have personally reviewed pertinent reports.

## 2024-02-21 NOTE — RESTORATIVE TECHNICIAN NOTE
Restorative Technician Note      Patient Name: Gino Hernandez     Restorative Tech Visit Date: 02/21/24  Note Type: Mobility  Patient Position Upon Consult: Supine  Activity Performed: Ambulated  Assistive Device: Other (Comment) (Attempted RW however pt unable to safely manage 2/2 cog deficits at this time; HHA was more appropriate and safer)  Education Provided: Yes  Patient Position at End of Consult: Supine; All needs within reach; Bed/Chair alarm activated; Other (comment) (waist timoteo donned)    Sharon Ching  DPT, Restorative Technician

## 2024-02-21 NOTE — CASE MANAGEMENT
Case Management Discharge Planning Note    Patient name Gino Hernandez  Location [facility] 834/[facility] 834-01 MRN [id number]  : 1940 Date 2024       Current Admission Date: 2024  Current Admission Diagnosis:Dementia with agitation and other behavioral disturbance (HCC)   Patient Active Problem List    Diagnosis Date Noted    Moderate protein-calorie malnutrition (HCC) 2024    Metabolic encephalopathy 2024    UTI (urinary tract infection) 2024    Polypharmacy 2024    Ambulatory dysfunction 2024    Dementia with agitation and other behavioral disturbance (HCC) 2024    Neuropathy 2024    BPH (benign prostatic hyperplasia) 2024    Abnormal urine finding 2024    Delirium 2023    H/O inguinal hernia repair 2023    Benign prostatic hyperplasia with urinary obstruction 2023    Sherwood's esophagus with dysplasia 2023    History of colonic diverticulitis 2022    Slow transit constipation 2022    Pancreas cyst 2021    Schatzki's ring 2021    Gastric polyps 2021    Hx of adenomatous colonic polyps 2021    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    Insomnia 2020      LOS (days): 14  Geometric Mean LOS (GMLOS) (days): 5  Days to GMLOS:-8.9     OBJECTIVE:  Risk of Unplanned Readmission Score: 30.57         Current admission status: Inpatient   Preferred Pharmacy:   [id number] - [place] - [address]  [address] [address]  Phone: [phone] Fax: [phone]    [facility] [place] - [address]  [address] [address]  Phone: [phone] Fax: [phone]    [facility] - [place] - [address]  7 [facility] [address]  Phone: [phone] Fax: [phone]    Primary Care Provider: Augusta Davis MD    Primary Insurance: MEDICARE  Secondary Insurance:  FOR  LIFE    DISCHARGE DETAILS:        Received phone call from Cordelia at [facility]. Provided OK to set up transport tomorrow 2/22.   Will arrange BLS for 10:00am.  DME form completed by provider and faxed to [facility]- [phone]  [facility] following

## 2024-02-21 NOTE — CASE MANAGEMENT
Case Management Discharge Planning Note    Patient name Gino Hernandez  Location [facility] 834/[facility] 834-01 MRN [id number]  : 1940 Date 2024       Current Admission Date: 2024  Current Admission Diagnosis:Dementia with agitation and other behavioral disturbance (HCC)   Patient Active Problem List    Diagnosis Date Noted    Moderate protein-calorie malnutrition (HCC) 2024    Metabolic encephalopathy 2024    UTI (urinary tract infection) 2024    Polypharmacy 2024    Ambulatory dysfunction 2024    Dementia with agitation and other behavioral disturbance (HCC) 2024    Neuropathy 2024    BPH (benign prostatic hyperplasia) 2024    Abnormal urine finding 2024    Delirium 2023    H/O inguinal hernia repair 2023    Benign prostatic hyperplasia with urinary obstruction 2023    Sherwood's esophagus with dysplasia 2023    History of colonic diverticulitis 2022    Slow transit constipation 2022    Pancreas cyst 2021    Schatzki's ring 2021    Gastric polyps 2021    Hx of adenomatous colonic polyps 2021    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 [address]  Phone: [phone] Fax: [phone]    Primary Care Provider: Augusta Davis MD    Primary Insurance: MEDICARE  Secondary Insurance:  FOR  LIFE    DISCHARGE DETAILS:        CM met with representatives from [facility] in [place]. Per liaison,  Cordelia Ghotra ( [phone]), pt is accepted to [facility]. Facility requesting completed DME form, as well as walker ordered for ambulation.   Once facility makes contact with family, and needed paperwork is completed, will inform CM to set up transport.   Spoke to Nelida Olivo, hospice liaison ( [phone]) who reports they will order walker for patient, and because patient was admitted, would need a new referral through [facility] placed in [facility].

## 2024-02-21 NOTE — PLAN OF CARE
Problem: Nutrition/Hydration-ADULT  Goal: Nutrient/Hydration intake appropriate for improving, restoring or maintaining nutritional needs  Description: Monitor and assess patient's nutrition/hydration status for malnutrition. Collaborate with interdisciplinary team and initiate plan and interventions as ordered.  Monitor patient's weight and dietary intake as ordered or per policy. Utilize nutrition screening tool and intervene as necessary. Determine patient's food preferences and provide high-protein, high-caloric foods as appropriate.     INTERVENTIONS:  - Monitor oral intake, urinary output, labs, and treatment plans  - Assess nutrition and hydration status and recommend course of action  - Evaluate amount of meals eaten  - Assist patient with eating if necessary   - Allow adequate time for meals  - Recommend/ encourage appropriate diets, oral nutritional supplements, and vitamin/mineral supplements  - Order, calculate, and assess calorie counts as needed  - Recommend, monitor, and adjust tube feedings and TPN/PPN based on assessed needs  - Assess need for intravenous fluids  - Provide specific nutrition/hydration education as appropriate  - Include patient/family/caregiver in decisions related to nutrition  Outcome: Progressing     Problem: PAIN - ADULT  Goal: Verbalizes/displays adequate comfort level or baseline comfort level  Description: Interventions:  - Encourage patient to monitor pain and request assistance  - Assess pain using appropriate pain scale  - Administer analgesics based on type and severity of pain and evaluate response  - Implement non-pharmacological measures as appropriate and evaluate response  - Consider cultural and social influences on pain and pain management  - Notify physician/advanced practitioner if interventions unsuccessful or patient reports new pain  Outcome: Progressing     Problem: INFECTION - ADULT  Goal: Absence or prevention of progression during  hospitalization  Description: INTERVENTIONS:  - Assess and monitor for signs and symptoms of infection  - Monitor lab/diagnostic results  - Monitor all insertion sites, i.e. indwelling lines, tubes, and drains  - Monitor endotracheal if appropriate and nasal secretions for changes in amount and color  - [place] appropriate cooling/warming therapies per order  - Administer medications as ordered  - Instruct and encourage patient and family to use good hand hygiene technique  - Identify and instruct in appropriate isolation precautions for identified infection/condition  Outcome: Progressing  Goal: Absence of fever/infection during neutropenic period  Description: INTERVENTIONS:  - Monitor WBC    Outcome: Progressing     Problem: DISCHARGE PLANNING  Goal: Discharge to home or other facility with appropriate resources  Description: INTERVENTIONS:  - Identify barriers to discharge w/patient and caregiver  - Arrange for needed discharge resources and transportation as appropriate  - Identify discharge learning needs (meds, wound care, etc.)  - Arrange for interpretive services to assist at discharge as needed  - Refer to Case Management Department for coordinating discharge planning if the patient needs post-hospital services based on physician/advanced practitioner order or complex needs related to functional status, cognitive ability, or social support system  Outcome: Progressing     Problem: Knowledge Deficit  Goal: Patient/family/caregiver demonstrates understanding of disease process, treatment plan, medications, and discharge instructions  Description: Complete learning assessment and assess knowledge base.  Interventions:  - Provide teaching at level of understanding  - Provide teaching via preferred learning methods  Outcome: Progressing

## 2024-02-21 NOTE — DISCHARGE SUMMARY
INTERNAL MEDICINE RESIDENCY DISCHARGE SUMMARY     Gino Hernandez   83 y.o. male  MRN: [id number]  Room/Bed: [facility] 834/[facility] 834-01     [facility] BE [facility] 8   Encounter: [id number]    Principal Problem:    Dementia with agitation and other behavioral disturbance (HCC)  Active Problems:    BPH (benign prostatic hyperplasia)    Polypharmacy    Moderate protein-calorie malnutrition (HCC)      * Dementia with agitation and other behavioral disturbance (HCC)  Assessment & Plan  Patient with history of progressive Alzheimer's dementia.  At baseline, patient may be alert and oriented to person, but not to place or time.  He holds conversations but does get confused at baseline.  Patient had recent admission in January 2024 patient has dementia with agitation and other behavioral disturbances.  Was seen in consultation by geriatrics for polypharmacy and dementia treatment at that time.  Following conclusion of hospitalization patient was discharged to hospice at [facility].  However, patient returned to ED at [facility] on 02/07 as patient was having behaviors and acting aggressive towards staff and other residents at [facility].  There were no longer able to care for him without a continuous one-to-one observer, which was not covered by insurance in which family could not afford.  Because of this, he was taken back to the ED to find placement at another facility.  Patient currently without any acute complaints or signs/symptoms of infection.  Per wife, patient has not been acting any different recently than he has since last discharge.  No apparent metabolic/infectious changes to explain behaviors at this time.  Likely just progression of underlying dementia.   Reviewed medicine reconciliation sent by [facility] at admission.  2/13: Pt has not been combative. Occasionally pulls at mcduffie/IV, so reordered soft restraints overnight. Otherwise  feels well.   2/14: Overnight was agitated. S/p 1 time dose of gabapentin 100mg. Improved. Nurse removed wrist restraints this am. Pt sleeping comfortably. Aox1  2/19: Stable today. No overnight events.   Plan:  Continue PTA Depakote 250 mg 3 times daily and cont extra 125 mg dose with PM dose.   Continue PTA melatonin 6 mg nightly  Continue PTA mirtazapine 15 mg nightly  Continue olanzapine 7.5 mg p.o. daily at 2 PM  Increased Abilify to 5 mg daily per geriatric recommendation  Continue PTA vitamin B-12 supplementation  Continue PTA olanzapine 2.5 mg IM as needed for agitation  Continue PTA haloperidol 1 mg p.o. every 6 hours as needed for agitation unresponsive to as needed olanzapine  Geriatrics service consulted at this admission given patient's dementia with behavioral disturbances and polypharmacy.  Their recommendations are very much appreciated.  Avoid physical restraints, use anxiolytics prn for agitation  Cont gabapentin 200mg in am, 200mg in afternoon; increase night dose to 400mg      BPH (benign prostatic hyperplasia)  Assessment & Plan  Patient with history of BPH.  Voiding trial initiated while inpatient upon request of wife. Patient failed voiding trial, Mcduffie catheter placed 2/11.  Patient will require chronic Mcduffie catheter. Outpatient follow-up with urology.  *Spoke with wife and son at bedside. Wife is concerned about pt having discomfort with the mcduffie catheter. Had discussion regarding voiding trial and decided if pt is comfortable with the mcduffie, we can leave it for now. If it begins to cause discomfort, we can attempt a voiding trial. Informed nurse.  2/15: Pt still pulling at his mcduffie, attempted voiding trial, mcduffie removed  2/16: pt underwent straight cath with 900 cc output   2/17: pt doing well without mcduffie for now  2/18: pt was straight cathed twice; bladder scans showed 427cc and 593 cc  2/19: mcduffie was replaced     Plan:   Continue prior to admission finasteride 5 mg daily  Continue  "reta,  follow up with urology outpatient     Polypharmacy  Assessment & Plan  Patient with history of progressive dementia.  Prior to admission medications include Depakote, melatonin, mirtazapine, olanzapine, quetiapine, Haldol as needed, olanzapine as needed.  These medications were reviewed at admission.  Medications currently being ministered at [facility] were continued.  Given patient's continued behavioral disturbances and extensive list of current medications, geriatric service is consulted and their recommendations are greatly appreciated.  Plan:  -as above    Constipation-resolved as of 2/21/2024  Assessment & Plan  Patient with history of constipation. Had several bowel movements overnight.   Plan:  Decrease bowel regimen to Miralax qd and sennakot qd  Monitor for bowel movements.          DETAILS OF HOSPITAL COURSE     HPI per Max Early, \"Mr. Jimmie Hernandez is an 83-year-old male with past medical history of dementia with agitation and behavioral disturbances, polypharmacy, BPH, constipation, ambulatory dysfunction.  Patient had recent hospitalization from 0 1/7/2024 through 01/29/2024 at [facility] for dementia with behavioral disturbances.  Geriatric service saw patient in consultation, and patient was eventually discharged on hospice to [facility]. Unfortunately, patient presented to emergency department on 02/07/2024.  Per EMS, patient was hitting staff and other residents at his nursing home and even more unfortunately he was not welcome back to the facility any longer.  Per patient's wife, patient states that he requires a continuous one-to-one observer and this would not be covered by insurance and thus patient's family would have to pay out-of-pocket for this which they cannot.  Upon my encounter, patient seen in ED in hospital bed.  Patient not combative or agitated at this time.  However, he was noticeably confused.  He was alert to person, but not to place or " "time.  Per conversation with his wife, this is his baseline.  Patient is afebrile, pulse 71, respiratory rate 16, BP 97/53 and saturating at 100% on room air.  CBC with WBC of 4.77, hemoglobin at 10.8.  CMP largely unremarkable.  Patient had just completed treatment for UTI at previous hospitalization.  No dysuria at admission.  Patient will be admitted to the [facility] service for geriatrics consultation for dementia and polypharmacy along with placement for patient.\"    Throughout hospital stay, the geriatric team was consulted to help manage agitation and other behavioral disturbances.  They made several changes to his home medication regimen to effectively optimize his agitation.  He is currently taking Depakote 250 mg 3 times daily and an extra 125 mg nightly, melatonin 6 mg, mirtazapine 15 mg, olanzapine 7.5 mg in the afternoon, Abilify 5 mg, gabapentin 200mg in the morning, 200 mg in the afternoon and 400 mg at bedtime, and vitamin B12 supplementation.    Pt also required mcduffie catheter placement. Attempted voiding trials; however pt was still retaining urine after each trial. Will discharge with mcduffie and have him follow up with urology for suprapubic catheter evaluation/placement.      2/22/2024:   Pt seen and examined this morning. Sleeping on exam. Mumbled some words in sleep. No overnight events per nurse. Unable to obtain ROS due to dementia/sleeping.     Physical Exam  Constitutional:       Appearance: Normal appearance.   HENT:      Head: Normocephalic and atraumatic.   Eyes:      Extraocular Movements: Extraocular movements intact.   Cardiovascular:      Rate and Rhythm: Normal rate and regular rhythm.      Pulses: Normal pulses.      Heart sounds: No murmur heard.  Pulmonary:      Effort: No respiratory distress.      Breath sounds: Normal breath sounds. No wheezing.   Abdominal:      General: There is no distension.      Palpations: Abdomen is soft.      Tenderness: There is no abdominal " tenderness.   Musculoskeletal:         General: No swelling or tenderness.      Cervical back: Normal range of motion and neck supple.   Skin:     General: Skin is warm and dry.   Neurological:      General: No focal deficit present.      Mental Status: He is alert.      Comments: AOx1   Psychiatric:         Mood and Affect: Mood normal.         Behavior: Behavior normal.         DISCHARGE INFORMATION     PCP at Discharge: Augusta Davis MD    Admitting Provider: Ascencion Baldwin MD  Admission Date: 2/7/2024    Discharge Provider: Cyndee Keller DO  Discharge Date: 2/21/2024    Discharge Disposition: Home/Self Care  Discharge Condition: good  Discharge with Lines: mcduffie catheter     Discharge Diet: regular diet  Activity Restrictions: none  Test Results Pending at Discharge: none    Discharge Diagnoses:  Principal Problem:    Dementia with agitation and other behavioral disturbance (HCC)  Active Problems:    BPH (benign prostatic hyperplasia)    Polypharmacy    Moderate protein-calorie malnutrition (HCC)  Resolved Problems:    Constipation      Consulting Providers:      Diagnostic & Therapeutic Procedures Performed:  No results found.    Code Status: Level 3 - DNAR and DNI  Advance Directive & Living Will: <no information>      Medications:    Current Discharge Medication List        START taking these medications    Details   ARIPiprazole (ABILIFY) 5 mg tablet Take 1 tablet (5 mg total) by mouth daily    Associated Diagnoses: Aggressive behavior; Severe Alzheimer's dementia with agitation, unspecified timing of dementia onset (HCC)      !! divalproex sodium (DEPAKOTE SPRINKLE) 125 MG capsule Take 1 capsule (125 mg total) by mouth daily with dinner    Associated Diagnoses: Aggressive behavior; Severe Alzheimer's dementia with agitation, unspecified timing of dementia onset (HCC)      finasteride (PROSCAR) 5 mg tablet Take 1 tablet (5 mg total) by mouth daily    Associated Diagnoses: Benign prostatic hyperplasia with  urinary retention      senna-docusate sodium (SENOKOT S) 8.6-50 mg per tablet Take 1 tablet by mouth in the morning    Associated Diagnoses: Slow transit constipation       !! - Potential duplicate medications found. Please discuss with provider.        Current Discharge Medication List        CONTINUE these medications which have NOT CHANGED    Details   cyanocobalamin (VITAMIN B-12) 1000 MCG tablet Take 1 tablet (1,000 mcg total) by mouth daily  Qty: 90 tablet, Refills: 0    Associated Diagnoses: Altered mental status, unspecified altered mental status type      !! divalproex sodium (DEPAKOTE SPRINKLE) 125 MG capsule Take 2 capsules (250 mg total) by mouth 3 (three) times a day with meals    Associated Diagnoses: Dementia (HCC)      mirtazapine (REMERON) 15 mg tablet Take 1 tablet (15 mg total) by mouth daily at bedtime for 15 days    Associated Diagnoses: Dementia (HCC)      polyethylene glycol (MIRALAX) 17 g packet Take 17 g by mouth daily Do not start before November 17, 2023.  Qty: 90 each, Refills: 0    Associated Diagnoses: Altered mental status, unspecified altered mental status type       !! - Potential duplicate medications found. Please discuss with provider.         Current Discharge Medication List        CONTINUE these medications which have CHANGED    Details   !! gabapentin (NEURONTIN) 100 mg capsule Take 2 capsules (200 mg total) by mouth daily    Associated Diagnoses: Aggressive behavior; Severe Alzheimer's dementia with agitation, unspecified timing of dementia onset (HCC)      !! gabapentin (NEURONTIN) 100 mg capsule Take 2 capsules (200 mg total) by mouth daily after lunch    Associated Diagnoses: Aggressive behavior; Severe Alzheimer's dementia with agitation, unspecified timing of dementia onset (HCC)      !! gabapentin (NEURONTIN) 400 mg capsule Take 1 capsule (400 mg total) by mouth daily at bedtime    Associated Diagnoses: Aggressive behavior; Severe Alzheimer's dementia with agitation,  unspecified timing of dementia onset (HCC)      melatonin 3 mg Take 2 tablets (6 mg total) by mouth daily at bedtime    Associated Diagnoses: Insomnia due to other mental disorder      OLANZapine (ZyPREXA) 7.5 mg tablet Take 1 tablet (7.5 mg total) by mouth daily    Associated Diagnoses: Aggressive behavior; Severe Alzheimer's dementia with agitation, unspecified timing of dementia onset (HCC)       !! - Potential duplicate medications found. Please discuss with provider.         Current Discharge Medication List        STOP taking these medications       divalproex sodium (DEPAKOTE) 250 mg DR tablet Comments:   Reason for Stopping:         docusate sodium (COLACE) 100 mg capsule Comments:   Reason for Stopping:         docusate sodium (COLACE) 100 mg capsule Comments:   Reason for Stopping:         doxycycline hyclate (VIBRAMYCIN) 100 mg capsule Comments:   Reason for Stopping:         dutasteride (AVODART) 0.5 mg capsule Comments:   Reason for Stopping:         dutasteride (AVODART) 0.5 mg capsule Comments:   Reason for Stopping:         escitalopram (LEXAPRO) 5 mg tablet Comments:   Reason for Stopping:         escitalopram (LEXAPRO) 5 mg tablet Comments:   Reason for Stopping:         LORazepam (ATIVAN) 0.5 mg tablet Comments:   Reason for Stopping:         LORazepam (ATIVAN) 1 mg tablet Comments:   Reason for Stopping:         QUEtiapine (SEROquel) 25 mg tablet Comments:   Reason for Stopping:         senna (SENOKOT) 8.6 MG tablet Comments:   Reason for Stopping:         senna (SENOKOT) 8.6 mg Comments:   Reason for Stopping:         tamsulosin (FLOMAX) 0.4 mg Comments:   Reason for Stopping:         tamsulosin (Flomax) 0.4 mg Comments:   Reason for Stopping:                 Allergies:  Allergies   Allergen Reactions    Sulfa Antibiotics Other (See Comments)     Childhood- unknown    Sulfa Antibiotics Other (See Comments)     unKnown       FOLLOW-UP     PCP Outpatient Follow-up:  Will follow up with PCP  "    Consulting Providers Follow-up:  Urology      Active Issues Requiring Follow-up:   none    Discharge Statement:   I spent 30 minutes minutes discharging the patient. This time was spent on the day of discharge. I had direct contact with the patient on the day of discharge. Additional documentation is required if more than 30 minutes were spent on discharge.    Portions of the record may have been created with voice recognition software.  Occasional wrong word or \"sound a like\" substitutions may have occurred due to the inherent limitations of voice recognition software.  Read the chart carefully and recognize, using context, where substitutions have occurred.    ==  Lizbet Beckett DO  [facility] [address]  Internal Medicine Resident PGY-1   "

## 2024-02-22 VITALS
TEMPERATURE: 97.3 F | WEIGHT: 118.39 LBS | RESPIRATION RATE: 18 BRPM | DIASTOLIC BLOOD PRESSURE: 57 MMHG | BODY MASS INDEX: 19.03 KG/M2 | SYSTOLIC BLOOD PRESSURE: 95 MMHG | HEIGHT: 66 IN | OXYGEN SATURATION: 93 % | HEART RATE: 60 BPM

## 2024-02-22 PROCEDURE — 99233 SBSQ HOSP IP/OBS HIGH 50: CPT | Performed by: INTERNAL MEDICINE

## 2024-02-22 RX ORDER — OLANZAPINE 7.5 MG/1
7.5 TABLET, FILM COATED ORAL DAILY
Start: 2024-02-22

## 2024-02-22 RX ORDER — GABAPENTIN 100 MG/1
200 CAPSULE ORAL
Start: 2024-02-22

## 2024-02-22 RX ORDER — ARIPIPRAZOLE 5 MG/1
5 TABLET ORAL DAILY
Start: 2024-02-22

## 2024-02-22 RX ORDER — AMOXICILLIN 250 MG
1 CAPSULE ORAL DAILY
Start: 2024-02-22

## 2024-02-22 RX ORDER — GABAPENTIN 400 MG/1
400 CAPSULE ORAL
Start: 2024-02-22

## 2024-02-22 RX ORDER — LANOLIN ALCOHOL/MO/W.PET/CERES
6 CREAM (GRAM) TOPICAL
Start: 2024-02-22

## 2024-02-22 RX ORDER — DIVALPROEX SODIUM 125 MG/1
125 CAPSULE, COATED PELLETS ORAL
Start: 2024-02-22

## 2024-02-22 RX ORDER — FINASTERIDE 5 MG/1
5 TABLET, FILM COATED ORAL DAILY
Start: 2024-02-22

## 2024-02-22 RX ORDER — GABAPENTIN 100 MG/1
200 CAPSULE ORAL DAILY
Start: 2024-02-22

## 2024-02-22 NOTE — PROGRESS NOTES
Patient:    MRN:  [id number]    Mikaela Request ID:  [id number]    Level of care reserved:  Assisted Living/Congregate    Partner Reserved:  [facility] [place] [id number] [phone]    Clinical needs requested:    Geography searched:  10 miles around [id number]    Start of Service:    Request sent:  10:52am EST on 2/21/2024 by Sharron Berkowitz    Partner reserved:  9:39am EST on 2/22/2024 by Sharron Berkowitz    Choice list shared:  9:39am EST on 2/22/2024 by Sharron Berkowitz

## 2024-02-22 NOTE — PROGRESS NOTES
Progress Note - Geriatric Medicine   Gino Hernandez 83 y.o. male MRN: [id number]  Unit/Bed#: [facility] 834-01 Encounter: [id number]      Assessment/Plan:  Dementia w/ agitation and behavioral disturbance   -Moderate to severe; progressively worsening which lead to placement difficulties and ultimate enrollment into hospice    -Strong family hx of dementia  -Neurology dx Dementia in 2021 in op  -[facility] 12/30 (12/2023)   -[facility] 1/7/24 images personally viewed, moderate/severe diffuse chronic microangiopathic changes  -TSH WNL 1.916, B12 WNL at 882 on daily supplementation   -Recent LFTs- WNL since medication adjustments   -Depakote Level 2/10/24- 53      -Patient is high risk of delirium due to age, dementia, hospitalization, polypharmacy   -Continue delirium precautions  -Encourage normal sleep/wake cycle  -Encourage early mobilization and ambulation with assist as appropriate to do so, per records patient greatly enjoys walking and frequent walks throughout the day reportedly helped some with behaviors during prior admissions  -Provide frequent reorientation and redirection as indicated and appropriate   -Encourage hydration and nutrition      Regimen includes Gabapentin 200mg BID and 400mg HS, Zyprexa to 7.5mg, goal to wean Zyprexa   In 2 days would consider discontinuing Zyprexa altogether   Continue Abilify to 5mg QD, continue Depakote dosing 250mg TID w/ evening dose 375mg, if he becomes restless can take evening dose earlier rather than increasing dose, continue B12 supplementation  Recommend avoiding Haloperidol    OOB as tolerated, walking is a mechanism that he prefers as he used to walk up to 10 miles/day with dog at home  Mcduffie in place if discharging with mcduffie will need close follow up, goal for removal and voiding trial           Hospice Patient   Secondary to severe dementia since recent hospitalization where he was discharged to SNF w/ hospice  Placement at [facility] in locked Dementia Unit; DC today          Insomnia  -Recommend and Encourage consistent sleep/wake times and establishment of bedtime routine  -Minimize daytime naps and encourage activity earlier in morning to reduce risk of late afternoon/early evening activity/stimulation disrupting normal circadian rhythm  -Outpatient regimen includes Mirtazapine and Melatonin, will continue current regimen      BPH with LUTS  -Continue home Finasteride   -Now with mcduffie catheter for urinary retention; voiding trial and remove as appropriate       Constipation   -Hx of recurrent constipation, continue bowel regimen including Miralax and Senokot scheduled   -Encourage hydration and mobilization to encourage bowel motility  -Last BM documented appears to be on 2/17     Impaired Vision  -Continue use of corrective lenses at all appropriate times  -Consider large font for printed materials provided to patient     Impaired Hearing  -Encourage use of hearing aids at all appropriate times  -Encourage providers and caregivers to speak slowly and clearly directly to patient  -Minimize background noise to encourage patient engagement  -Encourage use of teach back method to ensure clear communication     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  -Clinical frailty scale stage V, mildly frail  -Multifactorial including age, ambulatory dysfunction with history of falls, advanced dementia with behavioral disturbance and multiple additional chronic medical co-morbidities  -Encourage well-balanced nutrition  -Continue optimization of chronic medical conditions and address acute metabolic derangements as arise   -Continue psychosocial supports of patient and caregivers  -Although no plan for patient to return home spouse may still benefit from referral to caregiver support group for overall support and coping given sudden progression and severity of symptoms        Subjective:   Patient was seen and examined at the bedside. Patient was resting comfortably sitting up eating breakfast in no  "overt acute distress. Patient had a good night no restless or agressive behaviors per nursing. Patient was appropriately conversive and denied any complaints at the time of my evaluation.     Review of Systems   Constitutional:  Negative for chills and fever.   HENT:  Negative for congestion, ear pain and sore throat.    Eyes:  Negative for pain and visual disturbance.   Respiratory:  Negative for cough, chest tightness, shortness of breath and wheezing.    Cardiovascular:  Negative for chest pain and palpitations.   Gastrointestinal:  Negative for abdominal pain, diarrhea, nausea and vomiting.   Genitourinary:  Negative for dysuria and hematuria.   Musculoskeletal:  Negative for arthralgias and back pain.   Skin:  Negative for color change and rash.   Neurological:  Negative for seizures, syncope and headaches.   Psychiatric/Behavioral:  Negative for agitation.    All other systems reviewed and are negative.        Objective:     Vitals: Blood pressure 95/57, pulse 60, temperature (!) 97.3 °F (36.3 °C), temperature source Axillary, resp. rate 18, height 5' 6\" (1.676 m), weight 53.7 kg (118 lb 6.2 oz), SpO2 93%.,Body mass index is 19.11 kg/m².      Intake/Output Summary (Last 24 hours) at 2/22/2024 0839  Last data filed at 2/21/2024 2217  Gross per 24 hour   Intake 120 ml   Output 1050 ml   Net -930 ml       Current Medications: Reviewed    Physical Exam:   Physical Exam  Vitals and nursing note reviewed.   Constitutional:       General: He is not in acute distress.     Appearance: He is well-developed. He is not toxic-appearing or diaphoretic.      Comments: Cachectic    HENT:      Head: Normocephalic and atraumatic.      Mouth/Throat:      Mouth: Mucous membranes are dry.   Eyes:      General:         Right eye: No discharge.         Left eye: No discharge.      Conjunctiva/sclera: Conjunctivae normal.      Pupils: Pupils are equal, round, and reactive to light.   Cardiovascular:      Rate and Rhythm: Normal rate " and regular rhythm.      Pulses: Normal pulses.      Heart sounds: Normal heart sounds. No murmur heard.     No friction rub. No gallop.   Pulmonary:      Effort: Pulmonary effort is normal. No respiratory distress.      Breath sounds: Normal breath sounds. No wheezing or rhonchi.   Chest:      Chest wall: No tenderness.   Abdominal:      General: Abdomen is flat. Bowel sounds are normal. There is no distension.      Palpations: Abdomen is soft.      Tenderness: There is no abdominal tenderness.      Comments: In posey waist restraint    Genitourinary:     Comments: Choi in place   Musculoskeletal:         General: No swelling or tenderness.      Cervical back: Neck supple.   Skin:     General: Skin is warm and dry.      Capillary Refill: Capillary refill takes less than 2 seconds.   Neurological:      Mental Status: He is alert.      Comments: Alert to person, answering some questions appropriately, follows commands, mentation most clear its been, no focal neurologic deficits appreciated    Psychiatric:         Mood and Affect: Mood normal.         Behavior: Behavior normal.          Invasive Devices       Peripheral Intravenous Line  Duration             Peripheral IV 02/18/24 Left;Proximal;Ventral (anterior) Forearm 3 days              Drain  Duration             Urethral Catheter 18 Fr. 3 days                    Lab, Imaging and other studies:    Lab Results:   I have personally reviewed pertinent lab results including the following:  -    I have personally reviewed the following imaging study reports in PACS:    - I have personally reviewed pertinent reports.

## 2024-02-22 NOTE — PLAN OF CARE
Problem: Nutrition/Hydration-ADULT  Goal: Nutrient/Hydration intake appropriate for improving, restoring or maintaining nutritional needs  Description: Monitor and assess patient's nutrition/hydration status for malnutrition. Collaborate with interdisciplinary team and initiate plan and interventions as ordered.  Monitor patient's weight and dietary intake as ordered or per policy. Utilize nutrition screening tool and intervene as necessary. Determine patient's food preferences and provide high-protein, high-caloric foods as appropriate.     INTERVENTIONS:  - Monitor oral intake, urinary output, labs, and treatment plans  - Assess nutrition and hydration status and recommend course of action  - Evaluate amount of meals eaten  - Assist patient with eating if necessary   - Allow adequate time for meals  - Recommend/ encourage appropriate diets, oral nutritional supplements, and vitamin/mineral supplements  - Order, calculate, and assess calorie counts as needed  - Recommend, monitor, and adjust tube feedings and TPN/PPN based on assessed needs  - Assess need for intravenous fluids  - Provide specific nutrition/hydration education as appropriate  - Include patient/family/caregiver in decisions related to nutrition  Outcome: Progressing     Problem: SAFETY,RESTRAINT: NV/NON-SELF DESTRUCTIVE BEHAVIOR  Goal: Remains free of harm/injury (restraint for non violent/non self-detsructive behavior)  Description: INTERVENTIONS:  - Instruct patient/family regarding restraint use   - Assess and monitor physiologic and psychological status   - Provide interventions and comfort measures to meet assessed patient needs   - Identify and implement measures to help patient regain control  - Assess readiness for release of restraint   Outcome: Progressing

## 2024-02-22 NOTE — CASE MANAGEMENT
Case Management Discharge Planning Note    Patient name Gino Hernandez  Location [facility] 834/[facility] 834-01 MRN [id number]  : 1940 Date 2024       Current Admission Date: 2024  Current Admission Diagnosis:Dementia with agitation and other behavioral disturbance (HCC)   Patient Active Problem List    Diagnosis Date Noted    Moderate protein-calorie malnutrition (HCC) 2024    Metabolic encephalopathy 2024    Polypharmacy 2024    Ambulatory dysfunction 2024    Dementia with agitation and other behavioral disturbance (HCC) 2024    Neuropathy 2024    BPH (benign prostatic hyperplasia) 2024    Abnormal urine finding 2024    Delirium 2023    H/O inguinal hernia repair 2023    Benign prostatic hyperplasia with urinary obstruction 2023    Sherwood's esophagus with dysplasia 2023    History of colonic diverticulitis 2022    Slow transit constipation 2022    Pancreas cyst 2021    Schatzki's ring 2021    Gastric polyps 2021    Hx of adenomatous colonic polyps 2021    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    Insomnia 2020      LOS (days): 15  Geometric Mean LOS (GMLOS) (days): 5  Days to GMLOS:-9.7     OBJECTIVE:  Risk of Unplanned Readmission Score: 31.14         Current admission status: Inpatient   Preferred Pharmacy:   [id number] - [place] - [address]  [address] [address]  Phone: [phone] Fax: [phone]    [facility] [address] [facility] [place] - [address]  [address] [address]  Phone: [phone] Fax: [phone]    [facility] - [place], [place] - [address]  [address] [facility] [address]  Phone: [phone] Fax: [phone]    Primary Care Provider: Augusta Davis MD    Primary Insurance: MEDICARE  Secondary Insurance:  FOR LIFE    DISCHARGE DETAILS:        IMM Given  (Date):: 02/22/24  IMM Given to:: Family              Pt clear to d/c to [facility] intermediate, BLS requested for 10:00am. IMM reviewed with wife, Sobia via phone call, copy placed in chart.

## 2024-02-29 ENCOUNTER — NURSING HOME VISIT (OUTPATIENT)
Dept: GERIATRICS | Facility: OTHER | Age: 84
End: 2024-02-29
Payer: MEDICARE

## 2024-02-29 DIAGNOSIS — E44.0 MODERATE PROTEIN-CALORIE MALNUTRITION (HCC): ICD-10-CM

## 2024-02-29 DIAGNOSIS — R33.8 BENIGN PROSTATIC HYPERPLASIA WITH URINARY RETENTION: ICD-10-CM

## 2024-02-29 DIAGNOSIS — F02.C11 SEVERE ALZHEIMER'S DEMENTIA WITH AGITATION, UNSPECIFIED TIMING OF DEMENTIA ONSET (HCC): Primary | ICD-10-CM

## 2024-02-29 DIAGNOSIS — G30.9 SEVERE ALZHEIMER'S DEMENTIA WITH AGITATION, UNSPECIFIED TIMING OF DEMENTIA ONSET (HCC): Primary | ICD-10-CM

## 2024-02-29 DIAGNOSIS — N40.1 BENIGN PROSTATIC HYPERPLASIA WITH URINARY RETENTION: ICD-10-CM

## 2024-02-29 DIAGNOSIS — F41.9 ANXIETY: ICD-10-CM

## 2024-02-29 DIAGNOSIS — R26.2 AMBULATORY DYSFUNCTION: ICD-10-CM

## 2024-02-29 PROCEDURE — 99344 HOME/RES VST NEW MOD MDM 60: CPT | Performed by: FAMILY MEDICINE

## 2024-02-29 NOTE — PROGRESS NOTES
[facility]  [address]  [place] [address]   Sp [facility]  POS 13  History and Physical    NAME: Gino Hernandez  AGE: 83 y.o. SEX: male [phone]    DATE OF ENCOUNTER: 3/5/2024    Code status:  No CPR    Assessment and Plan     1. Severe Alzheimer's dementia with agitation, unspecified timing of dementia onset (HCC)  - redirection, reorientation  - assistance with ADLs  - cont Aripiprazole 5 mg po qd  - cont divalproex 250 mg po bid  - cont olanzapine 7.5 mg po qd    2. Ambulatory dysfunction  - Fall precautions in place    3. Benign prostatic hyperplasia with urinary retention  - stable  - cont Finasteride 5 mg po qd    4. Moderate protein-calorie malnutrition (HCC)  - assistance with feeding  - nutritional supplements  - cont Mirtazapine 15 mg po qhs    5. Anxiety  - with intermittent episodes  - cont Lorazepam 0.5 mg q6 as needed    All medications and routine orders were reviewed and updated as needed.    Plan discussed with: Nurse    Chief Complaint     Seen for admission at Nursing Facility    History of Present Illness     Ed Davdi, a 82 y/o male with PMH of Dementia, BPH, Malnutrition got admitted to the hospital from [facility] with worsening behaviors. He had a previous admission of the same, placed on hospice due to his advanced dementia. Geriatrics team got consulted for medication reconciliation. He had urinary retention and a mcduffie was placed after failed voiding attempts.  He got discharged to [facility] for long term stay and hospice care.   He was seen and examined, stable. He found sitting in the family room. He is not able to participate in the visit due to dementia. He is very irritable.   Staff have no concerns at this time.    HISTORY:  Past Medical History:   Diagnosis Date   • Anxiety    • BPH (benign prostatic hyperplasia)    • Chronic indwelling Mcduffie catheter    • Colon polyp    • Dementia with behavioral disturbance (HCC)    • Depression    •  Disorder of eye     disorder of choroid of eye   • Diverticulitis of rectum    • Frequent UTI    • GERD (gastroesophageal reflux disease)    • Infected hernioplasty mesh (HCC)    • Malignant melanoma of eye, left (HCC)    • Memory impairment    • Mixed hyperlipidemia    • Skin cancer (melanoma) (HCC)     Uveal, Caroidal   • Urinary retention    • Vision loss of left eye    • Wears glasses      Family History   Problem Relation Age of Onset   • Alzheimer's disease Mother    • No Known Problems Father    • Dementia Mother      Social History     Socioeconomic History   • Marital status: /Civil Union     Spouse name: None   • Number of children: None   • Years of education: 4 year college   • Highest education level: None   Occupational History   • Occupation: [employer]   Tobacco Use   • Smoking status: Former     Current packs/day: 0.00     Average packs/day: 1 pack/day for 9.0 years (9.0 ttl pk-yrs)     Types: Cigarettes     Start date:      Quit date:      Years since quittin.2   • Smokeless tobacco: Never   • Tobacco comments:     QUIT IN    Vaping Use   • Vaping status: Never Used   Substance and Sexual Activity   • Alcohol use: Not Currently     Alcohol/week: 7.0 standard drinks of alcohol     Types: 7 Glasses of wine per week     Comment: 1 glass a night   • Drug use: Not Currently     Comment: DENIES   • Sexual activity: Not Currently   Other Topics Concern   • None   Social History Narrative    ** Merged History Encounter **         Do you currently or have you served in the [employer]: Yes  If Yes, What branch of service: [employer]  Occupation: retired [employer]  Education: 4 Year College  Marital status:   Exercise level: None  Diet: Regular  General stress level: Low  Alcohol i    ntake: Moderate  Caffeine intake: Moderate  Seat belts used routinely: Yes  Sunscreen used routinely: Yes  Smoke alarm in home: Yes  Advance directive: Yes  Live alone or with others: with others      Social Determinants of Health     Financial Resource Strain: Low Risk  (4/17/2023)    Overall Financial Resource Strain (CARDIA)    • Difficulty of Paying Living Expenses: Not very hard   Food Insecurity: No Food Insecurity (2/22/2024)    Hunger Vital Sign    • Worried About Running Out of Food in the Last Year: Never true    • Ran Out of Food in the Last Year: Never true   Transportation Needs: No Transportation Needs (2/22/2024)    PRAPARE - Transportation    • Lack of Transportation (Medical): No    • Lack of Transportation (Non-Medical): No   Physical Activity: Not on file   Stress: Not on file   Social Connections: Not on file   Intimate Partner Violence: Not on file   Housing Stability: High Risk (2/22/2024)    Housing Stability Vital Sign    • Unable to Pay for Housing in the Last Year: No    • Number of Places Lived in the Last Year: 3    • Unstable Housing in the Last Year: Yes       Allergies:  Allergies   Allergen Reactions   • Sulfa Antibiotics Other (See Comments)     Childhood- unknown   • Sulfa Antibiotics Other (See Comments)     unKnown       Review of Systems     Review of Systems   Unable to perform ROS: Dementia   As in HPI.    Medications and orders     All medications reviewed and updated in [facility] EMR.      Objective     Vitals: T: 97.0, P: 76, R: 16, BP: 107/56, 92% on RA    Physical Exam  Vitals and nursing note reviewed.   Constitutional:       General: He is not in acute distress.     Appearance: Normal appearance. He is well-developed. He is not diaphoretic.   HENT:      Head: Normocephalic and atraumatic.      Nose: Nose normal.      Mouth/Throat:      Mouth: Mucous membranes are dry.      Pharynx: Oropharynx is clear. No oropharyngeal exudate.   Eyes:      General: No scleral icterus.        Right eye: No discharge.         Left eye: No discharge.      Conjunctiva/sclera: Conjunctivae normal.   Cardiovascular:      Rate and Rhythm: Normal rate and regular rhythm.      Heart  sounds: Normal heart sounds. No murmur heard.     No friction rub.   Pulmonary:      Effort: Pulmonary effort is normal. No respiratory distress.      Breath sounds: Normal breath sounds. No wheezing.   Chest:      Chest wall: No tenderness.   Abdominal:      General: Bowel sounds are normal. There is no distension.      Palpations: Abdomen is soft.      Tenderness: There is no abdominal tenderness. There is no guarding.   Musculoskeletal:         General: No tenderness or deformity. Normal range of motion.      Right lower leg: No edema.      Left lower leg: No edema.   Skin:     General: Skin is warm and dry.   Neurological:      Mental Status: He is alert.      Cranial Nerves: No cranial nerve deficit.      Motor: No abnormal muscle tone.      Coordination: Coordination normal.      Comments: Oriented to self  Verbal, confused speech  Unable to follow commands   Psychiatric:      Comments: Irritable, confused.         Pertinent Laboratory/Diagnostic Studies:   The following labs/studies were reviewed please see chart or hospital paperwork for details.  Ref Range & Units 2/17/24 0651 2/15/24 0841 2/12/24 0610 2/10/24 0557 2/8/24 0424 2/7/24 1317 1/27/24 0526   Sodium  135 - 147 mmol/L 141 142 140 139 140 138 142   Potassium  3.5 - 5.3 mmol/L 3.6 3.7 3.7 4.1 CM 3.5 3.9 4.0   Chloride  96 - 108 mmol/L 108 107 105 108 106 106 110 High    CO2  21 - 32 mmol/L 28 28 29 27 19 Low  27 26   ANION GAP  mmol/L 5 7 6 4 15 5 6   BUN  5 - 25 mg/dL 23 25 14 13 15 19 25   Creatinine  0.60 - 1.30 mg/dL 0.78 0.93 CM 0.95 CM 0.88 CM 0.83 CM 0.85 CM 0.89 CM   Comment: Standardized to IDMS reference method   Glucose  65 - 140 mg/dL 99 87 CM 84 CM 77  CM 82 CM 92 CM   Comment: If the patient is fasting, the ADA then defines impaired fasting glucose as > 100 mg/dL and diabetes as > or equal to 123 mg/dL.   Calcium  8.4 - 10.2 mg/dL 8.9 8.9 8.8 8.5 9.1 8.5 8.8   eGFR  ml/min/1.73sq m 84 76 74 79 81 81 79     Ref Range & Units  2/17/24 0651 2/15/24 0841 2/12/24 0610 2/10/24 0557 2/8/24 0424 2/7/24 1317 1/27/24 0526   WBC  4.31 - 10.16 Thousand/uL 3.85 Low  4.84 7.54 3.94 Low  6.20 4.77 6.12   RBC  3.88 - 5.62 Million/uL 4.34 4.53 4.34 4.22 4.51 3.75 Low  4.64   Hemoglobin  12.0 - 17.0 g/dL 12.5 13.1 12.7 12.5 12.8 10.8 Low  13.4   Hematocrit  36.5 - 49.3 % 37.2 39.5 38.0 37.7 37.9 33.3 Low  40.9   MCV  82 - 98 fL 86 87 88 89 84 89 88   MCH  26.8 - 34.3 pg 28.8 28.9 29.3 29.6 28.4 28.8 28.9   MCHC  31.4 - 37.4 g/dL 33.6 33.2 33.4 33.2 33.8 32.4 32.8   RDW  11.6 - 15.1 % 14.3 14.3 14.2 14.5 13.9 14.4 13.7   MPV  8.9 - 12.7 fL 11.6 11.3 11.4 12.3 11.5 11.4 11.2   Platelets  149 - 390 Thousands/uL 169 178 195 168 243 175 207   nRBC  /100 WBCs 0  0   0    Neutrophils Relative  43 - 75 % 64  76 High    76 High     Immat GRANS %  0 - 2 % 0  1   0    Lymphocytes Relative  14 - 44 % 23  13 Low    15    Monocytes Relative  4 - 12 % 9  8   8    Eosinophils Relative  0 - 6 % 3  2   1    Basophils Relative  0 - 1 % 1  0   0    Neutrophils Absolute  1.85 - 7.62 Thousands/µL 2.48  5.71   3.56    Immature Grans Absolute  0.00 - 0.20 Thousand/uL 0.01  0.04   0.01    Lymphocytes Absolute  0.60 - 4.47 Thousands/µL 0.88  1.00   0.73    Monocytes Absolute  0.17 - 1.22 Thousand/µL 0.33  0.60   0.39    Eosinophils Absolute  0.00 - 0.61 Thousand/µL 0.13  0.16   0.06    Basophils Absolute  0.00 - 0.10 Thousands/µL 0.02  0.03         Ref Range & Units 2/10/24 0557   Valproic Acid, Total  50 - 100 ug/mL 53     - Counseling Documentation: patient was counseled regarding: prognosis

## 2024-03-01 DIAGNOSIS — F02.C11 SEVERE ALZHEIMER'S DEMENTIA WITH AGITATION, UNSPECIFIED TIMING OF DEMENTIA ONSET (HCC): Primary | ICD-10-CM

## 2024-03-01 DIAGNOSIS — G30.9 SEVERE ALZHEIMER'S DEMENTIA WITH AGITATION, UNSPECIFIED TIMING OF DEMENTIA ONSET (HCC): Primary | ICD-10-CM

## 2024-03-01 DIAGNOSIS — Z51.5 HOSPICE CARE PATIENT: ICD-10-CM

## 2024-03-01 RX ORDER — LORAZEPAM 0.5 MG/1
0.5 TABLET ORAL EVERY 6 HOURS
Qty: 120 TABLET | Refills: 0 | Status: SHIPPED | OUTPATIENT
Start: 2024-03-01

## 2024-03-08 ENCOUNTER — HOSPITAL ENCOUNTER (EMERGENCY)
Facility: HOSPITAL | Age: 84
Discharge: NURSING, REHAB OR LONG-TERM CARE | End: 2024-03-08
Attending: EMERGENCY MEDICINE
Payer: MEDICARE

## 2024-03-08 ENCOUNTER — TELEPHONE (OUTPATIENT)
Age: 84
End: 2024-03-08

## 2024-03-08 VITALS
TEMPERATURE: 97.6 F | DIASTOLIC BLOOD PRESSURE: 64 MMHG | SYSTOLIC BLOOD PRESSURE: 136 MMHG | RESPIRATION RATE: 16 BRPM | HEART RATE: 81 BPM | OXYGEN SATURATION: 100 %

## 2024-03-08 DIAGNOSIS — R33.9 URINARY RETENTION: ICD-10-CM

## 2024-03-08 DIAGNOSIS — T83.9XXA PROBLEM WITH FOLEY CATHETER, INITIAL ENCOUNTER (HCC): Primary | ICD-10-CM

## 2024-03-08 PROCEDURE — 99283 EMERGENCY DEPT VISIT LOW MDM: CPT | Performed by: EMERGENCY MEDICINE

## 2024-03-08 PROCEDURE — 99283 EMERGENCY DEPT VISIT LOW MDM: CPT

## 2024-03-08 NOTE — TELEPHONE ENCOUNTER
Minda from [employer] called today in attempt to establish the patient after a ED visit today for urinary retention.    Caller states the patient was discharged with a mcduffie, but needs to have SPT put in.    Please review for scheduling recommendations.    Pt would like to be scheduled in [facility] office.     Call back [phone]

## 2024-03-08 NOTE — ED PROVIDER NOTES
"History  Chief Complaint   Patient presents with    Urinary Catheter Problem     Patient presents from [facility]. Per EMS \"Patient needed suprapubic mcduffie changed out when he became aggressive with staff, patient was given .5mg of haldol, .5mg of ativan to premedicate patient\" Patient does have dementia, no urinary catheter in place.      83-year-old male with history of severe dementia, presents from nursing facility due to urinary catheter problems.  Patient apparently had a voiding trial, which he failed, and when attempting to replace the Mcduffie catheter, patient got agitated and they discontinued trying.  Sent him to the emergency room.  Apparently family is agreeable for suprapubic catheter placement, and facility was told to make the appropriate appointments.  They state that he had no other complaints at this time and was at his baseline.        Prior to Admission Medications   Prescriptions Last Dose Informant Patient Reported? Taking?   ARIPiprazole (ABILIFY) 5 mg tablet   No No   Sig: Take 1 tablet (5 mg total) by mouth daily   LORazepam (Ativan) 0.5 mg tablet   No No   Sig: Take 1 tablet (0.5 mg total) by mouth every 6 (six) hours   OLANZapine (ZyPREXA) 7.5 mg tablet   No No   Sig: Take 1 tablet (7.5 mg total) by mouth daily   cyanocobalamin (VITAMIN B-12) 1000 MCG tablet   No No   Sig: Take 1 tablet (1,000 mcg total) by mouth daily   divalproex sodium (DEPAKOTE SPRINKLE) 125 MG capsule   No No   Sig: Take 2 capsules (250 mg total) by mouth 3 (three) times a day with meals   divalproex sodium (DEPAKOTE SPRINKLE) 125 MG capsule   No No   Sig: Take 1 capsule (125 mg total) by mouth daily with dinner   finasteride (PROSCAR) 5 mg tablet   No No   Sig: Take 1 tablet (5 mg total) by mouth daily   gabapentin (NEURONTIN) 100 mg capsule   No No   Sig: Take 2 capsules (200 mg total) by mouth daily   gabapentin (NEURONTIN) 100 mg capsule   No No   Sig: Take 2 capsules (200 mg total) by mouth daily " after lunch   gabapentin (NEURONTIN) 400 mg capsule   No No   Sig: Take 1 capsule (400 mg total) by mouth daily at bedtime   melatonin 3 mg   No No   Sig: Take 2 tablets (6 mg total) by mouth daily at bedtime   mirtazapine (REMERON) 15 mg tablet   No No   Sig: Take 1 tablet (15 mg total) by mouth daily at bedtime for 15 days   polyethylene glycol (MIRALAX) 17 g packet   No No   Sig: Take 17 g by mouth daily Do not start before November 17, 2023.   senna-docusate sodium (SENOKOT S) 8.6-50 mg per tablet   No No   Sig: Take 1 tablet by mouth in the morning      Facility-Administered Medications: None       Past Medical History:   Diagnosis Date    Anxiety     BPH (benign prostatic hyperplasia)     Chronic indwelling Choi catheter     Colon polyp     Dementia with behavioral disturbance (HCC)     Depression     Disorder of eye     disorder of choroid of eye    Diverticulitis of rectum     Frequent UTI     GERD (gastroesophageal reflux disease)     Infected hernioplasty mesh (HCC)     Malignant melanoma of eye, left (HCC)     Memory impairment     Mixed hyperlipidemia     Skin cancer (melanoma) (HCC)     Uveal, Caroidal    Urinary retention     Vision loss of left eye     Wears glasses        Past Surgical History:   Procedure Laterality Date    ABDOMINAL SURGERY      mesh removal    EGD  2019    EGD AND COLONOSCOPY  2017    EGD AND COLONOSCOPY  03/11/2022    HERNIA REPAIR      HERNIA REPAIR Right     Inguinal/Umbilical    NC RPR RECRT INGUINAL HERNIA ANY AGE REDUCIBLE Right 11/2/2023    Procedure: OPEN REPAIR OF RECURRENT REDUCIBLE RIGHT INGUINAL  HERNIAS WITH MESH;  Surgeon: Riley Chavez MD;  Location:  MAIN OR;  Service: General    SKIN CANCER EXCISION  02/21/2019    R upper cheek, benign lesion including margins, face, ears, eyelids, nose, lips, mucous membrane, excised diameter 2.1 to 3.0 CM       Family History   Problem Relation Age of Onset    Alzheimer's disease Mother     No Known Problems Father      Dementia Mother      I have reviewed and agree with the history as documented.    E-Cigarette/Vaping    E-Cigarette Use Never User      E-Cigarette/Vaping Substances    Nicotine No     THC No     CBD No     Flavoring No     Other No     Unknown No      Social History     Tobacco Use    Smoking status: Former     Current packs/day: 0.00     Average packs/day: 1 pack/day for 9.0 years (9.0 ttl pk-yrs)     Types: Cigarettes     Start date:      Quit date:      Years since quittin.2    Smokeless tobacco: Never    Tobacco comments:     QUIT IN    Vaping Use    Vaping status: Never Used   Substance Use Topics    Alcohol use: Not Currently     Alcohol/week: 7.0 standard drinks of alcohol     Types: 7 Glasses of wine per week     Comment: 1 glass a night    Drug use: Not Currently     Comment: DENIES        Review of Systems   Unable to perform ROS: Dementia       Physical Exam  ED Triage Vitals   Temperature Pulse Respirations Blood Pressure SpO2   24 1215 24 1210 24 1210 24 1210 24 1210   97.6 °F (36.4 °C) 58 16 136/64 100 %      Temp Source Heart Rate Source Patient Position - Orthostatic VS BP Location FiO2 (%)   24 1209 24 1210 24 1210 24 1210 --   Oral Monitor Lying Right arm       Pain Score       --                    Orthostatic Vital Signs  Vitals:    24 1210 24 1215   BP: 136/64 136/64   Pulse: 58 81   Patient Position - Orthostatic VS: Lying        Physical Exam  Vitals and nursing note reviewed.   Constitutional:       General: He is not in acute distress.     Appearance: He is not ill-appearing.   HENT:      Head: Normocephalic and atraumatic.      Right Ear: External ear normal.      Left Ear: External ear normal.      Nose: Nose normal.      Mouth/Throat:      Mouth: Mucous membranes are moist.   Eyes:      Extraocular Movements: Extraocular movements intact.   Cardiovascular:      Rate and Rhythm: Normal rate.      Pulses:  Normal pulses.      Heart sounds: Normal heart sounds.   Pulmonary:      Effort: Pulmonary effort is normal. No respiratory distress.   Abdominal:      Palpations: Abdomen is soft.      Tenderness: There is no abdominal tenderness.   Musculoskeletal:         General: Normal range of motion.      Cervical back: Normal range of motion and neck supple.   Skin:     General: Skin is warm and dry.      Capillary Refill: Capillary refill takes less than 2 seconds.   Neurological:      Mental Status: He is alert. Mental status is at baseline. He is disoriented.   Psychiatric:         Mood and Affect: Mood normal.         ED Medications  Medications - No data to display    Diagnostic Studies  Results Reviewed       None                   No orders to display         Procedures  Procedures      ED Course  ED Course as of 03/08/24 1320   Fri Mar 08, 2024   1247 Discussed patient with his nursing facility, they stated that patient had a voiding trial and when attempting to replace the Choi catheter, patient became very agitated and they were unable to replace it.  They state he is at his normal baseline with no other symptoms or complaints at this time.                                       Medical Decision Making  Ed came due to urinary Choi issues.  Failed his voiding trial, attempted to replace as no facility and they had difficulties.  Sent to the ER for replacement.  Choi catheter inserted with no difficulties.  Patient tolerated procedure well.  Discussed with the nursing facility, they state there was no other symptoms and patient has baseline.  Patient discharged with urology follow-up in order for an apparent suprapubic catheter placement.  Patient discharged in stable condition          Disposition  Final diagnoses:   Problem with Choi catheter, initial encounter (HCC)   Urinary retention     Time reflects when diagnosis was documented in both MDM as applicable and the Disposition within this note       Time User  Action Codes Description Comment    3/8/2024 12:46 PM Lalo Odom Add [T83.9XXA] Problem with Choi catheter, initial encounter (HCC)     3/8/2024 12:46 PM Lalo Odom Add [R33.9] Urinary retention           ED Disposition       ED Disposition   Discharge    Condition   Stable    Date/Time   Fri Mar 8, 2024 12:46 PM    Comment   Gino Hernandez discharge to home/self care.                   MD Documentation      Flowsheet Row Most Recent Value   Transported by (Company and Unit #) [facility]          RN Documentation      Flowsheet Row Most Recent Value   Transported by (Company and Unit #) [facility]          Follow-up Information       Follow up With Specialties Details Why Contact Info Additional Information    Rob Ribeiro MD Family Medicine, Geriatric Medicine Call in 1 day For ED follow-up [address] [place] [id number]  [phone]       [facility] Emergency Medicine   [address] [facility] [address]  [phone] [facility], [address] [place], [place], [address]    [facility] Call in 1 day For ED follow-up [address]  [facility] [address] [phone] [facility], [address] [facility] [facility] [address], [place], [place], [id number] [phone]            Patient's Medications   Discharge Prescriptions    No medications on file     No discharge procedures on file.    PDMP Review         Value Time User    PDMP Reviewed  Yes 3/1/2024  3:07 PM Rob Ribeiro MD             ED Provider  Attending physically available and evaluated Gino ZION David. I managed the patient along with the ED Attending.    Electronically Signed by           Lalo Odom DO  03/08/24 1384

## 2024-03-09 NOTE — ED ATTENDING ATTESTATION
3/8/2024  ITrina MD, saw and evaluated the patient. I have discussed the patient with the resident/non-physician practitioner and agree with the resident's/non-physician practitioner's findings, Plan of Care, and MDM as documented in the resident's/non-physician practitioner's note, except where noted. All available labs and Radiology studies were reviewed.  I was present for key portions of any procedure(s) performed by the resident/non-physician practitioner and I was immediately available to provide assistance.       At this point I agree with the current assessment done in the Emergency Department.  I have conducted an independent evaluation of this patient a history and physical is as follows:    83-year-old with dementia and Choi, nursing home was unable to put a new Choi in, sent patient to the ER.  No other complaints.  Patient with severe dementia unable to provide any history.    Will proceed with Choi placement, outpatient follow-up with urology.      ED Course         Critical Care Time  Procedures

## 2024-03-15 ENCOUNTER — TELEPHONE (OUTPATIENT)
Age: 84
End: 2024-03-15

## 2024-03-15 NOTE — TELEPHONE ENCOUNTER
[facility] ([phone]) called for signature of death certificate.     Case # [id number]  Date of Death:  3/14/2024  Time of Death:  5:03 am

## 2024-03-19 NOTE — TELEPHONE ENCOUNTER
Arlen from [facility] ([phone]) called following up on death certificate.   Provider advised the death certificate was competed under a different case #    I called back Arlen an advised.

## 2024-05-29 ENCOUNTER — TELEPHONE (OUTPATIENT)
Dept: GASTROENTEROLOGY | Facility: CLINIC | Age: 84
End: 2024-05-29

## 2024-05-29 NOTE — TELEPHONE ENCOUNTER
ov due to age to discuss possible repeat endoscopy in 1 year   Dilatation of the esophagus if needed. frm dr. morel pt   .  I lmom for pt to please call back to schedule with one of our GI providers as Dr Morel has retired. Will call again if do not hear back from him.

## 2025-08-07 ENCOUNTER — DOCUMENTATION (OUTPATIENT)
Dept: ADMINISTRATIVE | Facility: OTHER | Age: 85
End: 2025-08-07

## (undated) DEVICE — CHLORAPREP HI-LITE 26ML ORANGE

## (undated) DEVICE — PEANUT 5 PK

## (undated) DEVICE — GLOVE INDICATOR PI UNDERGLOVE SZ 8 BLUE

## (undated) DEVICE — SUT PROLENE 2-0 SH 30 IN 8833H

## (undated) DEVICE — MEDI-VAC YANK SUCT HNDL W/TPRD BULBOUS TIP: Brand: CARDINAL HEALTH

## (undated) DEVICE — PLUMEPEN PRO 10FT

## (undated) DEVICE — STRL PENROSE DRAIN 18" X 1/4": Brand: CARDINAL HEALTH

## (undated) DEVICE — BARD MESH PERFIX PLUG, EXTRA LARGE
Type: IMPLANTABLE DEVICE | Site: INGUINAL | Status: NON-FUNCTIONAL
Brand: BARD MESH PERFIX PLUG

## (undated) DEVICE — ADHESIVE SKIN HIGH VISCOSITY EXOFIN 1ML

## (undated) DEVICE — INTENDED FOR TISSUE SEPARATION, AND OTHER PROCEDURES THAT REQUIRE A SHARP SURGICAL BLADE TO PUNCTURE OR CUT.: Brand: BARD-PARKER SAFETY BLADES SIZE 15, STERILE

## (undated) DEVICE — SUT VICRYL 2-0 SH 27 IN UNDYED J417H

## (undated) DEVICE — SUT MONOCRYL 4-0 PS-2 27 IN Y426H

## (undated) DEVICE — TUBING SUCTION 5MM X 12 FT

## (undated) DEVICE — GLOVE SRG BIOGEL ECLIPSE 7.5

## (undated) DEVICE — SUT MONOCRYL 4-0 PS-2 18 IN Y496G

## (undated) DEVICE — NEEDLE 25G X 1 1/2

## (undated) DEVICE — BETHLEHEM UNIVERSAL MINOR GEN: Brand: CARDINAL HEALTH